# Patient Record
Sex: MALE | Race: BLACK OR AFRICAN AMERICAN | NOT HISPANIC OR LATINO
[De-identification: names, ages, dates, MRNs, and addresses within clinical notes are randomized per-mention and may not be internally consistent; named-entity substitution may affect disease eponyms.]

---

## 2017-01-17 ENCOUNTER — APPOINTMENT (OUTPATIENT)
Dept: CT IMAGING | Facility: IMAGING CENTER | Age: 67
End: 2017-01-17

## 2017-01-24 ENCOUNTER — APPOINTMENT (OUTPATIENT)
Dept: CT IMAGING | Facility: IMAGING CENTER | Age: 67
End: 2017-01-24

## 2017-01-24 ENCOUNTER — OUTPATIENT (OUTPATIENT)
Dept: OUTPATIENT SERVICES | Facility: HOSPITAL | Age: 67
LOS: 1 days | End: 2017-01-24
Payer: MEDICARE

## 2017-01-24 DIAGNOSIS — Z00.8 ENCOUNTER FOR OTHER GENERAL EXAMINATION: ICD-10-CM

## 2017-01-24 PROCEDURE — 71250 CT THORAX DX C-: CPT

## 2017-02-06 ENCOUNTER — EMERGENCY (EMERGENCY)
Facility: HOSPITAL | Age: 67
LOS: 1 days | Discharge: HOME | End: 2017-02-06
Attending: EMERGENCY MEDICINE | Admitting: EMERGENCY MEDICINE
Payer: MEDICARE

## 2017-02-06 VITALS
OXYGEN SATURATION: 97 % | HEART RATE: 77 BPM | RESPIRATION RATE: 20 BRPM | DIASTOLIC BLOOD PRESSURE: 78 MMHG | SYSTOLIC BLOOD PRESSURE: 194 MMHG | TEMPERATURE: 98 F

## 2017-02-06 DIAGNOSIS — Z95.1 PRESENCE OF AORTOCORONARY BYPASS GRAFT: Chronic | ICD-10-CM

## 2017-02-06 LAB
ALBUMIN SERPL ELPH-MCNC: 3.4 G/DL — SIGNIFICANT CHANGE UP (ref 3.3–5)
ALP SERPL-CCNC: 127 U/L — HIGH (ref 40–120)
ALT FLD-CCNC: 15 U/L — SIGNIFICANT CHANGE UP (ref 4–41)
AST SERPL-CCNC: 22 U/L — SIGNIFICANT CHANGE UP (ref 4–40)
BASOPHILS # BLD AUTO: 0.01 K/UL — SIGNIFICANT CHANGE UP (ref 0–0.2)
BASOPHILS NFR BLD AUTO: 0.2 % — SIGNIFICANT CHANGE UP (ref 0–2)
BILIRUB SERPL-MCNC: 0.2 MG/DL — SIGNIFICANT CHANGE UP (ref 0.2–1.2)
BUN SERPL-MCNC: 54 MG/DL — HIGH (ref 7–23)
CALCIUM SERPL-MCNC: 8.8 MG/DL — SIGNIFICANT CHANGE UP (ref 8.4–10.5)
CHLORIDE SERPL-SCNC: 103 MMOL/L — SIGNIFICANT CHANGE UP (ref 98–107)
CK MB BLD-MCNC: 3 — HIGH (ref 0–2.5)
CK MB BLD-MCNC: 6.67 NG/ML — HIGH (ref 1–6.6)
CK SERPL-CCNC: 219 U/L — HIGH (ref 30–200)
CO2 SERPL-SCNC: 21 MMOL/L — LOW (ref 22–31)
CREAT SERPL-MCNC: 3.75 MG/DL — HIGH (ref 0.5–1.3)
EOSINOPHIL # BLD AUTO: 0.2 K/UL — SIGNIFICANT CHANGE UP (ref 0–0.5)
EOSINOPHIL NFR BLD AUTO: 3.2 % — SIGNIFICANT CHANGE UP (ref 0–6)
GLUCOSE SERPL-MCNC: 214 MG/DL — HIGH (ref 70–99)
HCT VFR BLD CALC: 31.3 % — LOW (ref 39–50)
HGB BLD-MCNC: 10 G/DL — LOW (ref 13–17)
IMM GRANULOCYTES NFR BLD AUTO: 0.2 % — SIGNIFICANT CHANGE UP (ref 0–1.5)
LYMPHOCYTES # BLD AUTO: 1.15 K/UL — SIGNIFICANT CHANGE UP (ref 1–3.3)
LYMPHOCYTES # BLD AUTO: 18.5 % — SIGNIFICANT CHANGE UP (ref 13–44)
MCHC RBC-ENTMCNC: 24.7 PG — LOW (ref 27–34)
MCHC RBC-ENTMCNC: 31.9 % — LOW (ref 32–36)
MCV RBC AUTO: 77.3 FL — LOW (ref 80–100)
MONOCYTES # BLD AUTO: 0.56 K/UL — SIGNIFICANT CHANGE UP (ref 0–0.9)
MONOCYTES NFR BLD AUTO: 9 % — SIGNIFICANT CHANGE UP (ref 2–14)
NEUTROPHILS # BLD AUTO: 4.3 K/UL — SIGNIFICANT CHANGE UP (ref 1.8–7.4)
NEUTROPHILS NFR BLD AUTO: 68.9 % — SIGNIFICANT CHANGE UP (ref 43–77)
PLATELET # BLD AUTO: 269 K/UL — SIGNIFICANT CHANGE UP (ref 150–400)
PMV BLD: 10.3 FL — SIGNIFICANT CHANGE UP (ref 7–13)
POTASSIUM SERPL-MCNC: 5.1 MMOL/L — SIGNIFICANT CHANGE UP (ref 3.5–5.3)
POTASSIUM SERPL-SCNC: 5.1 MMOL/L — SIGNIFICANT CHANGE UP (ref 3.5–5.3)
PROT SERPL-MCNC: 6.6 G/DL — SIGNIFICANT CHANGE UP (ref 6–8.3)
RBC # BLD: 4.05 M/UL — LOW (ref 4.2–5.8)
RBC # FLD: 16.1 % — HIGH (ref 10.3–14.5)
SODIUM SERPL-SCNC: 137 MMOL/L — SIGNIFICANT CHANGE UP (ref 135–145)
TROPONIN T SERPL-MCNC: 0.1 NG/ML — HIGH (ref 0–0.06)
WBC # BLD: 6.23 K/UL — SIGNIFICANT CHANGE UP (ref 3.8–10.5)
WBC # FLD AUTO: 6.23 K/UL — SIGNIFICANT CHANGE UP (ref 3.8–10.5)

## 2017-02-06 PROCEDURE — 93010 ELECTROCARDIOGRAM REPORT: CPT

## 2017-02-06 PROCEDURE — 99284 EMERGENCY DEPT VISIT MOD MDM: CPT | Mod: 25,GC

## 2017-02-06 RX ORDER — SODIUM CHLORIDE 9 MG/ML
1000 INJECTION INTRAMUSCULAR; INTRAVENOUS; SUBCUTANEOUS ONCE
Qty: 0 | Refills: 0 | Status: COMPLETED | OUTPATIENT
Start: 2017-02-06 | End: 2017-02-06

## 2017-02-06 RX ADMIN — SODIUM CHLORIDE 2000 MILLILITER(S): 9 INJECTION INTRAMUSCULAR; INTRAVENOUS; SUBCUTANEOUS at 23:54

## 2017-02-06 NOTE — ED PROVIDER NOTE - OBJECTIVE STATEMENT
66yom w/ HTN, DM2 on insulin, CAD s/p CABG p/w a transient episode of right arm weakness. Pt states he had a sudden onset of weakness in his whole right arm that lasted about 5 minutes and resolved spontaneously. No associated facial droop, slurred speech, headache, vision changes, or persistent deficit. Asymptomatic now. Denies chest pain, shortness of breath, palpitations. No hx of CVA or TIA. Takes 81mg ASA daily. 66yom w/ HTN, DM2 on insulin, CAD s/p CABG p/w a transient episode of right arm weakness. Pt states he had a sudden onset of weakness in his whole right arm that lasted about 5 minutes and resolved spontaneously  Was absolutely unabel to use arm during that time. No shaking. No associated facial droop, slurred speech, headache, vision changes, or persistent deficit. Asymptomatic now. Denies chest pain, shortness of breath, palpitations. No hx of CVA or TIA. Takes 81mg ASA daily. Has never happened before.

## 2017-02-06 NOTE — ED PROVIDER NOTE - CRANIAL NERVE AND PUPILLARY EXAM
cranial nerves 2-12 intact/extra-ocular movements intact/tongue is midline/central and peripheral vision intact

## 2017-02-06 NOTE — ED PROVIDER NOTE - ATTENDING CONTRIBUTION TO CARE
Attending Attestation: Dr. Campos  I have personally performed a history and physical examination of the patient and discussed management with the resident as well as the patient.  I reviewed the resident's note and agree with the documented findings and plan of care.  I have authored and modified critical sections of the Provider Note, including but not limited to HPI, Physical Exam and MDM. 66yom w/ CAD, HTN, DM with transient right arm weakness now resolved. Neuro exam completely normal now w/ no persistent deficits. Suspicious for possible TIA, unlikely CVA given prompt resolution of symptoms. Check basic labs, non-con CT head. Likely f/u w/ neuro outpatient for MRI.

## 2017-02-06 NOTE — ED PROVIDER NOTE - CHPI ED SYMPTOMS NEG
no dizziness/no blurred vision/no change in level of consciousness/no loss of consciousness/no confusion

## 2017-02-06 NOTE — ED PROVIDER NOTE - PROGRESS NOTE DETAILS
Second troponin pending. Pt does not want to wait for result. Already has follow up appointment w/ PMD in the morning. Took pt's number, will call back if troponin is rising. Pt and wife aware that elevated troponin may be due to MI, understand that the result is abnormal and needs to be followed up. Pt also provided w/ Neurology f/u list, will make appointment. - MD Lea Repeat trop down to 0.08. Luara Tate Second troponin pending. Pt does not want to wait for result. Already has follow up appointment w/ PMD in the morning. Took pt's number, will call back if troponin is rising. Pt and wife aware that elevated troponin may be due to MI, understand that the result is abnormal and needs to be followed up immediately. Pt also provided w/ Neurology f/u list, will make appointment. - MD Lea

## 2017-02-06 NOTE — ED ADULT NURSE NOTE - CHIEF COMPLAINT QUOTE
Pt. brought to [facility] ED via [facility]. Pt. had numbness of right arm that has since resolved. No deficits noted. A & O x 3. Called [facility] to evaluate for stroke. No stroke code. Did take BP medicine at home and BP is coming down.

## 2017-02-06 NOTE — ED PROVIDER NOTE - MEDICAL DECISION MAKING DETAILS
66yom w/ CAD, HTN, DM with transient right arm weakness now resolved. Neuro exam completely normal now w/ no persistent deficits. Suspicious for possible TIA, unlikely CVA given prompt resolution of symptoms. Check basic labs, non-con CT head. Likely f/u w/ neuro outpatient for MRI.

## 2017-02-06 NOTE — ED ADULT NURSE NOTE - OBJECTIVE STATEMENT
Pt received into room 6 BIBA co Right arm numbness & twitching at approximately 7:30pm. Pt states he was eating dinner his Right arm went numb, his hand started twitching, the episode lasted 4-5 minutes and resolved on its own. Pt denies all symptoms currently. Pt A&Ox4, in NAD. Pt states these same symptoms happened about 4 days ago, lasted about 5 minutes and resolved on their own, pt did not seek medical attention. Pt denies, LOC, CP, dizziness, light headed. No motor or sensory deficits noted currently. 20G IV inserted in L AC, labs sent, Awaiting MD evaluation. Pt has PMH HTN, DM. Family at bedside, call bell within reach, will contnue to monitor for safety and comfort.

## 2017-02-06 NOTE — ED ADULT TRIAGE NOTE - CHIEF COMPLAINT QUOTE
Pt. brought to [facility] ED via [facility]. Pt. had numbness of right arm that has since resolved. No deficits noted. A & O x 3. Called [facility] to evaluate for stroke. No stroke code. Did take BP medicine at home and BP is coming down.

## 2017-02-07 VITALS
RESPIRATION RATE: 18 BRPM | HEART RATE: 69 BPM | DIASTOLIC BLOOD PRESSURE: 86 MMHG | SYSTOLIC BLOOD PRESSURE: 221 MMHG | OXYGEN SATURATION: 100 %

## 2017-02-07 LAB
CK MB BLD-MCNC: 3.2 — HIGH (ref 0–2.5)
CK MB BLD-MCNC: 5.23 NG/ML — SIGNIFICANT CHANGE UP (ref 1–6.6)
CK SERPL-CCNC: 163 U/L — SIGNIFICANT CHANGE UP (ref 30–200)
TROPONIN T SERPL-MCNC: 0.08 NG/ML — HIGH (ref 0–0.06)

## 2017-02-07 PROCEDURE — 70450 CT HEAD/BRAIN W/O DYE: CPT | Mod: 26

## 2017-02-07 RX ORDER — SODIUM CHLORIDE 9 MG/ML
1000 INJECTION, SOLUTION INTRAVENOUS ONCE
Qty: 0 | Refills: 0 | Status: COMPLETED | OUTPATIENT
Start: 2017-02-07 | End: 2017-02-07

## 2017-02-07 RX ADMIN — SODIUM CHLORIDE 1000 MILLILITER(S): 9 INJECTION, SOLUTION INTRAVENOUS at 00:47

## 2017-11-06 ENCOUNTER — APPOINTMENT (OUTPATIENT)
Dept: CT IMAGING | Facility: IMAGING CENTER | Age: 67
End: 2017-11-06
Payer: MEDICARE

## 2017-11-06 ENCOUNTER — OUTPATIENT (OUTPATIENT)
Dept: OUTPATIENT SERVICES | Facility: HOSPITAL | Age: 67
LOS: 1 days | End: 2017-11-06
Payer: MEDICARE

## 2017-11-06 DIAGNOSIS — Z95.1 PRESENCE OF AORTOCORONARY BYPASS GRAFT: Chronic | ICD-10-CM

## 2017-11-06 DIAGNOSIS — Z00.8 ENCOUNTER FOR OTHER GENERAL EXAMINATION: ICD-10-CM

## 2017-11-06 PROCEDURE — 71250 CT THORAX DX C-: CPT

## 2017-11-06 PROCEDURE — 71250 CT THORAX DX C-: CPT | Mod: 26

## 2018-03-27 NOTE — ED ADULT NURSE NOTE - NS PRO PASSIVE SMOKE EXP
Problem: SLP Goal  Goal: SLP Goal  Updated Speech Language Pathology Goals  Goals expected to be met by 4/3/18:    1.  Pt will tolerate Dental Soft diet w/ thin liquids w/ no overt signs of aspiration.  2.  Pt will be oriented x4 using external cues.  3.  Pt will complete abstract naming tasks w/ 80% acc given min cues.  4.  Pt will complete problem-solving tasks w/ 80% acc given min cues.  5.  Pt will complete reasoning tasks w/ 80% acc given min cues.  6.  Pt will complete short term memory tasks w/ 66% acc indep using memory strategies.  7.  Pt will complete further evaluation of reading, writing and visual spatial skills to determine the need for additional goals.        Speech Language Pathology Goals  Goals expected to be met by 4/1  1. Pt will participate in ongoing swallow assessment -Goal Met  2. Pt will participate in speech, language and cognitive assessment -Goal Met     Outcome: Ongoing (interventions implemented as appropriate)  Speech Language Cognitive evaluation completed.  Goals updated.  Cont ST per POC.    Nichol Perdomo CCC-SLP  3/27/2018         Unknown

## 2018-06-12 ENCOUNTER — INPATIENT (INPATIENT)
Facility: HOSPITAL | Age: 68
LOS: 4 days | Discharge: HOME | End: 2018-06-17
Attending: INTERNAL MEDICINE | Admitting: INTERNAL MEDICINE
Payer: MEDICARE

## 2018-06-12 VITALS
RESPIRATION RATE: 20 BRPM | DIASTOLIC BLOOD PRESSURE: 99 MMHG | OXYGEN SATURATION: 96 % | HEART RATE: 76 BPM | SYSTOLIC BLOOD PRESSURE: 164 MMHG | TEMPERATURE: 98 F

## 2018-06-12 DIAGNOSIS — I50.9 HEART FAILURE, UNSPECIFIED: ICD-10-CM

## 2018-06-12 DIAGNOSIS — Z95.1 PRESENCE OF AORTOCORONARY BYPASS GRAFT: Chronic | ICD-10-CM

## 2018-06-12 LAB
ALBUMIN SERPL ELPH-MCNC: 3.9 G/DL — SIGNIFICANT CHANGE UP (ref 3.3–5)
ALP SERPL-CCNC: 159 U/L — HIGH (ref 40–120)
ALT FLD-CCNC: 22 U/L — SIGNIFICANT CHANGE UP (ref 4–41)
APTT BLD: 36.6 SEC — SIGNIFICANT CHANGE UP (ref 27.5–37.4)
AST SERPL-CCNC: 28 U/L — SIGNIFICANT CHANGE UP (ref 4–40)
BASE EXCESS BLDV CALC-SCNC: -4.8 MMOL/L — SIGNIFICANT CHANGE UP
BILIRUB SERPL-MCNC: 0.2 MG/DL — SIGNIFICANT CHANGE UP (ref 0.2–1.2)
BLD GP AB SCN SERPL QL: NEGATIVE — SIGNIFICANT CHANGE UP
BLOOD GAS VENOUS - CREATININE: 8.52 MG/DL — HIGH (ref 0.5–1.3)
BUN SERPL-MCNC: 110 MG/DL — HIGH (ref 7–23)
CALCIUM SERPL-MCNC: 7.8 MG/DL — LOW (ref 8.4–10.5)
CHLORIDE BLDV-SCNC: 108 MMOL/L — SIGNIFICANT CHANGE UP (ref 96–108)
CHLORIDE SERPL-SCNC: 99 MMOL/L — SIGNIFICANT CHANGE UP (ref 98–107)
CO2 SERPL-SCNC: 18 MMOL/L — LOW (ref 22–31)
CREAT SERPL-MCNC: 7.69 MG/DL — HIGH (ref 0.5–1.3)
GAS PNL BLDV: 137 MMOL/L — SIGNIFICANT CHANGE UP (ref 136–146)
GLUCOSE BLDV-MCNC: 196 — HIGH (ref 70–99)
GLUCOSE SERPL-MCNC: 188 MG/DL — HIGH (ref 70–99)
HCO3 BLDV-SCNC: 20 MMOL/L — SIGNIFICANT CHANGE UP (ref 20–27)
HCT VFR BLD CALC: 23.5 % — LOW (ref 39–50)
HCT VFR BLDV CALC: 24.3 % — LOW (ref 39–51)
HGB BLD-MCNC: 7.3 G/DL — LOW (ref 13–17)
HGB BLDV-MCNC: 7.8 G/DL — LOW (ref 13–17)
INR BLD: 0.91 — SIGNIFICANT CHANGE UP (ref 0.88–1.17)
LACTATE BLDV-MCNC: 0.8 MMOL/L — SIGNIFICANT CHANGE UP (ref 0.5–2)
MCHC RBC-ENTMCNC: 24.5 PG — LOW (ref 27–34)
MCHC RBC-ENTMCNC: 31.1 % — LOW (ref 32–36)
MCV RBC AUTO: 78.9 FL — LOW (ref 80–100)
NRBC # FLD: 0 — SIGNIFICANT CHANGE UP
NT-PROBNP SERPL-SCNC: SIGNIFICANT CHANGE UP PG/ML
PCO2 BLDV: 39 MMHG — LOW (ref 41–51)
PH BLDV: 7.33 PH — SIGNIFICANT CHANGE UP (ref 7.32–7.43)
PLATELET # BLD AUTO: 240 K/UL — SIGNIFICANT CHANGE UP (ref 150–400)
PMV BLD: 10.4 FL — SIGNIFICANT CHANGE UP (ref 7–13)
PO2 BLDV: 46 MMHG — HIGH (ref 35–40)
POTASSIUM BLDV-SCNC: 4.8 MMOL/L — HIGH (ref 3.4–4.5)
POTASSIUM SERPL-MCNC: 5.2 MMOL/L — SIGNIFICANT CHANGE UP (ref 3.5–5.3)
POTASSIUM SERPL-SCNC: 5.2 MMOL/L — SIGNIFICANT CHANGE UP (ref 3.5–5.3)
PROT SERPL-MCNC: 7.6 G/DL — SIGNIFICANT CHANGE UP (ref 6–8.3)
PROTHROM AB SERPL-ACNC: 10.4 SEC — SIGNIFICANT CHANGE UP (ref 9.8–13.1)
RBC # BLD: 2.98 M/UL — LOW (ref 4.2–5.8)
RBC # FLD: 19.4 % — HIGH (ref 10.3–14.5)
RH IG SCN BLD-IMP: POSITIVE — SIGNIFICANT CHANGE UP
SAO2 % BLDV: 76.4 % — SIGNIFICANT CHANGE UP (ref 60–85)
SODIUM SERPL-SCNC: 138 MMOL/L — SIGNIFICANT CHANGE UP (ref 135–145)
TROPONIN T SERPL-MCNC: 0.42 NG/ML — HIGH (ref 0–0.06)
WBC # BLD: 7.59 K/UL — SIGNIFICANT CHANGE UP (ref 3.8–10.5)
WBC # FLD AUTO: 7.59 K/UL — SIGNIFICANT CHANGE UP (ref 3.8–10.5)

## 2018-06-12 PROCEDURE — 71045 X-RAY EXAM CHEST 1 VIEW: CPT | Mod: 26

## 2018-06-12 RX ORDER — DEXTROSE 50 % IN WATER 50 %
25 SYRINGE (ML) INTRAVENOUS ONCE
Qty: 0 | Refills: 0 | Status: DISCONTINUED | OUTPATIENT
Start: 2018-06-12 | End: 2018-06-17

## 2018-06-12 RX ORDER — SODIUM CHLORIDE 9 MG/ML
1000 INJECTION, SOLUTION INTRAVENOUS
Qty: 0 | Refills: 0 | Status: DISCONTINUED | OUTPATIENT
Start: 2018-06-12 | End: 2018-06-17

## 2018-06-12 RX ORDER — HEPARIN SODIUM 5000 [USP'U]/ML
5000 INJECTION INTRAVENOUS; SUBCUTANEOUS EVERY 12 HOURS
Qty: 0 | Refills: 0 | Status: DISCONTINUED | OUTPATIENT
Start: 2018-06-12 | End: 2018-06-17

## 2018-06-12 RX ORDER — FUROSEMIDE 40 MG
40 TABLET ORAL ONCE
Qty: 0 | Refills: 0 | Status: COMPLETED | OUTPATIENT
Start: 2018-06-12 | End: 2018-06-12

## 2018-06-12 RX ORDER — HYDRALAZINE HCL 50 MG
50 TABLET ORAL EVERY 8 HOURS
Qty: 0 | Refills: 0 | Status: DISCONTINUED | OUTPATIENT
Start: 2018-06-12 | End: 2018-06-17

## 2018-06-12 RX ORDER — METOPROLOL TARTRATE 50 MG
50 TABLET ORAL DAILY
Qty: 0 | Refills: 0 | Status: DISCONTINUED | OUTPATIENT
Start: 2018-06-12 | End: 2018-06-17

## 2018-06-12 RX ORDER — SIMVASTATIN 20 MG/1
20 TABLET, FILM COATED ORAL AT BEDTIME
Qty: 0 | Refills: 0 | Status: DISCONTINUED | OUTPATIENT
Start: 2018-06-12 | End: 2018-06-17

## 2018-06-12 RX ORDER — ASPIRIN/CALCIUM CARB/MAGNESIUM 324 MG
162 TABLET ORAL ONCE
Qty: 0 | Refills: 0 | Status: COMPLETED | OUTPATIENT
Start: 2018-06-12 | End: 2018-06-12

## 2018-06-12 RX ORDER — AMLODIPINE BESYLATE 2.5 MG/1
10 TABLET ORAL DAILY
Qty: 0 | Refills: 0 | Status: DISCONTINUED | OUTPATIENT
Start: 2018-06-12 | End: 2018-06-17

## 2018-06-12 RX ORDER — INSULIN LISPRO 100/ML
VIAL (ML) SUBCUTANEOUS
Qty: 0 | Refills: 0 | Status: DISCONTINUED | OUTPATIENT
Start: 2018-06-12 | End: 2018-06-17

## 2018-06-12 RX ORDER — INSULIN LISPRO 100/ML
VIAL (ML) SUBCUTANEOUS AT BEDTIME
Qty: 0 | Refills: 0 | Status: DISCONTINUED | OUTPATIENT
Start: 2018-06-12 | End: 2018-06-17

## 2018-06-12 RX ORDER — DEXTROSE 50 % IN WATER 50 %
12.5 SYRINGE (ML) INTRAVENOUS ONCE
Qty: 0 | Refills: 0 | Status: DISCONTINUED | OUTPATIENT
Start: 2018-06-12 | End: 2018-06-17

## 2018-06-12 RX ORDER — GLUCAGON INJECTION, SOLUTION 0.5 MG/.1ML
1 INJECTION, SOLUTION SUBCUTANEOUS ONCE
Qty: 0 | Refills: 0 | Status: DISCONTINUED | OUTPATIENT
Start: 2018-06-12 | End: 2018-06-17

## 2018-06-12 RX ORDER — ASPIRIN/CALCIUM CARB/MAGNESIUM 324 MG
81 TABLET ORAL DAILY
Qty: 0 | Refills: 0 | Status: DISCONTINUED | OUTPATIENT
Start: 2018-06-12 | End: 2018-06-17

## 2018-06-12 RX ORDER — CLOPIDOGREL BISULFATE 75 MG/1
75 TABLET, FILM COATED ORAL DAILY
Qty: 0 | Refills: 0 | Status: DISCONTINUED | OUTPATIENT
Start: 2018-06-12 | End: 2018-06-17

## 2018-06-12 RX ORDER — DEXTROSE 50 % IN WATER 50 %
15 SYRINGE (ML) INTRAVENOUS ONCE
Qty: 0 | Refills: 0 | Status: DISCONTINUED | OUTPATIENT
Start: 2018-06-12 | End: 2018-06-17

## 2018-06-12 RX ORDER — FUROSEMIDE 40 MG
40 TABLET ORAL
Qty: 0 | Refills: 0 | Status: DISCONTINUED | OUTPATIENT
Start: 2018-06-12 | End: 2018-06-14

## 2018-06-12 RX ADMIN — Medication 162 MILLIGRAM(S): at 20:42

## 2018-06-12 RX ADMIN — Medication 40 MILLIGRAM(S): at 20:42

## 2018-06-12 NOTE — ED PROVIDER NOTE - MEDICAL DECISION MAKING DETAILS
68M with sob and fluid overload, likely chf exacerbation. check labs with probnp and cardiac enzymes. cxr. ekg, asa. lasix ivp. 68M with sob and fluid overload, likely chf exacerbation. vs worsening renal failure. check labs with probnp and cardiac enzymes. cxr. ekg, asa. lasix ivp.

## 2018-06-12 NOTE — ED PROVIDER NOTE - OBJECTIVE STATEMENT
68M with hx of HTN, DM, CKD, CHF p/w worsening sob and le edema for the past 2 weeks. patient has been feeling too sob to walk short distances at home to the bathroom. unable to lay flat. denies any cough, fever, chills, cp.     PMD: Dr. Swartz  Cards: Dr. Mynor Lloyd 68M with hx of HTN, DM, CKD, CHF p/w worsening sob and le edema for the past 2 weeks. patient has been feeling too sob to walk short distances at home to the bathroom. unable to lay flat. denies any cough, fever, chills, cp.     PMD: Dr. Swartz  Cards: Dr. Mynor Lloyd    Attendinyo male presents with worsening shortness of breath and LE edema for the last 3 weeks or so.  Has dyspnea with exertaion.  Feels no chest pain.  Has mild shortness of breath at rest.

## 2018-06-12 NOTE — ED ADULT NURSE NOTE - OBJECTIVE STATEMENT
68 y male A+Ox3 presents to the ER with SOB and bilateral edema for the past month.  Pt states he has CHF and is on lasix at home but states for the past 2 weeks, his SOB has been getting worse along with increased swelling of the feet. Pt is normally ambulatory without assistance but for the past week he has been more on bedrest. Pt has hx of aortic valve replacement, is diabetic, and has a hx of hypertension which he states he is med compliant with.   Pt states he took his medications today. +2 pitting edema noted on bilateral feet. Wife at bedside. Pt placed on cardiac monitor and labs sent awaiting results and dispo. Wife at bedside.

## 2018-06-13 DIAGNOSIS — I50.9 HEART FAILURE, UNSPECIFIED: ICD-10-CM

## 2018-06-13 DIAGNOSIS — E11.9 TYPE 2 DIABETES MELLITUS WITHOUT COMPLICATIONS: ICD-10-CM

## 2018-06-13 DIAGNOSIS — Z29.9 ENCOUNTER FOR PROPHYLACTIC MEASURES, UNSPECIFIED: ICD-10-CM

## 2018-06-13 DIAGNOSIS — I10 ESSENTIAL (PRIMARY) HYPERTENSION: ICD-10-CM

## 2018-06-13 DIAGNOSIS — N18.9 CHRONIC KIDNEY DISEASE, UNSPECIFIED: ICD-10-CM

## 2018-06-13 DIAGNOSIS — D64.9 ANEMIA, UNSPECIFIED: ICD-10-CM

## 2018-06-13 LAB
BUN SERPL-MCNC: 110 MG/DL — HIGH (ref 7–23)
CALCIUM SERPL-MCNC: 7.6 MG/DL — LOW (ref 8.4–10.5)
CHLORIDE SERPL-SCNC: 103 MMOL/L — SIGNIFICANT CHANGE UP (ref 98–107)
CHOLEST SERPL-MCNC: 173 MG/DL — SIGNIFICANT CHANGE UP (ref 120–199)
CK MB BLD-MCNC: 11.5 NG/ML — HIGH (ref 1–6.6)
CK MB BLD-MCNC: 12.62 NG/ML — HIGH (ref 1–6.6)
CK SERPL-CCNC: 448 U/L — HIGH (ref 30–200)
CK SERPL-CCNC: 476 U/L — HIGH (ref 30–200)
CO2 SERPL-SCNC: 18 MMOL/L — LOW (ref 22–31)
CREAT SERPL-MCNC: 7.78 MG/DL — HIGH (ref 0.5–1.3)
GLUCOSE BLDC GLUCOMTR-MCNC: 126 MG/DL — HIGH (ref 70–99)
GLUCOSE BLDC GLUCOMTR-MCNC: 130 MG/DL — HIGH (ref 70–99)
GLUCOSE BLDC GLUCOMTR-MCNC: 130 MG/DL — HIGH (ref 70–99)
GLUCOSE BLDC GLUCOMTR-MCNC: 142 MG/DL — HIGH (ref 70–99)
GLUCOSE BLDC GLUCOMTR-MCNC: 144 MG/DL — HIGH (ref 70–99)
GLUCOSE SERPL-MCNC: 110 MG/DL — HIGH (ref 70–99)
HBA1C BLD-MCNC: 5.9 % — HIGH (ref 4–5.6)
HCT VFR BLD CALC: 21.2 % — LOW (ref 39–50)
HCT VFR BLD CALC: 21.9 % — LOW (ref 39–50)
HDLC SERPL-MCNC: 64 MG/DL — HIGH (ref 35–55)
HGB BLD-MCNC: 6.9 G/DL — CRITICAL LOW (ref 13–17)
HGB BLD-MCNC: 7 G/DL — CRITICAL LOW (ref 13–17)
LIPID PNL WITH DIRECT LDL SERPL: 103 MG/DL — SIGNIFICANT CHANGE UP
MAGNESIUM SERPL-MCNC: 2.3 MG/DL — SIGNIFICANT CHANGE UP (ref 1.6–2.6)
MCHC RBC-ENTMCNC: 24.1 PG — LOW (ref 27–34)
MCHC RBC-ENTMCNC: 24.2 PG — LOW (ref 27–34)
MCHC RBC-ENTMCNC: 31.5 % — LOW (ref 32–36)
MCHC RBC-ENTMCNC: 31.9 % — LOW (ref 32–36)
MCV RBC AUTO: 75.8 FL — LOW (ref 80–100)
MCV RBC AUTO: 76.6 FL — LOW (ref 80–100)
NRBC # FLD: 0 — SIGNIFICANT CHANGE UP
NRBC # FLD: 0 — SIGNIFICANT CHANGE UP
PHOSPHATE SERPL-MCNC: 7.9 MG/DL — HIGH (ref 2.5–4.5)
PLATELET # BLD AUTO: 231 K/UL — SIGNIFICANT CHANGE UP (ref 150–400)
PLATELET # BLD AUTO: 237 K/UL — SIGNIFICANT CHANGE UP (ref 150–400)
PMV BLD: SIGNIFICANT CHANGE UP FL (ref 7–13)
PMV BLD: SIGNIFICANT CHANGE UP FL (ref 7–13)
POTASSIUM SERPL-MCNC: 5 MMOL/L — SIGNIFICANT CHANGE UP (ref 3.5–5.3)
POTASSIUM SERPL-SCNC: 5 MMOL/L — SIGNIFICANT CHANGE UP (ref 3.5–5.3)
RBC # BLD: 2.81 M/UL — LOW (ref 4.2–5.8)
RBC # BLD: 2.86 M/UL — LOW (ref 4.2–5.8)
RBC # FLD: 18.9 % — HIGH (ref 10.3–14.5)
RBC # FLD: 19.6 % — HIGH (ref 10.3–14.5)
SODIUM SERPL-SCNC: 141 MMOL/L — SIGNIFICANT CHANGE UP (ref 135–145)
TRIGL SERPL-MCNC: 81 MG/DL — SIGNIFICANT CHANGE UP (ref 10–149)
TROPONIN T SERPL-MCNC: 0.38 NG/ML — HIGH (ref 0–0.06)
TROPONIN T SERPL-MCNC: 0.39 NG/ML — HIGH (ref 0–0.06)
TSH SERPL-MCNC: 1.73 UIU/ML — SIGNIFICANT CHANGE UP (ref 0.27–4.2)
WBC # BLD: 7.82 K/UL — SIGNIFICANT CHANGE UP (ref 3.8–10.5)
WBC # BLD: 8.17 K/UL — SIGNIFICANT CHANGE UP (ref 3.8–10.5)
WBC # FLD AUTO: 7.82 K/UL — SIGNIFICANT CHANGE UP (ref 3.8–10.5)
WBC # FLD AUTO: 8.17 K/UL — SIGNIFICANT CHANGE UP (ref 3.8–10.5)

## 2018-06-13 RX ORDER — FUROSEMIDE 40 MG
80 TABLET ORAL ONCE
Qty: 0 | Refills: 0 | Status: COMPLETED | OUTPATIENT
Start: 2018-06-13 | End: 2018-06-14

## 2018-06-13 RX ADMIN — AMLODIPINE BESYLATE 10 MILLIGRAM(S): 2.5 TABLET ORAL at 05:55

## 2018-06-13 RX ADMIN — Medication 81 MILLIGRAM(S): at 11:44

## 2018-06-13 RX ADMIN — Medication 50 MILLIGRAM(S): at 05:56

## 2018-06-13 RX ADMIN — SIMVASTATIN 20 MILLIGRAM(S): 20 TABLET, FILM COATED ORAL at 21:51

## 2018-06-13 RX ADMIN — CLOPIDOGREL BISULFATE 75 MILLIGRAM(S): 75 TABLET, FILM COATED ORAL at 11:44

## 2018-06-13 RX ADMIN — Medication 50 MILLIGRAM(S): at 13:37

## 2018-06-13 RX ADMIN — HEPARIN SODIUM 5000 UNIT(S): 5000 INJECTION INTRAVENOUS; SUBCUTANEOUS at 18:41

## 2018-06-13 RX ADMIN — Medication 40 MILLIGRAM(S): at 05:55

## 2018-06-13 RX ADMIN — Medication 50 MILLIGRAM(S): at 21:51

## 2018-06-13 RX ADMIN — Medication 40 MILLIGRAM(S): at 18:41

## 2018-06-13 RX ADMIN — Medication 50 MILLIGRAM(S): at 05:55

## 2018-06-13 RX ADMIN — Medication 0: at 10:10

## 2018-06-13 RX ADMIN — HEPARIN SODIUM 5000 UNIT(S): 5000 INJECTION INTRAVENOUS; SUBCUTANEOUS at 05:55

## 2018-06-13 NOTE — H&P ADULT - ATTENDING COMMENTS
Patient well known to me for many years he has been having chronic renal failure for the past 2 years which is worsening his wife refuse any intervention and possible hemodialysis I offered her to come to the hospital about 2 weeks ago but she decided to take him back to [facility] for further assessment and evaluation but apparently nothing was done as per patient he was seen in the ER lethargic his lab tests reviewed he had elevated troponin which can be secondary tronic renal failure patient downgraded to medicine so will be admitted to the floor  not  tely bed He was very uncomfortable in the ER.  67 y/o M with h/o HTN, DM, CKD, CKF (EF 30%), anemia, presents to the ED for shortness of breath and LE edema for 2 weeks. Pt states he has been having worsening shortness of breath. Pt states he has orthopnea and PND. Pt states he gets dyspnea on exertion after walking a block. Pt states he has a cough and coughs up phlegm. Pt denies LOC, syncope, fever, chills, N/V/D/C, chest pain, numbness, tingling, palpitations, headache, abdominal pain, dysuria, urinary/bowel incontinence or any other complaints at this time.  Patient creatinine 2 months ago was 6 is elevated  we'll ask renal to be seen for possible hemodialysis  which his wife completely against it.  His medical case discussed with the  ER team and physician assistant in detail

## 2018-06-13 NOTE — PHYSICAL THERAPY INITIAL EVALUATION ADULT - PERTINENT HX OF CURRENT PROBLEM, REHAB EVAL
67 y/o M with h/o HTN, DM, CKD, CKF (EF 30%), anemia, presents to the ED for shortness of breath and LE edema for 2 weeks.

## 2018-06-13 NOTE — H&P ADULT - ASSESSMENT
69 y/o M with h/o HTN, DM, CKD, CHF (EF 30-35%), anemia, HLD presents to the ED for SOB and LE edema. Admit to telemetry for acute on chronic CHF.

## 2018-06-13 NOTE — H&P ADULT - NSHPPHYSICALEXAM_GEN_ALL_CORE
GENERAL APPEARANCE: Well developed, well nourished, alert and cooperative, and appears to be in no acute distress.  HEAD: normocephalic.  EYES: PERRL, EOMI.   EARS: External auditory canals clear, hearing grossly intact.  NECK: Neck supple, non-tender without lymphadenopathy, masses or thyromegaly.  CARDIAC: Normal S1 and S2. No S3, S4 or murmurs. Rhythm is regular.   LUNGS: Clear to auscultation and percussion without rales, rhonchi, wheezing or diminished breath sounds.  ABDOMEN: Positive bowel sounds. Soft, nondistended, nontender. No guarding or rebound. No masses.  EXTREMITIES: No significant deformity or joint abnormality. 3+ edema bilaterally.  Peripheral pulses intact.   SKIN: Skin normal color, texture and turgor with no lesions or eruptions.  PSYCHIATRIC: The mental examination revealed the patient was oriented to person, place, and time. The patient was able to demonstrate good judgement and reason, without hallucinations, abnormal affect or abnormal behaviors during the examination. Patient is not suicidal.

## 2018-06-13 NOTE — ED ADULT NURSE REASSESSMENT NOTE - NS ED NURSE REASSESS COMMENT FT1
Report received from CORBIN Lea. Pt. received with 20 gauge IV in right ac, on 2L oxygen via nasal cannula, and on cardiac monitor. Pt. is resting comfortably at present. Awaiting bed assignment. Will continue to monitor.

## 2018-06-13 NOTE — H&P ADULT - PROBLEM SELECTOR PLAN 1
Admit to telemetry.   Strict I and O, daily weights.   Continue with lasix 40 mg IV BID. Fluid restriction.   check cbc,tsh,lipid, hemoglobin a1c, bmp with mag and phos.   f/u MD note

## 2018-06-13 NOTE — H&P ADULT - NSHPREVIEWOFSYSTEMS_GEN_ALL_CORE
Constitutional: No fever, fatigue or weight loss.  Skin: No rash.  Eyes: No recent vision problems or eye pain.  ENT: No congestion, ear pain, or sore throat.  Endocrine: No thyroid problems.  Cardiovascular: No chest pain or palpitation.  Respiratory: + shortness of breath + cough. + orthopnea. +PND. No congestion, or wheezing.  Gastrointestinal: No abdominal pain, nausea, vomiting, or diarrhea.  Genitourinary: No dysuria.  Musculoskeletal: No joint swelling.  Neurologic: No headache.

## 2018-06-13 NOTE — H&P ADULT - HISTORY OF PRESENT ILLNESS
67 y/o M with h/o HTN, DM, CKD, CKF (EF 30%), anemia, presents to the ED for shortness of breath and LE edema for 2 weeks. Pt states he has been having worsening shortness of breath. Pt states he has orthopnea and PND. Pt states he gets dyspnea on exertion after walking a block. Pt states he has a cough and coughs up phlegm. Pt denies LOC, syncope, fever, chills, N/V/D/C, chest pain, numbness, tingling, palpitations, headache, abdominal pain, dysuria, urinary/bowel incontinence or any other complaints at this time.

## 2018-06-13 NOTE — PHYSICAL THERAPY INITIAL EVALUATION ADULT - ADDITIONAL COMMENTS
Patient lives with wife in a home with steps to enter; patient reports he does not use an Assistive Device prior to admission

## 2018-06-13 NOTE — H&P ADULT - NSHPLABSRESULTS_GEN_ALL_CORE
LABS:                        7.3    7.59  )-----------( 240      ( 12 Jun 2018 20:30 )             23.5     06-12    138  |  99  |  110<H>  ----------------------------<  188<H>  5.2   |  18<L>  |  7.69<H>    Ca    7.8<L>      12 Jun 2018 19:58    TPro  7.6  /  Alb  3.9  /  TBili  0.2  /  DBili  x   /  AST  28  /  ALT  22  /  AlkPhos  159<H>  06-12    PT/INR - ( 12 Jun 2018 20:30 )   PT: 10.4 SEC;   INR: 0.91          PTT - ( 12 Jun 2018 20:30 )  PTT:36.6 SEC    CAPILLARY BLOOD GLUCOSE      EKG shows NSR @ 75 bpm  ms

## 2018-06-13 NOTE — ED ADULT NURSE REASSESSMENT NOTE - NS ED NURSE REASSESS COMMENT FT1
Tele JERICA Ramirez made aware of pt. vital signs and troponin results. No new orders at this time. Pt. is resting comfortably at present. Will continue to monitor.

## 2018-06-13 NOTE — H&P ADULT - PMH
Anemia    CHF (congestive heart failure)    CKD (chronic kidney disease)    Diabetes mellitus    Hypertension

## 2018-06-14 ENCOUNTER — TRANSCRIPTION ENCOUNTER (OUTPATIENT)
Age: 68
End: 2018-06-14

## 2018-06-14 DIAGNOSIS — N18.9 CHRONIC KIDNEY DISEASE, UNSPECIFIED: ICD-10-CM

## 2018-06-14 DIAGNOSIS — N17.9 ACUTE KIDNEY FAILURE, UNSPECIFIED: ICD-10-CM

## 2018-06-14 DIAGNOSIS — E87.2 ACIDOSIS: ICD-10-CM

## 2018-06-14 DIAGNOSIS — I10 ESSENTIAL (PRIMARY) HYPERTENSION: ICD-10-CM

## 2018-06-14 LAB
BUN SERPL-MCNC: 111 MG/DL — HIGH (ref 7–23)
CALCIUM SERPL-MCNC: 7.5 MG/DL — LOW (ref 8.4–10.5)
CHLORIDE SERPL-SCNC: 100 MMOL/L — SIGNIFICANT CHANGE UP (ref 98–107)
CO2 SERPL-SCNC: 19 MMOL/L — LOW (ref 22–31)
CREAT SERPL-MCNC: 7.98 MG/DL — HIGH (ref 0.5–1.3)
GLUCOSE BLDC GLUCOMTR-MCNC: 111 MG/DL — HIGH (ref 70–99)
GLUCOSE BLDC GLUCOMTR-MCNC: 148 MG/DL — HIGH (ref 70–99)
GLUCOSE BLDC GLUCOMTR-MCNC: 149 MG/DL — HIGH (ref 70–99)
GLUCOSE BLDC GLUCOMTR-MCNC: 189 MG/DL — HIGH (ref 70–99)
GLUCOSE SERPL-MCNC: 95 MG/DL — SIGNIFICANT CHANGE UP (ref 70–99)
HCT VFR BLD CALC: 21.4 % — LOW (ref 39–50)
HGB BLD-MCNC: 6.7 G/DL — CRITICAL LOW (ref 13–17)
MAGNESIUM SERPL-MCNC: 2.3 MG/DL — SIGNIFICANT CHANGE UP (ref 1.6–2.6)
MCHC RBC-ENTMCNC: 24.8 PG — LOW (ref 27–34)
MCHC RBC-ENTMCNC: 31.3 % — LOW (ref 32–36)
MCV RBC AUTO: 79.3 FL — LOW (ref 80–100)
NRBC # FLD: 0 — SIGNIFICANT CHANGE UP
PLATELET # BLD AUTO: 220 K/UL — SIGNIFICANT CHANGE UP (ref 150–400)
PMV BLD: SIGNIFICANT CHANGE UP FL (ref 7–13)
POTASSIUM SERPL-MCNC: 4.9 MMOL/L — SIGNIFICANT CHANGE UP (ref 3.5–5.3)
POTASSIUM SERPL-SCNC: 4.9 MMOL/L — SIGNIFICANT CHANGE UP (ref 3.5–5.3)
RBC # BLD: 2.7 M/UL — LOW (ref 4.2–5.8)
RBC # FLD: 18.8 % — HIGH (ref 10.3–14.5)
RH IG SCN BLD-IMP: POSITIVE — SIGNIFICANT CHANGE UP
SODIUM SERPL-SCNC: 139 MMOL/L — SIGNIFICANT CHANGE UP (ref 135–145)
WBC # BLD: 6.99 K/UL — SIGNIFICANT CHANGE UP (ref 3.8–10.5)
WBC # FLD AUTO: 6.99 K/UL — SIGNIFICANT CHANGE UP (ref 3.8–10.5)

## 2018-06-14 PROCEDURE — 99232 SBSQ HOSP IP/OBS MODERATE 35: CPT | Mod: GC

## 2018-06-14 RX ORDER — SODIUM BICARBONATE 1 MEQ/ML
650 SYRINGE (ML) INTRAVENOUS THREE TIMES A DAY
Qty: 0 | Refills: 0 | Status: DISCONTINUED | OUTPATIENT
Start: 2018-06-14 | End: 2018-06-17

## 2018-06-14 RX ORDER — FUROSEMIDE 40 MG
80 TABLET ORAL
Qty: 0 | Refills: 0 | Status: DISCONTINUED | OUTPATIENT
Start: 2018-06-14 | End: 2018-06-16

## 2018-06-14 RX ORDER — CALCIUM ACETATE 667 MG
667 TABLET ORAL
Qty: 0 | Refills: 0 | Status: DISCONTINUED | OUTPATIENT
Start: 2018-06-14 | End: 2018-06-17

## 2018-06-14 RX ADMIN — Medication 80 MILLIGRAM(S): at 17:24

## 2018-06-14 RX ADMIN — Medication 1: at 13:15

## 2018-06-14 RX ADMIN — Medication 50 MILLIGRAM(S): at 05:54

## 2018-06-14 RX ADMIN — HEPARIN SODIUM 5000 UNIT(S): 5000 INJECTION INTRAVENOUS; SUBCUTANEOUS at 17:45

## 2018-06-14 RX ADMIN — CLOPIDOGREL BISULFATE 75 MILLIGRAM(S): 75 TABLET, FILM COATED ORAL at 11:33

## 2018-06-14 RX ADMIN — SIMVASTATIN 20 MILLIGRAM(S): 20 TABLET, FILM COATED ORAL at 23:25

## 2018-06-14 RX ADMIN — Medication 81 MILLIGRAM(S): at 11:33

## 2018-06-14 RX ADMIN — Medication 80 MILLIGRAM(S): at 23:26

## 2018-06-14 RX ADMIN — Medication 50 MILLIGRAM(S): at 13:52

## 2018-06-14 RX ADMIN — Medication 667 MILLIGRAM(S): at 17:45

## 2018-06-14 RX ADMIN — HEPARIN SODIUM 5000 UNIT(S): 5000 INJECTION INTRAVENOUS; SUBCUTANEOUS at 05:54

## 2018-06-14 RX ADMIN — Medication 40 MILLIGRAM(S): at 05:54

## 2018-06-14 RX ADMIN — Medication 650 MILLIGRAM(S): at 23:25

## 2018-06-14 RX ADMIN — Medication 50 MILLIGRAM(S): at 23:26

## 2018-06-14 RX ADMIN — AMLODIPINE BESYLATE 10 MILLIGRAM(S): 2.5 TABLET ORAL at 05:55

## 2018-06-14 NOTE — DIETITIAN INITIAL EVALUATION ADULT. - OTHER INFO
Nutrition consult received on 6/12/18 for RD. Pt. alert, oriented , tolerating PO diet, reports fair to good appetite, PO intake . Pt.  states he was on low salt diet , no known food allergies, no difficulty chewing swallowing , maintained WT. Pt. denies being on renal restriction . Pt. informed on current diet order and renal restriction but not interested .

## 2018-06-14 NOTE — CONSULT NOTE ADULT - PROBLEM SELECTOR RECOMMENDATION 4
Serum CO2 noted to be 19 on labs done today. Recommend to start on sodium bicarbonate  mg TID. Monitor serum CO2 levels.

## 2018-06-14 NOTE — CONSULT NOTE ADULT - PROBLEM SELECTOR RECOMMENDATION 2
In setting of advanced renal failure and fluid overload. BP noted to be elevated today. Recommend to increase lasix to 80 mg IV BID. Low salt diet advised. Monitor BP. In setting of advanced renal failure and fluid overload. BP noted to be elevated today. Recommend to start on lasix and metolazone(as above). Low salt diet advised. Monitor BP.

## 2018-06-14 NOTE — CONSULT NOTE ADULT - PROBLEM SELECTOR RECOMMENDATION 9
Patient with known history of CKD. However exact cause and duration of CKD unknown. Scr. was 6 about 2 months. Scr. on 6/12 was 7.69 which increased to 7.98 today. BUN also noted to be elevated at 111 on labs done today. Patient with PACO on CKD in setting of fluid overload? Patient with progressive CKD?? Discussed with patient about starting RRT. However patient does not want to start HD despite explaining about risks of not starting on RRT. Patient is non oliguric and does not appear to be clinically uremic at this time. Recommend to increase lasix to 80 mg IV BID. Check renal sonogram to r/o obstructive causes. Monitor BMP daily. Avoid nephrotoxins. Patient with known history of CKD. However exact cause and duration of CKD unknown. Scr. was 6 about 2 months. Scr. on 6/12 was 7.69 which increased to 7.98 today. BUN also noted to be elevated at 111 on labs done today. Patient with PACO on CKD in setting of fluid overload? Patient with progressive CKD?? Discussed with patient about starting RRT. However patient does not want to start HD despite explaining about risks of not starting on RRT. Patient is non oliguric and does not appear to be clinically uremic at this time. Recommend to start patient on lasix 80 mg PO BID and metolazone 10 mg daily for diuresis. Check renal sonogram to r/o obstructive causes. Monitor BMP daily. Avoid nephrotoxins.

## 2018-06-14 NOTE — CHART NOTE - NSCHARTNOTEFT_GEN_A_CORE
HGb downtrending. Review of chart yesterday indicates patient refused. Patient is still refusing today. He would like to discuss transfusion with wife.

## 2018-06-14 NOTE — CONSULT NOTE ADULT - PROBLEM SELECTOR RECOMMENDATION 5
Serum phosphorus noted to be elevated. Recommend to start on phoslo 667 mg TID with meals. Check PTH levels. Monitor serum phosphorus levels.

## 2018-06-14 NOTE — CONSULT NOTE ADULT - PROBLEM SELECTOR RECOMMENDATION 3
Hb noted to be 6.7 on labs done today. Check serum ferritin and iron studies. Will start on epogen once BP is more controlled. Monitor Hb level.

## 2018-06-14 NOTE — CONSULT NOTE ADULT - SUBJECTIVE AND OBJECTIVE BOX
[facility] Division of Kidney Diseases & Hypertension  INITIAL CONSULT NOTE  [phone]--------------------------------------------------------------------------------    HPI: 69 y/o M with h/o HTN, DM, CKD, CKF (EF 30%), anemia came from home with complains of SOB on exertion and LE edema. Symptoms started about 2 weeks when patient started developing SOB on exertion. Patient also developed LE edema and productive cough as well. Symptoms worsened and patient was sent to ER for further management. Nephrology was consulted for elevated creatinine. Patient has been aware of his kidney diease for the past few months and has seen a nephrologist. His baseline creatinine is ~ 6 2 months ago. Patient was offered to start on dialysis but patient and his wife has refused it. Currently patient has mild SOB which has improved since yesterday. Patient denies CP, abdominal pain, nausea, vomiting, or decrease in appetite.     PAST HISTORY  --------------------------------------------------------------------------------  PAST MEDICAL & SURGICAL HISTORY:  CKD (chronic kidney disease)  Anemia  CHF (congestive heart failure)  Diabetes mellitus  Hypertension  S/P CABG (coronary artery bypass graft)  S/P appendectomy    FAMILY HISTORY:  No pertinent family history in first degree relatives    PAST SOCIAL HISTORY:    ALLERGIES & MEDICATIONS  --------------------------------------------------------------------------------  Allergies    lisinopril (Anaphylaxis)    Intolerances      Standing Inpatient Medications  amLODIPine   Tablet 10 milliGRAM(s) Oral daily  aspirin  chewable 81 milliGRAM(s) Oral daily  clopidogrel Tablet 75 milliGRAM(s) Oral daily  dextrose 5%. 1000 milliLiter(s) IV Continuous <Continuous>  dextrose 50% Injectable 12.5 Gram(s) IV Push once  dextrose 50% Injectable 25 Gram(s) IV Push once  dextrose 50% Injectable 25 Gram(s) IV Push once  furosemide   Injectable 40 milliGRAM(s) IV Push two times a day  furosemide   Injectable 80 milliGRAM(s) IV Push once  heparin  Injectable 5000 Unit(s) SubCutaneous every 12 hours  hydrALAZINE 50 milliGRAM(s) Oral every 8 hours  insulin lispro (HumaLOG) corrective regimen sliding scale   SubCutaneous three times a day before meals  insulin lispro (HumaLOG) corrective regimen sliding scale   SubCutaneous at bedtime  metoprolol succinate ER 50 milliGRAM(s) Oral daily  simvastatin 20 milliGRAM(s) Oral at bedtime    PRN Inpatient Medications  dextrose 40% Gel 15 Gram(s) Oral once PRN  glucagon  Injectable 1 milliGRAM(s) IntraMuscular once PRN      REVIEW OF SYSTEMS  --------------------------------------------------------------------------------  Gen: No  fevers/chills, weakness  Skin: No rashes  Head/Eyes/Ears/Mouth: No headache; Normal hearing; Normal vision w/o blurriness;   Respiratory:   CV: No chest pain,   GI: No abdominal pain, diarrhea, constipation, nausea, vomiting  : No increased frequency, dysuria, hematuria, nocturia  MSK: No joint pain/swelling;   Neuro: No dizziness/lightheadedness, weakness, seizures    All other systems were reviewed and are negative, except as noted.    VITALS/PHYSICAL EXAM  --------------------------------------------------------------------------------  T(C): 36.7 (06-14-18 @ 13:50), Max: 36.7 (06-13-18 @ 21:48)  HR: 67 (06-14-18 @ 13:50) (66 - 79)  BP: 166/78 (06-14-18 @ 13:50) (157/79 - 173/89)  RR: 18 (06-14-18 @ 13:50) (18 - 18)  SpO2: 95% (06-14-18 @ 13:50) (95% - 100%)  Wt(kg): --  Height (cm): 167.64 (06-13-18 @ 12:06)  Weight (kg): 73 (06-13-18 @ 12:06)  BMI (kg/m2): 26 (06-13-18 @ 12:06)  BSA (m2): 1.82 (06-13-18 @ 12:06)      06-13-18 @ 07:01  -  06-14-18 @ 07:00  --------------------------------------------------------  IN: 100 mL / OUT: 825 mL / NET: -725 mL    06-14-18 @ 07:01  -  06-14-18 @ 15:42  --------------------------------------------------------  IN: 250 mL / OUT: 200 mL / NET: 50 mL      Physical Exam:  	Gen: Elderly male, on nasal cannula.   	HEENT: no JVD  	Pulm: CTA B/L  	CV:  S1S2  	Abd: +BS, soft   	Ext: B/L Lower ext edema +  	Neuro: No focal deficits  	Skin: Warm, without rashes  	Vascular access: none     LABS/STUDIES  --------------------------------------------------------------------------------              6.7    6.99  >-----------<  220      [06-14-18 @ 05:20]              21.4     139  |  100  |  111  ----------------------------<  95      [06-14-18 @ 05:20]  4.9   |  19  |  7.98        Ca     7.5     [06-14-18 @ 05:20]      Mg     2.3     [06-14-18 @ 05:20]      Phos  7.9     [06-13-18 @ 07:50]    TPro  7.6  /  Alb  3.9  /  TBili  0.2  /  DBili  x   /  AST  28  /  ALT  22  /  AlkPhos  159  [06-12-18 @ 19:58]    PT/INR: PT 10.4 , INR 0.91       [06-12-18 @ 20:30]  PTT: 36.6       [06-12-18 @ 20:30]    Troponin 0.39      [06-13-18 @ 07:50]        [06-13-18 @ 07:50]    Creatinine Trend:  SCr 7.98 [06-14 @ 05:20]  SCr 7.78 [06-13 @ 07:50]  SCr 7.69 [06-12 @ 19:58]        HbA1c 5.9      [06-13-18 @ 07:50]  TSH 1.73      [06-13-18 @ 07:50]  Lipid: chol 173, TG 81, HDL 64,       [06-13-18 @ 07:50]

## 2018-06-14 NOTE — CHART NOTE - NSCHARTNOTEFT_GEN_A_CORE
Renal Consult requested- Dr. Hall. Awaiting recommendations. Renal Consult requested- Dr. Hall. Awaiting recommendations. Consult canceled. Renal Consult requested- Dr. Hall. Awaiting recommendations. Consult canceled.  House nephrology consulted.

## 2018-06-14 NOTE — CONSULT NOTE ADULT - ATTENDING COMMENTS
Patient examined and ROS reviewed. A case of advanced CKD with fluid overload, anemia, hyperphosphatemia need to be treated conservatively with enhanced diuresis, epogen and phosphate chelating agents.

## 2018-06-14 NOTE — DIETITIAN INITIAL EVALUATION ADULT. - DIET TYPE
1500ml/low sodium/regular/renal replacement pts:no protein restr,no conc K & phos, low sodium/DASH/TLC (sodium and cholesterol restricted diet)

## 2018-06-15 LAB
BUN SERPL-MCNC: 108 MG/DL — HIGH (ref 7–23)
CALCIUM SERPL-MCNC: 7.6 MG/DL — LOW (ref 8.4–10.5)
CHLORIDE SERPL-SCNC: 98 MMOL/L — SIGNIFICANT CHANGE UP (ref 98–107)
CO2 SERPL-SCNC: 20 MMOL/L — LOW (ref 22–31)
CREAT SERPL-MCNC: 7.93 MG/DL — HIGH (ref 0.5–1.3)
FERRITIN SERPL-MCNC: 323.8 NG/ML — SIGNIFICANT CHANGE UP (ref 30–400)
GLUCOSE BLDC GLUCOMTR-MCNC: 107 MG/DL — HIGH (ref 70–99)
GLUCOSE BLDC GLUCOMTR-MCNC: 116 MG/DL — HIGH (ref 70–99)
GLUCOSE BLDC GLUCOMTR-MCNC: 181 MG/DL — HIGH (ref 70–99)
GLUCOSE BLDC GLUCOMTR-MCNC: 218 MG/DL — HIGH (ref 70–99)
GLUCOSE SERPL-MCNC: 120 MG/DL — HIGH (ref 70–99)
HCT VFR BLD CALC: 23.3 % — LOW (ref 39–50)
HGB BLD-MCNC: 7.6 G/DL — LOW (ref 13–17)
IRON SATN MFR SERPL: 38 UG/DL — LOW (ref 45–165)
MCHC RBC-ENTMCNC: 25.3 PG — LOW (ref 27–34)
MCHC RBC-ENTMCNC: 32.6 % — SIGNIFICANT CHANGE UP (ref 32–36)
MCV RBC AUTO: 77.7 FL — LOW (ref 80–100)
NRBC # FLD: 0 — SIGNIFICANT CHANGE UP
PLATELET # BLD AUTO: 230 K/UL — SIGNIFICANT CHANGE UP (ref 150–400)
PMV BLD: SIGNIFICANT CHANGE UP FL (ref 7–13)
POTASSIUM SERPL-MCNC: 4.4 MMOL/L — SIGNIFICANT CHANGE UP (ref 3.5–5.3)
POTASSIUM SERPL-SCNC: 4.4 MMOL/L — SIGNIFICANT CHANGE UP (ref 3.5–5.3)
PTH-INTACT SERPL-MCNC: 768.7 PG/ML — HIGH (ref 15–65)
RBC # BLD: 3 M/UL — LOW (ref 4.2–5.8)
RBC # FLD: 17.2 % — HIGH (ref 10.3–14.5)
SODIUM SERPL-SCNC: 137 MMOL/L — SIGNIFICANT CHANGE UP (ref 135–145)
WBC # BLD: 7.12 K/UL — SIGNIFICANT CHANGE UP (ref 3.8–10.5)
WBC # FLD AUTO: 7.12 K/UL — SIGNIFICANT CHANGE UP (ref 3.8–10.5)

## 2018-06-15 PROCEDURE — 99232 SBSQ HOSP IP/OBS MODERATE 35: CPT | Mod: GC

## 2018-06-15 RX ORDER — FUROSEMIDE 40 MG
40 TABLET ORAL ONCE
Qty: 0 | Refills: 0 | Status: DISCONTINUED | OUTPATIENT
Start: 2018-06-15 | End: 2018-06-15

## 2018-06-15 RX ADMIN — Medication 80 MILLIGRAM(S): at 17:03

## 2018-06-15 RX ADMIN — Medication 80 MILLIGRAM(S): at 05:48

## 2018-06-15 RX ADMIN — Medication 650 MILLIGRAM(S): at 21:41

## 2018-06-15 RX ADMIN — Medication 81 MILLIGRAM(S): at 13:08

## 2018-06-15 RX ADMIN — Medication 650 MILLIGRAM(S): at 05:48

## 2018-06-15 RX ADMIN — Medication 50 MILLIGRAM(S): at 13:09

## 2018-06-15 RX ADMIN — Medication 50 MILLIGRAM(S): at 21:41

## 2018-06-15 RX ADMIN — AMLODIPINE BESYLATE 10 MILLIGRAM(S): 2.5 TABLET ORAL at 05:48

## 2018-06-15 RX ADMIN — Medication 650 MILLIGRAM(S): at 13:09

## 2018-06-15 RX ADMIN — Medication 50 MILLIGRAM(S): at 05:48

## 2018-06-15 RX ADMIN — SIMVASTATIN 20 MILLIGRAM(S): 20 TABLET, FILM COATED ORAL at 21:41

## 2018-06-15 RX ADMIN — Medication 667 MILLIGRAM(S): at 13:09

## 2018-06-15 RX ADMIN — Medication 1: at 17:44

## 2018-06-15 RX ADMIN — HEPARIN SODIUM 5000 UNIT(S): 5000 INJECTION INTRAVENOUS; SUBCUTANEOUS at 05:49

## 2018-06-15 RX ADMIN — Medication 2: at 13:09

## 2018-06-15 RX ADMIN — CLOPIDOGREL BISULFATE 75 MILLIGRAM(S): 75 TABLET, FILM COATED ORAL at 13:09

## 2018-06-15 NOTE — PROGRESS NOTE ADULT - ATTENDING COMMENTS
Patient examined and ROS reviewed. A case of PACO on CKD with fluid load being diuresed. BP is not fully controlled. Adjust BP meds.

## 2018-06-16 ENCOUNTER — TRANSCRIPTION ENCOUNTER (OUTPATIENT)
Age: 68
End: 2018-06-16

## 2018-06-16 LAB
BUN SERPL-MCNC: 108 MG/DL — HIGH (ref 7–23)
CALCIUM SERPL-MCNC: 7.9 MG/DL — LOW (ref 8.4–10.5)
CHLORIDE SERPL-SCNC: 99 MMOL/L — SIGNIFICANT CHANGE UP (ref 98–107)
CO2 SERPL-SCNC: 22 MMOL/L — SIGNIFICANT CHANGE UP (ref 22–31)
CREAT SERPL-MCNC: 8.15 MG/DL — HIGH (ref 0.5–1.3)
GLUCOSE BLDC GLUCOMTR-MCNC: 104 MG/DL — HIGH (ref 70–99)
GLUCOSE BLDC GLUCOMTR-MCNC: 113 MG/DL — HIGH (ref 70–99)
GLUCOSE BLDC GLUCOMTR-MCNC: 198 MG/DL — HIGH (ref 70–99)
GLUCOSE BLDC GLUCOMTR-MCNC: 243 MG/DL — HIGH (ref 70–99)
GLUCOSE SERPL-MCNC: 113 MG/DL — HIGH (ref 70–99)
HCT VFR BLD CALC: 28.9 % — LOW (ref 39–50)
HGB BLD-MCNC: 9.8 G/DL — LOW (ref 13–17)
MCHC RBC-ENTMCNC: 25.5 PG — LOW (ref 27–34)
MCHC RBC-ENTMCNC: 33.9 % — SIGNIFICANT CHANGE UP (ref 32–36)
MCV RBC AUTO: 75.1 FL — LOW (ref 80–100)
NRBC # FLD: 0 — SIGNIFICANT CHANGE UP
PLATELET # BLD AUTO: 258 K/UL — SIGNIFICANT CHANGE UP (ref 150–400)
PMV BLD: SIGNIFICANT CHANGE UP FL (ref 7–13)
POTASSIUM SERPL-MCNC: 4.4 MMOL/L — SIGNIFICANT CHANGE UP (ref 3.5–5.3)
POTASSIUM SERPL-SCNC: 4.4 MMOL/L — SIGNIFICANT CHANGE UP (ref 3.5–5.3)
RBC # BLD: 3.85 M/UL — LOW (ref 4.2–5.8)
RBC # FLD: 16.6 % — HIGH (ref 10.3–14.5)
SODIUM SERPL-SCNC: 141 MMOL/L — SIGNIFICANT CHANGE UP (ref 135–145)
WBC # BLD: 7.56 K/UL — SIGNIFICANT CHANGE UP (ref 3.8–10.5)
WBC # FLD AUTO: 7.56 K/UL — SIGNIFICANT CHANGE UP (ref 3.8–10.5)

## 2018-06-16 PROCEDURE — 99232 SBSQ HOSP IP/OBS MODERATE 35: CPT | Mod: GC

## 2018-06-16 RX ORDER — FUROSEMIDE 40 MG
40 TABLET ORAL ONCE
Qty: 0 | Refills: 0 | Status: COMPLETED | OUTPATIENT
Start: 2018-06-16 | End: 2018-06-16

## 2018-06-16 RX ORDER — FUROSEMIDE 40 MG
80 TABLET ORAL
Qty: 0 | Refills: 0 | Status: DISCONTINUED | OUTPATIENT
Start: 2018-06-16 | End: 2018-06-17

## 2018-06-16 RX ORDER — FUROSEMIDE 40 MG
40 TABLET ORAL ONCE
Qty: 0 | Refills: 0 | Status: DISCONTINUED | OUTPATIENT
Start: 2018-06-17 | End: 2018-06-17

## 2018-06-16 RX ADMIN — Medication 650 MILLIGRAM(S): at 05:41

## 2018-06-16 RX ADMIN — Medication 50 MILLIGRAM(S): at 14:15

## 2018-06-16 RX ADMIN — Medication 650 MILLIGRAM(S): at 14:15

## 2018-06-16 RX ADMIN — Medication 667 MILLIGRAM(S): at 17:06

## 2018-06-16 RX ADMIN — SIMVASTATIN 20 MILLIGRAM(S): 20 TABLET, FILM COATED ORAL at 21:27

## 2018-06-16 RX ADMIN — Medication 50 MILLIGRAM(S): at 05:41

## 2018-06-16 RX ADMIN — HEPARIN SODIUM 5000 UNIT(S): 5000 INJECTION INTRAVENOUS; SUBCUTANEOUS at 05:40

## 2018-06-16 RX ADMIN — Medication 50 MILLIGRAM(S): at 21:27

## 2018-06-16 RX ADMIN — HEPARIN SODIUM 5000 UNIT(S): 5000 INJECTION INTRAVENOUS; SUBCUTANEOUS at 17:06

## 2018-06-16 RX ADMIN — Medication 650 MILLIGRAM(S): at 21:27

## 2018-06-16 RX ADMIN — Medication 2: at 12:08

## 2018-06-16 RX ADMIN — Medication 667 MILLIGRAM(S): at 11:10

## 2018-06-16 RX ADMIN — AMLODIPINE BESYLATE 10 MILLIGRAM(S): 2.5 TABLET ORAL at 05:41

## 2018-06-16 RX ADMIN — CLOPIDOGREL BISULFATE 75 MILLIGRAM(S): 75 TABLET, FILM COATED ORAL at 11:09

## 2018-06-16 RX ADMIN — Medication 80 MILLIGRAM(S): at 05:40

## 2018-06-16 RX ADMIN — Medication 80 MILLIGRAM(S): at 17:06

## 2018-06-16 RX ADMIN — Medication 40 MILLIGRAM(S): at 15:43

## 2018-06-16 RX ADMIN — Medication 81 MILLIGRAM(S): at 11:09

## 2018-06-16 RX ADMIN — Medication 667 MILLIGRAM(S): at 08:38

## 2018-06-16 NOTE — DISCHARGE NOTE ADULT - HOSPITAL COURSE
67 y/o male with a PMHx of CAD S/P CABG, ischemic cardiomyopathy with moderate LV dysfunction (EF 42%), stage II diastolic dysfunction, CKD not on HD (wife opposed), HTN, HLD, DM and anemia of chronic disease presents to ED with dyspnea on exertion, orthopnea and lower extremity edema secondary to fluid overload.     CHF exacerbation  - Admitted to telemetry -> ADS  - Strict I and O, daily weights.   - Continue with lasix 40 mg IV BID. Fluid restriction.   - EKG: NSR at 75 bpm with LBBB, TWI I and AVL  - CE x3: Trop 0.42-->0.38-->0.39, -->448  - 6/12 CXR: Low lung volumes. Patchy opacities in the bilateral lungs, which may be secondary to pulmonary edema versus infection    PACO on CKD  - Nephrology following   - Creat improving   - Avoid neprhotoxins and trend daily BMP     Anemia  - Continue to monitor hemoglobin   - Pt denies any blood loss.     CKD   - Continue to monitor kidney function and electrolytes.   - Avoid nephrotoxic drugs.   - Nephrology following     Diabetes mellitus  - ISS & FSs  - HgA1C 5.9%    Hypertension  - Routine blood pressure check.  - Continue with current medications.   - Low salt,low cholesterol, DASH diet.     Need for prophylactic measure  - Heparin SQ    Dispo - Home

## 2018-06-16 NOTE — PROGRESS NOTE ADULT - ATTENDING COMMENTS
Patient examined and ROS reviewed. A case of advanced CKD with fluid overload. Patient is diuresing well.

## 2018-06-16 NOTE — CHART NOTE - NSCHARTNOTEFT_GEN_A_CORE
As per Dr. Swartz will give additional Lasix 40mg PO 6/16 and 6/17 and patient to be discharged 6/17

## 2018-06-16 NOTE — DISCHARGE NOTE ADULT - MEDICATION SUMMARY - MEDICATIONS TO TAKE
I will START or STAY ON the medications listed below when I get home from the hospital:    aspirin 81 mg oral tablet  -- 1 tab(s) by mouth once a day  -- Indication: For Prophylaxis    sodium bicarbonate 650 mg oral tablet  -- 1 tab(s) by mouth 3 times a day  -- Indication: For CKD (chronic kidney disease)    NovoLOG Mix 70/30 FlexPen subcutaneous suspension  -- 10 unit(s) subcutaneous once a day (in the morning) before breakfast and 6units once a day pefore dinner  -- Indication: For Diabetes mellitus    simvastatin 20 mg oral tablet  -- 1 tab(s) by mouth once a day (at bedtime)  -- Indication: For Hyperlipidemia    Plavix 75 mg oral tablet  -- 1 tab(s) by mouth once a day  -- Indication: For CAD    metoprolol succinate 50 mg oral tablet, extended release  -- 1 tab(s) by mouth once a day  -- Indication: For Hypertension    amLODIPine 10 mg oral tablet  -- 1 tab(s) by mouth once a day  -- Indication: For Hypertension    Lasix 40 mg oral tablet  -- 1 tab(s) by mouth once a day (at bedtime)   -- Indication: For Hypertension    Lasix 80 mg oral tablet  -- 1 tab(s) by mouth once a day (in the morning)   -- Avoid prolonged or excessive exposure to direct and/or artificial sunlight while taking this medication.  It is very important that you take or use this exactly as directed.  Do not skip doses or discontinue unless directed by your doctor.  It may be advisable to drink a full glass orange juice or eat a banana daily while taking this medication.    -- Indication: For Hypertension    calcium acetate 667 mg oral tablet  -- 1 tab(s) by mouth 3 times a day (with meals)   -- Indication: For CKD (chronic kidney disease)    hydrALAZINE 50 mg oral tablet  -- 1 tab(s) by mouth every 8 hours  -- Indication: For Hypertension

## 2018-06-16 NOTE — DISCHARGE NOTE ADULT - PLAN OF CARE
Continue your medications as directed and please follow-up as an outpatient with your primary care provider for further care and recommendations. Follow-up with your outpatient provider if further treatment is warranted. Monitor for signs/symptoms indicating worsening of disease, such as, easy bleeding/bruising, pale skin, fatigue, dizziness, increased heart rate, or chest pain. Continue consistent carbohydrate diet.  Monitor blood glucose levels throughout the day before meals and at bedtime.  Record blood sugars and bring to outpatient providers appointment in order to be reviewed by your doctor for management modifications.  Be aware of diabetes management symptoms including feeling cool and clammy may be related to low glucose levels.  Feeling hot and dry may indicate high glucose levels. If you feel these symptoms, check your blood sugar.  Make regular podiatry appointments in order to have feet checked for wounds and toe nails cut by a doctor to prevent infections, as well as, appointments with an ophthalmologist to monitor your vision. Continue blood pressure medication regimen as directed. Monitor for any visual changes, headaches or dizziness.  Monitor blood pressure regularly.  Follow up with your PCP for further management for high blood pressure. Resolution and return to baseline Please take Lasix 80mg PO in the morning and 40mg PO at bedtime. Continue your medications as directed and please follow-up as an outpatient with your primary care provider for further care and recommendations. Monitor for signs/symptoms of fluid overload and electrolyte abnormalities, such as, shortness of breath, cough, swelling, chest discomfort, changes in heart rate, dizziness, fainting, or changes in mental status. Follow-up with your PCP/cardiologist outpatient after you've been discharged from the hospital. Continue your medications as directed and please follow-up as an outpatient with your primary care provider for further care and recommendations and to repeat your labwork to assess kidney function. Continue your medications as directed and please follow-up as an outpatient with your primary care provider for further care and recommendations. You were started on calcium acetate and sodium bicarbonate please continue these as directed and follow-up with PCP for reassessment of kidney function.

## 2018-06-16 NOTE — DISCHARGE NOTE ADULT - ADDITIONAL INSTRUCTIONS
Continue your medications as directed and please follow-up as an outpatient with your primary care provider for further care and recommendations.   Follow-up for echocardiogram as recommended by your primary care physician to assess heart function Continue your medications as directed and please follow-up as an outpatient with your primary care provider for further care and recommendations. Continue your medications as directed and please follow-up as an outpatient with your primary care provider for further care and recommendations.  Follow-up outpatient for echocardiogram and renal ultrasound testing/results

## 2018-06-16 NOTE — DISCHARGE NOTE ADULT - CARE PLAN
Principal Discharge DX:	CHF exacerbation  Goal:	Resolution and return to baseline  Assessment and plan of treatment:	Continue your medications as directed and please follow-up as an outpatient with your primary care provider for further care and recommendations.  Secondary Diagnosis:	PACO (acute kidney injury)  Goal:	Resolution and return to baseline  Assessment and plan of treatment:	Continue your medications as directed and please follow-up as an outpatient with your primary care provider for further care and recommendations.  Secondary Diagnosis:	CKD (chronic kidney disease)  Assessment and plan of treatment:	Continue your medications as directed and please follow-up as an outpatient with your primary care provider for further care and recommendations.  Secondary Diagnosis:	Anemia  Assessment and plan of treatment:	Follow-up with your outpatient provider if further treatment is warranted. Monitor for signs/symptoms indicating worsening of disease, such as, easy bleeding/bruising, pale skin, fatigue, dizziness, increased heart rate, or chest pain.  Secondary Diagnosis:	Diabetes mellitus  Assessment and plan of treatment:	Continue consistent carbohydrate diet.  Monitor blood glucose levels throughout the day before meals and at bedtime.  Record blood sugars and bring to outpatient providers appointment in order to be reviewed by your doctor for management modifications.  Be aware of diabetes management symptoms including feeling cool and clammy may be related to low glucose levels.  Feeling hot and dry may indicate high glucose levels. If you feel these symptoms, check your blood sugar.  Make regular podiatry appointments in order to have feet checked for wounds and toe nails cut by a doctor to prevent infections, as well as, appointments with an ophthalmologist to monitor your vision.  Secondary Diagnosis:	Hypertension  Assessment and plan of treatment:	Continue blood pressure medication regimen as directed. Monitor for any visual changes, headaches or dizziness.  Monitor blood pressure regularly.  Follow up with your PCP for further management for high blood pressure. Principal Discharge DX:	CHF exacerbation  Goal:	Resolution and return to baseline  Assessment and plan of treatment:	Please take Lasix 80mg PO in the morning and 40mg PO at bedtime. Continue your medications as directed and please follow-up as an outpatient with your primary care provider for further care and recommendations. Monitor for signs/symptoms of fluid overload and electrolyte abnormalities, such as, shortness of breath, cough, swelling, chest discomfort, changes in heart rate, dizziness, fainting, or changes in mental status. Follow-up with your PCP/cardiologist outpatient after you've been discharged from the hospital.  Secondary Diagnosis:	PACO (acute kidney injury)  Goal:	Resolution and return to baseline  Assessment and plan of treatment:	Continue your medications as directed and please follow-up as an outpatient with your primary care provider for further care and recommendations and to repeat your labwork to assess kidney function.  Secondary Diagnosis:	CKD (chronic kidney disease)  Assessment and plan of treatment:	Continue your medications as directed and please follow-up as an outpatient with your primary care provider for further care and recommendations. You were started on calcium acetate and sodium bicarbonate please continue these as directed and follow-up with PCP for reassessment of kidney function.  Secondary Diagnosis:	Anemia  Assessment and plan of treatment:	Follow-up with your outpatient provider if further treatment is warranted. Monitor for signs/symptoms indicating worsening of disease, such as, easy bleeding/bruising, pale skin, fatigue, dizziness, increased heart rate, or chest pain.  Secondary Diagnosis:	Diabetes mellitus  Assessment and plan of treatment:	Continue consistent carbohydrate diet.  Monitor blood glucose levels throughout the day before meals and at bedtime.  Record blood sugars and bring to outpatient providers appointment in order to be reviewed by your doctor for management modifications.  Be aware of diabetes management symptoms including feeling cool and clammy may be related to low glucose levels.  Feeling hot and dry may indicate high glucose levels. If you feel these symptoms, check your blood sugar.  Make regular podiatry appointments in order to have feet checked for wounds and toe nails cut by a doctor to prevent infections, as well as, appointments with an ophthalmologist to monitor your vision.  Secondary Diagnosis:	Hypertension  Assessment and plan of treatment:	Continue blood pressure medication regimen as directed. Monitor for any visual changes, headaches or dizziness.  Monitor blood pressure regularly.  Follow up with your PCP for further management for high blood pressure.

## 2018-06-16 NOTE — DISCHARGE NOTE ADULT - PATIENT PORTAL LINK FT
You can access the [facility] Patient Portal, offered by [facility], by registering with the following website: [email or web address]

## 2018-06-17 VITALS
TEMPERATURE: 98 F | RESPIRATION RATE: 20 BRPM | OXYGEN SATURATION: 100 % | HEART RATE: 71 BPM | SYSTOLIC BLOOD PRESSURE: 178 MMHG | DIASTOLIC BLOOD PRESSURE: 81 MMHG

## 2018-06-17 LAB
BUN SERPL-MCNC: 112 MG/DL — HIGH (ref 7–23)
CALCIUM SERPL-MCNC: 8.4 MG/DL — SIGNIFICANT CHANGE UP (ref 8.4–10.5)
CHLORIDE SERPL-SCNC: 94 MMOL/L — LOW (ref 98–107)
CO2 SERPL-SCNC: 24 MMOL/L — SIGNIFICANT CHANGE UP (ref 22–31)
CREAT SERPL-MCNC: 8.25 MG/DL — HIGH (ref 0.5–1.3)
GLUCOSE BLDC GLUCOMTR-MCNC: 149 MG/DL — HIGH (ref 70–99)
GLUCOSE BLDC GLUCOMTR-MCNC: 209 MG/DL — HIGH (ref 70–99)
GLUCOSE SERPL-MCNC: 194 MG/DL — HIGH (ref 70–99)
HCT VFR BLD CALC: 30.8 % — LOW (ref 39–50)
HGB BLD-MCNC: 9.9 G/DL — LOW (ref 13–17)
MAGNESIUM SERPL-MCNC: 2.3 MG/DL — SIGNIFICANT CHANGE UP (ref 1.6–2.6)
MCHC RBC-ENTMCNC: 25.3 PG — LOW (ref 27–34)
MCHC RBC-ENTMCNC: 32.1 % — SIGNIFICANT CHANGE UP (ref 32–36)
MCV RBC AUTO: 78.8 FL — LOW (ref 80–100)
NRBC # FLD: 0 — SIGNIFICANT CHANGE UP
PHOSPHATE SERPL-MCNC: 6.5 MG/DL — HIGH (ref 2.5–4.5)
PLATELET # BLD AUTO: 248 K/UL — SIGNIFICANT CHANGE UP (ref 150–400)
PMV BLD: SIGNIFICANT CHANGE UP FL (ref 7–13)
POTASSIUM SERPL-MCNC: 4.5 MMOL/L — SIGNIFICANT CHANGE UP (ref 3.5–5.3)
POTASSIUM SERPL-SCNC: 4.5 MMOL/L — SIGNIFICANT CHANGE UP (ref 3.5–5.3)
RBC # BLD: 3.91 M/UL — LOW (ref 4.2–5.8)
RBC # FLD: 16.1 % — HIGH (ref 10.3–14.5)
SODIUM SERPL-SCNC: 136 MMOL/L — SIGNIFICANT CHANGE UP (ref 135–145)
WBC # BLD: 6.99 K/UL — SIGNIFICANT CHANGE UP (ref 3.8–10.5)
WBC # FLD AUTO: 6.99 K/UL — SIGNIFICANT CHANGE UP (ref 3.8–10.5)

## 2018-06-17 PROCEDURE — 93306 TTE W/DOPPLER COMPLETE: CPT | Mod: 26

## 2018-06-17 RX ORDER — FUROSEMIDE 40 MG
1 TABLET ORAL
Qty: 0 | Refills: 0 | COMMUNITY

## 2018-06-17 RX ORDER — CALCIUM ACETATE 667 MG
1 TABLET ORAL
Qty: 90 | Refills: 0 | OUTPATIENT
Start: 2018-06-17

## 2018-06-17 RX ORDER — FUROSEMIDE 40 MG
1 TABLET ORAL
Qty: 30 | Refills: 0 | OUTPATIENT
Start: 2018-06-17

## 2018-06-17 RX ORDER — SODIUM BICARBONATE 1 MEQ/ML
1 SYRINGE (ML) INTRAVENOUS
Qty: 90 | Refills: 0 | OUTPATIENT
Start: 2018-06-17

## 2018-06-17 RX ADMIN — HEPARIN SODIUM 5000 UNIT(S): 5000 INJECTION INTRAVENOUS; SUBCUTANEOUS at 05:25

## 2018-06-17 RX ADMIN — Medication 650 MILLIGRAM(S): at 05:25

## 2018-06-17 RX ADMIN — Medication 81 MILLIGRAM(S): at 12:41

## 2018-06-17 RX ADMIN — Medication 50 MILLIGRAM(S): at 05:25

## 2018-06-17 RX ADMIN — AMLODIPINE BESYLATE 10 MILLIGRAM(S): 2.5 TABLET ORAL at 05:25

## 2018-06-17 RX ADMIN — Medication 2: at 12:41

## 2018-06-17 RX ADMIN — CLOPIDOGREL BISULFATE 75 MILLIGRAM(S): 75 TABLET, FILM COATED ORAL at 12:41

## 2018-06-17 RX ADMIN — Medication 667 MILLIGRAM(S): at 08:44

## 2018-06-17 RX ADMIN — Medication 667 MILLIGRAM(S): at 12:41

## 2018-06-17 RX ADMIN — Medication 80 MILLIGRAM(S): at 05:29

## 2018-06-17 NOTE — PROGRESS NOTE ADULT - ASSESSMENT
67 y/o M with h/o HTN, DM, CKD, CKF (EF 30%), anemia admitted for fluid overload.
69 y/o M with h/o HTN, DM, CKD, CKF (EF 30%), anemia admitted for fluid overload.
67 y/o M with h/o HTN, DM, CKD, CKF (EF 30%), anemia admitted for fluid overload.

## 2018-06-17 NOTE — PROGRESS NOTE ADULT - PROBLEM SELECTOR PROBLEM 5
Chronic kidney disease-mineral and bone disorder

## 2018-06-17 NOTE — PROGRESS NOTE ADULT - PROBLEM SELECTOR PLAN 3
Hb noted to be 9.8 on labs done today. Serum ferritin is WNL. Will start on epogen once BP is more controlled. Monitor Hb level.
Hb noted to be 6.7 on labs done today. Serum ferritin is WNL. Will start on epogen once BP is more controlled. Monitor Hb level.
Hb noted to be 9.8 on labs done today. Serum ferritin is WNL. Will start on epogen once BP is more controlled. Monitor Hb level.

## 2018-06-17 NOTE — CHART NOTE - NSCHARTNOTEFT_GEN_A_CORE
DC home  note   Pt seen his  family at bed side .   spend more than 35 mint  for his care  and  after hospital fu  need lasix 80mg in am and 40 mg in pm .   lab test pending( initialy her refuse)

## 2018-06-17 NOTE — PROGRESS NOTE ADULT - PROBLEM SELECTOR PLAN 1
Patient with PACO on CKD in setting of fluid overload? Patient with progressive CKD?? Latest Scr. is elevated at 8.15 on labs done yesterday. No labs available for review today. Patient is non oliguric. Continue with lasix 80 mg PO BID. F/u renal sonogram. Continue to monitor BMP daily. Avoid nephrotoxins.
Patient with PACO on CKD in setting of fluid overload? Patient with progressive CKD?? Latest Scr. is stable at 7.93 today. Patient is non oliguric. Continue with diuretics. F/u renal sonogram. Continue to monitor BMP daily. Avoid nephrotoxins.
Patient with PACO on CKD in setting of fluid overload? Patient with progressive CKD?? Latest Scr. is elevated at 8.15 today. Patient is non oliguric. Can switch to lasix 80 mg PO BID. F/u renal sonogram. Continue to monitor BMP daily. Avoid nephrotoxins.

## 2018-06-17 NOTE — PROGRESS NOTE ADULT - PROBLEM SELECTOR PLAN 5
Serum phosphorus noted to be elevated. Continue with phoslo 667 TID with meals. Check serum phosphorus level today.
Serum phosphorus noted to be elevated. Continue with phoslo 667 TID with meals. Check serum phosphorus level today.
Serum phosphorus noted to be elevated. Continue with phoslo 667 TID with meals. Monitor serum phosphorus levels.

## 2018-06-17 NOTE — PROGRESS NOTE ADULT - PROBLEM SELECTOR PLAN 2
BP noted to be elevated today. Recommend to titrate up dose of hydralazine as per BP. Low salt diet advised. Monitor BP.
BP noted to be elevated today. Recommend to titrate up dose of hydralazine as per BP. Low salt diet advised. Monitor BP.
BP noted to be elevated today. Recommend to titrate up dose of hydralazine.  Low salt diet advised. Monitor BP.

## 2018-06-17 NOTE — PROGRESS NOTE ADULT - PROBLEM SELECTOR PLAN 4
Serum CO2 remains low. Continue with sodium bicarbonate 650 mg TID with meals. Monitor serum CO2 level.

## 2018-06-17 NOTE — PROGRESS NOTE ADULT - SUBJECTIVE AND OBJECTIVE BOX
CHIEF COMPLAINT:  patient condition improving  and he is breathing much better  CBC resolved post tra transfusion noted  67 y/o M with h/o HTN, DM, CKD, CKF (EF 30%), anemia, presents to the ED for shortness of breath and LE edema for 2 weeks. Pt states he has been having worsening shortness of breath. Pt states he has orthopnea and PND. Pt states he gets dyspnea on exertion after walking a block. Pt states he has a cough and coughs up phlegm. Pt denies LOC, syncope, fever, chills, N/V/D/C, chest pain, numbness, tingling, palpitations, headache, abdominal pain, dysuria, urinary/bowel incontinence or any other complaints at this time.  SUBJECTIVE:     REVIEW OF SYSTEMS:     Constitutional: No fever, fatigue or weight loss.  	Skin: No rash.  	Eyes: No recent vision problems or eye pain.  	ENT: No congestion, ear pain, or sore throat.  	Endocrine: No thyroid problems.  	Cardiovascular: No chest pain or palpitation.  	Respiratory: + shortness of breath + cough. + orthopnea. +PND. No congestion, or wheezing.  	Gastrointestinal: No abdominal pain, nausea, vomiting, or diarrhea.  	Genitourinary: No dysuria.  	Musculoskeletal: No joint swelling.  Neurologic: No headache  Vital Signs Last 24 Hrs  T(C): 36.9 (15 Gildardo 2018 16:55), Max: 37.2 (14 Jun 2018 22:50)  T(F): 98.5 (15 Gildardo 2018 16:55), Max: 98.9 (14 Jun 2018 22:50)  HR: 76 (15 Gildardo 2018 16:55) (73 - 84)  BP: 161/85 (15 Gildardo 2018 16:55) (159/82 - 177/76)  BP(mean): --  RR: 18 (15 Gildardo 2018 16:55) (18 - 18)  SpO2: 96% (15 Gildardo 2018 16:55) (94% - 96%)    I&O's Summary    14 Jun 2018 07:01  -  15 Gildardo 2018 07:00  --------------------------------------------------------  IN: 500 mL / OUT: 1170 mL / NET: -670 mL    15 Gildardo 2018 07:01  -  15 Gildardo 2018 18:02  --------------------------------------------------------  IN: 320 mL / OUT: 1150 mL / NET: -830 mL        CAPILLARY BLOOD GLUCOSE      POCT Blood Glucose.: 181 mg/dL (15 Gildardo 2018 17:03)  POCT Blood Glucose.: 218 mg/dL (15 Gildardo 2018 12:32)  POCT Blood Glucose.: 116 mg/dL (15 Gildardo 2018 08:55)  POCT Blood Glucose.: 148 mg/dL (14 Jun 2018 21:52)  POCT Blood Glucose.: 111 mg/dL (14 Jun 2018 18:07)      PHYSICAL EXAM:   GENERAL APPEARANCE: Well developed, well nourished, alert and cooperative, and appears to be in no acute distress.  HEAD: normocephalic.  EYES: PERRL, EOMI.   EARS: External auditory canals clear, hearing grossly intact.  NECK: Neck supple, non-tender without lymphadenopathy, masses or thyromegaly.  CARDIAC: Normal S1 and S2. No S3, S4 or murmurs. Rhythm is regular.   LUNGS: Clear to auscultation and percussion without rales, rhonchi, wheezing or diminished breath sounds.  ABDOMEN: Positive bowel sounds. Soft, nondistended, nontender. No guarding or rebound. No masses.  EXTREMITIES: No significant deformity or joint abnormality. 3+ edema bilaterally.  Peripheral pulses intact.   SKIN: Skin normal color, texture and turgor with no lesions or eruptions. PSYCHIATRIC: The mental examination revealed the patient was oriented to person, place, and time. The patient was able to demonstrate good judgement and reason, without hallucinations, abnormal affect or abnormal behaviors during the examination. Patient is not suicidal.	         MEDICATIONS:  MEDICATIONS  (STANDING):  amLODIPine   Tablet 10 milliGRAM(s) Oral daily  aspirin  chewable 81 milliGRAM(s) Oral daily  calcium acetate 667 milliGRAM(s) Oral three times a day with meals  clopidogrel Tablet 75 milliGRAM(s) Oral daily  dextrose 5%. 1000 milliLiter(s) (50 mL/Hr) IV Continuous <Continuous>  dextrose 50% Injectable 12.5 Gram(s) IV Push once  dextrose 50% Injectable 25 Gram(s) IV Push once  dextrose 50% Injectable 25 Gram(s) IV Push once  furosemide   Injectable 80 milliGRAM(s) IV Push two times a day  heparin  Injectable 5000 Unit(s) SubCutaneous every 12 hours  hydrALAZINE 50 milliGRAM(s) Oral every 8 hours  insulin lispro (HumaLOG) corrective regimen sliding scale   SubCutaneous three times a day before meals  insulin lispro (HumaLOG) corrective regimen sliding scale   SubCutaneous at bedtime  metoprolol succinate ER 50 milliGRAM(s) Oral daily  simvastatin 20 milliGRAM(s) Oral at bedtime  sodium bicarbonate 650 milliGRAM(s) Oral three times a day      LABS: All Labs Reviewed:                        7.6    7.12  )-----------( 230      ( 15 Gildardo 2018 06:28 )             23.3     06-15    137  |  98  |  108<H>  ----------------------------<  120<H>  4.4   |  20<L>  |  7.93<H>    Ca    7.6<L>      15 Gildardo 2018 06:28  Mg     2.3     06-14            Blood Culture:   Urine Culture      RADIOLOGY/EKG:    ASSESSMENT AND PLAN:  67 y/o M with h/o HTN, DM, CKD, CHF (EF 30-35%), anemia, HLD presents to the ED for SOB and LE edema. Admit to telemetry for acute on chronic CHF.     Problem/Plan - 1:  ·  Problem: CHF exacerbation.    Strict I and O, daily weights.   Continue with lasix 40 mg IV BID. Fluid restriction.   check cbc,tsh,lipid, hemoglobin a1c, bmp with mag and phos.    renal consult appreciated    Problem/Plan - 2:  ·  Problem: Anemia.   Will  transfuse patient 2 units  his wife agree the only  1/2 initially and then repeat   monitor hemoglobin  the daily.Pt denies any blood loss.     Problem/Plan - 3:  ·  Problem: CKD (chronic kidney disease).  Plan: Continue to monitor kidney function and electrolytes.   Avoid nephrotoxic drugs.     Problem/Plan - 4:  ·  Problem: Diabetes mellitus.  Plan: Routine blood glucose check.   Start sliding scale.   Check hemoglobin a1c.   Low carb diet.     Problem/Plan - 5:  ·  Problem: Hypertension.  Plan: Routine blood pressure check.  Continue with current medications.   Low salt,low cholesterol, DASH diet.     Problem/Plan - 6:  Problem: Need for prophylactic measure. Plan: Heparin sq for vte prophylaxis  DVT PPX:    ADVANCED DIRECTIVE:    DISPOSITION:
CHIEF COMPLAINT:  patient condition improving  and he is breathing much better  CBC result  pos t  transfusion noted  67 y/o M with h/o HTN, DM, CKD, CKF (EF 30%), anemia, presents to the ED for shortness of breath and LE edema for 2 weeks. Pt states he has been having worsening shortness of breath. Pt states he has orthopnea and PND. Pt states he gets dyspnea on exertion after walking a block. Pt states he has a cough and coughs up phlegm. Pt denies LOC, syncope, fever, chills, N/V/D/C, chest pain, numbness, tingling, palpitations, headache, abdominal pain, dysuria, urinary/bowel incontinence or any other complaints at this time.  SUBJECTIVE:     REVIEW OF SYSTEMS:   Constitutional: No fever, fatigue or weight loss.  	Skin: No rash.  	Eyes: No recent vision problems or eye pain.  	ENT: No congestion, ear pain, or sore throat.  	Endocrine: No thyroid problems.  	Cardiovascular: No chest pain or palpitation.  	Respiratory: + shortness of breath + cough. + orthopnea. +PND. No congestion, or wheezing.  	Gastrointestinal: No abdominal pain, nausea, vomiting, or diarrhea.  	Genitourinary: No dysuria.  	Musculoskeletal: No joint swelling.   All other review of systems is negative unless indicated above    Vital Signs Last 24 Hrs  T(C): 36.6 (16 Jun 2018 14:14), Max: 36.9 (15 Gildardo 2018 21:45)  T(F): 97.8 (16 Jun 2018 14:14), Max: 98.5 (15 Gildardo 2018 21:45)  HR: 75 (16 Jun 2018 17:03) (73 - 92)  BP: 175/88 (16 Jun 2018 17:03) (165/83 - 178/84)  BP(mean): --  RR: 18 (16 Jun 2018 14:14) (18 - 18)  SpO2: 98% (16 Jun 2018 14:14) (96% - 100%)    I&O's Summary    15 Gildardo 2018 07:01  -  16 Jun 2018 07:00  --------------------------------------------------------  IN: 320 mL / OUT: 2500 mL / NET: -2180 mL    16 Jun 2018 07:01  -  16 Jun 2018 17:36  --------------------------------------------------------  IN: 650 mL / OUT: 800 mL / NET: -150 mL        CAPILLARY BLOOD GLUCOSE      POCT Blood Glucose.: 104 mg/dL (16 Jun 2018 17:05)  POCT Blood Glucose.: 243 mg/dL (16 Jun 2018 12:00)  POCT Blood Glucose.: 113 mg/dL (16 Jun 2018 08:26)  POCT Blood Glucose.: 107 mg/dL (15 Gildardo 2018 21:33)      PHYSICAL EXAM:       GENERAL APPEARANCE: Well developed, well nourished, alert and cooperative, and appears to be in no acute distress.  	HEAD: normocephalic.  	EYES: PERRL, EOMI.   	EARS: External auditory canals clear, hearing grossly intact.  	NECK: Neck supple, non-tender without lymphadenopathy, masses or thyromegaly.  	CARDIAC: Normal S1 and S2. No S3, S4 or murmurs. Rhythm is regular.   	LUNGS: Clear to auscultation and percussion without rales, rhonchi, wheezing or diminished breath sounds.  	ABDOMEN: Positive bowel sounds. Soft, nondistended, nontender. No guarding or rebound. No masses.  	EXTREMITIES: No significant deformity or joint abnormality. 3+ edema bilaterally.  Peripheral pulses intact.   	SKIN: Skin normal color, texture and turgor with no lesions or eruptions.  PSYCHIATRIC: The mental examination revealed the patient was oriented to person, place, and time. The patient was able to demonstrate good judgement and reason, without hallucinations, abnormal affect or abnormal behaviors during the examination. Patient is not suicidal.  MEDICATIONS:  MEDICATIONS  (STANDING):  amLODIPine   Tablet 10 milliGRAM(s) Oral daily  aspirin  chewable 81 milliGRAM(s) Oral daily  calcium acetate 667 milliGRAM(s) Oral three times a day with meals  clopidogrel Tablet 75 milliGRAM(s) Oral daily  dextrose 5%. 1000 milliLiter(s) (50 mL/Hr) IV Continuous <Continuous>  dextrose 50% Injectable 12.5 Gram(s) IV Push once  dextrose 50% Injectable 25 Gram(s) IV Push once  dextrose 50% Injectable 25 Gram(s) IV Push once  furosemide    Tablet 80 milliGRAM(s) Oral two times a day  heparin  Injectable 5000 Unit(s) SubCutaneous every 12 hours  hydrALAZINE 50 milliGRAM(s) Oral every 8 hours  insulin lispro (HumaLOG) corrective regimen sliding scale   SubCutaneous three times a day before meals  insulin lispro (HumaLOG) corrective regimen sliding scale   SubCutaneous at bedtime  metoprolol succinate ER 50 milliGRAM(s) Oral daily  simvastatin 20 milliGRAM(s) Oral at bedtime  sodium bicarbonate 650 milliGRAM(s) Oral three times a day      LABS: All Labs Reviewed:                        9.8    7.56  )-----------( 258      ( 16 Jun 2018 05:57 )             28.9     06-16    141  |  99  |  108<H>  ----------------------------<  113<H>  4.4   |  22  |  8.15<H>    Ca    7.9<L>      16 Jun 2018 05:57            Blood Culture:   Urine Culture      RADIOLOGY/EKG:    ASSESSMENT AND PLAN:  67 y/o M with h/o HTN, DM, CKD, CHF (EF 30-35%), anemia, HLD presents to the ED for SOB and LE edema. Admit to telemetry for acute on chronic CHF.     Problem/Plan - 1:  ·  Problem: CHF exacerbation.    Strict I and O, daily weights.   Continue with lasix  80 mg po  BID. Fluid restriction.       renal consult appreciated    Problem/Plan - 2:  ·  Problem: Anemia.   Will  transfuse patient 2 units    Problem/Plan - 3:  ·  Problem: CKD (chronic kidney disease).  Plan: Continue to monitor kidney function and electrolytes.   Avoid nephrotoxic drugs.     Problem/Plan - 4:  ·  Problem: Diabetes mellitus.  Plan: Routine blood glucose check.   Start sliding scale.   Check hemoglobin a1c.   Low carb diet.     Problem/Plan - 5:  ·  Problem: Hypertension.  Plan: Routine blood pressure check.  Continue with current medications.    Low salt,low cholesterol, DASH diet.     Problem/Plan - 6:  Problem: Need for prophylactic measure. Plan: Heparin sq for vte prophylaxis  DVT PPX:    ADVANCED DIRECTIVE:    DISPOSITION: home in  am
CHIEF COMPLAINT: patient condition improv and he is breathing much better and a long discussion wit at 11:00 this a.m. while I saw  and consent was obtained  1 y/o M with h/o HTN, DM, CKD, CKF (EF 30%), anemia, presents to the ED for shortness of breath and LE edema for 2 weeks. Pt states he has been having worsening shortness of breath. Pt states he has orthopnea and PND. Pt states he gets dyspnea on exertion after walking a block. Pt states he has a cough and coughs up phlegm. Pt denies LOC, syncope, fever, chills, N/V/D/C, chest pain, numbness, tingling, palpitations, headache, abdominal pain, dysuria, urinary/bowel incontinence or any other complaints at this time.  SUBJECTIVE:     REVIEW OF SYSTEMS:  Constitutional: No fever, fatigue or weight loss.  	Skin: No rash.  	Eyes: No recent vision problems or eye pain.  	ENT: No congestion, ear pain, or sore throat.  	Endocrine: No thyroid problems.  	Cardiovascular: No chest pain or palpitation.  	Respiratory: + shortness of breath + cough. + orthopnea. +PND. No congestion, or wheezing.  	Gastrointestinal: No abdominal pain, nausea, vomiting, or diarrhea.  	Genitourinary: No dysuria.  	Musculoskeletal: No joint swelling.  Neurologic: No headache     Vital Signs Last 24 Hrs  T(C): 37.2 (14 Jun 2018 22:50), Max: 37.2 (14 Jun 2018 22:50)  T(F): 98.9 (14 Jun 2018 22:50), Max: 98.9 (14 Jun 2018 22:50)  HR: 83 (14 Jun 2018 22:50) (66 - 83)  BP: 164/80 (14 Jun 2018 22:50) (157/79 - 173/89)  BP(mean): --  RR: 18 (14 Jun 2018 22:50) (18 - 18)  SpO2: 95% (14 Jun 2018 22:50) (95% - 100%)    I&O's Summary    13 Jun 2018 07:01  -  14 Jun 2018 07:00  --------------------------------------------------------  IN: 100 mL / OUT: 825 mL / NET: -725 mL    14 Jun 2018 07:01  -  14 Jun 2018 23:16  --------------------------------------------------------  IN: 500 mL / OUT: 450 mL / NET: 50 mL        CAPILLARY BLOOD GLUCOSE      POCT Blood Glucose.: 148 mg/dL (14 Jun 2018 21:52)  POCT Blood Glucose.: 111 mg/dL (14 Jun 2018 18:07)  POCT Blood Glucose.: 189 mg/dL (14 Jun 2018 12:19)  POCT Blood Glucose.: 149 mg/dL (14 Jun 2018 08:26)      PHYSICAL EXAM:      : GENERAL APPEARANCE: Well developed, well nourished, alert and cooperative, and appears to be in no acute distress.  HEAD: normocephalic.  EYES: PERRL, EOMI.   EARS: External auditory canals clear, hearing grossly intact.  NECK: Neck supple, non-tender without lymphadenopathy, masses or thyromegaly.  CARDIAC: Normal S1 and S2. No S3, S4 or murmurs. Rhythm is regular.   LUNGS: Clear to auscultation and percussion without rales, rhonchi, wheezing or diminished breath sounds.  ABDOMEN: Positive bowel sounds. Soft, nondistended, nontender. No guarding or rebound. No masses.  EXTREMITIES: No significant deformity or joint abnormality. 3+ edema bilaterally.  Peripheral pulses intact.   SKIN: Skin normal color, texture and turgor with no lesions or eruptions. PSYCHIATRIC: The mental examination revealed the patient was oriented to person, place, and time. The patient was able to demonstrate good judgement and reason, without hallucinations, abnormal affect or abnormal behaviors during the examination. Patient is not suicidal.	      MEDICATIONS:  MEDICATIONS  (STANDING):  amLODIPine   Tablet 10 milliGRAM(s) Oral daily  aspirin  chewable 81 milliGRAM(s) Oral daily  calcium acetate 667 milliGRAM(s) Oral three times a day with meals  clopidogrel Tablet 75 milliGRAM(s) Oral daily  dextrose 5%. 1000 milliLiter(s) (50 mL/Hr) IV Continuous <Continuous>  dextrose 50% Injectable 12.5 Gram(s) IV Push once  dextrose 50% Injectable 25 Gram(s) IV Push once  dextrose 50% Injectable 25 Gram(s) IV Push once  furosemide   Injectable 80 milliGRAM(s) IV Push two times a day  heparin  Injectable 5000 Unit(s) SubCutaneous every 12 hours  hydrALAZINE 50 milliGRAM(s) Oral every 8 hours  insulin lispro (HumaLOG) corrective regimen sliding scale   SubCutaneous three times a day before meals  insulin lispro (HumaLOG) corrective regimen sliding scale   SubCutaneous at bedtime  metoprolol succinate ER 50 milliGRAM(s) Oral daily  simvastatin 20 milliGRAM(s) Oral at bedtime  sodium bicarbonate 650 milliGRAM(s) Oral three times a day      LABS: All Labs Reviewed:                        6.7    6.99  )-----------( 220      ( 14 Jun 2018 05:20 )             21.4     06-14    139  |  100  |  111<H>  ----------------------------<  95  4.9   |  19<L>  |  7.98<H>    Ca    7.5<L>      14 Jun 2018 05:20  Phos  7.9     06-13  Mg     2.3     06-14        CARDIAC MARKERS ( 13 Jun 2018 07:50 )  x     / 0.39 ng/mL / 448 u/L / 11.50 ng/mL / x      CARDIAC MARKERS ( 13 Jun 2018 02:00 )  x     / 0.38 ng/mL / 476 u/L / 12.62 ng/mL / x          Blood Culture:   Urine Culture      RADIOLOGY/EKG:    ASSESSMENT AND PLAN:  67 y/o M with h/o HTN, DM, CKD, CHF (EF 30-35%), anemia, HLD presents to the ED for SOB and LE edema. Admit to telemetry for acute on chronic CHF.     Problem/Plan - 1:  ·  Problem: CHF exacerbation.    Strict I and O, daily weights.   Continue with lasix 40 mg IV BID. Fluid restriction.   check cbc,tsh,lipid, hemoglobin a1c, bmp with mag and phos.    renal consult called    Problem/Plan - 2:  ·  Problem: Anemia.   Will transfuse patient 2 units his wife agree the only  1/2 initially and then repeat   monitor hemoglobin  the daily.Pt denies any blood loss.     Problem/Plan - 3:  ·  Problem: CKD (chronic kidney disease).  Plan: Continue to monitor kidney function and electrolytes.   Avoid nephrotoxic drugs.     Problem/Plan - 4:  ·  Problem: Diabetes mellitus.  Plan: Routine blood glucose check.   Start sliding scale.   Check hemoglobin a1c.   Low carb diet.     Problem/Plan - 5:  ·  Problem: Hypertension.  Plan: Routine blood pressure check.  Continue with current medications.   Low salt,low cholesterol, DASH diet.     Problem/Plan - 6:  Problem: Need for prophylactic measure. Plan: Heparin sq for vte prophylaxis.  DVT PPX:    ADVANCED DIRECTIVE:    DISPOSITION:
[facility] Division of Kidney Diseases & Hypertension  FOLLOW UP NOTE  [phone]--------------------------------------------------------------------------------    67 y/o M with h/o HTN, DM, CKD, CKF (EF 30%), anemia is currently admitted for fluid overload. Nephrology was consulted for elevated creatinine. Patient has been aware of his kidney diease for the past few months and has seen a nephrologist. His baseline creatinine is ~ 6 2 months ago. Patient was offered to start on dialysis but patient and his wife has refused it. Patient seen and examined today at bedside; states that SOB has improved.     PAST HISTORY  --------------------------------------------------------------------------------  No significant changes to PMH, PSH, FHx, SHx, unless otherwise noted    ALLERGIES & MEDICATIONS  --------------------------------------------------------------------------------  Allergies    lisinopril (Anaphylaxis)    Intolerances      Standing Inpatient Medications  amLODIPine   Tablet 10 milliGRAM(s) Oral daily  aspirin  chewable 81 milliGRAM(s) Oral daily  calcium acetate 667 milliGRAM(s) Oral three times a day with meals  clopidogrel Tablet 75 milliGRAM(s) Oral daily  dextrose 5%. 1000 milliLiter(s) IV Continuous <Continuous>  dextrose 50% Injectable 12.5 Gram(s) IV Push once  dextrose 50% Injectable 25 Gram(s) IV Push once  dextrose 50% Injectable 25 Gram(s) IV Push once  furosemide   Injectable 80 milliGRAM(s) IV Push two times a day  furosemide   Injectable 40 milliGRAM(s) IV Push once  heparin  Injectable 5000 Unit(s) SubCutaneous every 12 hours  hydrALAZINE 50 milliGRAM(s) Oral every 8 hours  insulin lispro (HumaLOG) corrective regimen sliding scale   SubCutaneous three times a day before meals  insulin lispro (HumaLOG) corrective regimen sliding scale   SubCutaneous at bedtime  metoprolol succinate ER 50 milliGRAM(s) Oral daily  simvastatin 20 milliGRAM(s) Oral at bedtime  sodium bicarbonate 650 milliGRAM(s) Oral three times a day    PRN Inpatient Medications  dextrose 40% Gel 15 Gram(s) Oral once PRN  glucagon  Injectable 1 milliGRAM(s) IntraMuscular once PRN      REVIEW OF SYSTEMS  --------------------------------------------------------------------------------  Gen: No  fevers/chills  Head/Eyes/Ears/Mouth: No headache  Respiratory: No dyspnea  CV: No chest pain  GI: No abdominal pain, nausea, vomiting  MSK: no edema    VITALS/PHYSICAL EXAM  --------------------------------------------------------------------------------  T(C): 36.9 (06-15-18 @ 13:15), Max: 37.2 (06-14-18 @ 22:50)  HR: 73 (06-15-18 @ 13:15) (67 - 84)  BP: 159/82 (06-15-18 @ 13:15) (159/82 - 177/76)  RR: 18 (06-15-18 @ 13:15) (18 - 18)  SpO2: 95% (06-15-18 @ 13:15) (94% - 96%)  Wt(kg): --        06-14-18 @ 07:01  -  06-15-18 @ 07:00  --------------------------------------------------------  IN: 500 mL / OUT: 1170 mL / NET: -670 mL      Physical Exam:  	  Gen: Elderly male, on nasal cannula.   	HEENT: no JVD  	Pulm: CTA B/L  	CV:  S1S2  	Abd: +BS, soft   	Ext: B/L Lower ext edema +  	Neuro: No focal deficits  	Skin: Warm, without rashes  	Vascular access: none     LABS/STUDIES  --------------------------------------------------------------------------------              7.6    7.12  >-----------<  230      [06-15-18 @ 06:28]              23.3     137  |  98  |  108  ----------------------------<  120      [06-15-18 @ 06:28]  4.4   |  20  |  7.93        Ca     7.6     [06-15-18 @ 06:28]      Mg     2.3     [06-14-18 @ 05:20]            Creatinine Trend:  SCr 7.93 [06-15 @ 06:28]  SCr 7.98 [06-14 @ 05:20]  SCr 7.78 [06-13 @ 07:50]  SCr 7.69 [06-12 @ 19:58]        Iron 38, TIBC --, %sat --      [06-15-18 @ 06:28]  Ferritin 323.8      [06-15-18 @ 06:28]  .7 (Ca --)      [06-15-18 @ 06:28]   --  HbA1c 5.9      [06-13-18 @ 07:50]  TSH 1.73      [06-13-18 @ 07:50]  Lipid: chol 173, TG 81, HDL 64,       [06-13-18 @ 07:50]
[facility] Division of Kidney Diseases & Hypertension  FOLLOW UP NOTE  [phone]--------------------------------------------------------------------------------    HPI: 67 y/o M with h/o HTN, DM, CKD, CKF (EF 30%), anemia came from home with complains of SOB on exertion and LE edema. Symptoms started about 2 weeks when patient started developing SOB on exertion. Patient also developed LE edema and productive cough as well. Symptoms worsened and patient was sent to ER for further management. Nephrology was consulted for elevated creatinine. Patient has been aware of his kidney diease for the past few months and has seen a nephrologist. His baseline creatinine is ~ 6 2 months ago. Patient was offered to start on dialysis but patient and his wife has refused it. Currently patient denies any complaints at this time.    PAST HISTORY  --------------------------------------------------------------------------------  No significant changes to PMH, PSH, FHx, SHx, unless otherwise noted    ALLERGIES & MEDICATIONS  --------------------------------------------------------------------------------  Allergies    lisinopril (Anaphylaxis)    Intolerances      Standing Inpatient Medications  amLODIPine   Tablet 10 milliGRAM(s) Oral daily  aspirin  chewable 81 milliGRAM(s) Oral daily  calcium acetate 667 milliGRAM(s) Oral three times a day with meals  clopidogrel Tablet 75 milliGRAM(s) Oral daily  dextrose 5%. 1000 milliLiter(s) IV Continuous <Continuous>  dextrose 50% Injectable 12.5 Gram(s) IV Push once  dextrose 50% Injectable 25 Gram(s) IV Push once  dextrose 50% Injectable 25 Gram(s) IV Push once  furosemide    Tablet 80 milliGRAM(s) Oral two times a day  furosemide    Tablet 40 milliGRAM(s) Oral once  heparin  Injectable 5000 Unit(s) SubCutaneous every 12 hours  hydrALAZINE 50 milliGRAM(s) Oral every 8 hours  insulin lispro (HumaLOG) corrective regimen sliding scale   SubCutaneous three times a day before meals  insulin lispro (HumaLOG) corrective regimen sliding scale   SubCutaneous at bedtime  metoprolol succinate ER 50 milliGRAM(s) Oral daily  simvastatin 20 milliGRAM(s) Oral at bedtime  sodium bicarbonate 650 milliGRAM(s) Oral three times a day    PRN Inpatient Medications  dextrose 40% Gel 15 Gram(s) Oral once PRN  glucagon  Injectable 1 milliGRAM(s) IntraMuscular once PRN      REVIEW OF SYSTEMS  --------------------------------------------------------------------------------    Gen: No  fevers/chills  Head/Eyes/Ears/Mouth: No headache  Respiratory: No dyspnea  CV: No chest pain  GI: No abdominal pain, nausea, vomiting  MSK: no edema    VITALS/PHYSICAL EXAM  --------------------------------------------------------------------------------  T(C): 36.8 (06-17-18 @ 05:21), Max: 36.8 (06-17-18 @ 05:21)  HR: 80 (06-17-18 @ 05:21) (73 - 80)  BP: 174/81 (06-17-18 @ 05:21) (174/80 - 177/81)  RR: 18 (06-17-18 @ 05:21) (18 - 18)  SpO2: 98% (06-17-18 @ 05:21) (97% - 98%)  Wt(kg): --        06-16-18 @ 07:01  -  06-17-18 @ 07:00  --------------------------------------------------------  IN: 950 mL / OUT: 1850 mL / NET: -900 mL    06-17-18 @ 07:01  -  06-17-18 @ 11:11  --------------------------------------------------------  IN: 0 mL / OUT: 500 mL / NET: -500 mL      Physical Exam:  	    Gen: NAD  	Pulm: CTA B/L  	CV: RRR, S1S2;  	Abd: +BS, soft, nontender/nondistended  	: No suprapubic tenderness              Extremities: no bilateral LE edema noted.               Neuro: No focal deficits  	Skin: Warm, without rashes  	Vascular access:    LABS/STUDIES  --------------------------------------------------------------------------------              9.8    7.56  >-----------<  258      [06-16-18 @ 05:57]              28.9     141  |  99  |  108  ----------------------------<  113      [06-16-18 @ 05:57]  4.4   |  22  |  8.15        Ca     7.9     [06-16-18 @ 05:57]            Creatinine Trend:  SCr 8.15 [06-16 @ 05:57]  SCr 7.93 [06-15 @ 06:28]  SCr 7.98 [06-14 @ 05:20]  SCr 7.78 [06-13 @ 07:50]  SCr 7.69 [06-12 @ 19:58]        Iron 38, TIBC --, %sat --      [06-15-18 @ 06:28]  Ferritin 323.8      [06-15-18 @ 06:28]  .7 (Ca --)      [06-15-18 @ 06:28]   --  HbA1c 5.9      [06-13-18 @ 07:50]  TSH 1.73      [06-13-18 @ 07:50]  Lipid: chol 173, TG 81, HDL 64,       [06-13-18 @ 07:50]
Health system Division of Kidney Diseases & Hypertension  FOLLOW UP NOTE  [phone]--------------------------------------------------------------------------------    HPI: 67 y/o M with h/o HTN, DM, CKD, CKF (EF 30%), anemia came from home with complains of SOB on exertion and LE edema. Symptoms started about 2 weeks when patient started developing SOB on exertion. Patient also developed LE edema and productive cough as well. Symptoms worsened and patient was sent to ER for further management. Nephrology was consulted for elevated creatinine. Patient has been aware of his kidney diease for the past few months and has seen a nephrologist. His baseline creatinine is ~ 6 2 months ago. Patient was offered to start on dialysis but patient and his wife has refused it. Currently patient denies any complaints at this time.    PAST HISTORY  --------------------------------------------------------------------------------  No significant changes to PMH, PSH, FHx, SHx, unless otherwise noted    ALLERGIES & MEDICATIONS  --------------------------------------------------------------------------------  Allergies    lisinopril (Anaphylaxis)    Intolerances      Standing Inpatient Medications  amLODIPine   Tablet 10 milliGRAM(s) Oral daily  aspirin  chewable 81 milliGRAM(s) Oral daily  calcium acetate 667 milliGRAM(s) Oral three times a day with meals  clopidogrel Tablet 75 milliGRAM(s) Oral daily  dextrose 5%. 1000 milliLiter(s) IV Continuous <Continuous>  dextrose 50% Injectable 12.5 Gram(s) IV Push once  dextrose 50% Injectable 25 Gram(s) IV Push once  dextrose 50% Injectable 25 Gram(s) IV Push once  furosemide   Injectable 80 milliGRAM(s) IV Push two times a day  heparin  Injectable 5000 Unit(s) SubCutaneous every 12 hours  hydrALAZINE 50 milliGRAM(s) Oral every 8 hours  insulin lispro (HumaLOG) corrective regimen sliding scale   SubCutaneous three times a day before meals  insulin lispro (HumaLOG) corrective regimen sliding scale   SubCutaneous at bedtime  metoprolol succinate ER 50 milliGRAM(s) Oral daily  simvastatin 20 milliGRAM(s) Oral at bedtime  sodium bicarbonate 650 milliGRAM(s) Oral three times a day    PRN Inpatient Medications  dextrose 40% Gel 15 Gram(s) Oral once PRN  glucagon  Injectable 1 milliGRAM(s) IntraMuscular once PRN      REVIEW OF SYSTEMS  --------------------------------------------------------------------------------    Gen: No  fevers/chills  Head/Eyes/Ears/Mouth: No headache  Respiratory: No dyspnea  CV: No chest pain  GI: No abdominal pain, nausea, vomiting  MSK: no edema    VITALS/PHYSICAL EXAM  --------------------------------------------------------------------------------  T(C): 36.7 (06-16-18 @ 05:34), Max: 36.9 (06-15-18 @ 13:15)  HR: 92 (06-16-18 @ 05:34) (73 - 92)  BP: 178/84 (06-16-18 @ 05:34) (159/82 - 178/84)  RR: 18 (06-16-18 @ 05:34) (18 - 18)  SpO2: 100% (06-16-18 @ 05:34) (95% - 100%)  Wt(kg): --        06-15-18 @ 07:01  -  06-16-18 @ 07:00  --------------------------------------------------------  IN: 320 mL / OUT: 2500 mL / NET: -2180 mL    06-16-18 @ 07:01  -  06-16-18 @ 11:24  --------------------------------------------------------  IN: 200 mL / OUT: 150 mL / NET: 50 mL      Physical Exam:  	  Gen: Elderly male, on nasal cannula.   	HEENT: no JVD  	Pulm: CTA B/L  	CV:  S1S2  	Abd: +BS, soft   	Ext: B/L Lower ext edema +  	Neuro: No focal deficits  	Skin: Warm, without rashes  	Vascular access: none       All other systems were reviewed and are negative, except as noted.    LABS/STUDIES  --------------------------------------------------------------------------------              9.8    7.56  >-----------<  258      [06-16-18 @ 05:57]              28.9     141  |  99  |  108  ----------------------------<  113      [06-16-18 @ 05:57]  4.4   |  22  |  8.15        Ca     7.9     [06-16-18 @ 05:57]            Creatinine Trend:  SCr 8.15 [06-16 @ 05:57]  SCr 7.93 [06-15 @ 06:28]  SCr 7.98 [06-14 @ 05:20]  SCr 7.78 [06-13 @ 07:50]  SCr 7.69 [06-12 @ 19:58]        Iron 38, TIBC --, %sat --      [06-15-18 @ 06:28]  Ferritin 323.8      [06-15-18 @ 06:28]  .7 (Ca --)      [06-15-18 @ 06:28]   --  HbA1c 5.9      [06-13-18 @ 07:50]  TSH 1.73      [06-13-18 @ 07:50]  Lipid: chol 173, TG 81, HDL 64,       [06-13-18 @ 07:50]

## 2018-08-02 PROBLEM — I50.9 HEART FAILURE, UNSPECIFIED: Chronic | Status: ACTIVE | Noted: 2018-06-12

## 2018-08-02 PROBLEM — D64.9 ANEMIA, UNSPECIFIED: Chronic | Status: ACTIVE | Noted: 2018-06-13

## 2018-08-03 ENCOUNTER — LABORATORY RESULT (OUTPATIENT)
Age: 68
End: 2018-08-03

## 2018-08-03 ENCOUNTER — APPOINTMENT (OUTPATIENT)
Dept: PULMONOLOGY | Facility: CLINIC | Age: 68
End: 2018-08-03
Payer: MEDICARE

## 2018-08-03 VITALS
DIASTOLIC BLOOD PRESSURE: 80 MMHG | RESPIRATION RATE: 16 BRPM | HEIGHT: 66 IN | TEMPERATURE: 99.3 F | SYSTOLIC BLOOD PRESSURE: 216 MMHG | HEART RATE: 85 BPM | OXYGEN SATURATION: 91 % | BODY MASS INDEX: 26.52 KG/M2 | WEIGHT: 165 LBS

## 2018-08-03 VITALS — SYSTOLIC BLOOD PRESSURE: 170 MMHG | DIASTOLIC BLOOD PRESSURE: 90 MMHG

## 2018-08-03 PROCEDURE — 99213 OFFICE O/P EST LOW 20 MIN: CPT | Mod: 25

## 2018-08-03 PROCEDURE — 71046 X-RAY EXAM CHEST 2 VIEWS: CPT

## 2018-08-04 LAB
ALBUMIN SERPL ELPH-MCNC: 3.6 G/DL
ALP BLD-CCNC: 118 U/L
ALT SERPL-CCNC: 15 U/L
ANION GAP SERPL CALC-SCNC: 20 MMOL/L
AST SERPL-CCNC: 18 U/L
BASOPHILS # BLD AUTO: 0.02 K/UL
BASOPHILS NFR BLD AUTO: 0.3 %
BILIRUB SERPL-MCNC: 0.2 MG/DL
BUN SERPL-MCNC: 117 MG/DL
CALCIUM SERPL-MCNC: 7.4 MG/DL
CHLORIDE SERPL-SCNC: 101 MMOL/L
CO2 SERPL-SCNC: 20 MMOL/L
CREAT SERPL-MCNC: 9.2 MG/DL
EOSINOPHIL # BLD AUTO: 0.3 K/UL
EOSINOPHIL NFR BLD AUTO: 4.1 %
GLUCOSE SERPL-MCNC: 97 MG/DL
HCT VFR BLD CALC: 23.5 %
HGB BLD-MCNC: 7.5 G/DL
IMM GRANULOCYTES NFR BLD AUTO: 0.3 %
LYMPHOCYTES # BLD AUTO: 1.01 K/UL
LYMPHOCYTES NFR BLD AUTO: 13.8 %
MAN DIFF?: NORMAL
MCHC RBC-ENTMCNC: 25.3 PG
MCHC RBC-ENTMCNC: 31.9 GM/DL
MCV RBC AUTO: 79.1 FL
MONOCYTES # BLD AUTO: 0.46 K/UL
MONOCYTES NFR BLD AUTO: 6.3 %
NEUTROPHILS # BLD AUTO: 5.53 K/UL
NEUTROPHILS NFR BLD AUTO: 75.2 %
PLATELET # BLD AUTO: 316 K/UL
POTASSIUM SERPL-SCNC: 4.6 MMOL/L
PROT SERPL-MCNC: 6.6 G/DL
RBC # BLD: 2.97 M/UL
RBC # FLD: 16.3 %
SODIUM SERPL-SCNC: 140 MMOL/L
WBC # FLD AUTO: 7.34 K/UL

## 2018-08-06 ENCOUNTER — MOBILE ON CALL (OUTPATIENT)
Age: 68
End: 2018-08-06

## 2018-09-01 ENCOUNTER — RECORD ABSTRACTING (OUTPATIENT)
Age: 68
End: 2018-09-01

## 2018-09-01 DIAGNOSIS — Z28.21 IMMUNIZATION NOT CARRIED OUT BECAUSE OF PATIENT REFUSAL: ICD-10-CM

## 2018-10-25 ENCOUNTER — INPATIENT (INPATIENT)
Facility: HOSPITAL | Age: 68
LOS: 2 days | Discharge: HOME | End: 2018-10-28
Attending: INTERNAL MEDICINE | Admitting: INTERNAL MEDICINE
Payer: MEDICARE

## 2018-10-25 VITALS
OXYGEN SATURATION: 100 % | RESPIRATION RATE: 16 BRPM | HEART RATE: 60 BPM | SYSTOLIC BLOOD PRESSURE: 184 MMHG | TEMPERATURE: 98 F | DIASTOLIC BLOOD PRESSURE: 93 MMHG

## 2018-10-25 DIAGNOSIS — D64.9 ANEMIA, UNSPECIFIED: ICD-10-CM

## 2018-10-25 DIAGNOSIS — N18.9 CHRONIC KIDNEY DISEASE, UNSPECIFIED: ICD-10-CM

## 2018-10-25 DIAGNOSIS — Z95.1 PRESENCE OF AORTOCORONARY BYPASS GRAFT: Chronic | ICD-10-CM

## 2018-10-25 DIAGNOSIS — I50.30 UNSPECIFIED DIASTOLIC (CONGESTIVE) HEART FAILURE: ICD-10-CM

## 2018-10-25 DIAGNOSIS — N18.5 CHRONIC KIDNEY DISEASE, STAGE 5: ICD-10-CM

## 2018-10-25 DIAGNOSIS — Z29.9 ENCOUNTER FOR PROPHYLACTIC MEASURES, UNSPECIFIED: ICD-10-CM

## 2018-10-25 DIAGNOSIS — E11.9 TYPE 2 DIABETES MELLITUS WITHOUT COMPLICATIONS: ICD-10-CM

## 2018-10-25 DIAGNOSIS — E87.79 OTHER FLUID OVERLOAD: ICD-10-CM

## 2018-10-25 DIAGNOSIS — I10 ESSENTIAL (PRIMARY) HYPERTENSION: ICD-10-CM

## 2018-10-25 DIAGNOSIS — G93.40 ENCEPHALOPATHY, UNSPECIFIED: ICD-10-CM

## 2018-10-25 DIAGNOSIS — I16.0 HYPERTENSIVE URGENCY: ICD-10-CM

## 2018-10-25 DIAGNOSIS — I25.10 ATHEROSCLEROTIC HEART DISEASE OF NATIVE CORONARY ARTERY WITHOUT ANGINA PECTORIS: ICD-10-CM

## 2018-10-25 LAB
ACANTHOCYTES BLD QL SMEAR: SIGNIFICANT CHANGE UP
ALBUMIN SERPL ELPH-MCNC: 3.3 G/DL — SIGNIFICANT CHANGE UP (ref 3.3–5)
ALP SERPL-CCNC: 120 U/L — SIGNIFICANT CHANGE UP (ref 40–120)
ALT FLD-CCNC: 16 U/L — SIGNIFICANT CHANGE UP (ref 4–41)
ANISOCYTOSIS BLD QL: SIGNIFICANT CHANGE UP
APPEARANCE UR: CLEAR — SIGNIFICANT CHANGE UP
AST SERPL-CCNC: 20 U/L — SIGNIFICANT CHANGE UP (ref 4–40)
BACTERIA # UR AUTO: NEGATIVE — SIGNIFICANT CHANGE UP
BASOPHILS # BLD AUTO: 0.02 K/UL — SIGNIFICANT CHANGE UP (ref 0–0.2)
BASOPHILS NFR BLD AUTO: 0.3 % — SIGNIFICANT CHANGE UP (ref 0–2)
BASOPHILS NFR SPEC: 0.9 % — SIGNIFICANT CHANGE UP (ref 0–2)
BILIRUB SERPL-MCNC: 0.2 MG/DL — SIGNIFICANT CHANGE UP (ref 0.2–1.2)
BILIRUB UR-MCNC: NEGATIVE — SIGNIFICANT CHANGE UP
BLASTS # FLD: 0 % — SIGNIFICANT CHANGE UP (ref 0–0)
BLD GP AB SCN SERPL QL: NEGATIVE — SIGNIFICANT CHANGE UP
BLOOD UR QL VISUAL: SIGNIFICANT CHANGE UP
BUN SERPL-MCNC: 101 MG/DL — HIGH (ref 7–23)
CALCIUM SERPL-MCNC: 7.6 MG/DL — LOW (ref 8.4–10.5)
CHLORIDE SERPL-SCNC: 105 MMOL/L — SIGNIFICANT CHANGE UP (ref 98–107)
CO2 SERPL-SCNC: 18 MMOL/L — LOW (ref 22–31)
COLOR SPEC: SIGNIFICANT CHANGE UP
CREAT SERPL-MCNC: 8.74 MG/DL — HIGH (ref 0.5–1.3)
EOSINOPHIL # BLD AUTO: 0.09 K/UL — SIGNIFICANT CHANGE UP (ref 0–0.5)
EOSINOPHIL NFR BLD AUTO: 1.3 % — SIGNIFICANT CHANGE UP (ref 0–6)
EOSINOPHIL NFR FLD: 0.9 % — SIGNIFICANT CHANGE UP (ref 0–6)
GIANT PLATELETS BLD QL SMEAR: PRESENT — SIGNIFICANT CHANGE UP
GLUCOSE BLDC GLUCOMTR-MCNC: 115 MG/DL — HIGH (ref 70–99)
GLUCOSE BLDC GLUCOMTR-MCNC: 140 MG/DL — HIGH (ref 70–99)
GLUCOSE BLDC GLUCOMTR-MCNC: 144 MG/DL — HIGH (ref 70–99)
GLUCOSE BLDC GLUCOMTR-MCNC: 155 MG/DL — HIGH (ref 70–99)
GLUCOSE SERPL-MCNC: 38 MG/DL — CRITICAL LOW (ref 70–99)
GLUCOSE UR-MCNC: 50 — SIGNIFICANT CHANGE UP
HCT VFR BLD CALC: 23.3 % — LOW (ref 39–50)
HGB BLD-MCNC: 6.9 G/DL — CRITICAL LOW (ref 13–17)
HYALINE CASTS # UR AUTO: NEGATIVE — SIGNIFICANT CHANGE UP
HYPOCHROMIA BLD QL: SLIGHT — SIGNIFICANT CHANGE UP
IMM GRANULOCYTES # BLD AUTO: 0.02 # — SIGNIFICANT CHANGE UP
IMM GRANULOCYTES NFR BLD AUTO: 0.3 % — SIGNIFICANT CHANGE UP (ref 0–1.5)
KETONES UR-MCNC: NEGATIVE — SIGNIFICANT CHANGE UP
LEUKOCYTE ESTERASE UR-ACNC: NEGATIVE — SIGNIFICANT CHANGE UP
LYMPHOCYTES # BLD AUTO: 0.34 K/UL — LOW (ref 1–3.3)
LYMPHOCYTES # BLD AUTO: 5 % — LOW (ref 13–44)
LYMPHOCYTES NFR SPEC AUTO: 1.7 % — LOW (ref 13–44)
MACROCYTES BLD QL: SLIGHT — SIGNIFICANT CHANGE UP
MCHC RBC-ENTMCNC: 24.1 PG — LOW (ref 27–34)
MCHC RBC-ENTMCNC: 29.6 % — LOW (ref 32–36)
MCV RBC AUTO: 81.5 FL — SIGNIFICANT CHANGE UP (ref 80–100)
METAMYELOCYTES # FLD: 0 % — SIGNIFICANT CHANGE UP (ref 0–1)
MICROCYTES BLD QL: SLIGHT — SIGNIFICANT CHANGE UP
MONOCYTES # BLD AUTO: 0.59 K/UL — SIGNIFICANT CHANGE UP (ref 0–0.9)
MONOCYTES NFR BLD AUTO: 8.7 % — SIGNIFICANT CHANGE UP (ref 2–14)
MONOCYTES NFR BLD: 5.2 % — SIGNIFICANT CHANGE UP (ref 2–9)
MYELOCYTES NFR BLD: 0 % — SIGNIFICANT CHANGE UP (ref 0–0)
NEUTROPHIL AB SER-ACNC: 91.3 % — HIGH (ref 43–77)
NEUTROPHILS # BLD AUTO: 5.76 K/UL — SIGNIFICANT CHANGE UP (ref 1.8–7.4)
NEUTROPHILS NFR BLD AUTO: 84.4 % — HIGH (ref 43–77)
NEUTS BAND # BLD: 0 % — SIGNIFICANT CHANGE UP (ref 0–6)
NITRITE UR-MCNC: NEGATIVE — SIGNIFICANT CHANGE UP
NRBC # BLD: 0.9 /100WBC — SIGNIFICANT CHANGE UP
NRBC # FLD: 0 — SIGNIFICANT CHANGE UP
OB PNL STL: NEGATIVE — SIGNIFICANT CHANGE UP
OTHER - HEMATOLOGY %: 0 — SIGNIFICANT CHANGE UP
OVALOCYTES BLD QL SMEAR: SLIGHT — SIGNIFICANT CHANGE UP
PH UR: 6.5 — SIGNIFICANT CHANGE UP (ref 5–8)
PLATELET # BLD AUTO: 280 K/UL — SIGNIFICANT CHANGE UP (ref 150–400)
PLATELET COUNT - ESTIMATE: NORMAL — SIGNIFICANT CHANGE UP
PMV BLD: SIGNIFICANT CHANGE UP FL (ref 7–13)
POIKILOCYTOSIS BLD QL AUTO: SIGNIFICANT CHANGE UP
POTASSIUM SERPL-MCNC: 4.8 MMOL/L — SIGNIFICANT CHANGE UP (ref 3.5–5.3)
POTASSIUM SERPL-SCNC: 4.8 MMOL/L — SIGNIFICANT CHANGE UP (ref 3.5–5.3)
PROMYELOCYTES # FLD: 0 % — SIGNIFICANT CHANGE UP (ref 0–0)
PROT SERPL-MCNC: 7.1 G/DL — SIGNIFICANT CHANGE UP (ref 6–8.3)
PROT UR-MCNC: 300 — HIGH
RBC # BLD: 2.86 M/UL — LOW (ref 4.2–5.8)
RBC # FLD: 18.9 % — HIGH (ref 10.3–14.5)
RBC CASTS # UR COMP ASSIST: SIGNIFICANT CHANGE UP (ref 0–?)
RH IG SCN BLD-IMP: POSITIVE — SIGNIFICANT CHANGE UP
SCHISTOCYTES BLD QL AUTO: SLIGHT — SIGNIFICANT CHANGE UP
SODIUM SERPL-SCNC: 143 MMOL/L — SIGNIFICANT CHANGE UP (ref 135–145)
SP GR SPEC: 1.01 — SIGNIFICANT CHANGE UP (ref 1–1.04)
SQUAMOUS # UR AUTO: SIGNIFICANT CHANGE UP
TARGETS BLD QL SMEAR: SLIGHT — SIGNIFICANT CHANGE UP
TROPONIN T, HIGH SENSITIVITY: 396 NG/L — CRITICAL HIGH (ref ?–14)
TROPONIN T, HIGH SENSITIVITY: 429 NG/L — CRITICAL HIGH (ref ?–14)
UROBILINOGEN FLD QL: NORMAL — SIGNIFICANT CHANGE UP
VARIANT LYMPHS # BLD: 0 % — SIGNIFICANT CHANGE UP
WBC # BLD: 6.82 K/UL — SIGNIFICANT CHANGE UP (ref 3.8–10.5)
WBC # FLD AUTO: 6.82 K/UL — SIGNIFICANT CHANGE UP (ref 3.8–10.5)
WBC UR QL: SIGNIFICANT CHANGE UP (ref 0–?)

## 2018-10-25 PROCEDURE — 71045 X-RAY EXAM CHEST 1 VIEW: CPT | Mod: 26

## 2018-10-25 PROCEDURE — 70450 CT HEAD/BRAIN W/O DYE: CPT | Mod: 26

## 2018-10-25 PROCEDURE — 99223 1ST HOSP IP/OBS HIGH 75: CPT | Mod: GC

## 2018-10-25 RX ORDER — SIMVASTATIN 20 MG/1
20 TABLET, FILM COATED ORAL AT BEDTIME
Qty: 0 | Refills: 0 | Status: DISCONTINUED | OUTPATIENT
Start: 2018-10-25 | End: 2018-10-28

## 2018-10-25 RX ORDER — CLOPIDOGREL BISULFATE 75 MG/1
75 TABLET, FILM COATED ORAL DAILY
Qty: 0 | Refills: 0 | Status: DISCONTINUED | OUTPATIENT
Start: 2018-10-25 | End: 2018-10-28

## 2018-10-25 RX ORDER — DEXTROSE 50 % IN WATER 50 %
15 SYRINGE (ML) INTRAVENOUS ONCE
Qty: 0 | Refills: 0 | Status: DISCONTINUED | OUTPATIENT
Start: 2018-10-25 | End: 2018-10-28

## 2018-10-25 RX ORDER — FUROSEMIDE 40 MG
60 TABLET ORAL ONCE
Qty: 0 | Refills: 0 | Status: COMPLETED | OUTPATIENT
Start: 2018-10-25 | End: 2018-10-25

## 2018-10-25 RX ORDER — HYDRALAZINE HCL 50 MG
50 TABLET ORAL EVERY 8 HOURS
Qty: 0 | Refills: 0 | Status: DISCONTINUED | OUTPATIENT
Start: 2018-10-25 | End: 2018-10-25

## 2018-10-25 RX ORDER — HEPARIN SODIUM 5000 [USP'U]/ML
5000 INJECTION INTRAVENOUS; SUBCUTANEOUS EVERY 8 HOURS
Qty: 0 | Refills: 0 | Status: DISCONTINUED | OUTPATIENT
Start: 2018-10-25 | End: 2018-10-28

## 2018-10-25 RX ORDER — HYDRALAZINE HCL 50 MG
50 TABLET ORAL EVERY 8 HOURS
Qty: 0 | Refills: 0 | Status: DISCONTINUED | OUTPATIENT
Start: 2018-10-25 | End: 2018-10-26

## 2018-10-25 RX ORDER — METOPROLOL TARTRATE 50 MG
50 TABLET ORAL DAILY
Qty: 0 | Refills: 0 | Status: DISCONTINUED | OUTPATIENT
Start: 2018-10-26 | End: 2018-10-27

## 2018-10-25 RX ORDER — DEXTROSE 50 % IN WATER 50 %
12.5 SYRINGE (ML) INTRAVENOUS ONCE
Qty: 0 | Refills: 0 | Status: DISCONTINUED | OUTPATIENT
Start: 2018-10-25 | End: 2018-10-28

## 2018-10-25 RX ORDER — SODIUM CHLORIDE 9 MG/ML
1000 INJECTION, SOLUTION INTRAVENOUS
Qty: 0 | Refills: 0 | Status: DISCONTINUED | OUTPATIENT
Start: 2018-10-25 | End: 2018-10-25

## 2018-10-25 RX ORDER — SODIUM CHLORIDE 9 MG/ML
1000 INJECTION, SOLUTION INTRAVENOUS
Qty: 0 | Refills: 0 | Status: DISCONTINUED | OUTPATIENT
Start: 2018-10-25 | End: 2018-10-28

## 2018-10-25 RX ORDER — DEXTROSE 50 % IN WATER 50 %
25 SYRINGE (ML) INTRAVENOUS ONCE
Qty: 0 | Refills: 0 | Status: DISCONTINUED | OUTPATIENT
Start: 2018-10-25 | End: 2018-10-28

## 2018-10-25 RX ORDER — INSULIN LISPRO 100/ML
VIAL (ML) SUBCUTANEOUS AT BEDTIME
Qty: 0 | Refills: 0 | Status: DISCONTINUED | OUTPATIENT
Start: 2018-10-25 | End: 2018-10-28

## 2018-10-25 RX ORDER — INSULIN LISPRO 100/ML
VIAL (ML) SUBCUTANEOUS
Qty: 0 | Refills: 0 | Status: DISCONTINUED | OUTPATIENT
Start: 2018-10-25 | End: 2018-10-28

## 2018-10-25 RX ORDER — AMLODIPINE BESYLATE 2.5 MG/1
10 TABLET ORAL DAILY
Qty: 0 | Refills: 0 | Status: DISCONTINUED | OUTPATIENT
Start: 2018-10-25 | End: 2018-10-28

## 2018-10-25 RX ORDER — CALCIUM ACETATE 667 MG
667 TABLET ORAL
Qty: 0 | Refills: 0 | Status: DISCONTINUED | OUTPATIENT
Start: 2018-10-25 | End: 2018-10-28

## 2018-10-25 RX ORDER — GLUCAGON INJECTION, SOLUTION 0.5 MG/.1ML
1 INJECTION, SOLUTION SUBCUTANEOUS ONCE
Qty: 0 | Refills: 0 | Status: DISCONTINUED | OUTPATIENT
Start: 2018-10-25 | End: 2018-10-28

## 2018-10-25 RX ORDER — HYDRALAZINE HCL 50 MG
25 TABLET ORAL EVERY 8 HOURS
Qty: 0 | Refills: 0 | Status: DISCONTINUED | OUTPATIENT
Start: 2018-10-25 | End: 2018-10-25

## 2018-10-25 RX ORDER — FUROSEMIDE 40 MG
80 TABLET ORAL
Qty: 0 | Refills: 0 | Status: DISCONTINUED | OUTPATIENT
Start: 2018-10-25 | End: 2018-10-28

## 2018-10-25 RX ORDER — METOPROLOL TARTRATE 50 MG
50 TABLET ORAL ONCE
Qty: 0 | Refills: 0 | Status: COMPLETED | OUTPATIENT
Start: 2018-10-25 | End: 2018-10-25

## 2018-10-25 RX ORDER — HYDRALAZINE HCL 50 MG
20 TABLET ORAL EVERY 6 HOURS
Qty: 0 | Refills: 0 | Status: DISCONTINUED | OUTPATIENT
Start: 2018-10-25 | End: 2018-10-25

## 2018-10-25 RX ORDER — FUROSEMIDE 40 MG
20 TABLET ORAL ONCE
Qty: 0 | Refills: 0 | Status: COMPLETED | OUTPATIENT
Start: 2018-10-25 | End: 2018-10-25

## 2018-10-25 RX ORDER — ASPIRIN/CALCIUM CARB/MAGNESIUM 324 MG
81 TABLET ORAL DAILY
Qty: 0 | Refills: 0 | Status: DISCONTINUED | OUTPATIENT
Start: 2018-10-25 | End: 2018-10-28

## 2018-10-25 RX ORDER — HYDRALAZINE HCL 50 MG
10 TABLET ORAL ONCE
Qty: 0 | Refills: 0 | Status: COMPLETED | OUTPATIENT
Start: 2018-10-25 | End: 2018-10-25

## 2018-10-25 RX ORDER — HYDRALAZINE HCL 50 MG
50 TABLET ORAL ONCE
Qty: 0 | Refills: 0 | Status: COMPLETED | OUTPATIENT
Start: 2018-10-25 | End: 2018-10-25

## 2018-10-25 RX ORDER — DEXTROSE 50 % IN WATER 50 %
50 SYRINGE (ML) INTRAVENOUS ONCE
Qty: 0 | Refills: 0 | Status: COMPLETED | OUTPATIENT
Start: 2018-10-25 | End: 2018-10-25

## 2018-10-25 RX ORDER — HYDRALAZINE HCL 50 MG
15 TABLET ORAL ONCE
Qty: 0 | Refills: 0 | Status: COMPLETED | OUTPATIENT
Start: 2018-10-25 | End: 2018-10-25

## 2018-10-25 RX ADMIN — Medication 50 MILLIGRAM(S): at 16:20

## 2018-10-25 RX ADMIN — Medication 10 MILLIGRAM(S): at 08:23

## 2018-10-25 RX ADMIN — CLOPIDOGREL BISULFATE 75 MILLIGRAM(S): 75 TABLET, FILM COATED ORAL at 11:49

## 2018-10-25 RX ADMIN — Medication 667 MILLIGRAM(S): at 12:24

## 2018-10-25 RX ADMIN — Medication 667 MILLIGRAM(S): at 17:38

## 2018-10-25 RX ADMIN — AMLODIPINE BESYLATE 10 MILLIGRAM(S): 2.5 TABLET ORAL at 11:49

## 2018-10-25 RX ADMIN — SIMVASTATIN 20 MILLIGRAM(S): 20 TABLET, FILM COATED ORAL at 21:26

## 2018-10-25 RX ADMIN — Medication 15 MILLIGRAM(S): at 09:53

## 2018-10-25 RX ADMIN — Medication 50 MILLIGRAM(S): at 16:53

## 2018-10-25 RX ADMIN — Medication 81 MILLIGRAM(S): at 11:49

## 2018-10-25 RX ADMIN — Medication 50 MILLIGRAM(S): at 21:26

## 2018-10-25 RX ADMIN — Medication 50 MILLIGRAM(S): at 05:55

## 2018-10-25 RX ADMIN — HEPARIN SODIUM 5000 UNIT(S): 5000 INJECTION INTRAVENOUS; SUBCUTANEOUS at 21:25

## 2018-10-25 RX ADMIN — Medication 20 MILLIGRAM(S): at 07:20

## 2018-10-25 RX ADMIN — Medication 50 MILLILITER(S): at 04:40

## 2018-10-25 RX ADMIN — Medication 80 MILLIGRAM(S): at 22:52

## 2018-10-25 RX ADMIN — Medication 60 MILLIGRAM(S): at 08:04

## 2018-10-25 NOTE — ED PROVIDER NOTE - OBJECTIVE STATEMENT
68M, pmh CHF, DM2, HTN, CKD, c/o ams by wife at home. Hx obtain from wife, who found the patient altered and unable to spoke around midnight. Pt was bibems and had FS 28 and received 1 amp of D50 and FS at ED was 100. Wife is unsure if the patient took HTN meds today and believe the patient did not eat much today, which caused his hypoglycemia. Pt denied any pain and is sleeping. Wife was away and unsure if patient took the meds. Pt is verbal and AAOx3 at baseline. 68M, pmh CHF, DM2, HTN, CKD, c/o ams by wife at home. Hx obtain from wife, who found the patient altered and unable to spoke around midnight. Pt was bibems and had FS 28 and received 1 amp of D50 and FS at ED was 100. Wife is unsure if the patient took HTN meds today and believe the patient did not eat much today, which caused his hypoglycemia. Pt denied any pain and is sleeping. Wife was away and unsure if patient took the meds. Pt is verbal and AAOx3 at baseline.  [place]: 68M PMH CHF, DM, CAD w/ CABG, ischemic cardiomyopathy, HTN, HLD, CKD (recommended to start HD but no specific plans yet) p/w AMS. Pt very poor historian, denying any complaints, though wife states he always denies complaints. Lives w/ family, slow but independent gait at baseline. Pt was last seen normal self ~2100. Wife randomly awoke in middle of night and found pt sitting in chair, unresponsive, profusely sweating. Wife also thinks that pt only occasionally takes his meds. EMS noted low FSGs given amp d50 w/ improvement. Wife states that over last few weeks to months pt has been very sleepy, moving around less, more confused. also eating/drinking less, especially yesterday. No reported cough, NV, black stool, diarrhea, fevers.   Only on insulin, no long acting oral hypoglycemics.

## 2018-10-25 NOTE — CONSULT NOTE ADULT - SUBJECTIVE AND OBJECTIVE BOX
CHIEF COMPLAINT: Hypoglycemia    HPI: 68M with HTN, T2DM, CAD s/p CABG, HFpEF, progressively worseneing CKD (Last discharged with Cr 8 6/2018) brought in by EMS for lethargy after found to have FS of 28 in the field. Pt's wife at bedside said she came home from work and found him lethargic and diaphoretic Pt given 1 amp of d50 EMS with glucose of 100 on arrival in the ED but FS dropped to 38 1 hour later, last 106 after another amp of dextrose. Pt is AAOx3 but a poor historian, recalls feeling tired and sweaty, now feeling better. Says he took 70/30 10u in the AM and 4u at night as per usual, only ate 1 muffin this AM. He doesn't usually eat more than that and never checks his glucose. He takes all of his own meds. Unclear if he took his antihypertensives today as BP in 250s/120s. NO headache, vision changes, numbess, or weakness. No fever, chills, chest pain, or palpitations but endorses dyspnea at rest, denies orthopnea. Pt anemic, denies melena, hematochezia, or other bleeding.     PAST MEDICAL & SURGICAL HISTORY:  CKD (chronic kidney disease)  Anemia  CHF (congestive heart failure)  Diabetes mellitus  Hypertension  S/P CABG (coronary artery bypass graft)  S/P appendectomy      FAMILY HISTORY:  No pertinent family history in first degree relatives      SOCIAL HISTORY:  Smoking: [ x] Never Smoked [ ] Former Smoker (__ packs x ___ years) [ ] Current Smoker  (__ packs x ___ years)  Substance Use: [ x] Never Used [ ] Used ____  EtOH Use: None  Marital Status: [ ] Single [ ]  [ ]  [ ]   Sexual History:   Occupation:  Recent Travel:  Country of Birth:  Advance Directives:    Allergies    lisinopril (Anaphylaxis)    Intolerances        HOME MEDICATIONS:    REVIEW OF SYSTEMS:  Constitutional: [x ] lethagy [ ] fevers [ ] chills [ ] weight loss [ ] weight gain  HEENT: [ x] negative [ ] dry eyes [ ] eye irritation [ ] postnasal drip [ ] nasal congestion  CV: [x ] negative  [ ] chest pain [ ] orthopnea [ ] palpitations [ ] murmur  Resp: [x ] negative [ ] cough [ ] shortness of breath [ ] dyspnea [ ] wheezing [ ] sputum [ ] hemoptysis  GI: [x ] negative [ ] nausea [ ] vomiting [ ] diarrhea [ ] constipation [ ] abd pain [ ] dysphagia   : [x ] negative [ ] dysuria [ ] nocturia [ ] hematuria [ ] increased urinary frequency  Musculoskeletal: [x ] negative [ ] back pain [ ] myalgias [ ] arthralgias [ ] fracture  Skin: [x ] negative [ ] rash [ ] itch  Neurological: [x ] negative [ ] headache [ ] dizziness [ ] syncope [ ] weakness [ ] numbness  Psychiatric: [ x] negative [ ] anxiety [ ] depression  Endocrine: [ ] negative [x ] diabetes [ ] thyroid problem  Hematologic/Lymphatic: [ ] negative [x ] anemia [ ] bleeding problem  Allergic/Immunologic: [ x] negative [ ] itchy eyes [ ] nasal discharge [ ] hives [ ] angioedema  [ x] All other systems negative  [ ] Unable to assess ROS because ________    OBJECTIVE:  ICU Vital Signs Last 24 Hrs  T(C): 36.7 (25 Oct 2018 03:33), Max: 36.8 (25 Oct 2018 02:08)  T(F): 98.1 (25 Oct 2018 03:33), Max: 98.2 (25 Oct 2018 02:08)  HR: 65 (25 Oct 2018 05:55) (60 - 65)  BP: 250/104 (25 Oct 2018 05:55) (184/93 - 250/104)  BP(mean): --  ABP: --  ABP(mean): --  RR: 16 (25 Oct 2018 05:55) (16 - 16)  SpO2: 100% (25 Oct 2018 05:55) (100% - 100%)        CAPILLARY BLOOD GLUCOSE      POCT Blood Glucose.: 106 mg/dL (25 Oct 2018 05:35)      PHYSICAL EXAM:  General: Appear comfortable  HEENT: PERRL, EOMI  Neck: full ROM, no JVD  Respiratory: rales at the bases, no accessory muscle use  Cardiovascular: RRR, no murmurs  Abdomen: soft, NTND, +BS  Extremities: 3+ pedal edema, no clubbing or cyanosis  Skin: warm and dry, LE stasis changes  Neurological: AAOx3, CN intact, strength 5/5 all extremities, no pronator drift  Psychiatry: paucity of speech and thoughtprocess    LINES: Peripherals    HOSPITAL MEDICATIONS:    Hydralazine 50 mg po, Metoprolol 50 mg ER          dextrose 5% + sodium chloride 0.45%. 1000 milliLiter(s) IV Continuous <Continuous>            LABS:                        6.9    6.82  )-----------( 280      ( 25 Oct 2018 03:40 )             23.3     Hgb Trend: 6.9<--  10-25    143  |  105  |  101<H>  ----------------------------<  38<LL>  4.8   |  18<L>  |  8.74<H>    Ca    7.6<L>      25 Oct 2018 03:40    TPro  7.1  /  Alb  3.3  /  TBili  0.2  /  DBili  x   /  AST  20  /  ALT  16  /  AlkPhos  120  10-25    Creatinine Trend: 8.74<--            MICROBIOLOGY:     RADIOLOGY:  [x] Reviewed and interpreted by me - b/l pleural effusions    EKG: RBBB and TWI in V1, V2, 1, aVL all unchanged, new TWI in V6

## 2018-10-25 NOTE — CONSULT NOTE ADULT - ASSESSMENT
68M with HTN, T2DM, CAD s/p CABG, HFpEF, progressively worseneing CKD (Last discharged with Cr 8 6/2018) brought in by EMS for lethargy after found to have FS of 28 in the field, MICU consulted for recurrent hypoglycemia to the 30s now improved to the 100s with D50 likely in setting of poor renal clearance of insulin 70/30 and pt not eating despite taking insulin. Pt also with hypertensive urgency.    # Hypoglycemia  - Would repeat finger stick now  - If pt with persistent hypoglycemia would give another amp of d50 and start at d10 at 20 cc/hr to limit fluid volume as pt is volume overloaded  - Check fingerstick frequently and adjust d10 accordingly  - Pt's wife is refusing d10 at the present time despite recommendation to start d10 and extensive discussion regarding risks and benefits     # HTN Urgency: without current neurologic symptoms  - Consider labetalol 5 mg IV push and the restart home antihypertensives    # CKD5: Continue goals of care discussion regarding HD as fluid overloaded and uremic, consider nephrology consult 68M with HTN, T2DM, CAD s/p CABG, HFpEF, progressively worseneing CKD (Last discharged with Cr 8 6/2018) brought in by EMS for lethargy after found to have FS of 28 in the field, MICU consulted for recurrent hypoglycemia to the 30s now improved to the 100s with D50 likely in setting of poor renal clearance of insulin 70/30 and pt not eating despite taking insulin. Pt also with hypertensive urgency.    # Hypoglycemia  - Would repeat finger stick now  - If pt with persistent hypoglycemia would give another amp of d50 and start at d10 at 20 cc/hr to limit fluid volume as pt is volume overloaded  - Check fingerstick frequently and adjust d10 accordingly  - Pt's wife is refusing d10 at the present time despite recommendation to start d10 and extensive discussion regarding risks and benefits   - Pt not a candidate for ICU admission a the present time, please call with concerns    # HTN Urgency: without current neurologic symptoms  - Consider labetalol 5 mg IV push and the restart home antihypertensives    # CKD5: Continue goals of care discussion regarding HD as fluid overloaded and uremic, consider nephrology consult

## 2018-10-25 NOTE — H&P ADULT - PROBLEM SELECTOR PLAN 4
Patient on Humulin 70/30 10 units am and 6 units pm at home  - wife endorsing that patient normally does not take pm dose  - presenting now with hypoglycemia, will not start basal or bolus regimen at this time, will start ISS and monitor FSG's closely  - will consult endocrinology   - f/u A1C  - carb consistent diet Patient on Humulin 70/30 10 units am and 6 units pm at home  - wife endorsing that patient normally does not take pm dose  - presenting now with hypoglycemia, will not start basal or bolus regimen at this time, will start ISS and monitor FSG's closely  - f/u A1C  - carb consistent diet

## 2018-10-25 NOTE — H&P ADULT - ASSESSMENT
68 year old Male with HTN, DM2, CAD s/p CABG, HFpEF, progressively worsening CKD (Creatinine 8 in 06/2018, refusing HD in the past) admitted for AMS secondary to hypoglycemia and hypertensive urgency.

## 2018-10-25 NOTE — CONSULT NOTE ADULT - PROBLEM SELECTOR RECOMMENDATION 3
Patient with uncontrolled HTN in the setting of CKD. Recommend uptitrating medications and diuresis with Lasix for improvement. Monitor BP

## 2018-10-25 NOTE — CONSULT NOTE ADULT - PROBLEM SELECTOR RECOMMENDATION 2
Patient with concerning admitting symptoms of shortness of breath and increased peripheral edema in the setting of advanced/progressive CKD. Attempt trial of diuresis with IV Lasix. Monitor daily weights and urine output

## 2018-10-25 NOTE — CONSULT NOTE ADULT - SUBJECTIVE AND OBJECTIVE BOX
[facility] DIVISION OF KIDNEY DISEASES AND HYPERTENSION -- INITIAL CONSULT NOTE  --------------------------------------------------------------------------------  HPI: 67 yo M with PMHx of advanced/progressive CKD, DM2, HTN, admitted for hypertensive urgency and shortness of breath. Patient was found to have an elevated Scr of 8.74 on admission (10/25/18). Nephrology consulted for progression of CKD.    Patient seen and examined in the ED in the company of his wife. Patient developed significant shortness of breath and LE edema at home which caused him to come to the emergency department. [facility] patient was admitted under similar circumstance between 6/12/18 through 6/17/18, where his Scr on admission was 7.69 and was increased to 8.25 on discharge.  Patient has been aware of his kidney disease for the past three months, and has seen a few nephrologists to gather a consensus opinion. Patient has been told that his kidney function has worsened to the point that he will require dialysis in the near future, which he has resisted. Patient currently states that he has mild shortness of breath and swollen LE that has worsened over the past few days. Admits to decreased appetite and lethargy. Denies CP, N/V/F/C.    PAST HISTORY  --------------------------------------------------------------------------------  PAST MEDICAL & SURGICAL HISTORY:  CKD (chronic kidney disease)  Anemia  CHF (congestive heart failure)  Diabetes mellitus  Hypertension  S/P CABG (coronary artery bypass graft)  S/P appendectomy    FAMILY HISTORY:  No pertinent family history in first degree relatives  Multiple family members with kidney disease requiring hemodialysis    PAST SOCIAL HISTORY: Denies alcohol, tobacco, and illicit drugs    ALLERGIES & MEDICATIONS  --------------------------------------------------------------------------------  Allergies    lisinopril (Anaphylaxis)    Intolerances    Standing Inpatient Medications  amLODIPine   Tablet 10 milliGRAM(s) Oral daily  aspirin enteric coated 81 milliGRAM(s) Oral daily  calcium acetate 667 milliGRAM(s) Oral three times a day with meals  clopidogrel Tablet 75 milliGRAM(s) Oral daily  dextrose 5%. 1000 milliLiter(s) IV Continuous <Continuous>  dextrose 50% Injectable 12.5 Gram(s) IV Push once  dextrose 50% Injectable 25 Gram(s) IV Push once  dextrose 50% Injectable 25 Gram(s) IV Push once  heparin  Injectable 5000 Unit(s) SubCutaneous every 8 hours  insulin lispro (HumaLOG) corrective regimen sliding scale   SubCutaneous three times a day before meals  insulin lispro (HumaLOG) corrective regimen sliding scale   SubCutaneous at bedtime  simvastatin 20 milliGRAM(s) Oral at bedtime    REVIEW OF SYSTEMS  --------------------------------------------------------------------------------  Gen: + lethargy, + fatigue  Respiratory: + dyspnea  CV: No chest pain  GI: + decreased appetite, no abdominal pain  MSK: + LE edema  Neuro: No dizziness  Heme: No bleeding  Psych: No depression  Skin: No rash    All other systems were reviewed and are negative, except as noted.    VITALS/PHYSICAL EXAM  --------------------------------------------------------------------------------  T(C): 36.9 (10-25-18 @ 13:54), Max: 36.9 (10-25-18 @ 13:54)  HR: 77 (10-25-18 @ 13:54) (60 - 77)  BP: 190/90 (10-25-18 @ 13:54) (184/93 - 250/104)  RR: 15 (10-25-18 @ 13:54) (15 - 18)  SpO2: 95% (10-25-18 @ 13:54) (95% - 100%)  Wt(kg): --    10-25-18 @ 07:01  -  10-25-18 @ 14:21  --------------------------------------------------------  IN: 0 mL / OUT: 200 mL / NET: -200 mL    Physical Exam:  	Gen: NAD  	HEENT: Supple neck  	Pulm: + bibasilar rales  	CV: RRR, S1S2; no rub  	Abd: +BS, soft, nontender/nondistended  	: No suprapubic tenderness  	UE: No asterixis  	LE: 2+ pitting edema b/l LE, L>R  	Skin: Warm, without rashes    LABS/STUDIES  --------------------------------------------------------------------------------              6.9    6.82  >-----------<  280      [10-25-18 @ 03:40]              23.3     143  |  105  |  101  ----------------------------<  38      [10-25-18 @ 03:40]  4.8   |  18  |  8.74        Ca     7.6     [10-25-18 @ 03:40]    TPro  7.1  /  Alb  3.3  /  TBili  0.2  /  DBili  x   /  AST  20  /  ALT  16  /  AlkPhos  120  [10-25-18 @ 03:40]    Creatinine Trend:  SCr 8.74 [10-25 @ 03:40]    Urinalysis - [10-25-18 @ 07:00]      Color LIGHT YELLOW / Appearance CLEAR / SG 1.014 / pH 6.5      Gluc 50 / Ketone NEGATIVE  / Bili NEGATIVE / Urobili NORMAL       Blood TRACE / Protein 300 / Leuk Est NEGATIVE / Nitrite NEGATIVE      RBC 3-5 / WBC 0-2 / Hyaline NEGATIVE / Gran  / Sq Epi OCC / Non Sq Epi  / Bacteria NEGATIVE    Iron 38, TIBC --, %sat --      [06-15-18 @ 06:28]  Ferritin 323.8      [06-15-18 @ 06:28]  .7 (Ca --)      [06-15-18 @ 06:28]   --  HbA1c 5.9      [06-13-18 @ 07:50]  TSH 1.73      [06-13-18 @ 07:50]  Lipid: chol 173, TG 81, HDL 64,       [06-13-18 @ 07:50]

## 2018-10-25 NOTE — ED ADULT NURSE NOTE - OBJECTIVE STATEMENT
pt hx HTN and diabetes type 2 received to the ed brought in by wife for symptoms of sweating and abnormal responses. as per wife pt not at baseline mental status-with increased lethargy x1day. pt able to respond to questions but appears lethargic in bed. pt a&ox4 and ambulatory at baseline, skin intact, respirations even and unlabored, abd soft and non-distended. pt denies cp, sob, chills, fever, or any other symptoms. pt arrived with 18G placed by ems in left arm, IV line flushes without difficulty. pt normally on bp but did not take evening dose, MD Arias aware of pts bp. will continue to monitor.

## 2018-10-25 NOTE — H&P ADULT - NSHPREVIEWOFSYSTEMS_GEN_ALL_CORE
Review of Systems:  Constitutional: No fever/chills, good appetite/po intake  Eyes: No blurry vision, No diplopia  Neuro: No tremors, No muscle weakness   Cardiovascular: No chest pain, No palpitations  Respiratory: + SOB with exertion, +  non productive cough  GI: No nausea, No vomiting, No diarrhea  : No dysuria, No hematuria  Skin: No rash  Vascular: + LE swelling

## 2018-10-25 NOTE — ED PROVIDER NOTE - MEDICAL DECISION MAKING DETAILS
68M pmh HTN, DM, CKD, CHF, c/o AMS and low FS at 28. Concerning for metabolic or infectious cause of hypoglycemia. Pt continued to have hypoglycemia event in the ED and still not back to baseline. Labs, UA, cxr, unlikely DKA, due to hx, will admit for further management

## 2018-10-25 NOTE — H&P ADULT - PROBLEM SELECTOR PLAN 3
Patient with progressively worsening CKD stage 5, Creatinine 8 in 06/2018, refusing HD in the past  - Patient with signs of overload on exam  - elevated Trop (429-->396) likely in setting of worsening kidney failure and demand  - s/p Lasix 80 IV in ED with good urine output  - Renal consulted, will follow up recommendations regarding HD and diuretics   - monitor I's and O's

## 2018-10-25 NOTE — CONSULT NOTE ADULT - ASSESSMENT
67 yo M with PMHx of advanced/progressive CKD, DM2, HTN, admitted for hypertensive urgency and shortness of breath. Patient was found to have an elevated Scr of 8.74 on admission (10/25/18). Nephrology consulted for progression of CKD.

## 2018-10-25 NOTE — H&P ADULT - NSHPLABSRESULTS_GEN_ALL_CORE
LABS, IMAGING AND EKG PERSONALLY REVIEWED:                            6.9    6.82  )-----------( 280      ( 25 Oct 2018 03:40 )             23.3       10-25    143  |  105  |  101<H>  ----------------------------<  38<LL>  4.8   |  18<L>  |  8.74<H>    Ca    7.6<L>      25 Oct 2018 03:40    TPro  7.1  /  Alb  3.3  /  TBili  0.2  /  DBili  x   /  AST  20  /  ALT  16  /  AlkPhos  120  10-25              Urinalysis Basic - ( 25 Oct 2018 07:00 )    Color: LIGHT YELLOW / Appearance: CLEAR / S.014 / pH: 6.5  Gluc: 50 / Ketone: NEGATIVE  / Bili: NEGATIVE / Urobili: NORMAL   Blood: TRACE / Protein: 300 / Nitrite: NEGATIVE   Leuk Esterase: NEGATIVE / RBC: 3-5 / WBC 0-2   Sq Epi: OCC / Non Sq Epi: x / Bacteria: NEGATIVE        POCT Blood Glucose.: 115 mg/dL (25 Oct 2018 09:01)      Troponin T, High Sensitivity: 396: Delta: 429 on 10/25/    < from: Xray Chest 1 View AP/PA (10.25.18 @ 03:53) >      FINDINGS:   Status post median sternotomy, CABG and aortic valve replacement.  Low lung volumes. Trace left pleural effusion. Underlying pneumonia   cannot be excluded. There is no pneumothorax.  The heart size cannot be accurately assessed in thisprojection.   No acute bony pathology.    IMPRESSION:    No focal consolidations.    < end of copied text > LABS, IMAGING AND EKG PERSONALLY REVIEWED:                            6.9    6.82  )-----------( 280      ( 25 Oct 2018 03:40 )             23.3       10-25    143  |  105  |  101<H>  ----------------------------<  38<LL>  4.8   |  18<L>  |  8.74<H>    Ca    7.6<L>      25 Oct 2018 03:40    TPro  7.1  /  Alb  3.3  /  TBili  0.2  /  DBili  x   /  AST  20  /  ALT  16  /  AlkPhos  120  10-25              Urinalysis Basic - ( 25 Oct 2018 07:00 )    Color: LIGHT YELLOW / Appearance: CLEAR / S.014 / pH: 6.5  Gluc: 50 / Ketone: NEGATIVE  / Bili: NEGATIVE / Urobili: NORMAL   Blood: TRACE / Protein: 300 / Nitrite: NEGATIVE   Leuk Esterase: NEGATIVE / RBC: 3-5 / WBC 0-2   Sq Epi: OCC / Non Sq Epi: x / Bacteria: NEGATIVE        POCT Blood Glucose.: 115 mg/dL (25 Oct 2018 09:01)      Troponin T, High Sensitivity: 396: Delta: 429 on 10/25/    < from: Xray Chest 1 View AP/PA (10.25.18 @ 03:53) >      FINDINGS:   Status post median sternotomy, CABG and aortic valve replacement.  Low lung volumes. Trace left pleural effusion. Underlying pneumonia   cannot be excluded. There is no pneumothorax.  The heart size cannot be accurately assessed in thisprojection.   No acute bony pathology.    IMPRESSION:    No focal consolidations.    < end of copied text >      < from: CT Head No Cont (10.25.18 @ 10:36) >    FINDINGS:  Mild prominence of the sulci and ventricles are consistent with   age-appropriate volume loss. There are hemispheric white matter areas of   low attenuation which are nonspecific but likely related to sequelae of   microvascular disease. Small chronic lacunar infarctions along the left   lentiform nucleus, bilateral caudate nuclei are stable. There is no   intraparenchymal hematoma, mass effect or midline shift. No abnormal   extra-axial fluid collections are present. There is no evidence of acute   transcortical territorial infarction.    The calvarium is intact. The visualized intraorbital compartments,   paranasal sinuses and tympanomastoid cavities appear free of acute   disease.    IMPRESSION:  No interval change.  Microvascular disease.  No acute intracranial hemorrhage.    < end of copied text >

## 2018-10-25 NOTE — ED ADULT NURSE NOTE - NSIMPLEMENTINTERV_GEN_ALL_ED
Implemented All Fall Risk Interventions:  [place] to call system. Call bell, personal items and telephone within reach. Instruct patient to call for assistance. Room bathroom lighting operational. Non-slip footwear when patient is off stretcher. Physically safe environment: no spills, clutter or unnecessary equipment. Stretcher in lowest position, wheels locked, appropriate side rails in place. Provide visual cue, wrist band, yellow gown, etc. Monitor gait and stability. Monitor for mental status changes and reorient to person, place, and time. Review medications for side effects contributing to fall risk. Reinforce activity limits and safety measures with patient and family.

## 2018-10-25 NOTE — ED PROVIDER NOTE - PHYSICAL EXAMINATION
General: NAD, good hygiene, well developed  HENT: Atraumatic, PERRLA, EMOI, no conjunctivae injection,  Cardiovascular: RRR, S1&2, no murmurs, rubs, radial pulses equal and b/l  Respiratory: crackles b/l in lower lobes, no wheezes or decreased breath sounds  Abdominal:  soft and non-tender  Extremities: no edema of the legs/feet  Skin: warm, well perfused  Neurologic: nonfocal, AAOx3, strength and sensation equal/bl, no pronator drift   Psych: normal mood and affect Klepfish: 3+ b/l LE pitting edema. Klepfish: 3+ b/l LE pitting edema.  resident:   neuro: DIANA MTZ, CN2-12 grossly intact, strength +5 b/l and sensation intact b/l   Pul: crackles on exam b/l more in lower lobes, no wheezes or coughs  Card: RRR,. s1s2

## 2018-10-25 NOTE — ED PROVIDER NOTE - CARE PLAN
Principal Discharge DX:	Hypoglycemia Principal Discharge DX:	Hypoglycemia  Secondary Diagnosis:	Anemia

## 2018-10-25 NOTE — H&P ADULT - HISTORY OF PRESENT ILLNESS
68 year old Male with HTN, DM2, CAD s/p CABG, HFpEF, progressively worsening CKD (Creatinine 8 in 06/2018, refusing HD in the past) brought in by EMS after wife noticed patient with AMS. FSG done by EMS was 28, patient received 1 amp of D50. FSG upon arrival in ED was 100, but then dropped to 30, requiring another amp of D50, with FSG improving to 130. Patient now AA0X3, does not remember much about last night except for feeling fatigued. Patient denies feeling unwell yesterday, took his normal dose of Humulin 70/30 10 units in the morning, wife says he did not take his normal dose of 6 units in the evening. Patient with ate muffin and sandwich yesterday. Patient's wife endorses chronic non productive cough. Patient denies fever, chills, chest pain, palpitations, URI symptoms, headache, blurry vision, abdominal pain, n/v/d or urinary symptoms. Patient with exertional dyspnea, not much worse than usual, also with LE swelling.

## 2018-10-25 NOTE — CONSULT NOTE ADULT - PROBLEM SELECTOR RECOMMENDATION 9
Patient with advanced/progressive CKD likely in the setting of long-standing DM2 and HTN. Upon review of [facility] HIE/[facility] showed elevated Scr of 8.25 on 6/17/18. Patient found to have elevated Scr of 8.74 on this admission (10/25/18). Patient understands that he will require renal replacement therapy in the near future, however has not made a decision to undergo this. Patient with shortness of breath and LE pitting edema. Recommend Lasix 80mg IV BID for symptoms. Request vascular consult for vein mapping for possible AVF creation. Will discuss patient's goals on a daily basis. Monitor BMP and urine output.

## 2018-10-25 NOTE — H&P ADULT - ATTENDING COMMENTS
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   Deb with all above management continue hydralazine IV and Lasix IV  For hyperglycemia wife refused D 10 will continue with oral intake and hold his meds today

## 2018-10-25 NOTE — ED PROVIDER NOTE - PROGRESS NOTE DETAILS
spoken with Dr. Swartz who is concern for hypoglycemia and requested ICU on board. Spoke with the ICU and will see patient shortly. Pt vss. Guaiac was brown, no gross blood (Elda Rolon) send and will give 1 unit blood. Klepfish: Pt labs notable for CKD, also hypoglycemia, given another amp d50 and started on d5 (low rate 2/2 likely fluid overload). Also anemia, will transfuse PRBC. Brown stool, guaiac pending. . CTH, UA pending. Klepfish: guaiac negative. repeat fsg wnl. not icu candidate at this time. wife refusing glucose gtt, understands risks. stable for floor. rediscussed w/ dr. graham and text paged mar.

## 2018-10-25 NOTE — H&P ADULT - PROBLEM SELECTOR PLAN 6
Patient with HF with preserved EF, 56% in 6/2018  - volume overload likely in setting of CKD  - c/w Metoprolol succinate 50 mg daily  - will f/u Nephrology recommendations regarding diuretics  - monitor I's and O's

## 2018-10-25 NOTE — H&P ADULT - PROBLEM SELECTOR PLAN 2
Patient presenting with systolic BP up to 250, without headache, blurry vision or n/v  - s/p Hydralazine 50 PO, 15 IV and 10 IV in ED   - will c/w Hydralazine 20 IV q 6hrs for SBP >180  - c/w home PO meds: metoprolol succinate 50 daily and amlodipine 10 mg daily   - continue to monitor BP closely Patient presenting with systolic BP up to 250, without headache, blurry vision or n/v  - CT head negative for hemorrhage   - s/p Hydralazine 50 PO, 15 IV and 10 IV in ED   - will c/w Hydralazine 20 IV q 6hrs for SBP >180  - c/w home PO meds: metoprolol succinate 50 daily and amlodipine 10 mg daily   - continue to monitor BP closely

## 2018-10-25 NOTE — H&P ADULT - NSHPPHYSICALEXAM_GEN_ALL_CORE
Vital Signs Last 24 Hrs  T(C): 36.3 (25 Oct 2018 09:38), Max: 36.8 (25 Oct 2018 02:08)  T(F): 97.4 (25 Oct 2018 09:38), Max: 98.2 (25 Oct 2018 02:08)  HR: 77 (25 Oct 2018 09:55) (60 - 77)  BP: 205/107 (25 Oct 2018 09:55) (184/93 - 250/104)  BP(mean): --  RR: 18 (25 Oct 2018 09:38) (16 - 18)  SpO2: 100% (25 Oct 2018 09:38) (100% - 100%)      PHYSICAL EXAM  GENERAL: NAD, lying comfortably in bed, awake and alert   HEAD:  Atraumatic, Normocephalic  EYES: EOMI b/l, PERRLA b/l, conjunctiva and sclera clear  NECK: Supple, No JVD, No LAD   CHEST/LUNG: diffuse crackles   HEART: Regular rate and rhythm; S1 and S2 present, No murmurs, rubs, or gallops  ABDOMEN: Soft, Nontender, Nondistended; Bowel sounds present  EXTREMITIES:  2+ Peripheral Pulses, 1+ pitting edema LE b/l   NEURO: AAOx3, non-focal   SKIN: No rashes or lesions

## 2018-10-25 NOTE — CONSULT NOTE ADULT - PROBLEM SELECTOR RECOMMENDATION 4
Recommend obtaining iron studies and reticulocyte count. Consider blood transfusion for symptoms of shortness of breath. Monitor Hgb

## 2018-10-25 NOTE — H&P ADULT - PROBLEM SELECTOR PLAN 1
Patient presenting with AMS in setting of hypoglycemia FSG 28 per EMS  - s/p 2 amps of D50, with improvement of FSG to 130, most recent 115  - no change in insulin regimen, likely hypoglycemic in setting of worsening CKD  - patient afebrile, no WC, no symptoms, CXR without consolidation, less likely infectious etiology  - mental status back at baseline now with improvement in FSG  - will continue to monitor FSG's Patient presenting with AMS in setting of hypoglycemia FSG 28 per EMS  - s/p 2 amps of D50, with improvement of FSG to 130, most recent 115  - no change in insulin regimen, likely hypoglycemic in setting of worsening CKD  - patient afebrile, no WC, no symptoms, CXR without consolidation, less likely infectious etiology  - mental status back at baseline now with improvement in FSG  - CT head negative   - will continue to monitor FSG's

## 2018-10-25 NOTE — CONSULT NOTE ADULT - ATTENDING COMMENTS
AMS, hypoglycemia in the setting of diabetes on insulin and CKD  d10, serial FS  r/o underlying infection   uncontrolled HTN, no signs of hypertensive emergency
Patient seen and examined today  with fellow.  Progressive renal failure  diminished appetite and subjective weakness    Would benefit from starting HD soon, spoke with pt and son about this.  Will see if lasix works and if not may start on this admission.

## 2018-10-25 NOTE — ED PROVIDER NOTE - NS ED ROS FT
per wife  General: denies fever, chills, fatigue  HENT: denies nasal congestion, sore throat, ear pain, hearing loss  Eyes: denies visual changes  Neck: denies neck pain, swelling, stiffness  CV: denies chest pain, palpitations  Resp: cough, no sob  GI: denies nausea, vomiting, diarrhea, abdominal pain, constipation  Urinary: denies pain on urination change  MSK: denies joint and muscle pain  Neuro: denies headaches, lightheadedness  Skin: denies rashes

## 2018-10-25 NOTE — ED ADULT TRIAGE NOTE - CHIEF COMPLAINT QUOTE
pt comes to ED for hypoglycemia. pt took his insulin today without eating enough food. pt went to bed pt wife noticed he was covered in sweat and called 911. pt FS at home was 28 pt received 1 amp of D 50. pt FS was 140. pt FS in triage is 100. pt BP is elevated otherwise VSS ekg obtained pt comes to ED for hypoglycemia. pt took his insulin today without eating enough food. pt went to bed pt wife noticed he was covered in sweat and called 911. pt FS at home was 28 pt received 1 amp of D 50. pt FS was 140. pt FS in triage is 100. pt BP is elevated otherwise VSS ekg obtained pt has 18 g in R AC by EMS

## 2018-10-25 NOTE — ED PROVIDER NOTE - ATTENDING CONTRIBUTION TO CARE
68M PMH CHF, DM, CAD w/ CABG, ischemic cardiomyopathy, HTN, HLD, CKD (recommended to start HD but no specific plans yet) p/w several weeks of increasing fatigue and decreasing PO intake and confusion. Now w/ episode of unresponsiveness/diaphoresis and noted to be hypoglycemic, improved w/ d50 but still slightly sleepy. Not on oral hypoglycemics. Unreliably taking meds Ate less than usual yesterday. Hypertensive, other vitals wnl. Exam as above.   Hypoglycemia: Possible metabolic vs. infectious vs. too much meds vs. too little food.   Weeks of lethargy: Likely related to multiple comorbidities, worsening kidney disease.   CBC, cmp, UA, CXR, CTH. Food. Reassess.

## 2018-10-25 NOTE — ED ADULT NURSE NOTE - CHIEF COMPLAINT QUOTE
pt comes to ED for hypoglycemia. pt took his insulin today without eating enough food. pt went to bed pt wife noticed he was covered in sweat and called 911. pt FS at home was 28 pt received 1 amp of D 50. pt FS was 140. pt FS in triage is 100. pt BP is elevated otherwise VSS ekg obtained pt has 18 g in R AC by EMS

## 2018-10-26 DIAGNOSIS — I25.10 ATHEROSCLEROTIC HEART DISEASE OF NATIVE CORONARY ARTERY WITHOUT ANGINA PECTORIS: ICD-10-CM

## 2018-10-26 DIAGNOSIS — N18.6 END STAGE RENAL DISEASE: ICD-10-CM

## 2018-10-26 DIAGNOSIS — I50.9 HEART FAILURE, UNSPECIFIED: ICD-10-CM

## 2018-10-26 DIAGNOSIS — E11.22 TYPE 2 DIABETES MELLITUS WITH DIABETIC CHRONIC KIDNEY DISEASE: ICD-10-CM

## 2018-10-26 DIAGNOSIS — E11.649 TYPE 2 DIABETES MELLITUS WITH HYPOGLYCEMIA WITHOUT COMA: ICD-10-CM

## 2018-10-26 LAB
BASOPHILS # BLD AUTO: 0.03 K/UL — SIGNIFICANT CHANGE UP (ref 0–0.2)
BASOPHILS NFR BLD AUTO: 0.5 % — SIGNIFICANT CHANGE UP (ref 0–2)
BUN SERPL-MCNC: 94 MG/DL — HIGH (ref 7–23)
CALCIUM SERPL-MCNC: 7.5 MG/DL — LOW (ref 8.4–10.5)
CHLORIDE SERPL-SCNC: 100 MMOL/L — SIGNIFICANT CHANGE UP (ref 98–107)
CO2 SERPL-SCNC: 19 MMOL/L — LOW (ref 22–31)
CREAT SERPL-MCNC: 8.59 MG/DL — HIGH (ref 0.5–1.3)
EOSINOPHIL # BLD AUTO: 0.36 K/UL — SIGNIFICANT CHANGE UP (ref 0–0.5)
EOSINOPHIL NFR BLD AUTO: 5.7 % — SIGNIFICANT CHANGE UP (ref 0–6)
GLUCOSE BLDC GLUCOMTR-MCNC: 107 MG/DL — HIGH (ref 70–99)
GLUCOSE BLDC GLUCOMTR-MCNC: 150 MG/DL — HIGH (ref 70–99)
GLUCOSE BLDC GLUCOMTR-MCNC: 187 MG/DL — HIGH (ref 70–99)
GLUCOSE BLDC GLUCOMTR-MCNC: 190 MG/DL — HIGH (ref 70–99)
GLUCOSE SERPL-MCNC: 85 MG/DL — SIGNIFICANT CHANGE UP (ref 70–99)
HBA1C BLD-MCNC: 4.9 % — SIGNIFICANT CHANGE UP (ref 4–5.6)
HCT VFR BLD CALC: 22.7 % — LOW (ref 39–50)
HGB BLD-MCNC: 7.2 G/DL — LOW (ref 13–17)
IMM GRANULOCYTES # BLD AUTO: 0.02 # — SIGNIFICANT CHANGE UP
IMM GRANULOCYTES NFR BLD AUTO: 0.3 % — SIGNIFICANT CHANGE UP (ref 0–1.5)
LYMPHOCYTES # BLD AUTO: 0.46 K/UL — LOW (ref 1–3.3)
LYMPHOCYTES # BLD AUTO: 7.3 % — LOW (ref 13–44)
MAGNESIUM SERPL-MCNC: 2.1 MG/DL — SIGNIFICANT CHANGE UP (ref 1.6–2.6)
MCHC RBC-ENTMCNC: 25.1 PG — LOW (ref 27–34)
MCHC RBC-ENTMCNC: 31.7 % — LOW (ref 32–36)
MCV RBC AUTO: 79.1 FL — LOW (ref 80–100)
MONOCYTES # BLD AUTO: 0.87 K/UL — SIGNIFICANT CHANGE UP (ref 0–0.9)
MONOCYTES NFR BLD AUTO: 13.8 % — SIGNIFICANT CHANGE UP (ref 2–14)
NEUTROPHILS # BLD AUTO: 4.58 K/UL — SIGNIFICANT CHANGE UP (ref 1.8–7.4)
NEUTROPHILS NFR BLD AUTO: 72.4 % — SIGNIFICANT CHANGE UP (ref 43–77)
NRBC # FLD: 0 — SIGNIFICANT CHANGE UP
PHOSPHATE SERPL-MCNC: 8.2 MG/DL — HIGH (ref 2.5–4.5)
PLATELET # BLD AUTO: 288 K/UL — SIGNIFICANT CHANGE UP (ref 150–400)
PMV BLD: SIGNIFICANT CHANGE UP FL (ref 7–13)
POTASSIUM SERPL-MCNC: 4.9 MMOL/L — SIGNIFICANT CHANGE UP (ref 3.5–5.3)
POTASSIUM SERPL-SCNC: 4.9 MMOL/L — SIGNIFICANT CHANGE UP (ref 3.5–5.3)
RBC # BLD: 2.87 M/UL — LOW (ref 4.2–5.8)
RBC # FLD: 19 % — HIGH (ref 10.3–14.5)
SODIUM SERPL-SCNC: 138 MMOL/L — SIGNIFICANT CHANGE UP (ref 135–145)
WBC # BLD: 6.32 K/UL — SIGNIFICANT CHANGE UP (ref 3.8–10.5)
WBC # FLD AUTO: 6.32 K/UL — SIGNIFICANT CHANGE UP (ref 3.8–10.5)

## 2018-10-26 PROCEDURE — 99233 SBSQ HOSP IP/OBS HIGH 50: CPT | Mod: GC

## 2018-10-26 PROCEDURE — G0365: CPT | Mod: 26

## 2018-10-26 PROCEDURE — 99222 1ST HOSP IP/OBS MODERATE 55: CPT

## 2018-10-26 RX ORDER — HYDRALAZINE HCL 50 MG
100 TABLET ORAL THREE TIMES A DAY
Qty: 0 | Refills: 0 | Status: DISCONTINUED | OUTPATIENT
Start: 2018-10-26 | End: 2018-10-28

## 2018-10-26 RX ORDER — HYDRALAZINE HCL 50 MG
75 TABLET ORAL EVERY 8 HOURS
Qty: 0 | Refills: 0 | Status: DISCONTINUED | OUTPATIENT
Start: 2018-10-26 | End: 2018-10-26

## 2018-10-26 RX ORDER — FUROSEMIDE 40 MG
80 TABLET ORAL ONCE
Qty: 0 | Refills: 0 | Status: COMPLETED | OUTPATIENT
Start: 2018-10-26 | End: 2018-10-26

## 2018-10-26 RX ORDER — ACETAMINOPHEN 500 MG
650 TABLET ORAL ONCE
Qty: 0 | Refills: 0 | Status: COMPLETED | OUTPATIENT
Start: 2018-10-26 | End: 2018-10-26

## 2018-10-26 RX ORDER — ISOSORBIDE DINITRATE 5 MG/1
20 TABLET ORAL THREE TIMES A DAY
Qty: 0 | Refills: 0 | Status: DISCONTINUED | OUTPATIENT
Start: 2018-10-26 | End: 2018-10-27

## 2018-10-26 RX ORDER — HYDRALAZINE HCL 50 MG
5 TABLET ORAL ONCE
Qty: 0 | Refills: 0 | Status: COMPLETED | OUTPATIENT
Start: 2018-10-26 | End: 2018-10-26

## 2018-10-26 RX ORDER — HYDRALAZINE HCL 50 MG
25 TABLET ORAL ONCE
Qty: 0 | Refills: 0 | Status: COMPLETED | OUTPATIENT
Start: 2018-10-26 | End: 2018-10-26

## 2018-10-26 RX ADMIN — HEPARIN SODIUM 5000 UNIT(S): 5000 INJECTION INTRAVENOUS; SUBCUTANEOUS at 21:56

## 2018-10-26 RX ADMIN — Medication 667 MILLIGRAM(S): at 17:48

## 2018-10-26 RX ADMIN — Medication 50 MILLIGRAM(S): at 05:31

## 2018-10-26 RX ADMIN — CLOPIDOGREL BISULFATE 75 MILLIGRAM(S): 75 TABLET, FILM COATED ORAL at 12:21

## 2018-10-26 RX ADMIN — Medication 667 MILLIGRAM(S): at 08:58

## 2018-10-26 RX ADMIN — Medication 80 MILLIGRAM(S): at 05:31

## 2018-10-26 RX ADMIN — SIMVASTATIN 20 MILLIGRAM(S): 20 TABLET, FILM COATED ORAL at 21:56

## 2018-10-26 RX ADMIN — Medication 80 MILLIGRAM(S): at 17:48

## 2018-10-26 RX ADMIN — Medication 81 MILLIGRAM(S): at 12:21

## 2018-10-26 RX ADMIN — Medication 80 MILLIGRAM(S): at 21:55

## 2018-10-26 RX ADMIN — Medication 100 MILLIGRAM(S): at 21:56

## 2018-10-26 RX ADMIN — HEPARIN SODIUM 5000 UNIT(S): 5000 INJECTION INTRAVENOUS; SUBCUTANEOUS at 05:31

## 2018-10-26 RX ADMIN — Medication 75 MILLIGRAM(S): at 14:45

## 2018-10-26 RX ADMIN — Medication 25 MILLIGRAM(S): at 08:58

## 2018-10-26 RX ADMIN — Medication 1: at 12:20

## 2018-10-26 RX ADMIN — Medication 667 MILLIGRAM(S): at 12:21

## 2018-10-26 RX ADMIN — Medication 650 MILLIGRAM(S): at 17:48

## 2018-10-26 RX ADMIN — ISOSORBIDE DINITRATE 20 MILLIGRAM(S): 5 TABLET ORAL at 21:56

## 2018-10-26 RX ADMIN — Medication 5 MILLIGRAM(S): at 01:48

## 2018-10-26 RX ADMIN — HEPARIN SODIUM 5000 UNIT(S): 5000 INJECTION INTRAVENOUS; SUBCUTANEOUS at 14:45

## 2018-10-26 RX ADMIN — AMLODIPINE BESYLATE 10 MILLIGRAM(S): 2.5 TABLET ORAL at 05:31

## 2018-10-26 NOTE — CONSULT NOTE ADULT - PROBLEM SELECTOR RECOMMENDATION 3
Continues to be in hypertensive urgency BP 170S -200S/ 70-80S. Likely secondary to worsening CKD. Pt and family have agreed to HD  - Continues to be in hypertensive urgency BP 170S -200S/ 70-80S. Likely secondary to worsening CKD. Pt and family have agreed to HD  - currently on metoprolol 50mg ER, amlodipine 10mg qd, hydralazine 75mg TID, and Lasix 80mg IVP BID   - Continues to be in hypertensive urgency BP 170S -200S/ 70-80S. Likely secondary to worsening CKD. Pt and family have agreed to HD  - currently on metoprolol 50mg ER, amlodipine 10mg qd, hydralazine 75mg TID, and Lasix 80mg IVP BID   - Would recommend to continue above regimen and add Isorbide Dinitrate 20mg TID Last echo 6/18: grossly normal LV function, normal RV function and EF =56%  - denies SOB, orthopnea. CXR clear  - On lasix 80mg BID IVP

## 2018-10-26 NOTE — CONSULT NOTE ADULT - SUBJECTIVE AND OBJECTIVE BOX
Endocrinology Attending Covering For Dr. Cruz    HPI:  68 year old Male with HTN, DM2, CAD s/p CABG, HFpEF, progressively worsening CKD (Creatinine 8 in 06/2018, refusing HD in the past) brought in by EMS after wife noticed patient with AMS. FSG done by EMS was 28, patient received 1 amp of D50. FSG upon arrival in ED was 100, but then dropped to 30, requiring another amp of D50, with FSG improving to 130. Patient now AA0X3, does not remember much about last night except for feeling fatigued. Patient denies feeling unwell yesterday, took his normal dose of Humulin 70/30 10 units in the morning, wife says he did not take his normal dose of 6 units in the evening. Patient with ate muffin and sandwich yesterday. Patient's wife endorses chronic non productive cough. Patient denies fever, chills, chest pain, palpitations, URI symptoms, headache, blurry vision, abdominal pain, n/v/d or urinary symptoms. Patient with exertional dyspnea, not much worse than usual, also with LE swelling. (25 Oct 2018 10:17)  Patient has history of diabetes over 30 years, insulin  as noted above. Patient follows up with PCP for diabetes management. seen optho., had hypoglycemic episodes while on 70/30 insulin    PAST MEDICAL & SURGICAL HISTORY:  CKD (chronic kidney disease)  Anemia  CHF (congestive heart failure)  Diabetes mellitus  Hypertension  S/P CABG (coronary artery bypass graft)  S/P appendectomy      FAMILY HISTORY:  No pertinent family history in first degree relatives      Social History:    Outpatient Medications:    MEDICATIONS  (STANDING):  amLODIPine   Tablet 10 milliGRAM(s) Oral daily  aspirin enteric coated 81 milliGRAM(s) Oral daily  calcium acetate 667 milliGRAM(s) Oral three times a day with meals  clopidogrel Tablet 75 milliGRAM(s) Oral daily  dextrose 5%. 1000 milliLiter(s) (50 mL/Hr) IV Continuous <Continuous>  dextrose 50% Injectable 12.5 Gram(s) IV Push once  dextrose 50% Injectable 25 Gram(s) IV Push once  dextrose 50% Injectable 25 Gram(s) IV Push once  furosemide   Injectable 80 milliGRAM(s) IV Push two times a day  heparin  Injectable 5000 Unit(s) SubCutaneous every 8 hours  hydrALAZINE 75 milliGRAM(s) Oral every 8 hours  insulin lispro (HumaLOG) corrective regimen sliding scale   SubCutaneous three times a day before meals  insulin lispro (HumaLOG) corrective regimen sliding scale   SubCutaneous at bedtime  metoprolol succinate ER 50 milliGRAM(s) Oral daily  simvastatin 20 milliGRAM(s) Oral at bedtime    MEDICATIONS  (PRN):  dextrose 40% Gel 15 Gram(s) Oral once PRN Blood Glucose LESS THAN 70 milliGRAM(s)/deciliter  glucagon  Injectable 1 milliGRAM(s) IntraMuscular once PRN Glucose LESS THAN 70 milligrams/deciliter      Allergies    lisinopril (Anaphylaxis)    Intolerances      Review of Systems:  Constitutional: No fever, no chills  Eyes: No blurry vision  Neuro: No tremors  HEENT: No pain, no neck swelling  Cardiovascular: No chest pain, no palpitations  Respiratory: Has SOB, no cough  GI: No nausea, vomiting, abdominal pain  : No dysuria  Skin: no rash  MSK: Has leg swelling, no foot ulcers.  Psych: no depression  Endocrine: no polyuria, polydipsia    ALL OTHER SYSTEMS REVIEWED AND NEGATIVE    UNABLE TO OBTAIN    PHYSICAL EXAM:  VITALS: T(C): 36.8 (10-26-18 @ 05:20)  T(F): 98.3 (10-26-18 @ 05:20), Max: 98.8 (10-25-18 @ 16:06)  HR: 67 (10-26-18 @ 10:18) (67 - 79)  BP: 174/82 (10-26-18 @ 10:18) (172/80 - 205/83)  RR:  (14 - 18)  SpO2:  (95% - 96%)  Wt(kg): --  GENERAL: NAD, well-groomed, well-developed  EYES: No proptosis, no lid lag  HEENT:  Atraumatic, Normocephalic  THYROID: Normal size, no palpable nodules  RESPIRATORY: Clear to auscultation bilaterally; No rales, rhonchi, wheezing  CARDIOVASCULAR: Si S2, No murmurs;  GI: Soft, non distended, normal bowel sounds  SKIN: Dry, intact, No rashes or lesions  MUSCULOSKELETAL: Has BL lower extremity edema.  NEURO:  no tremor, sensation decreased in feet BL,    POCT Blood Glucose.: 187 mg/dL (10-26-18 @ 12:06)  POCT Blood Glucose.: 107 mg/dL (10-26-18 @ 08:21)  POCT Blood Glucose.: 155 mg/dL (10-25-18 @ 21:18)  POCT Blood Glucose.: 140 mg/dL (10-25-18 @ 17:19)  POCT Blood Glucose.: 144 mg/dL (10-25-18 @ 12:16)  POCT Blood Glucose.: 115 mg/dL (10-25-18 @ 09:01)  POCT Blood Glucose.: 130 mg/dL (10-25-18 @ 06:46)  POCT Blood Glucose.: 106 mg/dL (10-25-18 @ 05:35)  POCT Blood Glucose.: 100 mg/dL (10-25-18 @ 02:06)                            7.2    6.32  )-----------( 288      ( 26 Oct 2018 04:40 )             22.7       10-26    138  |  100  |  94<H>  ----------------------------<  85  4.9   |  19<L>  |  8.59<H>    EGFR if : 7   EGFR if non : 6     Ca    7.5<L>      10-26  Mg     2.1     10-26  Phos  8.2     10-26    TPro  7.1  /  Alb  3.3  /  TBili  0.2  /  DBili  x   /  AST  20  /  ALT  16  /  AlkPhos  120  10-25      Thyroid Function Tests:      Hemoglobin A1C, Whole Blood: 4.9 % [4.0 - 5.6] (10-26-18 @ 04:40)          Radiology:

## 2018-10-26 NOTE — PROGRESS NOTE ADULT - SUBJECTIVE AND OBJECTIVE BOX
CHIEF COMPLAINT:    SUBJECTIVE:     REVIEW OF SYSTEMS:    CONSTITUTIONAL: (  )  weakness,  (  ) fevers or chills  EYES/ENT: (  )visual changes;     NECK: (  ) pain or stiffness  RESPIRATORY:   (  )cough, wheezing, hemoptysis;  (  ) shortness of breath  CARDIOVASCULAR:  (  )chest pain or palpitations  GASTROINTESTINAL:   (  )abdominal or epigastric pain.  (  ) nausea, vomiting, or hematemesis;   (   ) diarrhea or constipation.   GENITOURINARY:   (    ) dysuria, frequency or hematuria  NEUROLOGICAL:  (   ) numbness or weakness   All other review of systems is negative unless indicated above    Vital Signs Last 24 Hrs  T(C): 36.8 (26 Oct 2018 05:20), Max: 37.1 (25 Oct 2018 16:06)  T(F): 98.3 (26 Oct 2018 05:20), Max: 98.8 (25 Oct 2018 16:06)  HR: 76 (26 Oct 2018 05:20) (75 - 79)  BP: 172/80 (26 Oct 2018 06:31) (172/80 - 205/107)  BP(mean): --  RR: 18 (26 Oct 2018 05:20) (14 - 18)  SpO2: 96% (26 Oct 2018 05:20) (95% - 96%)    I&O's Summary    25 Oct 2018 07:01  -  26 Oct 2018 07:00  --------------------------------------------------------  IN: 30 mL / OUT: 200 mL / NET: -170 mL        CAPILLARY BLOOD GLUCOSE      POCT Blood Glucose.: 107 mg/dL (26 Oct 2018 08:21)  POCT Blood Glucose.: 155 mg/dL (25 Oct 2018 21:18)  POCT Blood Glucose.: 140 mg/dL (25 Oct 2018 17:19)  POCT Blood Glucose.: 144 mg/dL (25 Oct 2018 12:16)      PHYSICAL EXAM:    Constitutional:  (   ) NAD,   (   )awake and alert  HEENT: PERR, EOMI,    Neck: Soft and supple, No LAD, No JVD  Respiratory:  (    Breath sounds are clear bilaterally,    (   ) wheezing, rales or rhonchi  Cardiovascular:     (   )S1 and S2, regular rate and rhythm, no Murmurs, gallops or rubs  Gastrointestinal:  (   )Bowel Sounds present, soft,   (  )nontender, nondistended,    Extremities:    (  ) peripheral edema  Vascular: 2+ peripheral pulses  Neurological:    (    )A/O x 3,   (  ) focal deficits  Musculoskeletal:    (   )  normal strength b/l upper  (     ) normal  lower extremities  Skin: No rashes    MEDICATIONS:  MEDICATIONS  (STANDING):  amLODIPine   Tablet 10 milliGRAM(s) Oral daily  aspirin enteric coated 81 milliGRAM(s) Oral daily  calcium acetate 667 milliGRAM(s) Oral three times a day with meals  clopidogrel Tablet 75 milliGRAM(s) Oral daily  dextrose 5%. 1000 milliLiter(s) (50 mL/Hr) IV Continuous <Continuous>  dextrose 50% Injectable 12.5 Gram(s) IV Push once  dextrose 50% Injectable 25 Gram(s) IV Push once  dextrose 50% Injectable 25 Gram(s) IV Push once  furosemide   Injectable 80 milliGRAM(s) IV Push two times a day  heparin  Injectable 5000 Unit(s) SubCutaneous every 8 hours  hydrALAZINE 75 milliGRAM(s) Oral every 8 hours  insulin lispro (HumaLOG) corrective regimen sliding scale   SubCutaneous three times a day before meals  insulin lispro (HumaLOG) corrective regimen sliding scale   SubCutaneous at bedtime  metoprolol succinate ER 50 milliGRAM(s) Oral daily  simvastatin 20 milliGRAM(s) Oral at bedtime      LABS: All Labs Reviewed:                        7.2    6.32  )-----------( 288      ( 26 Oct 2018 04:40 )             22.7     10-26    138  |  100  |  94<H>  ----------------------------<  85  4.9   |  19<L>  |  8.59<H>    Ca    7.5<L>      26 Oct 2018 04:40  Phos  8.2     10-26  Mg     2.1     10-26    TPro  7.1  /  Alb  3.3  /  TBili  0.2  /  DBili  x   /  AST  20  /  ALT  16  /  AlkPhos  120  10-25          Blood Culture:   Urine Culture      RADIOLOGY/EKG:    ASSESSMENT AND PLAN:    DVT PPX:    ADVANCED DIRECTIVE:    DISPOSITION: CHIEF COMPLAINT: Hypoglycemia, HTN urgency  68 year old Male with HTN, DM2, CAD s/p CABG, HFpEF, progressively worsening CKD (Creatinine 8 in 06/2018, refusing HD in the past) brought in by EMS after wife noticed patient with AMS. FSG done by EMS was 28, patient received 1 amp of D50. FSG upon arrival in ED was 100, but then dropped to 30, requiring another amp of D50, with FSG improving to 130. Patient now AA0X3, does not remember much about last night except for feeling fatigued. Patient denies feeling unwell yesterday, took his normal dose of Humulin 70/30 10 units in the morning, wife says he did not take his normal dose of 6 units in the evening. Patient with ate muffin and sandwich yesterday. Patient's wife endorses chronic non productive cough. Patient denies fever, chills, chest pain, palpitations, URI symptoms, headache, blurry vision, abdominal pain, n/v/d or urinary symptoms. Patient with exertional dyspnea, not much worse than usual, also with LE swelling.   SUBJECTIVE:     REVIEW OF SYSTEMS:     	Constitutional: No fever/chills, good appetite/po intake  	Eyes: No blurry vision, No diplopia  	Neuro: No tremors, No muscle weakness   	Cardiovascular: No chest pain, No palpitations  	Respiratory: + SOB with exertion, +  non productive cough  	GI: No nausea, No vomiting, No diarrhea  	: No dysuria, No hematuria  	Skin: No rash  Vascular: + LE swelling    Vital Signs Last 24 Hrs  T(C): 36.8 (26 Oct 2018 05:20), Max: 37.1 (25 Oct 2018 16:06)  T(F): 98.3 (26 Oct 2018 05:20), Max: 98.8 (25 Oct 2018 16:06)  HR: 76 (26 Oct 2018 05:20) (75 - 79)  BP: 172/80 (26 Oct 2018 06:31) (172/80 - 205/107)  BP(mean): --  RR: 18 (26 Oct 2018 05:20) (14 - 18)  SpO2: 96% (26 Oct 2018 05:20) (95% - 96%)    I&O's Summary    25 Oct 2018 07:01  -  26 Oct 2018 07:00  --------------------------------------------------------  IN: 30 mL / OUT: 200 mL / NET: -170 mL        CAPILLARY BLOOD GLUCOSE      POCT Blood Glucose.: 107 mg/dL (26 Oct 2018 08:21)  POCT Blood Glucose.: 155 mg/dL (25 Oct 2018 21:18)  POCT Blood Glucose.: 140 mg/dL (25 Oct 2018 17:19)  POCT Blood Glucose.: 144 mg/dL (25 Oct 2018 12:16)      PHYSICAL EXAM:  GENERAL: NAD, lying comfortably in bed, awake and alert   	HEAD:  Atraumatic, Normocephalic  	EYES: EOMI b/l, PERRLA b/l, conjunctiva and sclera clear  	NECK: Supple, No JVD, No LAD   	CHEST/LUNG: diffuse crackles   	HEART: Regular rate and rhythm; S1 and S2 present, No murmurs, rubs, or gallops  	ABDOMEN: Soft, Nontender, Nondistended; Bowel sounds present  	EXTREMITIES:  2+ Peripheral Pulses, 1+ pitting edema LE b/l   	NEURO: AAOx3, non-focal   SKIN: No rashes or lesion       MEDICATIONS:  MEDICATIONS  (STANDING):  amLODIPine   Tablet 10 milliGRAM(s) Oral daily  aspirin enteric coated 81 milliGRAM(s) Oral daily  calcium acetate 667 milliGRAM(s) Oral three times a day with meals  clopidogrel Tablet 75 milliGRAM(s) Oral daily  dextrose 5%. 1000 milliLiter(s) (50 mL/Hr) IV Continuous <Continuous>  dextrose 50% Injectable 12.5 Gram(s) IV Push once  dextrose 50% Injectable 25 Gram(s) IV Push once  dextrose 50% Injectable 25 Gram(s) IV Push once  furosemide   Injectable 80 milliGRAM(s) IV Push two times a day  heparin  Injectable 5000 Unit(s) SubCutaneous every 8 hours  hydrALAZINE 75 milliGRAM(s) Oral every 8 hours  insulin lispro (HumaLOG) corrective regimen sliding scale   SubCutaneous three times a day before meals  insulin lispro (HumaLOG) corrective regimen sliding scale   SubCutaneous at bedtime  metoprolol succinate ER 50 milliGRAM(s) Oral daily  simvastatin 20 milliGRAM(s) Oral at bedtime      LABS: All Labs Reviewed:                        7.2    6.32  )-----------( 288      ( 26 Oct 2018 04:40 )             22.7     10-26    138  |  100  |  94<H>  ----------------------------<  85  4.9   |  19<L>  |  8.59<H>    Ca    7.5<L>      26 Oct 2018 04:40  Phos  8.2     10-26  Mg     2.1     10-26    TPro  7.1  /  Alb  3.3  /  TBili  0.2  /  DBili  x   /  AST  20  /  ALT  16  /  AlkPhos  120  10-25          Blood Culture:   Urine Culture      RADIOLOGY/EKG:    ASSESSMENT AND PLAN:  68 year old Male with HTN, DM2, CAD s/p CABG, HFpEF, progressively worsening CKD (Creatinine 8 in 06/2018, refusing HD in the past) admitted for AMS secondary to hypoglycemia and hypertensive urgency.     Problem/Plan - 1:  ·  Problem: Acute encephalopathy.  Plan: Patient presenting with AMS in setting of hypoglycemia FSG 28 per EMS   , most recent 115 will ask endo to see him  - no change in insulin regimen, likely hypoglycemic in setting of worsening CKD  - patient afebrile, no WC, no symptoms, CXR without consolidation, less likely infectious etiology  - mental status back at baseline now with improvement in FSG  - CT head negative   - will continue to monitor FSG's.     Problem/Plan - 2:  ·  Problem: Hypertensive urgency.  Plan: Patient presenting with systolic BP up to 250, without headache, blurry vision or n/v  - CT head negative for hemorrhage   - s/p Hydralazine 50 PO, 15 IV and 10 IV in ED   - will c/w Hydralazine 20 IV q 6hrs for SBP >180  - c/w home PO meds: metoprolol succinate 50 daily and amlodipine 10 mg daily   - continue to monitor BP closely.     Problem/Plan - 3:  ·  Problem: CKD (chronic kidney disease).  Plan: Patient with progressively worsening CKD stage 5, Creatinine 8 in 06/2018, refusing HD in the past  - Patient with signs of overload on exam  - elevated Trop (429-->396) likely in setting of worsening kidney failure and demand  - s/p Lasix 80 IV in ED with good urine output  - Renal consulted noted PT AND WIFE AGREE with HD   - monitor I's and O's.     Problem/Plan - 4:  ·  Problem: Diabetes mellitus.  Plan: Patient on Humulin 70/30 10 units am and 6 units pm at home  - wife endorsing that patient normally does not take pm dose  - presenting now with hypoglycemia, will not start basal or bolus regimen at this time, will start ISS and monitor FSG's closely  - f/u A1C  - carb consistent diet.     Problem/Plan - 5:  ·  Problem: Anemia.  Plan: Patient with chronic anemia, likely in setting of CKD  - Hgb 6.9, but will hold off on blood transfusion at this time as patient with significant volume overload  - continue to monitor Hgb, currently without hemodynamic compromise, asymptomatic.     Problem/Plan - 6:  Problem: Heart failure with preserved ejection fraction. Plan: Patient with HF with preserved EF, 56% in 6/2018  - volume overload likely in setting of CKD  - c/w Metoprolol succinate 50 mg daily  - will f/u Nephrology recommendations regarding diuretics  - monitor I's and O's.    Problem/Plan - 7:  ·  Problem: CAD (coronary artery disease).  Plan: c/w simvastatin and aspirin.     Problem/Plan - 8:  ·  Problem: Need for prophylactic measure.  Plan: Dvt PPx: hep sub q   Diet: Carb consistent diet.       DVT PPX:    ADVANCED DIRECTIVE: wife he is full code DW PT IN DETAIL and his wife [phone]    DISPOSITION: CHIEF COMPLAINT: Hypoglycemia, HTN urgency  68 year old Male with HTN, DM2, CAD s/p CABG, HFpEF, progressively worsening CKD (Creatinine 8 in 06/2018, refusing HD in the past) brought in by EMS after wife noticed patient with AMS. FSG done by EMS was 28, patient received 1 amp of D50. FSG upon arrival in ED was 100, but then dropped to 30, requiring another amp of D50, with FSG improving to 130. Patient now AA0X3, does not remember much about last night except for feeling fatigued. Patient denies feeling unwell yesterday, took his normal dose of Humulin 70/30 10 units in the morning, wife says he did not take his normal dose of 6 units in the evening. Patient with ate muffin and sandwich yesterday. Patient's wife endorses chronic non productive cough. Patient denies fever, chills, chest pain, palpitations, URI symptoms, headache, blurry vision, abdominal pain, n/v/d or urinary symptoms. Patient with exertional dyspnea, not much worse than usual, also with LE swelling.   SUBJECTIVE:     REVIEW OF SYSTEMS:     	Constitutional: No fever/chills, good appetite/po intake  	Eyes: No blurry vision, No diplopia  	Neuro: No tremors, No muscle weakness   	Cardiovascular: No chest pain, No palpitations  	Respiratory: + SOB with exertion, +  non productive cough  	GI: No nausea, No vomiting, No diarrhea  	: No dysuria, No hematuria  	Skin: No rash  Vascular: + LE swelling    Vital Signs Last 24 Hrs  T(C): 36.8 (26 Oct 2018 05:20), Max: 37.1 (25 Oct 2018 16:06)  T(F): 98.3 (26 Oct 2018 05:20), Max: 98.8 (25 Oct 2018 16:06)  HR: 76 (26 Oct 2018 05:20) (75 - 79)  BP: 172/80 (26 Oct 2018 06:31) (172/80 - 205/107)  BP(mean): --  RR: 18 (26 Oct 2018 05:20) (14 - 18)  SpO2: 96% (26 Oct 2018 05:20) (95% - 96%)    I&O's Summary    25 Oct 2018 07:01  -  26 Oct 2018 07:00  --------------------------------------------------------  IN: 30 mL / OUT: 200 mL / NET: -170 mL        CAPILLARY BLOOD GLUCOSE      POCT Blood Glucose.: 107 mg/dL (26 Oct 2018 08:21)  POCT Blood Glucose.: 155 mg/dL (25 Oct 2018 21:18)  POCT Blood Glucose.: 140 mg/dL (25 Oct 2018 17:19)  POCT Blood Glucose.: 144 mg/dL (25 Oct 2018 12:16)      PHYSICAL EXAM:  GENERAL: NAD, lying comfortably in bed, awake and alert   	HEAD:  Atraumatic, Normocephalic  	EYES: EOMI b/l, PERRLA b/l, conjunctiva and sclera clear  	NECK: Supple, No JVD, No LAD   	CHEST/LUNG: diffuse crackles   	HEART: Regular rate and rhythm; S1 and S2 present, No murmurs, rubs, or gallops  	ABDOMEN: Soft, Nontender, Nondistended; Bowel sounds present  	EXTREMITIES:  2+ Peripheral Pulses, 1+ pitting edema LE b/l   	NEURO: AAOx3, non-focal   SKIN: No rashes or lesion       MEDICATIONS:  MEDICATIONS  (STANDING):  amLODIPine   Tablet 10 milliGRAM(s) Oral daily  aspirin enteric coated 81 milliGRAM(s) Oral daily  calcium acetate 667 milliGRAM(s) Oral three times a day with meals  clopidogrel Tablet 75 milliGRAM(s) Oral daily  dextrose 5%. 1000 milliLiter(s) (50 mL/Hr) IV Continuous <Continuous>  dextrose 50% Injectable 12.5 Gram(s) IV Push once  dextrose 50% Injectable 25 Gram(s) IV Push once  dextrose 50% Injectable 25 Gram(s) IV Push once  furosemide   Injectable 80 milliGRAM(s) IV Push two times a day  heparin  Injectable 5000 Unit(s) SubCutaneous every 8 hours  hydrALAZINE 75 milliGRAM(s) Oral every 8 hours  insulin lispro (HumaLOG) corrective regimen sliding scale   SubCutaneous three times a day before meals  insulin lispro (HumaLOG) corrective regimen sliding scale   SubCutaneous at bedtime  metoprolol succinate ER 50 milliGRAM(s) Oral daily  simvastatin 20 milliGRAM(s) Oral at bedtime      LABS: All Labs Reviewed:                        7.2    6.32  )-----------( 288      ( 26 Oct 2018 04:40 )             22.7     10-26    138  |  100  |  94<H>  ----------------------------<  85  4.9   |  19<L>  |  8.59<H>    Ca    7.5<L>      26 Oct 2018 04:40  Phos  8.2     10-26  Mg     2.1     10-26    TPro  7.1  /  Alb  3.3  /  TBili  0.2  /  DBili  x   /  AST  20  /  ALT  16  /  AlkPhos  120  10-25          Blood Culture:   Urine Culture      RADIOLOGY/EKG:    ASSESSMENT AND PLAN:  68 year old Male with HTN, DM2, CAD s/p CABG, HFpEF, progressively worsening CKD (Creatinine 8 in 06/2018, refusing HD in the past) admitted for AMS secondary to hypoglycemia and hypertensive urgency.     Problem/Plan - 1:  ·  Problem: Acute encephalopathy.  Plan: Patient presenting with AMS in setting of hypoglycemia FSG 28 per EMS   , most recent 115 will ask endo to see him  - no change in insulin regimen, likely hypoglycemic in setting of worsening CKD  - patient afebrile, no WC, no symptoms, CXR without consolidation, less likely infectious etiology  - mental status back at baseline now with improvement in FSG  - CT head negative   - will continue to monitor FSG's.     Problem/Plan - 2:  ·  Problem: Hypertensive urgency.  Plan: Patient presenting with systolic BP up to 250, without headache, blurry vision or n/v  - CT head negative for hemorrhage   - s/p Hydralazine 50 PO, 15 IV and 10 IV in ED   - will c/w Hydralazine 75 mg tid   - c/w home PO meds: metoprolol succinate 50 daily and amlodipine 10 mg daily   - continue to monitor BP closely.     Problem/Plan - 3:  ·  Problem: CKD (chronic kidney disease).  Plan: Patient with progressively worsening CKD stage 5, Creatinine 8 in 06/2018, refusing HD in the past  - Patient with signs of overload on exam  - elevated Trop (429-->396) likely in setting of worsening kidney failure and demand  - s/p Lasix 80 IV in ED with good urine output  - Renal consulted noted PT AND WIFE AGREE with HD   - monitor I's and O's.     Problem/Plan - 4:  ·  Problem: Diabetes mellitus.  Plan: Patient on Humulin 70/30 10 units am and 6 units pm at home  - wife endorsing that patient normally does not take pm dose  - presenting now with hypoglycemia, will not start basal or bolus regimen at this time, will start ISS and monitor FSG's closely  - f/u A1C  - carb consistent diet.     Problem/Plan - 5:  ·  Problem: Anemia.  Plan: Patient with chronic anemia, likely in setting of CKD  - Hgb 6.9, but will hold off on blood transfusion at this time as patient with significant volume overload  - continue to monitor Hgb, currently without hemodynamic compromise, asymptomatic.     Problem/Plan - 6:  Problem: Heart failure with preserved ejection fraction. Plan: Patient with HF with preserved EF, 56% in 6/2018  - volume overload likely in setting of CKD  - c/w Metoprolol succinate 50 mg daily  - will f/u Nephrology recommendations regarding diuretics  - monitor I's and O's.    Problem/Plan - 7:  ·  Problem: CAD (coronary artery disease).  Plan: c/w simvastatin and aspirin.     Problem/Plan - 8:  ·  Problem: Need for prophylactic measure.  Plan: Dvt PPx: hep sub q   Diet: Carb consistent diet.       DVT PPX:    ADVANCED DIRECTIVE: wife he is full code DW PT IN DETAIL and his wife [phone]    DISPOSITION:

## 2018-10-26 NOTE — CONSULT NOTE ADULT - PROBLEM SELECTOR RECOMMENDATION 9
Since has ESRD, shoul not push for Tight glycemic control  Keep correction scale coverage, ( low coverage )monitor FS will FU  as long as BS are not above 200 no need for basal insulin  Patient counseled for compliance with consistent low carb diet

## 2018-10-26 NOTE — CONSULT NOTE ADULT - PROBLEM SELECTOR RECOMMENDATION 2
Continue treatment, monitoring, Primary team FU   suggest to consider small 1 to 2 mg of Bumex, to get better control of BP

## 2018-10-26 NOTE — CONSULT NOTE ADULT - SUBJECTIVE AND OBJECTIVE BOX
Reason for Consultation:  Chief Complaint:  Date of Admission:       HPI:  68 year old Male with HTN, DM2, CAD s/p CABG, HFpEF, progressively worsening CKD (Creatinine 8 in 06/2018, refusing HD in the past) brought in by EMS after wife noticed patient with AMS. FSG done by EMS was 28, patient received 1 amp of D50. FSG upon arrival in ED was 100, but then dropped to 30, requiring another amp of D50, with FSG improving to 130. Patient now AA0X3, does not remember much about last night except for feeling fatigued. Patient denies feeling unwell yesterday, took his normal dose of Humulin 70/30 10 units in the morning, wife says he did not take his normal dose of 6 units in the evening. Patient with ate muffin and sandwich yesterday. Patient's wife endorses chronic non productive cough. Patient denies fever, chills, chest pain, palpitations, URI symptoms, headache, blurry vision, abdominal pain, n/v/d or urinary symptoms. Patient with exertional dyspnea, not much worse than usual, also with LE swelling. (25 Oct 2018 10:17)    Of note pt was admitted in June 6/12- 6/16 for similar complaints, p/w SOB, orthopnea and LE edema 2/2 fluid overload and received supportive tx with diuretics.     ED course:   Vitals 223-250/ 104-118, HR 67, RR16 and 100%spO2 on RA   Labs remarkable for severe anemia (6.9 -transfusion held because of volume overload),  cr 8.7, Trop T x2 429 -->  396  EKG showed sinus bradycardia, RBBB, left anterior fascicular block  CXR is clear   CT head negative for hemorrhage     Received 2 amps of D50 with improvement of mental status   Received hydralazine 50mg PO, 15 IV and 10 IV + Lasix 20IVP and 60IVP for hypertensive urgency       INTERVAL HISTORY:   Mental status back to baseline with stabilization of glucose levels.   Patient continues to have elevated BP on hydralazine 75mg TID (added during this admission), amlodipine 10mg qd, metoprolol succinate 50mg qd.   Seen by nephrology who discussed HD--family/pt has AGREED to HD         Past Medical History:   CKD (chronic kidney disease)  Anemia  CHF (congestive heart failure)  Diabetes mellitus  Hypertension    Past Surgical History:   S/P CABG (coronary artery bypass graft)  S/P appendectomy    Medications:   amLODIPine   Tablet 10 milliGRAM(s) Oral daily  aspirin enteric coated 81 milliGRAM(s) Oral daily  calcium acetate 667 milliGRAM(s) Oral three times a day with meals  clopidogrel Tablet 75 milliGRAM(s) Oral daily  dextrose 40% Gel 15 Gram(s) Oral once PRN  dextrose 5%. 1000 milliLiter(s) IV Continuous <Continuous>  dextrose 50% Injectable 12.5 Gram(s) IV Push once  dextrose 50% Injectable 25 Gram(s) IV Push once  dextrose 50% Injectable 25 Gram(s) IV Push once  furosemide   Injectable 80 milliGRAM(s) IV Push two times a day  glucagon  Injectable 1 milliGRAM(s) IntraMuscular once PRN  heparin  Injectable 5000 Unit(s) SubCutaneous every 8 hours  hydrALAZINE 75 milliGRAM(s) Oral every 8 hours  insulin lispro (HumaLOG) corrective regimen sliding scale   SubCutaneous three times a day before meals  insulin lispro (HumaLOG) corrective regimen sliding scale   SubCutaneous at bedtime  metoprolol succinate ER 50 milliGRAM(s) Oral daily  simvastatin 20 milliGRAM(s) Oral at bedtime      Allergies:  lisinopril (Anaphylaxis)      FAMILY HISTORY:  No pertinent family history in first degree relatives    Social History: Lives at home with wife. Denies smoking, alcohol use or illlicit drug use.    Review of Systems:  REVIEW OF SYSTEMS:  CONSTITUTIONAL: No weakness, fevers or chills  EYES/ENT: No visual changes;  No dysphagia  NECK: No pain or stiffness  RESPIRATORY: No cough, wheezing, hemoptysis; No shortness of breath  CARDIOVASCULAR: No chest pain or palpitations; No lower extremity edema  GASTROINTESTINAL: No abdominal or epigastric pain. No nausea, vomiting, or hematemesis; No diarrhea or constipation. No melena or hematochezia.  BACK: No back pain  GENITOURINARY: No dysuria, frequency or hematuria  NEUROLOGICAL: No numbness or weakness  SKIN: No itching, burning, rashes, or lesions   All other review of systems is negative unless indicated above.    Vitals:  T(F): 98.3 (10-26), Max: 98.8 (10-25)  HR: 67 (10-26) (67 - 79)  BP: 174/82 (10-26) (172/80 - 205/83)  RR: 18 (10-26)  SpO2: 95% (10-26)    Physical Exam:  GENERAL: No acute distress, well-developed  HEAD:  Atraumatic, Normocephalic  ENT: EOMI, PERRLA, conjunctiva and sclera clear, Neck supple, No JVD, moist mucosa  CHEST/LUNG: Clear to auscultation bilaterally; No wheeze, equal breath sounds bilaterally   BACK: No spinal tenderness  HEART: Regular rate and rhythm; No murmurs, rubs, or gallops  ABDOMEN: Soft, Nontender, Nondistended; Bowel sounds present  EXTREMITIES:  No clubbing, cyanosis, or edema  PSYCH: Nl behavior, nl affect  NEUROLOGY: AAOx3, non-focal, cranial nerves intact  SKIN: Normal color, No rashes or lesions  LINES:    Cardiovascular Diagnostic Testing:    Interpretation of Tele:    ECG 10/25: sinus bradycardia, RBBB, left anterior fascicular bloc  k  Echo 06/2017:< from: Transthoracic Echocardiogram (06.17.18 @ 10:19) >  CONCLUSIONS:  1. Mitral annular calcification and calcified mitral  leaflets with normal diastolic opening.  2. Bioprosthetic aortic valve replacement. Peak transaortic  valve gradient equals 44 mm Hg, mean transaortic valve  gradient equals 22 mm Hg, which is probably normal in the  presence of a prosthetic valve.  3. Endocardium not well visualized; grossly normal left  ventricular systolic function.  4. Normal right ventricular size and function.    Stress Testing 02/2014:  < from: Nuclear Stress Test-Pharmacologic (02.03.14 @ 07:46) >  MPRESSIONS:Abnormal Study  * Myocardial Perfusion SPECT results are abnormal.  * Normal myocardial perfusion,with no evidence of  infarction or inducible ischemia. There is a mild proximal  inferior defect that is no longer evident with prone  imaging for attenuation correction.  * However, findings suggest non-ischemic cardiomyopathy.  Post-stress gated wall motion analysis was performed (LVEF  = 35 %;LVEDV = 201 ml.), revealing severe diffuse  hypokinesis.    Imaging: < from: Xray Chest 1 View AP/PA (10.25.18 @ 03:53) >  FINDINGS:   Status post median sternotomy, CABG and aortic valve replacement.  Low lung volumes. Trace left pleural effusion. Underlying pneumonia   cannot be excluded. There is no pneumothorax.  The heart size cannot be accurately assessed in thisprojection.   No acute bony pathology.    IMPRESSION:    No focal consolidations.  Labs: Personally reviewed                        7.2    6.32  )-----------( 288      ( 26 Oct 2018 04:40 )             22.7     10-26    138  |  100  |  94<H>  ----------------------------<  85  4.9   |  19<L>  |  8.59<H>    Ca    7.5<L>      26 Oct 2018 04:40  Phos  8.2     10-26  Mg     2.1     10-26    TPro  7.1  /  Alb  3.3  /  TBili  0.2  /  DBili  x   /  AST  20  /  ALT  16  /  AlkPhos  120  10-25    Hemoglobin A1C, Whole Blood: 4.9 % (10-26 @ 04:40) Reason for Consultation:  Chief Complaint:  Date of Admission:       HPI:  68 year old Male with HTN, DM2, CAD s/p CABG, Bioprosthetic AVR, and HFpEF, progressively worsening CKD (Creatinine 8 in 06/2018, refusing HD in the past) brought in by EMS after wife noticed patient with AMS. FSG done by EMS was 28, patient received 1 amp of D50. FSG upon arrival in ED was 100, but then dropped to 30, requiring another amp of D50, with FSG improving to 130. Patient now AA0X3, does not remember much about last night except for feeling fatigued. Patient denies feeling unwell yesterday, took his normal dose of Humulin 70/30 10 units in the morning, wife says he did not take his normal dose of 6 units in the evening. Patient with ate muffin and sandwich yesterday. Patient's wife endorses chronic non productive cough. Patient denies fever, chills, chest pain, palpitations, URI symptoms, headache, blurry vision, abdominal pain, n/v/d or urinary symptoms. Patient with exertional dyspnea, not much worse than usual, also with LE swelling. (25 Oct 2018 10:17)    Of note pt was admitted in June 6/12- 6/16 for similar complaints, p/w SOB, orthopnea and LE edema 2/2 fluid overload. Trop in 40s and proBNP was [id number] echocardiogram 6/16 showed grossly normal LV function and normal RV function with EF = 56%. He improved with diuretics and supportive care.     ED course:   Vitals 223-250/ 104-118, HR 67, RR16 and 100%spO2 on RA   Labs remarkable for severe anemia (6.9 -transfusion held because of volume overload),  cr 8.7, Trop T x2 429 -->  396  EKG showed sinus bradycardia, RBBB, left anterior fascicular block  CXR is clear   CT head negative for hemorrhage     Received 2 amps of D50 with improvement of mental status   Received hydralazine 50mg PO, 15 IV and 10 IV + Lasix 20IVP and 60IVP for hypertensive urgency     INTERVAL HISTORY:   Mental status back to baseline with stabilization of glucose levels.   Patient continues to have elevated BP on hydralazine 75mg TID (added during this admission), amlodipine 10mg qd, metoprolol succinate 50mg qd.   Seen by nephrology who discussed HD--family/pt has AGREED to HD     Past Medical History:   CKD (chronic kidney disease)  Anemia  CHF (congestive heart failure)  Diabetes mellitus  Hypertension    Past Surgical History:   S/P CABG (coronary artery bypass graft)  S/P appendectomy    Home Medications:  amLODIPine 10 mg oral tablet: 1 tab(s) orally once a day (25 Oct 2018 10:14)  aspirin 81 mg oral tablet: 1 tab(s) orally once a day (25 Oct 2018 10:14)  hydrALAZINE 50 mg oral tablet: 1 tab(s) orally every 8 hours (25 Oct 2018 10:14)  metoprolol succinate 50 mg oral tablet, extended release: 1 tab(s) orally once a day (25 Oct 2018 10:14)  NovoLOG Mix 70/30 FlexPen subcutaneous suspension: 10 unit(s) subcutaneous once a day (in the morning) before breakfast and 6units once a day pefore dinner (25 Oct 2018 10:14)  Omega-3 oral capsule: 1 tab(s) orally once a day (25 Oct 2018 10:14)  Plavix 75 mg oral tablet: 1 tab(s) orally once a day (25 Oct 2018 10:14)  simvastatin 20 mg oral tablet: 1 tab(s) orally once a day (at bedtime) (25 Oct 2018 10:14)    Medications:   amLODIPine   Tablet 10 milliGRAM(s) Oral daily  aspirin enteric coated 81 milliGRAM(s) Oral daily  calcium acetate 667 milliGRAM(s) Oral three times a day with meals  clopidogrel Tablet 75 milliGRAM(s) Oral daily  dextrose 40% Gel 15 Gram(s) Oral once PRN  dextrose 5%. 1000 milliLiter(s) IV Continuous <Continuous>  dextrose 50% Injectable 12.5 Gram(s) IV Push once  dextrose 50% Injectable 25 Gram(s) IV Push once  dextrose 50% Injectable 25 Gram(s) IV Push once  furosemide   Injectable 80 milliGRAM(s) IV Push two times a day  glucagon  Injectable 1 milliGRAM(s) IntraMuscular once PRN  heparin  Injectable 5000 Unit(s) SubCutaneous every 8 hours  hydrALAZINE 75 milliGRAM(s) Oral every 8 hours  insulin lispro (HumaLOG) corrective regimen sliding scale   SubCutaneous three times a day before meals  insulin lispro (HumaLOG) corrective regimen sliding scale   SubCutaneous at bedtime  metoprolol succinate ER 50 milliGRAM(s) Oral daily  simvastatin 20 milliGRAM(s) Oral at bedtime      Allergies:  lisinopril (Anaphylaxis)      FAMILY HISTORY:  No pertinent family history in first degree relatives    Social History: Lives at home with wife. Denies smoking, alcohol use or illlicit drug use.    Review of Systems:  REVIEW OF SYSTEMS:  CONSTITUTIONAL: No weakness, fevers or chills  EYES/ENT: No visual changes;  No dysphagia  NECK: No pain or stiffness  RESPIRATORY: No cough, wheezing, hemoptysis; No shortness of breath  CARDIOVASCULAR: No chest pain or palpitations; No lower extremity edema  GASTROINTESTINAL: No abdominal or epigastric pain. No nausea, vomiting, or hematemesis; No diarrhea or constipation. No melena or hematochezia.  BACK: No back pain  GENITOURINARY: No dysuria, frequency or hematuria  NEUROLOGICAL: No numbness or weakness  SKIN: No itching, burning, rashes, or lesions   All other review of systems is negative unless indicated above.    Vitals:  T(F): 98.3 (10-26), Max: 98.8 (10-25)  HR: 67 (10-26) (67 - 79)  BP: 174/82 (10-26) (172/80 - 205/83)  RR: 18 (10-26)  SpO2: 95% (10-26)    Physical Exam:  GENERAL: No acute distress, well-developed  HEAD:  Atraumatic, Normocephalic  ENT: EOMI, PERRLA, conjunctiva and sclera clear, Neck supple, No JVD, moist mucosa  CHEST/LUNG: Clear to auscultation bilaterally; No wheeze, equal breath sounds bilaterally   BACK: No spinal tenderness  HEART: Regular rate and rhythm; No murmurs, rubs, or gallops  ABDOMEN: Soft, Nontender, Nondistended; Bowel sounds present  EXTREMITIES:  No clubbing, cyanosis, or edema  PSYCH: Nl behavior, nl affect  NEUROLOGY: AAOx3, non-focal, cranial nerves intact  SKIN: Normal color, No rashes or lesions  LINES:    Cardiovascular Diagnostic Testing:    Interpretation of Tele:    ECG 10/25: sinus bradycardia, RBBB, left anterior fascicular bloc    Echo 06/2017:< from: Transthoracic Echocardiogram (06.17.18 @ 10:19) >  CONCLUSIONS:  1. Mitral annular calcification and calcified mitral  leaflets with normal diastolic opening.  2. Bioprosthetic aortic valve replacement. Peak transaortic  valve gradient equals 44 mm Hg, mean transaortic valve  gradient equals 22 mm Hg, which is probably normal in the  presence of a prosthetic valve.  3. Endocardium not well visualized; grossly normal left  ventricular systolic function.  4. Normal right ventricular size and function.    Stress Testing 02/2014:  < from: Nuclear Stress Test-Pharmacologic (02.03.14 @ 07:46) >  MPRESSIONS:Abnormal Study  * Myocardial Perfusion SPECT results are abnormal.  * Normal myocardial perfusion,with no evidence of  infarction or inducible ischemia. There is a mild proximal  inferior defect that is no longer evident with prone  imaging for attenuation correction.  * However, findings suggest non-ischemic cardiomyopathy.  Post-stress gated wall motion analysis was performed (LVEF  = 35 %;LVEDV = 201 ml.), revealing severe diffuse  hypokinesis.    Imaging: < from: Xray Chest 1 View AP/PA (10.25.18 @ 03:53) >  FINDINGS:   Status post median sternotomy, CABG and aortic valve replacement.  Low lung volumes. Trace left pleural effusion. Underlying pneumonia   cannot be excluded. There is no pneumothorax.  The heart size cannot be accurately assessed in thisprojection.   No acute bony pathology.    IMPRESSION:    No focal consolidations.  Labs: Personally reviewed                        7.2    6.32  )-----------( 288      ( 26 Oct 2018 04:40 )             22.7     10-26    138  |  100  |  94<H>  ----------------------------<  85  4.9   |  19<L>  |  8.59<H>    Ca    7.5<L>      26 Oct 2018 04:40  Phos  8.2     10-26  Mg     2.1     10-26    TPro  7.1  /  Alb  3.3  /  TBili  0.2  /  DBili  x   /  AST  20  /  ALT  16  /  AlkPhos  120  10-25    Hemoglobin A1C, Whole Blood: 4.9 % (10-26 @ 04:40) Reason for Consultation: preop risk stratification and BP management in the setting of Hypertensive urgency   Chief Complaint: AMS  Date of Admission: 10/25/2018      HPI:  68 year old Male with HTN, DM2, CAD s/p CABG, Bioprosthetic AVR, and HFpEF, progressively worsening CKD (Creatinine 8 in 06/2018, refusing HD in the past) brought in by EMS after wife noticed patient with AMS. FSG done by EMS was 28, patient received 1 amp of D50. FSG upon arrival in ED was 100, but then dropped to 30, requiring another amp of D50, with FSG improving to 130. Patient now AA0X3, does not remember much about last night except for feeling fatigued. Patient denies feeling unwell yesterday, took his normal dose of Humulin 70/30 10 units in the morning, wife says he did not take his normal dose of 6 units in the evening. Patient with ate muffin and sandwich yesterday. Patient's wife endorses chronic non productive cough. Patient denies fever, chills, chest pain, palpitations, URI symptoms, headache, blurry vision, abdominal pain, n/v/d or urinary symptoms. Patient with exertional dyspnea, not much worse than usual, also with LE swelling. (25 Oct 2018 10:17)    Of note pt was admitted in June 6/12- 6/16 SOB, orthopnea and LE edema 2/2 fluid overload. Trop in 40s and proBNP was [id number] echocardiogram 6/16 showed grossly normal LV function and normal RV function with EF = 56%. He improved with diuretics and supportive care.     ED course:   Vitals 223-250/ 104-118, HR 67, RR16 and 100%spO2 on RA   Labs remarkable for severe anemia (6.9 -transfusion held because of volume overload),  cr 8.7, Trop T x2 429 -->  396  EKG showed sinus bradycardia, RBBB, left anterior fascicular block  CXR is clear   CT head negative for hemorrhage     Received 2 amps of D50 with improvement of mental status   Received hydralazine 50mg PO, 15 IV and 10 IV + Lasix 20IVP and 60IVP for hypertensive urgency     INTERVAL HISTORY:   Mental status back to baseline with stabilization of glucose levels.   Patient continues to have elevated BP on hydralazine 75mg TID (added during this admission), amlodipine 10mg qd, metoprolol succinate 50mg qd.   Seen by nephrology who discussed HD--family/pt has AGREED to HD     Past Medical History:   CKD (chronic kidney disease)  Anemia  CHF (congestive heart failure)  Diabetes mellitus  Hypertension    Past Surgical History:   S/P CABG (coronary artery bypass graft)  S/P appendectomy    Home Medications:  amLODIPine 10 mg oral tablet: 1 tab(s) orally once a day (25 Oct 2018 10:14)  aspirin 81 mg oral tablet: 1 tab(s) orally once a day (25 Oct 2018 10:14)  hydrALAZINE 50 mg oral tablet: 1 tab(s) orally every 8 hours (25 Oct 2018 10:14)  metoprolol succinate 50 mg oral tablet, extended release: 1 tab(s) orally once a day (25 Oct 2018 10:14)  NovoLOG Mix 70/30 FlexPen subcutaneous suspension: 10 unit(s) subcutaneous once a day (in the morning) before breakfast and 6units once a day pefore dinner (25 Oct 2018 10:14)  Omega-3 oral capsule: 1 tab(s) orally once a day (25 Oct 2018 10:14)  Plavix 75 mg oral tablet: 1 tab(s) orally once a day (25 Oct 2018 10:14)  simvastatin 20 mg oral tablet: 1 tab(s) orally once a day (at bedtime) (25 Oct 2018 10:14)    Medications:   amLODIPine   Tablet 10 milliGRAM(s) Oral daily  aspirin enteric coated 81 milliGRAM(s) Oral daily  calcium acetate 667 milliGRAM(s) Oral three times a day with meals  clopidogrel Tablet 75 milliGRAM(s) Oral daily  dextrose 40% Gel 15 Gram(s) Oral once PRN  dextrose 5%. 1000 milliLiter(s) IV Continuous <Continuous>  dextrose 50% Injectable 12.5 Gram(s) IV Push once  dextrose 50% Injectable 25 Gram(s) IV Push once  dextrose 50% Injectable 25 Gram(s) IV Push once  furosemide   Injectable 80 milliGRAM(s) IV Push two times a day  glucagon  Injectable 1 milliGRAM(s) IntraMuscular once PRN  heparin  Injectable 5000 Unit(s) SubCutaneous every 8 hours  hydrALAZINE 75 milliGRAM(s) Oral every 8 hours  insulin lispro (HumaLOG) corrective regimen sliding scale   SubCutaneous three times a day before meals  insulin lispro (HumaLOG) corrective regimen sliding scale   SubCutaneous at bedtime  metoprolol succinate ER 50 milliGRAM(s) Oral daily  simvastatin 20 milliGRAM(s) Oral at bedtime      Allergies:  lisinopril (Anaphylaxis)      FAMILY HISTORY:  No pertinent family history in first degree relatives    Social History: Lives at home with wife. Denies smoking, alcohol use or illlicit drug use.    Review of Systems:  REVIEW OF SYSTEMS:  CONSTITUTIONAL: No weakness, fevers or chills  EYES/ENT: No visual changes;  No dysphagia  NECK: No pain or stiffness  RESPIRATORY: No cough, wheezing, hemoptysis; No shortness of breath  CARDIOVASCULAR: No chest pain or palpitations; No lower extremity edema  GASTROINTESTINAL: No abdominal or epigastric pain. No nausea, vomiting, or hematemesis; No diarrhea or constipation. No melena or hematochezia.  BACK: No back pain  GENITOURINARY: No dysuria, frequency or hematuria  NEUROLOGICAL: No numbness or weakness  SKIN: No itching, burning, rashes, or lesions   All other review of systems is negative unless indicated above.    Vitals:  T(F): 98.3 (10-26), Max: 98.8 (10-25)  HR: 67 (10-26) (67 - 79)  BP: 174/82 (10-26) (172/80 - 205/83)  RR: 18 (10-26)  SpO2: 95% (10-26)    Physical Exam:  GENERAL: No acute distress, well-developed  HEAD:  Atraumatic, Normocephalic  ENT: EOMI, PERRLA, conjunctiva and sclera clear, Neck supple, No JVD, moist mucosa  CHEST/LUNG: Clear to auscultation bilaterally; No wheeze, equal breath sounds bilaterally   BACK: No spinal tenderness  HEART: Regular rate and rhythm; No murmurs, rubs, or gallops  ABDOMEN: Soft, Nontender, Nondistended; Bowel sounds present  EXTREMITIES:  No clubbing, cyanosis, or edema  PSYCH: Nl behavior, nl affect  NEUROLOGY: AAOx3, non-focal, cranial nerves intact  SKIN: Normal color, No rashes or lesions  LINES:    Cardiovascular Diagnostic Testing:    Interpretation of Tele:    ECG 10/25: sinus bradycardia, RBBB, left anterior fascicular bloc    Echo 06/2017:< from: Transthoracic Echocardiogram (06.17.18 @ 10:19) >  CONCLUSIONS:  1. Mitral annular calcification and calcified mitral  leaflets with normal diastolic opening.  2. Bioprosthetic aortic valve replacement. Peak transaortic  valve gradient equals 44 mm Hg, mean transaortic valve  gradient equals 22 mm Hg, which is probably normal in the  presence of a prosthetic valve.  3. Endocardium not well visualized; grossly normal left  ventricular systolic function.  4. Normal right ventricular size and function.    Stress Testing 02/2014:  < from: Nuclear Stress Test-Pharmacologic (02.03.14 @ 07:46) >  MPRESSIONS:Abnormal Study  * Myocardial Perfusion SPECT results are abnormal.  * Normal myocardial perfusion,with no evidence of  infarction or inducible ischemia. There is a mild proximal  inferior defect that is no longer evident with prone  imaging for attenuation correction.  * However, findings suggest non-ischemic cardiomyopathy.  Post-stress gated wall motion analysis was performed (LVEF  = 35 %;LVEDV = 201 ml.), revealing severe diffuse  hypokinesis.    Imaging: < from: Xray Chest 1 View AP/PA (10.25.18 @ 03:53) >  FINDINGS:   Status post median sternotomy, CABG and aortic valve replacement.  Low lung volumes. Trace left pleural effusion. Underlying pneumonia   cannot be excluded. There is no pneumothorax.  The heart size cannot be accurately assessed in thisprojection.   No acute bony pathology.    IMPRESSION:    No focal consolidations.  Labs: Personally reviewed                        7.2    6.32  )-----------( 288      ( 26 Oct 2018 04:40 )             22.7     10-26    138  |  100  |  94<H>  ----------------------------<  85  4.9   |  19<L>  |  8.59<H>    Ca    7.5<L>      26 Oct 2018 04:40  Phos  8.2     10-26  Mg     2.1     10-26    TPro  7.1  /  Alb  3.3  /  TBili  0.2  /  DBili  x   /  AST  20  /  ALT  16  /  AlkPhos  120  10-25    Hemoglobin A1C, Whole Blood: 4.9 % (10-26 @ 04:40) Reason for Consultation: preop risk stratification for AVF fistula and BP management in the setting of Hypertensive urgency   Chief Complaint: AMS  Date of Admission: 10/25/2018      HPI:  67YO  with HTN, DM2, CAD s/p CABG (no MI or stent), Bioprosthetic AVR, and HFpEF (EF=56% 6/2018), progressively worsening CKD (Creatinine 8 in 06/2018, refusing HD in the past) brought in by EMS after wife noticed patient with AMS. FSG done by EMS was 28, patient received 1 amp of D50. FSG upon arrival in ED was 100, but then dropped to 30, requiring another amp of D50, with FSG improving to 130. Patient now AA0X3, does not remember much about last night except for feeling fatigued. Patient denies feeling unwell yesterday, took his normal dose of Humulin 70/30 10 units in the morning, wife says he did not take his normal dose of 6 units in the evening. Patient with ate muffin and sandwich yesterday. Patient's wife endorses chronic non productive cough. Patient denies fever, chills, chest pain, palpitations, URI symptoms, headache, blurry vision, abdominal pain, n/v/d or urinary symptoms. Patient with exertional dyspnea, not much worse than usual, also with LE swelling. (25 Oct 2018 10:17)    Of note pt was admitted in June 6/12- 6/16 at [facility] after presenting with SOB, orthopnea and LE edema 2/2 fluid overload. Trop in 40s and proBNP was 04593. Last echocardiogram 6/16 showed grossly normal LV function and normal RV function with EF = 56%. He improved with diuretics and supportive care.     ED course:   Vitals 223-250/ 104-118, HR 67, RR16 and 100%spO2 on RA   Labs remarkable for severe anemia (6.9 -transfusion held because of volume overload),  cr 8.7, Trop T x2 429 -->  396  EKG showed sinus bradycardia, RBBB, left anterior fascicular block  CXR is clear   CT head negative for hemorrhage     Received 2 amps of D50 with improvement of mental status   Received hydralazine 50mg PO, 15 IV and 10 IV + Lasix 20IVP and 60IVP for hypertensive urgency with 200cc urine output yesterday     INTERVAL HISTORY:   Mental status back to baseline with stabilization of glucose levels.   Patient continues to have elevated BP on hydralazine 75mg TID (added during this admission), amlodipine 10mg qd, metoprolol succinate 50mg qd.   Seen by nephrology who discussed HD--family/pt has AGREED to HD. Went for vein mapping this afternoon.     Past Medical History:   CKD (chronic kidney disease)  Anemia  CHF (congestive heart failure)  Diabetes mellitus  Hypertension    Past Surgical History:   S/P CABG (coronary artery bypass graft)  S/P appendectomy    Home Medications:  amLODIPine 10 mg oral tablet: 1 tab(s) orally once a day (25 Oct 2018 10:14)  aspirin 81 mg oral tablet: 1 tab(s) orally once a day (25 Oct 2018 10:14)  hydrALAZINE 50 mg oral tablet: 1 tab(s) orally every 8 hours (25 Oct 2018 10:14)  metoprolol succinate 50 mg oral tablet, extended release: 1 tab(s) orally once a day (25 Oct 2018 10:14)  NovoLOG Mix 70/30 FlexPen subcutaneous suspension: 10 unit(s) subcutaneous once a day (in the morning) before breakfast and 6units once a day pefore dinner (25 Oct 2018 10:14)  Omega-3 oral capsule: 1 tab(s) orally once a day (25 Oct 2018 10:14)  Plavix 75 mg oral tablet: 1 tab(s) orally once a day (25 Oct 2018 10:14)  simvastatin 20 mg oral tablet: 1 tab(s) orally once a day (at bedtime) (25 Oct 2018 10:14)    Medications:   amLODIPine   Tablet 10 milliGRAM(s) Oral daily  aspirin enteric coated 81 milliGRAM(s) Oral daily  calcium acetate 667 milliGRAM(s) Oral three times a day with meals  clopidogrel Tablet 75 milliGRAM(s) Oral daily  dextrose 40% Gel 15 Gram(s) Oral once PRN  dextrose 5%. 1000 milliLiter(s) IV Continuous <Continuous>  dextrose 50% Injectable 12.5 Gram(s) IV Push once  dextrose 50% Injectable 25 Gram(s) IV Push once  dextrose 50% Injectable 25 Gram(s) IV Push once  furosemide   Injectable 80 milliGRAM(s) IV Push two times a day  glucagon  Injectable 1 milliGRAM(s) IntraMuscular once PRN  heparin  Injectable 5000 Unit(s) SubCutaneous every 8 hours  hydrALAZINE 75 milliGRAM(s) Oral every 8 hours  insulin lispro (HumaLOG) corrective regimen sliding scale   SubCutaneous three times a day before meals  insulin lispro (HumaLOG) corrective regimen sliding scale   SubCutaneous at bedtime  metoprolol succinate ER 50 milliGRAM(s) Oral daily  simvastatin 20 milliGRAM(s) Oral at bedtime      Allergies:  lisinopril (Anaphylaxis)      FAMILY HISTORY:  No pertinent family history in first degree relatives    Social History: Lives at home with wife. Denies smoking, alcohol use or illlicit drug use.    Review of Systems:  REVIEW OF SYSTEMS:  CONSTITUTIONAL: No weakness, fevers or chills  EYES/ENT: No visual changes;  No dysphagia  NECK: No pain or stiffness  RESPIRATORY: No cough, wheezing, hemoptysis; No shortness of breath  CARDIOVASCULAR: No chest pain or palpitations; No lower extremity edema  GASTROINTESTINAL: No abdominal or epigastric pain. No nausea, vomiting, or hematemesis; No diarrhea or constipation. No melena or hematochezia.  BACK: No back pain  GENITOURINARY: No dysuria, frequency or hematuria  NEUROLOGICAL: No numbness or weakness  SKIN: No itching, burning, rashes, or lesions   All other review of systems is negative unless indicated above.    Vitals:  T(F): 98.3 (10-26), Max: 98.8 (10-25)  HR: 67 (10-26) (67 - 79)  BP: 174/82 (10-26) (172/80 - 205/83)  RR: 18 (10-26)  SpO2: 95% (10-26)    Physical Exam:  GENERAL: No acute distress, well-developed  HEAD:  Atraumatic, Normocephalic  ENT: EOMI, PERRLA, conjunctiva and sclera clear, Neck supple, No JVD, moist mucosa  CHEST/LUNG: Clear to auscultation bilaterally; No wheeze, equal breath sounds bilaterally   BACK: No spinal tenderness  HEART: Regular rate and rhythm; No murmurs, rubs, or gallops  ABDOMEN: Soft, Nontender, Nondistended; Bowel sounds present  EXTREMITIES:  No clubbing, cyanosis, or edema  PSYCH: Nl behavior, nl affect  NEUROLOGY: AAOx3, non-focal, cranial nerves intact  SKIN:  2+ pitting edema bilaterally  LINES:    ECG 10/25: sinus bradycardia, RBBB, left anterior fascicular bloc    Echo 06/2017:< from: Transthoracic Echocardiogram (06.17.18 @ 10:19) >  CONCLUSIONS:  1. Mitral annular calcification and calcified mitral  leaflets with normal diastolic opening.  2. Bioprosthetic aortic valve replacement. Peak transaortic  valve gradient equals 44 mm Hg, mean transaortic valve  gradient equals 22 mm Hg, which is probably normal in the  presence of a prosthetic valve.  3. Endocardium not well visualized; grossly normal left  ventricular systolic function.  4. Normal right ventricular size and function.    Stress Testing 02/2014:  < from: Nuclear Stress Test-Pharmacologic (02.03.14 @ 07:46) >  MPRESSIONS:Abnormal Study  * Myocardial Perfusion SPECT results are abnormal.  * Normal myocardial perfusion,with no evidence of  infarction or inducible ischemia. There is a mild proximal  inferior defect that is no longer evident with prone  imaging for attenuation correction.  * However, findings suggest non-ischemic cardiomyopathy.  Post-stress gated wall motion analysis was performed (LVEF  = 35 %;LVEDV = 201 ml.), revealing severe diffuse  hypokinesis.    Imaging: < from: Xray Chest 1 View AP/PA (10.25.18 @ 03:53) >  FINDINGS:   Status post median sternotomy, CABG and aortic valve replacement.  Low lung volumes. Trace left pleural effusion. Underlying pneumonia   cannot be excluded. There is no pneumothorax.  The heart size cannot be accurately assessed in thisprojection.   No acute bony pathology.    IMPRESSION:    No focal consolidations.  Labs: Personally reviewed                        7.2    6.32  )-----------( 288      ( 26 Oct 2018 04:40 )             22.7     10-26    138  |  100  |  94<H>  ----------------------------<  85  4.9   |  19<L>  |  8.59<H>    Ca    7.5<L>      26 Oct 2018 04:40  Phos  8.2     10-26  Mg     2.1     10-26    TPro  7.1  /  Alb  3.3  /  TBili  0.2  /  DBili  x   /  AST  20  /  ALT  16  /  AlkPhos  120  10-25    Hemoglobin A1C, Whole Blood: 4.9 % (10-26 @ 04:40) Reason for Consultation: preop risk stratification for AV fistula and BP management in the setting of Hypertensive urgency   Chief Complaint: AMS  Date of Admission: 10/25/2018      HPI:  67YO M with PMHx of HTN, DM2, CAD s/p CABG (no MI or stent), Bioprosthetic AVR, and HFpEF (EF=56% 6/2018), progressively worsening CKD (Creatinine 8 in 06/2018, refusing HD in the past) brought in by EMS after wife noticed patient with AMS. FSG done by EMS was 28, patient received 1 amp of D50. FSG upon arrival in ED was 100, but then dropped to 30, requiring another amp of D50, with FSG improving to 130. Patient now AA0X3, does not remember much about last night except for feeling fatigued. Patient denies feeling unwell yesterday, took his normal dose of Humulin 70/30 10 units in the morning, wife says he did not take his normal dose of 6 units in the evening. Patient with ate muffin and sandwich yesterday. Patient's wife endorses chronic non productive cough. Patient denies fever, chills, chest pain, palpitations, URI symptoms, headache, blurry vision, abdominal pain, n/v/d or urinary symptoms. Patient with exertional dyspnea, not much worse than usual, also with LE swelling. (25 Oct 2018 10:17)    Of note, pt was admitted in June 6/12- 6/16 at [facility] after presenting with SOB, orthopnea and LE edema 2/2 fluid overload. Trop in 40s and proBNP was 28508. Last echocardiogram 6/16 showed grossly normal systolic LV function and normal RV function with EF = 56%. He improved with diuretics and supportive care.     ED course:   Vitals 223-250/ 104-118, HR 67, RR16 and 100%spO2 on RA   Labs remarkable for severe anemia (6.9 -transfusion held because of volume overload),  Cr 8.7, Trop T x2 429 -->  396  EKG showed sinus bradycardia, RBBB, left anterior fascicular block  CXR is clear   CT head negative for hemorrhage     Received 2 amps of D50 with improvement of mental status   Received hydralazine 50mg PO, 15 IV and 10 IV + Lasix 20IVP and 60IVP for hypertensive urgency with 200cc urine output yesterday     INTERVAL HISTORY:   Mental status back to baseline with stabilization of glucose levels.   Patient continues to have elevated BP on hydralazine 75mg TID (added during this admission), amlodipine 10mg qd, metoprolol succinate 50mg qd.   Seen by nephrology who discussed HD--family/pt has AGREED to HD. Went for vein mapping this afternoon.   Patient reports being able to climb 1 flight of stairs at home, while carrying laundry or groceries without difficulty --No SOB or CP.     Past Medical History:   CKD (chronic kidney disease)  Anemia  CHF (congestive heart failure)  Diabetes mellitus  Hypertension    Past Surgical History:   S/P CABG (coronary artery bypass graft)  S/P appendectomy    Home Medications:  amLODIPine 10 mg oral tablet: 1 tab(s) orally once a day (25 Oct 2018 10:14)  aspirin 81 mg oral tablet: 1 tab(s) orally once a day (25 Oct 2018 10:14)  hydrALAZINE 50 mg oral tablet: 1 tab(s) orally every 8 hours (25 Oct 2018 10:14)  metoprolol succinate 50 mg oral tablet, extended release: 1 tab(s) orally once a day (25 Oct 2018 10:14)  NovoLOG Mix 70/30 FlexPen subcutaneous suspension: 10 unit(s) subcutaneous once a day (in the morning) before breakfast and 6units once a day pefore dinner (25 Oct 2018 10:14)  Omega-3 oral capsule: 1 tab(s) orally once a day (25 Oct 2018 10:14)  Plavix 75 mg oral tablet: 1 tab(s) orally once a day (25 Oct 2018 10:14)  simvastatin 20 mg oral tablet: 1 tab(s) orally once a day (at bedtime) (25 Oct 2018 10:14)    Medications:   amLODIPine   Tablet 10 milliGRAM(s) Oral daily  aspirin enteric coated 81 milliGRAM(s) Oral daily  calcium acetate 667 milliGRAM(s) Oral three times a day with meals  clopidogrel Tablet 75 milliGRAM(s) Oral daily  dextrose 40% Gel 15 Gram(s) Oral once PRN  dextrose 5%. 1000 milliLiter(s) IV Continuous <Continuous>  dextrose 50% Injectable 12.5 Gram(s) IV Push once  dextrose 50% Injectable 25 Gram(s) IV Push once  dextrose 50% Injectable 25 Gram(s) IV Push once  furosemide   Injectable 80 milliGRAM(s) IV Push two times a day  glucagon  Injectable 1 milliGRAM(s) IntraMuscular once PRN  heparin  Injectable 5000 Unit(s) SubCutaneous every 8 hours  hydrALAZINE 75 milliGRAM(s) Oral every 8 hours  insulin lispro (HumaLOG) corrective regimen sliding scale   SubCutaneous three times a day before meals  insulin lispro (HumaLOG) corrective regimen sliding scale   SubCutaneous at bedtime  metoprolol succinate ER 50 milliGRAM(s) Oral daily  simvastatin 20 milliGRAM(s) Oral at bedtime      Allergies:  lisinopril (Anaphylaxis)      FAMILY HISTORY:  No pertinent family history in first degree relatives    Social History: Lives at home with wife. Denies smoking, alcohol use or illlicit drug use.    Review of Systems:  REVIEW OF SYSTEMS:  CONSTITUTIONAL: No weakness, fevers or chills  EYES/ENT: No visual changes;  No dysphagia  NECK: No pain or stiffness  RESPIRATORY: No cough, wheezing, hemoptysis; No shortness of breath  CARDIOVASCULAR: No chest pain or palpitations; No lower extremity edema  GASTROINTESTINAL: No abdominal or epigastric pain. No nausea, vomiting, or hematemesis; No diarrhea or constipation. No melena or hematochezia.  BACK: No back pain  GENITOURINARY: No dysuria, frequency or hematuria  NEUROLOGICAL: No numbness or weakness  SKIN: No itching, burning, rashes, or lesions   All other review of systems is negative unless indicated above.    Vitals:  T(F): 98.3 (10-26), Max: 98.8 (10-25)  HR: 67 (10-26) (67 - 79)  BP: 174/82 (10-26) (172/80 - 205/83)  RR: 18 (10-26)  SpO2: 95% (10-26)    Physical Exam:  GENERAL: No acute distress, well-developed  HEAD:  Atraumatic, Normocephalic  ENT: EOMI, PERRLA, conjunctiva and sclera clear, Neck supple, No JVD, moist mucosa  CHEST/LUNG: Clear to auscultation bilaterally; No wheeze, equal breath sounds bilaterally   BACK: No spinal tenderness  HEART: Regular rate and rhythm; No murmurs, rubs, or gallops  ABDOMEN: Soft, Nontender, Nondistended; Bowel sounds present  EXTREMITIES:  No clubbing, cyanosis, or edema  PSYCH: Nl behavior, nl affect  NEUROLOGY: AAOx3, non-focal, cranial nerves intact  SKIN:  2+ LE pitting edema bilaterally  LINES:    ECG 10/25: sinus bradycardia, RBBB, left anterior fascicular bloc    Echo 06/2017:< from: Transthoracic Echocardiogram (06.17.18 @ 10:19) >  CONCLUSIONS:  1. Mitral annular calcification and calcified mitral  leaflets with normal diastolic opening.  2. Bioprosthetic aortic valve replacement. Peak transaortic  valve gradient equals 44 mm Hg, mean transaortic valve  gradient equals 22 mm Hg, which is probably normal in the  presence of a prosthetic valve.  3. Endocardium not well visualized; grossly normal left  ventricular systolic function.  4. Normal right ventricular size and function.    Stress Testing 02/2014:  < from: Nuclear Stress Test-Pharmacologic (02.03.14 @ 07:46) >  MPRESSIONS:Abnormal Study  * Myocardial Perfusion SPECT results are abnormal.  * Normal myocardial perfusion,with no evidence of  infarction or inducible ischemia. There is a mild proximal  inferior defect that is no longer evident with prone  imaging for attenuation correction.  * However, findings suggest non-ischemic cardiomyopathy.  Post-stress gated wall motion analysis was performed (LVEF  = 35 %;LVEDV = 201 ml.), revealing severe diffuse  hypokinesis.    Imaging: < from: Xray Chest 1 View AP/PA (10.25.18 @ 03:53) >  FINDINGS:   Status post median sternotomy, CABG and aortic valve replacement.  Low lung volumes. Trace left pleural effusion. Underlying pneumonia   cannot be excluded. There is no pneumothorax.  The heart size cannot be accurately assessed in thisprojection.   No acute bony pathology.    IMPRESSION:    No focal consolidations.  Labs: Personally reviewed                        7.2    6.32  )-----------( 288      ( 26 Oct 2018 04:40 )             22.7     10-26    138  |  100  |  94<H>  ----------------------------<  85  4.9   |  19<L>  |  8.59<H>    Ca    7.5<L>      26 Oct 2018 04:40  Phos  8.2     10-26  Mg     2.1     10-26    TPro  7.1  /  Alb  3.3  /  TBili  0.2  /  DBili  x   /  AST  20  /  ALT  16  /  AlkPhos  120  10-25    Hemoglobin A1C, Whole Blood: 4.9 % (10-26 @ 04:40)

## 2018-10-26 NOTE — CONSULT NOTE ADULT - PROBLEM SELECTOR RECOMMENDATION 2
Last echo 6/18: grossly normal LV function, normal RV function and EF =56%  - denies SOB, orthopnea. CXR clear   - Can give Lasix Last echo 6/18: grossly normal LV function, normal RV function and EF =56%  - denies SOB, orthopnea. CXR clear  - On lasix 80mg BID IVP Hx of CAD s/p CABG and Bioprosthetic AVR. Revised Cardiac Risk Index :11%  - Asymptomatic, denies CP, SOB or dizziness.   - Although pt has 11% risk, he is cleared to go for a AV Fistula (moderate risk procedure) given good functional status >4mets per hx.   - EKG 10/25/18 showed sinus bradycardia, RBBB, left anterior fascicular block  - Continue metoprolol succinate 50mg daily, Amlodipine 10mg qd, simvastatin 20mg at bedtime, plavex 75mg qd.

## 2018-10-26 NOTE — CONSULT NOTE ADULT - ASSESSMENT
Assessment  DMT2: 68y Male with DM T2 with  hypoglycemic episodes , not  eating  well ,   compliant with low carb diet.  CAD:  S//P On medications, stable, monitored.  .HLD; On Statin  HTN:  Not Controlled, On med.  CKD: Monitor labs/BMP,   ESRD: need hemodialysis but patient refusing , Monitor labs/BMP   CHF; On Med,s    Plan:     DMT2: Since has ESRD, shoul not push for Tight glycemic control  Keep correction scale coverage, ( low coverage )monitor FS will FU  as long as BS are not above 200 no need for basal insulin  Patient counseled for compliance with consistent low carb diet  CAD: Continue treatment, monitoring FU  by medical team.  HTN: Continue treatment, monitoring, Primary team FU   suggest to consider small 1 to 2 mg of Bumex, to get better control of BP.  CKD: Continue treatment, monitoring, Primary team FU   suggest to consider small 1 to 2 mg of Bumex, to get better control of BP  Dr francisco [phone]

## 2018-10-26 NOTE — CONSULT NOTE ADULT - PROBLEM SELECTOR RECOMMENDATION 9
Hx of  CAD s/p CABG and Bioprosthetic AVR. Revised Cardiac Risk Index :11%   - Asymptomatic, denies SOB    - EKG 10/25/18 showed sinus bradycardia, RBBB, left anterior fascicular block  - Continue metoprolol succinate 50mg daily, Amlodipine 10mg qd, simvastatin 20mg at bedtime, plavex 75mg qd Hx of  CAD s/p CABG and Bioprosthetic AVR. Revised Cardiac Risk Index :11% - High Risk patient   - Asymptomatic, denies CP, SOB or dizziness   - EKG 10/25/18 showed sinus bradycardia, RBBB, left anterior fascicular block  - Continue metoprolol succinate 50mg daily, Amlodipine 10mg qd, simvastatin 20mg at bedtime, plavex 75mg qd Continues to be in hypertensive urgency BP 170s -200s/ 70-80s, improved from admission -250s. Likely secondary to worsening CKD. Pt and family have agreed to HD  - currently on metoprolol 50mg ER, amlodipine 10mg qd, hydralazine 75mg TID, and Lasix 80mg IVP BID   - Would recommend increasing hydralazine 100mg TID and continue with current dose of metoprolol and amlodipine.

## 2018-10-26 NOTE — CONSULT NOTE ADULT - ASSESSMENT
68 year old Male with HTN, DM2, CAD s/p CABG, Bioprosthetic AVR, and HFpEF (56% on last echo 6/2018), progressively worsening CKD (Creatinine 8 in 06/2018, refusing HD in the past) brought in by EMS after wife noticed patient with AMS. FSG done by EMS was 28, p 68 year old Male with HTN, DM2, CAD s/p CABG, Bioprosthetic AVR, and HFpEF (56% on last echo 6/2018)  brought in by EMS after wife noticed patient with AMS brought by EMS for AMS 2/2 severe hypoglycemia ( FSG done by EMS was 28) in the setting of worsening CKD (Creatinine 8 in 06/2018, refusing HD in the past) and found on admission to have hypertensive urgency. We were consulted for preop risk stratification for AV fistula and BP management. 68 year old Male with HTN, DM2, CAD s/p CABG, Bioprosthetic AVR, and HFpEF (56% on last echo 6/2018)  brought in by EMS after wife noticed change in mental status, found to have severe hypoglycemia ( FSG done by EMS was 28) and hypertensive urgency in the setting of worsening CKD (Creatinine 8, refusing HD in the past). We were consulted for preop risk stratification for AV fistula and BP management.

## 2018-10-26 NOTE — PROGRESS NOTE ADULT - SUBJECTIVE AND OBJECTIVE BOX
[facility] DIVISION OF KIDNEY DISEASES AND HYPERTENSION -- FOLLOW UP NOTE  --------------------------------------------------------------------------------  HPI: 67 yo M with PMHx of advanced/progressive CKD, DM2, HTN, admitted for hypertensive urgency and shortness of breath. Patient was found to have an elevated Scr of 8.74 on admission (10/25/18). Nephrology consulted for progression of CKD.    Patient seen and examined at bedside this AM. Patient states that he would not like to start HD at this time. States that his breathing has improved today. Juventino BALDERAS, DAY, N/V/F/C.    PAST HISTORY  --------------------------------------------------------------------------------  No significant changes to PMH, PSH, FHx, SHx, unless otherwise noted    ALLERGIES & MEDICATIONS  --------------------------------------------------------------------------------  Allergies    lisinopril (Anaphylaxis)    Intolerances    Standing Inpatient Medications  amLODIPine   Tablet 10 milliGRAM(s) Oral daily  aspirin enteric coated 81 milliGRAM(s) Oral daily  calcium acetate 667 milliGRAM(s) Oral three times a day with meals  clopidogrel Tablet 75 milliGRAM(s) Oral daily  dextrose 5%. 1000 milliLiter(s) IV Continuous <Continuous>  dextrose 50% Injectable 12.5 Gram(s) IV Push once  dextrose 50% Injectable 25 Gram(s) IV Push once  dextrose 50% Injectable 25 Gram(s) IV Push once  furosemide   Injectable 80 milliGRAM(s) IV Push two times a day  heparin  Injectable 5000 Unit(s) SubCutaneous every 8 hours  hydrALAZINE 75 milliGRAM(s) Oral every 8 hours  insulin lispro (HumaLOG) corrective regimen sliding scale   SubCutaneous three times a day before meals  insulin lispro (HumaLOG) corrective regimen sliding scale   SubCutaneous at bedtime  metoprolol succinate ER 50 milliGRAM(s) Oral daily  simvastatin 20 milliGRAM(s) Oral at bedtime    REVIEW OF SYSTEMS  --------------------------------------------------------------------------------  Gen: No fatigue  Respiratory: No dyspnea  CV: No chest pain  GI: + decreased appetite, no abdominal pain  MSK: + LE edema  Neuro: No dizziness  Heme: No bleeding  Psych: No depression  Skin: No rash    All other systems were reviewed and are negative, except as noted.    VITALS/PHYSICAL EXAM  --------------------------------------------------------------------------------  T(C): 36.8 (10-26-18 @ 05:20), Max: 37.1 (10-25-18 @ 16:06)  HR: 67 (10-26-18 @ 10:18) (67 - 79)  BP: 174/82 (10-26-18 @ 10:18) (172/80 - 205/83)  RR: 18 (10-26-18 @ 10:18) (14 - 18)  SpO2: 95% (10-26-18 @ 10:18) (95% - 96%)  Wt(kg): --  Height (cm): 167.64 (10-25-18 @ 17:16)  Weight (kg): 161 (10-25-18 @ 17:16)  BMI (kg/m2): 57.3 (10-25-18 @ 17:16)  BSA (m2): 2.55 (10-25-18 @ 17:16)    10-25-18 @ 07:01  -  10-26-18 @ 07:00  --------------------------------------------------------  IN: 30 mL / OUT: 200 mL / NET: -170 mL    Physical Exam:  	Gen: NAD  	HEENT: Supple neck  	Pulm: CTA b/l  	CV: RRR, S1S2; no rub  	Abd: +BS, soft, nontender/nondistended  	: No suprapubic tenderness  	UE: No asterixis  	LE: 2+ pitting edema b/l LE, L>R  	Skin: Warm, without rashes    LABS/STUDIES  --------------------------------------------------------------------------------              7.2    6.32  >-----------<  288      [10-26-18 @ 04:40]              22.7     138  |  100  |  94  ----------------------------<  85      [10-26-18 @ 04:40]  4.9   |  19  |  8.59        Ca     7.5     [10-26-18 @ 04:40]      Mg     2.1     [10-26-18 @ 04:40]      Phos  8.2     [10-26-18 @ 04:40]    TPro  7.1  /  Alb  3.3  /  TBili  0.2  /  DBili  x   /  AST  20  /  ALT  16  /  AlkPhos  120  [10-25-18 @ 03:40]    Creatinine Trend:  SCr 8.59 [10-26 @ 04:40]  SCr 8.74 [10-25 @ 03:40]    Urinalysis - [10-25-18 @ 07:00]      Color LIGHT YELLOW / Appearance CLEAR / SG 1.014 / pH 6.5      Gluc 50 / Ketone NEGATIVE  / Bili NEGATIVE / Urobili NORMAL       Blood TRACE / Protein 300 / Leuk Est NEGATIVE / Nitrite NEGATIVE      RBC 3-5 / WBC 0-2 / Hyaline NEGATIVE / Gran  / Sq Epi OCC / Non Sq Epi  / Bacteria NEGATIVE

## 2018-10-27 ENCOUNTER — TRANSCRIPTION ENCOUNTER (OUTPATIENT)
Age: 68
End: 2018-10-27

## 2018-10-27 LAB
BUN SERPL-MCNC: 97 MG/DL — HIGH (ref 7–23)
CALCIUM SERPL-MCNC: 7.5 MG/DL — LOW (ref 8.4–10.5)
CHLORIDE SERPL-SCNC: 101 MMOL/L — SIGNIFICANT CHANGE UP (ref 98–107)
CO2 SERPL-SCNC: 19 MMOL/L — LOW (ref 22–31)
CREAT SERPL-MCNC: 9.02 MG/DL — HIGH (ref 0.5–1.3)
GLUCOSE BLDC GLUCOMTR-MCNC: 113 MG/DL — HIGH (ref 70–99)
GLUCOSE BLDC GLUCOMTR-MCNC: 121 MG/DL — HIGH (ref 70–99)
GLUCOSE BLDC GLUCOMTR-MCNC: 130 MG/DL — HIGH (ref 70–99)
GLUCOSE BLDC GLUCOMTR-MCNC: 131 MG/DL — HIGH (ref 70–99)
GLUCOSE SERPL-MCNC: 96 MG/DL — SIGNIFICANT CHANGE UP (ref 70–99)
HCT VFR BLD CALC: 24.3 % — LOW (ref 39–50)
HGB BLD-MCNC: 7.7 G/DL — LOW (ref 13–17)
MAGNESIUM SERPL-MCNC: 2.1 MG/DL — SIGNIFICANT CHANGE UP (ref 1.6–2.6)
MCHC RBC-ENTMCNC: 25.3 PG — LOW (ref 27–34)
MCHC RBC-ENTMCNC: 31.7 % — LOW (ref 32–36)
MCV RBC AUTO: 79.9 FL — LOW (ref 80–100)
NRBC # FLD: 0 — SIGNIFICANT CHANGE UP
PHOSPHATE SERPL-MCNC: 8.2 MG/DL — HIGH (ref 2.5–4.5)
PLATELET # BLD AUTO: 261 K/UL — SIGNIFICANT CHANGE UP (ref 150–400)
PMV BLD: SIGNIFICANT CHANGE UP FL (ref 7–13)
POTASSIUM SERPL-MCNC: 4.7 MMOL/L — SIGNIFICANT CHANGE UP (ref 3.5–5.3)
POTASSIUM SERPL-SCNC: 4.7 MMOL/L — SIGNIFICANT CHANGE UP (ref 3.5–5.3)
RBC # BLD: 3.04 M/UL — LOW (ref 4.2–5.8)
RBC # FLD: 18.2 % — HIGH (ref 10.3–14.5)
SODIUM SERPL-SCNC: 139 MMOL/L — SIGNIFICANT CHANGE UP (ref 135–145)
WBC # BLD: 6.31 K/UL — SIGNIFICANT CHANGE UP (ref 3.8–10.5)
WBC # FLD AUTO: 6.31 K/UL — SIGNIFICANT CHANGE UP (ref 3.8–10.5)

## 2018-10-27 PROCEDURE — 99232 SBSQ HOSP IP/OBS MODERATE 35: CPT

## 2018-10-27 PROCEDURE — 99232 SBSQ HOSP IP/OBS MODERATE 35: CPT | Mod: GC

## 2018-10-27 RX ORDER — CARVEDILOL PHOSPHATE 80 MG/1
25 CAPSULE, EXTENDED RELEASE ORAL EVERY 12 HOURS
Qty: 0 | Refills: 0 | Status: DISCONTINUED | OUTPATIENT
Start: 2018-10-27 | End: 2018-10-28

## 2018-10-27 RX ORDER — ISOSORBIDE DINITRATE 5 MG/1
30 TABLET ORAL THREE TIMES A DAY
Qty: 0 | Refills: 0 | Status: DISCONTINUED | OUTPATIENT
Start: 2018-10-27 | End: 2018-10-28

## 2018-10-27 RX ADMIN — CLOPIDOGREL BISULFATE 75 MILLIGRAM(S): 75 TABLET, FILM COATED ORAL at 12:22

## 2018-10-27 RX ADMIN — Medication 667 MILLIGRAM(S): at 18:06

## 2018-10-27 RX ADMIN — SIMVASTATIN 20 MILLIGRAM(S): 20 TABLET, FILM COATED ORAL at 22:11

## 2018-10-27 RX ADMIN — Medication 667 MILLIGRAM(S): at 12:22

## 2018-10-27 RX ADMIN — Medication 80 MILLIGRAM(S): at 05:03

## 2018-10-27 RX ADMIN — HEPARIN SODIUM 5000 UNIT(S): 5000 INJECTION INTRAVENOUS; SUBCUTANEOUS at 14:23

## 2018-10-27 RX ADMIN — Medication 667 MILLIGRAM(S): at 08:50

## 2018-10-27 RX ADMIN — Medication 50 MILLIGRAM(S): at 05:04

## 2018-10-27 RX ADMIN — Medication 100 MILLIGRAM(S): at 05:04

## 2018-10-27 RX ADMIN — Medication 100 MILLIGRAM(S): at 22:10

## 2018-10-27 RX ADMIN — Medication 81 MILLIGRAM(S): at 12:22

## 2018-10-27 RX ADMIN — AMLODIPINE BESYLATE 10 MILLIGRAM(S): 2.5 TABLET ORAL at 05:04

## 2018-10-27 RX ADMIN — Medication 80 MILLIGRAM(S): at 18:06

## 2018-10-27 RX ADMIN — HEPARIN SODIUM 5000 UNIT(S): 5000 INJECTION INTRAVENOUS; SUBCUTANEOUS at 05:03

## 2018-10-27 RX ADMIN — ISOSORBIDE DINITRATE 20 MILLIGRAM(S): 5 TABLET ORAL at 05:04

## 2018-10-27 RX ADMIN — HEPARIN SODIUM 5000 UNIT(S): 5000 INJECTION INTRAVENOUS; SUBCUTANEOUS at 22:11

## 2018-10-27 RX ADMIN — Medication 100 MILLIGRAM(S): at 14:23

## 2018-10-27 RX ADMIN — ISOSORBIDE DINITRATE 20 MILLIGRAM(S): 5 TABLET ORAL at 14:23

## 2018-10-27 RX ADMIN — ISOSORBIDE DINITRATE 30 MILLIGRAM(S): 5 TABLET ORAL at 22:10

## 2018-10-27 RX ADMIN — CARVEDILOL PHOSPHATE 25 MILLIGRAM(S): 80 CAPSULE, EXTENDED RELEASE ORAL at 22:10

## 2018-10-27 NOTE — DISCHARGE NOTE ADULT - CARE PROVIDER_API CALL
Josue Swartz), Medicine  [address] [place] [address]  Phone: [phone]  Fax: [phone] Josue Swartz (MD), Medicine  [address] [place] [address]  Phone: [phone]  Fax: [phone]    Antione Ramirez (MD; PhD), Cardiology; Internal Medicine; Vascular Medicine  [address] [address] [place] [address]  [place] [address]  Phone: [phone]  Fax: [phone]    Yumiko Cruz (MD), EndocrinologyMetabDiabetes; Internal Medicine  [address] [place] [address]  Phone: [phone]  Fax: [phone]    Devonte Singh), Nephrology  [address] [place] [address]  Phone: [phone]  Fax: [phone]

## 2018-10-27 NOTE — DISCHARGE NOTE ADULT - MEDICATION SUMMARY - MEDICATIONS TO STOP TAKING
I will STOP taking the medications listed below when I get home from the hospital:    NovoLOG Mix 70/30 FlexPen subcutaneous suspension  -- 10 unit(s) subcutaneous once a day (in the morning) before breakfast and 6units once a day pefore dinner    metoprolol succinate 50 mg oral tablet, extended release  -- 1 tab(s) by mouth once a day

## 2018-10-27 NOTE — DISCHARGE NOTE ADULT - PATIENT PORTAL LINK FT
You can access the [facility], offered by [facility], by registering with the following website: [email or web address]

## 2018-10-27 NOTE — DISCHARGE NOTE ADULT - HOSPITAL COURSE
68 year old Male with HTN, DM2, CAD s/p CABG, HFpEF, progressively worsening CKD (Creatinine 8 in 06/2018, refusing HD in the past) brought in by EMS after wife noticed patient with AMS. FSG done by EMS was 28, patient received 1 amp of D50. FSG upon arrival in ED was 100, but then dropped to 30, requiring another amp of D50, with FSG improving to 130 68 year old Male with HTN, DM2, CAD s/p CABG, HFpEF, progressively worsening CKD (Creatinine 8 in 06/2018, refusing HD in the past) brought in by EMS after wife noticed patient with AMS. FSG done by EMS was 28, patient received 1 amp of D50. FSG upon arrival in ED was 100, but then dropped to 30, requiring another amp of D50, with FSG improving to 130    Acute encephalopathy-  AMS in setting of hypoglycemia FSG 28 per EMS- s/p 2 amps of D50, with improvement of FSG to 130. CXR without consolidation. CT head negative     Hypertensive urgency - with systolic BP up to 250-asymptomatic. CT head negative for hemorrhage   - Coreg, amlodipine 10 mg QD, Isorbide 30mg TID, hydralazine 100mg TID, and Lasix 80mg IVP BID     CKD -  progressively worsening, no s/overload   - elevated Trop (429-->396) likely in setting of worsening kidney failure and demand  - s/p Lasix 80 IV BID  - Renal house-plan for HD, pt wants after 11/12    Diabetes mellitus A1C 4.9% Patient was on Humulin 70/30 10 units am and 6 units pm at home, wife endorsing that patient normally does not take pm dose. Pt has been evaluated by endo, no need for insulin, discharged on Januvia 25 mg daily and outpt follow up   Anemia- Hgb 6.9. 10/26 1PRBC in 1/2s and Lasix 80 mg IV in between - occult blood neg  Heart failure -  preserved EF, 56% in 6/2018  CAD- simvastatin and aspirin.

## 2018-10-27 NOTE — DISCHARGE NOTE ADULT - PROVIDER TOKENS
TOKROSA M:'[id number]' TOKEN:'[id number]',TOKEN:'[id number]:'[id number]',TOKEN:'[id number]'

## 2018-10-27 NOTE — PROGRESS NOTE ADULT - SUBJECTIVE AND OBJECTIVE BOX
Endocrinology Attending Covering for Dr. Cruz      Chief complaint  Patient is a 68y old  Male who presents with a chief complaint of Hypoglycemia, HTN urgency (27 Oct 2018 08:04)   Review of systems  Patient in bed, looks comfortable, no fever,  had no hypoglycemia.    Labs and Fingersticks  CAPILLARY BLOOD GLUCOSE      POCT Blood Glucose.: 113 mg/dL (27 Oct 2018 08:34)  POCT Blood Glucose.: 190 mg/dL (26 Oct 2018 22:08)  POCT Blood Glucose.: 150 mg/dL (26 Oct 2018 17:15)  POCT Blood Glucose.: 187 mg/dL (26 Oct 2018 12:06)        Hemoglobin A1C, Whole Blood: 4.9 (10-26 @ 04:40)    Calcium, Total Serum: 7.5 <L> (10-27 @ 06:10)  Calcium, Total Serum: 7.5 <L> (10-26 @ 04:40)          10-27    139  |  101  |  97<H>  ----------------------------<  96  4.7   |  19<L>  |  9.02<H>    Ca    7.5<L>      27 Oct 2018 06:10  Phos  8.2     10-27  Mg     2.1     10-27                          7.7    6.31  )-----------( 261      ( 27 Oct 2018 06:10 )             24.3     Medications  MEDICATIONS  (STANDING):  amLODIPine   Tablet 10 milliGRAM(s) Oral daily  aspirin enteric coated 81 milliGRAM(s) Oral daily  calcium acetate 667 milliGRAM(s) Oral three times a day with meals  clopidogrel Tablet 75 milliGRAM(s) Oral daily  dextrose 5%. 1000 milliLiter(s) (50 mL/Hr) IV Continuous <Continuous>  dextrose 50% Injectable 12.5 Gram(s) IV Push once  dextrose 50% Injectable 25 Gram(s) IV Push once  dextrose 50% Injectable 25 Gram(s) IV Push once  furosemide   Injectable 80 milliGRAM(s) IV Push two times a day  heparin  Injectable 5000 Unit(s) SubCutaneous every 8 hours  hydrALAZINE 100 milliGRAM(s) Oral three times a day  insulin lispro (HumaLOG) corrective regimen sliding scale   SubCutaneous three times a day before meals  insulin lispro (HumaLOG) corrective regimen sliding scale   SubCutaneous at bedtime  isosorbide   dinitrate Tablet (ISORDIL) 20 milliGRAM(s) Oral three times a day  metoprolol succinate ER 50 milliGRAM(s) Oral daily  simvastatin 20 milliGRAM(s) Oral at bedtime      Physical Exam  General: Patient comfortable in bed  Vital Signs Last 12 Hrs  T(F): 97.4 (10-27-18 @ 05:00), Max: 97.9 (10-26-18 @ 21:50)  HR: 77 (10-27-18 @ 05:00) (71 - 79)  BP: 188/80 (10-27-18 @ 05:00) (179/86 - 188/80)  BP(mean): --  RR: 18 (10-27-18 @ 05:00) (18 - 18)  SpO2: 95% (10-27-18 @ 05:00) (95% - 95%)  Neck: No palpable thyroid nodules.  CVS: S1S2, No murmurs  Respiratory: No wheezing, no crepitations  GI: Abdomen soft, bowel sounds positive  Musculoskeletal:  edema lower extremities.   Skin: No skin rashes, no ecchymosis    Diagnostics

## 2018-10-27 NOTE — DISCHARGE NOTE ADULT - PLAN OF CARE
resolved You were found to have hypoglycemia. Glucose control follow consistent carbohydrate diet. Insulin was changed. Make a follow up appt with Dr Cruz in 3-4 weeks for further follow up and adjustment in insulin regimen. Goal FS btw 100-180 Further follow up Worsening of renal function at this point nephrologist strongly recommend to start dialysis.   Follow renal diet, Keep blood pressure and sugars under tight control symptoms control You were started on Isordil to 30mg 3 x day, coreg 25mg 2 x day  Continue Hydralazine, Norvasc, Lasix as ordered Symptoms control Continue daily Aspirin and Plavix. Make a follow up appt with your cardiologist. stable hemoglobin you have received 1 U of PRBC. Hg on discharge__________ CT head was negative. You were found to have hypoglycemia. Stop insulin. You were started on Januvia 25 mg take 1 tab in AM. Keep FS log and make a follow up appt with Dr Anthony rodas you have received 1 U of PRBC. Hg on discharge 7.7

## 2018-10-27 NOTE — PROGRESS NOTE ADULT - PROBLEM SELECTOR PLAN 4
Recommend obtaining iron studies and reticulocyte count. Blood transfusions per primary team. Monitor Hgb

## 2018-10-27 NOTE — DISCHARGE NOTE ADULT - MEDICATION SUMMARY - MEDICATIONS TO TAKE
I will START or STAY ON the medications listed below when I get home from the hospital:    aspirin 81 mg oral tablet  -- 1 tab(s) by mouth once a day  -- Indication: For CAD (coronary artery disease)    isosorbide dinitrate 30 mg oral tablet  -- 1 tab(s) by mouth 3 times a day  -- Indication: For CAD (coronary artery disease)    Januvia 25 mg oral tablet  -- 1 tab(s) by mouth once a day   -- Do not drink alcoholic beverages when taking this medication.    -- Indication: For Diabetes mellitus    simvastatin 20 mg oral tablet  -- 1 tab(s) by mouth once a day (at bedtime)  -- Indication: For HLD    Plavix 75 mg oral tablet  -- 1 tab(s) by mouth once a day  -- Indication: For CAD (coronary artery disease)    carvedilol 25 mg oral tablet  -- 1 tab(s) by mouth every 12 hours  -- Indication: For CAD (coronary artery disease)    amLODIPine 10 mg oral tablet  -- 1 tab(s) by mouth once a day  -- Indication: For Hypertensive urgency    furosemide 80 mg oral tablet  -- 1 tab(s) by mouth 2 times a day   -- Indication: For Congestive heart failure    Omega-3 oral capsule  -- 1 tab(s) by mouth once a day  -- Indication: For Supplement     calcium acetate 667 mg oral tablet  -- 2 tab(s) by mouth 3 times a day   -- Indication: For ESRD    hydrALAZINE 100 mg oral tablet  -- 1 tab(s) by mouth 3 times a day  -- Indication: For Hypertensive urgency

## 2018-10-27 NOTE — DISCHARGE NOTE ADULT - SECONDARY DIAGNOSIS.
Congestive heart failure, unspecified HF chronicity, unspecified heart failure type CAD (coronary artery disease) Anemia due to chronic kidney disease, on chronic dialysis Hypoglycemia associated with type 2 diabetes mellitus CKD (chronic kidney disease) stage 5, GFR less than 15 ml/min

## 2018-10-27 NOTE — DISCHARGE NOTE ADULT - CARE PROVIDERS DIRECT ADDRESSES
,DirectAddress_Unknown ,DirectAddress_Unknown,[email or web address],DirectAddress_Unknown,[email or web address]

## 2018-10-27 NOTE — DISCHARGE NOTE ADULT - INSTRUCTIONS
Low slat, consistent carbohydrate, renal restrictions diet Low salt, consistent carbohydrate, renal restrictions diet

## 2018-10-27 NOTE — PROGRESS NOTE ADULT - SUBJECTIVE AND OBJECTIVE BOX
[facility] DIVISION OF KIDNEY DISEASES AND HYPERTENSION -- FOLLOW UP NOTE  --------------------------------------------------------------------------------  HPI: 67 yo M with PMHx of advanced/progressive CKD, DM2, HTN, admitted for hypertensive urgency and shortness of breath. Patient was found to have an elevated Scr of 8.74 on admission (10/25/18). Nephrology consulted for progression of CKD.    Patient seen and examined at bedside this AM. Patient states that he would not like to start HD at this time. Received additional Lasix 80mg IV dose yesterday night. BP remains uncontrolled. Unable to assess urine output (not recorded). States that his breathing is normal today. Denies CP, SOB, N/V/F/C.    PAST HISTORY  --------------------------------------------------------------------------------  No significant changes to PMH, PSH, FHx, SHx, unless otherwise noted    ALLERGIES & MEDICATIONS  --------------------------------------------------------------------------------  Allergies    lisinopril (Anaphylaxis)    Intolerances      Standing Inpatient Medications  amLODIPine   Tablet 10 milliGRAM(s) Oral daily  aspirin enteric coated 81 milliGRAM(s) Oral daily  calcium acetate 667 milliGRAM(s) Oral three times a day with meals  clopidogrel Tablet 75 milliGRAM(s) Oral daily  dextrose 5%. 1000 milliLiter(s) IV Continuous <Continuous>  dextrose 50% Injectable 12.5 Gram(s) IV Push once  dextrose 50% Injectable 25 Gram(s) IV Push once  dextrose 50% Injectable 25 Gram(s) IV Push once  furosemide   Injectable 80 milliGRAM(s) IV Push two times a day  heparin  Injectable 5000 Unit(s) SubCutaneous every 8 hours  hydrALAZINE 100 milliGRAM(s) Oral three times a day  insulin lispro (HumaLOG) corrective regimen sliding scale   SubCutaneous three times a day before meals  insulin lispro (HumaLOG) corrective regimen sliding scale   SubCutaneous at bedtime  isosorbide   dinitrate Tablet (ISORDIL) 20 milliGRAM(s) Oral three times a day  metoprolol succinate ER 50 milliGRAM(s) Oral daily  simvastatin 20 milliGRAM(s) Oral at bedtime    REVIEW OF SYSTEMS  --------------------------------------------------------------------------------  Gen: No fatigue  Respiratory: No dyspnea  CV: No chest pain  GI: No decreased appetite, no abdominal pain  MSK: + LE edema  Neuro: No dizziness  Heme: No bleeding  Psych: No depression  Skin: No rash    All other systems were reviewed and are negative, except as noted.    VITALS/PHYSICAL EXAM  --------------------------------------------------------------------------------  T(C): 36.3 (10-27-18 @ 05:00), Max: 36.6 (10-26-18 @ 21:50)  HR: 77 (10-27-18 @ 05:00) (67 - 79)  BP: 188/80 (10-27-18 @ 05:00) (163/80 - 193/85)  RR: 18 (10-27-18 @ 05:00) (18 - 18)  SpO2: 95% (10-27-18 @ 05:00) (95% - 95%)  Wt(kg): --  Height (cm): 167.64 (10-25-18 @ 17:16)  Weight (kg): 161 (10-25-18 @ 17:16)  BMI (kg/m2): 57.3 (10-25-18 @ 17:16)  BSA (m2): 2.55 (10-25-18 @ 17:16)    Physical Exam:  	Gen: NAD  	HEENT: Supple neck  	Pulm: CTA b/l  	CV: RRR, S1S2; no rub  	Abd: +BS, soft, nontender/nondistended  	: No suprapubic tenderness  	UE: No asterixis  	LE: 2+ pitting edema b/l LE, L>R  	Skin: Warm, without rashes    LABS/STUDIES  --------------------------------------------------------------------------------              7.7    6.31  >-----------<  261      [10-27-18 @ 06:10]              24.3     139  |  101  |  97  ----------------------------<  96      [10-27-18 @ 06:10]  4.7   |  19  |  9.02        Ca     7.5     [10-27-18 @ 06:10]      Mg     2.1     [10-27-18 @ 06:10]      Phos  8.2     [10-27-18 @ 06:10]    Creatinine Trend:  SCr 9.02 [10-27 @ 06:10]  SCr 8.59 [10-26 @ 04:40]  SCr 8.74 [10-25 @ 03:40] [facility] DIVISION OF KIDNEY DISEASES AND HYPERTENSION -- FOLLOW UP NOTE  --------------------------------------------------------------------------------  HPI: 69 yo M with PMHx of advanced/progressive CKD, DM2, HTN, admitted for hypertensive urgency and shortness of breath. Patient was found to have an elevated Scr of 8.74 on admission (10/25/18). Nephrology consulted for progression of CKD.    Patient seen and examined at bedside this AM. Patient states that he would not like to start HD at this time. Received additional Lasix 80mg IV dose yesterday night. BP remains uncontrolled. Unable to assess urine output (not recorded). States that his breathing is normal today. Denies CP, SOB, N/V/F/C.    PAST HISTORY  --------------------------------------------------------------------------------  No significant changes to PMH, PSH, FHx, SHx, unless otherwise noted    ALLERGIES & MEDICATIONS  --------------------------------------------------------------------------------  Allergies    lisinopril (Anaphylaxis)    Intolerances      Standing Inpatient Medications  amLODIPine   Tablet 10 milliGRAM(s) Oral daily  aspirin enteric coated 81 milliGRAM(s) Oral daily  calcium acetate 667 milliGRAM(s) Oral three times a day with meals  clopidogrel Tablet 75 milliGRAM(s) Oral daily  dextrose 5%. 1000 milliLiter(s) IV Continuous <Continuous>  dextrose 50% Injectable 12.5 Gram(s) IV Push once  dextrose 50% Injectable 25 Gram(s) IV Push once  dextrose 50% Injectable 25 Gram(s) IV Push once  furosemide   Injectable 80 milliGRAM(s) IV Push two times a day  heparin  Injectable 5000 Unit(s) SubCutaneous every 8 hours  hydrALAZINE 100 milliGRAM(s) Oral three times a day  insulin lispro (HumaLOG) corrective regimen sliding scale   SubCutaneous three times a day before meals  insulin lispro (HumaLOG) corrective regimen sliding scale   SubCutaneous at bedtime  isosorbide   dinitrate Tablet (ISORDIL) 20 milliGRAM(s) Oral three times a day  metoprolol succinate ER 50 milliGRAM(s) Oral daily  simvastatin 20 milliGRAM(s) Oral at bedtime    REVIEW OF SYSTEMS  --------------------------------------------------------------------------------  Gen: No fatigue  Respiratory: No dyspnea  CV: No chest pain  GI: No decreased appetite, no abdominal pain  MSK: + LE edema  Neuro: No dizziness  Heme: No bleeding  Psych: No depression  Skin: No rash    All other systems were reviewed and are negative, except as noted.    VITALS/PHYSICAL EXAM  --------------------------------------------------------------------------------  T(C): 36.3 (10-27-18 @ 05:00), Max: 36.6 (10-26-18 @ 21:50)  HR: 77 (10-27-18 @ 05:00) (67 - 79)  BP: 188/80 (10-27-18 @ 05:00) (163/80 - 193/85)  RR: 18 (10-27-18 @ 05:00) (18 - 18)  SpO2: 95% (10-27-18 @ 05:00) (95% - 95%)  Wt(kg): --  Height (cm): 167.64 (10-25-18 @ 17:16)  Weight (kg): 161 (10-25-18 @ 17:16)  BMI (kg/m2): 57.3 (10-25-18 @ 17:16)  BSA (m2): 2.55 (10-25-18 @ 17:16)    Physical Exam:  	Gen: NAD  	HEENT: Supple neck  	Pulm: + rales b/l  	CV: RRR, S1S2; no rub  	Abd: +BS, soft, nontender/nondistended  	: No suprapubic tenderness  	UE: No asterixis  	LE: 2+ pitting edema b/l LE, L>R  	Skin: Warm, without rashes    LABS/STUDIES  --------------------------------------------------------------------------------              7.7    6.31  >-----------<  261      [10-27-18 @ 06:10]              24.3     139  |  101  |  97  ----------------------------<  96      [10-27-18 @ 06:10]  4.7   |  19  |  9.02        Ca     7.5     [10-27-18 @ 06:10]      Mg     2.1     [10-27-18 @ 06:10]      Phos  8.2     [10-27-18 @ 06:10]    Creatinine Trend:  SCr 9.02 [10-27 @ 06:10]  SCr 8.59 [10-26 @ 04:40]  SCr 8.74 [10-25 @ 03:40]

## 2018-10-27 NOTE — DISCHARGE NOTE ADULT - CARE PLAN
Principal Discharge DX:	Acute encephalopathy  Goal:	resolved  Assessment and plan of treatment:	You were found to have hypoglycemia.  Secondary Diagnosis:	Hypoglycemia associated with type 2 diabetes mellitus  Goal:	Glucose control  Secondary Diagnosis:	CKD (chronic kidney disease) stage 5, GFR less than 15 ml/min  Secondary Diagnosis:	Congestive heart failure, unspecified HF chronicity, unspecified heart failure type  Secondary Diagnosis:	CAD (coronary artery disease)  Secondary Diagnosis:	Anemia due to chronic kidney disease, on chronic dialysis Principal Discharge DX:	Acute encephalopathy  Goal:	resolved  Assessment and plan of treatment:	You were found to have hypoglycemia.  Secondary Diagnosis:	Hypoglycemia associated with type 2 diabetes mellitus  Goal:	Glucose control  Assessment and plan of treatment:	follow consistent carbohydrate diet. Insulin was changed. Make a follow up appt with Dr Cruz in 3-4 weeks for further follow up and adjustment in insulin regimen. Goal FS btw 100-180  Secondary Diagnosis:	CKD (chronic kidney disease) stage 5, GFR less than 15 ml/min  Goal:	Further follow up  Assessment and plan of treatment:	Worsening of renal function at this point nephrologist strongly recommend to start dialysis.   Follow renal diet, Keep blood pressure and sugars under tight control  Secondary Diagnosis:	Congestive heart failure, unspecified HF chronicity, unspecified heart failure type  Goal:	symptoms control  Assessment and plan of treatment:	You were started on Isordil to 30mg 3 x day, coreg 25mg 2 x day  Continue Hydralazine, Norvasc, Lasix as ordered  Secondary Diagnosis:	CAD (coronary artery disease)  Goal:	Symptoms control  Assessment and plan of treatment:	Continue daily Aspirin and Plavix. Make a follow up appt with your cardiologist.  Secondary Diagnosis:	Anemia due to chronic kidney disease, on chronic dialysis  Goal:	stable hemoglobin  Assessment and plan of treatment:	you have received 1 U of PRBC. Hg on discharge__________ Principal Discharge DX:	Acute encephalopathy  Goal:	resolved  Assessment and plan of treatment:	CT head was negative. You were found to have hypoglycemia. Stop insulin. You were started on Januvia 25 mg take 1 tab in AM. Keep FS log and make a follow up appt with Dr Cruz endocrine  Secondary Diagnosis:	Hypoglycemia associated with type 2 diabetes mellitus  Goal:	Glucose control  Assessment and plan of treatment:	follow consistent carbohydrate diet. Insulin was changed. Make a follow up appt with Dr Cruz in 3-4 weeks for further follow up and adjustment in insulin regimen. Goal FS btw 100-180  Secondary Diagnosis:	CKD (chronic kidney disease) stage 5, GFR less than 15 ml/min  Goal:	Further follow up  Assessment and plan of treatment:	Worsening of renal function at this point nephrologist strongly recommend to start dialysis.   Follow renal diet, Keep blood pressure and sugars under tight control  Secondary Diagnosis:	Congestive heart failure, unspecified HF chronicity, unspecified heart failure type  Goal:	symptoms control  Assessment and plan of treatment:	You were started on Isordil to 30mg 3 x day, coreg 25mg 2 x day  Continue Hydralazine, Norvasc, Lasix as ordered  Secondary Diagnosis:	CAD (coronary artery disease)  Goal:	Symptoms control  Assessment and plan of treatment:	Continue daily Aspirin and Plavix. Make a follow up appt with your cardiologist.  Secondary Diagnosis:	Anemia due to chronic kidney disease, on chronic dialysis  Goal:	stable hemoglobin  Assessment and plan of treatment:	you have received 1 U of PRBC. Hg on discharge 7.7

## 2018-10-27 NOTE — DISCHARGE NOTE ADULT - MEDICATION SUMMARY - MEDICATIONS TO CHANGE
I will SWITCH the dose or number of times a day I take the medications listed below when I get home from the hospital:    hydrALAZINE 50 mg oral tablet  -- 1 tab(s) by mouth every 8 hours    Lasix 40 mg oral tablet  -- 1 tab(s) by mouth once a day (at bedtime)    Lasix 80 mg oral tablet  -- 1 tab(s) by mouth once a day (in the morning)   -- Avoid prolonged or excessive exposure to direct and/or artificial sunlight while taking this medication.  It is very important that you take or use this exactly as directed.  Do not skip doses or discontinue unless directed by your doctor.  It may be advisable to drink a full glass orange juice or eat a banana daily while taking this medication.    calcium acetate 667 mg oral tablet  -- 1 tab(s) by mouth 3 times a day (with meals)

## 2018-10-27 NOTE — PROGRESS NOTE ADULT - SUBJECTIVE AND OBJECTIVE BOX
24H hour events: No acute events.    MEDICATIONS:  amLODIPine   Tablet 10 milliGRAM(s) Oral daily  aspirin enteric coated 81 milliGRAM(s) Oral daily  clopidogrel Tablet 75 milliGRAM(s) Oral daily  furosemide   Injectable 80 milliGRAM(s) IV Push two times a day  heparin  Injectable 5000 Unit(s) SubCutaneous every 8 hours  hydrALAZINE 100 milliGRAM(s) Oral three times a day  isosorbide   dinitrate Tablet (ISORDIL) 20 milliGRAM(s) Oral three times a day  metoprolol succinate ER 50 milliGRAM(s) Oral daily  dextrose 40% Gel 15 Gram(s) Oral once PRN  dextrose 50% Injectable 12.5 Gram(s) IV Push once  dextrose 50% Injectable 25 Gram(s) IV Push once  dextrose 50% Injectable 25 Gram(s) IV Push once  glucagon  Injectable 1 milliGRAM(s) IntraMuscular once PRN  insulin lispro (HumaLOG) corrective regimen sliding scale   SubCutaneous three times a day before meals  insulin lispro (HumaLOG) corrective regimen sliding scale   SubCutaneous at bedtime  simvastatin 20 milliGRAM(s) Oral at bedtime  calcium acetate 667 milliGRAM(s) Oral three times a day with meals  dextrose 5%. 1000 milliLiter(s) IV Continuous <Continuous>      REVIEW OF SYSTEMS:  Complete 10point ROS negative. Denies cardiac complaints.    PHYSICAL EXAM:  T(C): 36.2 (10-27-18 @ 09:00), Max: 36.6 (10-26-18 @ 21:50)  HR: 77 (10-27-18 @ 09:00) (71 - 79)  BP: 178/80 (10-27-18 @ 09:00) (163/80 - 193/85)  RR: 18 (10-27-18 @ 09:00) (18 - 18)  SpO2: 95% (10-27-18 @ 09:00) (95% - 95%)  Wt(kg): --  I&O's Summary    Appearance: Normal	  HEENT:   Normal oral mucosa, PERRL, EOMI	  Lymphatic: No lymphadenopathy  Cardiovascular: Normal S1 S2, No JVD, No murmurs, No edema  Respiratory: Lungs clear to auscultation	  Gastrointestinal:  Soft, Non-tender, + BS		  Neurologic: Non-focal  Vascular: Peripheral pulses palpable 2+ bilaterally    LABS:	 	    CBC Full  -  ( 27 Oct 2018 06:10 )  WBC Count : 6.31 K/uL  Hemoglobin : 7.7 g/dL  Hematocrit : 24.3 %  Platelet Count - Automated : 261 K/uL  Mean Cell Volume : 79.9 fL  Mean Cell Hemoglobin : 25.3 pg  Mean Cell Hemoglobin Concentration : 31.7 %    10-27    139  |  101  |  97<H>  ----------------------------<  96  4.7   |  19<L>  |  9.02<H>  10-26    138  |  100  |  94<H>  ----------------------------<  85  4.9   |  19<L>  |  8.59<H>    Ca    7.5<L>      27 Oct 2018 06:10  Ca    7.5<L>      26 Oct 2018 04:40  Phos  8.2     10-27  Phos  8.2     10-26  Mg     2.1     10-27  Mg     2.1     10-26    < from: Transthoracic Echocardiogram (06.17.18 @ 10:19) >  ------------------------------------------------------------------------  DIMENSIONS:  Dimensions:     Normal Values:  LA:     5.5 cm    2.0 - 4.0 cm  Ao:     3.8 cm    2.0 - 3.8 cm  SEPTUM: 1.7 cm    0.6 - 1.2 cm  PWT: 1.8 cm    0.6 - 1.1 cm  LVIDd:  4.5 cm    3.0 - 5.6 cm  LVIDs:  3.2 cm    1.8 - 4.0 cm  Derived Variables:  LVMI: 193 g/m2  RWT: 0.80  Fractional short: 29 %  Ejection Fraction (Micki): 56 %  ------------------------------------------------------------------------    < end of copied text >  < from: Transthoracic Echocardiogram (06.17.18 @ 10:19) >  ------------------------------------------------------------------------  CONCLUSIONS:  1. Mitral annular calcification and calcified mitral  leaflets with normal diastolic opening.  2. Bioprosthetic aortic valve replacement. Peak transaortic  valve gradient equals 44 mm Hg, mean transaortic valve  gradient equals 22 mm Hg, which is probably normal in the  presence of a prosthetic valve.  3. Endocardium not well visualized; grossly normal left  ventricular systolic function.  4. Normal right ventricular size and function.  ------------------------------------------------------------------------    < end of copied text > 24H hour events: No acute events.    MEDICATIONS:  amLODIPine   Tablet 10 milliGRAM(s) Oral daily  aspirin enteric coated 81 milliGRAM(s) Oral daily  clopidogrel Tablet 75 milliGRAM(s) Oral daily  furosemide   Injectable 80 milliGRAM(s) IV Push two times a day  heparin  Injectable 5000 Unit(s) SubCutaneous every 8 hours  hydrALAZINE 100 milliGRAM(s) Oral three times a day  isosorbide   dinitrate Tablet (ISORDIL) 20 milliGRAM(s) Oral three times a day  metoprolol succinate ER 50 milliGRAM(s) Oral daily  dextrose 40% Gel 15 Gram(s) Oral once PRN  dextrose 50% Injectable 12.5 Gram(s) IV Push once  dextrose 50% Injectable 25 Gram(s) IV Push once  dextrose 50% Injectable 25 Gram(s) IV Push once  glucagon  Injectable 1 milliGRAM(s) IntraMuscular once PRN  insulin lispro (HumaLOG) corrective regimen sliding scale   SubCutaneous three times a day before meals  insulin lispro (HumaLOG) corrective regimen sliding scale   SubCutaneous at bedtime  simvastatin 20 milliGRAM(s) Oral at bedtime  calcium acetate 667 milliGRAM(s) Oral three times a day with meals  dextrose 5%. 1000 milliLiter(s) IV Continuous <Continuous>      REVIEW OF SYSTEMS:  Complete 10point ROS negative. Denies cardiac complaints.    PHYSICAL EXAM:  T(C): 36.2 (10-27-18 @ 09:00), Max: 36.6 (10-26-18 @ 21:50)  HR: 77 (10-27-18 @ 09:00) (71 - 79)  BP: 178/80 (10-27-18 @ 09:00) (163/80 - 193/85)  RR: 18 (10-27-18 @ 09:00) (18 - 18)  SpO2: 95% (10-27-18 @ 09:00) (95% - 95%)  Wt(kg): --  I&O's Summary    Appearance: Normal	  HEENT:   Normal oral mucosa, PERRL, EOMI	  Lymphatic: No lymphadenopathy  Cardiovascular: Normal S1 S2, JVD 10 cm, No murmurs, LE 2+ pitting  Respiratory: Lungs clear to auscultation	  Gastrointestinal:  Soft, Non-tender, + BS		  Neurologic: Non-focal  Vascular: Peripheral pulses palpable 2+ bilaterally    LABS:	 	    CBC Full  -  ( 27 Oct 2018 06:10 )  WBC Count : 6.31 K/uL  Hemoglobin : 7.7 g/dL  Hematocrit : 24.3 %  Platelet Count - Automated : 261 K/uL  Mean Cell Volume : 79.9 fL  Mean Cell Hemoglobin : 25.3 pg  Mean Cell Hemoglobin Concentration : 31.7 %    10-27    139  |  101  |  97<H>  ----------------------------<  96  4.7   |  19<L>  |  9.02<H>  10-26    138  |  100  |  94<H>  ----------------------------<  85  4.9   |  19<L>  |  8.59<H>    Ca    7.5<L>      27 Oct 2018 06:10  Ca    7.5<L>      26 Oct 2018 04:40  Phos  8.2     10-27  Phos  8.2     10-26  Mg     2.1     10-27  Mg     2.1     10-26    < from: Transthoracic Echocardiogram (06.17.18 @ 10:19) >  ------------------------------------------------------------------------  DIMENSIONS:  Dimensions:     Normal Values:  LA:     5.5 cm    2.0 - 4.0 cm  Ao:     3.8 cm    2.0 - 3.8 cm  SEPTUM: 1.7 cm    0.6 - 1.2 cm  PWT: 1.8 cm    0.6 - 1.1 cm  LVIDd:  4.5 cm    3.0 - 5.6 cm  LVIDs:  3.2 cm    1.8 - 4.0 cm  Derived Variables:  LVMI: 193 g/m2  RWT: 0.80  Fractional short: 29 %  Ejection Fraction (Micki): 56 %  ------------------------------------------------------------------------    < end of copied text >  < from: Transthoracic Echocardiogram (06.17.18 @ 10:19) >  ------------------------------------------------------------------------  CONCLUSIONS:  1. Mitral annular calcification and calcified mitral  leaflets with normal diastolic opening.  2. Bioprosthetic aortic valve replacement. Peak transaortic  valve gradient equals 44 mm Hg, mean transaortic valve  gradient equals 22 mm Hg, which is probably normal in the  presence of a prosthetic valve.  3. Endocardium not well visualized; grossly normal left  ventricular systolic function.  4. Normal right ventricular size and function.  ------------------------------------------------------------------------    < end of copied text >

## 2018-10-27 NOTE — PROGRESS NOTE ADULT - SUBJECTIVE AND OBJECTIVE BOX
CHIEF COMPLAINT:    SUBJECTIVE:     REVIEW OF SYSTEMS:    CONSTITUTIONAL: (  )  weakness,  (  ) fevers or chills  EYES/ENT: (  )visual changes;     NECK: (  ) pain or stiffness  RESPIRATORY:   (  )cough, wheezing, hemoptysis;  (  ) shortness of breath  CARDIOVASCULAR:  (  )chest pain or palpitations  GASTROINTESTINAL:   (  )abdominal or epigastric pain.  (  ) nausea, vomiting, or hematemesis;   (   ) diarrhea or constipation.   GENITOURINARY:   (    ) dysuria, frequency or hematuria  NEUROLOGICAL:  (   ) numbness or weakness   All other review of systems is negative unless indicated above    Vital Signs Last 24 Hrs  T(C): 36.8 (27 Oct 2018 12:22), Max: 36.8 (27 Oct 2018 12:22)  T(F): 98.3 (27 Oct 2018 12:22), Max: 98.3 (27 Oct 2018 12:22)  HR: 71 (27 Oct 2018 12:22) (71 - 79)  BP: 165/81 (27 Oct 2018 12:22) (163/80 - 193/85)  BP(mean): --  RR: 17 (27 Oct 2018 12:22) (17 - 18)  SpO2: 90% (27 Oct 2018 12:22) (90% - 95%)    I&O's Summary      CAPILLARY BLOOD GLUCOSE      POCT Blood Glucose.: 131 mg/dL (27 Oct 2018 12:27)  POCT Blood Glucose.: 113 mg/dL (27 Oct 2018 08:34)  POCT Blood Glucose.: 190 mg/dL (26 Oct 2018 22:08)  POCT Blood Glucose.: 150 mg/dL (26 Oct 2018 17:15)      PHYSICAL EXAM:    Constitutional:  (   ) NAD,   (   )awake and alert  HEENT: PERR, EOMI,    Neck: Soft and supple, No LAD, No JVD  Respiratory:  (    Breath sounds are clear bilaterally,    (   ) wheezing, rales or rhonchi  Cardiovascular:     (   )S1 and S2, regular rate and rhythm, no Murmurs, gallops or rubs  Gastrointestinal:  (   )Bowel Sounds present, soft,   (  )nontender, nondistended,    Extremities:    (  ) peripheral edema  Vascular: 2+ peripheral pulses  Neurological:    (    )A/O x 3,   (  ) focal deficits  Musculoskeletal:    (   )  normal strength b/l upper  (     ) normal  lower extremities  Skin: No rashes    MEDICATIONS:  MEDICATIONS  (STANDING):  amLODIPine   Tablet 10 milliGRAM(s) Oral daily  aspirin enteric coated 81 milliGRAM(s) Oral daily  calcium acetate 667 milliGRAM(s) Oral three times a day with meals  clopidogrel Tablet 75 milliGRAM(s) Oral daily  dextrose 5%. 1000 milliLiter(s) (50 mL/Hr) IV Continuous <Continuous>  dextrose 50% Injectable 12.5 Gram(s) IV Push once  dextrose 50% Injectable 25 Gram(s) IV Push once  dextrose 50% Injectable 25 Gram(s) IV Push once  furosemide   Injectable 80 milliGRAM(s) IV Push two times a day  heparin  Injectable 5000 Unit(s) SubCutaneous every 8 hours  hydrALAZINE 100 milliGRAM(s) Oral three times a day  insulin lispro (HumaLOG) corrective regimen sliding scale   SubCutaneous three times a day before meals  insulin lispro (HumaLOG) corrective regimen sliding scale   SubCutaneous at bedtime  isosorbide   dinitrate Tablet (ISORDIL) 20 milliGRAM(s) Oral three times a day  metoprolol succinate ER 50 milliGRAM(s) Oral daily  simvastatin 20 milliGRAM(s) Oral at bedtime      LABS: All Labs Reviewed:                        7.7    6.31  )-----------( 261      ( 27 Oct 2018 06:10 )             24.3     10-27    139  |  101  |  97<H>  ----------------------------<  96  4.7   |  19<L>  |  9.02<H>    Ca    7.5<L>      27 Oct 2018 06:10  Phos  8.2     10-27  Mg     2.1     10-27            Blood Culture:   Urine Culture      RADIOLOGY/EKG:    ASSESSMENT AND PLAN:    DVT PPX:    ADVANCED DIRECTIVE:    DISPOSITION: CHIEF COMPLAINT: Hypoglycemia, HTN urgency  68 year old Male with HTN, DM2, CAD s/p CABG, HFpEF, progressively worsening CKD (Creatinine 8 in 06/2018, refusing HD in the past) brought in by EMS after wife noticed patient with AMS. FSG done by EMS was 28, patient received 1 amp of D50. FSG upon arrival in ED was 100, but then dropped to 30, requiring another amp of D50, with FSG improving to 130. Patient now AA0X3, does not remember much about last night except for feeling fatigued. Patient denies feeling unwell yesterday, took his normal dose of Humulin 70/30 10 units in the morning, wife says he did not take his normal dose of 6 units in the evening. Patient with ate muffin and sandwich yesterday. Patient's wife endorses chronic non productive cough. Patient denies fever, chills, chest pain, palpitations, URI symptoms, headache, blurry vision, abdominal pain, n/v/d or urinary symptoms. Patient with exertional dyspnea, not much worse than usual, also with LE swelling.     SUBJECTIVE:     REVIEW OF SYSTEMS:     	Constitutional: No fever/chills, good appetite/po intake  	Eyes: No blurry vision, No diplopia  	Neuro: No tremors, No muscle weakness   	Cardiovascular: No chest pain, No palpitations  	Respiratory: + SOB with exertion, +  non productive cough  	GI: No nausea, No vomiting, No diarrhea  	: No dysuria, No hematuria  	Skin: No rash  Vascular: + LE swelling    Vital Signs Last 24 Hrs  T(C): 36.8 (27 Oct 2018 12:22), Max: 36.8 (27 Oct 2018 12:22)  T(F): 98.3 (27 Oct 2018 12:22), Max: 98.3 (27 Oct 2018 12:22)  HR: 71 (27 Oct 2018 12:22) (71 - 79)  BP: 165/81 (27 Oct 2018 12:22) (163/80 - 193/85)  BP(mean): --  RR: 17 (27 Oct 2018 12:22) (17 - 18)  SpO2: 90% (27 Oct 2018 12:22) (90% - 95%)    I&O's Summary      CAPILLARY BLOOD GLUCOSE      POCT Blood Glucose.: 131 mg/dL (27 Oct 2018 12:27)  POCT Blood Glucose.: 113 mg/dL (27 Oct 2018 08:34)  POCT Blood Glucose.: 190 mg/dL (26 Oct 2018 22:08)  POCT Blood Glucose.: 150 mg/dL (26 Oct 2018 17:15)      PHYSICAL EXAM:    GENERAL: NAD, lying comfortably in bed, awake and alert   	HEAD:  Atraumatic, Normocephalic  	EYES: EOMI b/l, PERRLA b/l, conjunctiva and sclera clear  	NECK: Supple, No JVD, No LAD   	CHEST/LUNG: diffuse crackles   	HEART: Regular rate and rhythm; S1 and S2 present, No murmurs, rubs, or gallops  	ABDOMEN: Soft, Nontender, Nondistended; Bowel sounds present  	EXTREMITIES:  2+ Peripheral Pulses, 1+ pitting edema LE b/l   	NEURO: AAOx3, non-focal   SKIN: No rashes or lesion    MEDICATIONS:  MEDICATIONS  (STANDING):  amLODIPine   Tablet 10 milliGRAM(s) Oral daily  aspirin enteric coated 81 milliGRAM(s) Oral daily  calcium acetate 667 milliGRAM(s) Oral three times a day with meals  clopidogrel Tablet 75 milliGRAM(s) Oral daily  dextrose 5%. 1000 milliLiter(s) (50 mL/Hr) IV Continuous <Continuous>  dextrose 50% Injectable 12.5 Gram(s) IV Push once  dextrose 50% Injectable 25 Gram(s) IV Push once  dextrose 50% Injectable 25 Gram(s) IV Push once  furosemide   Injectable 80 milliGRAM(s) IV Push two times a day  heparin  Injectable 5000 Unit(s) SubCutaneous every 8 hours  hydrALAZINE 100 milliGRAM(s) Oral three times a day  insulin lispro (HumaLOG) corrective regimen sliding scale   SubCutaneous three times a day before meals  insulin lispro (HumaLOG) corrective regimen sliding scale   SubCutaneous at bedtime  isosorbide   dinitrate Tablet (ISORDIL) 20 milliGRAM(s) Oral three times a day  metoprolol succinate ER 50 milliGRAM(s) Oral daily  simvastatin 20 milliGRAM(s) Oral at bedtime      LABS: All Labs Reviewed:                        7.7    6.31  )-----------( 261      ( 27 Oct 2018 06:10 )             24.3     10-27    139  |  101  |  97<H>  ----------------------------<  96  4.7   |  19<L>  |  9.02<H>    Ca    7.5<L>      27 Oct 2018 06:10  Phos  8.2     10-27  Mg     2.1     10-27            Blood Culture:   Urine Culture      RADIOLOGY/EKG:    ASSESSMENT AND PLAN:  68 year old Male with HTN, DM2, CAD s/p CABG, HFpEF, progressively worsening CKD (Creatinine 8 in 06/2018, refusing HD in the past) admitted for AMS secondary to hypoglycemia and hypertensive urgency.     Problem/Plan - 1:  ·  Problem: Acute encephalopathy.  Plan: Patient presenting with AMS in setting of hypoglycemia FSG 28 per EMS     - patient afebrile, no WC, no symptoms, CXR without consolidation, less likely infectious etiology  - mental status back at baseline now with improvement in FSG  - CT head negative   - will continue to monitor FSG's.     Problem/Plan - 2:  ·  Problem: Hypertensive urgency.  Plan: Patient presenting with systolic BP up to 250, without headache, blurry vision or n/v  - CT head negative for hemorrhage   - s/p Hydralazine 50 PO, 15 IV and 10 IV in ED   - will c/w Hydralazine 75 mg tid   - c/w home PO meds: metoprolol succinate 50 daily and amlodipine 10 mg daily   - continue to monitor BP closely.     Problem/Plan - 3:  ·  Problem: CKD (chronic kidney disease).  Plan: Patient with progressively worsening CKD stage 5, Creatinine 8 in 06/2018, refusing HD in the past  - Patient with signs of overload on exam  - elevated Trop (429-->396) likely in setting of worsening kidney failure and demand  - s/p Lasix 80 IV in ED with good urine output  - Renal consulted noted PT AND WIFE AGREE with HD   - monitor I's and O's.     Problem/Plan - 4:  ·  Problem: Diabetes mellitus.  ENDO consult appreciated patient on fingerstick sliding scale.     Problem/Plan - 5:  ·  Problem: Anemia.  Plan: Patient with chronic anemia, likely in setting of CKD  - Hgb  7.7    Problem/Plan - 6:  Problem: Heart failure with preserved ejection fraction. Plan: Patient with HF with preserved EF, 56% in 6/2018  - volume overload likely in setting of CKD  - c/w Metoprolol succinate 50 mg daily  - will f/u Nephrology recommendations on Lasix 80 mg twice daily  - monitor I's and O's.    Problem/Plan - 7:  ·  Problem: CAD (coronary artery disease).  Plan: c/w simvastatin and aspirin. cardiology consult note    Problem/Plan - 8:  ·  Problem: Need for prophylactic measure.  Plan: Dvt PPx: hep sub q   Diet: Carb consistent diet.       DVT PPX:    ADVANCED DIRECTIVE: wife he is full code DW PT IN DETAIL and his wife [phone]    DVT PPX:    ADVANCED DIRECTIVE:    DISPOSITION:

## 2018-10-28 VITALS — DIASTOLIC BLOOD PRESSURE: 71 MMHG | HEART RATE: 65 BPM | SYSTOLIC BLOOD PRESSURE: 148 MMHG

## 2018-10-28 DIAGNOSIS — E83.39 OTHER DISORDERS OF PHOSPHORUS METABOLISM: ICD-10-CM

## 2018-10-28 LAB
GLUCOSE BLDC GLUCOMTR-MCNC: 128 MG/DL — HIGH (ref 70–99)
GLUCOSE BLDC GLUCOMTR-MCNC: 238 MG/DL — HIGH (ref 70–99)

## 2018-10-28 PROCEDURE — 99232 SBSQ HOSP IP/OBS MODERATE 35: CPT

## 2018-10-28 PROCEDURE — 99233 SBSQ HOSP IP/OBS HIGH 50: CPT | Mod: GC

## 2018-10-28 PROCEDURE — 99223 1ST HOSP IP/OBS HIGH 75: CPT | Mod: GC

## 2018-10-28 RX ORDER — FUROSEMIDE 40 MG
1 TABLET ORAL
Qty: 60 | Refills: 0 | OUTPATIENT
Start: 2018-10-28 | End: 2018-11-26

## 2018-10-28 RX ORDER — HYDRALAZINE HCL 50 MG
1 TABLET ORAL
Qty: 90 | Refills: 0 | OUTPATIENT
Start: 2018-10-28 | End: 2018-11-26

## 2018-10-28 RX ORDER — FUROSEMIDE 40 MG
1 TABLET ORAL
Qty: 0 | Refills: 0 | COMMUNITY
Start: 2018-10-28

## 2018-10-28 RX ORDER — CALCIUM ACETATE 667 MG
1 TABLET ORAL
Qty: 0 | Refills: 0 | COMMUNITY
Start: 2018-10-28

## 2018-10-28 RX ORDER — HYDRALAZINE HCL 50 MG
1 TABLET ORAL
Qty: 0 | Refills: 0 | COMMUNITY
Start: 2018-10-28

## 2018-10-28 RX ORDER — METOPROLOL TARTRATE 50 MG
1 TABLET ORAL
Qty: 0 | Refills: 0 | COMMUNITY

## 2018-10-28 RX ORDER — CALCIUM ACETATE 667 MG
2 TABLET ORAL
Qty: 180 | Refills: 0 | OUTPATIENT
Start: 2018-10-28 | End: 2018-11-26

## 2018-10-28 RX ORDER — SITAGLIPTIN 50 MG/1
1 TABLET, FILM COATED ORAL
Qty: 30 | Refills: 0
Start: 2018-10-28 | End: 2018-11-26

## 2018-10-28 RX ORDER — CALCIUM ACETATE 667 MG
1334 TABLET ORAL
Qty: 0 | Refills: 0 | Status: DISCONTINUED | OUTPATIENT
Start: 2018-10-28 | End: 2018-10-28

## 2018-10-28 RX ORDER — ISOSORBIDE DINITRATE 5 MG/1
1 TABLET ORAL
Qty: 90 | Refills: 0
Start: 2018-10-28 | End: 2018-11-26

## 2018-10-28 RX ORDER — CARVEDILOL PHOSPHATE 80 MG/1
1 CAPSULE, EXTENDED RELEASE ORAL
Qty: 60 | Refills: 0
Start: 2018-10-28 | End: 2018-11-26

## 2018-10-28 RX ADMIN — ISOSORBIDE DINITRATE 30 MILLIGRAM(S): 5 TABLET ORAL at 14:10

## 2018-10-28 RX ADMIN — Medication 2: at 12:30

## 2018-10-28 RX ADMIN — AMLODIPINE BESYLATE 10 MILLIGRAM(S): 2.5 TABLET ORAL at 05:24

## 2018-10-28 RX ADMIN — CARVEDILOL PHOSPHATE 25 MILLIGRAM(S): 80 CAPSULE, EXTENDED RELEASE ORAL at 05:24

## 2018-10-28 RX ADMIN — Medication 100 MILLIGRAM(S): at 14:10

## 2018-10-28 RX ADMIN — Medication 80 MILLIGRAM(S): at 05:23

## 2018-10-28 RX ADMIN — Medication 100 MILLIGRAM(S): at 05:24

## 2018-10-28 RX ADMIN — Medication 81 MILLIGRAM(S): at 11:17

## 2018-10-28 RX ADMIN — CLOPIDOGREL BISULFATE 75 MILLIGRAM(S): 75 TABLET, FILM COATED ORAL at 11:17

## 2018-10-28 RX ADMIN — ISOSORBIDE DINITRATE 30 MILLIGRAM(S): 5 TABLET ORAL at 05:24

## 2018-10-28 RX ADMIN — HEPARIN SODIUM 5000 UNIT(S): 5000 INJECTION INTRAVENOUS; SUBCUTANEOUS at 05:24

## 2018-10-28 NOTE — PROGRESS NOTE ADULT - REASON FOR ADMISSION
Hypoglycemia, HTN urgency

## 2018-10-28 NOTE — PROGRESS NOTE ADULT - SUBJECTIVE AND OBJECTIVE BOX
[facility] DIVISION OF KIDNEY DISEASES AND HYPERTENSION -- FOLLOW UP NOTE  --------------------------------------------------------------------------------  HPI: 67 yo M with PMHx of advanced/progressive CKD, DM2, HTN, admitted for hypertensive urgency and shortness of breath. Patient was found to have an elevated Scr of 8.74 on admission (10/25/18). Nephrology consulted for progression of CKD.    Patient seen and examined at bedside this AM. Patient states that he would not like to start HD at this time. Continues to state that his breathing and appetite are normal. Patient denies CP, SOB, N/V/F/C.    PAST HISTORY  --------------------------------------------------------------------------------  No significant changes to PMH, PSH, FHx, SHx, unless otherwise noted    ALLERGIES & MEDICATIONS  --------------------------------------------------------------------------------  Allergies    lisinopril (Anaphylaxis)    Intolerances    Standing Inpatient Medications  amLODIPine   Tablet 10 milliGRAM(s) Oral daily  aspirin enteric coated 81 milliGRAM(s) Oral daily  calcium acetate 667 milliGRAM(s) Oral three times a day with meals  carvedilol 25 milliGRAM(s) Oral every 12 hours  clopidogrel Tablet 75 milliGRAM(s) Oral daily  dextrose 5%. 1000 milliLiter(s) IV Continuous <Continuous>  dextrose 50% Injectable 12.5 Gram(s) IV Push once  dextrose 50% Injectable 25 Gram(s) IV Push once  dextrose 50% Injectable 25 Gram(s) IV Push once  furosemide   Injectable 80 milliGRAM(s) IV Push two times a day  heparin  Injectable 5000 Unit(s) SubCutaneous every 8 hours  hydrALAZINE 100 milliGRAM(s) Oral three times a day  insulin lispro (HumaLOG) corrective regimen sliding scale   SubCutaneous three times a day before meals  insulin lispro (HumaLOG) corrective regimen sliding scale   SubCutaneous at bedtime  isosorbide   dinitrate Tablet (ISORDIL) 30 milliGRAM(s) Oral three times a day  simvastatin 20 milliGRAM(s) Oral at bedtime    REVIEW OF SYSTEMS  --------------------------------------------------------------------------------  Gen: No fatigue  Respiratory: No dyspnea  CV: No chest pain  GI: No decreased appetite, no abdominal pain  MSK: + LE edema  Neuro: No dizziness  Heme: No bleeding  Psych: No depression  Skin: No rash    All other systems were reviewed and are negative, except as noted.    VITALS/PHYSICAL EXAM  --------------------------------------------------------------------------------  T(C): 36.7 (10-28-18 @ 09:03), Max: 36.8 (10-27-18 @ 12:22)  HR: 66 (10-28-18 @ 09:03) (66 - 80)  BP: 128/62 (10-28-18 @ 09:03) (128/62 - 191/77)  RR: 18 (10-28-18 @ 09:03) (17 - 18)  SpO2: 95% (10-28-18 @ 09:03) (90% - 95%)  Wt(kg): --    10-27-18 @ 07:01  -  10-28-18 @ 07:00  --------------------------------------------------------  IN: 0 mL / OUT: 300 mL / NET: -300 mL    Physical Exam:  	Gen: NAD  	HEENT: Supple neck  	Pulm: + rales b/l  	CV: RRR, S1S2; no rub  	Abd: +BS, soft, nontender/nondistended  	: No suprapubic tenderness  	UE: No asterixis  	LE: 2+ pitting edema b/l LE, L>R  	Skin: Warm, without rashes    LABS/STUDIES  --------------------------------------------------------------------------------              7.7    6.31  >-----------<  261      [10-27-18 @ 06:10]              24.3     139  |  101  |  97  ----------------------------<  96      [10-27-18 @ 06:10]  4.7   |  19  |  9.02        Ca     7.5     [10-27-18 @ 06:10]      Mg     2.1     [10-27-18 @ 06:10]      Phos  8.2     [10-27-18 @ 06:10]    Creatinine Trend:  SCr 9.02 [10-27 @ 06:10]  SCr 8.59 [10-26 @ 04:40]  SCr 8.74 [10-25 @ 03:40] [facility] DIVISION OF KIDNEY DISEASES AND HYPERTENSION -- FOLLOW UP NOTE  --------------------------------------------------------------------------------  HPI: 69 yo M with PMHx of advanced/progressive CKD, DM2, HTN, admitted for hypertensive urgency and shortness of breath. Patient was found to have an elevated Scr of 8.74 on admission (10/25/18). Nephrology consulted for progression of CKD.    Patient seen and examined at bedside this AM. Patient states that he would not like to start HD at this time. Continues to state that his breathing and appetite are normal. Patient denies CP, SOB, N/V/F/C.    PAST HISTORY  --------------------------------------------------------------------------------  No significant changes to PMH, PSH, FHx, SHx, unless otherwise noted    ALLERGIES & MEDICATIONS  --------------------------------------------------------------------------------  Allergies    lisinopril (Anaphylaxis)    Intolerances    Standing Inpatient Medications  amLODIPine   Tablet 10 milliGRAM(s) Oral daily  aspirin enteric coated 81 milliGRAM(s) Oral daily  calcium acetate 667 milliGRAM(s) Oral three times a day with meals  carvedilol 25 milliGRAM(s) Oral every 12 hours  clopidogrel Tablet 75 milliGRAM(s) Oral daily  dextrose 5%. 1000 milliLiter(s) IV Continuous <Continuous>  dextrose 50% Injectable 12.5 Gram(s) IV Push once  dextrose 50% Injectable 25 Gram(s) IV Push once  dextrose 50% Injectable 25 Gram(s) IV Push once  furosemide   Injectable 80 milliGRAM(s) IV Push two times a day  heparin  Injectable 5000 Unit(s) SubCutaneous every 8 hours  hydrALAZINE 100 milliGRAM(s) Oral three times a day  insulin lispro (HumaLOG) corrective regimen sliding scale   SubCutaneous three times a day before meals  insulin lispro (HumaLOG) corrective regimen sliding scale   SubCutaneous at bedtime  isosorbide   dinitrate Tablet (ISORDIL) 30 milliGRAM(s) Oral three times a day  simvastatin 20 milliGRAM(s) Oral at bedtime    REVIEW OF SYSTEMS  --------------------------------------------------------------------------------  Gen:fatigue  Respiratory: No dyspnea  CV: No chest pain  GI: No decreased appetite, no abdominal pain  MSK: + LE edema  Neuro: No dizziness  Heme: No bleeding  Psych: No depression  Skin: No rash    All other systems were reviewed and are negative, except as noted.    VITALS/PHYSICAL EXAM  --------------------------------------------------------------------------------  T(C): 36.7 (10-28-18 @ 09:03), Max: 36.8 (10-27-18 @ 12:22)  HR: 66 (10-28-18 @ 09:03) (66 - 80)  BP: 128/62 (10-28-18 @ 09:03) (128/62 - 191/77)  RR: 18 (10-28-18 @ 09:03) (17 - 18)  SpO2: 95% (10-28-18 @ 09:03) (90% - 95%)  Wt(kg): --    10-27-18 @ 07:01  -  10-28-18 @ 07:00  --------------------------------------------------------  IN: 0 mL / OUT: 300 mL / NET: -300 mL    Physical Exam:  	Gen: NAD  	HEENT: Supple neck  	Pulm: + rales b/l  	CV: RRR, S1S2; no rub  	Abd: +BS, soft, nontender/nondistended  	: No suprapubic tenderness  	UE: No asterixis  	LE: 2+ pitting edema b/l LE, L>R  	Skin: Warm, without rashes    LABS/STUDIES  --------------------------------------------------------------------------------              7.7    6.31  >-----------<  261      [10-27-18 @ 06:10]              24.3     139  |  101  |  97  ----------------------------<  96      [10-27-18 @ 06:10]  4.7   |  19  |  9.02        Ca     7.5     [10-27-18 @ 06:10]      Mg     2.1     [10-27-18 @ 06:10]      Phos  8.2     [10-27-18 @ 06:10]    Creatinine Trend:  SCr 9.02 [10-27 @ 06:10]  SCr 8.59 [10-26 @ 04:40]  SCr 8.74 [10-25 @ 03:40]

## 2018-10-28 NOTE — PROGRESS NOTE ADULT - SUBJECTIVE AND OBJECTIVE BOX
24H hour events: No acute events.     MEDICATIONS:  amLODIPine   Tablet 10 milliGRAM(s) Oral daily  aspirin enteric coated 81 milliGRAM(s) Oral daily  carvedilol 25 milliGRAM(s) Oral every 12 hours  clopidogrel Tablet 75 milliGRAM(s) Oral daily  furosemide   Injectable 80 milliGRAM(s) IV Push two times a day  heparin  Injectable 5000 Unit(s) SubCutaneous every 8 hours  hydrALAZINE 100 milliGRAM(s) Oral three times a day  isosorbide   dinitrate Tablet (ISORDIL) 30 milliGRAM(s) Oral three times a day  dextrose 40% Gel 15 Gram(s) Oral once PRN  dextrose 50% Injectable 12.5 Gram(s) IV Push once  dextrose 50% Injectable 25 Gram(s) IV Push once  dextrose 50% Injectable 25 Gram(s) IV Push once  glucagon  Injectable 1 milliGRAM(s) IntraMuscular once PRN  insulin lispro (HumaLOG) corrective regimen sliding scale   SubCutaneous three times a day before meals  insulin lispro (HumaLOG) corrective regimen sliding scale   SubCutaneous at bedtime  simvastatin 20 milliGRAM(s) Oral at bedtime  calcium acetate 667 milliGRAM(s) Oral three times a day with meals  dextrose 5%. 1000 milliLiter(s) IV Continuous <Continuous>      REVIEW OF SYSTEMS:  Complete 10point ROS negative.    PHYSICAL EXAM:  T(C): 36.7 (10-28-18 @ 09:03), Max: 36.8 (10-27-18 @ 12:22)  HR: 66 (10-28-18 @ 09:03) (66 - 80)  BP: 128/62 (10-28-18 @ 09:03) (128/62 - 191/77)  RR: 18 (10-28-18 @ 09:03) (17 - 18)  SpO2: 95% (10-28-18 @ 09:03) (90% - 95%)  Wt(kg): --  I&O's Summary    27 Oct 2018 07:01  -  28 Oct 2018 07:00  --------------------------------------------------------  IN: 0 mL / OUT: 300 mL / NET: -300 mL        Appearance: NAD  HEENT:   Normal oral mucosa, PERRL, EOMI	  Cardiovascular: Normal S1 S2, No JVD, No murmurs, LE pitting edema  Respiratory: Lungs clear to auscultation	  Gastrointestinal:  Soft, Non-tender, + BS	  Neurologic: Non-focal  Vascular: Peripheral pulses palpable 2+ bilaterally        LABS:	 	    CBC Full  -  ( 27 Oct 2018 06:10 )  WBC Count : 6.31 K/uL  Hemoglobin : 7.7 g/dL  Hematocrit : 24.3 %  Platelet Count - Automated : 261 K/uL  Mean Cell Volume : 79.9 fL  Mean Cell Hemoglobin : 25.3 pg  Mean Cell Hemoglobin Concentration : 31.7 %    10-27    139  |  101  |  97<H>  ----------------------------<  96  4.7   |  19<L>  |  9.02<H>    Ca    7.5<L>      27 Oct 2018 06:10  Phos  8.2     10-27  Mg     2.1     10-27

## 2018-10-28 NOTE — CONSULT NOTE ADULT - CONSULT REQUESTED BY NAME
Pt told she requested it go to [facility] so that is why it was sent a second time  Told she only has to get it from [facility] and take 2 tabs once with food 
Dr Sheridan Ray
Dr. Arias
Dr. Swartz
Nephrology
Dr. Josue Swartz

## 2018-10-28 NOTE — CONSULT NOTE ADULT - REASON FOR ADMISSION
Hypoglycemia, HTN urgency

## 2018-10-28 NOTE — PROGRESS NOTE ADULT - PROBLEM SELECTOR PROBLEM 1
CKD (chronic kidney disease) stage 5, GFR less than 15 ml/min
Hypoglycemia associated with type 2 diabetes mellitus
Hypoglycemia associated with type 2 diabetes mellitus
CKD (chronic kidney disease) stage 5, GFR less than 15 ml/min

## 2018-10-28 NOTE — PROGRESS NOTE ADULT - PROBLEM SELECTOR PLAN 3
-improving  -cont lasix  -monitor I/Os and weigh daily
Patient with recently controlled HTN in the setting of advanced CKD. Appreciate cardiology progress note. Recommend uptitrating medications and diuresis with Lasix as needed. Monitor BP
Patient with uncontrolled HTN in the setting of CKD. Appreciate cardiology consult note. Recommend uptitrating medications and diuresis with Lasix for improvement. Monitor BP
Patient with uncontrolled HTN in the setting of CKD. Recommend uptitrating medications and diuresis with Lasix for improvement. Monitor BP
-improving on exam  -cont lasix  -what I wouldn't give for some I/Os and a weight trend

## 2018-10-28 NOTE — PROGRESS NOTE ADULT - PROBLEM SELECTOR PLAN 4
Recommend obtaining iron studies and reticulocyte count. Blood transfusions per primary team. Monitor Hgb Recommend obtaining iron studies and reticulocyte count.  Monitor Hgb

## 2018-10-28 NOTE — PROGRESS NOTE ADULT - PROVIDER SPECIALTY LIST ADULT
Cardiology
Endocrinology
Endocrinology
Internal Medicine
Nephrology
Cardiology

## 2018-10-28 NOTE — PROGRESS NOTE ADULT - SUBJECTIVE AND OBJECTIVE BOX
CHIEF COMPLAINT:    SUBJECTIVE:     REVIEW OF SYSTEMS:    CONSTITUTIONAL: (  )  weakness,  (  ) fevers or chills  EYES/ENT: (  )visual changes;     NECK: (  ) pain or stiffness  RESPIRATORY:   (  )cough, wheezing, hemoptysis;  (  ) shortness of breath  CARDIOVASCULAR:  (  )chest pain or palpitations  GASTROINTESTINAL:   (  )abdominal or epigastric pain.  (  ) nausea, vomiting, or hematemesis;   (   ) diarrhea or constipation.   GENITOURINARY:   (    ) dysuria, frequency or hematuria  NEUROLOGICAL:  (   ) numbness or weakness   All other review of systems is negative unless indicated above    Vital Signs Last 24 Hrs  T(C): 36.7 (28 Oct 2018 09:03), Max: 36.8 (27 Oct 2018 12:22)  T(F): 98.1 (28 Oct 2018 09:03), Max: 98.3 (27 Oct 2018 12:22)  HR: 66 (28 Oct 2018 09:03) (66 - 80)  BP: 128/62 (28 Oct 2018 09:03) (128/62 - 191/77)  BP(mean): --  RR: 18 (28 Oct 2018 09:03) (17 - 18)  SpO2: 95% (28 Oct 2018 09:03) (90% - 95%)    I&O's Summary    27 Oct 2018 07:01  -  28 Oct 2018 07:00  --------------------------------------------------------  IN: 0 mL / OUT: 300 mL / NET: -300 mL        CAPILLARY BLOOD GLUCOSE      POCT Blood Glucose.: 128 mg/dL (28 Oct 2018 08:37)  POCT Blood Glucose.: 130 mg/dL (27 Oct 2018 22:12)  POCT Blood Glucose.: 121 mg/dL (27 Oct 2018 17:14)  POCT Blood Glucose.: 131 mg/dL (27 Oct 2018 12:27)      PHYSICAL EXAM:    Constitutional:  (   ) NAD,   (   )awake and alert  HEENT: PERR, EOMI,    Neck: Soft and supple, No LAD, No JVD  Respiratory:  (    Breath sounds are clear bilaterally,    (   ) wheezing, rales or rhonchi  Cardiovascular:     (   )S1 and S2, regular rate and rhythm, no Murmurs, gallops or rubs  Gastrointestinal:  (   )Bowel Sounds present, soft,   (  )nontender, nondistended,    Extremities:    (  ) peripheral edema  Vascular: 2+ peripheral pulses  Neurological:    (    )A/O x 3,   (  ) focal deficits  Musculoskeletal:    (   )  normal strength b/l upper  (     ) normal  lower extremities  Skin: No rashes    MEDICATIONS:  MEDICATIONS  (STANDING):  amLODIPine   Tablet 10 milliGRAM(s) Oral daily  aspirin enteric coated 81 milliGRAM(s) Oral daily  calcium acetate 667 milliGRAM(s) Oral three times a day with meals  carvedilol 25 milliGRAM(s) Oral every 12 hours  clopidogrel Tablet 75 milliGRAM(s) Oral daily  dextrose 5%. 1000 milliLiter(s) (50 mL/Hr) IV Continuous <Continuous>  dextrose 50% Injectable 12.5 Gram(s) IV Push once  dextrose 50% Injectable 25 Gram(s) IV Push once  dextrose 50% Injectable 25 Gram(s) IV Push once  furosemide   Injectable 80 milliGRAM(s) IV Push two times a day  heparin  Injectable 5000 Unit(s) SubCutaneous every 8 hours  hydrALAZINE 100 milliGRAM(s) Oral three times a day  insulin lispro (HumaLOG) corrective regimen sliding scale   SubCutaneous three times a day before meals  insulin lispro (HumaLOG) corrective regimen sliding scale   SubCutaneous at bedtime  isosorbide   dinitrate Tablet (ISORDIL) 30 milliGRAM(s) Oral three times a day  simvastatin 20 milliGRAM(s) Oral at bedtime      LABS: All Labs Reviewed:                        7.7    6.31  )-----------( 261      ( 27 Oct 2018 06:10 )             24.3     10-27    139  |  101  |  97<H>  ----------------------------<  96  4.7   |  19<L>  |  9.02<H>    Ca    7.5<L>      27 Oct 2018 06:10  Phos  8.2     10-27  Mg     2.1     10-27            Blood Culture:   Urine Culture      RADIOLOGY/EKG:    ASSESSMENT AND PLAN:    DVT PPX:    ADVANCED DIRECTIVE:    DISPOSITION: CHIEF COMPLAINT: Hypoglycemia, HTN urgency patient refused lab today  68 year old Male with HTN, DM2, CAD s/p CABG, HFpEF, progressively worsening CKD (Creatinine 8 in 06/2018, refusing HD in the past) brought in by EMS after wife noticed patient with AMS. FSG done by EMS was 28, patient received 1 amp of D50. FSG upon arrival in ED was 100, but then dropped to 30, requiring another amp of D50, with FSG improving to 130. Patient now AA0X3, does not remember much about last night except for feeling fatigued. Patient denies feeling unwell yesterday, took his normal dose of Humulin 70/30 10 units in the morning, wife says he did not take his normal dose of 6 units in the evening. Patient with ate muffin and sandwich yesterday. Patient's wife endorses chronic non productive cough. Patient denies fever, chills, chest pain, palpitations, URI symptoms, headache, blurry vision, abdominal pain, n/v/d or urinary symptoms. Patient with exertional dyspnea, not much worse than usual, also with LE swelling.     SUBJECTIVE:     REVIEW OF SYSTEMS:  	Constitutional: No fever/chills, good appetite/po intake  	Eyes: No blurry vision, No diplopia  	Neuro: No tremors, No muscle weakness   	Cardiovascular: No chest pain, No palpitations  	Respiratory: + SOB with exertion, +  non productive cough  	GI: No nausea, No vomiting, No diarrhea  	: No dysuria, No hematuria  	Skin: No rash  Vascular: + LE swelling     Vital Signs Last 24 Hrs  T(C): 36.7 (28 Oct 2018 09:03), Max: 36.8 (27 Oct 2018 12:22)  T(F): 98.1 (28 Oct 2018 09:03), Max: 98.3 (27 Oct 2018 12:22)  HR: 66 (28 Oct 2018 09:03) (66 - 80)  BP: 128/62 (28 Oct 2018 09:03) (128/62 - 191/77)  BP(mean): --  RR: 18 (28 Oct 2018 09:03) (17 - 18)  SpO2: 95% (28 Oct 2018 09:03) (90% - 95%)    I&O's Summary    27 Oct 2018 07:01  -  28 Oct 2018 07:00  --------------------------------------------------------  IN: 0 mL / OUT: 300 mL / NET: -300 mL        CAPILLARY BLOOD GLUCOSE      POCT Blood Glucose.: 128 mg/dL (28 Oct 2018 08:37)  POCT Blood Glucose.: 130 mg/dL (27 Oct 2018 22:12)  POCT Blood Glucose.: 121 mg/dL (27 Oct 2018 17:14)  POCT Blood Glucose.: 131 mg/dL (27 Oct 2018 12:27)      PHYSICAL EXAM:       GENERAL: NAD, lying comfortably in bed, awake and alert   	HEAD:  Atraumatic, Normocephalic  	EYES: EOMI b/l, PERRLA b/l, conjunctiva and sclera clear  	NECK: Supple, No JVD, No LAD   	CHEST/LUNG: diffuse crackles   	HEART: Regular rate and rhythm; S1 and S2 present, No murmurs, rubs, or gallops  	ABDOMEN: Soft, Nontender, Nondistended; Bowel sounds present  	EXTREMITIES:  2+ Peripheral Pulses, 1+ pitting edema LE b/l   	NEURO: AAOx3, non-focal   SKIN: No rashes or lesion  MEDICATIONS:  MEDICATIONS  (STANDING):  amLODIPine   Tablet 10 milliGRAM(s) Oral daily  aspirin enteric coated 81 milliGRAM(s) Oral daily  calcium acetate 667 milliGRAM(s) Oral three times a day with meals  carvedilol 25 milliGRAM(s) Oral every 12 hours  clopidogrel Tablet 75 milliGRAM(s) Oral daily  dextrose 5%. 1000 milliLiter(s) (50 mL/Hr) IV Continuous <Continuous>  dextrose 50% Injectable 12.5 Gram(s) IV Push once  dextrose 50% Injectable 25 Gram(s) IV Push once  dextrose 50% Injectable 25 Gram(s) IV Push once  furosemide   Injectable 80 milliGRAM(s) IV Push two times a day  heparin  Injectable 5000 Unit(s) SubCutaneous every 8 hours  hydrALAZINE 100 milliGRAM(s) Oral three times a day  insulin lispro (HumaLOG) corrective regimen sliding scale   SubCutaneous three times a day before meals  insulin lispro (HumaLOG) corrective regimen sliding scale   SubCutaneous at bedtime  isosorbide   dinitrate Tablet (ISORDIL) 30 milliGRAM(s) Oral three times a day  simvastatin 20 milliGRAM(s) Oral at bedtime      LABS: All Labs Reviewed:                        7.7    6.31  )-----------( 261      ( 27 Oct 2018 06:10 )             24.3     10-27    139  |  101  |  97<H>  ----------------------------<  96  4.7   |  19<L>  |  9.02<H>    Ca    7.5<L>      27 Oct 2018 06:10  Phos  8.2     10-27  Mg     2.1     10-27            Blood Culture:   Urine Culture      RADIOLOGY/EKG:    ASSESSMENT AND PLAN:  68 year old Male with HTN, DM2, CAD s/p CABG, HFpEF, progressively worsening CKD (Creatinine 8 in 06/2018, refusing HD in the past) admitted for AMS secondary to hypoglycemia and hypertensive urgency.     Problem/Plan - 1:  ·  Problem: Acute encephalopathy.  Plan: Patient presenting with AMS in setting of hypoglycemia FSG 28 per EMS     - patient afebrile, no WC, no symptoms, CXR without consolidation, less likely infectious etiology  - mental status back at baseline now with improvement in FSG  - CT head negative   - will continue to monitor FSG's.     Problem/Plan - 2:  ·  Problem: Hypertensive urgency.  Plan: Patient presenting with systolic BP up to 250, without headache, blurry vision or n/v  - CT head negative for hemorrhage   - s/p Hydralazine 50 PO, 15 IV and 10 IV in ED   - will c/w Hydralazine 75 mg tid   - c/w home PO meds: metoprolol succinate 50 daily and amlodipine 10 mg daily   - continue to monitor BP closely.     Problem/Plan - 3:  ·  Problem: CKD (chronic kidney disease).  Plan: Patient with progressively worsening CKD stage 5, Creatinine 8 in 06/2018, refusing HD in the past  - Patient with signs of overload on exam  - elevated Trop (429-->396) likely in setting of worsening kidney failure and demand  - s/p Lasix 80 IV in ED with good urine output  - Renal consulted noted PT AND WIFE AGREE with HD         Problem/Plan - 4:  ·  Problem: Diabetes mellitus.  ENDO consult appreciated patient on fingerstick sliding scale.     Problem/Plan - 5:  ·  Problem: Anemia.  Plan: Patient with chronic anemia, likely in setting of CKD  - Hgb  7.7    Problem/Plan - 6:  Problem: Heart failure with preserved ejection fraction. Plan: Patient with HF with preserved EF, 56% in 6/2018  - volume overload likely in setting of CKD  - c/w Metoprolol succinate 50 mg daily  - will f/u Nephrology recommendations on Lasix 80 mg twice daily  - monitor I's and O's.    Problem/Plan - 7:  ·  Problem: CAD (coronary artery disease).  Plan: c/w simvastatin and aspirin. cardiology consult note    Problem/Plan - 8:  ·  Problem: Need for prophylactic measure.  Plan: Dvt PPx: hep sub q   Diet: Carb consistent diet.       DVT PPX:    ADVANCED DIRECTIVE: wife he is full code DW PT IN DETAIL and his wife [phone]  DVT PPX:    ADVANCED DIRECTIVE:    DISPOSITION: discharge home after a vascular consult for preparation for hemodialysis discuss with patient and NP in detail

## 2018-10-28 NOTE — PROGRESS NOTE ADULT - PROBLEM SELECTOR PLAN 2
-uncontrolled - goal daily 25% reduction to <130/80  -increase isordil to 30mg tid; hold for sbp <100  -change metoprolol to coreg 25mg bid; hold for sbp <100, hr <60  -greatest help would be initiation of HD
Patient with concerning admitting symptoms of shortness of breath and increased peripheral edema in the setting of advanced/progressive CKD. Attempt trial of diuresis with IV Lasix. Monitor daily weights and urine output
Patient with concerning admitting symptoms of shortness of breath and increased peripheral edema in the setting of advanced/progressive CKD. Monitor urine output. Continue with diuresis
Patient with concerning admitting symptoms of shortness of breath and increased peripheral edema in the setting of advanced/progressive CKD. Monitor urine output. Continue with diuresis
-currently at goal this am at 128/62- goal daily 25% reduction to <130/80  -isordil to 30mg tid; hold for sbp <100  -coreg 25mg bid; hold for sbp <100, hr <60  -greatest help would be initiation of HD

## 2018-10-28 NOTE — PROGRESS NOTE ADULT - PROBLEM SELECTOR PROBLEM 4
Coronary artery disease without angina pectoris, unspecified vessel or lesion type, unspecified whether native or transplanted heart
Coronary artery disease without angina pectoris, unspecified vessel or lesion type, unspecified whether native or transplanted heart
Anemia due to chronic kidney disease, on chronic dialysis

## 2018-10-28 NOTE — PROGRESS NOTE ADULT - PROBLEM SELECTOR PROBLEM 3
Stage 5 chronic kidney disease on chronic dialysis
Stage 5 chronic kidney disease on chronic dialysis
Heart failure with preserved ejection fraction
Hypertension, unspecified type
Heart failure with preserved ejection fraction

## 2018-10-28 NOTE — PROGRESS NOTE ADULT - PROBLEM SELECTOR PLAN 5
Hyperphosphatemia of 8.2 in the setting of CKD. Patient on Phoslo 667mg TID with meals. May increase this to Phoslo 2 tabs PO TID. Monitor serum phosphorus

## 2018-10-28 NOTE — CONSULT NOTE ADULT - ASSESSMENT
68 year old male with CKD stage V, not yet requiring HD, with request for pre-emptive AV fistula; hemodynamically stable.    - plan for outpatient follow up with Dr. Granados to establish care and plan for AV fistula creation  - care and dispo per primary team 68 year old male with CKD stage V, not yet requiring HD, with request for pre-emptive AV fistula by nephrology team; hemodynamically stable.    - plan for outpatient follow up with Dr. Granados to establish care and plan for AV fistula creation  - vein mapping completed, may require AV graft vs BVT  - patient and family presently unwilling to undergo fistula placement, amenable to further discussion in the outpatient setting  - care and dispo per primary team    Discussed with Dr. Vinay Robison (vascular surgery fellow), seen and examined with Dr. Krueger,  --YANELY Jolly, PGY-4

## 2018-10-28 NOTE — PROGRESS NOTE ADULT - ASSESSMENT
67 yo M with PMHx of advanced/progressive CKD, DM2, HTN, admitted for hypertensive urgency and shortness of breath. Patient was found to have an elevated Scr of 8.74 on admission (10/25/18). Nephrology consulted for progression of CKD.
67 yo M with PMHx of advanced/progressive CKD, DM2, HTN, admitted for hypertensive urgency and shortness of breath. Patient was found to have an elevated Scr of 8.74 on admission (10/25/18). Nephrology consulted for progression of CKD.
68 year old Male with HTN, DM2, CAD s/p CABG, Bioprosthetic AVR, and HFpEF (56% on last echo 6/2018)  brought in by EMS after wife noticed change in mental status, found to have severe hypoglycemia ( FSG done by EMS was 28) and hypertensive urgency in the setting of worsening CKD (Creatinine 8, refusing HD in the past). We were consulted for preop risk stratification for AV fistula and BP management.
69 yo M with PMHx of advanced/progressive CKD, DM2, HTN, admitted for hypertensive urgency and shortness of breath. Patient was found to have an elevated Scr of 8.74 on admission (10/25/18). Nephrology consulted for progression of CKD.
Assessment  DMT2: 68y Male with DM T2 with  hypoglycemic episodes , ,   compliant with low carb diet.  Had breakfast this AM , F.Sticks below 200, this   CAD:  S//P On medications, stable, monitored.  .HLD; On Statin  HTN:  Not Controlled, On med.  CKD: Monitor labs/BMP,   ESRD: need hemodialysis but patient refusing , Monitor labs/BMP   CHF; On Med,s    Plan:     DMT2:   Keep correction scale coverage, ( low coverage )monitor FS will FU  as long as BS are not above 200 no need for basal insulin  Patient counseled for compliance with consistent low carb diet  CAD: Continue treatment, monitoring FU  by medical team.  HTN: Continue treatment, monitoring, Primary team FU   suggest to consider small 1 to 2 mg of Bumex, to get better control of BP.  CKD: Continue treatment, monitoring, Primary team FU   suggest to consider small 1 to 2 mg of Bumex, to get better control of BP  Dr francisco [phone]
Assessment  DMT2: 68y Male with DM T2 with H/O   hypoglycemic episodes , ,   compliant with low carb diet.  Had breakfast this AM , F.Sticks  128 this AM  CAD:  S//P On medications, stable, monitored.  .HLD; On Statin  HTN:  Not Controlled, On med.  CKD: Monitor labs/BMP,   ESRD: need hemodialysis but patient refusing , Monitor labs/BMP   CHF; On Med,s    Plan:     DMT2:   Pt can be D/C on Januvia 25 mg daily  Patient counseled for compliance with consistent low carb diet  CAD: Continue treatment, monitoring FU  by medical team.  HTN: Continue treatment, monitoring, Primary team FU  CKD: Continue treatment, monitoring, Primary team FU  Case discussed with the PA  Dr francisco [phone]
68 year old Male with HTN, DM2, CAD s/p CABG, Bioprosthetic AVR, and HFpEF (56% on last echo 6/2018)  brought in by EMS after wife noticed change in mental status, found to have severe hypoglycemia ( FSG done by EMS was 28) and hypertensive urgency in the setting of worsening CKD (Creatinine 8, refusing HD in the past). We were consulted for preop risk stratification for AV fistula and BP management.

## 2018-10-28 NOTE — PROGRESS NOTE ADULT - PROBLEM SELECTOR PROBLEM 5
Congestive heart failure, unspecified HF chronicity, unspecified heart failure type
Congestive heart failure, unspecified HF chronicity, unspecified heart failure type
Hyperphosphatemia

## 2018-10-28 NOTE — PROGRESS NOTE ADULT - ATTENDING COMMENTS
still refusing to consent for dialysis but leaves the option open for the future. discussed regarding fistula placement in the meantime but not committal at this point. please get the iron studies if low would need iron infusion  and if normal will start SONU

## 2018-10-28 NOTE — PROGRESS NOTE ADULT - PROBLEM SELECTOR PROBLEM 2
Hypertension associated with stage 5 chronic kidney disease due to type 2 diabetes mellitus
Hypertension associated with stage 5 chronic kidney disease due to type 2 diabetes mellitus
Hypertension, unspecified type
Other hypervolemia
Hypertension, unspecified type

## 2018-10-28 NOTE — PROGRESS NOTE ADULT - SUBJECTIVE AND OBJECTIVE BOX
Endocrinology Attending Covering for Dr. Cruz      Chief complaint  Patient is a 68y old  Male who presents with a chief complaint of Hypoglycemia, HTN urgency (28 Oct 2018 11:36)   Review of systems  Patient in bed, looks comfortable, no fever,  had no hypoglycemia.    Labs and Fingersticks  CAPILLARY BLOOD GLUCOSE      POCT Blood Glucose.: 238 mg/dL (28 Oct 2018 12:28)  POCT Blood Glucose.: 128 mg/dL (28 Oct 2018 08:37)  POCT Blood Glucose.: 130 mg/dL (27 Oct 2018 22:12)  POCT Blood Glucose.: 121 mg/dL (27 Oct 2018 17:14)          Calcium, Total Serum: 7.5 <L> (10-27 @ 06:10)          10-27    139  |  101  |  97<H>  ----------------------------<  96  4.7   |  19<L>  |  9.02<H>    Ca    7.5<L>      27 Oct 2018 06:10  Phos  8.2     10-27  Mg     2.1     10-27                          7.7    6.31  )-----------( 261      ( 27 Oct 2018 06:10 )             24.3     Medications  MEDICATIONS  (STANDING):  amLODIPine   Tablet 10 milliGRAM(s) Oral daily  aspirin enteric coated 81 milliGRAM(s) Oral daily  calcium acetate 1334 milliGRAM(s) Oral four times a day with meals  carvedilol 25 milliGRAM(s) Oral every 12 hours  clopidogrel Tablet 75 milliGRAM(s) Oral daily  dextrose 5%. 1000 milliLiter(s) (50 mL/Hr) IV Continuous <Continuous>  dextrose 50% Injectable 12.5 Gram(s) IV Push once  dextrose 50% Injectable 25 Gram(s) IV Push once  dextrose 50% Injectable 25 Gram(s) IV Push once  furosemide   Injectable 80 milliGRAM(s) IV Push two times a day  heparin  Injectable 5000 Unit(s) SubCutaneous every 8 hours  hydrALAZINE 100 milliGRAM(s) Oral three times a day  insulin lispro (HumaLOG) corrective regimen sliding scale   SubCutaneous three times a day before meals  insulin lispro (HumaLOG) corrective regimen sliding scale   SubCutaneous at bedtime  isosorbide   dinitrate Tablet (ISORDIL) 30 milliGRAM(s) Oral three times a day  simvastatin 20 milliGRAM(s) Oral at bedtime      Physical Exam  General: Patient comfortable in bed  Vital Signs Last 12 Hrs  T(F): 98.1 (10-28-18 @ 09:03), Max: 98.1 (10-28-18 @ 04:47)  HR: 66 (10-28-18 @ 09:03) (66 - 74)  BP: 128/62 (10-28-18 @ 09:03) (128/62 - 161/75)  BP(mean): --  RR: 18 (10-28-18 @ 09:03) (18 - 18)  SpO2: 95% (10-28-18 @ 09:03) (93% - 95%)  Neck: No palpable thyroid nodules.  CVS: S1S2, No murmurs  Respiratory: No wheezing, no crepitations  GI: Abdomen soft, bowel sounds positive  Musculoskeletal:  edema lower extremities.   Skin: No skin rashes, no ecchymosis    Diagnostics

## 2018-10-28 NOTE — CONSULT NOTE ADULT - SUBJECTIVE AND OBJECTIVE BOX
incomplete note, full consult to follow CC: 68y old Male admitted with a chief complaint of Hypoglycemia, HTN urgency, now with worsening kidney function.    HPI:  68 year old Male with HTN, DM2, CAD s/p CABG, HFpEF, progressively worsening CKD (Creatinine 8 in 06/2018, refusing HD in the past) was brought in by EMS with AMS. He was diagnosed with hypoglycemia and HTN. During this hospitalization he was evaluated by the nephrology team; patient has history of CKD and has previously declined HD/AV fistula placement. At this time, he has no urgent HD needs but declining kidney function and was advised to consider a fistula. The family and patient are presently concerned about that, and not interested in undergoing fistula creation at this time. He had vein mapping performed. Patient is right hand dominant and has no history of central lines, pacemakers or other intravenous devices.      PMHx: DM II, HTN, CHF, anemia, CKD (stage V)  PSHx: S/P CABG (~2014), S/P appendectomy    Medications (inpatient): amLODIPine   Tablet 10 milliGRAM(s) Oral daily  aspirin enteric coated 81 milliGRAM(s) Oral daily  calcium acetate 1334 milliGRAM(s) Oral four times a day with meals  carvedilol 25 milliGRAM(s) Oral every 12 hours  clopidogrel Tablet 75 milliGRAM(s) Oral daily  dextrose 5%. 1000 milliLiter(s) IV Continuous <Continuous>  dextrose 50% Injectable 12.5 Gram(s) IV Push once  dextrose 50% Injectable 25 Gram(s) IV Push once  dextrose 50% Injectable 25 Gram(s) IV Push once  furosemide   Injectable 80 milliGRAM(s) IV Push two times a day  heparin  Injectable 5000 Unit(s) SubCutaneous every 8 hours  hydrALAZINE 100 milliGRAM(s) Oral three times a day  insulin lispro (HumaLOG) corrective regimen sliding scale   SubCutaneous three times a day before meals  insulin lispro (HumaLOG) corrective regimen sliding scale   SubCutaneous at bedtime  isosorbide   dinitrate Tablet (ISORDIL) 30 milliGRAM(s) Oral three times a day  simvastatin 20 milliGRAM(s) Oral at bedtime    Medications (PRN):dextrose 40% Gel 15 Gram(s) Oral once PRN  glucagon  Injectable 1 milliGRAM(s) IntraMuscular once PRN    Allergies: lisinopril (Anaphylaxis)  Social Hx: Lives at home with wife  Family Hx: No pertinent family history in first degree relatives      Physical Exam  T(C): 36.7  HR: 65 (65 - 80)  BP: 148/71 (128/62 - 191/77)  RR: 18 (18 - 18)  SpO2: 95% (92% - 95%)  Tmax: T(C): , Max: 36.7 (10-27-18 @ 21:44)    10-27-18  -  10-28-18  --------------------------------------------------------  IN:  Total IN: 0 mL    OUT:    Voided: 300 mL  Total OUT: 300 mL    Total NET: -300 mL        General: well developed, well nourished, NAD  Neuro: alert and oriented, no focal deficits, moves all extremities spontaneously  HEENT: NCAT, mucosa moist  Respiratory: airway patent, respirations unlabored  CVS: regular rate and rhythm  Extremities: no edema, sensation and movement grossly intact  Vascular: bilateral palpable radial pulses, normal cap refill  Skin: warm, dry, appropriate color    Labs:                        7.7    6.31  )-----------( 261      ( 27 Oct 2018 06:10 )             24.3       10-27    139  |  101  |  97<H>  ----------------------------<  96  4.7   |  19<L>  |  9.02<H>    Ca    7.5<L>      27 Oct 2018 06:10  Phos  8.2     10-27  Mg     2.1     10-27    Imaging and other studies:  < from: [facility] Duplex Ext Veins Upper Comp, Bilat. (10.26.18 @ 12:42) >  RESULT:  ------------------------------------------------------------------------  RIGHT:  Deep venous thrombosis: (Not Examined)  Superficial venous thrombosis: No  ------------------------------------------------------------------------  LEFT:  Deep venous thrombosis: (Not Examined)  Superficial venous thrombosis: Yes  ------------------------------------------------------------------------  Right Findings: Right Cephalic Vein  Diameter(cm)  Proximal upper arm                    0.26  Mid upper arm                            0.15  Distal upper arm                        0.19  Antecubital                                0.40  Proximal forearm                       0.19  Mid forearm                            0.17  Distal forearm                           0.18    Right Basilic Vein   [place]                                0.17    Mid upper arm                           0.17    Distal upper arm                        0.11  Right brachial artery: Normal velocities with triphasic waveforms.  Right radial artery: Normal velocities with triphasic waveforms.  Right ulnar artery: Normal velocities with triphasic waveforms.  Left Findings: Superficial venous thrombosis noted within the median cubital vein at the antecubital fossa.  Left Cephalic Vein                     Diameter (cm)  Proximal upper arm                    0.55  Mid upper arm                            0.24  Distal upper arm                        0.25  Antecubital                                Thrombosed  Proximal forearm                       0.14  Mid forearm                               0.14  Distal forearm                           0.13    Left Basilic Vein  [place]    0.40    Mid upper arm                           0.23    Distal upper arm                        0.10  Left brachial artery: Normal velocities with triphasic waveforms.  Left radial artery: Normal velocities with triphasic waveforms.  Left ulnarartery: Normal velocities with triphasic waveforms.    < end of copied text >

## 2018-10-28 NOTE — CONSULT NOTE ADULT - CONSULT REASON
Hypoglycemia
Hypoglycemis
Progressive CKD
request placement of AV fistula
preop risk stratification and BP management in the setting of Hypertensive urgency

## 2018-11-20 ENCOUNTER — APPOINTMENT (OUTPATIENT)
Dept: PULMONOLOGY | Facility: CLINIC | Age: 68
End: 2018-11-20

## 2018-11-20 NOTE — PROGRESS NOTE ADULT - PROBLEM/PLAN-4
Message noted. May start macrobid as requested for UTI symptoms. Erx sent to listed pharmacy.  To follow up for appointment if not better; Please notify patient DISPLAY PLAN FREE TEXT

## 2018-12-20 ENCOUNTER — APPOINTMENT (OUTPATIENT)
Dept: CT IMAGING | Facility: IMAGING CENTER | Age: 68
End: 2018-12-20

## 2018-12-20 ENCOUNTER — INPATIENT (INPATIENT)
Facility: HOSPITAL | Age: 68
LOS: 16 days | Discharge: HOME | DRG: 264 | End: 2019-01-06
Attending: INTERNAL MEDICINE | Admitting: INTERNAL MEDICINE
Payer: MEDICARE

## 2018-12-20 VITALS
SYSTOLIC BLOOD PRESSURE: 191 MMHG | HEART RATE: 72 BPM | DIASTOLIC BLOOD PRESSURE: 117 MMHG | WEIGHT: 141.98 LBS | OXYGEN SATURATION: 98 % | RESPIRATION RATE: 19 BRPM

## 2018-12-20 DIAGNOSIS — I16.1 HYPERTENSIVE EMERGENCY: ICD-10-CM

## 2018-12-20 DIAGNOSIS — I48.0 PAROXYSMAL ATRIAL FIBRILLATION: ICD-10-CM

## 2018-12-20 DIAGNOSIS — J96.91 RESPIRATORY FAILURE, UNSPECIFIED WITH HYPOXIA: ICD-10-CM

## 2018-12-20 DIAGNOSIS — I50.21 ACUTE SYSTOLIC (CONGESTIVE) HEART FAILURE: ICD-10-CM

## 2018-12-20 DIAGNOSIS — D64.9 ANEMIA, UNSPECIFIED: ICD-10-CM

## 2018-12-20 DIAGNOSIS — Z95.1 PRESENCE OF AORTOCORONARY BYPASS GRAFT: Chronic | ICD-10-CM

## 2018-12-20 DIAGNOSIS — R74.8 ABNORMAL LEVELS OF OTHER SERUM ENZYMES: ICD-10-CM

## 2018-12-20 DIAGNOSIS — I50.9 HEART FAILURE, UNSPECIFIED: ICD-10-CM

## 2018-12-20 DIAGNOSIS — E11.9 TYPE 2 DIABETES MELLITUS WITHOUT COMPLICATIONS: ICD-10-CM

## 2018-12-20 DIAGNOSIS — N18.5 CHRONIC KIDNEY DISEASE, STAGE 5: ICD-10-CM

## 2018-12-20 LAB
ALBUMIN SERPL ELPH-MCNC: 3.8 G/DL — SIGNIFICANT CHANGE UP (ref 3.3–5)
ALP SERPL-CCNC: 136 U/L — HIGH (ref 40–120)
ALT FLD-CCNC: 31 U/L — SIGNIFICANT CHANGE UP (ref 10–45)
ANION GAP SERPL CALC-SCNC: 22 MMOL/L — HIGH (ref 5–17)
APTT BLD: 35.5 SEC — SIGNIFICANT CHANGE UP (ref 27.5–36.3)
AST SERPL-CCNC: 31 U/L — SIGNIFICANT CHANGE UP (ref 10–40)
BASOPHILS # BLD AUTO: 0 K/UL — SIGNIFICANT CHANGE UP (ref 0–0.2)
BASOPHILS NFR BLD AUTO: 0.5 % — SIGNIFICANT CHANGE UP (ref 0–2)
BILIRUB SERPL-MCNC: 0.3 MG/DL — SIGNIFICANT CHANGE UP (ref 0.2–1.2)
BUN SERPL-MCNC: 122 MG/DL — HIGH (ref 7–23)
CALCIUM SERPL-MCNC: 8 MG/DL — LOW (ref 8.4–10.5)
CHLORIDE SERPL-SCNC: 105 MMOL/L — SIGNIFICANT CHANGE UP (ref 96–108)
CO2 SERPL-SCNC: 19 MMOL/L — LOW (ref 22–31)
CREAT SERPL-MCNC: 10.2 MG/DL — HIGH (ref 0.5–1.3)
EOSINOPHIL # BLD AUTO: 0 K/UL — SIGNIFICANT CHANGE UP (ref 0–0.5)
EOSINOPHIL NFR BLD AUTO: 0.6 % — SIGNIFICANT CHANGE UP (ref 0–6)
GAS PNL BLDV: SIGNIFICANT CHANGE UP
GLUCOSE BLDC GLUCOMTR-MCNC: 246 MG/DL — HIGH (ref 70–99)
GLUCOSE SERPL-MCNC: 141 MG/DL — HIGH (ref 70–99)
HCT VFR BLD CALC: 22.5 % — LOW (ref 39–50)
HGB BLD-MCNC: 7.4 G/DL — LOW (ref 13–17)
INR BLD: 1.08 RATIO — SIGNIFICANT CHANGE UP (ref 0.88–1.16)
LYMPHOCYTES # BLD AUTO: 0.7 K/UL — LOW (ref 1–3.3)
LYMPHOCYTES # BLD AUTO: 10.8 % — LOW (ref 13–44)
MCHC RBC-ENTMCNC: 25 PG — LOW (ref 27–34)
MCHC RBC-ENTMCNC: 32.7 GM/DL — SIGNIFICANT CHANGE UP (ref 32–36)
MCV RBC AUTO: 76.3 FL — LOW (ref 80–100)
MONOCYTES # BLD AUTO: 0.5 K/UL — SIGNIFICANT CHANGE UP (ref 0–0.9)
MONOCYTES NFR BLD AUTO: 7.8 % — SIGNIFICANT CHANGE UP (ref 2–14)
NEUTROPHILS # BLD AUTO: 5.2 K/UL — SIGNIFICANT CHANGE UP (ref 1.8–7.4)
NEUTROPHILS NFR BLD AUTO: 80.3 % — HIGH (ref 43–77)
NT-PROBNP SERPL-SCNC: HIGH PG/ML (ref 0–300)
PLATELET # BLD AUTO: 243 K/UL — SIGNIFICANT CHANGE UP (ref 150–400)
POTASSIUM SERPL-MCNC: 5.2 MMOL/L — SIGNIFICANT CHANGE UP (ref 3.5–5.3)
POTASSIUM SERPL-SCNC: 5.2 MMOL/L — SIGNIFICANT CHANGE UP (ref 3.5–5.3)
PROT SERPL-MCNC: 7.5 G/DL — SIGNIFICANT CHANGE UP (ref 6–8.3)
PROTHROM AB SERPL-ACNC: 12.5 SEC — SIGNIFICANT CHANGE UP (ref 10–12.9)
RBC # BLD: 2.95 M/UL — LOW (ref 4.2–5.8)
RBC # FLD: 22.2 % — HIGH (ref 10.3–14.5)
SODIUM SERPL-SCNC: 146 MMOL/L — HIGH (ref 135–145)
TROPONIN T, HIGH SENSITIVITY RESULT: 492 NG/L — HIGH (ref 0–51)
WBC # BLD: 6.4 K/UL — SIGNIFICANT CHANGE UP (ref 3.8–10.5)
WBC # FLD AUTO: 6.4 K/UL — SIGNIFICANT CHANGE UP (ref 3.8–10.5)

## 2018-12-20 PROCEDURE — 93010 ELECTROCARDIOGRAM REPORT: CPT

## 2018-12-20 PROCEDURE — 99291 CRITICAL CARE FIRST HOUR: CPT

## 2018-12-20 PROCEDURE — 99223 1ST HOSP IP/OBS HIGH 75: CPT

## 2018-12-20 PROCEDURE — 71045 X-RAY EXAM CHEST 1 VIEW: CPT | Mod: 26

## 2018-12-20 RX ORDER — HYDRALAZINE HCL 50 MG
100 TABLET ORAL THREE TIMES A DAY
Qty: 0 | Refills: 0 | Status: DISCONTINUED | OUTPATIENT
Start: 2018-12-20 | End: 2019-01-03

## 2018-12-20 RX ORDER — CLOPIDOGREL BISULFATE 75 MG/1
75 TABLET, FILM COATED ORAL DAILY
Qty: 0 | Refills: 0 | Status: DISCONTINUED | OUTPATIENT
Start: 2018-12-20 | End: 2018-12-24

## 2018-12-20 RX ORDER — HYDRALAZINE HCL 50 MG
10 TABLET ORAL ONCE
Qty: 0 | Refills: 0 | Status: COMPLETED | OUTPATIENT
Start: 2018-12-20 | End: 2018-12-20

## 2018-12-20 RX ORDER — INSULIN GLARGINE 100 [IU]/ML
8 INJECTION, SOLUTION SUBCUTANEOUS AT BEDTIME
Qty: 0 | Refills: 0 | Status: DISCONTINUED | OUTPATIENT
Start: 2018-12-20 | End: 2018-12-23

## 2018-12-20 RX ORDER — DEXTROSE 50 % IN WATER 50 %
12.5 SYRINGE (ML) INTRAVENOUS ONCE
Qty: 0 | Refills: 0 | Status: DISCONTINUED | OUTPATIENT
Start: 2018-12-20 | End: 2019-01-03

## 2018-12-20 RX ORDER — ASPIRIN/CALCIUM CARB/MAGNESIUM 324 MG
81 TABLET ORAL DAILY
Qty: 0 | Refills: 0 | Status: DISCONTINUED | OUTPATIENT
Start: 2018-12-20 | End: 2019-01-03

## 2018-12-20 RX ORDER — HEPARIN SODIUM 5000 [USP'U]/ML
5000 INJECTION INTRAVENOUS; SUBCUTANEOUS EVERY 8 HOURS
Qty: 0 | Refills: 0 | Status: DISCONTINUED | OUTPATIENT
Start: 2018-12-20 | End: 2019-01-03

## 2018-12-20 RX ORDER — ISOSORBIDE DINITRATE 5 MG/1
10 TABLET ORAL THREE TIMES A DAY
Qty: 0 | Refills: 0 | Status: DISCONTINUED | OUTPATIENT
Start: 2018-12-20 | End: 2019-01-03

## 2018-12-20 RX ORDER — DEXTROSE 50 % IN WATER 50 %
25 SYRINGE (ML) INTRAVENOUS ONCE
Qty: 0 | Refills: 0 | Status: DISCONTINUED | OUTPATIENT
Start: 2018-12-20 | End: 2019-01-03

## 2018-12-20 RX ORDER — INSULIN LISPRO 100/ML
VIAL (ML) SUBCUTANEOUS AT BEDTIME
Qty: 0 | Refills: 0 | Status: DISCONTINUED | OUTPATIENT
Start: 2018-12-20 | End: 2019-01-03

## 2018-12-20 RX ORDER — SODIUM CHLORIDE 9 MG/ML
1000 INJECTION, SOLUTION INTRAVENOUS
Qty: 0 | Refills: 0 | Status: DISCONTINUED | OUTPATIENT
Start: 2018-12-20 | End: 2019-01-03

## 2018-12-20 RX ORDER — METOPROLOL TARTRATE 50 MG
50 TABLET ORAL
Qty: 0 | Refills: 0 | Status: DISCONTINUED | OUTPATIENT
Start: 2018-12-20 | End: 2018-12-21

## 2018-12-20 RX ORDER — CALCIUM ACETATE 667 MG
667 TABLET ORAL
Qty: 0 | Refills: 0 | Status: DISCONTINUED | OUTPATIENT
Start: 2018-12-20 | End: 2019-01-03

## 2018-12-20 RX ORDER — SIMVASTATIN 20 MG/1
1 TABLET, FILM COATED ORAL
Qty: 0 | Refills: 0 | COMMUNITY

## 2018-12-20 RX ORDER — DEXTROSE 50 % IN WATER 50 %
15 SYRINGE (ML) INTRAVENOUS ONCE
Qty: 0 | Refills: 0 | Status: DISCONTINUED | OUTPATIENT
Start: 2018-12-20 | End: 2019-01-03

## 2018-12-20 RX ORDER — FUROSEMIDE 40 MG
40 TABLET ORAL ONCE
Qty: 0 | Refills: 0 | Status: COMPLETED | OUTPATIENT
Start: 2018-12-20 | End: 2018-12-20

## 2018-12-20 RX ORDER — FUROSEMIDE 40 MG
60 TABLET ORAL
Qty: 0 | Refills: 0 | Status: DISCONTINUED | OUTPATIENT
Start: 2018-12-20 | End: 2018-12-28

## 2018-12-20 RX ORDER — INSULIN LISPRO 100/ML
VIAL (ML) SUBCUTANEOUS
Qty: 0 | Refills: 0 | Status: DISCONTINUED | OUTPATIENT
Start: 2018-12-20 | End: 2019-01-03

## 2018-12-20 RX ORDER — HYDRALAZINE HCL 50 MG
100 TABLET ORAL ONCE
Qty: 0 | Refills: 0 | Status: COMPLETED | OUTPATIENT
Start: 2018-12-20 | End: 2018-12-20

## 2018-12-20 RX ORDER — ISOSORBIDE DINITRATE 5 MG/1
10 TABLET ORAL ONCE
Qty: 0 | Refills: 0 | Status: COMPLETED | OUTPATIENT
Start: 2018-12-20 | End: 2018-12-20

## 2018-12-20 RX ORDER — GLUCAGON INJECTION, SOLUTION 0.5 MG/.1ML
1 INJECTION, SOLUTION SUBCUTANEOUS ONCE
Qty: 0 | Refills: 0 | Status: DISCONTINUED | OUTPATIENT
Start: 2018-12-20 | End: 2019-01-03

## 2018-12-20 RX ORDER — ATORVASTATIN CALCIUM 80 MG/1
40 TABLET, FILM COATED ORAL AT BEDTIME
Qty: 0 | Refills: 0 | Status: DISCONTINUED | OUTPATIENT
Start: 2018-12-20 | End: 2019-01-03

## 2018-12-20 RX ADMIN — Medication 10 MILLIGRAM(S): at 16:58

## 2018-12-20 RX ADMIN — INSULIN GLARGINE 8 UNIT(S): 100 INJECTION, SOLUTION SUBCUTANEOUS at 23:14

## 2018-12-20 RX ADMIN — Medication 100 MILLIGRAM(S): at 22:49

## 2018-12-20 RX ADMIN — Medication 40 MILLIGRAM(S): at 16:58

## 2018-12-20 RX ADMIN — Medication 60 MILLIGRAM(S): at 22:49

## 2018-12-20 RX ADMIN — ISOSORBIDE DINITRATE 10 MILLIGRAM(S): 5 TABLET ORAL at 23:02

## 2018-12-20 NOTE — ED ADULT NURSE REASSESSMENT NOTE - NS ED NURSE REASSESS COMMENT FT1
Care assumed of patient at this time. Patient has family member at bedside. Patient is on nasal cannula and trying to sleep at this time. See VS at this time. Patient NAD at this time.

## 2018-12-20 NOTE — H&P ADULT - PROBLEM SELECTOR PLAN 1
--from med non-adherence, especially with uncontrolled HTN, and worsening CKD5  --requiring nasal cannula oxygen  --patient reports feeling a little better after lasix in the ED. Start lasix 60mg IV BID.  --cardiology consult as per Dr. Swartz in the morning  --strict I/Os, daily weights  --may ultimately need initiation of HD soon.

## 2018-12-20 NOTE — H&P ADULT - HISTORY OF PRESENT ILLNESS
68M PMH CAD/CABG, HFrEF (30%), T2DM, HTN, CKD 5 (not on HD), aortic valve replacement, chronic anemia occasionally requiring transfusions presents with SOB. Has had progressively worsening SOB and SZYMANSKI over the past 2 weeks. History is supplemented by wife as she manages most of his medical care and gives his medications. Symptoms significantly worsened this week with orthopnea and LE edema. No recent URIs, fevers, chills, cough, sputum production, chest pain, palpitations, NVD, abdominal pain, dysuria, decreased urination. Went to PMD for evaluation and he was noted to be in rapid afib in the office, rate 140s-150s. By time of arrival in ED, had spontaneously converted back to rate controlled sinus rhythm. O2sat 89% on RA, improved with nasal cannula. His wife believes he is on too many medications. For instance, she only gives him hydralazine once daily when he has been prescribed TID multiple times. Seems he is taking lasix 80mg daily (was discharged on BID dosing last admission). Never started isordil as recommended.

## 2018-12-20 NOTE — H&P ADULT - PROBLEM SELECTOR PLAN 5
--converted to sinus rhythm on way to the hospital  --no history of afib. Patient does not seem to have chest pain, palpitations, or dizziness related to it, so unclear if he has been having pAF for a while.   --Patient and his wife are extremely hesitant about starting anticoagulation at this time. No history of bleeding, but they have somewhat poor health literacy regarding medication interactions. I explained the risks and benefits of starting or deferring anticoagulation in the setting afib including risk of catastrophic CVA, and they wish to think on the matter further. Will hold off on A/C at the moment.

## 2018-12-20 NOTE — H&P ADULT - NSHPLABSRESULTS_GEN_ALL_CORE
EKG, labs, and imaging personally reviewed and interpreted.     EKG: sinus rhythm, LAFB, RBBB, LVH (EKG is unchanged from the prior EKG, no acute ischemic changes)    LABS:                        7.4    6.4   )-----------( 243      ( 20 Dec 2018 16:27 )             22.5     146<H>  |  105  |  122<H>  ----------------------------<  141<H>  5.2   |  19<L>  |  10.20<H>    Ca    8.0<L>      20 Dec 2018 16:27  Phos  7.1     12-20  Mg     2.4     12-20    TPro  7.5  /  Alb  3.8  /  TBili  0.3  /  DBili  x   /  AST  31  /  ALT  31  /  AlkPhos  136<H>  12-20    PT/INR - ( 20 Dec 2018 16:27 )   PT: 12.5 sec;   INR: 1.08 ratio    PTT - ( 20 Dec 2018 16:27 )  PTT:35.5 sec    RADIOLOGY & ADDITIONAL TESTS:  Imaging Personally Reviewed:    CXR:   IMPRESSION:   Mild perihilar fullness likely secondary to low inspiratory volume.   Left lower lung hazy opacity likely represents atelectasis given its   stability from prior study. Unchanged trace left pleural effusion.      Consultant(s) Notes Reviewed:  Yes  Care Discussed with Consultants/Other Providers: Yes

## 2018-12-20 NOTE — H&P ADULT - NSHPPHYSICALEXAM_GEN_ALL_CORE
Vital Signs Last 24 Hrs  T(C): 36.4 (12-20-18 @ 21:55)  T(F): 97.5 (12-20-18 @ 21:55), Max: 97.8 (12-20-18 @ 19:33)  HR: 71 (12-20-18 @ 21:55) (70 - 73)  BP: 210/120 (12-20-18 @ 21:55)  BP(mean): --  RR: 18 (12-20-18 @ 21:55) (18 - 24)  SpO2: 96% (12-20-18 @ 21:55) (89% - 100%)  Wt(kg): --    CAPILLARY BLOOD GLUCOSE  POCT Blood Glucose.: 246 mg/dL (20 Dec 2018 22:18)    PHYSICAL EXAM:  GENERAL: NAD, well-developed, sleepy  HEAD:  Atraumatic, Normocephalic  EYES: EOMI, PERRLA, conjunctiva and sclera clear  NECK: +JVD  CHEST/LUNG: Clear to auscultation bilaterally; No wheeze  HEART: crackles b/l lower lobes  ABDOMEN: Soft, Nontender, Nondistended; Bowel sounds present  EXTREMITIES:  2+ pitting edema peripheral; 2+ Peripheral Pulses, No clubbing, cyanosis  PSYCH: AAOx3  NEUROLOGY: non-focal  SKIN: No rashes or lesions

## 2018-12-20 NOTE — H&P ADULT - PROBLEM SELECTOR PLAN 3
--hsTrop to 492, up from high 300s last admission.   --likely type 2 NSTEMI from CHF exacerbation and worsening renal failure  --will trend troponin  --c/w DAPT  --restart statin (hasn't been taking)

## 2018-12-20 NOTE — H&P ADULT - PMH
Anemia    Aortic valve replaced    CHF (congestive heart failure)    CKD (chronic kidney disease)    Diabetes mellitus    HTN (hypertension)    Hypertension

## 2018-12-20 NOTE — ED ADULT NURSE NOTE - NSIMPLEMENTINTERV_GEN_ALL_ED
Implemented All Universal Safety Interventions:  [place] to call system. Call bell, personal items and telephone within reach. Instruct patient to call for assistance. Room bathroom lighting operational. Non-slip footwear when patient is off stretcher. Physically safe environment: no spills, clutter or unnecessary equipment. Stretcher in lowest position, wheels locked, appropriate side rails in place.

## 2018-12-20 NOTE — H&P ADULT - ASSESSMENT
68M PMH CAD/CABG, HFrEF (30%), T2DM, HTN, CKD 5 (not on HD), aortic valve replacement, chronic anemia occasionally requiring transfusions presents with SOB, admitted with acute decompensated systolic heart failure with hypertensive emergency, demand ischemia, worsening renal failure, transient afib RVR.

## 2018-12-20 NOTE — H&P ADULT - PROBLEM SELECTOR PLAN 2
--poor adherence/medical literacy regarding need for medications  --BPs remain elevated to 210/120 at time of my eval. Stat PO hydralazine and isordil.  --Will start hydralazine 100mg TID, isordil 10mg TID. --poor adherence/medical literacy regarding need for medications  --BPs remain elevated to 210/120 at time of my eval. Stat PO hydralazine and isordil.  --Will start hydralazine 100mg TID, isordil 10mg TID. On IV lasix for CHF.   --Restart home metoprolol, may need to be switched to coreg. Previously was on amlodipine too.

## 2018-12-20 NOTE — ED PROVIDER NOTE - PROGRESS NOTE DETAILS
Dr. Birch Note: updated pt on results and need for admission.  Pt advised on plan several times, continues to ask for update thinking it will be different results (believe pt anxious about his upcoming cruise).  Pt with elevated troponin likely from chf exacerbation in setting of kidney disease.  Pt stable for admission to hospital. Barby SAUCEDO: Spoke to patient's PCP about admission where he accepted onto his service.  PCP aware about elevated Cr of 10.2 and Trop of 450+ - no changes seen on EKG - likely due to worsening CKD.

## 2018-12-20 NOTE — ED PROVIDER NOTE - OBJECTIVE STATEMENT
68 year-old male with history of HTN, DM2, CAD s/p CABG, HFpEF (last EF 30%), progressively worsening CKD (last Cr 9.02) presents to the Emergency Department for shortness of breath.  Patient has been progressively worsening SOB x 2 weeks; worsened x 1 week with worsening LE edema and unable to lie flat.  Seen at PCP office and found to be tachycardic to 140-150s in rapid atrial fibrillation; spontaneously resolved before arrival to ER.  No fevers, nausea, vomiting, chest pain, abdominal pain, dysuria.  Patient was found to be 93% RA as per EMS that went to 98% on 3-4L NC.

## 2018-12-20 NOTE — ED PROVIDER NOTE - ATTENDING CONTRIBUTION TO CARE
Pt with worsening dyspnea over 2 weeks, noted tachycardia in doctor's office today and sent over (tachycardia resolved in ED).  +peripheral edema +3 bilateral, rales at bases bilateral, noted elevated BP.  no pains, no chest pain/back.  Equal pulses 4 extermities.

## 2018-12-20 NOTE — ED PROVIDER NOTE - PHYSICAL EXAMINATION
*Gen: lying in bed, intermittently somnolent/sleepy, AAO*3  *HEENT: NC/AT, dry mucous membranes, airway patent, trachea midline  *CV: RRR, S1/S2 present  *Resp: rales in b/l middle and lower lung lobes  *Abd: non-distended, soft N/Tx4, no guarding or rigidity  *Neuro: no focal neuro deficits, moving all limbs appropriately  *Extremities: no gross deformity  *Skin: no rashes, no wounds   ~ Georgina Dior M.D.

## 2018-12-20 NOTE — H&P ADULT - PROBLEM SELECTOR PLAN 6
--Per wife, they have been in discussion regarding the probability of needing to start dialysis some point soon. No definitive plans made. They have intermittent follow up with nephrology, not seeing anyone consistent right now.   --Needs lasix now for HTN emergency and respiratory failure.   --Nephrology consult as per Dr. Swartz in the morning.  --No urgent indications for HD at the moment as volume able to me managed with lasix and he is compensated from his uremia.

## 2018-12-20 NOTE — ED PROVIDER NOTE - MEDICAL DECISION MAKING DETAILS
68 year-old male with history of HTN, DM2, CAD s/p CABG, HFpEF (last EF 30%), progressively worsening CKD (last Cr 9.02) presents to the Emergency Department for shortness of breath.  DDx: ACS, CHF exacerbation, PNA, uremia, CO2 narcosis

## 2018-12-20 NOTE — ED ADULT NURSE NOTE - OBJECTIVE STATEMENT
69 y/o M, reported to ED from home. A&ox3, c/o SOB. Pt reports that he has been having SOB X 1 week. Pt reports that he went to his PMD office to get his Procrit shot and his doctor sent him to the ED after seeing his EKG. Pt denies C/P or numbness/tingling. EMS reports that the pt was in A. Fib upon their arrival to ED. Pt's HR was 130-150bmp. EMS reports that they tried to gain access but failed and then the pt spontaneously went back into a normal rhythm. Pt reports that he vomited 2x today. EMS reports that pt's Sp02 was 93% on RA, pt was placed on 2L NC and his Sp02 increased to 98%. Pt reports SOB with deep breaths. Pt denies LOC, H/A, C/P, N/D, abd pain. Pt denies fever or chills. Pt placed on cardiac monitor, wife at bedside.

## 2018-12-20 NOTE — H&P ADULT - NSHPREVIEWOFSYSTEMS_GEN_ALL_CORE
Review of Systems:   CONSTITUTIONAL: +fatigue; No fever, weight loss  EYES: No eye pain, visual disturbances, or discharge  ENMT:  No difficulty hearing, tinnitus, vertigo; No sinus or throat pain  NECK: No pain or stiffness  RESPIRATORY: +SOB No cough, wheezing, chills or hemoptysis;  CARDIOVASCULAR: +LE swelling, SZYMANSKI, orthopnea; No chest pain, palpitations, dizziness  GASTROINTESTINAL: No abdominal or epigastric pain. No nausea, vomiting, or hematemesis; No diarrhea or constipation. No melena or hematochezia.  GENITOURINARY: No dysuria, frequency, hematuria, or incontinence  NEUROLOGICAL: No headaches, memory loss, loss of strength, numbness, or tremors  SKIN: No itching, burning, rashes, or lesions   LYMPH NODES: No enlarged glands  ENDOCRINE: No heat or cold intolerance; No hair loss  MUSCULOSKELETAL: No joint pain or swelling; No muscle, back, or extremity pain  PSYCHIATRIC: No depression, anxiety, mood swings, or difficulty sleeping  HEME/LYMPH: No easy bruising, or bleeding gums  ALLERY AND IMMUNOLOGIC: No hives or eczema  [X] all other systems negative or as per HPI

## 2018-12-21 ENCOUNTER — APPOINTMENT (OUTPATIENT)
Dept: PULMONOLOGY | Facility: CLINIC | Age: 68
End: 2018-12-21

## 2018-12-21 ENCOUNTER — TRANSCRIPTION ENCOUNTER (OUTPATIENT)
Age: 68
End: 2018-12-21

## 2018-12-21 LAB
ALBUMIN SERPL ELPH-MCNC: 3.5 G/DL — SIGNIFICANT CHANGE UP (ref 3.3–5)
ALP SERPL-CCNC: 126 U/L — HIGH (ref 40–120)
ALT FLD-CCNC: 32 U/L — SIGNIFICANT CHANGE UP (ref 10–45)
ANION GAP SERPL CALC-SCNC: 20 MMOL/L — HIGH (ref 5–17)
AST SERPL-CCNC: 39 U/L — SIGNIFICANT CHANGE UP (ref 10–40)
BASOPHILS # BLD AUTO: 0.01 K/UL — SIGNIFICANT CHANGE UP (ref 0–0.2)
BASOPHILS NFR BLD AUTO: 0.2 % — SIGNIFICANT CHANGE UP (ref 0–2)
BILIRUB SERPL-MCNC: 0.3 MG/DL — SIGNIFICANT CHANGE UP (ref 0.2–1.2)
BLD GP AB SCN SERPL QL: NEGATIVE — SIGNIFICANT CHANGE UP
BUN SERPL-MCNC: 126 MG/DL — HIGH (ref 7–23)
CALCIUM SERPL-MCNC: 7.7 MG/DL — LOW (ref 8.4–10.5)
CHLORIDE SERPL-SCNC: 107 MMOL/L — SIGNIFICANT CHANGE UP (ref 96–108)
CO2 SERPL-SCNC: 19 MMOL/L — LOW (ref 22–31)
CREAT SERPL-MCNC: 10.54 MG/DL — HIGH (ref 0.5–1.3)
EOSINOPHIL # BLD AUTO: 0.09 K/UL — SIGNIFICANT CHANGE UP (ref 0–0.5)
EOSINOPHIL NFR BLD AUTO: 1.7 % — SIGNIFICANT CHANGE UP (ref 0–6)
FERRITIN SERPL-MCNC: 368 NG/ML — SIGNIFICANT CHANGE UP (ref 30–400)
GLUCOSE BLDC GLUCOMTR-MCNC: 106 MG/DL — HIGH (ref 70–99)
GLUCOSE BLDC GLUCOMTR-MCNC: 156 MG/DL — HIGH (ref 70–99)
GLUCOSE BLDC GLUCOMTR-MCNC: 168 MG/DL — HIGH (ref 70–99)
GLUCOSE BLDC GLUCOMTR-MCNC: 94 MG/DL — SIGNIFICANT CHANGE UP (ref 70–99)
GLUCOSE SERPL-MCNC: 110 MG/DL — HIGH (ref 70–99)
HBA1C BLD-MCNC: 5.9 % — HIGH (ref 4–5.6)
HCT VFR BLD CALC: 18.8 % — CRITICAL LOW (ref 39–50)
HCT VFR BLD CALC: 20.8 % — CRITICAL LOW (ref 39–50)
HGB BLD-MCNC: 6.1 G/DL — CRITICAL LOW (ref 13–17)
HGB BLD-MCNC: 6.8 G/DL — CRITICAL LOW (ref 13–17)
IMM GRANULOCYTES NFR BLD AUTO: 0.4 % — SIGNIFICANT CHANGE UP (ref 0–1.5)
IRON SATN MFR SERPL: 17 % — SIGNIFICANT CHANGE UP (ref 16–55)
IRON SATN MFR SERPL: 36 UG/DL — LOW (ref 45–165)
LYMPHOCYTES # BLD AUTO: 0.64 K/UL — LOW (ref 1–3.3)
LYMPHOCYTES # BLD AUTO: 12.4 % — LOW (ref 13–44)
MAGNESIUM SERPL-MCNC: 2.2 MG/DL — SIGNIFICANT CHANGE UP (ref 1.6–2.6)
MCHC RBC-ENTMCNC: 24.4 PG — LOW (ref 27–34)
MCHC RBC-ENTMCNC: 25.3 PG — LOW (ref 27–34)
MCHC RBC-ENTMCNC: 32.4 GM/DL — SIGNIFICANT CHANGE UP (ref 32–36)
MCHC RBC-ENTMCNC: 32.6 GM/DL — SIGNIFICANT CHANGE UP (ref 32–36)
MCV RBC AUTO: 75.2 FL — LOW (ref 80–100)
MCV RBC AUTO: 77.4 FL — LOW (ref 80–100)
MONOCYTES # BLD AUTO: 0.25 K/UL — SIGNIFICANT CHANGE UP (ref 0–0.9)
MONOCYTES NFR BLD AUTO: 4.8 % — SIGNIFICANT CHANGE UP (ref 2–14)
NEUTROPHILS # BLD AUTO: 4.15 K/UL — SIGNIFICANT CHANGE UP (ref 1.8–7.4)
NEUTROPHILS NFR BLD AUTO: 80.5 % — HIGH (ref 43–77)
PHOSPHATE SERPL-MCNC: 8.4 MG/DL — HIGH (ref 2.5–4.5)
PLATELET # BLD AUTO: 222 K/UL — SIGNIFICANT CHANGE UP (ref 150–400)
PLATELET # BLD AUTO: 241 K/UL — SIGNIFICANT CHANGE UP (ref 150–400)
POTASSIUM SERPL-MCNC: 5.2 MMOL/L — SIGNIFICANT CHANGE UP (ref 3.5–5.3)
POTASSIUM SERPL-SCNC: 5.2 MMOL/L — SIGNIFICANT CHANGE UP (ref 3.5–5.3)
PROT SERPL-MCNC: 6.7 G/DL — SIGNIFICANT CHANGE UP (ref 6–8.3)
RBC # BLD: 2.5 M/UL — LOW (ref 4.2–5.8)
RBC # BLD: 2.68 M/UL — LOW (ref 4.2–5.8)
RBC # FLD: 21.3 % — HIGH (ref 10.3–14.5)
RBC # FLD: 22.4 % — HIGH (ref 10.3–14.5)
RH IG SCN BLD-IMP: POSITIVE — SIGNIFICANT CHANGE UP
SODIUM SERPL-SCNC: 146 MMOL/L — HIGH (ref 135–145)
TIBC SERPL-MCNC: 212 UG/DL — LOW (ref 220–430)
TROPONIN T, HIGH SENSITIVITY RESULT: 487 NG/L — HIGH (ref 0–51)
TROPONIN T, HIGH SENSITIVITY RESULT: 520 NG/L — HIGH (ref 0–51)
UIBC SERPL-MCNC: 176 UG/DL — SIGNIFICANT CHANGE UP (ref 110–370)
WBC # BLD: 5.16 K/UL — SIGNIFICANT CHANGE UP (ref 3.8–10.5)
WBC # BLD: 5.2 K/UL — SIGNIFICANT CHANGE UP (ref 3.8–10.5)
WBC # FLD AUTO: 5.16 K/UL — SIGNIFICANT CHANGE UP (ref 3.8–10.5)
WBC # FLD AUTO: 5.2 K/UL — SIGNIFICANT CHANGE UP (ref 3.8–10.5)

## 2018-12-21 PROCEDURE — 93306 TTE W/DOPPLER COMPLETE: CPT | Mod: 26

## 2018-12-21 PROCEDURE — 93970 EXTREMITY STUDY: CPT | Mod: 26

## 2018-12-21 RX ORDER — ACETAMINOPHEN 500 MG
650 TABLET ORAL ONCE
Qty: 0 | Refills: 0 | Status: COMPLETED | OUTPATIENT
Start: 2018-12-21 | End: 2018-12-21

## 2018-12-21 RX ORDER — HYDRALAZINE HCL 50 MG
10 TABLET ORAL ONCE
Qty: 0 | Refills: 0 | Status: COMPLETED | OUTPATIENT
Start: 2018-12-21 | End: 2018-12-21

## 2018-12-21 RX ORDER — METOPROLOL TARTRATE 50 MG
100 TABLET ORAL
Qty: 0 | Refills: 0 | Status: DISCONTINUED | OUTPATIENT
Start: 2018-12-21 | End: 2018-12-24

## 2018-12-21 RX ORDER — ACETAMINOPHEN 500 MG
1000 TABLET ORAL ONCE
Qty: 0 | Refills: 0 | Status: COMPLETED | OUTPATIENT
Start: 2018-12-21 | End: 2018-12-21

## 2018-12-21 RX ADMIN — Medication 100 MILLIGRAM(S): at 14:05

## 2018-12-21 RX ADMIN — Medication 60 MILLIGRAM(S): at 05:38

## 2018-12-21 RX ADMIN — Medication 650 MILLIGRAM(S): at 01:33

## 2018-12-21 RX ADMIN — Medication 10 MILLIGRAM(S): at 09:50

## 2018-12-21 RX ADMIN — Medication 400 MILLIGRAM(S): at 04:08

## 2018-12-21 RX ADMIN — Medication 60 MILLIGRAM(S): at 18:14

## 2018-12-21 RX ADMIN — ISOSORBIDE DINITRATE 10 MILLIGRAM(S): 5 TABLET ORAL at 05:37

## 2018-12-21 RX ADMIN — Medication 81 MILLIGRAM(S): at 11:58

## 2018-12-21 RX ADMIN — Medication 667 MILLIGRAM(S): at 12:55

## 2018-12-21 RX ADMIN — Medication 1000 MILLIGRAM(S): at 04:42

## 2018-12-21 RX ADMIN — Medication 1: at 11:57

## 2018-12-21 RX ADMIN — Medication 100 MILLIGRAM(S): at 21:44

## 2018-12-21 RX ADMIN — Medication 667 MILLIGRAM(S): at 09:31

## 2018-12-21 RX ADMIN — HEPARIN SODIUM 5000 UNIT(S): 5000 INJECTION INTRAVENOUS; SUBCUTANEOUS at 05:37

## 2018-12-21 RX ADMIN — CLOPIDOGREL BISULFATE 75 MILLIGRAM(S): 75 TABLET, FILM COATED ORAL at 11:58

## 2018-12-21 RX ADMIN — Medication 100 MILLIGRAM(S): at 18:22

## 2018-12-21 RX ADMIN — HEPARIN SODIUM 5000 UNIT(S): 5000 INJECTION INTRAVENOUS; SUBCUTANEOUS at 21:44

## 2018-12-21 RX ADMIN — Medication 10 MILLIGRAM(S): at 21:07

## 2018-12-21 RX ADMIN — ISOSORBIDE DINITRATE 10 MILLIGRAM(S): 5 TABLET ORAL at 14:05

## 2018-12-21 RX ADMIN — Medication 100 MILLIGRAM(S): at 05:37

## 2018-12-21 RX ADMIN — HEPARIN SODIUM 5000 UNIT(S): 5000 INJECTION INTRAVENOUS; SUBCUTANEOUS at 14:05

## 2018-12-21 RX ADMIN — Medication 50 MILLIGRAM(S): at 05:37

## 2018-12-21 RX ADMIN — ATORVASTATIN CALCIUM 40 MILLIGRAM(S): 80 TABLET, FILM COATED ORAL at 21:44

## 2018-12-21 RX ADMIN — INSULIN GLARGINE 8 UNIT(S): 100 INJECTION, SOLUTION SUBCUTANEOUS at 21:44

## 2018-12-21 RX ADMIN — Medication 650 MILLIGRAM(S): at 00:50

## 2018-12-21 RX ADMIN — ISOSORBIDE DINITRATE 10 MILLIGRAM(S): 5 TABLET ORAL at 21:44

## 2018-12-21 RX ADMIN — Medication 667 MILLIGRAM(S): at 18:18

## 2018-12-21 NOTE — DISCHARGE NOTE ADULT - MEDICATION SUMMARY - MEDICATIONS TO CHANGE
I will SWITCH the dose or number of times a day I take the medications listed below when I get home from the hospital:    Procrit 20,000 units/mL injectable solution  -- injectable once a week

## 2018-12-21 NOTE — DISCHARGE NOTE ADULT - HOSPITAL COURSE
68M PMH CAD/CABG, HFrEF (30%), T2DM, HTN, CKD 5 (not on HD), aortic valve replacement, chronic anemia occasionally requiring transfusions presents with SOB. Has had progressively worsening SOB and SZYMANSKI over the past 2 weeks. Symptoms significantly worsened this week with orthopnea and LE edema. Went to PMD for evaluation and he was noted to be in rapid afib in the office, rate 140s-150s. By time of arrival in ED, had spontaneously converted back to rate controlled sinus rhythm. O2sat 89% on RA, improved with nasal cannula. His wife believes he is on too many medications. For instance, she only gives him hydralazine once daily when he has been prescribed TID multiple times. Seems he is taking lasix 80mg daily (was discharged on BID dosing last admission). Never started isordil as recommended.   Admitted with Respiratory failure with hypoxia due to acute decompensated systolic heart failure with hypertensive emergency, demand ischemia, worsening renal failure, transient afib RVR.  Acute systolic heart failure - likely due to med non-adherence, requiring nasal cannula oxygen, patient reports feeling a little better after lasix in the ED. Start lasix 60mg IV BID and transitioned to PO Lasix  Hypertensive urgency - B/P controlled with labetalol 300mg po tid, hydralazine 100mg TID, isordil 10mg TID,  lasix for CHF. labetalol 300 milliGRAM(s) Oral three times a day Allergy to ACE - Will not start ACE or ARB  Patient also found to have Elevated troponin. hsTrop to 492, up from high 300s last admission, due  worsening renal failure  Pt with Paroxysmal A-fib - converted to sinus rhythm on way to the hospital, no history of afib. Patient does not seem to have chest pain, palpitations, or dizziness related to it, so unclear if he has been having pAF for a while.    Renal consulted for CKD 5 now presents with uremia. He also has hypertensive urgency with accompanying severe pulmonary hypertension.  Goals of therapy are blood pressure control and dialysis.  Started hemodialysis via [facility] on 12/26 converted to   [facility] permacath placement 12/31/18 and s/p LUE AVF creation with Dr. Dotson on 1/3/18  Pt with chronic Anemia - s/p transfusion, on Epogen 10,000 units IV at each HD  -------------------------------------------

## 2018-12-21 NOTE — DISCHARGE NOTE ADULT - DURABLE MEDICAL EQUIPMENT AGENCY
[facility] @ [place], [phone]. First treatment is Tuesday, January 8, 2019 at 6:50 PM

## 2018-12-21 NOTE — PROGRESS NOTE ADULT - SUBJECTIVE AND OBJECTIVE BOX
CHIEF COMPLAINT:  68M PMH CAD/CABG, HFrEF (30%), T2DM, HTN, CKD 5 (not on HD), aortic valve replacement, chronic anemia occasionally requiring transfusions presents with SOB. Has had progressively worsening SOB and SZYMANSKI over the past 2 weeks. History is supplemented by wife as she manages most of his medical care and gives his medications. Symptoms significantly worsened this week with orthopnea and LE edema. No recent URIs, fevers, chills, cough, sputum production, chest pain, palpitations, NVD, abdominal pain, dysuria, decreased urination. Went to PMD for evaluation and he was noted to be in rapid afib in the office, rate 140s-150s. By time of arrival in ED, had spontaneously converted back to rate controlled sinus rhythm. O2sat 89% on RA, improved with nasal cannula. His wife believes he is on too many medications. For instance, she only gives him hydralazine once daily when he has been prescribed TID multiple times. Seems he is taking lasix 80mg daily (was discharged on BID dosing last admission). Never started isordil as recommended.   SUBJECTIVE:     REVIEW OF SYSTEMS:   CONSTITUTIONAL: +fatigue; No fever, weight loss  	EYES: No eye pain, visual disturbances, or discharge  	ENMT:  No difficulty hearing, tinnitus, vertigo; No sinus or throat pain  	NECK: No pain or stiffness  	RESPIRATORY: +SOB No cough, wheezing, chills or hemoptysis;  	CARDIOVASCULAR: +LE swelling, SZYMANSKI, orthopnea; No chest pain, palpitations, dizziness  	GASTROINTESTINAL: No abdominal or epigastric pain. No nausea, vomiting, or hematemesis; No diarrhea or constipation. No melena or hematochezia.  	GENITOURINARY: No dysuria, frequency, hematuria, or incontinence  	NEUROLOGICAL: No headaches, memory loss, loss of strength, numbness, or tremors  	SKIN: No itching, burning, rashes, or lesions     Vital Signs Last 24 Hrs  T(C): 36.7 (21 Dec 2018 14:06), Max: 36.7 (21 Dec 2018 14:06)  T(F): 98 (21 Dec 2018 14:06), Max: 98 (21 Dec 2018 14:06)  HR: 63 (21 Dec 2018 14:06) (63 - 80)  BP: 182/92 (21 Dec 2018 14:06) (172/118 - 216/124)  BP(mean): --  RR: 18 (21 Dec 2018 14:06) (18 - 24)  SpO2: 97% (21 Dec 2018 14:06) (89% - 100%)    I&O's Summary    20 Dec 2018 07:01  -  21 Dec 2018 07:00  --------------------------------------------------------  IN: 0 mL / OUT: 250 mL / NET: -250 mL        CAPILLARY BLOOD GLUCOSE      POCT Blood Glucose.: 168 mg/dL (21 Dec 2018 11:45)  POCT Blood Glucose.: 106 mg/dL (21 Dec 2018 09:29)  POCT Blood Glucose.: 246 mg/dL (20 Dec 2018 22:18)      PHYSICAL EXAM:     GENERAL: NAD, well-developed, sleepy  	HEAD:  Atraumatic, Normocephalic  	EYES: EOMI, PERRLA, conjunctiva and sclera clear  	NECK: +JVD  	CHEST/LUNG: Clear to auscultation bilaterally; No wheeze  	HEART: crackles b/l lower lobes  	ABDOMEN: Soft, Nontender, Nondistended; Bowel sounds present  	EXTREMITIES:  2+ pitting edema peripheral; 2+ Peripheral Pulses, No clubbing, cyanosis  	PSYCH: AAOx3  	NEUROLOGY: non-focal  SKIN: No rashes or lesions    MEDICATIONS:  MEDICATIONS  (STANDING):  aspirin enteric coated 81 milliGRAM(s) Oral daily  atorvastatin 40 milliGRAM(s) Oral at bedtime  calcium acetate 667 milliGRAM(s) Oral three times a day with meals  clopidogrel Tablet 75 milliGRAM(s) Oral daily  dextrose 5%. 1000 milliLiter(s) (50 mL/Hr) IV Continuous <Continuous>  dextrose 50% Injectable 12.5 Gram(s) IV Push once  dextrose 50% Injectable 25 Gram(s) IV Push once  dextrose 50% Injectable 25 Gram(s) IV Push once  furosemide   Injectable 60 milliGRAM(s) IV Push two times a day  heparin  Injectable 5000 Unit(s) SubCutaneous every 8 hours  hydrALAZINE 100 milliGRAM(s) Oral three times a day  insulin glargine Injectable (LANTUS) 8 Unit(s) SubCutaneous at bedtime  insulin lispro (HumaLOG) corrective regimen sliding scale   SubCutaneous three times a day before meals  insulin lispro (HumaLOG) corrective regimen sliding scale   SubCutaneous at bedtime  isosorbide   dinitrate Tablet (ISORDIL) 10 milliGRAM(s) Oral three times a day  metoprolol succinate  milliGRAM(s) Oral two times a day      LABS: All Labs Reviewed:                        6.8    5.2   )-----------( 222      ( 21 Dec 2018 12:09 )             20.8     12-21    146<H>  |  107  |  126<H>  ----------------------------<  110<H>  5.2   |  19<L>  |  10.54<H>    Ca    7.7<L>      21 Dec 2018 06:08  Phos  8.4     12-21  Mg     2.2     12-21    TPro  6.7  /  Alb  3.5  /  TBili  0.3  /  DBili  x   /  AST  39  /  ALT  32  /  AlkPhos  126<H>  12-21    PT/INR - ( 20 Dec 2018 16:27 )   PT: 12.5 sec;   INR: 1.08 ratio         PTT - ( 20 Dec 2018 16:27 )  PTT:35.5 sec      Blood Culture:   Urine Culture      RADIOLOGY/EKG:    ASSESSMENT AND PLAN:  68M PMH CAD/CABG, HFrEF (30%), T2DM, HTN, CKD 5 (not on HD), aortic valve replacement, chronic anemia occasionally requiring transfusions presents with SOB, admitted with acute decompensated systolic heart failure with hypertensive emergency, demand ischemia, worsening renal failure, transient afib RVR.     Problem/Plan - 1:  ·  Problem: Acute systolic heart failure.  Plan: --from med non-adherence, especially with uncontrolled HTN, and worsening CKD5  --requiring nasal cannula oxygen  --patient reports feeling a little better after lasix in the ED. Start lasix 60mg IV BID.  --cardiology consult as per Dr. Swartz in the morning  --strict I/Os, daily weights  --may ultimately need initiation of HD soon.     Problem/Plan - 2:  ·  Problem: Hypertensive emergency.  Plan: --poor adherence/medical literacy regarding need for medications  --BPs remain elevated to 210/120 at time of my eval. Stat PO hydralazine and isordil.  --Will start hydralazine 100mg TID, isordil 10mg TID. On IV lasix for CHF.   --Restart home metoprolol, may need to be switched to coreg. Previously was on amlodipine too.    Problem/Plan - 3:  ·  Problem: Elevated troponin.  Plan: --hsTrop to 492, up from high 300s last admission.   --likely type 2 NSTEMI from CHF exacerbation and worsening renal failure  --will trend troponin  --c/w DAPT  --restart statin (hasn't been taking).     Problem/Plan - 4:  ·  Problem: Respiratory failure with hypoxia.  Plan: --from CHF exacerbation and volume overload  --diuresis  --oxygen PRN.     Problem/Plan - 5:  ·  Problem: Paroxysmal A-fib.  Plan: --converted to sinus rhythm on way to the hospital  --no history of afib. Patient does not seem to have chest pain, palpitations, or dizziness related to it, so unclear if he has been having pAF for a while.   --Patient and his wife are extremely hesitant about starting anticoagulation at this time. No history of bleeding, but they have somewhat poor health literacy regarding medication interactions. I explained the risks and benefits of starting or deferring anticoagulation in the setting afib including risk of catastrophic CVA, and they wish to think on the matter further. Will hold off on A/C at the moment.     Problem/Plan - 6:  Problem: CKD (chronic kidney disease) stage 5, GFR less than 15 ml/min. Plan: --Per wife, they have been in discussion regarding the probability of needing to start dialysis some point soon. No definitive plans made. They have intermittent follow up with nephrology, not seeing anyone consistent right now.   --Needs lasix now for HTN emergency and respiratory failure.   --Nephrology consult as per Dr. Swartz in the morning.  --No urgent indications for HD at the moment as volume able to me managed with lasix and he is compensated from his uremia.    Problem/Plan - 7:  ·  Problem: T2DM (type 2 diabetes mellitus).  Plan: --On 70/30 at home. Start low dose lantus tonight, HISS.     Problem/Plan - 8:  ·  Problem: Chronic anemia.  Plan: --trend CBC, may need transfusion while admitted.       DVT PPX:    ADVANCED DIRECTIVE:    DISPOSITION: CHIEF COMPLAINT: pt feel better wife at bed side off o2 his sat drop to 84-85  68M PMH CAD/CABG, HFrEF (30%), T2DM, HTN, CKD 5 (not on HD), aortic valve replacement, chronic anemia occasionally requiring transfusions presents with SOB. Has had progressively worsening SOB and SZYMANSKI over the past 2 weeks. History is supplemented by wife as she manages most of his medical care and gives his medications. Symptoms significantly worsened this week with orthopnea and LE edema. No recent URIs, fevers, chills, cough, sputum production, chest pain, palpitations, NVD, abdominal pain, dysuria, decreased urination. Went to PMD for evaluation and he was noted to be in rapid afib in the office, rate 140s-150s. By time of arrival in ED, had spontaneously converted back to rate controlled sinus rhythm. O2sat 89% on RA, improved with nasal cannula. His wife believes he is on too many medications. For instance, she only gives him hydralazine once daily when he has been prescribed TID multiple times. Seems he is taking lasix 80mg daily (was discharged on BID dosing last admission)..   SUBJECTIVE:     REVIEW OF SYSTEMS:   CONSTITUTIONAL: +fatigue; No fever, weight loss  	EYES: No eye pain, visual disturbances, or discharge  	ENMT:  No difficulty hearing, tinnitus, vertigo; No sinus or throat pain  	NECK: No pain or stiffness  	RESPIRATORY: +SOB No cough, wheezing, chills or hemoptysis;  	CARDIOVASCULAR: +LE swelling, SZYMANSKI, orthopnea; No chest pain, palpitations, dizziness  	GASTROINTESTINAL: No abdominal or epigastric pain. No nausea, vomiting, or hematemesis; No diarrhea or constipation. No melena or hematochezia.  	GENITOURINARY: No dysuria, frequency, hematuria, or incontinence  	NEUROLOGICAL: No headaches, memory loss, loss of strength, numbness, or tremors  	SKIN: No itching, burning, rashes, or lesions     Vital Signs Last 24 Hrs  T(C): 36.7 (21 Dec 2018 14:06), Max: 36.7 (21 Dec 2018 14:06)  T(F): 98 (21 Dec 2018 14:06), Max: 98 (21 Dec 2018 14:06)  HR: 63 (21 Dec 2018 14:06) (63 - 80)  BP: 182/92 (21 Dec 2018 14:06) (172/118 - 216/124)  BP(mean): --  RR: 18 (21 Dec 2018 14:06) (18 - 24)  SpO2: 97% (21 Dec 2018 14:06) (89% - 100%)    I&O's Summary    20 Dec 2018 07:01  -  21 Dec 2018 07:00  --------------------------------------------------------  IN: 0 mL / OUT: 250 mL / NET: -250 mL        CAPILLARY BLOOD GLUCOSE      POCT Blood Glucose.: 168 mg/dL (21 Dec 2018 11:45)  POCT Blood Glucose.: 106 mg/dL (21 Dec 2018 09:29)  POCT Blood Glucose.: 246 mg/dL (20 Dec 2018 22:18)      PHYSICAL EXAM:     GENERAL: NAD, well-developed, sleepy  	HEAD:  Atraumatic, Normocephalic  	EYES: EOMI, PERRLA, conjunctiva and sclera clear  	NECK: +JVD  	CHEST/LUNG: Clear to auscultation bilaterally; No wheeze  	HEART: crackles b/l lower lobes  	ABDOMEN: Soft, Nontender, Nondistended; Bowel sounds present  	EXTREMITIES:  2+ pitting edema peripheral; 2+ Peripheral Pulses, No clubbing, cyanosis  	PSYCH: AAOx3  	NEUROLOGY: non-focal  SKIN: No rashes or lesions    MEDICATIONS:  MEDICATIONS  (STANDING):  aspirin enteric coated 81 milliGRAM(s) Oral daily  atorvastatin 40 milliGRAM(s) Oral at bedtime  calcium acetate 667 milliGRAM(s) Oral three times a day with meals  clopidogrel Tablet 75 milliGRAM(s) Oral daily  dextrose 5%. 1000 milliLiter(s) (50 mL/Hr) IV Continuous <Continuous>  dextrose 50% Injectable 12.5 Gram(s) IV Push once  dextrose 50% Injectable 25 Gram(s) IV Push once  dextrose 50% Injectable 25 Gram(s) IV Push once  furosemide   Injectable 60 milliGRAM(s) IV Push two times a day  heparin  Injectable 5000 Unit(s) SubCutaneous every 8 hours  hydrALAZINE 100 milliGRAM(s) Oral three times a day  insulin glargine Injectable (LANTUS) 8 Unit(s) SubCutaneous at bedtime  insulin lispro (HumaLOG) corrective regimen sliding scale   SubCutaneous three times a day before meals  insulin lispro (HumaLOG) corrective regimen sliding scale   SubCutaneous at bedtime  isosorbide   dinitrate Tablet (ISORDIL) 10 milliGRAM(s) Oral three times a day  metoprolol succinate  milliGRAM(s) Oral two times a day      LABS: All Labs Reviewed:                        6.8    5.2   )-----------( 222      ( 21 Dec 2018 12:09 )             20.8     12-21    146<H>  |  107  |  126<H>  ----------------------------<  110<H>  5.2   |  19<L>  |  10.54<H>    Ca    7.7<L>      21 Dec 2018 06:08  Phos  8.4     12-21  Mg     2.2     12-21    TPro  6.7  /  Alb  3.5  /  TBili  0.3  /  DBili  x   /  AST  39  /  ALT  32  /  AlkPhos  126<H>  12-21    PT/INR - ( 20 Dec 2018 16:27 )   PT: 12.5 sec;   INR: 1.08 ratio         PTT - ( 20 Dec 2018 16:27 )  PTT:35.5 sec      Blood Culture:   Urine Culture      RADIOLOGY/EKG:    ASSESSMENT AND PLAN:  68M PMH CAD/CABG, HFrEF (30%), T2DM, HTN, CKD 5 (not on HD), aortic valve replacement, chronic anemia occasionally requiring transfusions presents with SOB, admitted with acute decompensated systolic heart failure with hypertensive emergency, demand ischemia, worsening renal failure, transient afib RVR.     Problem/Plan - 1:  ·  Problem: Acute systolic heart failure.  Plan: --from med non-adherence, especially with uncontrolled HTN, and worsening CKD5  --requiring nasal cannula oxygen  --patient reports feeling a little better after lasix in the ED. Start lasix 60mg IV BID.  --cardiology consult  called  DR Borjas  --strict I/Os, daily weights   refus  HD no need for renal consult    Problem/Plan - 2:  · on  hydralazine 100mg TID, isordil 10mg TID. On IV lasix for CHF.   --Restart home metoprolol, may need to be switched to coreg. Previously was on amlodipine too.    Problem/Plan - 3:  ·  Problem: Elevated troponin.  Plan: --hsTrop to 492, up from high 300s last admission.    due  worsening renal failure  --will trend troponin  --c/w DAPT  --restart statin (hasn't been taking).     Problem/Plan - 4:  ·  Problem: Respiratory failure with hypoxia.  Plan: --from CHF exacerbation and volume overload  --diuresis  --oxygen PRN.  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  Plan: --converted to sinus rhythm on way to the hospital  --no history of afib. Patient does not seem to have chest pain, palpitations, or dizziness related to it, so unclear if he has been having pAF for a while.   --Patient and his wife are extremely hesitant about starting anticoagulation at this time. No history of bleeding, but they have somewhat poor health literacy regarding medication interactions. I explained the risks and benefits of starting or deferring anticoagulation in the setting afib including risk of catastrophic CVA, and they wish to think on the matter further. Will hold off on A/C at the moment.     Problem/Plan - 6:  Problem: CKD (chronic kidney disease) stage 5, GFR less than 15 ml/min. Plan: --Per wife, they have been in discussion regarding the probability of needing to start dialysis some point soon. No definitive plans made. They have intermittent follow up with nephrology, not seeing anyone consistent right now.   --Needs lasix now for HTN emergency and respiratory failure.   -  NO need for-Nephrology consult .       Problem/Plan - 7:  ·  Problem: T2DM (type 2 diabetes mellitus).  Plan: --On 70/30 at home. Start low dose lantus tonight, HISS.     Problem/Plan - 8:  ·  Problem: Chronic anemia.  Plan: --trend CBC, S/P  transfusion  .       DVT PPX:    ADVANCED DIRECTIVE:    DISPOSITION:

## 2018-12-21 NOTE — DISCHARGE NOTE ADULT - CARE PLAN
Principal Discharge DX:	Acute systolic heart failure  Goal:	no further exacerbations  Assessment and plan of treatment:	Weigh yourself daily.  If you gain 3lbs in 3 days, or 5lbs in a week call your Health Care Provider.  Do not eat or drink foods containing more than 2000mg of salt (sodium) in your diet every day.  Call your Health Care Provider if you have any swelling or increased swelling in your feet, ankles, and/or stomach.  Take all of your medication as directed.  If you become dizzy call your Health Care Provider.  Secondary Diagnosis:	CKD (chronic kidney disease) stage 5, GFR less than 15 ml/min  Assessment and plan of treatment:	New to HD  Follow up with Dr. Dotson in 2 weeks  Must be compliant with hemodialysis  Hemodialysis is the most common method used to treat advanced and permanent kidney failure. But even with better procedures and equipment, hemodialysis is still a complicated and inconvenient therapy that requires a coordinated effort from your whole health care team, including your nephrologist, dialysis nurse, dialysis technician, dietitian, and . The most important members of your health care team are you and your family.   Healthy kidneys clean your blood by removing excess fluid, minerals, and wastes. When your kidneys fail, harmful wastes build up in your body, your blood pressure may rise, and your body may retain excess fluid and not make enough red blood cells. When this happens, you need treatment to replace the work of your failed kidneys. In hemodialysis, your blood is allowed to flow, a few ounces at a time, through a special filter that removes wastes and extra fluids. The clean blood is then returned to your body. One of the biggest adjustments you must make when you start hemodialysis treatments is following a strict schedule. Most patients go to a dialysis center three times a week for 3 to 5 or more hours each visit.   Some of the more common conditions caused by kidney failure are extreme tiredness, bone problems, joint problems, itching, and "restless legs”.  Many people treated with hemodialysis complain of itchy skin, which is often worse during or just after treatment.  Patients on dialysis often have insomnia, and some people have a specific problem called the sleep apnea syndrome.  Over time, these sleep disturbances can lead to "day-night reversal" (insomnia at night, sleepiness during the day), headache, depression, and decreased alertness.   Sleep disorders may seem unimportant, but they can impair your quality of life. Don't hesitate to raise these problems with your nurse, doctor, or   Secondary Diagnosis:	Hypertensive emergency  Assessment and plan of treatment:	Now controlled  Take medications for your blood pressure as recommended.  Eat a heart healthy diet that is low in saturated fats and salt, and includes whole grains, fruits, vegetables and lean protein   Exercise regularly (consult with your physician or cardiologist first); maintain a heart healthy weight.   If you smoke - quit (A resource to help you stop smoking is the [facility] – phone number 072-051-6073.). Continue to follow with your primary physician or cardiologist.   Seek medical help for dizziness, Lightheadedness, Blurry vision, Headache, Chest pain, Shortness of breath  Follow up with your medical doctor to establish long term blood pressure treatment goals.  Secondary Diagnosis:	Paroxysmal A-fib  Assessment and plan of treatment:	converted to sinus rhythm on way to the hospital  --no history of afib. Patient does not seem to have chest pain, palpitations, or dizziness related to it, so unclear if he has been having pAF for a while.  Secondary Diagnosis:	Elevated troponin  Assessment and plan of treatment:	No further chest pain  Elevated troponin in the setting of worsening kidney function  Secondary Diagnosis:	T2DM (type 2 diabetes mellitus)  Assessment and plan of treatment:	Follow up with PMD in 1 week  HgA1C this admission - 5.9  Make sure you get your HgA1c checked every three months.  If you take oral diabetes medications, check your blood glucose two times a day.  If you take insulin, check your blood glucose before meals and at bedtime.  It's important not to skip any meals.  Keep a log of your blood glucose results and always take it with you to your doctor appointments.  Keep a list of your current medications including injectables and over the counter medications and bring this medication list with you to all your doctor appointments.  If you have not seen your ophthalmologist this year call for appointment.  Check your feet daily for redness, sores, or openings. Do not self treat. If no improvement in two days call your primary care physician for an appointment.  Low blood sugar (hypoglycemia) is a blood sugar below 70mg/dl. Check your blood sugar if you feel signs/symptoms of hypoglycemia. If your blood sugar is below 70 take 15 grams of carbohydrates (ex 4 oz of apple juice, 3-4 glucose tablets, or 4-6 oz of regular soda) wait 15 minutes and repeat blood sugar to make sure it comes up above 70.  If your blood sugar is above 70 and you are due for a meal, have a meal.  If you are not due for a meal have a snack.  This snack helps keeps your blood sugar at a safe range.  Secondary Diagnosis:	Anemia due to stage 5 chronic kidney disease, not on chronic dialysis  Assessment and plan of treatment:	S/p 3 units of PRBC while in hospital  Monitor CBC

## 2018-12-21 NOTE — DISCHARGE NOTE ADULT - PATIENT PORTAL LINK FT
You can access the [facility], offered by [facility], by registering with the following website: [email or web address]

## 2018-12-21 NOTE — PROVIDER CONTACT NOTE (OTHER) - ACTION/TREATMENT ORDERED:
Hydralazine 10mg IVP x1 dose given. BP remain elevated. Patient asymptomatic. Will continue to monitor.

## 2018-12-21 NOTE — DISCHARGE NOTE ADULT - MEDICATION SUMMARY - MEDICATIONS TO TAKE
I will START or STAY ON the medications listed below when I get home from the hospital:    Left radiocephalic fistula creation and revision of fistula daily dressing change  -- Indication: For dressing    aspirin 81 mg oral tablet  -- 1 tab(s) by mouth once a day  -- Indication: For Preventive measure    isosorbide dinitrate 10 mg oral tablet  -- 1 tab(s) by mouth 3 times a day  -- Indication: For CAD    NovoLOG Mix 70/30 subcutaneous suspension  -- 10 unit(s) subcutaneous once a day (in the morning)  -- Indication: For T2DM (type 2 diabetes mellitus)    atorvastatin 40 mg oral tablet  -- 1 tab(s) by mouth once a day (at bedtime)  -- Indication: For Hyperlipidemia    labetalol 300 mg oral tablet  -- 1 tab(s) by mouth 3 times a day  -- Indication: For HTN (hypertension)    furosemide 80 mg oral tablet  -- 1 tab(s) by mouth once a day    *please see internal stephen*  -- Indication: For Acute systolic heart failure    Procrit 10,000 units/mL preservative-free injectable solution  -- 1 milliliter(s) injectable Tuesday, Thursday, Saturday  -- Indication: For Anemia due to stage 5 chronic kidney disease, not on chronic dialysis    docusate sodium 100 mg oral capsule  -- 1 cap(s) by mouth 2 times a day  -- Indication: For Constipation    Senna 8.6 mg oral tablet  -- 1 tab(s) by mouth once a day (at bedtime), As Needed  -- Indication: For Constipation    calcium acetate 667 mg oral tablet  -- 1 tab(s) by mouth 3 times a day   -- Indication: For Supplement    hydrALAZINE 100 mg oral tablet  -- 1 tab(s) by mouth 3 times a day  -- Indication: For HTN (hypertension)    doxercalciferol 2 mcg/mL injectable solution  -- 1 milliliter(s) injectable Tuesday, Thursday, Sunday   -- Indication: For Supplement I will START or STAY ON the medications listed below when I get home from the hospital:    Left radiocephalic fistula creation and revision of fistula daily dressing change  -- Indication: For dressing    aspirin 81 mg oral tablet  -- 1 tab(s) by mouth once a day  -- Indication: For Preventive measure    isosorbide dinitrate 10 mg oral tablet  -- 1 tab(s) by mouth 3 times a day  -- Indication: For CAD    NovoLOG Mix 70/30 subcutaneous suspension  -- 10 unit(s) subcutaneous once a day (in the morning)  -- Indication: For T2DM (type 2 diabetes mellitus)    atorvastatin 40 mg oral tablet  -- 1 tab(s) by mouth once a day (at bedtime)  -- Indication: For Hyperlipidemia    labetalol 300 mg oral tablet  -- 1 tab(s) by mouth 3 times a day  -- Indication: For HTN (hypertension)    amLODIPine 5 mg oral tablet  -- 1 tab(s) by mouth once a day  -- Indication: For HTN (hypertension)    furosemide 80 mg oral tablet  -- 1 tab(s) by mouth once a day    *please see internal stephen*  -- Indication: For Acute systolic heart failure    Procrit 10,000 units/mL preservative-free injectable solution  -- 1 milliliter(s) injectable Tuesday, Thursday, Saturday  -- Indication: For Anemia due to stage 5 chronic kidney disease, not on chronic dialysis    docusate sodium 100 mg oral capsule  -- 1 cap(s) by mouth 2 times a day  -- Indication: For Constipation    Senna 8.6 mg oral tablet  -- 1 tab(s) by mouth once a day (at bedtime), As Needed  -- Indication: For Constipation    calcium acetate 667 mg oral tablet  -- 1 tab(s) by mouth 3 times a day   -- Indication: For Supplement    hydrALAZINE 100 mg oral tablet  -- 1 tab(s) by mouth 3 times a day  -- Indication: For HTN (hypertension)    doxercalciferol 2 mcg/mL injectable solution  -- 1 milliliter(s) injectable Tuesday, Thursday, Sunday   -- Indication: For Supplement I will START or STAY ON the medications listed below when I get home from the hospital:    Left radiocephalic fistula creation and revision of fistula daily dressing change  -- clean area with Normal Saline and apply dry dressing.  -- Indication: For skin    aspirin 81 mg oral tablet  -- 1 tab(s) by mouth once a day  -- Indication: For Preventive measure    isosorbide dinitrate 10 mg oral tablet  -- 1 tab(s) by mouth 3 times a day  -- Indication: For CAD    NovoLOG Mix 70/30 subcutaneous suspension  -- 10 unit(s) subcutaneous once a day (in the morning)  -- Indication: For T2DM (type 2 diabetes mellitus)    atorvastatin 40 mg oral tablet  -- 1 tab(s) by mouth once a day (at bedtime)  -- Indication: For Hyperlipidemia    labetalol 300 mg oral tablet  -- 1 tab(s) by mouth 3 times a day  -- Indication: For HTN (hypertension)    amLODIPine 5 mg oral tablet  -- 1 tab(s) by mouth once a day  -- Indication: For HTN (hypertension)    furosemide 80 mg oral tablet  -- 1 tab(s) by mouth once a day    *please see internal stephen*  -- Indication: For Acute systolic heart failure    Procrit 10,000 units/mL preservative-free injectable solution  -- 1 milliliter(s) injectable Tuesday, Thursday, Saturday  -- Indication: For Anemia due to stage 5 chronic kidney disease, not on chronic dialysis    docusate sodium 100 mg oral capsule  -- 1 cap(s) by mouth 2 times a day  -- Indication: For Constipation    Senna 8.6 mg oral tablet  -- 1 tab(s) by mouth once a day (at bedtime), As Needed  -- Indication: For Constipation    calcium acetate 667 mg oral tablet  -- 1 tab(s) by mouth 3 times a day   -- Indication: For Supplement    hydrALAZINE 100 mg oral tablet  -- 1 tab(s) by mouth 3 times a day  -- Indication: For HTN (hypertension)    doxercalciferol 2 mcg/mL injectable solution  -- 1 milliliter(s) injectable Tuesday, Thursday, Sunday   -- Indication: For Supplement

## 2018-12-21 NOTE — DISCHARGE NOTE ADULT - CARE PROVIDER_API CALL
Ar Dotson), Vascular Surgery  [address]  [address]  [place] [address]  Phone: [phone]  Fax: [phone]

## 2018-12-21 NOTE — DISCHARGE NOTE ADULT - SECONDARY DIAGNOSIS.
CKD (chronic kidney disease) stage 5, GFR less than 15 ml/min Hypertensive emergency Paroxysmal A-fib Elevated troponin T2DM (type 2 diabetes mellitus) Anemia due to stage 5 chronic kidney disease, not on chronic dialysis

## 2018-12-21 NOTE — DISCHARGE NOTE ADULT - PLAN OF CARE
no further exacerbations Weigh yourself daily.  If you gain 3lbs in 3 days, or 5lbs in a week call your Health Care Provider.  Do not eat or drink foods containing more than 2000mg of salt (sodium) in your diet every day.  Call your Health Care Provider if you have any swelling or increased swelling in your feet, ankles, and/or stomach.  Take all of your medication as directed.  If you become dizzy call your Health Care Provider. New to HD  Follow up with Dr. Dotson in 2 weeks  Must be compliant with hemodialysis  Hemodialysis is the most common method used to treat advanced and permanent kidney failure. But even with better procedures and equipment, hemodialysis is still a complicated and inconvenient therapy that requires a coordinated effort from your whole health care team, including your nephrologist, dialysis nurse, dialysis technician, dietitian, and . The most important members of your health care team are you and your family.   Healthy kidneys clean your blood by removing excess fluid, minerals, and wastes. When your kidneys fail, harmful wastes build up in your body, your blood pressure may rise, and your body may retain excess fluid and not make enough red blood cells. When this happens, you need treatment to replace the work of your failed kidneys. In hemodialysis, your blood is allowed to flow, a few ounces at a time, through a special filter that removes wastes and extra fluids. The clean blood is then returned to your body. One of the biggest adjustments you must make when you start hemodialysis treatments is following a strict schedule. Most patients go to a dialysis center three times a week for 3 to 5 or more hours each visit.   Some of the more common conditions caused by kidney failure are extreme tiredness, bone problems, joint problems, itching, and "restless legs”.  Many people treated with hemodialysis complain of itchy skin, which is often worse during or just after treatment.  Patients on dialysis often have insomnia, and some people have a specific problem called the sleep apnea syndrome.  Over time, these sleep disturbances can lead to "day-night reversal" (insomnia at night, sleepiness during the day), headache, depression, and decreased alertness.   Sleep disorders may seem unimportant, but they can impair your quality of life. Don't hesitate to raise these problems with your nurse, doctor, or  Now controlled  Take medications for your blood pressure as recommended.  Eat a heart healthy diet that is low in saturated fats and salt, and includes whole grains, fruits, vegetables and lean protein   Exercise regularly (consult with your physician or cardiologist first); maintain a heart healthy weight.   If you smoke - quit (A resource to help you stop smoking is the [facility] – phone number 996-740-8856.). Continue to follow with your primary physician or cardiologist.   Seek medical help for dizziness, Lightheadedness, Blurry vision, Headache, Chest pain, Shortness of breath  Follow up with your medical doctor to establish long term blood pressure treatment goals. converted to sinus rhythm on way to the hospital  --no history of afib. Patient does not seem to have chest pain, palpitations, or dizziness related to it, so unclear if he has been having pAF for a while. No further chest pain  Elevated troponin in the setting of worsening kidney function Follow up with PMD in 1 week  HgA1C this admission - 5.9  Make sure you get your HgA1c checked every three months.  If you take oral diabetes medications, check your blood glucose two times a day.  If you take insulin, check your blood glucose before meals and at bedtime.  It's important not to skip any meals.  Keep a log of your blood glucose results and always take it with you to your doctor appointments.  Keep a list of your current medications including injectables and over the counter medications and bring this medication list with you to all your doctor appointments.  If you have not seen your ophthalmologist this year call for appointment.  Check your feet daily for redness, sores, or openings. Do not self treat. If no improvement in two days call your primary care physician for an appointment.  Low blood sugar (hypoglycemia) is a blood sugar below 70mg/dl. Check your blood sugar if you feel signs/symptoms of hypoglycemia. If your blood sugar is below 70 take 15 grams of carbohydrates (ex 4 oz of apple juice, 3-4 glucose tablets, or 4-6 oz of regular soda) wait 15 minutes and repeat blood sugar to make sure it comes up above 70.  If your blood sugar is above 70 and you are due for a meal, have a meal.  If you are not due for a meal have a snack.  This snack helps keeps your blood sugar at a safe range. S/p 3 units of PRBC while in hospital  Monitor CBC

## 2018-12-22 LAB
ANION GAP SERPL CALC-SCNC: 20 MMOL/L — HIGH (ref 5–17)
BUN SERPL-MCNC: 122 MG/DL — HIGH (ref 7–23)
CALCIUM SERPL-MCNC: 7.8 MG/DL — LOW (ref 8.4–10.5)
CHLORIDE SERPL-SCNC: 103 MMOL/L — SIGNIFICANT CHANGE UP (ref 96–108)
CO2 SERPL-SCNC: 17 MMOL/L — LOW (ref 22–31)
CREAT SERPL-MCNC: 10.3 MG/DL — HIGH (ref 0.5–1.3)
GLUCOSE BLDC GLUCOMTR-MCNC: 115 MG/DL — HIGH (ref 70–99)
GLUCOSE BLDC GLUCOMTR-MCNC: 142 MG/DL — HIGH (ref 70–99)
GLUCOSE BLDC GLUCOMTR-MCNC: 150 MG/DL — HIGH (ref 70–99)
GLUCOSE BLDC GLUCOMTR-MCNC: 175 MG/DL — HIGH (ref 70–99)
GLUCOSE BLDC GLUCOMTR-MCNC: 52 MG/DL — LOW (ref 70–99)
GLUCOSE BLDC GLUCOMTR-MCNC: 53 MG/DL — LOW (ref 70–99)
GLUCOSE BLDC GLUCOMTR-MCNC: 62 MG/DL — LOW (ref 70–99)
GLUCOSE SERPL-MCNC: 47 MG/DL — LOW (ref 70–99)
HCT VFR BLD CALC: 21.5 % — LOW (ref 39–50)
HCT VFR BLD CALC: 22.5 % — LOW (ref 39–50)
HGB BLD-MCNC: 7.2 G/DL — LOW (ref 13–17)
HGB BLD-MCNC: 7.4 G/DL — LOW (ref 13–17)
MCHC RBC-ENTMCNC: 24.5 PG — LOW (ref 27–34)
MCHC RBC-ENTMCNC: 26.8 PG — LOW (ref 27–34)
MCHC RBC-ENTMCNC: 32 GM/DL — SIGNIFICANT CHANGE UP (ref 32–36)
MCHC RBC-ENTMCNC: 34.6 GM/DL — SIGNIFICANT CHANGE UP (ref 32–36)
MCV RBC AUTO: 76.5 FL — LOW (ref 80–100)
MCV RBC AUTO: 77.6 FL — LOW (ref 80–100)
PLATELET # BLD AUTO: 212 K/UL — SIGNIFICANT CHANGE UP (ref 150–400)
PLATELET # BLD AUTO: 248 K/UL — SIGNIFICANT CHANGE UP (ref 150–400)
POTASSIUM SERPL-MCNC: 4.7 MMOL/L — SIGNIFICANT CHANGE UP (ref 3.5–5.3)
POTASSIUM SERPL-SCNC: 4.7 MMOL/L — SIGNIFICANT CHANGE UP (ref 3.5–5.3)
RBC # BLD: 2.77 M/UL — LOW (ref 4.2–5.8)
RBC # BLD: 2.94 M/UL — LOW (ref 4.2–5.8)
RBC # FLD: 20 % — HIGH (ref 10.3–14.5)
RBC # FLD: 21.3 % — HIGH (ref 10.3–14.5)
SODIUM SERPL-SCNC: 140 MMOL/L — SIGNIFICANT CHANGE UP (ref 135–145)
WBC # BLD: 4.92 K/UL — SIGNIFICANT CHANGE UP (ref 3.8–10.5)
WBC # BLD: 5 K/UL — SIGNIFICANT CHANGE UP (ref 3.8–10.5)
WBC # FLD AUTO: 4.92 K/UL — SIGNIFICANT CHANGE UP (ref 3.8–10.5)
WBC # FLD AUTO: 5 K/UL — SIGNIFICANT CHANGE UP (ref 3.8–10.5)

## 2018-12-22 RX ORDER — HYDRALAZINE HCL 50 MG
10 TABLET ORAL ONCE
Qty: 0 | Refills: 0 | Status: DISCONTINUED | OUTPATIENT
Start: 2018-12-22 | End: 2018-12-22

## 2018-12-22 RX ORDER — DEXTROSE 50 % IN WATER 50 %
15 SYRINGE (ML) INTRAVENOUS ONCE
Qty: 0 | Refills: 0 | Status: COMPLETED | OUTPATIENT
Start: 2018-12-22 | End: 2018-12-22

## 2018-12-22 RX ORDER — HYDRALAZINE HCL 50 MG
10 TABLET ORAL ONCE
Qty: 0 | Refills: 0 | Status: COMPLETED | OUTPATIENT
Start: 2018-12-22 | End: 2018-12-22

## 2018-12-22 RX ADMIN — INSULIN GLARGINE 8 UNIT(S): 100 INJECTION, SOLUTION SUBCUTANEOUS at 22:18

## 2018-12-22 RX ADMIN — Medication 667 MILLIGRAM(S): at 08:59

## 2018-12-22 RX ADMIN — Medication 667 MILLIGRAM(S): at 12:13

## 2018-12-22 RX ADMIN — CLOPIDOGREL BISULFATE 75 MILLIGRAM(S): 75 TABLET, FILM COATED ORAL at 12:13

## 2018-12-22 RX ADMIN — Medication 100 MILLIGRAM(S): at 13:18

## 2018-12-22 RX ADMIN — Medication 100 MILLIGRAM(S): at 22:18

## 2018-12-22 RX ADMIN — Medication 10 MILLIGRAM(S): at 02:23

## 2018-12-22 RX ADMIN — ISOSORBIDE DINITRATE 10 MILLIGRAM(S): 5 TABLET ORAL at 13:18

## 2018-12-22 RX ADMIN — Medication 100 MILLIGRAM(S): at 06:11

## 2018-12-22 RX ADMIN — HEPARIN SODIUM 5000 UNIT(S): 5000 INJECTION INTRAVENOUS; SUBCUTANEOUS at 22:13

## 2018-12-22 RX ADMIN — Medication 60 MILLIGRAM(S): at 18:35

## 2018-12-22 RX ADMIN — ATORVASTATIN CALCIUM 40 MILLIGRAM(S): 80 TABLET, FILM COATED ORAL at 22:12

## 2018-12-22 RX ADMIN — ISOSORBIDE DINITRATE 10 MILLIGRAM(S): 5 TABLET ORAL at 22:19

## 2018-12-22 RX ADMIN — Medication 15 GRAM(S): at 08:13

## 2018-12-22 RX ADMIN — Medication 667 MILLIGRAM(S): at 17:07

## 2018-12-22 RX ADMIN — HEPARIN SODIUM 5000 UNIT(S): 5000 INJECTION INTRAVENOUS; SUBCUTANEOUS at 13:18

## 2018-12-22 RX ADMIN — Medication 1: at 12:12

## 2018-12-22 RX ADMIN — Medication 100 MILLIGRAM(S): at 01:12

## 2018-12-22 RX ADMIN — HEPARIN SODIUM 5000 UNIT(S): 5000 INJECTION INTRAVENOUS; SUBCUTANEOUS at 06:11

## 2018-12-22 RX ADMIN — Medication 60 MILLIGRAM(S): at 01:05

## 2018-12-22 RX ADMIN — Medication 81 MILLIGRAM(S): at 12:13

## 2018-12-22 RX ADMIN — ISOSORBIDE DINITRATE 10 MILLIGRAM(S): 5 TABLET ORAL at 06:11

## 2018-12-22 RX ADMIN — Medication 100 MILLIGRAM(S): at 17:07

## 2018-12-22 NOTE — CHART NOTE - NSCHARTNOTEFT_GEN_A_CORE
Notified by RN that patient hypertensive with Bp 210/100 mm of hg (manual). Seen and examined the patient. Patient asymptomatic, sitting in chair, offers no complaints    Vital Signs Last 24 Hrs  T(C): 36.8 (21 Dec 2018 16:20), Max: 36.8 (21 Dec 2018 16:20)  T(F): 98.2 (21 Dec 2018 16:20), Max: 98.2 (21 Dec 2018 16:20)  HR: 59 (21 Dec 2018 22:56) (59 - 78)  BP: 194/96 (21 Dec 2018 22:56) (182/92 - 210/100)  BP(mean): --  RR: 18 (21 Dec 2018 16:20) (18 - 18)  SpO2: 85% (21 Dec 2018 15:56) (85% - 97%)    Comprehensive Metabolic Panel in AM (12.21.18 @ 06:08)    Sodium, Serum: 146 mmol/L    Potassium, Serum: 5.2 mmol/L    Chloride, Serum: 107 mmol/L    Carbon Dioxide, Serum: 19 mmol/L    Anion Gap, Serum: 20 mmol/L    Blood Urea Nitrogen, Serum: 126: TEST REPEATED mg/dL    Creatinine, Serum: 10.54 mg/dL    Glucose, Serum: 110 mg/dL    Calcium, Total Serum: 7.7 mg/dL    Protein Total, Serum: 6.7 g/dL    Albumin, Serum: 3.5 g/dL    Bilirubin Total, Serum: 0.3 mg/dL    Alkaline Phosphatase, Serum: 126 U/L    Aspartate Aminotransferase (AST/SGOT): 39 U/L    Alanine Aminotransferase (ALT/SGPT): 32 U/L        68M PMH CAD/CABG, HFrEF (30%), T2DM, HTN, CKD 5 (not on HD), aortic valve replacement, chronic anemia occasionally requiring transfusions presents with SOB, admitted with acute decompensated systolic heart failure with hypertensive emergency, demand ischemia, worsening renal failure, transient afib RVR.   Acute systolic heart failure from med non-adherence, especially with uncontrolled HTN, and worsening CKD5  patient reports feeling a little better after lasix in the ED, on lasix 60mg IV BID   on  hydralazine 100mg TID, isordil 10mg TID. Metoprolol 100mg PO, On IV lasix for CHF /Bp control    Uncontrolled HTN: Noted that pt with SBP in 190's to 200's since admission, patient asymptomatic    Hydralazine 10mg IVP ordered  C/W  Hydralazine 100mg TID, isordil 10mg TID. Metoprolol 100mg PO, on IV Lasix for CHF /Bp control  Patient CKD5, not on HD, Trend creatinine  Consider cardiology consult in am if BP persists uncontrolled  CCU consult if patient symptomatic with elevated BP    CHF/Anemia  PRBC transfusion progress, F/U post transfusion CBC  Lungs wet, crackles on lung bases, Lasix 60mg IV am dose to administer now  CXR in am  Will follow closely  Will reach out to Attending to discuss plan of care  Will update with  primary team    Or[place] Bon[place]  [id number]                                        - Notified by RN that patient hypertensive with Bp 210/100 mm of hg (manual). Seen and examined the patient. Patient asymptomatic, sitting in chair, offers no complaints    Vital Signs Last 24 Hrs  T(C): 36.8 (21 Dec 2018 16:20), Max: 36.8 (21 Dec 2018 16:20)  T(F): 98.2 (21 Dec 2018 16:20), Max: 98.2 (21 Dec 2018 16:20)  HR: 59 (21 Dec 2018 22:56) (59 - 78)  BP: 194/96 (21 Dec 2018 22:56) (182/92 - 210/100)  BP(mean): --  RR: 18 (21 Dec 2018 16:20) (18 - 18)  SpO2: 85% (21 Dec 2018 15:56) (85% - 97%)    Comprehensive Metabolic Panel in AM (12.21.18 @ 06:08)    Sodium, Serum: 146 mmol/L    Potassium, Serum: 5.2 mmol/L    Chloride, Serum: 107 mmol/L    Carbon Dioxide, Serum: 19 mmol/L    Anion Gap, Serum: 20 mmol/L    Blood Urea Nitrogen, Serum: 126: TEST REPEATED mg/dL    Creatinine, Serum: 10.54 mg/dL    Glucose, Serum: 110 mg/dL    Calcium, Total Serum: 7.7 mg/dL    Protein Total, Serum: 6.7 g/dL    Albumin, Serum: 3.5 g/dL    Bilirubin Total, Serum: 0.3 mg/dL    Alkaline Phosphatase, Serum: 126 U/L    Aspartate Aminotransferase (AST/SGOT): 39 U/L    Alanine Aminotransferase (ALT/SGPT): 32 U/L    PHYSICAL EXAM:     GENERAL: NAD, well-developed, sleepy  	HEAD:  Atraumatic, Normocephalic  	NECK: +JVD  	CHEST/LUNG: fine crackles on B/L lung bases  	HEART: S1S2 RRR  	ABDOMEN: Soft, Nontender, Nondistended; Bowel sounds present  	EXTREMITIES:  2+ pitting edema peripheral; 2+ Peripheral Pulses, No clubbing, cyanosis  	PSYCH: AAOx3  	NEUROLOGY: non-focal    68M PMH CAD/CABG, HFrEF (30%), T2DM, HTN, CKD 5 (not on HD), aortic valve replacement, chronic anemia occasionally requiring transfusions presents with SOB, admitted with acute decompensated systolic heart failure with hypertensive emergency, demand ischemia, worsening renal failure, transient afib RVR.   Acute systolic heart failure from med non-adherence, especially with uncontrolled HTN, and worsening CKD5  patient reports feeling a little better after lasix in the ED, on lasix 60mg IV BID   on  hydralazine 100mg TID, isordil 10mg TID. Metoprolol 100mg PO, On IV lasix for CHF /Bp control    Uncontrolled HTN: Noted that pt with SBP in 190's to 200's since admission, patient asymptomatic    Hydralazine 10mg IVP ordered  C/W  Hydralazine 100mg TID, isordil 10mg TID. Metoprolol 100mg PO, on IV Lasix for CHF /Bp control  Patient CKD5, not on HD, Trend creatinine  Consider cardiology consult in am if BP persists uncontrolled  CCU consult if patient symptomatic with elevated BP    CHF/Anemia  PRBC transfusion progress, F/U post transfusion CBC  Lungs wet, crackles on lung bases, Lasix 60mg IV am dose to administer now  CXR in am  Will follow closely  Will reach out to Attending to discuss plan of care  Will update with  primary team    Orly Bonner [facility] [place]  [id number]                                        - Notified by RN that patient hypertensive with Bp 210/100 mm of hg (manual). Seen and examined the patient. Patient asymptomatic, sitting in chair, offers no complaints    Vital Signs Last 24 Hrs  T(C): 36.8 (21 Dec 2018 16:20), Max: 36.8 (21 Dec 2018 16:20)  T(F): 98.2 (21 Dec 2018 16:20), Max: 98.2 (21 Dec 2018 16:20)  HR: 59 (21 Dec 2018 22:56) (59 - 78)  BP: 194/96 (21 Dec 2018 22:56) (182/92 - 210/100)  BP(mean): --  RR: 18 (21 Dec 2018 16:20) (18 - 18)  SpO2: 85% (21 Dec 2018 15:56) (85% - 97%)    Comprehensive Metabolic Panel in AM (12.21.18 @ 06:08)    Sodium, Serum: 146 mmol/L    Potassium, Serum: 5.2 mmol/L    Chloride, Serum: 107 mmol/L    Carbon Dioxide, Serum: 19 mmol/L    Anion Gap, Serum: 20 mmol/L    Blood Urea Nitrogen, Serum: 126: TEST REPEATED mg/dL    Creatinine, Serum: 10.54 mg/dL    Glucose, Serum: 110 mg/dL    Calcium, Total Serum: 7.7 mg/dL    Protein Total, Serum: 6.7 g/dL    Albumin, Serum: 3.5 g/dL    Bilirubin Total, Serum: 0.3 mg/dL    Alkaline Phosphatase, Serum: 126 U/L    Aspartate Aminotransferase (AST/SGOT): 39 U/L    Alanine Aminotransferase (ALT/SGPT): 32 U/L    PHYSICAL EXAM:     GENERAL: NAD, well-developed, sleepy  	HEAD:  Atraumatic, Normocephalic  	NECK: +JVD  	CHEST/LUNG: fine crackles on B/L lung bases  	HEART: S1S2 RRR  	ABDOMEN: Soft, Nontender, Nondistended; Bowel sounds present  	EXTREMITIES:  2+ pitting edema peripheral; 2+ Peripheral Pulses, No clubbing, cyanosis  	PSYCH: AAOx3  	NEUROLOGY: non-focal    68M PMH CAD/CABG, HFrEF (30%), T2DM, HTN, CKD 5 (not on HD), aortic valve replacement, chronic anemia occasionally requiring transfusions presents with SOB, admitted with acute decompensated systolic heart failure with hypertensive emergency, demand ischemia, worsening renal failure, transient afib RVR.   Acute systolic heart failure from med non-adherence, especially with uncontrolled HTN, and worsening CKD5  patient reports feeling a little better after lasix in the ED, on lasix 60mg IV BID   on  hydralazine 100mg TID, isordil 10mg TID. Metoprolol 100mg PO, On IV lasix for CHF /Bp control    Uncontrolled HTN: Noted that pt with SBP in 190's to 200's since admission, patient asymptomatic    Hydralazine 10mg IVP ordered  C/W  Hydralazine 100mg TID, isordil 10mg TID. Metoprolol 100mg PO, on IV Lasix for CHF /Bp control  Patient CKD5, not on HD, Trend creatinine  F/U  cardiology consult with Dr. Ramirez in am if BP persists uncontrolled  CCU consult if patient symptomatic with elevated BP    CHF/Anemia  PRBC transfusion progress, F/U post transfusion CBC  Lungs wet, crackles on lung bases, Lasix 60mg IV am dose to administer now  CXR in am  Will follow closely  Will reach out to Attending to discuss plan of care  Will update with  primary team    Orly Bonner P [place]  [id number]                                        -

## 2018-12-22 NOTE — PROGRESS NOTE ADULT - SUBJECTIVE AND OBJECTIVE BOX
CHIEF COMPLAINT: pt feel better wife at bed side off o2 his sat drop to 84-85  68M PMH CAD/CABG, HFrEF (30%), T2DM, HTN, CKD 5 (not on HD), aortic valve replacement, chronic anemia occasionally requiring transfusions presents with SOB. Has had progressively worsening SOB and SZYMANSKI over the past 2 weeks. History is supplemented by wife as she manages most of his medical care and gives his medications. Symptoms significantly worsened this week with orthopnea and LE edema. No recent URIs, fevers, chills, cough, sputum production, chest pain, palpitations, NVD, abdominal pain, dysuria, decreased urination. Went to PMD for evaluation and he was noted to be in rapid afib in the office, rate 140s-150s. By time of arrival in ED, had spontaneously converted back to rate controlled sinus rhythm. O2sat 89% on RA, improved with nasal cannula. His wife believes he is on too many medications. For instance, she only gives him hydralazine once daily when he has been prescribed TID multiple times. Seems he is taking lasix 80mg daily (was discharged on BID dosing last admission)    SUBJECTIVE:     REVIEW OF SYSTEMS:    CONSTITUTIONAL: ( x )  weakness,  (  ) fevers or chills  EYES/ENT: (  )visual changes;     NECK: (  ) pain or stiffness  RESPIRATORY:   (  )cough, wheezing, hemoptysis;  ( x ) shortness of breath  CARDIOVASCULAR:  (  )chest pain or palpitations  GASTROINTESTINAL:   (  )abdominal or epigastric pain.  (  ) nausea, vomiting, or hematemesis;   (   ) diarrhea or constipation.   GENITOURINARY:   (    ) dysuria, frequency or hematuria  NEUROLOGICAL:  (   ) numbness or weakness   All other review of systems is negative unless indicated above    Vital Signs Last 24 Hrs  T(C): 36.6 (22 Dec 2018 11:29), Max: 36.8 (21 Dec 2018 16:20)  T(F): 97.9 (22 Dec 2018 11:29), Max: 98.2 (21 Dec 2018 16:20)  HR: 54 (22 Dec 2018 11:29) (51 - 63)  BP: 199/95 (22 Dec 2018 11:29) (178/92 - 210/100)  BP(mean): --  RR: 17 (22 Dec 2018 11:29) (17 - 18)  SpO2: 98% (22 Dec 2018 11:29) (85% - 98%)    I&O's Summary    21 Dec 2018 07:01  -  22 Dec 2018 07:00  --------------------------------------------------------  IN: 420 mL / OUT: 275 mL / NET: 145 mL    22 Dec 2018 07:01  -  22 Dec 2018 11:39  --------------------------------------------------------  IN: 360 mL / OUT: 0 mL / NET: 360 mL        CAPILLARY BLOOD GLUCOSE      POCT Blood Glucose.: 142 mg/dL (22 Dec 2018 08:29)  POCT Blood Glucose.: 62 mg/dL (22 Dec 2018 08:06)  POCT Blood Glucose.: 53 mg/dL (22 Dec 2018 07:48)  POCT Blood Glucose.: 52 mg/dL (22 Dec 2018 07:47)  POCT Blood Glucose.: 156 mg/dL (21 Dec 2018 21:37)  POCT Blood Glucose.: 94 mg/dL (21 Dec 2018 16:48)  POCT Blood Glucose.: 168 mg/dL (21 Dec 2018 11:45)      PHYSICAL EXAM:  GENERAL: NAD, well-developed, sleepy  	HEAD:  Atraumatic, Normocephalic  	EYES: EOMI, PERRLA, conjunctiva and sclera clear  	NECK: +JVD  	CHEST/LUNG: Clear to auscultation bilaterally; No wheeze  	HEART: crackles b/l lower lobes  	ABDOMEN: Soft, Nontender, Nondistended; Bowel sounds present  	EXTREMITIES:  2+ pitting edema peripheral; 2+ Peripheral Pulses, No clubbing, cyanosis  	PSYCH: AAOx3  	NEUROLOGY: non-focal  SKIN: No rashes or lesions       MEDICATIONS:  MEDICATIONS  (STANDING):  aspirin enteric coated 81 milliGRAM(s) Oral daily  atorvastatin 40 milliGRAM(s) Oral at bedtime  calcium acetate 667 milliGRAM(s) Oral three times a day with meals  clopidogrel Tablet 75 milliGRAM(s) Oral daily  dextrose 5%. 1000 milliLiter(s) (50 mL/Hr) IV Continuous <Continuous>  dextrose 50% Injectable 12.5 Gram(s) IV Push once  dextrose 50% Injectable 25 Gram(s) IV Push once  dextrose 50% Injectable 25 Gram(s) IV Push once  furosemide   Injectable 60 milliGRAM(s) IV Push two times a day  heparin  Injectable 5000 Unit(s) SubCutaneous every 8 hours  hydrALAZINE 100 milliGRAM(s) Oral three times a day  insulin glargine Injectable (LANTUS) 8 Unit(s) SubCutaneous at bedtime  insulin lispro (HumaLOG) corrective regimen sliding scale   SubCutaneous three times a day before meals  insulin lispro (HumaLOG) corrective regimen sliding scale   SubCutaneous at bedtime  isosorbide   dinitrate Tablet (ISORDIL) 10 milliGRAM(s) Oral three times a day  metoprolol succinate  milliGRAM(s) Oral two times a day      LABS: All Labs Reviewed:                        7.2    4.92  )-----------( 248      ( 22 Dec 2018 07:57 )             22.5     12-22    140  |  103  |  122<H>  ----------------------------<  47<L>  4.7   |  17<L>  |  10.30<H>    Ca    7.8<L>      22 Dec 2018 05:48  Phos  8.4     12-21  Mg     2.2     12-21    TPro  6.7  /  Alb  3.5  /  TBili  0.3  /  DBili  x   /  AST  39  /  ALT  32  /  AlkPhos  126<H>  12-21    PT/INR - ( 20 Dec 2018 16:27 )   PT: 12.5 sec;   INR: 1.08 ratio         PTT - ( 20 Dec 2018 16:27 )  PTT:35.5 sec      Blood Culture:   Urine Culture      RADIOLOGY/EKG:    ASSESSMENT AND PLAN:  68M PMH CAD/CABG, HFrEF (30%), T2DM, HTN, CKD 5 (not on HD), aortic valve replacement, chronic anemia occasionally requiring transfusions presents with SOB, admitted with acute decompensated systolic heart failure with hypertensive emergency, demand ischemia, worsening renal failure, transient afib RVR.     Problem/Plan - 1:  ·  Problem: Acute systolic heart failure.  Plan: --from med non-adherence, especially with uncontrolled HTN, and worsening CKD5  --requiring nasal cannula oxygen  --patient reports feeling a little better after lasix in the ED. Start lasix 60mg IV BID.  --cardiology consult  called  DR Borjas  --strict I/Os, daily weights   refus  HD no need for renal consult waiting for cariology consult dr payton  called [phone] left message    Problem/Plan - 2:  · on  hydralazine 100mg TID, isordil 10mg TID. On IV lasix for CHF.   --Restart home metoprolol, may need to be switched to coreg. Previously was on amlodipine too.    Problem/Plan - 3:  ·  Problem: Elevated troponin.  Plan: --hsTrop to 492, up from high 300s last admission.    due  worsening renal failure  --will trend troponin  --c/w DAPT  --restart statin (hasn't been taking).     Problem/Plan - 4:  ·  Problem: Respiratory failure with hypoxia.  Plan: --from CHF exacerbation and volume overload  --diuresis  --oxygen PRN.  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  Plan: --converted to sinus rhythm on way to the hospital  --no history of afib. Patient does not seem to have chest pain, palpitations, or dizziness related to it, so unclear if he has been having pAF for a while.   --Patient and his wife are extremely hesitant about starting anticoagulation at this time. No history of bleeding, but they have somewhat poor health literacy regarding medication interactions. I explained the risks and benefits of starting or deferring anticoagulation in the setting afib including risk of catastrophic CVA, and they wish to think on the matter further. Will hold off on A/C at the moment.     Problem/Plan - 6:  Problem: CKD (chronic kidney disease) stage 5, GFR less than 15 ml/min. Plan: --Per wife, they have been in discussion regarding the probability of needing to start dialysis some point soon. No definitive plans made. They have intermittent follow up with nephrology, not seeing anyone consistent right now.   --Needs lasix now for HTN emergency and  renal failure.   -  NO need for-Nephrology consult .       Problem/Plan - 7:  ·  Problem: T2DM (type 2 diabetes mellitus).  Plan: --On 70/30 at home. Start low dose lantus tonight, HISS.     Problem/Plan - 8:  ·  Problem: Chronic anemia.  Plan: --trend CBC, S/P  transfusion  .       DVT PPX:    ADVANCED DIRECTIVE:    DISPOSITION: home called his wife

## 2018-12-23 LAB
ANION GAP SERPL CALC-SCNC: 21 MMOL/L — HIGH (ref 5–17)
BUN SERPL-MCNC: 137 MG/DL — HIGH (ref 7–23)
CALCIUM SERPL-MCNC: 7.7 MG/DL — LOW (ref 8.4–10.5)
CHLORIDE SERPL-SCNC: 105 MMOL/L — SIGNIFICANT CHANGE UP (ref 96–108)
CO2 SERPL-SCNC: 17 MMOL/L — LOW (ref 22–31)
CREAT SERPL-MCNC: 10.73 MG/DL — HIGH (ref 0.5–1.3)
GLUCOSE BLDC GLUCOMTR-MCNC: 125 MG/DL — HIGH (ref 70–99)
GLUCOSE BLDC GLUCOMTR-MCNC: 134 MG/DL — HIGH (ref 70–99)
GLUCOSE BLDC GLUCOMTR-MCNC: 150 MG/DL — HIGH (ref 70–99)
GLUCOSE BLDC GLUCOMTR-MCNC: 195 MG/DL — HIGH (ref 70–99)
GLUCOSE BLDC GLUCOMTR-MCNC: 46 MG/DL — LOW (ref 70–99)
GLUCOSE BLDC GLUCOMTR-MCNC: 47 MG/DL — LOW (ref 70–99)
GLUCOSE BLDC GLUCOMTR-MCNC: 66 MG/DL — LOW (ref 70–99)
GLUCOSE BLDC GLUCOMTR-MCNC: 67 MG/DL — LOW (ref 70–99)
GLUCOSE BLDC GLUCOMTR-MCNC: 87 MG/DL — SIGNIFICANT CHANGE UP (ref 70–99)
GLUCOSE SERPL-MCNC: 51 MG/DL — LOW (ref 70–99)
HCT VFR BLD CALC: 23.8 % — LOW (ref 39–50)
HGB BLD-MCNC: 7.6 G/DL — LOW (ref 13–17)
MCHC RBC-ENTMCNC: 24.8 PG — LOW (ref 27–34)
MCHC RBC-ENTMCNC: 31.9 GM/DL — LOW (ref 32–36)
MCV RBC AUTO: 77.8 FL — LOW (ref 80–100)
PLATELET # BLD AUTO: 258 K/UL — SIGNIFICANT CHANGE UP (ref 150–400)
POTASSIUM SERPL-MCNC: 4.6 MMOL/L — SIGNIFICANT CHANGE UP (ref 3.5–5.3)
POTASSIUM SERPL-SCNC: 4.6 MMOL/L — SIGNIFICANT CHANGE UP (ref 3.5–5.3)
RBC # BLD: 3.06 M/UL — LOW (ref 4.2–5.8)
RBC # FLD: 20.2 % — HIGH (ref 10.3–14.5)
SODIUM SERPL-SCNC: 143 MMOL/L — SIGNIFICANT CHANGE UP (ref 135–145)
WBC # BLD: 5.67 K/UL — SIGNIFICANT CHANGE UP (ref 3.8–10.5)
WBC # FLD AUTO: 5.67 K/UL — SIGNIFICANT CHANGE UP (ref 3.8–10.5)

## 2018-12-23 RX ORDER — DEXTROSE 50 % IN WATER 50 %
25 SYRINGE (ML) INTRAVENOUS ONCE
Qty: 0 | Refills: 0 | Status: COMPLETED | OUTPATIENT
Start: 2018-12-23 | End: 2018-12-23

## 2018-12-23 RX ORDER — INSULIN GLARGINE 100 [IU]/ML
4 INJECTION, SOLUTION SUBCUTANEOUS AT BEDTIME
Qty: 0 | Refills: 0 | Status: DISCONTINUED | OUTPATIENT
Start: 2018-12-23 | End: 2019-01-03

## 2018-12-23 RX ADMIN — ISOSORBIDE DINITRATE 10 MILLIGRAM(S): 5 TABLET ORAL at 13:56

## 2018-12-23 RX ADMIN — HEPARIN SODIUM 5000 UNIT(S): 5000 INJECTION INTRAVENOUS; SUBCUTANEOUS at 13:56

## 2018-12-23 RX ADMIN — Medication 60 MILLIGRAM(S): at 05:47

## 2018-12-23 RX ADMIN — Medication 100 MILLIGRAM(S): at 18:05

## 2018-12-23 RX ADMIN — ATORVASTATIN CALCIUM 40 MILLIGRAM(S): 80 TABLET, FILM COATED ORAL at 22:14

## 2018-12-23 RX ADMIN — INSULIN GLARGINE 4 UNIT(S): 100 INJECTION, SOLUTION SUBCUTANEOUS at 22:14

## 2018-12-23 RX ADMIN — ISOSORBIDE DINITRATE 10 MILLIGRAM(S): 5 TABLET ORAL at 22:13

## 2018-12-23 RX ADMIN — Medication 81 MILLIGRAM(S): at 12:15

## 2018-12-23 RX ADMIN — Medication 667 MILLIGRAM(S): at 18:05

## 2018-12-23 RX ADMIN — Medication 667 MILLIGRAM(S): at 09:05

## 2018-12-23 RX ADMIN — HEPARIN SODIUM 5000 UNIT(S): 5000 INJECTION INTRAVENOUS; SUBCUTANEOUS at 22:14

## 2018-12-23 RX ADMIN — CLOPIDOGREL BISULFATE 75 MILLIGRAM(S): 75 TABLET, FILM COATED ORAL at 12:15

## 2018-12-23 RX ADMIN — ISOSORBIDE DINITRATE 10 MILLIGRAM(S): 5 TABLET ORAL at 05:47

## 2018-12-23 RX ADMIN — Medication 100 MILLIGRAM(S): at 05:47

## 2018-12-23 RX ADMIN — HEPARIN SODIUM 5000 UNIT(S): 5000 INJECTION INTRAVENOUS; SUBCUTANEOUS at 05:48

## 2018-12-23 RX ADMIN — Medication 100 MILLIGRAM(S): at 13:56

## 2018-12-23 RX ADMIN — Medication 25 GRAM(S): at 07:53

## 2018-12-23 RX ADMIN — Medication 60 MILLIGRAM(S): at 18:05

## 2018-12-23 RX ADMIN — Medication 667 MILLIGRAM(S): at 12:15

## 2018-12-23 RX ADMIN — Medication 100 MILLIGRAM(S): at 22:14

## 2018-12-23 NOTE — PROGRESS NOTE ADULT - SUBJECTIVE AND OBJECTIVE BOX
CHIEF COMPLAINT: event of hypoglycemia noted still BP  elevated  68M PMH CAD/CABG, HFrEF (30%), T2DM, HTN, CKD 5 (not on HD), aortic valve replacement, chronic anemia occasionally requiring transfusions presents with SOB. Has had progressively worsening SOB and SZYMNASKI over the past 2 weeks. History is supplemented by wife as she manages most of his medical care and gives his medications. Symptoms significantly worsened this week with orthopnea and LE edema. No recent URIs, fevers, chills, cough, sputum production, chest pain, palpitations, NVD, abdominal pain, dysuria, decreased urination. Went to PMD for evaluation and he was noted to be in rapid afib in the office, rate 140s-150s. By time of arrival in ED, had spontaneously converted back to rate controlled sinus rhythm. O2sat 89% on RA, improved with nasal cannula. His wife believes he is on too many medications. For instance, she only gives him hydralazine once daily when he has been prescribed TID multiple times. Seems he is taking lasix 80mg daily (was discharged on BID dosing last admission)  SUBJECTIVE:     REVIEW OF SYSTEMS:    CONSTITUTIONAL: (x  )  weakness,  (  ) fevers or chills  EYES/ENT: (  )visual changes;     NECK: (  ) pain or stiffness  RESPIRATORY:   (  )cough, wheezing, hemoptysis;  (  ) shortness of breath  CARDIOVASCULAR:  (  )chest pain or palpitations  GASTROINTESTINAL:   (  )abdominal or epigastric pain.  (  ) nausea, vomiting, or hematemesis;   (   ) diarrhea or constipation.   GENITOURINARY:   (    ) dysuria, frequency or hematuria  NEUROLOGICAL:  (   ) numbness or weakness   All other review of systems is negative unless indicated above    Vital Signs Last 24 Hrs  T(C): 36.3 (23 Dec 2018 11:19), Max: 36.8 (23 Dec 2018 04:04)  T(F): 97.4 (23 Dec 2018 11:19), Max: 98.2 (23 Dec 2018 04:04)  HR: 58 (23 Dec 2018 11:19) (55 - 63)  BP: 197/95 (23 Dec 2018 14:12) (168/82 - 206/94)  BP(mean): --  RR: 17 (23 Dec 2018 11:19) (17 - 19)  SpO2: 94% (23 Dec 2018 11:19) (94% - 96%)    I&O's Summary    22 Dec 2018 07:01  -  23 Dec 2018 07:00  --------------------------------------------------------  IN: 1200 mL / OUT: 800 mL / NET: 400 mL    23 Dec 2018 07:01  -  23 Dec 2018 14:54  --------------------------------------------------------  IN: 540 mL / OUT: 450 mL / NET: 90 mL        CAPILLARY BLOOD GLUCOSE      POCT Blood Glucose.: 134 mg/dL (23 Dec 2018 11:32)  POCT Blood Glucose.: 125 mg/dL (23 Dec 2018 08:20)  POCT Blood Glucose.: 47 mg/dL (23 Dec 2018 07:46)  POCT Blood Glucose.: 46 mg/dL (23 Dec 2018 07:45)  POCT Blood Glucose.: 150 mg/dL (22 Dec 2018 21:36)  POCT Blood Glucose.: 115 mg/dL (22 Dec 2018 16:41)      PHYSICAL EXAM:  GENERAL: NAD, well-developed, sleepy  	HEAD:  Atraumatic, Normocephalic  	EYES: EOMI, PERRLA, conjunctiva and sclera clear  	NECK: +JVD  	CHEST/LUNG: Clear to auscultation bilaterally; No wheeze  	HEART: crackles b/l lower lobes  	ABDOMEN: Soft, Nontender, Nondistended; Bowel sounds present  	EXTREMITIES:  1+ pitting edema peripheral; 2+ Peripheral Pulses, No clubbing, cyanosis  	PSYCH: AAOx3  	NEUROLOGY: non-focal  SKIN: No rashes or lesions       MEDICATIONS:  MEDICATIONS  (STANDING):  aspirin enteric coated 81 milliGRAM(s) Oral daily  atorvastatin 40 milliGRAM(s) Oral at bedtime  calcium acetate 667 milliGRAM(s) Oral three times a day with meals  clopidogrel Tablet 75 milliGRAM(s) Oral daily  dextrose 5%. 1000 milliLiter(s) (50 mL/Hr) IV Continuous <Continuous>  dextrose 50% Injectable 12.5 Gram(s) IV Push once  dextrose 50% Injectable 25 Gram(s) IV Push once  dextrose 50% Injectable 25 Gram(s) IV Push once  furosemide   Injectable 60 milliGRAM(s) IV Push two times a day  heparin  Injectable 5000 Unit(s) SubCutaneous every 8 hours  hydrALAZINE 100 milliGRAM(s) Oral three times a day  insulin glargine Injectable (LANTUS) 4 Unit(s) SubCutaneous at bedtime  insulin lispro (HumaLOG) corrective regimen sliding scale   SubCutaneous three times a day before meals  insulin lispro (HumaLOG) corrective regimen sliding scale   SubCutaneous at bedtime  isosorbide   dinitrate Tablet (ISORDIL) 10 milliGRAM(s) Oral three times a day  metoprolol succinate  milliGRAM(s) Oral two times a day      LABS: All Labs Reviewed:                        7.6    5.67  )-----------( 258      ( 23 Dec 2018 08:18 )             23.8     12-23    143  |  105  |  137<H>  ----------------------------<  51<L>  4.6   |  17<L>  |  10.73<H>    Ca    7.7<L>      23 Dec 2018 06:28            Blood Culture:   Urine Culture      RADIOLOGY/EKG:    ASSESSMENT AND PLAN:  68M PMH CAD/CABG, HFrEF (30%), T2DM, HTN, CKD 5 (not on HD), aortic valve replacement, chronic anemia occasionally requiring transfusions presents with SOB, admitted with acute decompensated systolic heart failure with hypertensive emergency, demand ischemia, worsening renal failure, transient afib RVR.     Problem/Plan - 1:  ·  Problem: Acute systolic heart failure.  Plan: --from med non-adherence, especially with uncontrolled HTN, and worsening CKD5  --requiring nasal cannula oxygen  --patient reports feeling a little better after lasix in the ED. Start lasix 60mg IV BID.  --cardiology consult  will house  --strict I/Os, daily weights   refus  HD no need for renal consult waiting for cariology consult dr payton  called [phone] left message    Problem/Plan - 2:  · on  hydralazine 100mg TID, isordil 10mg TID. On IV lasix for CHF.   --Restart home metoprolol, may need to be switched to coreg. Previously was on amlodipine too.    Problem/Plan - 3:  ·  Problem: Elevated troponin.  Plan: --hsTrop to 492, up from high 300s last admission.    due  worsening renal failure  --will trend troponin  --c/w DAPT  --restart statin (hasn't been taking).     Problem/Plan - 4:  ·  Problem: Respiratory failure with hypoxia.  Plan: --from CHF exacerbation and volume overload  --diuresis  --oxygen PRN.  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  Plan: --converted to sinus rhythm on way to the hospital  --no history of afib. Patient does not seem to have chest pain, palpitations, or dizziness related to it, so unclear if he has been having pAF for a while.   --Patient and his wife are extremely hesitant about starting anticoagulation at this time. No history of bleeding, but they have somewhat poor health literacy regarding medication interactions. I explained the risks and benefits of starting or deferring anticoagulation in the setting afib including risk of catastrophic CVA, and they wish to think on the matter further. Will hold off on A/C at the moment.     Problem/Plan - 6:  Problem: CKD (chronic kidney disease) stage 5, GFR less than 15 ml/min. Plan: --Per wife, they have been in discussion regarding the probability of needing to start dialysis some point soon. No definitive plans made. They have intermittent follow up with nephrology, not seeing anyone consistent right now.   --Needs lasix now for HTN emergency and  renal failure.   -  NO need for-Nephrology consult .       Problem/Plan - 7:  ·  Problem: T2DM (type 2 diabetes mellitus).  Plan: --On 70/30 at home.  due to hypoglycemia will decrease  lantus   DW  with [id number]  Problem/Plan - 8:  ·  Problem: Chronic anemia.  Plan: --trend CBC, S/P  transfusion  .       DVT PPX:    ADVANCED DIRECTIVE:    DISPOSITION:

## 2018-12-24 LAB
ANION GAP SERPL CALC-SCNC: 19 MMOL/L — HIGH (ref 5–17)
BUN SERPL-MCNC: 133 MG/DL — HIGH (ref 7–23)
CALCIUM SERPL-MCNC: 7.6 MG/DL — LOW (ref 8.4–10.5)
CHLORIDE SERPL-SCNC: 102 MMOL/L — SIGNIFICANT CHANGE UP (ref 96–108)
CO2 SERPL-SCNC: 20 MMOL/L — LOW (ref 22–31)
CREAT SERPL-MCNC: 10.84 MG/DL — HIGH (ref 0.5–1.3)
GLUCOSE BLDC GLUCOMTR-MCNC: 119 MG/DL — HIGH (ref 70–99)
GLUCOSE BLDC GLUCOMTR-MCNC: 157 MG/DL — HIGH (ref 70–99)
GLUCOSE BLDC GLUCOMTR-MCNC: 187 MG/DL — HIGH (ref 70–99)
GLUCOSE BLDC GLUCOMTR-MCNC: 94 MG/DL — SIGNIFICANT CHANGE UP (ref 70–99)
GLUCOSE SERPL-MCNC: 55 MG/DL — LOW (ref 70–99)
HBV SURFACE AG SER-ACNC: SIGNIFICANT CHANGE UP
HCT VFR BLD CALC: 23.9 % — LOW (ref 39–50)
HCV AB S/CO SERPL IA: 0.74 S/CO — SIGNIFICANT CHANGE UP
HCV AB SERPL-IMP: SIGNIFICANT CHANGE UP
HGB BLD-MCNC: 7.6 G/DL — LOW (ref 13–17)
MCHC RBC-ENTMCNC: 24.7 PG — LOW (ref 27–34)
MCHC RBC-ENTMCNC: 31.8 GM/DL — LOW (ref 32–36)
MCV RBC AUTO: 77.6 FL — LOW (ref 80–100)
PHOSPHATE SERPL-MCNC: 7.8 MG/DL — HIGH (ref 2.5–4.5)
PLATELET # BLD AUTO: 255 K/UL — SIGNIFICANT CHANGE UP (ref 150–400)
POTASSIUM SERPL-MCNC: 4.7 MMOL/L — SIGNIFICANT CHANGE UP (ref 3.5–5.3)
POTASSIUM SERPL-SCNC: 4.7 MMOL/L — SIGNIFICANT CHANGE UP (ref 3.5–5.3)
PTH-INTACT FLD-MCNC: 893 PG/ML — HIGH (ref 15–65)
RBC # BLD: 3.08 M/UL — LOW (ref 4.2–5.8)
RBC # FLD: 20.6 % — HIGH (ref 10.3–14.5)
SODIUM SERPL-SCNC: 141 MMOL/L — SIGNIFICANT CHANGE UP (ref 135–145)
WBC # BLD: 5.61 K/UL — SIGNIFICANT CHANGE UP (ref 3.8–10.5)
WBC # FLD AUTO: 5.61 K/UL — SIGNIFICANT CHANGE UP (ref 3.8–10.5)

## 2018-12-24 RX ORDER — HYDRALAZINE HCL 50 MG
10 TABLET ORAL ONCE
Qty: 0 | Refills: 0 | Status: COMPLETED | OUTPATIENT
Start: 2018-12-24 | End: 2018-12-24

## 2018-12-24 RX ORDER — LABETALOL HCL 100 MG
200 TABLET ORAL THREE TIMES A DAY
Qty: 0 | Refills: 0 | Status: DISCONTINUED | OUTPATIENT
Start: 2018-12-24 | End: 2018-12-28

## 2018-12-24 RX ADMIN — Medication 100 MILLIGRAM(S): at 06:14

## 2018-12-24 RX ADMIN — Medication 60 MILLIGRAM(S): at 06:15

## 2018-12-24 RX ADMIN — Medication 81 MILLIGRAM(S): at 11:53

## 2018-12-24 RX ADMIN — HEPARIN SODIUM 5000 UNIT(S): 5000 INJECTION INTRAVENOUS; SUBCUTANEOUS at 06:15

## 2018-12-24 RX ADMIN — Medication 667 MILLIGRAM(S): at 17:31

## 2018-12-24 RX ADMIN — Medication 60 MILLIGRAM(S): at 17:30

## 2018-12-24 RX ADMIN — Medication 100 MILLIGRAM(S): at 22:53

## 2018-12-24 RX ADMIN — ISOSORBIDE DINITRATE 10 MILLIGRAM(S): 5 TABLET ORAL at 22:53

## 2018-12-24 RX ADMIN — ISOSORBIDE DINITRATE 10 MILLIGRAM(S): 5 TABLET ORAL at 11:52

## 2018-12-24 RX ADMIN — Medication 200 MILLIGRAM(S): at 17:30

## 2018-12-24 RX ADMIN — ATORVASTATIN CALCIUM 40 MILLIGRAM(S): 80 TABLET, FILM COATED ORAL at 22:53

## 2018-12-24 RX ADMIN — Medication 1: at 17:30

## 2018-12-24 RX ADMIN — CLOPIDOGREL BISULFATE 75 MILLIGRAM(S): 75 TABLET, FILM COATED ORAL at 11:53

## 2018-12-24 RX ADMIN — Medication 10 MILLIGRAM(S): at 11:52

## 2018-12-24 RX ADMIN — Medication 100 MILLIGRAM(S): at 11:53

## 2018-12-24 RX ADMIN — Medication 667 MILLIGRAM(S): at 09:20

## 2018-12-24 RX ADMIN — Medication 1: at 12:27

## 2018-12-24 RX ADMIN — Medication 100 MILLIGRAM(S): at 06:15

## 2018-12-24 RX ADMIN — INSULIN GLARGINE 4 UNIT(S): 100 INJECTION, SOLUTION SUBCUTANEOUS at 22:44

## 2018-12-24 RX ADMIN — Medication 667 MILLIGRAM(S): at 11:53

## 2018-12-24 RX ADMIN — ISOSORBIDE DINITRATE 10 MILLIGRAM(S): 5 TABLET ORAL at 06:14

## 2018-12-24 RX ADMIN — HEPARIN SODIUM 5000 UNIT(S): 5000 INJECTION INTRAVENOUS; SUBCUTANEOUS at 14:20

## 2018-12-24 NOTE — PROVIDER CONTACT NOTE (OTHER) - ACTION/TREATMENT ORDERED:
NP informed, Hydralazine 10 mg IV stat ordered along with Hydralazine 100mg and Isordil 10mg. Will continue to monitor

## 2018-12-24 NOTE — CHART NOTE - NSCHARTNOTEFT_GEN_A_CORE
Pt was seen and examined.  Briefly this is a elderly male with hx HTN DM CAD sp CABG CKD stage 5 pw uremia    Suggest  -HD to be initiated  -DC plavix(Stent > one year ago-DW PMD)  -Change Lopressor to Labetolol  -Check PTH        Sayed Rafael  [facility]  [phone]

## 2018-12-24 NOTE — PROGRESS NOTE ADULT - SUBJECTIVE AND OBJECTIVE BOX
CHIEF COMPLAINT:  still BP  elevated patient and his wife agree with hemod and they agreed to stay in the hospi initiation of hemodialysis secondary to elevated blood pressure  68M PMH CAD/CABG, HFrEF (30%), T2DM, HTN, CKD 5 (not on HD), aortic valve replacement, chronic anemia occasionally requiring transfusions presents with SOB. Has had progressively worsening SOB and SZYMANSKI over the past 2 weeks. History is supplemented by wife as she manages most of his medical care and gives his medications. Symptoms significantly worsened this week with orthopnea and LE edema. No recent URIs, fevers, chills, cough, sputum production, chest pain, palpitations, NVD, abdominal pain, dysuria, decreased urination. Went to PMD for evaluation and he was noted to be in rapid afib in the office, rate 140s-150s. By time of arrival in ED, had spontaneously converted back to rate controlled sinus rhythm. O2sat 89% on RA, improved with nasal cannula. His wife believes he is on too many medications. For instance, she only gives him hydralazine once daily when he has been prescribed TID multiple times. Seems he is taking lasix 80mg daily (was discharged on BID dosing last admission)  SUBJECTIVE:     REVIEW OF SYSTEMS:    CONSTITUTIONAL: ( x )  weakness,  (  ) fevers or chills  EYES/ENT: (  )visual changes;     NECK: (  ) pain or stiffness  RESPIRATORY:   (  )cough, wheezing, hemoptysis;  ( x ) shortness of breath  CARDIOVASCULAR:  (  )chest pain or palpitations  GASTROINTESTINAL:   (  )abdominal or epigastric pain.  (  ) nausea, vomiting, or hematemesis;   (   ) diarrhea or constipation.   GENITOURINARY:   (    ) dysuria, frequency or hematuria  NEUROLOGICAL:  (   ) numbness or weakness   All other review of systems is negative unless indicated above    Vital Signs Last 24 Hrs  T(C): 36.6 (24 Dec 2018 11:25), Max: 36.8 (24 Dec 2018 04:21)  T(F): 97.8 (24 Dec 2018 11:25), Max: 98.2 (24 Dec 2018 04:21)  HR: 56 (24 Dec 2018 17:29) (54 - 59)  BP: 203/91 (24 Dec 2018 17:29) (164/71 - 210/90)  BP(mean): --  RR: 18 (24 Dec 2018 11:25) (18 - 19)  SpO2: 100% (24 Dec 2018 11:25) (97% - 100%)    I&O's Summary    23 Dec 2018 07:01  -  24 Dec 2018 07:00  --------------------------------------------------------  IN: 780 mL / OUT: 985 mL / NET: -205 mL    24 Dec 2018 07:01  -  24 Dec 2018 19:46  --------------------------------------------------------  IN: 680 mL / OUT: 500 mL / NET: 180 mL        CAPILLARY BLOOD GLUCOSE      POCT Blood Glucose.: 187 mg/dL (24 Dec 2018 16:47)  POCT Blood Glucose.: 157 mg/dL (24 Dec 2018 11:44)  POCT Blood Glucose.: 119 mg/dL (24 Dec 2018 07:46)  POCT Blood Glucose.: 195 mg/dL (23 Dec 2018 21:38)      PHYSICAL EXAM:     GENERAL: NAD, well-developed, sleepy  	HEAD:  Atraumatic, Normocephalic  	EYES: EOMI, PERRLA, conjunctiva and sclera clear  	NECK: +JVD  	CHEST/LUNG: Clear to auscultation bilaterally; No wheeze  	HEART: crackles b/l lower lobes  	ABDOMEN: Soft, Nontender, Nondistended; Bowel sounds present  	EXTREMITIES:  1+ pitting edema peripheral; 2+ Peripheral Pulses, No clubbing, cyanosis  	PSYCH: AAOx3  	NEUROLOGY: non-focal  SKIN: No rashes or lesions    MEDICATIONS:  MEDICATIONS  (STANDING):  aspirin enteric coated 81 milliGRAM(s) Oral daily  atorvastatin 40 milliGRAM(s) Oral at bedtime  calcium acetate 667 milliGRAM(s) Oral three times a day with meals  dextrose 5%. 1000 milliLiter(s) (50 mL/Hr) IV Continuous <Continuous>  dextrose 50% Injectable 12.5 Gram(s) IV Push once  dextrose 50% Injectable 25 Gram(s) IV Push once  dextrose 50% Injectable 25 Gram(s) IV Push once  furosemide   Injectable 60 milliGRAM(s) IV Push two times a day  heparin  Injectable 5000 Unit(s) SubCutaneous every 8 hours  hydrALAZINE 100 milliGRAM(s) Oral three times a day  insulin glargine Injectable (LANTUS) 4 Unit(s) SubCutaneous at bedtime  insulin lispro (HumaLOG) corrective regimen sliding scale   SubCutaneous three times a day before meals  insulin lispro (HumaLOG) corrective regimen sliding scale   SubCutaneous at bedtime  isosorbide   dinitrate Tablet (ISORDIL) 10 milliGRAM(s) Oral three times a day  labetalol 200 milliGRAM(s) Oral three times a day      LABS: All Labs Reviewed:                        7.6    5.61  )-----------( 255      ( 24 Dec 2018 07:51 )             23.9     12-24    141  |  102  |  133<H>  ----------------------------<  55<L>  4.7   |  20<L>  |  10.84<H>    Ca    7.6<L>      24 Dec 2018 06:18  Phos  7.8     12-24            Blood Culture:   Urine Culture      RADIOLOGY/EKG:    ASSESSMENT AND PLAN:  68M PMH CAD/CABG, HFrEF (30%), T2DM, HTN, CKD 5 (not on HD), aortic valve replacement, chronic anemia occasionally requiring transfusions presents with SOB, admitted with acute decompensated systolic heart failure with hypertensive emergency, demand ischemia, worsening renal failure, transient afib RVR.     Problem/Plan - 1:  ·  Problem: Acute systolic heart failure.  Plan: --from med non-adherence, especially with uncontrolled HTN, and worsening CKD5  --requiring nasal cannula oxygen  --patient reports feeling a little better after lasix in the ED. Start lasix 60mg IV BID.     --strict I/Os, daily weights   waiting for cariology consult dr payton  called [phone] left message    Problem/Plan - 2:   hypertension  · on  hydralazine 100mg TID, isordil 10mg TID. On IV lasix for CHF. labetalol 200 milliGRAM(s) Oral three times a day  --   Problem/Plan - 3:  ·  Problem: Elevated troponin.  Plan: --hsTrop to 492, up from high 300s last admission.    due  worsening renal failure  --will trend troponin  --c/w DAPT  --restart statin (hasn't been taking).     Problem/Plan - 4:  ·  Problem: Respiratory failure with hypoxia.  Plan: --from CHF exacerbation and volume overload  --diuresis  --oxygen PRN.  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  Plan: --converted to sinus rhythm on way to the hospital  --no history of afib. Patient does not seem to have chest pain, palpitations, or dizziness related to it, so unclear if he has been having pAF for a while.   --Patient and his wife are extremely hesitant about starting anticoagulation at this time. No history of bleeding, but they have somewhat poor health literacy regarding medication interactions. I explained the risks and benefits of starting or deferring anticoagulation in the setting afib including risk of catastrophic CVA, and they wish to think on the matter further. Will hold off on A/C at the moment.     Problem/Plan - 6:  Problem: CKD (chronic kidney disease) stage 5, GFR less than 15 ml/min. Plan: --Per wife, they have been in discussion regarding the probability of needing to start dialysis some point soon. No definitive plans made. They have intermittent follow up with nephrology, not seeing anyone consistent right now.   --Needs lasix now for HTN emergency and  renal failure.   -  Nephrology consult noted .       Problem/Plan - 7:  ·  Problem: T2DM (type 2 diabetes mellitus).  Plan: --On 70/30 at home.  due to hypoglycemia will decrease  lantus   DW  with [id number]  Problem/Plan - 8:  ·  Problem: Chronic anemia.  Plan: --trend CBC, S/P  transfusion  .     DVT PPX:    ADVANCED DIRECTIVE:    DISPOSITION:

## 2018-12-25 LAB
ANION GAP SERPL CALC-SCNC: 18 MMOL/L — HIGH (ref 5–17)
BLD GP AB SCN SERPL QL: NEGATIVE — SIGNIFICANT CHANGE UP
BUN SERPL-MCNC: 127 MG/DL — HIGH (ref 7–23)
CALCIUM SERPL-MCNC: 7.3 MG/DL — LOW (ref 8.4–10.5)
CALCIUM SERPL-MCNC: 7.3 MG/DL — LOW (ref 8.4–10.5)
CHLORIDE SERPL-SCNC: 101 MMOL/L — SIGNIFICANT CHANGE UP (ref 96–108)
CO2 SERPL-SCNC: 20 MMOL/L — LOW (ref 22–31)
CREAT SERPL-MCNC: 11.06 MG/DL — HIGH (ref 0.5–1.3)
GLUCOSE BLDC GLUCOMTR-MCNC: 131 MG/DL — HIGH (ref 70–99)
GLUCOSE BLDC GLUCOMTR-MCNC: 184 MG/DL — HIGH (ref 70–99)
GLUCOSE BLDC GLUCOMTR-MCNC: 206 MG/DL — HIGH (ref 70–99)
GLUCOSE BLDC GLUCOMTR-MCNC: 231 MG/DL — HIGH (ref 70–99)
GLUCOSE SERPL-MCNC: 93 MG/DL — SIGNIFICANT CHANGE UP (ref 70–99)
HBV SURFACE AB SER-ACNC: SIGNIFICANT CHANGE UP
HCT VFR BLD CALC: 22.2 % — LOW (ref 39–50)
HCT VFR BLD CALC: 22.5 % — LOW (ref 39–50)
HGB BLD-MCNC: 6.9 G/DL — CRITICAL LOW (ref 13–17)
HGB BLD-MCNC: 7.2 G/DL — LOW (ref 13–17)
MCHC RBC-ENTMCNC: 24.1 PG — LOW (ref 27–34)
MCHC RBC-ENTMCNC: 25.4 PG — LOW (ref 27–34)
MCHC RBC-ENTMCNC: 31.1 GM/DL — LOW (ref 32–36)
MCHC RBC-ENTMCNC: 31.9 GM/DL — LOW (ref 32–36)
MCV RBC AUTO: 77.6 FL — LOW (ref 80–100)
MCV RBC AUTO: 79.4 FL — LOW (ref 80–100)
PLATELET # BLD AUTO: 207 K/UL — SIGNIFICANT CHANGE UP (ref 150–400)
PLATELET # BLD AUTO: 213 K/UL — SIGNIFICANT CHANGE UP (ref 150–400)
POTASSIUM SERPL-MCNC: 4.7 MMOL/L — SIGNIFICANT CHANGE UP (ref 3.5–5.3)
POTASSIUM SERPL-SCNC: 4.7 MMOL/L — SIGNIFICANT CHANGE UP (ref 3.5–5.3)
RBC # BLD: 2.83 M/UL — LOW (ref 4.2–5.8)
RBC # BLD: 2.86 M/UL — LOW (ref 4.2–5.8)
RBC # FLD: 20.7 % — HIGH (ref 10.3–14.5)
RBC # FLD: 21.9 % — HIGH (ref 10.3–14.5)
RH IG SCN BLD-IMP: POSITIVE — SIGNIFICANT CHANGE UP
SODIUM SERPL-SCNC: 139 MMOL/L — SIGNIFICANT CHANGE UP (ref 135–145)
WBC # BLD: 5.6 K/UL — SIGNIFICANT CHANGE UP (ref 3.8–10.5)
WBC # BLD: 5.7 K/UL — SIGNIFICANT CHANGE UP (ref 3.8–10.5)
WBC # FLD AUTO: 5.6 K/UL — SIGNIFICANT CHANGE UP (ref 3.8–10.5)
WBC # FLD AUTO: 5.7 K/UL — SIGNIFICANT CHANGE UP (ref 3.8–10.5)

## 2018-12-25 RX ADMIN — ISOSORBIDE DINITRATE 10 MILLIGRAM(S): 5 TABLET ORAL at 14:33

## 2018-12-25 RX ADMIN — ATORVASTATIN CALCIUM 40 MILLIGRAM(S): 80 TABLET, FILM COATED ORAL at 21:30

## 2018-12-25 RX ADMIN — HEPARIN SODIUM 5000 UNIT(S): 5000 INJECTION INTRAVENOUS; SUBCUTANEOUS at 21:30

## 2018-12-25 RX ADMIN — Medication 667 MILLIGRAM(S): at 08:19

## 2018-12-25 RX ADMIN — Medication 2: at 17:19

## 2018-12-25 RX ADMIN — HEPARIN SODIUM 5000 UNIT(S): 5000 INJECTION INTRAVENOUS; SUBCUTANEOUS at 14:33

## 2018-12-25 RX ADMIN — HEPARIN SODIUM 5000 UNIT(S): 5000 INJECTION INTRAVENOUS; SUBCUTANEOUS at 05:32

## 2018-12-25 RX ADMIN — Medication 200 MILLIGRAM(S): at 05:32

## 2018-12-25 RX ADMIN — Medication 100 MILLIGRAM(S): at 21:30

## 2018-12-25 RX ADMIN — Medication 667 MILLIGRAM(S): at 12:22

## 2018-12-25 RX ADMIN — ISOSORBIDE DINITRATE 10 MILLIGRAM(S): 5 TABLET ORAL at 05:32

## 2018-12-25 RX ADMIN — Medication 60 MILLIGRAM(S): at 17:18

## 2018-12-25 RX ADMIN — ISOSORBIDE DINITRATE 10 MILLIGRAM(S): 5 TABLET ORAL at 21:30

## 2018-12-25 RX ADMIN — Medication 200 MILLIGRAM(S): at 21:30

## 2018-12-25 RX ADMIN — Medication 100 MILLIGRAM(S): at 05:32

## 2018-12-25 RX ADMIN — Medication 2: at 12:22

## 2018-12-25 RX ADMIN — Medication 100 MILLIGRAM(S): at 14:33

## 2018-12-25 RX ADMIN — Medication 60 MILLIGRAM(S): at 05:32

## 2018-12-25 RX ADMIN — Medication 667 MILLIGRAM(S): at 17:19

## 2018-12-25 RX ADMIN — Medication 200 MILLIGRAM(S): at 14:33

## 2018-12-25 RX ADMIN — INSULIN GLARGINE 4 UNIT(S): 100 INJECTION, SOLUTION SUBCUTANEOUS at 22:12

## 2018-12-25 RX ADMIN — Medication 1: at 08:19

## 2018-12-25 RX ADMIN — Medication 81 MILLIGRAM(S): at 12:22

## 2018-12-25 NOTE — PROGRESS NOTE ADULT - SUBJECTIVE AND OBJECTIVE BOX
CHIEF COMPLAINT: patient' feeling better today, wife at the bedside results of low hemoglobin noted but repeat  stable  68M PMH CAD/CABG, HFrEF (30%), T2DM, HTN, CKD 5 (not on HD), aortic valve replacement, chronic anemia occasionally requiring transfusions presents with SOB. Has had progressively worsening SOB and SZYMANSKI over the past 2 weeks. History is supplemented by wife as she manages most of his medical care and gives his medications. Symptoms significantly worsened this week with orthopnea and LE edema. No recent URIs, fevers, chills, cough, sputum production, chest pain, palpitations, NVD, abdominal pain, dysuria, decreased urination. Went to PMD for evaluation and he was noted to be in rapid afib in the office, rate 140s-150s. By time of arrival in ED, had spontaneously converted back to rate controlled sinus rhythm. O2sat 89% on RA, improved with nasal cannula. His wife believes he is on too many medications. For instance, she only gives him hydralazine once daily when he has been prescribed TID multiple times. Seems he is taking lasix 80mg daily (was discharged on BID dosing last admission)  SUBJECTIVE:     REVIEW OF SYSTEMS:    CONSTITUTIONAL: (x  )  weakness,  (  ) fevers or chills  EYES/ENT: (  )visual changes;     NECK: (  ) pain or stiffness  RESPIRATORY:   (  )cough, wheezing, hemoptysis;  ( x ) shortness of breath  CARDIOVASCULAR:  (  )chest pain or palpitations  GASTROINTESTINAL:   (  )abdominal or epigastric pain.  (  ) nausea, vomiting, or hematemesis;   (   ) diarrhea or constipation.   GENITOURINARY:   (    ) dysuria, frequency or hematuria  NEUROLOGICAL:  (   ) numbness or weakness   All other review of systems is negative unless indicated above    Vital Signs Last 24 Hrs  T(C): 36.4 (25 Dec 2018 11:12), Max: 36.5 (25 Dec 2018 04:01)  T(F): 97.5 (25 Dec 2018 11:12), Max: 97.7 (25 Dec 2018 04:01)  HR: 55 (25 Dec 2018 17:23) (55 - 58)  BP: 146/77 (25 Dec 2018 17:23) (146/77 - 178/71)  BP(mean): --  RR: 18 (25 Dec 2018 11:12) (18 - 18)  SpO2: 100% (25 Dec 2018 11:12) (97% - 100%)    I&O's Summary    24 Dec 2018 07:01  -  25 Dec 2018 07:00  --------------------------------------------------------  IN: 860 mL / OUT: 1250 mL / NET: -390 mL    25 Dec 2018 07:01  -  25 Dec 2018 18:01  --------------------------------------------------------  IN: 480 mL / OUT: 0 mL / NET: 480 mL        CAPILLARY BLOOD GLUCOSE      POCT Blood Glucose.: 206 mg/dL (25 Dec 2018 16:46)  POCT Blood Glucose.: 231 mg/dL (25 Dec 2018 11:55)  POCT Blood Glucose.: 184 mg/dL (25 Dec 2018 07:59)  POCT Blood Glucose.: 94 mg/dL (24 Dec 2018 21:55)      PHYSICAL EXAM:   GENERAL: NAD, well-developed, sleepy  	HEAD:  Atraumatic, Normocephalic  	EYES: EOMI, PERRLA, conjunctiva and sclera clear  	NECK: +JVD  	CHEST/LUNG: Clear to auscultation bilaterally; No wheeze  	HEART: crackles b/l lower lobes  	ABDOMEN: Soft, Nontender, Nondistended; Bowel sounds present  	EXTREMITIES:  1+ pitting edema peripheral; 2+ Peripheral Pulses, No clubbing, cyanosis  	PSYCH: AAOx3  	NEUROLOGY: non-focal  SKIN: No rashes or lesions     MEDICATIONS:  MEDICATIONS  (STANDING):  aspirin enteric coated 81 milliGRAM(s) Oral daily  atorvastatin 40 milliGRAM(s) Oral at bedtime  calcium acetate 667 milliGRAM(s) Oral three times a day with meals  dextrose 5%. 1000 milliLiter(s) (50 mL/Hr) IV Continuous <Continuous>  dextrose 50% Injectable 12.5 Gram(s) IV Push once  dextrose 50% Injectable 25 Gram(s) IV Push once  dextrose 50% Injectable 25 Gram(s) IV Push once  furosemide   Injectable 60 milliGRAM(s) IV Push two times a day  heparin  Injectable 5000 Unit(s) SubCutaneous every 8 hours  hydrALAZINE 100 milliGRAM(s) Oral three times a day  insulin glargine Injectable (LANTUS) 4 Unit(s) SubCutaneous at bedtime  insulin lispro (HumaLOG) corrective regimen sliding scale   SubCutaneous three times a day before meals  insulin lispro (HumaLOG) corrective regimen sliding scale   SubCutaneous at bedtime  isosorbide   dinitrate Tablet (ISORDIL) 10 milliGRAM(s) Oral three times a day  labetalol 200 milliGRAM(s) Oral three times a day      LABS: All Labs Reviewed:                        7.2    5.6   )-----------( 207      ( 25 Dec 2018 09:38 )             22.5     12-25    139  |  101  |  127<H>  ----------------------------<  93  4.7   |  20<L>  |  11.06<H>    Ca    7.3<L>      25 Dec 2018 05:43  Phos  7.8     12-24            Blood Culture:   Urine Culture      RADIOLOGY/EKG:    ASSESSMENT AND PLAN:  68M PMH CAD/CABG, HFrEF (30%), T2DM, HTN, CKD 5 (not on HD), aortic valve replacement, chronic anemia occasionally requiring transfusions presents with SOB, admitted with acute decompensated systolic heart failure with hypertensive emergency, demand ischemia, worsening renal failure, transient afib RVR.     Problem/Plan - 1:  ·  Problem: Acute systolic heart failure.  Plan: --from med non-adherence, especially with uncontrolled HTN, and worsening CKD5  --requiring nasal cannula oxygen  --patient reports feeling a little better after lasix in the ED. Start lasix 60mg IV BID.     --strict I/Os, daily weights   waiting for cariology consult dr payton  called [phone] left message    Problem/Plan - 2:   hypertension  · on  hydralazine 100mg TID, isordil 10mg TID. On IV lasix for CHF. labetalol 200 milliGRAM(s) Oral three times a day  --   Problem/Plan - 3:  ·  Problem: Elevated troponin.  Plan: --hsTrop to 492, up from high 300s last admission.    due  worsening renal failure  --will trend troponin  --c/w DAPT  --restart statin (hasn't been taking).     Problem/Plan - 4:  ·  Problem: Respiratory failure with hypoxia.  Plan: --from CHF exacerbation and volume overload  --diuresis  --oxygen PRN.  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  Plan: --converted to sinus rhythm on way to the hospital  --no history of afib. Patient does not seem to have chest pain, palpitations, or dizziness related to it, so unclear if he has been having pAF for a while.         Problem/Plan - 6:  Problem: CKD (chronic kidney disease) stage 5, GFR less than 15 ml/min. Plan: - anyone consistent right now.   --Needs lasix now for HTN emergency and  renal failure.   -  Nephrology consult noted . for hemodialysis soon       Problem/Plan - 7:  ·  Problem: T2DM (type 2 diabetes mellitus).  Plan: --On 70/30 at home.  due to hypoglycemia will decrease  lantus   DW  with [id number]  Problem/Plan - 8:  ·  Problem: Chronic anemia.  Plan: --trend CBC, ??transfusion  . before hemodialysis    DVT PPX:    ADVANCED DIRECTIVE:    DISPOSITION:

## 2018-12-26 LAB
ANION GAP SERPL CALC-SCNC: 19 MMOL/L — HIGH (ref 5–17)
BUN SERPL-MCNC: 125 MG/DL — HIGH (ref 7–23)
CALCIUM SERPL-MCNC: 7.3 MG/DL — LOW (ref 8.4–10.5)
CHLORIDE SERPL-SCNC: 97 MMOL/L — SIGNIFICANT CHANGE UP (ref 96–108)
CO2 SERPL-SCNC: 21 MMOL/L — LOW (ref 22–31)
CREAT SERPL-MCNC: 11.26 MG/DL — HIGH (ref 0.5–1.3)
GLUCOSE BLDC GLUCOMTR-MCNC: 148 MG/DL — HIGH (ref 70–99)
GLUCOSE BLDC GLUCOMTR-MCNC: 156 MG/DL — HIGH (ref 70–99)
GLUCOSE BLDC GLUCOMTR-MCNC: 175 MG/DL — HIGH (ref 70–99)
GLUCOSE BLDC GLUCOMTR-MCNC: 80 MG/DL — SIGNIFICANT CHANGE UP (ref 70–99)
GLUCOSE SERPL-MCNC: 101 MG/DL — HIGH (ref 70–99)
HCT VFR BLD CALC: 22.5 % — LOW (ref 39–50)
HGB BLD-MCNC: 7.1 G/DL — LOW (ref 13–17)
MAGNESIUM SERPL-MCNC: 2.4 MG/DL — SIGNIFICANT CHANGE UP (ref 1.6–2.6)
MCHC RBC-ENTMCNC: 24.6 PG — LOW (ref 27–34)
MCHC RBC-ENTMCNC: 31.6 GM/DL — LOW (ref 32–36)
MCV RBC AUTO: 77.9 FL — LOW (ref 80–100)
PLATELET # BLD AUTO: 248 K/UL — SIGNIFICANT CHANGE UP (ref 150–400)
POTASSIUM SERPL-MCNC: 5.1 MMOL/L — SIGNIFICANT CHANGE UP (ref 3.5–5.3)
POTASSIUM SERPL-SCNC: 5.1 MMOL/L — SIGNIFICANT CHANGE UP (ref 3.5–5.3)
RBC # BLD: 2.89 M/UL — LOW (ref 4.2–5.8)
RBC # FLD: 21 % — HIGH (ref 10.3–14.5)
SODIUM SERPL-SCNC: 137 MMOL/L — SIGNIFICANT CHANGE UP (ref 135–145)
WBC # BLD: 5.42 K/UL — SIGNIFICANT CHANGE UP (ref 3.8–10.5)
WBC # FLD AUTO: 5.42 K/UL — SIGNIFICANT CHANGE UP (ref 3.8–10.5)

## 2018-12-26 PROCEDURE — 36556 INSERT NON-TUNNEL CV CATH: CPT

## 2018-12-26 PROCEDURE — 77001 FLUOROGUIDE FOR VEIN DEVICE: CPT | Mod: 26

## 2018-12-26 PROCEDURE — 76937 US GUIDE VASCULAR ACCESS: CPT | Mod: 26

## 2018-12-26 RX ORDER — DOXERCALCIFEROL 2.5 UG/1
3 CAPSULE ORAL
Qty: 0 | Refills: 0 | Status: DISCONTINUED | OUTPATIENT
Start: 2018-12-26 | End: 2019-01-03

## 2018-12-26 RX ORDER — ERYTHROPOIETIN 10000 [IU]/ML
10000 INJECTION, SOLUTION INTRAVENOUS; SUBCUTANEOUS
Qty: 0 | Refills: 0 | Status: DISCONTINUED | OUTPATIENT
Start: 2018-12-26 | End: 2019-01-03

## 2018-12-26 RX ORDER — ACETAMINOPHEN 500 MG
1000 TABLET ORAL ONCE
Qty: 0 | Refills: 0 | Status: COMPLETED | OUTPATIENT
Start: 2018-12-26 | End: 2018-12-26

## 2018-12-26 RX ADMIN — Medication 100 MILLIGRAM(S): at 15:22

## 2018-12-26 RX ADMIN — Medication 400 MILLIGRAM(S): at 21:42

## 2018-12-26 RX ADMIN — INSULIN GLARGINE 4 UNIT(S): 100 INJECTION, SOLUTION SUBCUTANEOUS at 22:15

## 2018-12-26 RX ADMIN — Medication 60 MILLIGRAM(S): at 05:36

## 2018-12-26 RX ADMIN — Medication 100 MILLIGRAM(S): at 05:36

## 2018-12-26 RX ADMIN — Medication 200 MILLIGRAM(S): at 05:36

## 2018-12-26 RX ADMIN — ISOSORBIDE DINITRATE 10 MILLIGRAM(S): 5 TABLET ORAL at 05:36

## 2018-12-26 RX ADMIN — DOXERCALCIFEROL 3 MICROGRAM(S): 2.5 CAPSULE ORAL at 13:58

## 2018-12-26 RX ADMIN — ERYTHROPOIETIN 10000 UNIT(S): 10000 INJECTION, SOLUTION INTRAVENOUS; SUBCUTANEOUS at 13:58

## 2018-12-26 RX ADMIN — Medication 667 MILLIGRAM(S): at 08:14

## 2018-12-26 RX ADMIN — Medication 81 MILLIGRAM(S): at 16:26

## 2018-12-26 RX ADMIN — Medication 200 MILLIGRAM(S): at 15:22

## 2018-12-26 RX ADMIN — Medication 1000 MILLIGRAM(S): at 22:20

## 2018-12-26 RX ADMIN — Medication 60 MILLIGRAM(S): at 19:04

## 2018-12-26 RX ADMIN — HEPARIN SODIUM 5000 UNIT(S): 5000 INJECTION INTRAVENOUS; SUBCUTANEOUS at 05:37

## 2018-12-26 RX ADMIN — Medication 1: at 17:30

## 2018-12-26 RX ADMIN — Medication 667 MILLIGRAM(S): at 18:26

## 2018-12-26 RX ADMIN — ISOSORBIDE DINITRATE 10 MILLIGRAM(S): 5 TABLET ORAL at 15:22

## 2018-12-26 NOTE — PROGRESS NOTE ADULT - SUBJECTIVE AND OBJECTIVE BOX
68M PMH CAD/CABG, HFrEF (30%), T2DM, HTN, CKD 5 referred to IR for central venous catheter placement for hemodialysis.  high troponin thought to be 2/2 renal failure per chart review.     Allergies: lisinopril (Anaphylaxis)      PAST MEDICAL & SURGICAL HISTORY:  HTN (hypertension)  Aortic valve replaced  CKD (chronic kidney disease)  Anemia  CHF (congestive heart failure)  Diabetes mellitus  Hypertension  S/P CABG (coronary artery bypass graft)  S/P appendectomy        Pertinent labs:                      7.1    5.42  )-----------( 248      ( 26 Dec 2018 06:51 )             22.5   12-26    137  |  97  |  125<H>  ----------------------------<  101<H>  5.1   |  21<L>  |  11.26<H>    Ca    7.3<L>      26 Dec 2018 05:53  Phos  7.8     12-24  Mg     2.4     12-26    Troponin T, High Sensitivity (12.21.18 @ 06:08)    Troponin T, High Sensitivity Result: 520: Rapid upward or downward changes in high-sensitivity troponin levels  suggest acute myocardial injury. Renal impairment may cause sustained  troponin elevations.  Normal: <6 - 14 ng/L  Indeterminate: 15-51 ng/L  Elevated: > 51 ng/L  See http://labs/test/TROPTHS on the [facility] intranet for more  information ng/L    onsent: Procedure/risks/ Benefits explained. Informed consent obtained. Pt verbalizes understanding.

## 2018-12-26 NOTE — PROCEDURE NOTE - NSPROCDETAILS_GEN_ALL_CORE
lumen(s) aspirated and flushed/guidewire recovered/sterile dressing applied/sterile technique, catheter placed/ultrasound guidance

## 2018-12-26 NOTE — DIETITIAN INITIAL EVALUATION ADULT. - NS AS NUTRI INTERV MEALS SNACK
Recommend change diet to carbohydrate consistent + Renal diet. Encourage PO intake of protein rich, nutrient dense foods./General/healthful diet

## 2018-12-26 NOTE — DIETITIAN INITIAL EVALUATION ADULT. - NS AS NUTRI INTERV ED CONTENT
Provided education on renal diet, education included importance of monitoring intake of foods high in potassium/phosphorous/sodium, review of foods high in these micronutrients as well as alternatives, monitoring fluid intake, and importance of adequate protein intake due to increased demand on HD./Purpose of the nutrition education

## 2018-12-26 NOTE — DIETITIAN INITIAL EVALUATION ADULT. - PERTINENT LABORATORY DATA
POCT glucose: 12/26: 80-156mg/dL, 12/25: 131-231mg/dL, potassium 5.1 (12/26), Phosphorus 7.8 (12/24), , creatinine 10.84

## 2018-12-26 NOTE — PROGRESS NOTE ADULT - SUBJECTIVE AND OBJECTIVE BOX
NEPHROLOGY-NSN [phone]        Patient seen and examined in the bed.  He was in good spirits        MEDICATIONS  (STANDING):  aspirin enteric coated 81 milliGRAM(s) Oral daily  atorvastatin 40 milliGRAM(s) Oral at bedtime  calcium acetate 667 milliGRAM(s) Oral three times a day with meals  dextrose 5%. 1000 milliLiter(s) (50 mL/Hr) IV Continuous <Continuous>  dextrose 50% Injectable 12.5 Gram(s) IV Push once  dextrose 50% Injectable 25 Gram(s) IV Push once  dextrose 50% Injectable 25 Gram(s) IV Push once  furosemide   Injectable 60 milliGRAM(s) IV Push two times a day  heparin  Injectable 5000 Unit(s) SubCutaneous every 8 hours  hydrALAZINE 100 milliGRAM(s) Oral three times a day  insulin glargine Injectable (LANTUS) 4 Unit(s) SubCutaneous at bedtime  insulin lispro (HumaLOG) corrective regimen sliding scale   SubCutaneous three times a day before meals  insulin lispro (HumaLOG) corrective regimen sliding scale   SubCutaneous at bedtime  isosorbide   dinitrate Tablet (ISORDIL) 10 milliGRAM(s) Oral three times a day  labetalol 200 milliGRAM(s) Oral three times a day      VITAL:  T(C): , Max: 36.4 (12-25-18 @ 11:12)  T(F): , Max: 97.5 (12-25-18 @ 11:12)  HR: 71 (12-26-18 @ 04:41)  BP: 159/86 (12-26-18 @ 04:41)  BP(mean): --  RR: 18 (12-26-18 @ 04:41)  SpO2: 99% (12-26-18 @ 04:41)  Wt(kg): --    I and O's:    12-25 @ 07:01  -  12-26 @ 07:00  --------------------------------------------------------  IN: 480 mL / OUT: 0 mL / NET: 480 mL          PHYSICAL EXAM:    Constitutional: NAD  Neck:  No JVD  Respiratory: CTAB/L  Cardiovascular: S1 and S2  Gastrointestinal: BS+, soft, NT/ND  Extremities: No peripheral edema  Neurological: A/O x 3, no focal deficits  Psychiatric: Normal mood, normal affect  : No [place]  Skin: No rashes  Access: Not applicable    LABS:                        7.1    5.42  )-----------( 248      ( 26 Dec 2018 06:51 )             22.5     12-26    137  |  97  |  125<H>  ----------------------------<  101<H>  5.1   |  21<L>  |  11.26<H>    Ca    7.3<L>      26 Dec 2018 05:53  Phos  7.8     12-24  Mg     2.4     12-26            Urine Studies:          RADIOLOGY & ADDITIONAL STUDIES:

## 2018-12-26 NOTE — PROGRESS NOTE ADULT - SUBJECTIVE AND OBJECTIVE BOX
CHIEF COMPLAINT: the patient seen earlier today at 9 Am wating  FOR [facility] for hemodialysis  68M PMH CAD/CABG, HFrEF (30%), T2DM, HTN, CKD 5 (not on HD), aortic valve replacement, chronic anemia occasionally requiring transfusions presents with SOB. Has had progressively worsening SOB and SZYMANSKI over the past 2 weeks. History is supplemented by wife as she manages most of his medical care and gives his medications. Symptoms significantly worsened this week with orthopnea and LE edema. No recent URIs, fevers, chills, cough, sputum production, chest pain, palpitations, NVD, abdominal pain, dysuria, decreased urination. Went to PMD for evaluation and he was noted to be in rapid afib in the office, rate 140s-150s. By time of arrival in ED, had spontaneously converted back to rate controlled sinus rhythm. O2sat 89% on RA, improved with nasal cannula. His wife believes he is on too many medications. For instance, she only gives him hydralazine once daily when he has been prescribed TID multiple time will  SUBJECTIVE:     REVIEW OF SYSTEMS:    CONSTITUTIONAL: (x  )  weakness,  (  ) fevers or chills  EYES/ENT: (  )visual changes;     NECK: (  ) pain or stiffness  RESPIRATORY:   (  )cough, wheezing, hemoptysis;  (  ) shortness of breath  CARDIOVASCULAR:  (  )chest pain or palpitations  GASTROINTESTINAL:   (  )abdominal or epigastric pain.  (  ) nausea, vomiting, or hematemesis;   (   ) diarrhea or constipation.   GENITOURINARY:   (    ) dysuria, frequency or hematuria  NEUROLOGICAL:  (   ) numbness or weakness   All other review of systems is negative unless indicated above    Vital Signs Last 24 Hrs  T(C): 36.2 (26 Dec 2018 20:52), Max: 36.7 (26 Dec 2018 12:35)  T(F): 97.2 (26 Dec 2018 20:52), Max: 98.1 (26 Dec 2018 12:35)  HR: 74 (26 Dec 2018 20:52) (60 - 74)  BP: 157/74 (26 Dec 2018 20:52) (126/67 - 175/76)  BP(mean): --  RR: 18 (26 Dec 2018 20:52) (15 - 18)  SpO2: 98% (26 Dec 2018 20:52) (98% - 99%)    I&O's Summary    25 Dec 2018 07:01  -  26 Dec 2018 07:00  --------------------------------------------------------  IN: 480 mL / OUT: 0 mL / NET: 480 mL    26 Dec 2018 07:01  -  26 Dec 2018 23:00  --------------------------------------------------------  IN: 240 mL / OUT: 600 mL / NET: -360 mL        CAPILLARY BLOOD GLUCOSE      POCT Blood Glucose.: 148 mg/dL (26 Dec 2018 21:37)  POCT Blood Glucose.: 175 mg/dL (26 Dec 2018 16:37)  POCT Blood Glucose.: 156 mg/dL (26 Dec 2018 12:03)  POCT Blood Glucose.: 80 mg/dL (26 Dec 2018 08:03)      PHYSICAL EXAM:  GENERAL: NAD, well-developed, sleepy  	HEAD:  Atraumatic, Normocephalic  	EYES: EOMI, PERRLA, conjunctiva and sclera clear  	NECK: +JVD  	CHEST/LUNG: Clear to auscultation bilaterally; No wheeze  	HEART: crackles b/l lower lobes  	ABDOMEN: Soft, Nontender, Nondistended; Bowel sounds present  	EXTREMITIES:  1+ pitting edema peripheral; 2+ Peripheral Pulses, No clubbing, cyanosis  	PSYCH: AAOx3  	NEUROLOGY: non-focal  SKIN: No rashes or lesions       MEDICATIONS:  MEDICATIONS  (STANDING):  aspirin enteric coated 81 milliGRAM(s) Oral daily  atorvastatin 40 milliGRAM(s) Oral at bedtime  calcium acetate 667 milliGRAM(s) Oral three times a day with meals  dextrose 5%. 1000 milliLiter(s) (50 mL/Hr) IV Continuous <Continuous>  dextrose 50% Injectable 12.5 Gram(s) IV Push once  dextrose 50% Injectable 25 Gram(s) IV Push once  dextrose 50% Injectable 25 Gram(s) IV Push once  doxercalciferol Injectable 3 MICROGram(s) IV Push <User Schedule>  epoetin theodore Injectable 60975 Unit(s) IV Push <User Schedule>  furosemide   Injectable 60 milliGRAM(s) IV Push two times a day  heparin  Injectable 5000 Unit(s) SubCutaneous every 8 hours  hydrALAZINE 100 milliGRAM(s) Oral three times a day  insulin glargine Injectable (LANTUS) 4 Unit(s) SubCutaneous at bedtime  insulin lispro (HumaLOG) corrective regimen sliding scale   SubCutaneous three times a day before meals  insulin lispro (HumaLOG) corrective regimen sliding scale   SubCutaneous at bedtime  isosorbide   dinitrate Tablet (ISORDIL) 10 milliGRAM(s) Oral three times a day  labetalol 200 milliGRAM(s) Oral three times a day      LABS: All Labs Reviewed:                        7.1    5.42  )-----------( 248      ( 26 Dec 2018 06:51 )             22.5     12-26    137  |  97  |  125<H>  ----------------------------<  101<H>  5.1   |  21<L>  |  11.26<H>    Ca    7.3<L>      26 Dec 2018 05:53  Mg     2.4     12-26            Blood Culture:   Urine Culture      RADIOLOGY/EKG:    ASSESSMENT AND PLAN:  68M PMH CAD/CABG, HFrEF (30%), T2DM, HTN, CKD 5 (not on HD), aortic valve replacement, chronic anemia occasionally requiring transfusions presents with SOB, admitted with acute decompensated systolic heart failure with hypertensive emergency, demand ischemia, worsening renal failure, transient afib RVR.     Problem/Plan - 1:  ·  Problem: Acute systolic heart failure.  Plan: --from med non-adherence, especially with uncontrolled HTN, and worsening CKD5  --requiring nasal cannula oxygen  --patient reports feeling a little better after lasix in the ED. Start lasix 60mg IV BID.     --strict I/Os, daily weights   waiting for cariology consult dr payton  called [phone] left message    Problem/Plan - 2:   hypertension  · on  hydralazine 100mg TID, isordil 10mg TID. On IV lasix for CHF. labetalol 200 milliGRAM(s) Oral three times a day  --   Problem/Plan - 3:  ·  Problem: Elevated troponin.  Plan: --hsTrop to 492, up from high 300s last admission.    due  worsening renal failure  --will trend troponin  --c/w DAPT  --restart statin (hasn't been taking).     Problem/Plan - 4:  ·  Problem: Respiratory failure with hypoxia.  Plan: --from CHF exacerbation and volume overload  --diuresis  --oxygen PRN.  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  Plan: --converted to sinus rhythm on way to the hospital  --no history of afib. Patient does not seem to have chest pain, palpitations, or dizziness related to it, so unclear if he has been having pAF for a while.         Problem/Plan - 6:  Problem: CKD (chronic kidney disease) stage 5, GFR less than 15 ml/min. Plan: - anyone consistent right now.   --Needs lasix now for HTN emergency and  renal failure.   -  Nephrology  followup noted . for hemodialysis in AM       Problem/Plan - 7:  ·  Problem: T2DM (type 2 diabetes mellitus).  Plan: --On 70/30 at home.  due to hypoglycemia will decrease  lantus   DW  with [id number]  Problem/Plan - 8:  ·  Problem: Chronic anemia.  Plan: --trend CBC, ??transfusion  . before hemodialysis    DVT PPX:    ADVANCED DIRECTIVE:    DISPOSITION:

## 2018-12-26 NOTE — DIETITIAN INITIAL EVALUATION ADULT. - OTHER INFO
Patient seen for Length Of Stay on 4MON. Pt reports UBW as 144lbs however per previous RD note (6/2018) pt noted with dry weight of 161lbs. Current dosing weight noted as 150lbs, wt today (12/26) Post-HD noted as 156lbs. Question accuracy of stated UBW. Since admission pt reports overall good PO intake, completing ~75% of meals. Pt noted with decreased appetite this morning consuming only 50% of breakfast. No acute GI distress noted however no BM noted in chart. Consider addition of bowel regimen. Pt started on HD today, amendable to diet education.

## 2018-12-26 NOTE — PROGRESS NOTE ADULT - SUBJECTIVE AND OBJECTIVE BOX
Called to see patient for bleeding at the dermatotomy site. NO hematoma. Held pressure for 15 minutes hemostasis was not achieved. A purse string suture was applied and another suture to close the dermatotomy. Bleeding slow downed. New dressing applied and pressure dressing applied. PT tolerated procedure well. Called to see patient for bleeding at the dermatotomy site. NO hematoma. Held pressure for 15 minutes hemostasis was not achieved. A purse string suture was applied and another suture to close the dermatotomy. Bleeding slow downed. New dressing applied and pressure dressing applied. PT tolerated procedure well. Please don't give heparin during dialysis tomorrow.

## 2018-12-26 NOTE — DIETITIAN INITIAL EVALUATION ADULT. - ENERGY NEEDS
Ht: 66 inches, Wt: 150lbs, BMI:24.2 kg/m2, IBW:142 lbs +/- 10%, %IBW: 106%  Edema: +1 left/right leg , Skin: free of pressure injuries per nursing flow sheets   pertinent Information: This is a 68M PMH CAD/CABG, HFrEF (30%), T2DM, HTN, CKD 5 referred to IR for central venous catheter placement for hemodialysis.

## 2018-12-26 NOTE — DIETITIAN INITIAL EVALUATION ADULT. - ORAL INTAKE PTA
good/Pt reports good PO intake and appetite PTA, denies any recent changes. Usually consumes 2 meals per day (skips breakfast). Confirms NKFA, takes calcium acetate at home. Pt denies chewing/swallowing difficulty, nausea, vomiting, diarrhea, constipation.

## 2018-12-26 NOTE — DIETITIAN INITIAL EVALUATION ADULT. - ADHERENCE
Pt reports he follows a low sodium diet at home, denies following any renal restrictions but avoids potatoes, bananas and tomatoes. Pt noted with T2DM, takes Novolog, HbA1c: 5.9% indicating good glycemic control

## 2018-12-26 NOTE — PROGRESS NOTE ADULT - ASSESSMENT
The patient is a 68-year-old gentleman with past medical history of hypertension, diabetes, chronic kidney disease stage V, now presents with uremia.  He also has hypertensive urgency with also accompanying severe pulmonary hypertension.  Goals of therapy are blood pressure control and dialysis.  The patient was told the importance of dialysis and now he agrees to proceed.  Secondary hyperparathyroidism    1.	Renal:  Set up dialysis today;  [facility] to be done around 12pm and then HD to follow.  2.	Cardiology:  BP improved with the labetalol and hydralazine.  Likely reduction of these medications once HD is started and then add ARB  3.	Anemia:   Epogen today.  4.	Endocrine-Start Hectorol

## 2018-12-26 NOTE — DIETITIAN INITIAL EVALUATION ADULT. - PROBLEM SELECTOR PLAN 2
--poor adherence/medical literacy regarding need for medications  --BPs remain elevated to 210/120 at time of my eval. Stat PO hydralazine and isordil.  --Will start hydralazine 100mg TID, isordil 10mg TID. On IV lasix for CHF.   --Restart home metoprolol, may need to be switched to coreg. Previously was on amlodipine too.

## 2018-12-27 LAB
ANION GAP SERPL CALC-SCNC: 14 MMOL/L — SIGNIFICANT CHANGE UP (ref 5–17)
BLD GP AB SCN SERPL QL: NEGATIVE — SIGNIFICANT CHANGE UP
BUN SERPL-MCNC: 97 MG/DL — HIGH (ref 7–23)
CALCIUM SERPL-MCNC: 7 MG/DL — LOW (ref 8.4–10.5)
CHLORIDE SERPL-SCNC: 98 MMOL/L — SIGNIFICANT CHANGE UP (ref 96–108)
CO2 SERPL-SCNC: 23 MMOL/L — SIGNIFICANT CHANGE UP (ref 22–31)
CREAT SERPL-MCNC: 8.66 MG/DL — HIGH (ref 0.5–1.3)
GLUCOSE BLDC GLUCOMTR-MCNC: 112 MG/DL — HIGH (ref 70–99)
GLUCOSE BLDC GLUCOMTR-MCNC: 128 MG/DL — HIGH (ref 70–99)
GLUCOSE BLDC GLUCOMTR-MCNC: 133 MG/DL — HIGH (ref 70–99)
GLUCOSE BLDC GLUCOMTR-MCNC: 153 MG/DL — HIGH (ref 70–99)
GLUCOSE SERPL-MCNC: 110 MG/DL — HIGH (ref 70–99)
HAV IGM SER-ACNC: SIGNIFICANT CHANGE UP
HBV CORE AB SER-ACNC: SIGNIFICANT CHANGE UP
HBV CORE IGM SER-ACNC: SIGNIFICANT CHANGE UP
HBV SURFACE AB SER-ACNC: <3 MIU/ML — LOW
HBV SURFACE AG SER-ACNC: SIGNIFICANT CHANGE UP
HCT VFR BLD CALC: 20 % — CRITICAL LOW (ref 39–50)
HCT VFR BLD CALC: 24 % — LOW (ref 39–50)
HCV AB S/CO SERPL IA: 0.69 S/CO — SIGNIFICANT CHANGE UP
HCV AB SERPL-IMP: SIGNIFICANT CHANGE UP
HGB BLD-MCNC: 6.2 G/DL — CRITICAL LOW (ref 13–17)
HGB BLD-MCNC: 8 G/DL — LOW (ref 13–17)
MCHC RBC-ENTMCNC: 24.7 PG — LOW (ref 27–34)
MCHC RBC-ENTMCNC: 27.1 PG — SIGNIFICANT CHANGE UP (ref 27–34)
MCHC RBC-ENTMCNC: 31 GM/DL — LOW (ref 32–36)
MCHC RBC-ENTMCNC: 33.3 GM/DL — SIGNIFICANT CHANGE UP (ref 32–36)
MCV RBC AUTO: 79.7 FL — LOW (ref 80–100)
MCV RBC AUTO: 81.4 FL — SIGNIFICANT CHANGE UP (ref 80–100)
PLATELET # BLD AUTO: 176 K/UL — SIGNIFICANT CHANGE UP (ref 150–400)
PLATELET # BLD AUTO: 215 K/UL — SIGNIFICANT CHANGE UP (ref 150–400)
POTASSIUM SERPL-MCNC: 5 MMOL/L — SIGNIFICANT CHANGE UP (ref 3.5–5.3)
POTASSIUM SERPL-SCNC: 5 MMOL/L — SIGNIFICANT CHANGE UP (ref 3.5–5.3)
RBC # BLD: 2.51 M/UL — LOW (ref 4.2–5.8)
RBC # BLD: 2.95 M/UL — LOW (ref 4.2–5.8)
RBC # FLD: 20.8 % — HIGH (ref 10.3–14.5)
RBC # FLD: 20.9 % — HIGH (ref 10.3–14.5)
RH IG SCN BLD-IMP: POSITIVE — SIGNIFICANT CHANGE UP
SODIUM SERPL-SCNC: 135 MMOL/L — SIGNIFICANT CHANGE UP (ref 135–145)
WBC # BLD: 5.6 K/UL — SIGNIFICANT CHANGE UP (ref 3.8–10.5)
WBC # BLD: 6.41 K/UL — SIGNIFICANT CHANGE UP (ref 3.8–10.5)
WBC # FLD AUTO: 5.6 K/UL — SIGNIFICANT CHANGE UP (ref 3.8–10.5)
WBC # FLD AUTO: 6.41 K/UL — SIGNIFICANT CHANGE UP (ref 3.8–10.5)

## 2018-12-27 PROCEDURE — 99223 1ST HOSP IP/OBS HIGH 75: CPT | Mod: GC

## 2018-12-27 RX ORDER — METOCLOPRAMIDE HCL 10 MG
5 TABLET ORAL ONCE
Qty: 0 | Refills: 0 | Status: COMPLETED | OUTPATIENT
Start: 2018-12-27 | End: 2018-12-27

## 2018-12-27 RX ADMIN — Medication 81 MILLIGRAM(S): at 13:09

## 2018-12-27 RX ADMIN — Medication 200 MILLIGRAM(S): at 21:20

## 2018-12-27 RX ADMIN — ATORVASTATIN CALCIUM 40 MILLIGRAM(S): 80 TABLET, FILM COATED ORAL at 21:20

## 2018-12-27 RX ADMIN — Medication 5 MILLIGRAM(S): at 20:10

## 2018-12-27 RX ADMIN — ISOSORBIDE DINITRATE 10 MILLIGRAM(S): 5 TABLET ORAL at 21:20

## 2018-12-27 RX ADMIN — Medication 200 MILLIGRAM(S): at 13:09

## 2018-12-27 RX ADMIN — Medication 60 MILLIGRAM(S): at 18:07

## 2018-12-27 RX ADMIN — HEPARIN SODIUM 5000 UNIT(S): 5000 INJECTION INTRAVENOUS; SUBCUTANEOUS at 13:10

## 2018-12-27 RX ADMIN — Medication 100 MILLIGRAM(S): at 00:32

## 2018-12-27 RX ADMIN — Medication 667 MILLIGRAM(S): at 13:09

## 2018-12-27 RX ADMIN — INSULIN GLARGINE 4 UNIT(S): 100 INJECTION, SOLUTION SUBCUTANEOUS at 22:07

## 2018-12-27 RX ADMIN — ISOSORBIDE DINITRATE 10 MILLIGRAM(S): 5 TABLET ORAL at 00:32

## 2018-12-27 RX ADMIN — Medication 60 MILLIGRAM(S): at 05:33

## 2018-12-27 RX ADMIN — Medication 100 MILLIGRAM(S): at 21:20

## 2018-12-27 RX ADMIN — Medication 667 MILLIGRAM(S): at 08:03

## 2018-12-27 RX ADMIN — Medication 1: at 13:10

## 2018-12-27 RX ADMIN — Medication 667 MILLIGRAM(S): at 17:29

## 2018-12-27 RX ADMIN — Medication 100 MILLIGRAM(S): at 13:10

## 2018-12-27 RX ADMIN — Medication 200 MILLIGRAM(S): at 00:32

## 2018-12-27 RX ADMIN — ISOSORBIDE DINITRATE 10 MILLIGRAM(S): 5 TABLET ORAL at 13:09

## 2018-12-27 RX ADMIN — ATORVASTATIN CALCIUM 40 MILLIGRAM(S): 80 TABLET, FILM COATED ORAL at 00:31

## 2018-12-27 NOTE — PROGRESS NOTE ADULT - SUBJECTIVE AND OBJECTIVE BOX
NEPHROLOGY-[facility] [phone]        Patient seen and examined in bed on HD.  He felt well        MEDICATIONS  (STANDING):  aspirin enteric coated 81 milliGRAM(s) Oral daily  atorvastatin 40 milliGRAM(s) Oral at bedtime  calcium acetate 667 milliGRAM(s) Oral three times a day with meals  dextrose 5%. 1000 milliLiter(s) (50 mL/Hr) IV Continuous <Continuous>  dextrose 50% Injectable 12.5 Gram(s) IV Push once  dextrose 50% Injectable 25 Gram(s) IV Push once  dextrose 50% Injectable 25 Gram(s) IV Push once  doxercalciferol Injectable 3 MICROGram(s) IV Push <User Schedule>  epoetin theodore Injectable 41836 Unit(s) IV Push <User Schedule>  furosemide   Injectable 60 milliGRAM(s) IV Push two times a day  heparin  Injectable 5000 Unit(s) SubCutaneous every 8 hours  hydrALAZINE 100 milliGRAM(s) Oral three times a day  insulin glargine Injectable (LANTUS) 4 Unit(s) SubCutaneous at bedtime  insulin lispro (HumaLOG) corrective regimen sliding scale   SubCutaneous three times a day before meals  insulin lispro (HumaLOG) corrective regimen sliding scale   SubCutaneous at bedtime  isosorbide   dinitrate Tablet (ISORDIL) 10 milliGRAM(s) Oral three times a day  labetalol 200 milliGRAM(s) Oral three times a day      VITAL:  T(C): , Max: 37.1 (12-27-18 @ 09:18)  T(F): , Max: 98.7 (12-27-18 @ 09:18)  HR: 68 (12-27-18 @ 09:18)  BP: 161/67 (12-27-18 @ 09:18)  BP(mean): --  RR: 18 (12-27-18 @ 09:18)  SpO2: 97% (12-27-18 @ 09:18)  Wt(kg): --    I and O's:    12-26 @ 07:01  -  12-27 @ 07:00  --------------------------------------------------------  IN: 340 mL / OUT: 900 mL / NET: -560 mL          PHYSICAL EXAM:    Constitutional: NAD  Neck:  No JVD  Respiratory: CTAB/L  Cardiovascular: S1 and S2  Gastrointestinal: BS+, soft, NT/ND  Extremities: No peripheral edema  Neurological: A/O x 3, no focal deficits  Psychiatric: Normal mood, normal affect  : No [place]  Skin: No rashes  Access: Not applicable    LABS:                        6.2    6.41  )-----------( 215      ( 27 Dec 2018 08:03 )             20.0     12-27    135  |  98  |  97<H>  ----------------------------<  110<H>  5.0   |  23  |  8.66<H>    Ca    7.0<L>      27 Dec 2018 06:33  Mg     2.4     12-26            Urine Studies:          RADIOLOGY & ADDITIONAL STUDIES:

## 2018-12-27 NOTE — PHYSICAL THERAPY INITIAL EVALUATION ADULT - PERTINENT HX OF CURRENT PROBLEM, REHAB EVAL
67 y/o male admitted to [facility] on 12/20/18 PMH CAD/CABG, HFrEF (30%), T2DM, HTN, CKD 5 (not on HD), aortic valve replacement, chronic anemia occasionally requiring transfusions presents with SOB, admitted with acute decompensated systolic heart failure with hypertensive emergency, demand ischemia, worsening renal failure, transient afib RVR.

## 2018-12-27 NOTE — CONSULT NOTE ADULT - SUBJECTIVE AND OBJECTIVE BOX
Vascular Surgery Consult Note  Pager     HPI:        PAST MEDICAL & SURGICAL HISTORY:  HTN (hypertension)  Aortic valve replaced  CKD (chronic kidney disease)  Anemia  CHF (congestive heart failure)  Diabetes mellitus  Hypertension  S/P CABG (coronary artery bypass graft)  S/P appendectomy      ALLERGIES:    HOME MEDICATIONS:    MEDICATIONS  (STANDING):  aspirin enteric coated 81 milliGRAM(s) Oral daily  atorvastatin 40 milliGRAM(s) Oral at bedtime  calcium acetate 667 milliGRAM(s) Oral three times a day with meals  dextrose 5%. 1000 milliLiter(s) (50 mL/Hr) IV Continuous <Continuous>  dextrose 50% Injectable 12.5 Gram(s) IV Push once  dextrose 50% Injectable 25 Gram(s) IV Push once  dextrose 50% Injectable 25 Gram(s) IV Push once  doxercalciferol Injectable 3 MICROGram(s) IV Push <User Schedule>  epoetin theodore Injectable 26975 Unit(s) IV Push <User Schedule>  furosemide   Injectable 60 milliGRAM(s) IV Push two times a day  heparin  Injectable 5000 Unit(s) SubCutaneous every 8 hours  hydrALAZINE 100 milliGRAM(s) Oral three times a day  insulin glargine Injectable (LANTUS) 4 Unit(s) SubCutaneous at bedtime  insulin lispro (HumaLOG) corrective regimen sliding scale   SubCutaneous three times a day before meals  insulin lispro (HumaLOG) corrective regimen sliding scale   SubCutaneous at bedtime  isosorbide   dinitrate Tablet (ISORDIL) 10 milliGRAM(s) Oral three times a day  labetalol 200 milliGRAM(s) Oral three times a day      SOCIAL HISTORY:    FAMILY HISTORY:    ___________________________________________  REVIEW OF SYSTEMS:    ___________________________________________  PHYSICAL EXAM:  Vital Signs Last 24 Hrs  T(C): 37.2 (27 Dec 2018 13:25), Max: 37.2 (27 Dec 2018 12:18)  T(F): 99 (27 Dec 2018 13:25), Max: 99 (27 Dec 2018 12:18)  HR: 69 (27 Dec 2018 13:25) (68 - 74)  BP: 174/76 (27 Dec 2018 13:25) (143/63 - 177/90)  BP(mean): --  RR: 17 (27 Dec 2018 13:25) (17 - 18)  SpO2: 99% (27 Dec 2018 13:25) (96% - 100%)CAPILLARY BLOOD GLUCOSE      POCT Blood Glucose.: 133 mg/dL (27 Dec 2018 16:36)    I&O's Detail    26 Dec 2018 07:01  -  27 Dec 2018 07:00  --------------------------------------------------------  IN:    IV PiggyBack: 100 mL    Oral Fluid: 240 mL  Total IN: 340 mL    OUT:    Voided: 900 mL  Total OUT: 900 mL    Total NET: -560 mL      27 Dec 2018 07:01  -  27 Dec 2018 17:44  --------------------------------------------------------  IN:    Oral Fluid: 240 mL  Total IN: 240 mL    OUT:    Other: 400 mL    Voided: 150 mL  Total OUT: 550 mL    Total NET: -310 mL          General: A&O x3, NAD  Neuro: CN II-XII intact, motor and sensory grossly intact with no focal deficits.  HEENT: Normocephalic, atraumatic, EOMI, anicteric sclerae.  Respiratory: Clear to auscultation bilaterally, breathing non-labored.  CVS: Regular rate and rhythm  Abdomen: Soft, nondistended, nontender. No rebound, no guarding, No palpable organomegaly or masses.  Extremities: Warm bilaterally with palpable pulses.  MSK: Intact ROM.  ____________________________________________  LABS:  CBC Full  -  ( 27 Dec 2018 08:03 )  WBC Count : 6.41 K/uL  Hemoglobin : 6.2 g/dL  Hematocrit : 20.0 %  Platelet Count - Automated : 215 K/uL  Mean Cell Volume : 79.7 fl  Mean Cell Hemoglobin : 24.7 pg  Mean Cell Hemoglobin Concentration : 31.0 gm/dL  Auto Neutrophil # : x  Auto Lymphocyte # : x  Auto Monocyte # : x  Auto Eosinophil # : x  Auto Basophil # : x  Auto Neutrophil % : x  Auto Lymphocyte % : x  Auto Monocyte % : x  Auto Eosinophil % : x  Auto Basophil % : x    12-27    135  |  98  |  97<H>  ----------------------------<  110<H>  5.0   |  23  |  8.66<H>    Ca    7.0<L>      27 Dec 2018 06:33  Mg     2.4     12-26                ____________________________________________  RADIOLOGY: Vascular Surgery Consult Note  Pager     HPI: called to jarod pt for HD access Pt was admitted for CHF   Pt is right handed         PAST MEDICAL & SURGICAL HISTORY:  HTN (hypertension)  Aortic valve replaced  CKD (chronic kidney disease)  Anemia  CHF (congestive heart failure)  Diabetes mellitus  Hypertension  S/P CABG (coronary artery bypass graft)  S/P appendectomy      ALLERGIES:    HOME MEDICATIONS:    MEDICATIONS  (STANDING):  aspirin enteric coated 81 milliGRAM(s) Oral daily  atorvastatin 40 milliGRAM(s) Oral at bedtime  calcium acetate 667 milliGRAM(s) Oral three times a day with meals  dextrose 5%. 1000 milliLiter(s) (50 mL/Hr) IV Continuous <Continuous>  dextrose 50% Injectable 12.5 Gram(s) IV Push once  dextrose 50% Injectable 25 Gram(s) IV Push once  dextrose 50% Injectable 25 Gram(s) IV Push once  doxercalciferol Injectable 3 MICROGram(s) IV Push <User Schedule>  epoetin theodore Injectable 15341 Unit(s) IV Push <User Schedule>  furosemide   Injectable 60 milliGRAM(s) IV Push two times a day  heparin  Injectable 5000 Unit(s) SubCutaneous every 8 hours  hydrALAZINE 100 milliGRAM(s) Oral three times a day  insulin glargine Injectable (LANTUS) 4 Unit(s) SubCutaneous at bedtime  insulin lispro (HumaLOG) corrective regimen sliding scale   SubCutaneous three times a day before meals  insulin lispro (HumaLOG) corrective regimen sliding scale   SubCutaneous at bedtime  isosorbide   dinitrate Tablet (ISORDIL) 10 milliGRAM(s) Oral three times a day  labetalol 200 milliGRAM(s) Oral three times a day      SOCIAL HISTORY:    FAMILY HISTORY:    ___________________________________________  REVIEW OF SYSTEMS:    ___________________________________________  PHYSICAL EXAM:  Vital Signs Last 24 Hrs  T(C): 37.2 (27 Dec 2018 13:25), Max: 37.2 (27 Dec 2018 12:18)  T(F): 99 (27 Dec 2018 13:25), Max: 99 (27 Dec 2018 12:18)  HR: 69 (27 Dec 2018 13:25) (68 - 74)  BP: 174/76 (27 Dec 2018 13:25) (143/63 - 177/90)  BP(mean): --  RR: 17 (27 Dec 2018 13:25) (17 - 18)  SpO2: 99% (27 Dec 2018 13:25) (96% - 100%)CAPILLARY BLOOD GLUCOSE      POCT Blood Glucose.: 133 mg/dL (27 Dec 2018 16:36)    I&O's Detail    26 Dec 2018 07:01  -  27 Dec 2018 07:00  --------------------------------------------------------  IN:    IV PiggyBack: 100 mL    Oral Fluid: 240 mL  Total IN: 340 mL    OUT:    Voided: 900 mL  Total OUT: 900 mL    Total NET: -560 mL      27 Dec 2018 07:01  -  27 Dec 2018 17:44  --------------------------------------------------------  IN:    Oral Fluid: 240 mL  Total IN: 240 mL    OUT:    Other: 400 mL    Voided: 150 mL  Total OUT: 550 mL    Total NET: -310 mL    General: A&O x3, NAD  Neuro: CN II-XII intact, motor and sensory grossly intact with no focal deficits.  HEENT: Normocephalic, atraumatic, EOMI, anicteric sclerae.  Respiratory: Clear to auscultation bilaterally, breathing non-labored.  CVS: Regular rate and rhythm  Abdomen: Soft, nondistended, nontender. No rebound, no guarding, No palpable organomegaly or masses.  Extremities: Warm bilaterally with palpable pulses.  MSK: Intact ROM.  ____________________________________________  LABS:  CBC Full  -  ( 27 Dec 2018 08:03 )  WBC Count : 6.41 K/uL  Hemoglobin : 6.2 g/dL  Hematocrit : 20.0 %  Platelet Count - Automated : 215 K/uL  Mean Cell Volume : 79.7 fl  Mean Cell Hemoglobin : 24.7 pg  Mean Cell Hemoglobin Concentration : 31.0 gm/dL  Auto Neutrophil # : x  Auto Lymphocyte # : x  Auto Monocyte # : x  Auto Eosinophil # : x  Auto Basophil # : x  Auto Neutrophil % : x  Auto Lymphocyte % : x  Auto Monocyte % : x  Auto Eosinophil % : x  Auto Basophil % : x    12-27    135  |  98  |  97<H>  ----------------------------<  110<H>  5.0   |  23  |  8.66<H>    Ca    7.0<L>      27 Dec 2018 06:33  Mg     2.4     12-26 Vascular Surgery Consult Note  Pager     HPI: 68-year-old man with CHF s/p CABG (EF 30%), CKD (previously not on HD), AVR presents with CHF exacerbation, HTN emergency, and atrial fibrillation with RVR. Vascular surgery called to evaluate pt for HD access.  Pt is right handed. Currently receiving HD via a [employer].   Surgical history only includes CABG and appendectomy.       PAST MEDICAL & SURGICAL HISTORY:  HTN (hypertension)  Aortic valve replaced  CKD (chronic kidney disease)  Anemia  CHF (congestive heart failure)  Diabetes mellitus  Hypertension  S/P CABG (coronary artery bypass graft)  S/P appendectomy      ALLERGIES:  lisinopril (Anaphylaxis)        HOME MEDICATIONS:  aspirin 81 mg oral tablet: 1 tab(s) orally once a day (20 Dec 2018 22:31)  calcium acetate 667 mg oral tablet: 1 tab(s) orally once a day    *please see internal stephen* (20 Dec 2018 22:31)  furosemide 80 mg oral tablet: 1 tab(s) orally once a day    *please see internal stephen* (20 Dec 2018 22:31)  hydrALAZINE 100 mg oral tablet: 1 tab(s) orally once a day    *please see internal stephen* (20 Dec 2018 22:31)  Metoprolol Succinate ER 50 mg oral tablet, extended release: 1 tab(s) orally 2 times a day    *please see internal stephen* (20 Dec 2018 22:31)  NovoLOG Mix 70/30 subcutaneous suspension: 10 unit(s) subcutaneous once a day (in the morning) (20 Dec 2018 22:31)  Plavix 75 mg oral tablet: 1 tab(s) orally once a day (20 Dec 2018 22:31)  Procrit 20,000 units/mL injectable solution: injectable once a week (20 Dec 2018 22:31)  Senna 8.6 mg oral tablet: 1 tab(s) orally once a day (at bedtime), As Needed (20 Dec 2018 22:31)      MEDICATIONS  (STANDING):  aspirin enteric coated 81 milliGRAM(s) Oral daily  atorvastatin 40 milliGRAM(s) Oral at bedtime  calcium acetate 667 milliGRAM(s) Oral three times a day with meals  dextrose 5%. 1000 milliLiter(s) (50 mL/Hr) IV Continuous <Continuous>  dextrose 50% Injectable 12.5 Gram(s) IV Push once  dextrose 50% Injectable 25 Gram(s) IV Push once  dextrose 50% Injectable 25 Gram(s) IV Push once  doxercalciferol Injectable 3 MICROGram(s) IV Push <User Schedule>  epoetin theodore Injectable 47604 Unit(s) IV Push <User Schedule>  furosemide   Injectable 60 milliGRAM(s) IV Push two times a day  heparin  Injectable 5000 Unit(s) SubCutaneous every 8 hours  hydrALAZINE 100 milliGRAM(s) Oral three times a day  insulin glargine Injectable (LANTUS) 4 Unit(s) SubCutaneous at bedtime  insulin lispro (HumaLOG) corrective regimen sliding scale   SubCutaneous three times a day before meals  insulin lispro (HumaLOG) corrective regimen sliding scale   SubCutaneous at bedtime  isosorbide   dinitrate Tablet (ISORDIL) 10 milliGRAM(s) Oral three times a day  labetalol 200 milliGRAM(s) Oral three times a day      SOCIAL HISTORY:  Denies smoking and ETOH use.     FAMILY HISTORY:  No pertinent history in first degree relatives.  ___________________________________________  REVIEW OF SYSTEMS:  Constitutional: No fevers, chills, no recent weight loss  ENMT: No changes in hearing, no changes in vision, no sore throat, no cough  Respiratory: No shortness of breath  Cardiovascular: No chest pain, palpitations  Gastrointestinal: No abdominal pain, no diarrhea/constipation  Genitourinary: No dysuria, frequency, or urgency    Extremities: No joint swelling, no limited range of movement  Neurological: No paresthesia  Skin: No rashes  ___________________________________________  PHYSICAL EXAM:  Vital Signs Last 24 Hrs  T(C): 37.2 (27 Dec 2018 13:25), Max: 37.2 (27 Dec 2018 12:18)  T(F): 99 (27 Dec 2018 13:25), Max: 99 (27 Dec 2018 12:18)  HR: 69 (27 Dec 2018 13:25) (68 - 74)  BP: 174/76 (27 Dec 2018 13:25) (143/63 - 177/90)  BP(mean): --  RR: 17 (27 Dec 2018 13:25) (17 - 18)  SpO2: 99% (27 Dec 2018 13:25) (96% - 100%)CAPILLARY BLOOD GLUCOSE      POCT Blood Glucose.: 133 mg/dL (27 Dec 2018 16:36)    I&O's Detail    26 Dec 2018 07:01  -  27 Dec 2018 07:00  --------------------------------------------------------  IN:    IV PiggyBack: 100 mL    Oral Fluid: 240 mL  Total IN: 340 mL    OUT:    Voided: 900 mL  Total OUT: 900 mL    Total NET: -560 mL      27 Dec 2018 07:01  -  27 Dec 2018 17:44  --------------------------------------------------------  IN:    Oral Fluid: 240 mL  Total IN: 240 mL    OUT:    Other: 400 mL    Voided: 150 mL  Total OUT: 550 mL    Total NET: -310 mL      General: A&Ox3, NAD.  Neuro: Motor and sensory grossly intact with no focal deficits.  HEENT: Anicteric sclerae.  Respiratory: Unlabored breathing.   CVS: Regular rate and rhythm.  Abdomen: Soft, non-distended, non-tender.   Extremities: Upper extremities warm bilaterally w/ palpable pulses.   MSK: Intact ROM.  ____________________________________________  LABS:  CBC Full  -  ( 27 Dec 2018 08:03 )  WBC Count : 6.41 K/uL  Hemoglobin : 6.2 g/dL  Hematocrit : 20.0 %  Platelet Count - Automated : 215 K/uL  Mean Cell Volume : 79.7 fl  Mean Cell Hemoglobin : 24.7 pg  Mean Cell Hemoglobin Concentration : 31.0 gm/dL  Auto Neutrophil # : x  Auto Lymphocyte # : x  Auto Monocyte # : x  Auto Eosinophil # : x  Auto Basophil # : x  Auto Neutrophil % : x  Auto Lymphocyte % : x  Auto Monocyte % : x  Auto Eosinophil % : x  Auto Basophil % : x    12-27    135  |  98  |  97<H>  ----------------------------<  110<H>  5.0   |  23  |  8.66<H>    Ca    7.0<L>      27 Dec 2018 06:33  Mg     2.4     12-26

## 2018-12-27 NOTE — PROVIDER CONTACT NOTE (OTHER) - ACTION/TREATMENT ORDERED:
As per PA, administer IV Tylenol. PA at bedside applying pressure until IR resident arrives. Will continue to monitor.

## 2018-12-27 NOTE — PROGRESS NOTE ADULT - ASSESSMENT
The patient is a 68-year-old gentleman with past medical history of hypertension, diabetes, chronic kidney disease stage V, now presents with uremia.  He also has hypertensive urgency with also accompanying severe pulmonary hypertension.  Goals of therapy are blood pressure control and dialysis.  The patient was told the importance of dialysis and now he agrees to proceed.  Secondary hyperparathyroidism  Anemia      1.	Renal:  2nd HD and next hd in am.  Next HD after that will be Monday.  2.	Cardiology:  BP improved with the labetalol and hydralazine.  Likely reduction of these medications once HD.  Start Cozaar  3.	Anemia:   blood xfusion today on the floor  4.	Endocrine-Cont  Hectorol   5.	Vasc- Dr Dotson called for right AVF;  No blood draws on the right hand The patient is a 68-year-old gentleman with past medical history of hypertension, diabetes, chronic kidney disease stage V, now presents with uremia.  He also has hypertensive urgency with also accompanying severe pulmonary hypertension.  Goals of therapy are blood pressure control and dialysis.  The patient was told the importance of dialysis and now he agrees to proceed.  Secondary hyperparathyroidism  Anemia      1.	Renal:  2nd HD and next hd in am.  Next HD after that will be Monday.  2.	Cardiology:  BP improved with the labetalol and hydralazine.  Likely reduction of these medications once HD.  Allergy to ACE.  Will not start  3.	Anemia:   blood xfusion today on the floor  4.	Endocrine-Cont  Hectorol   5.	Vasc- Dr Dotson called for right AVF;  No blood draws on the right hand

## 2018-12-27 NOTE — PHYSICAL THERAPY INITIAL EVALUATION ADULT - ADDITIONAL COMMENTS
CVC cath placement, 12/27 bleeding for dermatogosit, elevated trop H?H 6.2/ 20.0 and currently receiving blood transfusion CVC cath placement, 12/27 bleeding from dermatotomy, elevated trop H?H 6.2/ 20.0 and currently receiving blood transfusion

## 2018-12-27 NOTE — CONSULT NOTE ADULT - PROBLEM SELECTOR RECOMMENDATION 9
- Will plan for fistula for next Thursday tentatively  - Please save left arm   - Please document medical/cardiac clearance    Discussed with Dr. Dotson  Pager [phone] I performed a history and physical exam of the patient and discussed  the findings and plan with the house officer. I reviewed the resident note and agree with the findings and plan

## 2018-12-27 NOTE — CONSULT NOTE ADULT - ASSESSMENT
68 y old male  requiring long term HD access    D/w pt indications risks and benefits of LUE AVF/G   pt wants to proceed   tent OR thurs 1/3/2019  lue precautions please and vein mapping  - Please document medical/cardiac clearance    Discussed with Dr. Dotson  Pager [phone] 68-year-old man presents with CHF exacerbation and CKD now requiring long-term HD access.    D/w pt indications risks and benefits of LUE AVF/G   pt wants to proceed   tent OR thurs 1/3/2019  lue precautions please and vein mapping  - Please document medical/cardiac clearance    Discussed with Dr. Dotson  Pager [phone]

## 2018-12-27 NOTE — PROGRESS NOTE ADULT - SUBJECTIVE AND OBJECTIVE BOX
Pt s/p non tunneled CVC catheter placement on 12/27 with bleeding from the dermatotomy site. Controlled with sutures a the site.   Receiving 1 unit of bleeding.   No hematoma or active bleeding noted at the site now. Dark blood noted on the dressing.  Will remove the sutures on monday 12/31.

## 2018-12-27 NOTE — CHART NOTE - NSCHARTNOTEFT_GEN_A_CORE
MEDICINE PA     Notified that patient with bleeding from RIJ shiley. s/p placement and HD today. Per documentation, significant bleeding in afternoon requiring purse string suture placed by IR PA. Pt examined at bedside by me. Endorses 5/10 pain at insertion site. Denies dizziness, lightheadedness, palpitations, numbness/tingling in L arm, active bleeding in any other areas.  On physical exam: brisk bleeding from RIJ insertion. old clotting. dressing saturated.  Pressure applied x15-20 minutes by me without resolution of bleed. Pressure dressing re-applied. IR resident evaluated patient, re sutured. Continue to monitor for bleeding. Sutures to be removed by Friday per IR. Endorsed to primary team in AM. Attending to follow.     Hollie Barrios PA-C   Department of Medicine   Spectra [phone] MEDICINE PA     Notified that patient with bleeding from RIJ shiley. s/p placement and HD today. Per documentation, significant bleeding in afternoon requiring purse string suture placed by IR PA. Pt examined at bedside by me. Endorses 5/10 pain at insertion site. Denies dizziness, lightheadedness, palpitations, numbness/tingling in L arm, active bleeding in any other areas.  On physical exam: brisk bleeding from RIJ insertion. old clotting. dressing saturated.  Pressure applied x15-20 minutes by me without resolution of bleed. Pressure dressing re-applied. IR resident evaluated patient, re sutured. Continue to monitor for bleeding. Sutures to be removed by Friday per IR. Endorsed to primary team in AM. Attending to follow.     Hollie Barrios PA-C   Department of Medicine   Spectra [phone]   *Late entry note- pt seen and evaluated at time of bleeding*

## 2018-12-27 NOTE — PROGRESS NOTE ADULT - SUBJECTIVE AND OBJECTIVE BOX
CHIEF COMPLAINT: He feel better  had HD last nigt  68M PMH CAD/CABG, HFrEF (30%), T2DM, HTN, CKD 5 (not on HD), aortic valve replacement, chronic anemia occasionally requiring transfusions presents with SOB. Has had progressively worsening SOB and SZYMANSKI over the past 2 weeks. History is supplemented by wife as she manages most of his medical care and gives his medications. Symptoms significantly worsened this week with orthopnea and LE edema. No recent URIs, fevers, chills, cough, sputum production, chest pain, palpitations, NVD, abdominal pain, dysuria, decreased urination. Went to PMD for evaluation and he was noted to be in rapid afib in the office, rate 140s-150s. By time of arrival in ED, had spontaneously converted back to rate controlled sinus rhythm. O2sat 89% on RA, improved with nasal cannula. His wife believes he is on too many medication  SUBJECTIVE:     REVIEW OF SYSTEMS:    CONSTITUTIONAL: (  )  weakness,  (  ) fevers or chills  EYES/ENT: (  )visual changes;     NECK: (  ) pain or stiffness  RESPIRATORY:   (  )cough, wheezing, hemoptysis;  (  ) shortness of breath  CARDIOVASCULAR:  (  )chest pain or palpitations  GASTROINTESTINAL:   (  )abdominal or epigastric pain.  (  ) nausea, vomiting, or hematemesis;   (   ) diarrhea or constipation.   GENITOURINARY:   (    ) dysuria, frequency or hematuria  NEUROLOGICAL:  (   ) numbness or weakness   All other review of systems is negative unless indicated above    Vital Signs Last 24 Hrs  T(C): 37.1 (27 Dec 2018 09:18), Max: 37.1 (27 Dec 2018 09:18)  T(F): 98.7 (27 Dec 2018 09:18), Max: 98.7 (27 Dec 2018 09:18)  HR: 68 (27 Dec 2018 09:18) (60 - 74)  BP: 161/67 (27 Dec 2018 09:18) (128/67 - 177/90)  BP(mean): --  RR: 18 (27 Dec 2018 09:18) (15 - 18)  SpO2: 97% (27 Dec 2018 09:18) (96% - 99%)    I&O's Summary    26 Dec 2018 07:01  -  27 Dec 2018 07:00  --------------------------------------------------------  IN: 340 mL / OUT: 900 mL / NET: -560 mL        CAPILLARY BLOOD GLUCOSE      POCT Blood Glucose.: 112 mg/dL (27 Dec 2018 07:41)  POCT Blood Glucose.: 148 mg/dL (26 Dec 2018 21:37)  POCT Blood Glucose.: 175 mg/dL (26 Dec 2018 16:37)  POCT Blood Glucose.: 156 mg/dL (26 Dec 2018 12:03)      PHYSICAL EXAM:  GENERAL: NAD, well-developed, sleepy  	HEAD:  Atraumatic, Normocephalic  	EYES: EOMI, PERRLA, conjunctiva and sclera clear  	NECK: +JVD  	CHEST/LUNG: Clear to auscultation bilaterally; No wheeze  	HEART: crackles b/l lower lobes  	ABDOMEN: Soft, Nontender, Nondistended; Bowel sounds present  	EXTREMITIES:  1+ pitting edema peripheral; 2+ Peripheral Pulses, No clubbing, cyanosis  	PSYCH: AAOx3  	NEUROLOGY: non-focal       MEDICATIONS:  MEDICATIONS  (STANDING):  aspirin enteric coated 81 milliGRAM(s) Oral daily  atorvastatin 40 milliGRAM(s) Oral at bedtime  calcium acetate 667 milliGRAM(s) Oral three times a day with meals  dextrose 5%. 1000 milliLiter(s) (50 mL/Hr) IV Continuous <Continuous>  dextrose 50% Injectable 12.5 Gram(s) IV Push once  dextrose 50% Injectable 25 Gram(s) IV Push once  dextrose 50% Injectable 25 Gram(s) IV Push once  doxercalciferol Injectable 3 MICROGram(s) IV Push <User Schedule>  epoetin theodore Injectable 84034 Unit(s) IV Push <User Schedule>  furosemide   Injectable 60 milliGRAM(s) IV Push two times a day  heparin  Injectable 5000 Unit(s) SubCutaneous every 8 hours  hydrALAZINE 100 milliGRAM(s) Oral three times a day  insulin glargine Injectable (LANTUS) 4 Unit(s) SubCutaneous at bedtime  insulin lispro (HumaLOG) corrective regimen sliding scale   SubCutaneous three times a day before meals  insulin lispro (HumaLOG) corrective regimen sliding scale   SubCutaneous at bedtime  isosorbide   dinitrate Tablet (ISORDIL) 10 milliGRAM(s) Oral three times a day  labetalol 200 milliGRAM(s) Oral three times a day      LABS: All Labs Reviewed:                        6.2    6.41  )-----------( 215      ( 27 Dec 2018 08:03 )             20.0     12-27    135  |  98  |  97<H>  ----------------------------<  110<H>  5.0   |  23  |  8.66<H>    Ca    7.0<L>      27 Dec 2018 06:33  Mg     2.4     12-26            Blood Culture:   Urine Culture      RADIOLOGY/EKG:    ASSESSMENT AND PLAN:  68M PMH CAD/CABG, HFrEF (30%), T2DM, HTN, CKD 5 (not on HD), aortic valve replacement, chronic anemia occasionally requiring transfusions presents with SOB, admitted with acute decompensated systolic heart failure with hypertensive emergency, demand ischemia, worsening renal failure, transient afib RVR.     Problem/Plan - 1:  ·  Problem: Acute systolic heart failure.  Plan: --from med non-adherence, especially with uncontrolled HTN, and worsening CKD5  --requiring nasal cannula oxygen  --patient reports feeling a little better after lasix in the ED. Start lasix 60mg IV BID.     --strict I/Os, daily weights   waiting for cariology consult dr payton  called [phone] left message    Problem/Plan - 2:   hypertension  · on  hydralazine 100mg TID, isordil 10mg TID. On IV lasix for CHF. labetalol 200 milliGRAM(s) Oral three times a day  --   Problem/Plan - 3:  ·  Problem: Elevated troponin.  Plan: --hsTrop to 492, up from high 300s last admission.    due  worsening renal failure  --will trend troponin  --c/w DAPT  --restart statin (hasn't been taking).     Problem/Plan - 4:  ·  Problem: Respiratory failure with hypoxia.  Plan: --from CHF exacerbation and volume overload  --diuresis  --oxygen PRN.  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  Plan: --converted to sinus rhythm on way to the hospital  --no history of afib. Patient does not seem to have chest pain, palpitations, or dizziness related to it, so unclear if he has been having pAF for a while.         Problem/Plan - 6:  Problem: CKD (chronic kidney disease) stage 5, GFR less than 15 ml/min. Plan: - anyone consistent right now.   --Needs lasix now for HTN emergency and  renal failure.   -  Nephrology  followup noted . for hemodialysis today       Problem/Plan - 7:  ·  Problem: T2DM (type 2 diabetes mellitus).  Plan: --On 70/30 at home.  due to hypoglycemia will decrease  lantus   DW  with [id number]  Problem/Plan - 8:  ·  Problem: Chronic anemia.  Plan: - for transfusion  .today    DVT PPX:    ADVANCED DIRECTIVE:    DISPOSITION:

## 2018-12-28 LAB
ANION GAP SERPL CALC-SCNC: 12 MMOL/L — SIGNIFICANT CHANGE UP (ref 5–17)
BUN SERPL-MCNC: 42 MG/DL — HIGH (ref 7–23)
CALCIUM SERPL-MCNC: 7.7 MG/DL — LOW (ref 8.4–10.5)
CHLORIDE SERPL-SCNC: 95 MMOL/L — LOW (ref 96–108)
CO2 SERPL-SCNC: 28 MMOL/L — SIGNIFICANT CHANGE UP (ref 22–31)
CREAT SERPL-MCNC: 5.51 MG/DL — HIGH (ref 0.5–1.3)
GLUCOSE BLDC GLUCOMTR-MCNC: 100 MG/DL — HIGH (ref 70–99)
GLUCOSE BLDC GLUCOMTR-MCNC: 148 MG/DL — HIGH (ref 70–99)
GLUCOSE BLDC GLUCOMTR-MCNC: 82 MG/DL — SIGNIFICANT CHANGE UP (ref 70–99)
GLUCOSE BLDC GLUCOMTR-MCNC: 84 MG/DL — SIGNIFICANT CHANGE UP (ref 70–99)
GLUCOSE SERPL-MCNC: 79 MG/DL — SIGNIFICANT CHANGE UP (ref 70–99)
HCT VFR BLD CALC: 22.7 % — LOW (ref 39–50)
HGB BLD-MCNC: 7.2 G/DL — LOW (ref 13–17)
MCHC RBC-ENTMCNC: 25.4 PG — LOW (ref 27–34)
MCHC RBC-ENTMCNC: 31.7 GM/DL — LOW (ref 32–36)
MCV RBC AUTO: 80.2 FL — SIGNIFICANT CHANGE UP (ref 80–100)
PLATELET # BLD AUTO: 201 K/UL — SIGNIFICANT CHANGE UP (ref 150–400)
POTASSIUM SERPL-MCNC: 4.2 MMOL/L — SIGNIFICANT CHANGE UP (ref 3.5–5.3)
POTASSIUM SERPL-SCNC: 4.2 MMOL/L — SIGNIFICANT CHANGE UP (ref 3.5–5.3)
RBC # BLD: 2.83 M/UL — LOW (ref 4.2–5.8)
RBC # FLD: 20.1 % — HIGH (ref 10.3–14.5)
SODIUM SERPL-SCNC: 135 MMOL/L — SIGNIFICANT CHANGE UP (ref 135–145)
WBC # BLD: 6.12 K/UL — SIGNIFICANT CHANGE UP (ref 3.8–10.5)
WBC # FLD AUTO: 6.12 K/UL — SIGNIFICANT CHANGE UP (ref 3.8–10.5)

## 2018-12-28 PROCEDURE — 99232 SBSQ HOSP IP/OBS MODERATE 35: CPT

## 2018-12-28 RX ORDER — HYDRALAZINE HCL 50 MG
10 TABLET ORAL ONCE
Qty: 0 | Refills: 0 | Status: COMPLETED | OUTPATIENT
Start: 2018-12-28 | End: 2018-12-28

## 2018-12-28 RX ORDER — FUROSEMIDE 40 MG
80 TABLET ORAL DAILY
Qty: 0 | Refills: 0 | Status: DISCONTINUED | OUTPATIENT
Start: 2018-12-28 | End: 2019-01-03

## 2018-12-28 RX ORDER — LABETALOL HCL 100 MG
300 TABLET ORAL THREE TIMES A DAY
Qty: 0 | Refills: 0 | Status: DISCONTINUED | OUTPATIENT
Start: 2018-12-28 | End: 2019-01-03

## 2018-12-28 RX ADMIN — Medication 300 MILLIGRAM(S): at 21:16

## 2018-12-28 RX ADMIN — ATORVASTATIN CALCIUM 40 MILLIGRAM(S): 80 TABLET, FILM COATED ORAL at 21:15

## 2018-12-28 RX ADMIN — Medication 100 MILLIGRAM(S): at 05:32

## 2018-12-28 RX ADMIN — ISOSORBIDE DINITRATE 10 MILLIGRAM(S): 5 TABLET ORAL at 14:34

## 2018-12-28 RX ADMIN — Medication 300 MILLIGRAM(S): at 14:33

## 2018-12-28 RX ADMIN — Medication 81 MILLIGRAM(S): at 14:35

## 2018-12-28 RX ADMIN — ISOSORBIDE DINITRATE 10 MILLIGRAM(S): 5 TABLET ORAL at 21:15

## 2018-12-28 RX ADMIN — HEPARIN SODIUM 5000 UNIT(S): 5000 INJECTION INTRAVENOUS; SUBCUTANEOUS at 14:35

## 2018-12-28 RX ADMIN — Medication 60 MILLIGRAM(S): at 05:32

## 2018-12-28 RX ADMIN — Medication 0: at 07:50

## 2018-12-28 RX ADMIN — INSULIN GLARGINE 4 UNIT(S): 100 INJECTION, SOLUTION SUBCUTANEOUS at 22:00

## 2018-12-28 RX ADMIN — Medication 10 MILLIGRAM(S): at 12:41

## 2018-12-28 RX ADMIN — Medication 100 MILLIGRAM(S): at 14:33

## 2018-12-28 RX ADMIN — HEPARIN SODIUM 5000 UNIT(S): 5000 INJECTION INTRAVENOUS; SUBCUTANEOUS at 05:32

## 2018-12-28 RX ADMIN — DOXERCALCIFEROL 3 MICROGRAM(S): 2.5 CAPSULE ORAL at 11:34

## 2018-12-28 RX ADMIN — Medication 100 MILLIGRAM(S): at 21:15

## 2018-12-28 RX ADMIN — HEPARIN SODIUM 5000 UNIT(S): 5000 INJECTION INTRAVENOUS; SUBCUTANEOUS at 21:16

## 2018-12-28 NOTE — PROGRESS NOTE ADULT - SUBJECTIVE AND OBJECTIVE BOX
NEPHROLOGY-[facility] [phone]        Patient seen and examined in bed on HD.  He felt well  BP was high though        MEDICATIONS  (STANDING):  aspirin enteric coated 81 milliGRAM(s) Oral daily  atorvastatin 40 milliGRAM(s) Oral at bedtime  calcium acetate 667 milliGRAM(s) Oral three times a day with meals  dextrose 5%. 1000 milliLiter(s) (50 mL/Hr) IV Continuous <Continuous>  dextrose 50% Injectable 12.5 Gram(s) IV Push once  dextrose 50% Injectable 25 Gram(s) IV Push once  dextrose 50% Injectable 25 Gram(s) IV Push once  doxercalciferol Injectable 3 MICROGram(s) IV Push <User Schedule>  epoetin hteodore Injectable 77819 Unit(s) IV Push <User Schedule>  furosemide   Injectable 60 milliGRAM(s) IV Push two times a day  heparin  Injectable 5000 Unit(s) SubCutaneous every 8 hours  hydrALAZINE 100 milliGRAM(s) Oral three times a day  insulin glargine Injectable (LANTUS) 4 Unit(s) SubCutaneous at bedtime  insulin lispro (HumaLOG) corrective regimen sliding scale   SubCutaneous three times a day before meals  insulin lispro (HumaLOG) corrective regimen sliding scale   SubCutaneous at bedtime  isosorbide   dinitrate Tablet (ISORDIL) 10 milliGRAM(s) Oral three times a day  labetalol 300 milliGRAM(s) Oral three times a day      VITAL:  T(C): , Max: 37.2 (12-27-18 @ 12:18)  T(F): , Max: 99 (12-27-18 @ 12:18)  HR: 73 (12-28-18 @ 08:50)  BP: 192/90 (12-28-18 @ 08:50)  BP(mean): --  RR: 19 (12-28-18 @ 08:50)  SpO2: 96% (12-28-18 @ 08:50)  Wt(kg): --    I and O's:    12-27 @ 07:01  -  12-28 @ 07:00  --------------------------------------------------------  IN: 360 mL / OUT: 750 mL / NET: -390 mL          PHYSICAL EXAM:    Constitutional: NAD  Neck:  No JVD  Respiratory: CTAB/L  Cardiovascular: S1 and S2  Gastrointestinal: BS+, soft, NT/ND  Extremities: No peripheral edema  Neurological: A/O x 3, no focal deficits  Psychiatric: Normal mood, normal affect  : No Palacios  Skin: No rashes  Access: [facility]    LABS:                        8.0    5.6   )-----------( 176      ( 27 Dec 2018 18:47 )             24.0     12-28    135  |  95<L>  |  42<H>  ----------------------------<  79  4.2   |  28  |  5.51<H>    Ca    7.7<L>      28 Dec 2018 09:25            Urine Studies:          RADIOLOGY & ADDITIONAL STUDIES:

## 2018-12-28 NOTE — PROGRESS NOTE ADULT - SUBJECTIVE AND OBJECTIVE BOX
Patient is a 68y old  Male who presents with a chief complaint of SOB (28 Dec 2018 10:17)      Vascular Surgery Attending Progress Note    Interval HPI: Pt w/o c/o     Medications:  aspirin enteric coated 81 milliGRAM(s) Oral daily  atorvastatin 40 milliGRAM(s) Oral at bedtime  calcium acetate 667 milliGRAM(s) Oral three times a day with meals  dextrose 40% Gel 15 Gram(s) Oral once PRN  dextrose 5%. 1000 milliLiter(s) IV Continuous <Continuous>  dextrose 50% Injectable 12.5 Gram(s) IV Push once  dextrose 50% Injectable 25 Gram(s) IV Push once  dextrose 50% Injectable 25 Gram(s) IV Push once  doxercalciferol Injectable 3 MICROGram(s) IV Push <User Schedule>  epoetin theodore Injectable 94126 Unit(s) IV Push <User Schedule>  furosemide    Tablet 80 milliGRAM(s) Oral daily  glucagon  Injectable 1 milliGRAM(s) IntraMuscular once PRN  heparin  Injectable 5000 Unit(s) SubCutaneous every 8 hours  hydrALAZINE 100 milliGRAM(s) Oral three times a day  insulin glargine Injectable (LANTUS) 4 Unit(s) SubCutaneous at bedtime  insulin lispro (HumaLOG) corrective regimen sliding scale   SubCutaneous three times a day before meals  insulin lispro (HumaLOG) corrective regimen sliding scale   SubCutaneous at bedtime  isosorbide   dinitrate Tablet (ISORDIL) 10 milliGRAM(s) Oral three times a day  labetalol 300 milliGRAM(s) Oral three times a day      Vital Signs Last 24 Hrs  T(C): 36.9 (28 Dec 2018 08:50), Max: 37.2 (27 Dec 2018 12:18)  T(F): 98.4 (28 Dec 2018 08:50), Max: 99 (27 Dec 2018 12:18)  HR: 73 (28 Dec 2018 08:50) (69 - 82)  BP: 192/90 (28 Dec 2018 08:50) (161/79 - 207/92)  BP(mean): --  RR: 19 (28 Dec 2018 08:50) (17 - 19)  SpO2: 96% (28 Dec 2018 08:50) (93% - 100%)  I&O's Summary    27 Dec 2018 07:01  -  28 Dec 2018 07:00  --------------------------------------------------------  IN: 360 mL / OUT: 750 mL / NET: -390 mL    Physical Exam:  Neuro  A&Ox3 VSS  Vascular:  stable     LABS:                        8.0    5.6   )-----------( 176      ( 27 Dec 2018 18:47 )             24.0     12-28    135  |  95<L>  |  42<H>  ----------------------------<  79  4.2   |  28  |  5.51<H>    Ca    7.7<L>      28 Dec 2018 09:25          SYDNIE LYN MD  [phone]

## 2018-12-28 NOTE — PROGRESS NOTE ADULT - ASSESSMENT
68-year-old man presents with CHF exacerbation and CKD now requiring long-term HD access.    tent OR thurs 1/3/2019 for lue avf/g  lue precautions please and vein mapping  - Please document medical/cardiac clearance    Discussed with Dr. Dotson  Pager [phone]

## 2018-12-28 NOTE — PROGRESS NOTE ADULT - ASSESSMENT
The patient is a 68-year-old gentleman with past medical history of hypertension, diabetes, chronic kidney disease stage V, now presents with uremia.  He also has hypertensive urgency with also accompanying severe pulmonary hypertension.  Goals of therapy are blood pressure control and dialysis.  The patient was told the importance of dialysis and now he agrees to proceed.  Secondary hyperparathyroidism  Anemia  Hypertensive urgency      1.	Renal:  3rd HD today  and next hd Monday after the perm cath also on Monday.  2.	Cardiology: Increase labetalol 300mg po tid.  Allergy to ACE.  Will not start ACE or ARB  3.	Anemia: SP blood xfusion  4.	Endocrine-Cont  Hectorol   5.	Vasc- Dr Dotson called for AVF likely on Wed

## 2018-12-28 NOTE — PROGRESS NOTE ADULT - SUBJECTIVE AND OBJECTIVE BOX
CHIEF COMPLAINT: He feel better  had HD today  68M PMH CAD/CABG, HFrEF (30%), T2DM, HTN, CKD 5 (not on HD), aortic valve replacement, chronic anemia occasionally requiring transfusions presents with SOB. Has had progressively worsening SOB and SZYMANSKI over the past 2 weeks. History is supplemented by wife as she manages most of his medical care and gives his medications. Symptoms significantly worsened this week with orthopnea and LE edema. No recent URIs, fevers, chills, cough, sputum production, chest pain, palpitations, NVD, abdominal pain, dysuria, decreased urination. Went to PMD for evaluation and he was noted to be in rapid afib in the office, rate 140s-150s. By time of arrival in ED, had spontaneously converted back to rate controlled sinus rhythm. O2sat 89% on RA, improved with nasal cannul    SUBJECTIVE:     REVIEW OF SYSTEMS:    CONSTITUTIONAL: (  x)  weakness,  (  ) fevers or chills  EYES/ENT: (  )visual changes;     NECK: (  ) pain or stiffness  RESPIRATORY:   (  )cough, wheezing, hemoptysis;  (  ) shortness of breath  CARDIOVASCULAR:  (  )chest pain or palpitations  GASTROINTESTINAL:   (  )abdominal or epigastric pain.  (  ) nausea, vomiting, or hematemesis;   (   ) diarrhea or constipation.   GENITOURINARY:   (    ) dysuria, frequency or hematuria  NEUROLOGICAL:  (   ) numbness or weakness   All other review of systems is negative unless indicated above    Vital Signs Last 24 Hrs  T(C): 36.8 (28 Dec 2018 13:10), Max: 37.1 (27 Dec 2018 20:58)  T(F): 98.2 (28 Dec 2018 13:10), Max: 98.7 (27 Dec 2018 20:58)  HR: 62 (28 Dec 2018 16:21) (62 - 83)  BP: 115/54 (28 Dec 2018 16:21) (115/54 - 223/95)  BP(mean): --  RR: 18 (28 Dec 2018 14:30) (18 - 19)  SpO2: 93% (28 Dec 2018 14:30) (93% - 96%)    I&O's Summary    27 Dec 2018 07:01  -  28 Dec 2018 07:00  --------------------------------------------------------  IN: 360 mL / OUT: 750 mL / NET: -390 mL    28 Dec 2018 07:01  -  28 Dec 2018 20:12  --------------------------------------------------------  IN: 0 mL / OUT: 1000 mL / NET: -1000 mL        CAPILLARY BLOOD GLUCOSE      POCT Blood Glucose.: 148 mg/dL (28 Dec 2018 16:36)  POCT Blood Glucose.: 82 mg/dL (28 Dec 2018 14:41)  POCT Blood Glucose.: 84 mg/dL (28 Dec 2018 07:47)  POCT Blood Glucose.: 128 mg/dL (27 Dec 2018 21:25)      PHYSICAL EXAM:   GENERAL: NAD, well-developed, sleepy  	HEAD:  Atraumatic, Normocephalic  	EYES: EOMI, PERRLA, conjunctiva and sclera clear  	NECK: +JVD  	CHEST/LUNG: Clear to auscultation bilaterally; No wheeze  	HEART: crackles b/l lower lobes  	ABDOMEN: Soft, Nontender, Nondistended; Bowel sounds present  	EXTREMITIES:  1+ pitting edema peripheral; 2+ Peripheral Pulses, No clubbing, cyanosis  	PSYCH: AAOx3  	NEUROLOGY: non-focal    MEDICATIONS:  MEDICATIONS  (STANDING):  aspirin enteric coated 81 milliGRAM(s) Oral daily  atorvastatin 40 milliGRAM(s) Oral at bedtime  calcium acetate 667 milliGRAM(s) Oral three times a day with meals  dextrose 5%. 1000 milliLiter(s) (50 mL/Hr) IV Continuous <Continuous>  dextrose 50% Injectable 12.5 Gram(s) IV Push once  dextrose 50% Injectable 25 Gram(s) IV Push once  dextrose 50% Injectable 25 Gram(s) IV Push once  doxercalciferol Injectable 3 MICROGram(s) IV Push <User Schedule>  epoetin theodore Injectable 55155 Unit(s) IV Push <User Schedule>  furosemide    Tablet 80 milliGRAM(s) Oral daily  heparin  Injectable 5000 Unit(s) SubCutaneous every 8 hours  hydrALAZINE 100 milliGRAM(s) Oral three times a day  insulin glargine Injectable (LANTUS) 4 Unit(s) SubCutaneous at bedtime  insulin lispro (HumaLOG) corrective regimen sliding scale   SubCutaneous three times a day before meals  insulin lispro (HumaLOG) corrective regimen sliding scale   SubCutaneous at bedtime  isosorbide   dinitrate Tablet (ISORDIL) 10 milliGRAM(s) Oral three times a day  labetalol 300 milliGRAM(s) Oral three times a day      LABS: All Labs Reviewed:                        7.2    6.12  )-----------( 201      ( 28 Dec 2018 11:15 )             22.7     12-28    135  |  95<L>  |  42<H>  ----------------------------<  79  4.2   |  28  |  5.51<H>    Ca    7.7<L>      28 Dec 2018 09:25            Blood Culture:   Urine Culture      RADIOLOGY/EKG:    ASSESSMENT AND PLAN:  68M PMH CAD/CABG, HFrEF (30%), T2DM, HTN, CKD 5 (not on HD), aortic valve replacement, chronic anemia occasionally requiring transfusions presents with SOB, admitted with acute decompensated systolic heart failure with hypertensive emergency, demand ischemia, worsening renal failure, transient afib RVR.     Problem/Plan - 1:  ·  Problem: Acute systolic heart failure.  Plan: --from med non-adherence, especially with uncontrolled HTN, and worsening CKD5  --requiring nasal cannula oxygen  --patient reports feeling a little better after lasix in the ED. Start lasix 60mg IV BID.     --strict I/Os, daily weights   waiting for cariology consult dr payton  called [phone] left message    Problem/Plan - 2:   hypertension  · on  hydralazine 100mg TID, isordil 10mg TID. On IV lasix for CHF. labetalol 200 milliGRAM(s) Oral three times a day  --   Problem/Plan - 3:  ·  Problem: Elevated troponin.  Plan: --hsTrop to 492, up from high 300s last admission.    due  worsening renal failure  --will trend troponin  --c/w DAPT  --restart statin (hasn't been taking).     Problem/Plan - 4:  ·  Problem: Respiratory failure with hypoxia.  Plan: --from CHF exacerbation and volume overload  --diuresis  --oxygen PRN.  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  Plan: --converted to sinus rhythm on way to the hospital  --no history of afib. Patient does not seem to have chest pain, palpitations, or dizziness related to it, so unclear if he has been having pAF for a while.         Problem/Plan - 6:  Problem: CKD (chronic kidney disease) stage 5, GFR less than 15 ml/min. Plan: - anyone consistent right now.   --Needs lasix now for HTN emergency and  renal failure.   -  Nephrology  followup noted . for hemodialysis today       Problem/Plan - 7:  ·  Problem: T2DM (type 2 diabetes mellitus).  Plan: --On 70/30 at home.  due to hypoglycemia will decrease  lantus   Problem/Plan - 8:  ·  Problem: Chronic anemia.  Plan: -  transfusion    dw RENAL     DVT PPX:    ADVANCED DIRECTIVE:    DISPOSITION:

## 2018-12-29 LAB
ANION GAP SERPL CALC-SCNC: 12 MMOL/L — SIGNIFICANT CHANGE UP (ref 5–17)
BUN SERPL-MCNC: 25 MG/DL — HIGH (ref 7–23)
CALCIUM SERPL-MCNC: 7.8 MG/DL — LOW (ref 8.4–10.5)
CHLORIDE SERPL-SCNC: 97 MMOL/L — SIGNIFICANT CHANGE UP (ref 96–108)
CO2 SERPL-SCNC: 27 MMOL/L — SIGNIFICANT CHANGE UP (ref 22–31)
CREAT SERPL-MCNC: 4.32 MG/DL — HIGH (ref 0.5–1.3)
GLUCOSE BLDC GLUCOMTR-MCNC: 119 MG/DL — HIGH (ref 70–99)
GLUCOSE BLDC GLUCOMTR-MCNC: 121 MG/DL — HIGH (ref 70–99)
GLUCOSE BLDC GLUCOMTR-MCNC: 193 MG/DL — HIGH (ref 70–99)
GLUCOSE BLDC GLUCOMTR-MCNC: 74 MG/DL — SIGNIFICANT CHANGE UP (ref 70–99)
GLUCOSE SERPL-MCNC: 69 MG/DL — LOW (ref 70–99)
HCT VFR BLD CALC: 23.8 % — LOW (ref 39–50)
HGB BLD-MCNC: 7.4 G/DL — LOW (ref 13–17)
MCHC RBC-ENTMCNC: 25.5 PG — LOW (ref 27–34)
MCHC RBC-ENTMCNC: 31.1 GM/DL — LOW (ref 32–36)
MCV RBC AUTO: 82.1 FL — SIGNIFICANT CHANGE UP (ref 80–100)
PLATELET # BLD AUTO: 205 K/UL — SIGNIFICANT CHANGE UP (ref 150–400)
POTASSIUM SERPL-MCNC: 4.2 MMOL/L — SIGNIFICANT CHANGE UP (ref 3.5–5.3)
POTASSIUM SERPL-SCNC: 4.2 MMOL/L — SIGNIFICANT CHANGE UP (ref 3.5–5.3)
RBC # BLD: 2.9 M/UL — LOW (ref 4.2–5.8)
RBC # FLD: 20 % — HIGH (ref 10.3–14.5)
SODIUM SERPL-SCNC: 136 MMOL/L — SIGNIFICANT CHANGE UP (ref 135–145)
WBC # BLD: 6.06 K/UL — SIGNIFICANT CHANGE UP (ref 3.8–10.5)
WBC # FLD AUTO: 6.06 K/UL — SIGNIFICANT CHANGE UP (ref 3.8–10.5)

## 2018-12-29 PROCEDURE — 93970 EXTREMITY STUDY: CPT | Mod: 26

## 2018-12-29 RX ADMIN — Medication 667 MILLIGRAM(S): at 13:27

## 2018-12-29 RX ADMIN — INSULIN GLARGINE 4 UNIT(S): 100 INJECTION, SOLUTION SUBCUTANEOUS at 21:38

## 2018-12-29 RX ADMIN — Medication 667 MILLIGRAM(S): at 17:32

## 2018-12-29 RX ADMIN — HEPARIN SODIUM 5000 UNIT(S): 5000 INJECTION INTRAVENOUS; SUBCUTANEOUS at 13:26

## 2018-12-29 RX ADMIN — Medication 81 MILLIGRAM(S): at 13:27

## 2018-12-29 RX ADMIN — ISOSORBIDE DINITRATE 10 MILLIGRAM(S): 5 TABLET ORAL at 21:31

## 2018-12-29 RX ADMIN — HEPARIN SODIUM 5000 UNIT(S): 5000 INJECTION INTRAVENOUS; SUBCUTANEOUS at 06:03

## 2018-12-29 RX ADMIN — ISOSORBIDE DINITRATE 10 MILLIGRAM(S): 5 TABLET ORAL at 06:02

## 2018-12-29 RX ADMIN — ISOSORBIDE DINITRATE 10 MILLIGRAM(S): 5 TABLET ORAL at 13:26

## 2018-12-29 RX ADMIN — Medication 100 MILLIGRAM(S): at 06:03

## 2018-12-29 RX ADMIN — Medication 300 MILLIGRAM(S): at 06:04

## 2018-12-29 RX ADMIN — ATORVASTATIN CALCIUM 40 MILLIGRAM(S): 80 TABLET, FILM COATED ORAL at 21:30

## 2018-12-29 RX ADMIN — Medication 100 MILLIGRAM(S): at 13:26

## 2018-12-29 RX ADMIN — Medication 667 MILLIGRAM(S): at 08:06

## 2018-12-29 RX ADMIN — Medication 100 MILLIGRAM(S): at 21:30

## 2018-12-29 RX ADMIN — HEPARIN SODIUM 5000 UNIT(S): 5000 INJECTION INTRAVENOUS; SUBCUTANEOUS at 21:30

## 2018-12-29 RX ADMIN — Medication 300 MILLIGRAM(S): at 13:26

## 2018-12-29 RX ADMIN — Medication 300 MILLIGRAM(S): at 21:30

## 2018-12-29 RX ADMIN — Medication 80 MILLIGRAM(S): at 06:03

## 2018-12-29 NOTE — PROGRESS NOTE ADULT - SUBJECTIVE AND OBJECTIVE BOX
NEPHROLOGY - NSN      Patient seen and examined. Pt seen laying in bed, on 2L NC, reports feeling better, offered no complaints.    MEDICATIONS  (STANDING):  aspirin enteric coated 81 milliGRAM(s) Oral daily  atorvastatin 40 milliGRAM(s) Oral at bedtime  calcium acetate 667 milliGRAM(s) Oral three times a day with meals  dextrose 5%. 1000 milliLiter(s) (50 mL/Hr) IV Continuous <Continuous>  dextrose 50% Injectable 12.5 Gram(s) IV Push once  dextrose 50% Injectable 25 Gram(s) IV Push once  dextrose 50% Injectable 25 Gram(s) IV Push once  doxercalciferol Injectable 3 MICROGram(s) IV Push <User Schedule>  epoetin theodore Injectable 95205 Unit(s) IV Push <User Schedule>  furosemide    Tablet 80 milliGRAM(s) Oral daily  heparin  Injectable 5000 Unit(s) SubCutaneous every 8 hours  hydrALAZINE 100 milliGRAM(s) Oral three times a day  insulin glargine Injectable (LANTUS) 4 Unit(s) SubCutaneous at bedtime  insulin lispro (HumaLOG) corrective regimen sliding scale   SubCutaneous three times a day before meals  insulin lispro (HumaLOG) corrective regimen sliding scale   SubCutaneous at bedtime  isosorbide   dinitrate Tablet (ISORDIL) 10 milliGRAM(s) Oral three times a day  labetalol 300 milliGRAM(s) Oral three times a day    VITALS:  T(C): , Max: 37.1 (12-29-18 @ 05:27)  T(F): , Max: 98.7 (12-29-18 @ 05:27)  HR: 68 (12-29-18 @ 05:27)  BP: 178/81 (12-29-18 @ 05:27)  RR: 18 (12-29-18 @ 05:27)  SpO2: 99% (12-29-18 @ 05:27)    I and O's:    12-28 @ 07:01  -  12-29 @ 07:00  --------------------------------------------------------  IN: 0 mL / OUT: 1200 mL / NET: -1200 mL    REVIEW OF SYSTEMS:  Full ROS done and were negative unless otherwise indicated in HPI/assessment.     PHYSICAL EXAM  Constitutional: NAD  Neck:  No JVD  Respiratory: diminished breath sounds bilaterally at bases  Cardiovascular: S1 and S2  Gastrointestinal: BS+, soft, NT/ND  Extremities: No peripheral edema  Neurological: A/O x 3, no focal deficits  Psychiatric: Normal mood, normal affect  : No Palacios  Skin: No rashes  Access: [facility]    LABS:                        7.4    6.06  )-----------( 205      ( 29 Dec 2018 08:55 )             23.8     12-29    136  |  97  |  25<H>  ----------------------------<  69<L>  4.2   |  27  |  4.32<H>    Ca    7.8<L>      29 Dec 2018 07:16

## 2018-12-29 NOTE — PROGRESS NOTE ADULT - SUBJECTIVE AND OBJECTIVE BOX
CHIEF COMPLAINT: He feel better  had  transfusion  68M PMH CAD/CABG, HFrEF (30%), T2DM, HTN, CKD 5 (not on HD), aortic valve replacement, chronic anemia occasionally requiring transfusions presents with SOB. Has had progressively worsening SOB and SZYMANSKI over the past 2 weeks. History is supplemented by wife as she manages most of his medical care and gives his medications. Symptoms significantly worsened this week with orthopnea and LE edema. No recent URIs, fevers, chills, cough, sputum production, chest pain, palpitations, NVD, abdominal pain, dysuria, decreased urination. Went to PMD for evaluation and he was noted to be in rapid afib in the office, rate 140s-150s. By time of arrival in ED, had spontaneously converted back to rate controlled sinus rhythm. O2sat 89% on RA, improved with nasal cannul  SUBJECTIVE:     REVIEW OF SYSTEMS:    CONSTITUTIONAL: (x  )  weakness,  (  ) fevers or chills  EYES/ENT: (  )visual changes;     NECK: (  ) pain or stiffness  RESPIRATORY:   (  )cough, wheezing, hemoptysis;  (  ) shortness of breath  CARDIOVASCULAR:  (  )chest pain or palpitations  GASTROINTESTINAL:   (  )abdominal or epigastric pain.  (  ) nausea, vomiting, or hematemesis;   (   ) diarrhea or constipation.   GENITOURINARY:   (    ) dysuria, frequency or hematuria  NEUROLOGICAL:  (   ) numbness or weakness   All other review of systems is negative unless indicated above    Vital Signs Last 24 Hrs  T(C): 37.1 (29 Dec 2018 05:27), Max: 37.1 (29 Dec 2018 05:27)  T(F): 98.7 (29 Dec 2018 05:27), Max: 98.7 (29 Dec 2018 05:27)  HR: 68 (29 Dec 2018 05:27) (62 - 83)  BP: 178/81 (29 Dec 2018 05:27) (115/54 - 223/95)  BP(mean): --  RR: 18 (29 Dec 2018 05:27) (17 - 18)  SpO2: 99% (29 Dec 2018 05:27) (93% - 99%)    I&O's Summary    28 Dec 2018 07:01  -  29 Dec 2018 07:00  --------------------------------------------------------  IN: 0 mL / OUT: 1200 mL / NET: -1200 mL        CAPILLARY BLOOD GLUCOSE      POCT Blood Glucose.: 74 mg/dL (29 Dec 2018 08:06)  POCT Blood Glucose.: 100 mg/dL (28 Dec 2018 21:37)  POCT Blood Glucose.: 148 mg/dL (28 Dec 2018 16:36)  POCT Blood Glucose.: 82 mg/dL (28 Dec 2018 14:41)      PHYSICAL EXAM:  GENERAL: NAD, well-developed, sleepy  	HEAD:  Atraumatic, Normocephalic  	EYES: EOMI, PERRLA, conjunctiva and sclera clear  	NECK: +JVD  	CHEST/LUNG: Clear to auscultation bilaterally; No wheeze  	HEART: crackles b/l lower lobes  	ABDOMEN: Soft, Nontender, Nondistended; Bowel sounds present  	EXTREMITIES:  1+ pitting edema peripheral; 2+ Peripheral Pulses, No clubbing, cyanosis  	PSYCH: AAOx3  	NEUROLOGY: non-focal       MEDICATIONS:  MEDICATIONS  (STANDING):  aspirin enteric coated 81 milliGRAM(s) Oral daily  atorvastatin 40 milliGRAM(s) Oral at bedtime  calcium acetate 667 milliGRAM(s) Oral three times a day with meals  dextrose 5%. 1000 milliLiter(s) (50 mL/Hr) IV Continuous <Continuous>  dextrose 50% Injectable 12.5 Gram(s) IV Push once  dextrose 50% Injectable 25 Gram(s) IV Push once  dextrose 50% Injectable 25 Gram(s) IV Push once  doxercalciferol Injectable 3 MICROGram(s) IV Push <User Schedule>  epoetin theodore Injectable 07524 Unit(s) IV Push <User Schedule>  furosemide    Tablet 80 milliGRAM(s) Oral daily  heparin  Injectable 5000 Unit(s) SubCutaneous every 8 hours  hydrALAZINE 100 milliGRAM(s) Oral three times a day  insulin glargine Injectable (LANTUS) 4 Unit(s) SubCutaneous at bedtime  insulin lispro (HumaLOG) corrective regimen sliding scale   SubCutaneous three times a day before meals  insulin lispro (HumaLOG) corrective regimen sliding scale   SubCutaneous at bedtime  isosorbide   dinitrate Tablet (ISORDIL) 10 milliGRAM(s) Oral three times a day  labetalol 300 milliGRAM(s) Oral three times a day      LABS: All Labs Reviewed:                        7.4    6.06  )-----------( 205      ( 29 Dec 2018 08:55 )             23.8     12-29    136  |  97  |  25<H>  ----------------------------<  69<L>  4.2   |  27  |  4.32<H>    Ca    7.8<L>      29 Dec 2018 07:16            Blood Culture:   Urine Culture      RADIOLOGY/EKG:    ASSESSMENT AND PLAN:  68M PMH CAD/CABG, HFrEF (30%), T2DM, HTN, CKD 5 (not on HD), aortic valve replacement, chronic anemia occasionally requiring transfusions presents with SOB, admitted with acute decompensated systolic heart failure with hypertensive emergency, demand ischemia, worsening renal failure, transient afib RVR.     Problem/Plan - 1:  ·  Problem: Acute systolic heart failure.  Plan: --from med non-adherence, especially with uncontrolled HTN, and worsening CKD5  --requiring nasal cannula oxygen  --patient reports feeling a little better after lasix in the ED. Start lasix 60mg IV BID.     --strict I/Os, daily weights   waiting for cariology consult dr payton  called [phone] left message    Problem/Plan - 2:   hypertension  · on  hydralazine 100mg TID, isordil 10mg TID. On IV lasix for CHF. labetalol 200 milliGRAM(s) Oral three times a day  --   Problem/Plan - 3:  ·  Problem: Elevated troponin.  Plan: --hsTrop to 492, up from high 300s last admission.    due  worsening renal failure  --will trend troponin  --c/w DAPT  --restart statin (hasn't been taking).     Problem/Plan - 4:  ·  Problem: Respiratory failure with hypoxia.  Plan: --from CHF exacerbation and volume overload  --diuresis  --oxygen PRN.  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  Plan: --converted to sinus rhythm on way to the hospital  --no history of afib. Patient does not seem to have chest pain, palpitations, or dizziness related to it, so unclear if he has been having pAF for a while.         Problem/Plan - 6:  Problem: CKD (chronic kidney disease) stage 5, GFR less than 15 ml/min. Plan: - anyone consistent right now.   --Needs lasix now for HTN emergency and  renal failure.   -  Nephrology  followup noted . for hemodialysis today       Problem/Plan - 7:  ·  Problem: T2DM (type 2 diabetes mellitus).  Plan: --On 70/30 at home.  due to hypoglycemia will decrease  lantus   Problem/Plan - 8:  ·  Problem: Chronic anemia.  Plan: -  s/p  transfusion      feel better    DVT PPX:    ADVANCED DIRECTIVE:    DISPOSITION:

## 2018-12-29 NOTE — PHYSICAL THERAPY INITIAL EVALUATION ADULT - ADDITIONAL COMMENTS
as per pt: PTA pt was living in a PH 3 steps to enter and living on first floor. and was independent in all functional mobility and ADL's. no AD for gait. Pt states he only ambulated household distances.

## 2018-12-29 NOTE — PHYSICAL THERAPY INITIAL EVALUATION ADULT - CRITERIA FOR SKILLED THERAPEUTIC INTERVENTIONS
predicted duration of therapy intervention/anticipated equipment needs at discharge/therapy frequency/functional limitations in following categories/risk reduction/prevention/anticipated discharge recommendation/impairments found/rehab potential

## 2018-12-29 NOTE — PROVIDER CONTACT NOTE (OTHER) - ACTION/TREATMENT ORDERED:
Provider did bedside assessment. Site marked to monitor bleeding overnight. Will continue to monitor. Provider did bedside assessment. Site marked to monitor further bleeding overnight. Will continue to monitor.

## 2018-12-29 NOTE — PHYSICAL THERAPY INITIAL EVALUATION ADULT - PLANNED THERAPY INTERVENTIONS, PT EVAL
Pt will negotiate 3 steps on stairs indepenedently in 2 weeks./bed mobility training/transfer training/balance training/gait training/strengthening

## 2018-12-29 NOTE — PROVIDER CONTACT NOTE (OTHER) - SITUATION
Pt's right IJ appears to be bleeding. Pt's right IJ appears to be bleeding. Blood collection in the center of dressing. Dressing not saturated.

## 2018-12-29 NOTE — PHYSICAL THERAPY INITIAL EVALUATION ADULT - PERTINENT HX OF CURRENT PROBLEM, REHAB EVAL
68M PMH CAD/CABG, HFrEF (30%), T2DM, HTN, CKD 5 (not on HD), aortic valve replacement, chronic anemia occasionally requiring transfusions presents with SOB, admitted with acute decompensated systolic heart failure with hypertensive emergency, demand ischemia, worsening renal failure, transient afib RVR. s/p Shiley placement by IR

## 2018-12-29 NOTE — PROGRESS NOTE ADULT - ASSESSMENT
ASSESMENT:  Mr Castaneda is a 67yo male with past medical history of HTN, DM chronic kidney disease stage V, now presents with uremia.  He also has hypertensive urgency with accompanying severe pulmonary hypertension.  Goals of therapy are blood pressure control and dialysis.  The patient was told the importance of dialysis and now he agrees to proceed. Last HD on 12/28/18: 0.5L removed.  Secondary hyperparathyroidism  Anemia-s/p transfusion, on Epogen 10,000 units IV at each HD  Hypertensive urgency - consult placed by primary team for cardiology Dr. Zurita    RECOMMEND  Renal:  HD Monday after the perm cath placement also planned for Monday  Cardiology: Monitor BPs, Continue labetalol 300mg po tid.  Allergy to ACE.  Will not start ACE or ARB  Anemia: continue Epogen IV q HD, trend H/H  Endocrine-Cont  Hectorol   Vasc- LUE precautions- OR planned for 1/3/18 for LUE AVF with Dr. Nila Bess, NP  [facility]  [phone]

## 2018-12-30 LAB
ANION GAP SERPL CALC-SCNC: 11 MMOL/L — SIGNIFICANT CHANGE UP (ref 5–17)
BUN SERPL-MCNC: 35 MG/DL — HIGH (ref 7–23)
CALCIUM SERPL-MCNC: 7.8 MG/DL — LOW (ref 8.4–10.5)
CHLORIDE SERPL-SCNC: 95 MMOL/L — LOW (ref 96–108)
CO2 SERPL-SCNC: 29 MMOL/L — SIGNIFICANT CHANGE UP (ref 22–31)
CREAT SERPL-MCNC: 5.94 MG/DL — HIGH (ref 0.5–1.3)
GLUCOSE BLDC GLUCOMTR-MCNC: 100 MG/DL — HIGH (ref 70–99)
GLUCOSE BLDC GLUCOMTR-MCNC: 136 MG/DL — HIGH (ref 70–99)
GLUCOSE BLDC GLUCOMTR-MCNC: 168 MG/DL — HIGH (ref 70–99)
GLUCOSE BLDC GLUCOMTR-MCNC: 94 MG/DL — SIGNIFICANT CHANGE UP (ref 70–99)
GLUCOSE SERPL-MCNC: 121 MG/DL — HIGH (ref 70–99)
HCT VFR BLD CALC: 21.7 % — LOW (ref 39–50)
HGB BLD-MCNC: 7.1 G/DL — LOW (ref 13–17)
MAGNESIUM SERPL-MCNC: 2.2 MG/DL — SIGNIFICANT CHANGE UP (ref 1.6–2.6)
MCHC RBC-ENTMCNC: 26.9 PG — LOW (ref 27–34)
MCHC RBC-ENTMCNC: 32.5 GM/DL — SIGNIFICANT CHANGE UP (ref 32–36)
MCV RBC AUTO: 82.5 FL — SIGNIFICANT CHANGE UP (ref 80–100)
PLATELET # BLD AUTO: 202 K/UL — SIGNIFICANT CHANGE UP (ref 150–400)
POTASSIUM SERPL-MCNC: 4.7 MMOL/L — SIGNIFICANT CHANGE UP (ref 3.5–5.3)
POTASSIUM SERPL-SCNC: 4.7 MMOL/L — SIGNIFICANT CHANGE UP (ref 3.5–5.3)
RBC # BLD: 2.63 M/UL — LOW (ref 4.2–5.8)
RBC # FLD: 21 % — HIGH (ref 10.3–14.5)
SODIUM SERPL-SCNC: 135 MMOL/L — SIGNIFICANT CHANGE UP (ref 135–145)
WBC # BLD: 7.2 K/UL — SIGNIFICANT CHANGE UP (ref 3.8–10.5)
WBC # FLD AUTO: 7.2 K/UL — SIGNIFICANT CHANGE UP (ref 3.8–10.5)

## 2018-12-30 RX ADMIN — Medication 100 MILLIGRAM(S): at 13:02

## 2018-12-30 RX ADMIN — Medication 81 MILLIGRAM(S): at 13:02

## 2018-12-30 RX ADMIN — Medication 667 MILLIGRAM(S): at 13:02

## 2018-12-30 RX ADMIN — Medication 80 MILLIGRAM(S): at 10:36

## 2018-12-30 RX ADMIN — Medication 100 MILLIGRAM(S): at 22:18

## 2018-12-30 RX ADMIN — HEPARIN SODIUM 5000 UNIT(S): 5000 INJECTION INTRAVENOUS; SUBCUTANEOUS at 22:18

## 2018-12-30 RX ADMIN — INSULIN GLARGINE 4 UNIT(S): 100 INJECTION, SOLUTION SUBCUTANEOUS at 22:17

## 2018-12-30 RX ADMIN — ISOSORBIDE DINITRATE 10 MILLIGRAM(S): 5 TABLET ORAL at 13:02

## 2018-12-30 RX ADMIN — HEPARIN SODIUM 5000 UNIT(S): 5000 INJECTION INTRAVENOUS; SUBCUTANEOUS at 13:05

## 2018-12-30 RX ADMIN — HEPARIN SODIUM 5000 UNIT(S): 5000 INJECTION INTRAVENOUS; SUBCUTANEOUS at 06:18

## 2018-12-30 RX ADMIN — Medication 667 MILLIGRAM(S): at 07:43

## 2018-12-30 RX ADMIN — Medication 667 MILLIGRAM(S): at 17:24

## 2018-12-30 RX ADMIN — Medication 300 MILLIGRAM(S): at 22:21

## 2018-12-30 RX ADMIN — ISOSORBIDE DINITRATE 10 MILLIGRAM(S): 5 TABLET ORAL at 22:19

## 2018-12-30 RX ADMIN — Medication 300 MILLIGRAM(S): at 13:02

## 2018-12-30 RX ADMIN — ATORVASTATIN CALCIUM 40 MILLIGRAM(S): 80 TABLET, FILM COATED ORAL at 22:18

## 2018-12-30 NOTE — PROGRESS NOTE ADULT - SUBJECTIVE AND OBJECTIVE BOX
CHIEF COMPLAINT: He feel better  had  transfusion  68M PMH CAD/CABG, HFrEF (30%), T2DM, HTN, CKD 5 (not on HD), aortic valve replacement, chronic anemia occasionally requiring transfusions presents with SOB. Has had progressively worsening SOB and SZYMANSKI over the past 2 weeks. History is supplemented by wife as she manages most of his medical care and gives his medications. Symptoms significantly worsened this week with orthopnea and LE edema. No recent URIs, fevers, chills, cough, sputum production, chest pain, palpitations, NVD, abdominal pain, dysuria, decreased urination. Went to PMD for evaluation and he was noted to be in rapid afib in the office, rate 140s-150s. By time of arrival in ED, had spontaneously converted back to rate controlled sinus rhythm. O2sat 89% on RA, improved with nasal cannul    CONSTITUTIONAL: (x  )  weakness,  (  ) fevers or chills  EYES/ENT: (  )visual changes;     NECK: (  ) pain or stiffness  RESPIRATORY:   (  )cough, wheezing, hemoptysis;  (  ) shortness of breath  CARDIOVASCULAR:  (  )chest pain or palpitations  GASTROINTESTINAL:   (  )abdominal or epigastric pain.  (  ) nausea, vomiting, or hematemesis;   (   ) diarrhea or constipation.   GENITOURINARY:   (    ) dysuria, frequency or hematuria  NEUROLOGICAL:  (   ) numbness or weakness   All other review of systems is negative unless indicated above    Vital Signs Last 24 Hrs  T(C): 36.8 (30 Dec 2018 10:59), Max: 36.8 (29 Dec 2018 21:28)  T(F): 98.2 (30 Dec 2018 10:59), Max: 98.2 (29 Dec 2018 21:28)  HR: 72 (30 Dec 2018 17:26) (67 - 72)  BP: 172/81 (30 Dec 2018 17:26) (134/80 - 186/82)  BP(mean): --  RR: 18 (30 Dec 2018 10:59) (18 - 18)  SpO2: 91% (30 Dec 2018 10:59) (91% - 98%)    I&O's Summary    29 Dec 2018 07:01  -  30 Dec 2018 07:00  --------------------------------------------------------  IN: 1160 mL / OUT: 600 mL / NET: 560 mL    30 Dec 2018 07:01  -  30 Dec 2018 20:25  --------------------------------------------------------  IN: 900 mL / OUT: 200 mL / NET: 700 mL        CAPILLARY BLOOD GLUCOSE      POCT Blood Glucose.: 136 mg/dL (30 Dec 2018 16:49)  POCT Blood Glucose.: 94 mg/dL (30 Dec 2018 11:57)  POCT Blood Glucose.: 100 mg/dL (30 Dec 2018 07:31)  POCT Blood Glucose.: 193 mg/dL (29 Dec 2018 21:34)      PHYSICAL EXAM:       GENERAL: NAD, well-developed, sleepy  	HEAD:  Atraumatic, Normocephalic  	EYES: EOMI, PERRLA, conjunctiva and sclera clear  	NECK: +JVD  	CHEST/LUNG: Clear to auscultation bilaterally; No wheeze  	HEART: crackles b/l lower lobes  	ABDOMEN: Soft, Nontender, Nondistended; Bowel sounds present  	EXTREMITIES:  1+ pitting edema peripheral; 2+ Peripheral Pulses, No clubbing, cyanosis  	PSYCH: AAOx3  	NEUROLOGY: non-focal  MEDICATIONS:  MEDICATIONS  (STANDING):  aspirin enteric coated 81 milliGRAM(s) Oral daily  atorvastatin 40 milliGRAM(s) Oral at bedtime  calcium acetate 667 milliGRAM(s) Oral three times a day with meals  dextrose 5%. 1000 milliLiter(s) (50 mL/Hr) IV Continuous <Continuous>  dextrose 50% Injectable 12.5 Gram(s) IV Push once  dextrose 50% Injectable 25 Gram(s) IV Push once  dextrose 50% Injectable 25 Gram(s) IV Push once  doxercalciferol Injectable 3 MICROGram(s) IV Push <User Schedule>  epoetin theodore Injectable 16211 Unit(s) IV Push <User Schedule>  furosemide    Tablet 80 milliGRAM(s) Oral daily  heparin  Injectable 5000 Unit(s) SubCutaneous every 8 hours  hydrALAZINE 100 milliGRAM(s) Oral three times a day  insulin glargine Injectable (LANTUS) 4 Unit(s) SubCutaneous at bedtime  insulin lispro (HumaLOG) corrective regimen sliding scale   SubCutaneous three times a day before meals  insulin lispro (HumaLOG) corrective regimen sliding scale   SubCutaneous at bedtime  isosorbide   dinitrate Tablet (ISORDIL) 10 milliGRAM(s) Oral three times a day  labetalol 300 milliGRAM(s) Oral three times a day      LABS: All Labs Reviewed:                        7.1    7.2   )-----------( 202      ( 30 Dec 2018 14:27 )             21.7     12-30    135  |  95<L>  |  35<H>  ----------------------------<  121<H>  4.7   |  29  |  5.94<H>    Ca    7.8<L>      30 Dec 2018 14:27  Mg     2.2     12-30            Blood Culture:   Urine Culture      RADIOLOGY/EKG:    ASSESSMENT AND PLAN:    68M PMH CAD/CABG, HFrEF (30%), T2DM, HTN, CKD 5 (not on HD), aortic valve replacement, chronic anemia occasionally requiring transfusions presents with SOB, admitted with acute decompensated systolic heart failure with hypertensive emergency, demand ischemia, worsening renal failure, transient afib RVR.     Problem/Plan - 1:  ·  Problem: Acute systolic heart failure.  Plan: --from med non-adherence, especially with uncontrolled HTN, and worsening CKD5  --requiring nasal cannula oxygen  --patient reports feeling a little better after lasix in the ED. Start lasix 60mg IV BID.     --strict I/Os, daily weights   waiting for cariology consult dr payton  called [phone] left message    Problem/Plan - 2:   hypertension  · on  hydralazine 100mg TID, isordil 10mg TID. On IV lasix for CHF. labetalol 200 milliGRAM(s) Oral three times a day  --   Problem/Plan - 3:  ·  Problem: Elevated troponin.  Plan: --hsTrop to 492, up from high 300s last admission.    due  worsening renal failure  --will trend troponin  -       Problem/Plan - 4:  ·  Problem: Respiratory failure with hypoxia.  Plan: --from CHF exacerbation and volume overload  --diuresis  --oxygen PRN.  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  Plan: --converted to sinus rhythm on way to the hospital  --no history of afib. Patient does not seem to have chest pain, palpitations, or dizziness related to it, so unclear if he has been having pAF for a while.         Problem/Plan - 6:  Problem: CKD (chronic kidney disease) stage 5, GFR less than 15 ml/min. Plan: - anyone consistent right now.   --Needs lasix now for HTN emergency and  renal failure.   -  Nephrology  followup noted . for hemodialysis today       Problem/Plan - 7:  ·  Problem: T2DM (type 2 diabetes mellitus).  Plan: --On 70/30 at home.  due to hypoglycemia will decrease  lantus   Problem/Plan - 8:  ·  Problem: Chronic anemia.  Plan: -  s/p  transfusion      feel better  DVT PPX:    ADVANCED DIRECTIVE:    DISPOSITION:

## 2018-12-31 LAB
ANION GAP SERPL CALC-SCNC: 11 MMOL/L — SIGNIFICANT CHANGE UP (ref 5–17)
BLD GP AB SCN SERPL QL: NEGATIVE — SIGNIFICANT CHANGE UP
BUN SERPL-MCNC: 42 MG/DL — HIGH (ref 7–23)
CALCIUM SERPL-MCNC: 7.6 MG/DL — LOW (ref 8.4–10.5)
CHLORIDE SERPL-SCNC: 97 MMOL/L — SIGNIFICANT CHANGE UP (ref 96–108)
CO2 SERPL-SCNC: 27 MMOL/L — SIGNIFICANT CHANGE UP (ref 22–31)
CREAT SERPL-MCNC: 6.7 MG/DL — HIGH (ref 0.5–1.3)
GLUCOSE BLDC GLUCOMTR-MCNC: 142 MG/DL — HIGH (ref 70–99)
GLUCOSE BLDC GLUCOMTR-MCNC: 65 MG/DL — LOW (ref 70–99)
GLUCOSE BLDC GLUCOMTR-MCNC: 81 MG/DL — SIGNIFICANT CHANGE UP (ref 70–99)
GLUCOSE BLDC GLUCOMTR-MCNC: 90 MG/DL — SIGNIFICANT CHANGE UP (ref 70–99)
GLUCOSE BLDC GLUCOMTR-MCNC: 94 MG/DL — SIGNIFICANT CHANGE UP (ref 70–99)
GLUCOSE SERPL-MCNC: 59 MG/DL — LOW (ref 70–99)
HCT VFR BLD CALC: 20.9 % — CRITICAL LOW (ref 39–50)
HCT VFR BLD CALC: 24.2 % — LOW (ref 39–50)
HGB BLD-MCNC: 6.6 G/DL — CRITICAL LOW (ref 13–17)
HGB BLD-MCNC: 7.7 G/DL — LOW (ref 13–17)
MAGNESIUM SERPL-MCNC: 2.2 MG/DL — SIGNIFICANT CHANGE UP (ref 1.6–2.6)
MCHC RBC-ENTMCNC: 25 PG — LOW (ref 27–34)
MCHC RBC-ENTMCNC: 26.5 PG — LOW (ref 27–34)
MCHC RBC-ENTMCNC: 31.6 GM/DL — LOW (ref 32–36)
MCHC RBC-ENTMCNC: 31.9 GM/DL — LOW (ref 32–36)
MCV RBC AUTO: 79.2 FL — LOW (ref 80–100)
MCV RBC AUTO: 83 FL — SIGNIFICANT CHANGE UP (ref 80–100)
PLATELET # BLD AUTO: 235 K/UL — SIGNIFICANT CHANGE UP (ref 150–400)
PLATELET # BLD AUTO: 260 K/UL — SIGNIFICANT CHANGE UP (ref 150–400)
POTASSIUM SERPL-MCNC: 4.7 MMOL/L — SIGNIFICANT CHANGE UP (ref 3.5–5.3)
POTASSIUM SERPL-SCNC: 4.7 MMOL/L — SIGNIFICANT CHANGE UP (ref 3.5–5.3)
RBC # BLD: 2.64 M/UL — LOW (ref 4.2–5.8)
RBC # BLD: 2.92 M/UL — LOW (ref 4.2–5.8)
RBC # FLD: 20.4 % — HIGH (ref 10.3–14.5)
RBC # FLD: 21 % — HIGH (ref 10.3–14.5)
RH IG SCN BLD-IMP: POSITIVE — SIGNIFICANT CHANGE UP
SODIUM SERPL-SCNC: 135 MMOL/L — SIGNIFICANT CHANGE UP (ref 135–145)
WBC # BLD: 7.6 K/UL — SIGNIFICANT CHANGE UP (ref 3.8–10.5)
WBC # BLD: 7.62 K/UL — SIGNIFICANT CHANGE UP (ref 3.8–10.5)
WBC # FLD AUTO: 7.6 K/UL — SIGNIFICANT CHANGE UP (ref 3.8–10.5)
WBC # FLD AUTO: 7.62 K/UL — SIGNIFICANT CHANGE UP (ref 3.8–10.5)

## 2018-12-31 PROCEDURE — 36558 INSERT TUNNELED CV CATH: CPT | Mod: 53

## 2018-12-31 RX ORDER — DEXTROSE 50 % IN WATER 50 %
25 SYRINGE (ML) INTRAVENOUS ONCE
Qty: 0 | Refills: 0 | Status: COMPLETED | OUTPATIENT
Start: 2018-12-31 | End: 2018-12-31

## 2018-12-31 RX ADMIN — Medication 100 MILLIGRAM(S): at 05:46

## 2018-12-31 RX ADMIN — ISOSORBIDE DINITRATE 10 MILLIGRAM(S): 5 TABLET ORAL at 21:07

## 2018-12-31 RX ADMIN — Medication 667 MILLIGRAM(S): at 14:53

## 2018-12-31 RX ADMIN — Medication 100 MILLIGRAM(S): at 14:53

## 2018-12-31 RX ADMIN — ERYTHROPOIETIN 10000 UNIT(S): 10000 INJECTION, SOLUTION INTRAVENOUS; SUBCUTANEOUS at 17:34

## 2018-12-31 RX ADMIN — ATORVASTATIN CALCIUM 40 MILLIGRAM(S): 80 TABLET, FILM COATED ORAL at 21:07

## 2018-12-31 RX ADMIN — HEPARIN SODIUM 5000 UNIT(S): 5000 INJECTION INTRAVENOUS; SUBCUTANEOUS at 14:53

## 2018-12-31 RX ADMIN — INSULIN GLARGINE 4 UNIT(S): 100 INJECTION, SOLUTION SUBCUTANEOUS at 22:19

## 2018-12-31 RX ADMIN — Medication 300 MILLIGRAM(S): at 21:06

## 2018-12-31 RX ADMIN — Medication 80 MILLIGRAM(S): at 09:02

## 2018-12-31 RX ADMIN — Medication 300 MILLIGRAM(S): at 15:54

## 2018-12-31 RX ADMIN — HEPARIN SODIUM 5000 UNIT(S): 5000 INJECTION INTRAVENOUS; SUBCUTANEOUS at 21:07

## 2018-12-31 RX ADMIN — ISOSORBIDE DINITRATE 10 MILLIGRAM(S): 5 TABLET ORAL at 13:46

## 2018-12-31 RX ADMIN — Medication 81 MILLIGRAM(S): at 14:53

## 2018-12-31 RX ADMIN — Medication 25 MILLILITER(S): at 08:32

## 2018-12-31 RX ADMIN — DOXERCALCIFEROL 3 MICROGRAM(S): 2.5 CAPSULE ORAL at 17:35

## 2018-12-31 RX ADMIN — Medication 100 MILLIGRAM(S): at 21:07

## 2018-12-31 NOTE — PROGRESS NOTE ADULT - ASSESSMENT
ASSESMENT:  Mr Castaneda is a 69yo male with past medical history of HTN, DM chronic kidney disease stage V, now presents with uremia.  He also has hypertensive urgency with accompanying severe pulmonary hypertension.  Goals of therapy are blood pressure control and dialysis.  The patient was told the importance of dialysis and now he agrees to proceed. Last HD on 12/28/18: 0.5L removed.  Secondary hyperparathyroidism  Anemia-s/p transfusion, on Epogen 10,000 units IV at each HD  Hypertensive urgency -      RECOMMEND  Renal:  HD Monday after the perm cath placement also planned today  Cardiology: Monitor BPs, Continue labetalol 300mg po tid.  Allergy to ACE.  Will not start ACE or ARB  Anemia: continue Epogen IV q HD, trend H/H  Endocrine-Cont  Hectorol   Vasc- LUE precautions- OR planned for 1/3/18 for LUE AVF with Dr. Dotson       [facility]  [phone]

## 2018-12-31 NOTE — PROGRESS NOTE ADULT - SUBJECTIVE AND OBJECTIVE BOX
Vascular Surgery Consult - Daily Progress Note    SUBJECTIVE:  Doing well.   No overnight events.   NPO in anticipation of IR today.     OBJECTIVE:     ** VITAL SIGNS / I&O's **    T(C): 37 (12-31-18 @ 04:36), Max: 37 (12-30-18 @ 22:00)  T(F): 98.6 (12-31-18 @ 04:36), Max: 98.6 (12-30-18 @ 22:00)  HR: 68 (12-31-18 @ 08:46) (68 - 73)  BP: 161/62 (12-31-18 @ 08:46) (134/80 - 186/82)  RR: 18 (12-31-18 @ 04:36) (18 - 18)  SpO2: 100% (12-31-18 @ 04:36) (91% - 100%)      30 Dec 2018 07:01  -  31 Dec 2018 07:00  --------------------------------------------------------  IN:    Oral Fluid: 1200 mL  Total IN: 1200 mL    OUT:    Voided: 450 mL  Total OUT: 450 mL    Total NET: 750 mL    ** PHYSICAL EXAM **    -- CONSTITUTIONAL: awake, alert, NAD  -- CARDIOVASCULAR: hypertensive, regular rate   -- RESPIRATORY: breathing comfortably   -- EXTREMITIES: warm, well perfused  -- VASCULAR:          RUE: palpable radial         LUE: palpable radial  -- NEUROLOGICAL: motor and sensation symmetric     ** LABS **                 6.6    7.62   )----------(  260       ( 31 Dec 2018 08:10 )               20.9      135    |  97     |  42     ----------------------------<  59         ( 31 Dec 2018 06:08 )  4.7     |  27     |  6.70     Ca    7.6        ( 31 Dec 2018 06:08 )  Mg     2.2       ( 31 Dec 2018 06:08 )      **RADS**  VA Duplex Upper Ext Vein Scan, Bilat (12.29.18 @ 12:31)   Right upper extremity    Basilic vein  Upper arm proximal not visualized  Upper arm mid 0.41 cm  Upper arm distal 0.44 cm  Antecubital fossa 0.21 cm  Forearm proximal 0.14 cm  Forearm mid 0.28 cm  Forearm distal 0.22 cm    Cephalic vein  Upper arm proximal 0.44 cm  Upper arm mid 0.40 cm  Upper arm distal 0.52 cm  Antecubital fossa 0.68 cm  Forearm proximal 0.39 cm  Forearm mid 0.38 cm  Forearm distal 0.25 cm    Left upper extremity    Basilic vein  Upper arm proximal not visualized  Upper arm mid 0.51 cm  Upper arm distal 0.25 cm  Antecubital fossa 0.27 cm  Forearm proximal 0.17 cm  Forearm mid 0.11 cm  Forearm distal 0.13 cm    Cephalic vein  Upper arm proximal 0.53 cm  Upper arm mid 0.51 cm  Upper arm distal 0.43 cm  Antecubital fossa 0.59 cm  Forearm proximal 0.37 cm  Forearm mid 0.31 cm  Forearm distal 0.34 cm    Impression: No duplex evidence of DVT in either upper extremity.    Nonocclusive postthrombotic changes in the cephalic veins bilaterally.    Superficial vein mapping as described above.

## 2018-12-31 NOTE — PROVIDER CONTACT NOTE (OTHER) - ACTION/TREATMENT ORDERED:
Give pt hydralazine 100mg now at 5am, retake vitals at 8am and see if the isordil, labetalol and lasix need to be administered at that time.

## 2018-12-31 NOTE — PROGRESS NOTE ADULT - SUBJECTIVE AND OBJECTIVE BOX
CHIEF COMPLAINT: He feel better  he was seen in the morning waiting  for placement        right internal jugular temporary dialysis  for long-term hemodialysis  68M PMH CAD/CABG, HFrEF (30%), T2DM, HTN, CKD 5 (not on HD), aortic valve replacement, chronic anemia occasionally requiring transfusions presents with SOB. Has had progressively worsening SOB and SZYMANSKI over the past 2 weeks. History is supplemented by wife as she manages most of his medical care and gives his medications. Symptoms significantly worsened this week with orthopnea and LE edema. No recent URIs, fevers, chills, cough, sputum production, chest pain, palpitations, NVD, abdominal pain, dysuria, decreased urination. Went to PMD for evaluation and he was noted to be in rapid afib in the office, rate 140s-150s. By time of arrival in ED, had spontaneously converted back to rate controlled sinus rhythm. O2sat 89% on RA, improved with nasal cannul    CONSTITUTIONAL: (x  )  weakness,  (  ) fevers or chills  EYES/ENT: (  )visual changes;     NECK: (  ) pain or stiffness  RESPIRATORY:   (  )cough, wheezing, hemoptysis;  (  ) shortness of breath  CARDIOVASCULAR:  (  )chest pain or palpitations  GASTROINTESTINAL:   (  )abdominal or epigastric pain.  (  ) nausea, vomiting, or hematemesis;   (   ) diarrhea or constipation.   GENITOURINARY:   (    ) dysuria, frequency or hematuria  NEUROLOGICAL:  (   ) numbness or weakness   All other review of systems is negative unless indicated above    Vital Signs Last 24 Hrs  T(C): 36.8 (30 Dec 2018 10:59), Max: 36.8 (29 Dec 2018 21:28)  T(F): 98.2 (30 Dec 2018 10:59), Max: 98.2 (29 Dec 2018 21:28)  HR: 72 (30 Dec 2018 17:26) (67 - 72)  BP: 172/81 (30 Dec 2018 17:26) (134/80 - 186/82)  BP(mean): --  RR: 18 (30 Dec 2018 10:59) (18 - 18)  SpO2: 91% (30 Dec 2018 10:59) (91% - 98%)    I&O's Summary    29 Dec 2018 07:01  -  30 Dec 2018 07:00  --------------------------------------------------------  IN: 1160 mL / OUT: 600 mL / NET: 560 mL    30 Dec 2018 07:01  -  30 Dec 2018 20:25  --------------------------------------------------------  IN: 900 mL / OUT: 200 mL / NET: 700 mL        CAPILLARY BLOOD GLUCOSE      POCT Blood Glucose.: 136 mg/dL (30 Dec 2018 16:49)  POCT Blood Glucose.: 94 mg/dL (30 Dec 2018 11:57)  POCT Blood Glucose.: 100 mg/dL (30 Dec 2018 07:31)  POCT Blood Glucose.: 193 mg/dL (29 Dec 2018 21:34)      PHYSICAL EXAM:       GENERAL: NAD, well-developed, sleepy  	HEAD:  Atraumatic, Normocephalic  	EYES: EOMI, PERRLA, conjunctiva and sclera clear  	NECK: +JVD  	CHEST/LUNG: Clear to auscultation bilaterally; No wheeze  	HEART: crackles b/l lower lobes  	ABDOMEN: Soft, Nontender, Nondistended; Bowel sounds present  	EXTREMITIES:  1+ pitting edema peripheral; 2+ Peripheral Pulses, No clubbing, cyanosis  	PSYCH: AAOx3  	NEUROLOGY: non-focal  MEDICATIONS:  MEDICATIONS  (STANDING):  aspirin enteric coated 81 milliGRAM(s) Oral daily  atorvastatin 40 milliGRAM(s) Oral at bedtime  calcium acetate 667 milliGRAM(s) Oral three times a day with meals  dextrose 5%. 1000 milliLiter(s) (50 mL/Hr) IV Continuous <Continuous>  dextrose 50% Injectable 12.5 Gram(s) IV Push once  dextrose 50% Injectable 25 Gram(s) IV Push once  dextrose 50% Injectable 25 Gram(s) IV Push once  doxercalciferol Injectable 3 MICROGram(s) IV Push <User Schedule>  epoetin theodore Injectable 94649 Unit(s) IV Push <User Schedule>  furosemide    Tablet 80 milliGRAM(s) Oral daily  heparin  Injectable 5000 Unit(s) SubCutaneous every 8 hours  hydrALAZINE 100 milliGRAM(s) Oral three times a day  insulin glargine Injectable (LANTUS) 4 Unit(s) SubCutaneous at bedtime  insulin lispro (HumaLOG) corrective regimen sliding scale   SubCutaneous three times a day before meals  insulin lispro (HumaLOG) corrective regimen sliding scale   SubCutaneous at bedtime  isosorbide   dinitrate Tablet (ISORDIL) 10 milliGRAM(s) Oral three times a day  labetalol 300 milliGRAM(s) Oral three times a day      LABS: All Labs Reviewed:                        7.1    7.2   )-----------( 202      ( 30 Dec 2018 14:27 )             21.7     12-30    135  |  95<L>  |  35<H>  ----------------------------<  121<H>  4.7   |  29  |  5.94<H>    Ca    7.8<L>      30 Dec 2018 14:27  Mg     2.2     12-30            Blood Culture:   Urine Culture      RADIOLOGY/EKG:    ASSESSMENT AND PLAN:    68M PMH CAD/CABG, HFrEF (30%), T2DM, HTN, CKD 5 (not on HD), aortic valve replacement, chronic anemia occasionally requiring transfusions presents with SOB, admitted with acute decompensated systolic heart failure with hypertensive emergency, demand ischemia, worsening renal failure, transient afib RVR.     Problem/Plan - 1:  ·  Problem: Acute systolic heart failure.  Plan: --from med non-adherence, especially with uncontrolled HTN, and worsening CKD5  --requiring nasal cannula oxygen  --patient reports feeling a little better after lasix in the ED. Start lasix 60mg IV BID.     --strict I/Os, daily weights   waiting for cariology consult dr payton  called [phone] left message    Problem/Plan - 2:   hypertension  · on  hydralazine 100mg TID, isordil 10mg TID. On IV lasix for CHF. labetalol 200 milliGRAM(s) Oral three times a day  --   Problem/Plan - 3:  ·  Problem: Elevated troponin.  Plan: --hsTrop to 492, up from high 300s last admission.    due  worsening renal failure  --will trend troponin  -       Problem/Plan - 4:  ·  Problem: Respiratory failure with hypoxia.  Plan: --from CHF exacerbation and volume overload  --diuresis  --oxygen PRN.  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  Plan: --converted to sinus rhythm on way to the hospital  --no history of afib. Patient does not seem to have chest pain, palpitations, or dizziness related to it, so unclear if he has been having pAF for a while.         Problem/Plan - 6:  Problem: CKD (chronic kidney disease) stage 5, GFR less than 15 ml/min. Plan: - anyone consistent right now.   --Needs lasix now for HTN emergency and  renal failure.   -  Nephrology  followup noted . for hemodialysis today       Problem/Plan - 7:  ·  Problem: T2DM (type 2 diabetes mellitus).  Plan: --On 70/30 at home.  due to hypoglycemia will decrease  lantus   Problem/Plan - 8:  ·  Problem: Chronic anemia.  Plan: -  s/p  transfusion      feel better

## 2018-12-31 NOTE — PROGRESS NOTE ADULT - ASSESSMENT
68-year-old man presents with CHF exacerbation and CKD now requiring long-term HD access.    Plan:  - OR on 1/3 for LUE AVF creation  - Protect left arm  - Please document cardiology and medicine risk stratification    Price Weaver, PGY2  [phone]

## 2018-12-31 NOTE — PROGRESS NOTE ADULT - SUBJECTIVE AND OBJECTIVE BOX
NEPHROLOGY-[facility] [phone]        Patient seen and examined in bed.  No new changes noted        MEDICATIONS  (STANDING):  aspirin enteric coated 81 milliGRAM(s) Oral daily  atorvastatin 40 milliGRAM(s) Oral at bedtime  calcium acetate 667 milliGRAM(s) Oral three times a day with meals  dextrose 5%. 1000 milliLiter(s) (50 mL/Hr) IV Continuous <Continuous>  dextrose 50% Injectable 12.5 Gram(s) IV Push once  dextrose 50% Injectable 25 Gram(s) IV Push once  dextrose 50% Injectable 25 Gram(s) IV Push once  doxercalciferol Injectable 3 MICROGram(s) IV Push <User Schedule>  epoetin theodore Injectable 14652 Unit(s) IV Push <User Schedule>  furosemide    Tablet 80 milliGRAM(s) Oral daily  heparin  Injectable 5000 Unit(s) SubCutaneous every 8 hours  hydrALAZINE 100 milliGRAM(s) Oral three times a day  insulin glargine Injectable (LANTUS) 4 Unit(s) SubCutaneous at bedtime  insulin lispro (HumaLOG) corrective regimen sliding scale   SubCutaneous three times a day before meals  insulin lispro (HumaLOG) corrective regimen sliding scale   SubCutaneous at bedtime  isosorbide   dinitrate Tablet (ISORDIL) 10 milliGRAM(s) Oral three times a day  labetalol 300 milliGRAM(s) Oral three times a day      VITAL:  T(C): , Max: 37 (12-30-18 @ 22:00)  T(F): , Max: 98.6 (12-30-18 @ 22:00)  HR: 68 (12-31-18 @ 08:46)  BP: 161/62 (12-31-18 @ 08:46)  BP(mean): --  RR: 18 (12-31-18 @ 04:36)  SpO2: 100% (12-31-18 @ 04:36)  Wt(kg): --    I and O's:    12-30 @ 07:01  -  12-31 @ 07:00  --------------------------------------------------------  IN: 1200 mL / OUT: 450 mL / NET: 750 mL          PHYSICAL EXAM:    Constitutional: NAD  Neck:  No JVD  Respiratory: CTAB/L  Cardiovascular: S1 and S2  Gastrointestinal: BS+, soft, NT/ND  Extremities: No peripheral edema  Neurological: A/O x 3, no focal deficits  Psychiatric: Normal mood, normal affect  : No [place]  Skin: No rashes  Access: Not applicable    LABS:                        7.7    7.6   )-----------( 235      ( 31 Dec 2018 09:44 )             24.2     12-31    135  |  97  |  42<H>  ----------------------------<  59<L>  4.7   |  27  |  6.70<H>    Ca    7.6<L>      31 Dec 2018 06:08  Mg     2.2     12-31            Urine Studies:          RADIOLOGY & ADDITIONAL STUDIES:

## 2018-12-31 NOTE — PROCEDURE NOTE - GENERAL PROCEDURE DETAILS
conversion of non tunneled rij dialysis catheter to a 19 cm tunneled rt ij dialysis catheter. tip in svc. ok to access.

## 2018-12-31 NOTE — PROGRESS NOTE ADULT - SUBJECTIVE AND OBJECTIVE BOX
Interventional Radiology  Pre-Procedure Note    This is a 68y  Male      HPI:  This is a 68 year old male with PMH of CAD/CABG, HFrEF (30%), T2DM, HTN, CKD 5 (not on HD), aortic valve replacement, chronic anemia occasionally requiring transfusions admitted to ED with SOB, oliguria. Temporary HD catheter was placed in IR on 12/26 for HD. Pt now requires longterm HD & is scheduled for AVF creation 1/23 with vascular surgery. Pt presents to IR for conversion of temporary to tunneled HD catheter with anesthesia today. Pt a & o x 3. Informed consent obtained from pt after risks, benefits & alternatives discussion with his comprehension confirmed.    NPO since 5:30 pm 12/30.      PAST MEDICAL & SURGICAL HISTORY:  HTN (hypertension)  Aortic valve replaced  CKD (chronic kidney disease)  Anemia  CHF (congestive heart failure)  Diabetes mellitus  Hypertension  S/P CABG (coronary artery bypass graft)  S/P appendectomy      Social History: wife at bedside    FAMILY HISTORY:  No pertinent family history in first degree relatives      Allergies: lisinopril (Anaphylaxis)      Current Medications: aspirin enteric coated 81 milliGRAM(s) Oral daily  atorvastatin 40 milliGRAM(s) Oral at bedtime  calcium acetate 667 milliGRAM(s) Oral three times a day with meals  dextrose 40% Gel 15 Gram(s) Oral once PRN  dextrose 5%. 1000 milliLiter(s) IV Continuous <Continuous>  dextrose 50% Injectable 12.5 Gram(s) IV Push once  dextrose 50% Injectable 25 Gram(s) IV Push once  dextrose 50% Injectable 25 Gram(s) IV Push once  doxercalciferol Injectable 3 MICROGram(s) IV Push <User Schedule>  epoetin theodore Injectable 28380 Unit(s) IV Push <User Schedule>  furosemide    Tablet 80 milliGRAM(s) Oral daily  glucagon  Injectable 1 milliGRAM(s) IntraMuscular once PRN  heparin  Injectable 5000 Unit(s) SubCutaneous every 8 hours  hydrALAZINE 100 milliGRAM(s) Oral three times a day  insulin glargine Injectable (LANTUS) 4 Unit(s) SubCutaneous at bedtime  insulin lispro (HumaLOG) corrective regimen sliding scale   SubCutaneous three times a day before meals  insulin lispro (HumaLOG) corrective regimen sliding scale   SubCutaneous at bedtime  isosorbide   dinitrate Tablet (ISORDIL) 10 milliGRAM(s) Oral three times a day  labetalol 300 milliGRAM(s) Oral three times a day      Labs:                           7.7    7.6   )-----------( 235      ( 31 Dec 2018 09:44 )             24.2       12-31    135  |  97  |  42<H>  ----------------------------<  59<L>  4.7   |  27  |  6.70<H>    Ca    7.6<L>      31 Dec 2018 06:08  Phos  7.8     12-24  Mg     2.2     12-31                Blood Bank: Type + Screen 12-27 @ 10:45  B  --  Negative  Positive        Assessment/Plan:   This is a 68 year old male who presents with ESRD requiring longterm HD.   Patient presents to IR for conversion of temporary to tunneled HD catheter with anesthesia.    Wendi CRUZ BC  ext [phone]  # [phone]

## 2019-01-01 LAB
ANION GAP SERPL CALC-SCNC: 11 MMOL/L — SIGNIFICANT CHANGE UP (ref 5–17)
BUN SERPL-MCNC: 22 MG/DL — SIGNIFICANT CHANGE UP (ref 7–23)
CALCIUM SERPL-MCNC: 7.7 MG/DL — LOW (ref 8.4–10.5)
CHLORIDE SERPL-SCNC: 95 MMOL/L — LOW (ref 96–108)
CO2 SERPL-SCNC: 28 MMOL/L — SIGNIFICANT CHANGE UP (ref 22–31)
CREAT SERPL-MCNC: 4.13 MG/DL — HIGH (ref 0.5–1.3)
GLUCOSE BLDC GLUCOMTR-MCNC: 107 MG/DL — HIGH (ref 70–99)
GLUCOSE BLDC GLUCOMTR-MCNC: 112 MG/DL — HIGH (ref 70–99)
GLUCOSE BLDC GLUCOMTR-MCNC: 113 MG/DL — HIGH (ref 70–99)
GLUCOSE BLDC GLUCOMTR-MCNC: 347 MG/DL — HIGH (ref 70–99)
GLUCOSE SERPL-MCNC: 81 MG/DL — SIGNIFICANT CHANGE UP (ref 70–99)
HCT VFR BLD CALC: 21.8 % — LOW (ref 39–50)
HCT VFR BLD CALC: 22.4 % — LOW (ref 39–50)
HGB BLD-MCNC: 6.8 G/DL — CRITICAL LOW (ref 13–17)
HGB BLD-MCNC: 7.2 G/DL — LOW (ref 13–17)
MCHC RBC-ENTMCNC: 25.5 PG — LOW (ref 27–34)
MCHC RBC-ENTMCNC: 26.7 PG — LOW (ref 27–34)
MCHC RBC-ENTMCNC: 31.2 GM/DL — LOW (ref 32–36)
MCHC RBC-ENTMCNC: 32.1 GM/DL — SIGNIFICANT CHANGE UP (ref 32–36)
MCV RBC AUTO: 81.6 FL — SIGNIFICANT CHANGE UP (ref 80–100)
MCV RBC AUTO: 83.2 FL — SIGNIFICANT CHANGE UP (ref 80–100)
PLATELET # BLD AUTO: 228 K/UL — SIGNIFICANT CHANGE UP (ref 150–400)
PLATELET # BLD AUTO: 236 K/UL — SIGNIFICANT CHANGE UP (ref 150–400)
POTASSIUM SERPL-MCNC: 4.4 MMOL/L — SIGNIFICANT CHANGE UP (ref 3.5–5.3)
POTASSIUM SERPL-SCNC: 4.4 MMOL/L — SIGNIFICANT CHANGE UP (ref 3.5–5.3)
RBC # BLD: 2.67 M/UL — LOW (ref 4.2–5.8)
RBC # BLD: 2.7 M/UL — LOW (ref 4.2–5.8)
RBC # FLD: 20.3 % — HIGH (ref 10.3–14.5)
RBC # FLD: 21.2 % — HIGH (ref 10.3–14.5)
SODIUM SERPL-SCNC: 134 MMOL/L — LOW (ref 135–145)
WBC # BLD: 7.28 K/UL — SIGNIFICANT CHANGE UP (ref 3.8–10.5)
WBC # BLD: 8 K/UL — SIGNIFICANT CHANGE UP (ref 3.8–10.5)
WBC # FLD AUTO: 7.28 K/UL — SIGNIFICANT CHANGE UP (ref 3.8–10.5)
WBC # FLD AUTO: 8 K/UL — SIGNIFICANT CHANGE UP (ref 3.8–10.5)

## 2019-01-01 RX ADMIN — HEPARIN SODIUM 5000 UNIT(S): 5000 INJECTION INTRAVENOUS; SUBCUTANEOUS at 22:12

## 2019-01-01 RX ADMIN — Medication 667 MILLIGRAM(S): at 17:10

## 2019-01-01 RX ADMIN — Medication 100 MILLIGRAM(S): at 22:10

## 2019-01-01 RX ADMIN — ATORVASTATIN CALCIUM 40 MILLIGRAM(S): 80 TABLET, FILM COATED ORAL at 22:10

## 2019-01-01 RX ADMIN — HEPARIN SODIUM 5000 UNIT(S): 5000 INJECTION INTRAVENOUS; SUBCUTANEOUS at 05:10

## 2019-01-01 RX ADMIN — ISOSORBIDE DINITRATE 10 MILLIGRAM(S): 5 TABLET ORAL at 22:11

## 2019-01-01 RX ADMIN — Medication 80 MILLIGRAM(S): at 05:10

## 2019-01-01 RX ADMIN — ISOSORBIDE DINITRATE 10 MILLIGRAM(S): 5 TABLET ORAL at 05:10

## 2019-01-01 RX ADMIN — Medication 4: at 17:10

## 2019-01-01 RX ADMIN — Medication 100 MILLIGRAM(S): at 13:50

## 2019-01-01 RX ADMIN — Medication 81 MILLIGRAM(S): at 13:50

## 2019-01-01 RX ADMIN — Medication 667 MILLIGRAM(S): at 13:51

## 2019-01-01 RX ADMIN — Medication 300 MILLIGRAM(S): at 13:51

## 2019-01-01 RX ADMIN — Medication 300 MILLIGRAM(S): at 22:11

## 2019-01-01 RX ADMIN — INSULIN GLARGINE 4 UNIT(S): 100 INJECTION, SOLUTION SUBCUTANEOUS at 22:11

## 2019-01-01 RX ADMIN — HEPARIN SODIUM 5000 UNIT(S): 5000 INJECTION INTRAVENOUS; SUBCUTANEOUS at 13:51

## 2019-01-01 RX ADMIN — Medication 667 MILLIGRAM(S): at 09:25

## 2019-01-01 RX ADMIN — Medication 300 MILLIGRAM(S): at 05:09

## 2019-01-01 RX ADMIN — Medication 100 MILLIGRAM(S): at 05:11

## 2019-01-01 RX ADMIN — ISOSORBIDE DINITRATE 10 MILLIGRAM(S): 5 TABLET ORAL at 13:50

## 2019-01-01 NOTE — PROGRESS NOTE ADULT - SUBJECTIVE AND OBJECTIVE BOX
[facility] [phone]        Patient seen and examined         MEDICATIONS  (STANDING):  aspirin enteric coated 81 milliGRAM(s) Oral daily  atorvastatin 40 milliGRAM(s) Oral at bedtime  calcium acetate 667 milliGRAM(s) Oral three times a day with meals  dextrose 5%. 1000 milliLiter(s) (50 mL/Hr) IV Continuous <Continuous>  dextrose 50% Injectable 12.5 Gram(s) IV Push once  dextrose 50% Injectable 25 Gram(s) IV Push once  dextrose 50% Injectable 25 Gram(s) IV Push once  doxercalciferol Injectable 3 MICROGram(s) IV Push <User Schedule>  epoetin theodore Injectable 31950 Unit(s) IV Push <User Schedule>  furosemide    Tablet 80 milliGRAM(s) Oral daily  heparin  Injectable 5000 Unit(s) SubCutaneous every 8 hours  hydrALAZINE 100 milliGRAM(s) Oral three times a day  insulin glargine Injectable (LANTUS) 4 Unit(s) SubCutaneous at bedtime  insulin lispro (HumaLOG) corrective regimen sliding scale   SubCutaneous three times a day before meals  insulin lispro (HumaLOG) corrective regimen sliding scale   SubCutaneous at bedtime  isosorbide   dinitrate Tablet (ISORDIL) 10 milliGRAM(s) Oral three times a day  labetalol 300 milliGRAM(s) Oral three times a day      VITAL:  T(C): , Max: 37.1 (12-31-18 @ 21:05)  T(F): , Max: 98.8 (12-31-18 @ 21:05)  HR: 70 (01-01-19 @ 13:54)  BP: 190/78 (01-01-19 @ 13:54)  BP(mean): --  RR: 17 (01-01-19 @ 11:26)  SpO2: 99% (01-01-19 @ 11:26)  Wt(kg): --    I and O's:    12-31 @ 07:01  -  01-01 @ 07:00  --------------------------------------------------------  IN: 540 mL / OUT: 900 mL / NET: -360 mL    01-01 @ 07:01  -  01-01 @ 15:31  --------------------------------------------------------  IN: 450 mL / OUT: 300 mL / NET: 150 mL        Weight (kg): 68.2 (12-31 @ 15:51)    PHYSICAL EXAM:    Constitutional: NAD  Neck:  No JVD  Respiratory: CTAB/L  Cardiovascular: S1 and S2  Gastrointestinal: BS+, soft, NT/ND  Extremities: No peripheral edema  Neurological: A/O x 3, no focal deficits  Psychiatric: Normal mood, normal affect  : No Palacios  Skin: No rashes  Access: RCW permacath    LABS:                        7.2    8.0   )-----------( 228      ( 01 Jan 2019 13:57 )             22.4     01-01    134<L>  |  95<L>  |  22  ----------------------------<  81  4.4   |  28  |  4.13<H>    Ca    7.7<L>      01 Jan 2019 05:58  Mg     2.2     12-31        ASSESSMENT:  67 yo male with past medical history of HTN, DM chronic kidney disease stage V, now presents with uremia. He also has hypertensive urgency with accompanying severe pulmonary hypertension.  Goals of therapy are blood pressure control and dialysis.  The patient was told the importance of dialysis and now he agrees to proceed.   Secondary hyperparathyroidism  Anemia - s/p transfusion, on Epogen 10,000 units IV at each HD  Hypertensive urgency  ESRD now on HD - last HD 12/31/18    RECOMMEND:  Renal: Next HD Thursday  Cardiology: Monitor BPs, Continue labetalol 300mg po tid.  Allergy to ACE. Will not start ACE or ARB  Anemia: continue Epogen IV q HD, trend H/H  Endocrine: Cont Hectorol   Vascular: LUE precautions- OR planned for Thursday 1/3/18 for LUE AVF with Dr. Nila Hampton, NP-C  [facility]  [phone] Patient seen and examined.      MEDICATIONS  (STANDING):  aspirin enteric coated 81 milliGRAM(s) Oral daily  atorvastatin 40 milliGRAM(s) Oral at bedtime  calcium acetate 667 milliGRAM(s) Oral three times a day with meals  dextrose 5%. 1000 milliLiter(s) (50 mL/Hr) IV Continuous <Continuous>  dextrose 50% Injectable 12.5 Gram(s) IV Push once  dextrose 50% Injectable 25 Gram(s) IV Push once  dextrose 50% Injectable 25 Gram(s) IV Push once  doxercalciferol Injectable 3 MICROGram(s) IV Push <User Schedule>  epoetin theodore Injectable 62293 Unit(s) IV Push <User Schedule>  furosemide    Tablet 80 milliGRAM(s) Oral daily  heparin  Injectable 5000 Unit(s) SubCutaneous every 8 hours  hydrALAZINE 100 milliGRAM(s) Oral three times a day  insulin glargine Injectable (LANTUS) 4 Unit(s) SubCutaneous at bedtime  insulin lispro (HumaLOG) corrective regimen sliding scale   SubCutaneous three times a day before meals  insulin lispro (HumaLOG) corrective regimen sliding scale   SubCutaneous at bedtime  isosorbide   dinitrate Tablet (ISORDIL) 10 milliGRAM(s) Oral three times a day  labetalol 300 milliGRAM(s) Oral three times a day      VITAL:  T(C): , Max: 37.1 (12-31-18 @ 21:05)  T(F): , Max: 98.8 (12-31-18 @ 21:05)  HR: 70 (01-01-19 @ 13:54)  BP: 190/78 (01-01-19 @ 13:54)  RR: 17 (01-01-19 @ 11:26)  SpO2: 99% (01-01-19 @ 11:26)    I and O's:    12-31 @ 07:01 - 01-01 @ 07:00  --------------------------------------------------------  IN: 540 mL / OUT: 900 mL / NET: -360 mL    01-01 @ 07:01 - 01-01 @ 15:31  --------------------------------------------------------  IN: 450 mL / OUT: 300 mL / NET: 150 mL        Weight (kg): 68.2 (12-31 @ 15:51)    PHYSICAL EXAM:    Constitutional: NAD  Neck:  No JVD  Respiratory: CTAB/L  Cardiovascular: S1 and S2  Gastrointestinal: BS+, soft, NT/ND  Extremities: No peripheral edema  Neurological: A/O x 3, no focal deficits  Psychiatric: Normal mood, normal affect  : No Palacios  Skin: No rashes  Access: RCW permacath    LABS:                        7.2    8.0   )-----------( 228      ( 01 Jan 2019 13:57 )             22.4     01-01    134<L>  |  95<L>  |  22  ----------------------------<  81  4.4   |  28  |  4.13<H>    Ca    7.7<L>      01 Jan 2019 05:58  Mg     2.2     12-31        ASSESSMENT:  67 yo male with past medical history of HTN, DM chronic kidney disease stage V, now presents with uremia. He also has hypertensive urgency with accompanying severe pulmonary hypertension.  Goals of therapy are blood pressure control and dialysis.  The patient was told the importance of dialysis and now he agrees to proceed.   Secondary hyperparathyroidism  Anemia - s/p transfusion, on Epogen 10,000 units IV at each HD  Hypertensive urgency  ESRD now on HD - last HD 12/31/18  s/p RCW permacath placement 12/31/18    RECOMMEND:  Renal: Next HD Thursday  Cardiology: Monitor BPs, Continue labetalol 300mg po tid.  Allergy to ACE. Will not start ACE or ARB  Anemia: continue Epogen IV q HD, trend H/H  Endocrine: Cont Hectorol   Vascular: LUE precautions- OR planned for Thursday 1/3/18 for LUE AVF with Dr. Nila Hampton, NP-C  [facility]  [phone] Patient seen and examined.      MEDICATIONS  (STANDING):  aspirin enteric coated 81 milliGRAM(s) Oral daily  atorvastatin 40 milliGRAM(s) Oral at bedtime  calcium acetate 667 milliGRAM(s) Oral three times a day with meals  dextrose 5%. 1000 milliLiter(s) (50 mL/Hr) IV Continuous <Continuous>  dextrose 50% Injectable 12.5 Gram(s) IV Push once  dextrose 50% Injectable 25 Gram(s) IV Push once  dextrose 50% Injectable 25 Gram(s) IV Push once  doxercalciferol Injectable 3 MICROGram(s) IV Push <User Schedule>  epoetin theodore Injectable 84481 Unit(s) IV Push <User Schedule>  furosemide    Tablet 80 milliGRAM(s) Oral daily  heparin  Injectable 5000 Unit(s) SubCutaneous every 8 hours  hydrALAZINE 100 milliGRAM(s) Oral three times a day  insulin glargine Injectable (LANTUS) 4 Unit(s) SubCutaneous at bedtime  insulin lispro (HumaLOG) corrective regimen sliding scale   SubCutaneous three times a day before meals  insulin lispro (HumaLOG) corrective regimen sliding scale   SubCutaneous at bedtime  isosorbide   dinitrate Tablet (ISORDIL) 10 milliGRAM(s) Oral three times a day  labetalol 300 milliGRAM(s) Oral three times a day      VITAL:  T(C): , Max: 37.1 (12-31-18 @ 21:05)  T(F): , Max: 98.8 (12-31-18 @ 21:05)  HR: 70 (01-01-19 @ 13:54)  BP: 190/78 (01-01-19 @ 13:54)  RR: 17 (01-01-19 @ 11:26)  SpO2: 99% (01-01-19 @ 11:26)    I and O's:    12-31 @ 07:01 - 01-01 @ 07:00  --------------------------------------------------------  IN: 540 mL / OUT: 900 mL / NET: -360 mL    01-01 @ 07:01 - 01-01 @ 15:31  --------------------------------------------------------  IN: 450 mL / OUT: 300 mL / NET: 150 mL        Weight (kg): 68.2 (12-31 @ 15:51)    PHYSICAL EXAM:    Constitutional: NAD  Neck:  No JVD  Respiratory: CTAB/L  Cardiovascular: S1 and S2  Gastrointestinal: BS+, soft, NT/ND  Extremities: No peripheral edema  Neurological: A/O x 3, no focal deficits  Psychiatric: Normal mood, normal affect  : No Palacios  Skin: No rashes  Access: RCW permacath    LABS:                        7.2    8.0   )-----------( 228      ( 01 Jan 2019 13:57 )             22.4     01-01    134<L>  |  95<L>  |  22  ----------------------------<  81  4.4   |  28  |  4.13<H>    Ca    7.7<L>      01 Jan 2019 05:58  Mg     2.2     12-31        ASSESSMENT:  67 yo male with past medical history of HTN, DM chronic kidney disease stage V, now presents with uremia. He also has hypertensive urgency with accompanying severe pulmonary hypertension.  Goals of therapy are blood pressure control and dialysis.  The patient was told the importance of dialysis and now he agrees to proceed.   Secondary hyperparathyroidism  Anemia - s/p transfusion, on Epogen 10,000 units IV at each HD  Hypertensive urgency  ESRD now on HD - last HD 12/31/18  s/p RCW permacath placement 12/31/18    RECOMMEND:  Renal: Next HD Thursday  Cardiology: Monitor BPs, Continue labetalol 300mg po tid.  Allergy to ACE. Will not start ACE or ARB  Anemia: continue Epogen IV q HD, trend H/H  Endocrine: Cont Hectorol   Vascular: LUE precautions- OR planned for Thursday 1/3/18 for LUE AVF with Dr. Nila Hampton, NP-C  [facility]  [phone] Patient seen and examined.      MEDICATIONS  (STANDING):  aspirin enteric coated 81 milliGRAM(s) Oral daily  atorvastatin 40 milliGRAM(s) Oral at bedtime  calcium acetate 667 milliGRAM(s) Oral three times a day with meals  dextrose 5%. 1000 milliLiter(s) (50 mL/Hr) IV Continuous <Continuous>  dextrose 50% Injectable 12.5 Gram(s) IV Push once  dextrose 50% Injectable 25 Gram(s) IV Push once  dextrose 50% Injectable 25 Gram(s) IV Push once  doxercalciferol Injectable 3 MICROGram(s) IV Push <User Schedule>  epoetin theodore Injectable 07017 Unit(s) IV Push <User Schedule>  furosemide    Tablet 80 milliGRAM(s) Oral daily  heparin  Injectable 5000 Unit(s) SubCutaneous every 8 hours  hydrALAZINE 100 milliGRAM(s) Oral three times a day  insulin glargine Injectable (LANTUS) 4 Unit(s) SubCutaneous at bedtime  insulin lispro (HumaLOG) corrective regimen sliding scale   SubCutaneous three times a day before meals  insulin lispro (HumaLOG) corrective regimen sliding scale   SubCutaneous at bedtime  isosorbide   dinitrate Tablet (ISORDIL) 10 milliGRAM(s) Oral three times a day  labetalol 300 milliGRAM(s) Oral three times a day      VITAL:  T(C): , Max: 37.1 (12-31-18 @ 21:05)  T(F): , Max: 98.8 (12-31-18 @ 21:05)  HR: 70 (01-01-19 @ 13:54)  BP: 190/78 (01-01-19 @ 13:54)  RR: 17 (01-01-19 @ 11:26)  SpO2: 99% (01-01-19 @ 11:26)    I and O's:    12-31 @ 07:01 - 01-01 @ 07:00  --------------------------------------------------------  IN: 540 mL / OUT: 900 mL / NET: -360 mL    01-01 @ 07:01 - 01-01 @ 15:31  --------------------------------------------------------  IN: 450 mL / OUT: 300 mL / NET: 150 mL        Weight (kg): 68.2 (12-31 @ 15:51)    PHYSICAL EXAM:    Constitutional: NAD  Neck:  No JVD  Respiratory: CTAB/L  Cardiovascular: S1 and S2  Gastrointestinal: BS+, soft, NT/ND  Extremities: No peripheral edema  Neurological: A/O x 3, no focal deficits  Psychiatric: Normal mood, normal affect  : No Palacios  Skin: No rashes  Access: RCW permacath    LABS:                        7.2    8.0   )-----------( 228      ( 01 Jan 2019 13:57 )             22.4     01-01    134<L>  |  95<L>  |  22  ----------------------------<  81  4.4   |  28  |  4.13<H>    Ca    7.7<L>      01 Jan 2019 05:58  Mg     2.2     12-31        ASSESSMENT:  67 yo male with past medical history of HTN, DM chronic kidney disease stage V, now presents with uremia. He also has hypertensive urgency with accompanying severe pulmonary hypertension.  Goals of therapy are blood pressure control and dialysis.  The patient was told the importance of dialysis and now he agrees to proceed.   Secondary hyperparathyroidism  Anemia - s/p transfusion, on Epogen 10,000 units IV at each HD  Hypertensive urgency - BP elevated this afternoon despite BP meds  ESRD now on HD - last HD 12/31/18  s/p RCW permacath placement 12/31/18    RECOMMEND:  Renal: Next HD Thursday  Cardiology: Monitor BPs, Continue labetalol 300mg po tid.  Allergy to ACE. Will not start ACE or ARB  Anemia: continue Epogen IV q HD, trend H/H  Endocrine: Cont Hectorol   Vascular: LUE precautions- OR planned for Thursday 1/3/18 for LUE AVF with Dr. Nila Hampton, NP-C  [facility]  [phone] Patient seen and examined. No new complaints.       MEDICATIONS  (STANDING):  aspirin enteric coated 81 milliGRAM(s) Oral daily  atorvastatin 40 milliGRAM(s) Oral at bedtime  calcium acetate 667 milliGRAM(s) Oral three times a day with meals  dextrose 5%. 1000 milliLiter(s) (50 mL/Hr) IV Continuous <Continuous>  dextrose 50% Injectable 12.5 Gram(s) IV Push once  dextrose 50% Injectable 25 Gram(s) IV Push once  dextrose 50% Injectable 25 Gram(s) IV Push once  doxercalciferol Injectable 3 MICROGram(s) IV Push <User Schedule>  epoetin theodore Injectable 45488 Unit(s) IV Push <User Schedule>  furosemide    Tablet 80 milliGRAM(s) Oral daily  heparin  Injectable 5000 Unit(s) SubCutaneous every 8 hours  hydrALAZINE 100 milliGRAM(s) Oral three times a day  insulin glargine Injectable (LANTUS) 4 Unit(s) SubCutaneous at bedtime  insulin lispro (HumaLOG) corrective regimen sliding scale   SubCutaneous three times a day before meals  insulin lispro (HumaLOG) corrective regimen sliding scale   SubCutaneous at bedtime  isosorbide   dinitrate Tablet (ISORDIL) 10 milliGRAM(s) Oral three times a day  labetalol 300 milliGRAM(s) Oral three times a day      VITAL:  T(C): , Max: 37.1 (12-31-18 @ 21:05)  T(F): , Max: 98.8 (12-31-18 @ 21:05)  HR: 70 (01-01-19 @ 13:54)  BP: 190/78 (01-01-19 @ 13:54)  RR: 17 (01-01-19 @ 11:26)  SpO2: 99% (01-01-19 @ 11:26)    I and O's:    12-31 @ 07:01 - 01-01 @ 07:00  --------------------------------------------------------  IN: 540 mL / OUT: 900 mL / NET: -360 mL    01-01 @ 07:01 - 01-01 @ 15:31  --------------------------------------------------------  IN: 450 mL / OUT: 300 mL / NET: 150 mL        Weight (kg): 68.2 (12-31 @ 15:51)    PHYSICAL EXAM:    Constitutional: NAD  Neck:  No JVD  Respiratory: CTAB/L  Cardiovascular: S1 and S2  Gastrointestinal: BS+, soft, NT/ND  Extremities: No peripheral edema  Neurological: A/O x 3, no focal deficits  Psychiatric: Normal mood, normal affect  : No Palacios  Skin: No rashes  Access: RCW permacath    LABS:                        7.2    8.0   )-----------( 228      ( 01 Jan 2019 13:57 )             22.4     01-01    134<L>  |  95<L>  |  22  ----------------------------<  81  4.4   |  28  |  4.13<H>    Ca    7.7<L>      01 Jan 2019 05:58  Mg     2.2     12-31        ASSESSMENT:  67 yo male with past medical history of HTN, DM chronic kidney disease stage V, now presents with uremia. He also has hypertensive urgency with accompanying severe pulmonary hypertension.  Goals of therapy are blood pressure control and dialysis.  The patient was told the importance of dialysis and now he agrees to proceed.   Secondary hyperparathyroidism  Anemia - s/p transfusion, on Epogen 10,000 units IV at each HD  Hypertensive urgency - BP elevated this afternoon - afternoon BP meds given, BP to be rechecked  ESRD now on HD - last HD 12/31/18  s/p RCW permacath placement 12/31/18    RECOMMEND:  Renal: Next HD Thursday  Cardiology: Monitor BPs, Continue labetalol 300mg po tid.  Allergy to ACE. Will not start ACE or ARB  Anemia: continue Epogen IV q HD, trend H/H  Endocrine: Cont Hectorol   Vascular: LUE precautions- OR planned for Thursday 1/3/18 for LUE AVF with Dr. Nila Hampton, NP-C  [facility]  [phone]

## 2019-01-01 NOTE — CHART NOTE - NSCHARTNOTEFT_GEN_A_CORE
Discussed planned LUE AVF with patient and wife. Both parties in agreement with plan and consent signed.     - OR on 1/3  - Will pre-op patient on 1/2  - Please document medical/cardiology risk stratification and optimization    Price Weaver, PGY2  [phone]

## 2019-01-01 NOTE — CHART NOTE - NSCHARTNOTEFT_GEN_A_CORE
MEDICINE PA     cc: SOB     Event Summary: Notified by RN that patient with SBP 200s. Patient examined at bedside by me. Endorses no new symptoms at this time. Denies CP, SOB, palpitations, nausea, vomiting, diarrhea, abdominal pain, diaphoresis, numbness, tingling, HA, changes in vision, confusion, trouble speaking, new UE/LE weakness.    Vital Signs Last 24 Hrs  T(C): 36.9 (01 Jan 2019 04:34), Max: 37.1 (31 Dec 2018 21:05)  T(F): 98.4 (01 Jan 2019 04:34), Max: 98.8 (31 Dec 2018 21:05)  HR: 75 (01 Jan 2019 04:34) (65 - 84)  BP: 179/84 (01 Jan 2019 04:34) (158/66 - 219/85)  RR: 18 (01 Jan 2019 04:34) (18 - 18)  SpO2: 99% (01 Jan 2019 04:34) (95% - 99%)    Physical Assessment:  General: Lying comfortably in bed  Neurology: A&Ox3, nonfocal. JAMES. EOMI. tongue midline no fasciculations. no facial droop. smile symmetric. facial sensation intact to light touch b/l. +should shrug. b/l UE and LE sensation intact to light touch b/l. 5/5 strength b/l UE   CV: +S1S2, RRR.    Respiratory: Even, unlabored.  CTA B/L.    Abdomen:  +BS.  Soft, NT, ND.     A/P:  68-year-old man presents with CHF exacerbation and CKD now requiring long-term HD access. Acutely presenting with HTN urgency s/p HD.     1. HTN Urgency likely 2/2 to not receiving BP medication   - All BP medications given   - No focal neuro deficits   - Recheck BP 1 hr sp medication   - Continue close monitoring of clinical status and vital signs.   - Endorsed to primary team in AM. Attending to follow.     Hollie Barrios PA-C   Department of Medicine   Spectra [phone] MEDICINE PA     cc: SOB     Event Summary: Notified by RN that patient with SBP 200s. Patient examined at bedside by me. Endorses no new symptoms at this time. Denies CP, SOB, palpitations, nausea, vomiting, diarrhea, abdominal pain, diaphoresis, numbness, tingling, HA, changes in vision, confusion, trouble speaking, new UE/LE weakness.    Vital Signs Last 24 Hrs  T(C): 36.9 (01 Jan 2019 04:34), Max: 37.1 (31 Dec 2018 21:05)  T(F): 98.4 (01 Jan 2019 04:34), Max: 98.8 (31 Dec 2018 21:05)  HR: 75 (01 Jan 2019 04:34) (65 - 84)  BP: 179/84 (01 Jan 2019 04:34) (158/66 - 219/85)  RR: 18 (01 Jan 2019 04:34) (18 - 18)  SpO2: 99% (01 Jan 2019 04:34) (95% - 99%)    Physical Assessment:  General: Lying comfortably in bed  Neurology: A&Ox3, nonfocal. JAMES. EOMI. tongue midline no fasciculations. no facial droop. smile symmetric. facial sensation intact to light touch b/l. +should shrug. b/l UE and LE sensation intact to light touch b/l. 5/5 strength b/l UE   CV: +S1S2, RRR.    Respiratory: Even, unlabored.  CTA B/L.    Abdomen:  +BS.  Soft, NT, ND.     A/P:  68-year-old man presents with CHF exacerbation and CKD now requiring long-term HD access. Acutely presenting with HTN urgency s/p HD.     1. HTN Urgency likely 2/2 to not receiving BP medication   - All BP medications given   - No focal neuro deficits   - Recheck BP 1 hr sp medication   - Continue close monitoring of clinical status and vital signs.   - Endorsed to primary team in AM. Attending to follow.     Hollie Barrios PA-C   Department of Medicine   Spectra [phone]  *Late entry note- pt seen and examined 12/31 appx 21:10*

## 2019-01-01 NOTE — PROVIDER CONTACT NOTE (OTHER) - ACTION/TREATMENT ORDERED:
PA notified. Hydralazine 100mg, labetalol 300mg, and isosorbide dinitrate 10mg given as ordered. Continue to monitor and re check BP in an hour.

## 2019-01-01 NOTE — PROGRESS NOTE ADULT - SUBJECTIVE AND OBJECTIVE BOX
CHIEF COMPLAINT:    SUBJECTIVE:     REVIEW OF SYSTEMS:    CONSTITUTIONAL: (  )  weakness,  (  ) fevers or chills  EYES/ENT: (  )visual changes;     NECK: (  ) pain or stiffness  RESPIRATORY:   (  )cough, wheezing, hemoptysis;  (  ) shortness of breath  CARDIOVASCULAR:  (  )chest pain or palpitations  GASTROINTESTINAL:   (  )abdominal or epigastric pain.  (  ) nausea, vomiting, or hematemesis;   (   ) diarrhea or constipation.   GENITOURINARY:   (    ) dysuria, frequency or hematuria  NEUROLOGICAL:  (   ) numbness or weakness   All other review of systems is negative unless indicated above    Vital Signs Last 24 Hrs  T(C): 36.9 (01 Jan 2019 11:26), Max: 37.1 (31 Dec 2018 21:05)  T(F): 98.4 (01 Jan 2019 11:26), Max: 98.8 (31 Dec 2018 21:05)  HR: 72 (01 Jan 2019 11:26) (65 - 84)  BP: 197/87 (01 Jan 2019 11:26) (158/66 - 219/85)  BP(mean): --  RR: 17 (01 Jan 2019 11:26) (17 - 18)  SpO2: 99% (01 Jan 2019 11:26) (95% - 99%)    I&O's Summary    31 Dec 2018 07:01  -  01 Jan 2019 07:00  --------------------------------------------------------  IN: 540 mL / OUT: 900 mL / NET: -360 mL    01 Jan 2019 07:01  -  01 Jan 2019 12:26  --------------------------------------------------------  IN: 420 mL / OUT: 0 mL / NET: 420 mL        CAPILLARY BLOOD GLUCOSE      POCT Blood Glucose.: 112 mg/dL (01 Jan 2019 11:44)  POCT Blood Glucose.: 107 mg/dL (01 Jan 2019 07:54)  POCT Blood Glucose.: 142 mg/dL (31 Dec 2018 21:54)  POCT Blood Glucose.: 94 mg/dL (31 Dec 2018 17:23)  POCT Blood Glucose.: 90 mg/dL (31 Dec 2018 14:45)      PHYSICAL EXAM:    Constitutional:  (   ) NAD,   (   )awake and alert  HEENT: PERR, EOMI,    Neck: Soft and supple, No LAD, No JVD  Respiratory:  (    Breath sounds are clear bilaterally,    (   ) wheezing, rales or rhonchi  Cardiovascular:     (   )S1 and S2, regular rate and rhythm, no Murmurs, gallops or rubs  Gastrointestinal:  (   )Bowel Sounds present, soft,   (  )nontender, nondistended,    Extremities:    (  ) peripheral edema  Vascular: 2+ peripheral pulses  Neurological:    (    )A/O x 3,   (  ) focal deficits  Musculoskeletal:    (   )  normal strength b/l upper  (     ) normal  lower extremities  Skin: No rashes    MEDICATIONS:  MEDICATIONS  (STANDING):  aspirin enteric coated 81 milliGRAM(s) Oral daily  atorvastatin 40 milliGRAM(s) Oral at bedtime  calcium acetate 667 milliGRAM(s) Oral three times a day with meals  dextrose 5%. 1000 milliLiter(s) (50 mL/Hr) IV Continuous <Continuous>  dextrose 50% Injectable 12.5 Gram(s) IV Push once  dextrose 50% Injectable 25 Gram(s) IV Push once  dextrose 50% Injectable 25 Gram(s) IV Push once  doxercalciferol Injectable 3 MICROGram(s) IV Push <User Schedule>  epoetin theodore Injectable 85098 Unit(s) IV Push <User Schedule>  furosemide    Tablet 80 milliGRAM(s) Oral daily  heparin  Injectable 5000 Unit(s) SubCutaneous every 8 hours  hydrALAZINE 100 milliGRAM(s) Oral three times a day  insulin glargine Injectable (LANTUS) 4 Unit(s) SubCutaneous at bedtime  insulin lispro (HumaLOG) corrective regimen sliding scale   SubCutaneous three times a day before meals  insulin lispro (HumaLOG) corrective regimen sliding scale   SubCutaneous at bedtime  isosorbide   dinitrate Tablet (ISORDIL) 10 milliGRAM(s) Oral three times a day  labetalol 300 milliGRAM(s) Oral three times a day      LABS: All Labs Reviewed:                        6.8    7.28  )-----------( 236      ( 01 Jan 2019 09:18 )             21.8     01-01    134<L>  |  95<L>  |  22  ----------------------------<  81  4.4   |  28  |  4.13<H>    Ca    7.7<L>      01 Jan 2019 05:58  Mg     2.2     12-31            Blood Culture:   Urine Culture      RADIOLOGY/EKG:    ASSESSMENT AND PLAN:    DVT PPX:    ADVANCED DIRECTIVE:    DISPOSITION: CHIEF COMPLAINT:   He feel better  he was seen in the morning waiting  for access for long-term hemodialysis which is scheduled  January 3, 2019         68M PMH CAD/CABG, HFrEF (30%), T2DM, HTN, CKD 5 (not on HD), aortic valve replacement, chronic anemia occasionally requiring transfusions presents with SOB. Has had progressively worsening SOB and SZYMANSKI over the past 2 weeks. History is supplemented by wife as she manages most of his medical care and gives his medications. Symptoms significantly worsened this week with orthopnea and LE edema. No recent URIs, fevers, chills, cough, sputum production, chest pain, palpitations, NVD, abdominal pain, dysuria, decreased urination. Went to PMD for evaluation and he was noted to be in rapid afib in the office, rate 140s-150s. By time of arrival in ED, had spontaneously converted back to rate controlled sinus rhythm. O2sat 89% on RA, improved with nasal cannul  SUBJECTIVE:     REVIEW OF SYSTEMS:    CONSTITUTIONAL: ( x )  weakness,  (  ) fevers or chills  EYES/ENT: (  )visual changes;     NECK: (  ) pain or stiffness  RESPIRATORY:   (  )cough, wheezing, hemoptysis;  (  ) shortness of breath  CARDIOVASCULAR:  (  )chest pain or palpitations  GASTROINTESTINAL:   (  )abdominal or epigastric pain.  (  ) nausea, vomiting, or hematemesis;   (   ) diarrhea or constipation.   GENITOURINARY:   (    ) dysuria, frequency or hematuria  NEUROLOGICAL:  (   ) numbness or weakness   All other review of systems is negative unless indicated above    Vital Signs Last 24 Hrs  T(C): 36.9 (01 Jan 2019 11:26), Max: 37.1 (31 Dec 2018 21:05)  T(F): 98.4 (01 Jan 2019 11:26), Max: 98.8 (31 Dec 2018 21:05)  HR: 72 (01 Jan 2019 11:26) (65 - 84)  BP: 197/87 (01 Jan 2019 11:26) (158/66 - 219/85)  BP(mean): --  RR: 17 (01 Jan 2019 11:26) (17 - 18)  SpO2: 99% (01 Jan 2019 11:26) (95% - 99%)    I&O's Summary    31 Dec 2018 07:01  -  01 Jan 2019 07:00  --------------------------------------------------------  IN: 540 mL / OUT: 900 mL / NET: -360 mL    01 Jan 2019 07:01  -  01 Jan 2019 12:26  --------------------------------------------------------  IN: 420 mL / OUT: 0 mL / NET: 420 mL        CAPILLARY BLOOD GLUCOSE      POCT Blood Glucose.: 112 mg/dL (01 Jan 2019 11:44)  POCT Blood Glucose.: 107 mg/dL (01 Jan 2019 07:54)  POCT Blood Glucose.: 142 mg/dL (31 Dec 2018 21:54)  POCT Blood Glucose.: 94 mg/dL (31 Dec 2018 17:23)  POCT Blood Glucose.: 90 mg/dL (31 Dec 2018 14:45)      PHYSICAL EXAM:    GENERAL: NAD, well-developed, sleepy  	HEAD:  Atraumatic, Normocephalic  	EYES: EOMI, PERRLA, conjunctiva and sclera clear  	NECK: +JVD  	CHEST/LUNG: Clear to auscultation bilaterally; No wheeze  	HEART: crackles b/l lower lobes  	ABDOMEN: Soft, Nontender, Nondistended; Bowel sounds present  	EXTREMITIES:  1+ pitting edema peripheral; 2+ Peripheral Pulses, No clubbing, cyanosis  	PSYCH: AAOx3  	NEUROLOGY: non-focal          MEDICATIONS:  MEDICATIONS  (STANDING):  aspirin enteric coated 81 milliGRAM(s) Oral daily  atorvastatin 40 milliGRAM(s) Oral at bedtime  calcium acetate 667 milliGRAM(s) Oral three times a day with meals  dextrose 5%. 1000 milliLiter(s) (50 mL/Hr) IV Continuous <Continuous>  dextrose 50% Injectable 12.5 Gram(s) IV Push once  dextrose 50% Injectable 25 Gram(s) IV Push once  dextrose 50% Injectable 25 Gram(s) IV Push once  doxercalciferol Injectable 3 MICROGram(s) IV Push <User Schedule>  epoetin theodore Injectable 32458 Unit(s) IV Push <User Schedule>  furosemide    Tablet 80 milliGRAM(s) Oral daily  heparin  Injectable 5000 Unit(s) SubCutaneous every 8 hours  hydrALAZINE 100 milliGRAM(s) Oral three times a day  insulin glargine Injectable (LANTUS) 4 Unit(s) SubCutaneous at bedtime  insulin lispro (HumaLOG) corrective regimen sliding scale   SubCutaneous three times a day before meals  insulin lispro (HumaLOG) corrective regimen sliding scale   SubCutaneous at bedtime  isosorbide   dinitrate Tablet (ISORDIL) 10 milliGRAM(s) Oral three times a day  labetalol 300 milliGRAM(s) Oral three times a day      LABS: All Labs Reviewed:                        6.8    7.28  )-----------( 236      ( 01 Jan 2019 09:18 )             21.8     01-01    134<L>  |  95<L>  |  22  ----------------------------<  81  4.4   |  28  |  4.13<H>    Ca    7.7<L>      01 Jan 2019 05:58  Mg     2.2     12-31            Blood Culture:   Urine Culture      RADIOLOGY/EKG:    ASSESSMENT AND PLAN:  68M PMH CAD/CABG, HFrEF (30%), T2DM, HTN, CKD 5 (not on HD), aortic valve replacement, chronic anemia occasionally requiring transfusions presents with SOB, admitted with acute decompensated systolic heart failure with hypertensive emergency, demand ischemia, worsening renal failure, transient afib RVR.     Problem/Plan - 1:  ·  Problem: Acute systolic heart failure.  Plan: --from med non-adherence, especially with uncontrolled HTN, and worsening CKD5  --requiring nasal cannula oxygen  --patient reports feeling a little better after lasix in the ED. Start lasix 60mg IV BID.     --strict I/Os, daily weights       Problem/Plan - 2:   hypertension  · on  hydralazine 100mg TID, isordil 10mg TID. On IV lasix for CHF. labetalol 300 milliGRAM(s) Oral three times a day  --   Problem/Plan - 3:  ·  Problem: Elevated troponin.  Plan: --hsTrop to 492, up from high 300s last admission.    due  worsening renal failure  --will trend troponin  -       Problem/Plan - 4:  ·  Problem: Respiratory failure with hypoxia.  Plan: --from CHF exacerbation and volume overload  --diuresis  --oxygen PRN.  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  Plan: --converted to sinus rhythm on way to the hospital  --no history of afib. Patient does not seem to have chest pain, palpitations, or dizziness related to it, so unclear if he has been having pAF for a while.         Problem/Plan - 6:  Problem: CKD (chronic kidney disease) stage 5, GFR less than 15 ml/min. Plan: - anyone consistent right now.   --Needs lasix now for HTN emergency and  renal failure.   -  Nephrology  followup noted . for hemodialysis today       Problem/Plan - 7:  ·  Problem: T2DM (type 2 diabetes mellitus).  Plan: --On 70/30 at home.  due to hypoglycemia will decrease  lantus   Problem/Plan - 8:  ·  Problem: Chronic anemia.  Plan: -  s/p  transfusion      feel better    DVT PPX:    ADVANCED DIRECTIVE:    DISPOSITION: waiting for placement for long-term access for hemodialysis which will be scheduled for January3/19

## 2019-01-02 ENCOUNTER — TRANSCRIPTION ENCOUNTER (OUTPATIENT)
Age: 69
End: 2019-01-02

## 2019-01-02 LAB
ANION GAP SERPL CALC-SCNC: 13 MMOL/L — SIGNIFICANT CHANGE UP (ref 5–17)
APTT BLD: 30.7 SEC — SIGNIFICANT CHANGE UP (ref 27.5–36.3)
BLD GP AB SCN SERPL QL: NEGATIVE — SIGNIFICANT CHANGE UP
BUN SERPL-MCNC: 32 MG/DL — HIGH (ref 7–23)
CALCIUM SERPL-MCNC: 8.1 MG/DL — LOW (ref 8.4–10.5)
CHLORIDE SERPL-SCNC: 95 MMOL/L — LOW (ref 96–108)
CO2 SERPL-SCNC: 26 MMOL/L — SIGNIFICANT CHANGE UP (ref 22–31)
CREAT SERPL-MCNC: 5.69 MG/DL — HIGH (ref 0.5–1.3)
GLUCOSE BLDC GLUCOMTR-MCNC: 115 MG/DL — HIGH (ref 70–99)
GLUCOSE BLDC GLUCOMTR-MCNC: 163 MG/DL — HIGH (ref 70–99)
GLUCOSE BLDC GLUCOMTR-MCNC: 188 MG/DL — HIGH (ref 70–99)
GLUCOSE BLDC GLUCOMTR-MCNC: 84 MG/DL — SIGNIFICANT CHANGE UP (ref 70–99)
GLUCOSE SERPL-MCNC: 61 MG/DL — LOW (ref 70–99)
HCT VFR BLD CALC: 21.8 % — LOW (ref 39–50)
HCT VFR BLD CALC: 22.2 % — LOW (ref 39–50)
HGB BLD-MCNC: 6.8 G/DL — CRITICAL LOW (ref 13–17)
HGB BLD-MCNC: 7 G/DL — CRITICAL LOW (ref 13–17)
INR BLD: 1.03 RATIO — SIGNIFICANT CHANGE UP (ref 0.88–1.16)
MAGNESIUM SERPL-MCNC: 2.2 MG/DL — SIGNIFICANT CHANGE UP (ref 1.6–2.6)
MCHC RBC-ENTMCNC: 25.7 PG — LOW (ref 27–34)
MCHC RBC-ENTMCNC: 26.5 PG — LOW (ref 27–34)
MCHC RBC-ENTMCNC: 30.6 GM/DL — LOW (ref 32–36)
MCHC RBC-ENTMCNC: 32.1 GM/DL — SIGNIFICANT CHANGE UP (ref 32–36)
MCV RBC AUTO: 82.5 FL — SIGNIFICANT CHANGE UP (ref 80–100)
MCV RBC AUTO: 83.8 FL — SIGNIFICANT CHANGE UP (ref 80–100)
PHOSPHATE SERPL-MCNC: 5.6 MG/DL — HIGH (ref 2.5–4.5)
PLATELET # BLD AUTO: 220 K/UL — SIGNIFICANT CHANGE UP (ref 150–400)
PLATELET # BLD AUTO: 260 K/UL — SIGNIFICANT CHANGE UP (ref 150–400)
POTASSIUM SERPL-MCNC: 4.6 MMOL/L — SIGNIFICANT CHANGE UP (ref 3.5–5.3)
POTASSIUM SERPL-SCNC: 4.6 MMOL/L — SIGNIFICANT CHANGE UP (ref 3.5–5.3)
PROTHROM AB SERPL-ACNC: 11.5 SEC — SIGNIFICANT CHANGE UP (ref 10–13.1)
RBC # BLD: 2.65 M/UL — LOW (ref 4.2–5.8)
RBC # BLD: 2.65 M/UL — LOW (ref 4.2–5.8)
RBC # FLD: 19.6 % — HIGH (ref 10.3–14.5)
RBC # FLD: 20.9 % — HIGH (ref 10.3–14.5)
RH IG SCN BLD-IMP: POSITIVE — SIGNIFICANT CHANGE UP
SODIUM SERPL-SCNC: 134 MMOL/L — LOW (ref 135–145)
WBC # BLD: 6.86 K/UL — SIGNIFICANT CHANGE UP (ref 3.8–10.5)
WBC # BLD: 7.7 K/UL — SIGNIFICANT CHANGE UP (ref 3.8–10.5)
WBC # FLD AUTO: 6.86 K/UL — SIGNIFICANT CHANGE UP (ref 3.8–10.5)
WBC # FLD AUTO: 7.7 K/UL — SIGNIFICANT CHANGE UP (ref 3.8–10.5)

## 2019-01-02 PROCEDURE — 93010 ELECTROCARDIOGRAM REPORT: CPT

## 2019-01-02 PROCEDURE — 71045 X-RAY EXAM CHEST 1 VIEW: CPT | Mod: 26

## 2019-01-02 PROCEDURE — 99232 SBSQ HOSP IP/OBS MODERATE 35: CPT

## 2019-01-02 RX ADMIN — ISOSORBIDE DINITRATE 10 MILLIGRAM(S): 5 TABLET ORAL at 14:10

## 2019-01-02 RX ADMIN — INSULIN GLARGINE 4 UNIT(S): 100 INJECTION, SOLUTION SUBCUTANEOUS at 22:45

## 2019-01-02 RX ADMIN — Medication 100 MILLIGRAM(S): at 14:10

## 2019-01-02 RX ADMIN — ISOSORBIDE DINITRATE 10 MILLIGRAM(S): 5 TABLET ORAL at 21:41

## 2019-01-02 RX ADMIN — DOXERCALCIFEROL 3 MICROGRAM(S): 2.5 CAPSULE ORAL at 17:43

## 2019-01-02 RX ADMIN — Medication 300 MILLIGRAM(S): at 05:17

## 2019-01-02 RX ADMIN — ISOSORBIDE DINITRATE 10 MILLIGRAM(S): 5 TABLET ORAL at 05:17

## 2019-01-02 RX ADMIN — Medication 80 MILLIGRAM(S): at 05:17

## 2019-01-02 RX ADMIN — ATORVASTATIN CALCIUM 40 MILLIGRAM(S): 80 TABLET, FILM COATED ORAL at 21:41

## 2019-01-02 RX ADMIN — Medication 300 MILLIGRAM(S): at 21:40

## 2019-01-02 RX ADMIN — HEPARIN SODIUM 5000 UNIT(S): 5000 INJECTION INTRAVENOUS; SUBCUTANEOUS at 21:41

## 2019-01-02 RX ADMIN — HEPARIN SODIUM 5000 UNIT(S): 5000 INJECTION INTRAVENOUS; SUBCUTANEOUS at 05:17

## 2019-01-02 RX ADMIN — Medication 100 MILLIGRAM(S): at 21:41

## 2019-01-02 RX ADMIN — Medication 667 MILLIGRAM(S): at 19:49

## 2019-01-02 RX ADMIN — ERYTHROPOIETIN 10000 UNIT(S): 10000 INJECTION, SOLUTION INTRAVENOUS; SUBCUTANEOUS at 17:40

## 2019-01-02 RX ADMIN — Medication 81 MILLIGRAM(S): at 12:10

## 2019-01-02 RX ADMIN — Medication 1: at 19:49

## 2019-01-02 RX ADMIN — Medication 667 MILLIGRAM(S): at 12:10

## 2019-01-02 RX ADMIN — Medication 100 MILLIGRAM(S): at 05:17

## 2019-01-02 NOTE — PROGRESS NOTE ADULT - ASSESSMENT
68-year-old man presents with CHF exacerbation and CKD now requiring long-term HD access.    tent OR thurs 1/3/2019 for lue avf/g  ysabele precautions

## 2019-01-02 NOTE — PROGRESS NOTE ADULT - SUBJECTIVE AND OBJECTIVE BOX
NEPHROLOGY-[facility] [phone]        Patient seen and examined in bed.  He felt better        MEDICATIONS  (STANDING):  aspirin enteric coated 81 milliGRAM(s) Oral daily  atorvastatin 40 milliGRAM(s) Oral at bedtime  calcium acetate 667 milliGRAM(s) Oral three times a day with meals  dextrose 5%. 1000 milliLiter(s) (50 mL/Hr) IV Continuous <Continuous>  dextrose 50% Injectable 12.5 Gram(s) IV Push once  dextrose 50% Injectable 25 Gram(s) IV Push once  dextrose 50% Injectable 25 Gram(s) IV Push once  doxercalciferol Injectable 3 MICROGram(s) IV Push <User Schedule>  epoetin theodore Injectable 97671 Unit(s) IV Push <User Schedule>  furosemide    Tablet 80 milliGRAM(s) Oral daily  heparin  Injectable 5000 Unit(s) SubCutaneous every 8 hours  hydrALAZINE 100 milliGRAM(s) Oral three times a day  insulin glargine Injectable (LANTUS) 4 Unit(s) SubCutaneous at bedtime  insulin lispro (HumaLOG) corrective regimen sliding scale   SubCutaneous three times a day before meals  insulin lispro (HumaLOG) corrective regimen sliding scale   SubCutaneous at bedtime  isosorbide   dinitrate Tablet (ISORDIL) 10 milliGRAM(s) Oral three times a day  labetalol 300 milliGRAM(s) Oral three times a day      VITAL:  T(C): , Max: 36.9 (01-01-19 @ 11:26)  T(F): , Max: 98.4 (01-01-19 @ 11:26)  HR: 70 (01-02-19 @ 04:03)  BP: 152/76 (01-02-19 @ 04:03)  BP(mean): --  RR: 18 (01-02-19 @ 04:03)  SpO2: 97% (01-02-19 @ 04:03)  Wt(kg): --    I and O's:    01-01 @ 07:01  -  01-02 @ 07:00  --------------------------------------------------------  IN: 1010 mL / OUT: 550 mL / NET: 460 mL          PHYSICAL EXAM:    Constitutional: NAD  Neck:  No JVD  Respiratory: CTAB/L  Cardiovascular: S1 and S2  Gastrointestinal: BS+, soft, NT/ND  Extremities: No peripheral edema  Neurological: A/O x 3, no focal deficits  Psychiatric: Normal mood, normal affect  : No Palacios  Skin: No rashes  Access: perm cath    LABS:                        7.0    7.7   )-----------( 220      ( 02 Jan 2019 08:23 )             21.8     01-02    134<L>  |  95<L>  |  32<H>  ----------------------------<  61<L>  4.6   |  26  |  5.69<H>    Ca    8.1<L>      02 Jan 2019 05:57  Phos  5.6     01-02  Mg     2.2     01-02            Urine Studies:          RADIOLOGY & ADDITIONAL STUDIES:

## 2019-01-02 NOTE — PROVIDER CONTACT NOTE (CRITICAL VALUE NOTIFICATION) - ASSESSMENT
Pt stable, no signs of distress, no bleeding noted
A&Ox4 Denies chest pain, sob or dizziness.
A&Ox4. No c/o chest pain, sob or dizziness. No s/s bleeding.
VSS. A&Ox4. No s/s of active bleeding
no s/s of active bleeding; vss
patient

## 2019-01-02 NOTE — PROGRESS NOTE ADULT - ASSESSMENT
69 yo male with past medical history of HTN, DM chronic kidney disease stage V, now presents with uremia. He also has hypertensive urgency with accompanying severe pulmonary hypertension.  Goals of therapy are blood pressure control and dialysis.     Secondary hyperparathyroidism  Anemia - s/p transfusion, on Epogen 10,000 units IV at each HD  Hypertensive urgency -    ESRD now on HD -    s/p RCW permacath placement 12/31/18    RECOMMEND:  Renal: Next HD today   Cardiology: Monitor BPs, Continue labetalol 300mg po tid.  Allergy to ACE. Will not start ACE or ARB  Anemia: continue Epogen IV q HD, trend H/H;  If HH remains low then blood xfusion   Endocrine: Cont Hectorol   Vascular: LUE precautions- OR planned for Thursday 1/3/18 for LUE AVF with Dr. Dotson           [facility]  [phone]

## 2019-01-02 NOTE — PROGRESS NOTE ADULT - ASSESSMENT
68-year-old man presents with CHF exacerbation and CKD now requiring long-term HD access.    Plan:  - OR on 1/3 for LUE AVF creation  - Protect left arm  - Medical clearance for OR documented in note from 1/2  - Consent signed and in chart  - NPO at MN; see pre-operative note    Price Weaver, PGY2  [phone]

## 2019-01-02 NOTE — CHART NOTE - NSCHARTNOTEFT_GEN_A_CORE
Nutrition Follow Up Note  Patient seen for: nutrition follow-up on 4MON.     Chart reviewed, events noted. This is a 68 year old M PMH CAD/CABG, HFrEF (30%), T2DM, HTN, CKD 5 referred to IR for central venous catheter placement for hemodialysis. Possible OR tomorrow for LUE AVF.    Source: patient, medical record     Diet : DASH + carbohydrate consistent (evening snack) + Renal diet     Patient reports good PO intake and appetite, lunch tray just arrived a bedside during visit. Pt reports he is eating "all" of his meals. Denies any acute GI distress, last BM 1/1. Pt seen for diet education on renal diet during last assessment however per pt unable to recall to teack back points. Pt amendable to diet education reinforcement. Reviewed the importance of monitoring intake of foods high in potassium/phosphorous/sodium, reviewed foods high in these micronutrients as well as alternatives, discussed the need to monitor fluid intake, and the importance of adequate protein intake due to increased demand while on HD.      PO intake : % of meals     Source for PO intake:     Enteral /Parenteral Nutrition: N/A    Weights: weight changes, likely related to fluid shifts as pt on HD with good PO intake during admission, will continue to monitor.   Post-HD Weights:   156lbs (12/26)  149.4lbs (12/31)       Pertinent Medications: MEDICATIONS  (STANDING):  aspirin enteric coated 81 milliGRAM(s) Oral daily  atorvastatin 40 milliGRAM(s) Oral at bedtime  calcium acetate 667 milliGRAM(s) Oral three times a day with meals  dextrose 5%. 1000 milliLiter(s) (50 mL/Hr) IV Continuous <Continuous>  dextrose 50% Injectable 12.5 Gram(s) IV Push once  dextrose 50% Injectable 25 Gram(s) IV Push once  dextrose 50% Injectable 25 Gram(s) IV Push once  doxercalciferol Injectable 3 MICROGram(s) IV Push <User Schedule>  epoetin theodore Injectable 13720 Unit(s) IV Push <User Schedule>  furosemide    Tablet 80 milliGRAM(s) Oral daily  heparin  Injectable 5000 Unit(s) SubCutaneous every 8 hours  hydrALAZINE 100 milliGRAM(s) Oral three times a day  insulin glargine Injectable (LANTUS) 4 Unit(s) SubCutaneous at bedtime  insulin lispro (HumaLOG) corrective regimen sliding scale   SubCutaneous three times a day before meals  insulin lispro (HumaLOG) corrective regimen sliding scale   SubCutaneous at bedtime  isosorbide   dinitrate Tablet (ISORDIL) 10 milliGRAM(s) Oral three times a day  labetalol 300 milliGRAM(s) Oral three times a day    MEDICATIONS  (PRN):  dextrose 40% Gel 15 Gram(s) Oral once PRN Blood Glucose LESS THAN 70 milliGRAM(s)/deciliter  glucagon  Injectable 1 milliGRAM(s) IntraMuscular once PRN Glucose LESS THAN 70 milligrams/deciliter    Pertinent Labs: 01-02 @ 05:57: Na 134<L>, BUN 32<H>, Cr 5.69<H>, BG 61<L>, K+ 4.6, Phos 5.6<H>, Mg 2.2, Alk Phos --, ALT/SGPT --, AST/SGOT --, HbA1c --    Finger Sticks:  POCT Blood Glucose.: 84 mg/dL (01-02 @ 11:46)  POCT Blood Glucose.: 115 mg/dL (01-02 @ 08:01)  POCT Blood Glucose.: 113 mg/dL (01-01 @ 22:10)  POCT Blood Glucose.: 347 mg/dL (01-01 @ 16:47)      Skin per nursing documentation: free of pressure injuries   Edema: +1 right arm       Estimated Needs:   [ x] no change since previous assessment  [ ] recalculated:     Previous Nutrition Diagnosis: Food - and nutrition - related knowledge deficit  Nutrition Diagnosis is: on going, being addressed with diet education reinforcement.     New Nutrition Diagnosis: N/A      Interventions:   Recommend  1) Continue current diet as tolerated. Encourage PO intake of protein-rich nutrient dense foods.   2) Provide diet education reinforcement as needed.   3) Obtain/honor food preferences as able.   4) Recommend addition of Nephro-Anatoliy once daily     Monitoring and Evaluation:     Continue to monitor Nutritional intake, Tolerance to diet prescription, weights, labs, skin integrity    RD remains available upon request and will follow up per protocol  Manuela Shrestha RD, CDN, Pager # [phone]

## 2019-01-02 NOTE — CHART NOTE - NSCHARTNOTEFT_GEN_A_CORE
Pre-operative Note    - Pre-operative Diagnosis: CKD  - Surgeon: Nila  - Procedure: LUE AVF poss graft, poss R    - Labs:                        6.8    6.86  )-----------( 260      ( 02 Jan 2019 07:00 )             22.2     01-02    134<L>  |  95<L>  |  32<H>  ----------------------------<  61<L>  4.6   |  26  |  5.69<H>    Ca    8.1<L>      02 Jan 2019 05:57  Phos  5.6     01-02  Mg     2.2     01-02      Type & Screen #1: 12/31, B+  Type & Screen #2: 1/02, B+    - CXR: pending results    - EKG: pending results    - Blood: Not needed.       - Orders:  > plan for HD today (1/2)  > NPO at midnight  > Perioperative antibiotics not needed.  > Morning Labs: CBC, BMP, coags  > Adjust Diabetic orders for NPO period: Lantus 4u to 2u.     - Permits:  > Consent in chart.  > Case scheduled with OR for Thursday 1/3  > f/u cardiology clearance Pre-operative Note    - Pre-operative Diagnosis: CKD  - Surgeon: Nila  - Procedure: LUE AVF poss graft, poss R    - Labs:                        6.8    6.86  )-----------( 260      ( 02 Jan 2019 07:00 )             22.2     01-02    134<L>  |  95<L>  |  32<H>  ----------------------------<  61<L>  4.6   |  26  |  5.69<H>    Ca    8.1<L>      02 Jan 2019 05:57  Phos  5.6     01-02  Mg     2.2     01-02      Type & Screen #1: 12/31, B+  Type & Screen #2: 1/02, B+    - Xray Chest 1 View AP/PA (12.20.18 @ 16:27)     Decreased inspiratory lung volume. Mild perihilar hazy opacities. Trace   left pleural effusion, unchanged. Left lower lung hazy opacity,   unchanged. Mediastinal silhouette appears enlarged on this projection.   Aortic knob iscalcified. Median sternotomy wires, aortic valve   replacement, and CABG. Osseous and soft tissue structures are   unremarkable.    IMPRESSION:     Mild perihilar fullness likely secondary to low inspiratory volume.   Left lower lung hazy opacity likely represents atelectasis given its   stability from prior study. Unchanged trace left pleural effusion.    - 12 Lead ECG (12.20.18 @ 16:15)     Ventricular Rate 72 BPM    Atrial Rate 72 BPM    P-R Interval 138 ms    QRS Duration 162 ms    Q-T Interval 480 ms    QTC Calculation(Bezet) 525 ms    P Axis 47 degrees    R Axis -54 degrees    T Axis 111 degrees    Diagnosis Line NORMAL SINUS RHYTHM  POSSIBLE LEFT ATRIAL ENLARGEMENT  RIGHT BUNDLE BRANCH BLOCK  LEFT ANTERIOR FASCICULAR BLOCK  *** BIFASCICULAR BLOCK ***  LEFT VENTRICULAR HYPERTROPHY  CANNOT RULE OUT SEPTAL INFARCT , AGE UNDETERMINED  T WAVE ABNORMALITY, CONSIDER LATERAL ISCHEMIA  ABNORMAL ECG    - Blood: Not needed.     - Orders:  > plan for HD today (1/2)  > NPO at midnight  > Perioperative antibiotics not needed.  > Morning Labs: CBC, BMP, coags  > Adjust Diabetic orders for NPO period: Lantus 4u to 2u.     - Permits:  > Consent in chart.  > Case scheduled with OR for Thursday 1/3  > Medical clearance documented in note from 1/2

## 2019-01-02 NOTE — PROGRESS NOTE ADULT - SUBJECTIVE AND OBJECTIVE BOX
Patient is a 68y old  Male who presents with a chief complaint of SOB (02 Jan 2019 10:54)      Vascular Surgery Attending Progress Note    Interval HPI: Pt w/o c/o     Medications:  aspirin enteric coated 81 milliGRAM(s) Oral daily  atorvastatin 40 milliGRAM(s) Oral at bedtime  calcium acetate 667 milliGRAM(s) Oral three times a day with meals  dextrose 40% Gel 15 Gram(s) Oral once PRN  dextrose 5%. 1000 milliLiter(s) IV Continuous <Continuous>  dextrose 50% Injectable 12.5 Gram(s) IV Push once  dextrose 50% Injectable 25 Gram(s) IV Push once  dextrose 50% Injectable 25 Gram(s) IV Push once  doxercalciferol Injectable 3 MICROGram(s) IV Push <User Schedule>  epoetin theodore Injectable 77418 Unit(s) IV Push <User Schedule>  furosemide    Tablet 80 milliGRAM(s) Oral daily  glucagon  Injectable 1 milliGRAM(s) IntraMuscular once PRN  heparin  Injectable 5000 Unit(s) SubCutaneous every 8 hours  hydrALAZINE 100 milliGRAM(s) Oral three times a day  insulin glargine Injectable (LANTUS) 4 Unit(s) SubCutaneous at bedtime  insulin lispro (HumaLOG) corrective regimen sliding scale   SubCutaneous three times a day before meals  insulin lispro (HumaLOG) corrective regimen sliding scale   SubCutaneous at bedtime  isosorbide   dinitrate Tablet (ISORDIL) 10 milliGRAM(s) Oral three times a day  labetalol 300 milliGRAM(s) Oral three times a day      Vital Signs Last 24 Hrs  T(C): 37 (02 Jan 2019 11:09), Max: 37 (02 Jan 2019 11:09)  T(F): 98.6 (02 Jan 2019 11:09), Max: 98.6 (02 Jan 2019 11:09)  HR: 65 (02 Jan 2019 11:09) (65 - 70)  BP: 182/84 (02 Jan 2019 11:09) (152/76 - 190/78)  BP(mean): --  RR: 17 (02 Jan 2019 11:09) (17 - 18)  SpO2: 92% (02 Jan 2019 11:09) (92% - 97%)  I&O's Summary    01 Jan 2019 07:01  -  02 Jan 2019 07:00  --------------------------------------------------------  IN: 1010 mL / OUT: 550 mL / NET: 460 mL    Physical Exam:  Neuro  A&Ox3 VSS  Vascular:  stable    LABS:                        7.0    7.7   )-----------( 220      ( 02 Jan 2019 08:23 )             21.8     01-02    134<L>  |  95<L>  |  32<H>  ----------------------------<  61<L>  4.6   |  26  |  5.69<H>    Ca    8.1<L>      02 Jan 2019 05:57  Phos  5.6     01-02  Mg     2.2     01-02      PT/INR - ( 02 Jan 2019 07:00 )   PT: 11.5 sec;   INR: 1.03 ratio         PTT - ( 02 Jan 2019 07:00 )  PTT:30.7 sec    SYDNIE LYN MD  [phone]

## 2019-01-02 NOTE — PROVIDER CONTACT NOTE (CRITICAL VALUE NOTIFICATION) - ACTION/TREATMENT ORDERED:
Per NP Julieta Razo repeat CBC and draw type and screen, cont to monitor
2units PRBC to be transfused in dialysis
First 1/2 unit started. No reaction noted. Next RN to give second 1/2 unit. Will continue to monitor.
NP will order stat cbc
Repeat CBC sent. Await result. Will continue to monitor.
STAT CBC
STAT CBC and type and screen

## 2019-01-02 NOTE — PROGRESS NOTE ADULT - SUBJECTIVE AND OBJECTIVE BOX
CHIEF COMPLAINT:    SUBJECTIVE:     REVIEW OF SYSTEMS:    CONSTITUTIONAL: (  )  weakness,  (  ) fevers or chills  EYES/ENT: (  )visual changes;     NECK: (  ) pain or stiffness  RESPIRATORY:   (  )cough, wheezing, hemoptysis;  (  ) shortness of breath  CARDIOVASCULAR:  (  )chest pain or palpitations  GASTROINTESTINAL:   (  )abdominal or epigastric pain.  (  ) nausea, vomiting, or hematemesis;   (   ) diarrhea or constipation.   GENITOURINARY:   (    ) dysuria, frequency or hematuria  NEUROLOGICAL:  (   ) numbness or weakness   All other review of systems is negative unless indicated above    Vital Signs Last 24 Hrs  T(C): 36.7 (02 Jan 2019 04:03), Max: 36.9 (01 Jan 2019 11:26)  T(F): 98 (02 Jan 2019 04:03), Max: 98.4 (01 Jan 2019 11:26)  HR: 70 (02 Jan 2019 04:03) (67 - 72)  BP: 152/76 (02 Jan 2019 04:03) (152/76 - 197/87)  BP(mean): --  RR: 18 (02 Jan 2019 04:03) (17 - 18)  SpO2: 97% (02 Jan 2019 04:03) (96% - 99%)    I&O's Summary    01 Jan 2019 07:01  -  02 Jan 2019 07:00  --------------------------------------------------------  IN: 1010 mL / OUT: 550 mL / NET: 460 mL        CAPILLARY BLOOD GLUCOSE      POCT Blood Glucose.: 115 mg/dL (02 Jan 2019 08:01)  POCT Blood Glucose.: 113 mg/dL (01 Jan 2019 22:10)  POCT Blood Glucose.: 347 mg/dL (01 Jan 2019 16:47)  POCT Blood Glucose.: 112 mg/dL (01 Jan 2019 11:44)      PHYSICAL EXAM:    Constitutional:  (   ) NAD,   (   )awake and alert  HEENT: PERR, EOMI,    Neck: Soft and supple, No LAD, No JVD  Respiratory:  (    Breath sounds are clear bilaterally,    (   ) wheezing, rales or rhonchi  Cardiovascular:     (   )S1 and S2, regular rate and rhythm, no Murmurs, gallops or rubs  Gastrointestinal:  (   )Bowel Sounds present, soft,   (  )nontender, nondistended,    Extremities:    (  ) peripheral edema  Vascular: 2+ peripheral pulses  Neurological:    (    )A/O x 3,   (  ) focal deficits  Musculoskeletal:    (   )  normal strength b/l upper  (     ) normal  lower extremities  Skin: No rashes    MEDICATIONS:  MEDICATIONS  (STANDING):  aspirin enteric coated 81 milliGRAM(s) Oral daily  atorvastatin 40 milliGRAM(s) Oral at bedtime  calcium acetate 667 milliGRAM(s) Oral three times a day with meals  dextrose 5%. 1000 milliLiter(s) (50 mL/Hr) IV Continuous <Continuous>  dextrose 50% Injectable 12.5 Gram(s) IV Push once  dextrose 50% Injectable 25 Gram(s) IV Push once  dextrose 50% Injectable 25 Gram(s) IV Push once  doxercalciferol Injectable 3 MICROGram(s) IV Push <User Schedule>  epoetin theodore Injectable 15016 Unit(s) IV Push <User Schedule>  furosemide    Tablet 80 milliGRAM(s) Oral daily  heparin  Injectable 5000 Unit(s) SubCutaneous every 8 hours  hydrALAZINE 100 milliGRAM(s) Oral three times a day  insulin glargine Injectable (LANTUS) 4 Unit(s) SubCutaneous at bedtime  insulin lispro (HumaLOG) corrective regimen sliding scale   SubCutaneous three times a day before meals  insulin lispro (HumaLOG) corrective regimen sliding scale   SubCutaneous at bedtime  isosorbide   dinitrate Tablet (ISORDIL) 10 milliGRAM(s) Oral three times a day  labetalol 300 milliGRAM(s) Oral three times a day      LABS: All Labs Reviewed:                        6.8    6.86  )-----------( 260      ( 02 Jan 2019 07:00 )             22.2     01-02    134<L>  |  95<L>  |  32<H>  ----------------------------<  61<L>  4.6   |  26  |  5.69<H>    Ca    8.1<L>      02 Jan 2019 05:57  Phos  5.6     01-02  Mg     2.2     01-02            Blood Culture:   Urine Culture      RADIOLOGY/EKG:    ASSESSMENT AND PLAN:    DVT PPX:    ADVANCED DIRECTIVE:    DISPOSITION: CHIEF COMPLAINT:  no enent overnight  LOW   HBG noted   waiting  for access (  AVF) for long-term hemodialysis which is scheduled  January 3, 2019   68M PMH CAD/CABG, HFrEF (30%), T2DM, HTN, CKD 5 (not on HD), aortic valve replacement, chronic anemia occasionally requiring transfusions presents with SOB. Has had progressively worsening SOB and SZYMANSKI over the past 2 weeks. History is supplemented by wife as she manages most of his medical care and gives his medications. Symptoms significantly worsened this week with orthopnea and LE edema. No recent URIs, fevers, chills, cough, sputum production, chest pain, palpitations, NVD, abdominal pain, dysuria, decreased urination. Went to PMD for evaluation and he was noted to be in rapid afib in the office, rate 140s-150s. By time of arrival in ED, had spontaneously converted back to rate controlled sinus rhythm. O2sat 89% on RA, improved with nasal cannul  SUBJECTIVE:     REVIEW OF SYSTEMS:    CONSTITUTIONAL: ( x )  weakness,  (  ) fevers or chills  EYES/ENT: (  )visual changes;     NECK: (  ) pain or stiffness  RESPIRATORY:   (  )cough, wheezing, hemoptysis;  (  ) shortness of breath  CARDIOVASCULAR:  (  )chest pain or palpitations  GASTROINTESTINAL:   (  )abdominal or epigastric pain.  (  ) nausea, vomiting, or hematemesis;   (   ) diarrhea or constipation.   GENITOURINARY:   (    ) dysuria, frequency or hematuria  NEUROLOGICAL:  (   ) numbness or weakness   All other review of systems is negative unless indicated above    Vital Signs Last 24 Hrs  T(C): 36.7 (02 Jan 2019 04:03), Max: 36.9 (01 Jan 2019 11:26)  T(F): 98 (02 Jan 2019 04:03), Max: 98.4 (01 Jan 2019 11:26)  HR: 70 (02 Jan 2019 04:03) (67 - 72)  BP: 152/76 (02 Jan 2019 04:03) (152/76 - 197/87)  BP(mean): --  RR: 18 (02 Jan 2019 04:03) (17 - 18)  SpO2: 97% (02 Jan 2019 04:03) (96% - 99%)    I&O's Summary    01 Jan 2019 07:01  -  02 Jan 2019 07:00  --------------------------------------------------------  IN: 1010 mL / OUT: 550 mL / NET: 460 mL        CAPILLARY BLOOD GLUCOSE      POCT Blood Glucose.: 115 mg/dL (02 Jan 2019 08:01)  POCT Blood Glucose.: 113 mg/dL (01 Jan 2019 22:10)  POCT Blood Glucose.: 347 mg/dL (01 Jan 2019 16:47)  POCT Blood Glucose.: 112 mg/dL (01 Jan 2019 11:44)      PHYSICAL EXAM:    GENERAL: NAD, well-developed,   	HEAD:  Atraumatic, Normocephalic  	EYES: EOMI, PERRLA, conjunctiva and sclera clear  	NECK: +JVD  	CHEST/LUNG: Clear to auscultation bilaterally; No wheeze  	HEART: crackles b/l lower lobes  	ABDOMEN: Soft, Nontender, Nondistended; Bowel sounds present  	EXTREMITIES:  1+ pitting edema peripheral; 2+ Peripheral Pulses, No clubbing, cyanosis  	PSYCH: AAOx3  	NEUROLOGY: non-focal     MEDICATIONS:  MEDICATIONS  (STANDING):  aspirin enteric coated 81 milliGRAM(s) Oral daily  atorvastatin 40 milliGRAM(s) Oral at bedtime  calcium acetate 667 milliGRAM(s) Oral three times a day with meals  dextrose 5%. 1000 milliLiter(s) (50 mL/Hr) IV Continuous <Continuous>  dextrose 50% Injectable 12.5 Gram(s) IV Push once  dextrose 50% Injectable 25 Gram(s) IV Push once  dextrose 50% Injectable 25 Gram(s) IV Push once  doxercalciferol Injectable 3 MICROGram(s) IV Push <User Schedule>  epoetin theodore Injectable 81866 Unit(s) IV Push <User Schedule>  furosemide    Tablet 80 milliGRAM(s) Oral daily  heparin  Injectable 5000 Unit(s) SubCutaneous every 8 hours  hydrALAZINE 100 milliGRAM(s) Oral three times a day  insulin glargine Injectable (LANTUS) 4 Unit(s) SubCutaneous at bedtime  insulin lispro (HumaLOG) corrective regimen sliding scale   SubCutaneous three times a day before meals  insulin lispro (HumaLOG) corrective regimen sliding scale   SubCutaneous at bedtime  isosorbide   dinitrate Tablet (ISORDIL) 10 milliGRAM(s) Oral three times a day  labetalol 300 milliGRAM(s) Oral three times a day      LABS: All Labs Reviewed:                        6.8    6.86  )-----------( 260      ( 02 Jan 2019 07:00 )             22.2     01-02    134<L>  |  95<L>  |  32<H>  ----------------------------<  61<L>  4.6   |  26  |  5.69<H>    Ca    8.1<L>      02 Jan 2019 05:57  Phos  5.6     01-02  Mg     2.2     01-02            Blood Culture:   Urine Culture      RADIOLOGY/EKG:    ASSESSMENT AND PLAN:  68M PMH CAD/CABG, HFrEF (30%), T2DM, HTN, CKD 5 (not on HD), aortic valve replacement, chronic anemia occasionally requiring transfusions presents with SOB, admitted with acute decompensated systolic heart failure with hypertensive emergency, demand ischemia, worsening renal failure, transient afib RVR.     Problem/Plan - 1:  ·  Problem: Acute systolic heart failure.  Plan: --from med non-adherence, especially with uncontrolled HTN, and worsening CKD5  --requiring nasal cannula oxygen  --patient reports feeling a little better after lasix in the ED. Start lasix 60mg IV BID.     --strict I/Os, daily weights       Problem/Plan - 2:   hypertension  · on  hydralazine 100mg TID, isordil 10mg TID. On IV lasix for CHF. labetalol 300 milliGRAM(s) Oral three times a day  --   Problem/Plan - 3:  ·  Problem: Elevated troponin.  Plan: --hsTrop to 492, up from high 300s last admission.    due  worsening renal failure  --will trend troponin  -       Problem/Plan - 4:  ·  Problem: Respiratory failure with hypoxia.  Plan: --from CHF exacerbation and volume overload  --diuresis  --oxygen PRN.       Problem/Plan - 5:  ·  Problem: Paroxysmal A-fib.  Plan: --converted to sinus rhythm on way to the hospital  --no history of afib. Patient does not seem to have chest pain, palpitations, or dizziness related to it, so unclear if he has been having pAF for a while.         Problem/Plan - 6:  Problem: CKD (chronic kidney disease) stage 5, GFR less than 15 ml/min. Plan: - anyone consistent right now.   --Needs lasix now for HTN emergency and  renal failure.   -  Nephrology  followup noted . for hemodialysis         Problem/Plan - 7:  ·  Problem: T2DM (type 2 diabetes mellitus).  Plan: --On 70/30 at home.  due to hypoglycemia will decrease  lantus   Problem/Plan - 8:  ·  Problem: Chronic anemia.   need   transfusion 2 unit            DVT PPX:    ADVANCED DIRECTIVE:    DISPOSITION: home after placement of AVF  . he is medically clear no medical contraindication.  DW   NP today

## 2019-01-02 NOTE — PROGRESS NOTE ADULT - SUBJECTIVE AND OBJECTIVE BOX
Vascular Surgery Consult - Daily Progress Note    SUBJECTIVE:  Doing well.   No overnight events.   Discussed plan for OR tomorrow. Patient expresses understanding.     OBJECTIVE:     ** VITAL SIGNS / I&O's **    T(C): 37 (01-02-19 @ 11:09), Max: 37 (01-02-19 @ 11:09)  T(F): 98.6 (01-02-19 @ 11:09), Max: 98.6 (01-02-19 @ 11:09)  HR: 65 (01-02-19 @ 11:09) (65 - 70)  BP: 182/84 (01-02-19 @ 11:09) (152/76 - 182/84)  RR: 17 (01-02-19 @ 11:09) (17 - 18)  SpO2: 92% (01-02-19 @ 11:09) (92% - 97%)      01 Jan 2019 07:01  -  02 Jan 2019 07:00  --------------------------------------------------------  IN:    Oral Fluid: 1010 mL  Total IN: 1010 mL    OUT:    Voided: 550 mL  Total OUT: 550 mL    Total NET: 460 mL      02 Jan 2019 07:01  -  02 Jan 2019 14:51  --------------------------------------------------------  IN:    Oral Fluid: 540 mL  Total IN: 540 mL    OUT:    Voided: 500 mL  Total OUT: 500 mL    Total NET: 40 mL    ** PHYSICAL EXAM **    -- CONSTITUTIONAL: awake, alert, NAD  -- CARDIOVASCULAR: hypertensive, regular rate   -- RESPIRATORY: breathing comfortably   -- EXTREMITIES: warm, well perfused  -- VASCULAR:          RUE: palpable radial         LUE: palpable radial  -- NEUROLOGICAL: motor and sensation symmetric     ** LABS **                 7.0    7.7    )----------(  220       ( 02 Jan 2019 08:23 )               21.8      134    |  95     |  32     ----------------------------<  61         ( 02 Jan 2019 05:57 )  4.6     |  26     |  5.69     Ca    8.1        ( 02 Jan 2019 05:57 )  Phos  5.6       ( 02 Jan 2019 05:57 )  Mg     2.2       ( 02 Jan 2019 05:57 )      PT/INR -  11.5 sec / 1.03 ratio   ( 02 Jan 2019 07:00 )       PTT -  30.7 sec   ( 02 Jan 2019 07:00 )  CAPILLARY BLOOD GLUCOSE    **RADS**  VA Duplex Upper Ext Vein Scan, Bilat (12.29.18 @ 12:31)   Right upper extremity    Basilic vein  Upper arm proximal not visualized  Upper arm mid 0.41 cm  Upper arm distal 0.44 cm  Antecubital fossa 0.21 cm  Forearm proximal 0.14 cm  Forearm mid 0.28 cm  Forearm distal 0.22 cm    Cephalic vein  Upper arm proximal 0.44 cm  Upper arm mid 0.40 cm  Upper arm distal 0.52 cm  Antecubital fossa 0.68 cm  Forearm proximal 0.39 cm  Forearm mid 0.38 cm  Forearm distal 0.25 cm    Left upper extremity    Basilic vein  Upper arm proximal not visualized  Upper arm mid 0.51 cm  Upper arm distal 0.25 cm  Antecubital fossa 0.27 cm  Forearm proximal 0.17 cm  Forearm mid 0.11 cm  Forearm distal 0.13 cm    Cephalic vein  Upper arm proximal 0.53 cm  Upper arm mid 0.51 cm  Upper arm distal 0.43 cm  Antecubital fossa 0.59 cm  Forearm proximal 0.37 cm  Forearm mid 0.31 cm  Forearm distal 0.34 cm    Impression: No duplex evidence of DVT in either upper extremity.    Nonocclusive postthrombotic changes in the cephalic veins bilaterally.    Superficial vein mapping as described above.

## 2019-01-03 LAB
ANION GAP SERPL CALC-SCNC: 8 MMOL/L — SIGNIFICANT CHANGE UP (ref 5–17)
APTT BLD: 30.4 SEC — SIGNIFICANT CHANGE UP (ref 27.5–36.3)
BUN SERPL-MCNC: 22 MG/DL — SIGNIFICANT CHANGE UP (ref 7–23)
CALCIUM SERPL-MCNC: 8.1 MG/DL — LOW (ref 8.4–10.5)
CHLORIDE SERPL-SCNC: 95 MMOL/L — LOW (ref 96–108)
CO2 SERPL-SCNC: 27 MMOL/L — SIGNIFICANT CHANGE UP (ref 22–31)
CREAT SERPL-MCNC: 4.23 MG/DL — HIGH (ref 0.5–1.3)
GLUCOSE BLDC GLUCOMTR-MCNC: 178 MG/DL — HIGH (ref 70–99)
GLUCOSE BLDC GLUCOMTR-MCNC: 79 MG/DL — SIGNIFICANT CHANGE UP (ref 70–99)
GLUCOSE BLDC GLUCOMTR-MCNC: 81 MG/DL — SIGNIFICANT CHANGE UP (ref 70–99)
GLUCOSE BLDC GLUCOMTR-MCNC: 84 MG/DL — SIGNIFICANT CHANGE UP (ref 70–99)
GLUCOSE SERPL-MCNC: 82 MG/DL — SIGNIFICANT CHANGE UP (ref 70–99)
HCT VFR BLD CALC: 27.3 % — LOW (ref 39–50)
HGB BLD-MCNC: 8.9 G/DL — LOW (ref 13–17)
INR BLD: 1.03 RATIO — SIGNIFICANT CHANGE UP (ref 0.88–1.16)
MAGNESIUM SERPL-MCNC: 2 MG/DL — SIGNIFICANT CHANGE UP (ref 1.6–2.6)
MCHC RBC-ENTMCNC: 27.3 PG — SIGNIFICANT CHANGE UP (ref 27–34)
MCHC RBC-ENTMCNC: 32.5 GM/DL — SIGNIFICANT CHANGE UP (ref 32–36)
MCV RBC AUTO: 84.1 FL — SIGNIFICANT CHANGE UP (ref 80–100)
PHOSPHATE SERPL-MCNC: 4.8 MG/DL — HIGH (ref 2.5–4.5)
PLATELET # BLD AUTO: 238 K/UL — SIGNIFICANT CHANGE UP (ref 150–400)
POTASSIUM SERPL-MCNC: 4.2 MMOL/L — SIGNIFICANT CHANGE UP (ref 3.5–5.3)
POTASSIUM SERPL-SCNC: 4.2 MMOL/L — SIGNIFICANT CHANGE UP (ref 3.5–5.3)
PROTHROM AB SERPL-ACNC: 11.6 SEC — SIGNIFICANT CHANGE UP (ref 10–13.1)
RBC # BLD: 3.24 M/UL — LOW (ref 4.2–5.8)
RBC # FLD: 19.1 % — HIGH (ref 10.3–14.5)
SODIUM SERPL-SCNC: 130 MMOL/L — LOW (ref 135–145)
WBC # BLD: 7.6 K/UL — SIGNIFICANT CHANGE UP (ref 3.8–10.5)
WBC # FLD AUTO: 7.6 K/UL — SIGNIFICANT CHANGE UP (ref 3.8–10.5)

## 2019-01-03 PROCEDURE — 36821 AV FUSION DIRECT ANY SITE: CPT | Mod: LT,59

## 2019-01-03 PROCEDURE — 36832 AV FISTULA REVISION OPEN: CPT | Mod: LT

## 2019-01-03 RX ORDER — CEFAZOLIN SODIUM 1 G
1000 VIAL (EA) INJECTION DAILY
Qty: 0 | Refills: 0 | Status: COMPLETED | OUTPATIENT
Start: 2019-01-03 | End: 2019-01-05

## 2019-01-03 RX ORDER — ATORVASTATIN CALCIUM 80 MG/1
40 TABLET, FILM COATED ORAL AT BEDTIME
Qty: 0 | Refills: 0 | Status: DISCONTINUED | OUTPATIENT
Start: 2019-01-03 | End: 2019-01-06

## 2019-01-03 RX ORDER — SODIUM CHLORIDE 9 MG/ML
1000 INJECTION, SOLUTION INTRAVENOUS
Qty: 0 | Refills: 0 | Status: DISCONTINUED | OUTPATIENT
Start: 2019-01-03 | End: 2019-01-06

## 2019-01-03 RX ORDER — ISOSORBIDE DINITRATE 5 MG/1
10 TABLET ORAL THREE TIMES A DAY
Qty: 0 | Refills: 0 | Status: DISCONTINUED | OUTPATIENT
Start: 2019-01-03 | End: 2019-01-06

## 2019-01-03 RX ORDER — DEXTROSE 50 % IN WATER 50 %
25 SYRINGE (ML) INTRAVENOUS ONCE
Qty: 0 | Refills: 0 | Status: DISCONTINUED | OUTPATIENT
Start: 2019-01-03 | End: 2019-01-06

## 2019-01-03 RX ORDER — FENTANYL CITRATE 50 UG/ML
25 INJECTION INTRAVENOUS
Qty: 0 | Refills: 0 | Status: DISCONTINUED | OUTPATIENT
Start: 2019-01-03 | End: 2019-01-03

## 2019-01-03 RX ORDER — CALCIUM ACETATE 667 MG
667 TABLET ORAL
Qty: 0 | Refills: 0 | Status: DISCONTINUED | OUTPATIENT
Start: 2019-01-03 | End: 2019-01-06

## 2019-01-03 RX ORDER — FUROSEMIDE 40 MG
80 TABLET ORAL DAILY
Qty: 0 | Refills: 0 | Status: DISCONTINUED | OUTPATIENT
Start: 2019-01-03 | End: 2019-01-06

## 2019-01-03 RX ORDER — ERYTHROPOIETIN 10000 [IU]/ML
10000 INJECTION, SOLUTION INTRAVENOUS; SUBCUTANEOUS
Qty: 0 | Refills: 0 | Status: DISCONTINUED | OUTPATIENT
Start: 2019-01-03 | End: 2019-01-06

## 2019-01-03 RX ORDER — INSULIN LISPRO 100/ML
VIAL (ML) SUBCUTANEOUS AT BEDTIME
Qty: 0 | Refills: 0 | Status: DISCONTINUED | OUTPATIENT
Start: 2019-01-03 | End: 2019-01-06

## 2019-01-03 RX ORDER — LABETALOL HCL 100 MG
300 TABLET ORAL THREE TIMES A DAY
Qty: 0 | Refills: 0 | Status: DISCONTINUED | OUTPATIENT
Start: 2019-01-03 | End: 2019-01-06

## 2019-01-03 RX ORDER — DEXTROSE 50 % IN WATER 50 %
12.5 SYRINGE (ML) INTRAVENOUS ONCE
Qty: 0 | Refills: 0 | Status: DISCONTINUED | OUTPATIENT
Start: 2019-01-03 | End: 2019-01-06

## 2019-01-03 RX ORDER — DOXERCALCIFEROL 2.5 UG/1
3 CAPSULE ORAL
Qty: 0 | Refills: 0 | Status: DISCONTINUED | OUTPATIENT
Start: 2019-01-03 | End: 2019-01-06

## 2019-01-03 RX ORDER — ONDANSETRON 8 MG/1
4 TABLET, FILM COATED ORAL ONCE
Qty: 0 | Refills: 0 | Status: DISCONTINUED | OUTPATIENT
Start: 2019-01-03 | End: 2019-01-03

## 2019-01-03 RX ORDER — GLUCAGON INJECTION, SOLUTION 0.5 MG/.1ML
1 INJECTION, SOLUTION SUBCUTANEOUS ONCE
Qty: 0 | Refills: 0 | Status: DISCONTINUED | OUTPATIENT
Start: 2019-01-03 | End: 2019-01-06

## 2019-01-03 RX ORDER — ASPIRIN/CALCIUM CARB/MAGNESIUM 324 MG
81 TABLET ORAL DAILY
Qty: 0 | Refills: 0 | Status: DISCONTINUED | OUTPATIENT
Start: 2019-01-03 | End: 2019-01-06

## 2019-01-03 RX ORDER — INSULIN GLARGINE 100 [IU]/ML
4 INJECTION, SOLUTION SUBCUTANEOUS AT BEDTIME
Qty: 0 | Refills: 0 | Status: DISCONTINUED | OUTPATIENT
Start: 2019-01-03 | End: 2019-01-06

## 2019-01-03 RX ORDER — HEPARIN SODIUM 5000 [USP'U]/ML
5000 INJECTION INTRAVENOUS; SUBCUTANEOUS EVERY 8 HOURS
Qty: 0 | Refills: 0 | Status: DISCONTINUED | OUTPATIENT
Start: 2019-01-03 | End: 2019-01-06

## 2019-01-03 RX ORDER — INSULIN LISPRO 100/ML
VIAL (ML) SUBCUTANEOUS
Qty: 0 | Refills: 0 | Status: DISCONTINUED | OUTPATIENT
Start: 2019-01-03 | End: 2019-01-06

## 2019-01-03 RX ORDER — HYDRALAZINE HCL 50 MG
100 TABLET ORAL THREE TIMES A DAY
Qty: 0 | Refills: 0 | Status: DISCONTINUED | OUTPATIENT
Start: 2019-01-03 | End: 2019-01-06

## 2019-01-03 RX ORDER — DEXTROSE 50 % IN WATER 50 %
15 SYRINGE (ML) INTRAVENOUS ONCE
Qty: 0 | Refills: 0 | Status: DISCONTINUED | OUTPATIENT
Start: 2019-01-03 | End: 2019-01-06

## 2019-01-03 RX ADMIN — ATORVASTATIN CALCIUM 40 MILLIGRAM(S): 80 TABLET, FILM COATED ORAL at 22:06

## 2019-01-03 RX ADMIN — Medication 300 MILLIGRAM(S): at 05:32

## 2019-01-03 RX ADMIN — INSULIN GLARGINE 4 UNIT(S): 100 INJECTION, SOLUTION SUBCUTANEOUS at 22:06

## 2019-01-03 RX ADMIN — Medication 100 MILLIGRAM(S): at 18:39

## 2019-01-03 RX ADMIN — ISOSORBIDE DINITRATE 10 MILLIGRAM(S): 5 TABLET ORAL at 18:39

## 2019-01-03 RX ADMIN — Medication 80 MILLIGRAM(S): at 05:34

## 2019-01-03 RX ADMIN — Medication 300 MILLIGRAM(S): at 18:39

## 2019-01-03 RX ADMIN — ISOSORBIDE DINITRATE 10 MILLIGRAM(S): 5 TABLET ORAL at 05:32

## 2019-01-03 RX ADMIN — Medication 667 MILLIGRAM(S): at 18:39

## 2019-01-03 RX ADMIN — Medication 100 MILLIGRAM(S): at 05:34

## 2019-01-03 NOTE — PROVIDER CONTACT NOTE (OTHER) - SITUATION
Patients Right SC Permacath dressing site bleeding. Pt s/p right SC permacath placement 12/31/18 for HD. Pt s/p HD 1/2/19. Dressing site bleeding, NP aware.

## 2019-01-03 NOTE — PROGRESS NOTE ADULT - SUBJECTIVE AND OBJECTIVE BOX
CHIEF COMPLAINT:   Patient was seen with their dialysis nurse wife at the bedside he was losing blood through there right  Subclavian access   68M PMH CAD/CABG, HFrEF (30%), T2DM, HTN, CKD 5 (not on HD), aortic valve replacement, chronic anemia occasionally requiring transfusions presents with SOB. Has had progressively worsening SOB and SZYMANSKI over the past 2 weeks. History is supplemented by wife as she manages most of his medical care and gives his medications. Symptoms significantly worsened this week with orthopnea and LE edema. No recent URIs, fevers, chills, cough, sputum production, chest pain, palpitations, NVD, abdominal pain, dysuria, decreased urination. Went to PMD for evaluation and he was noted to be in rapid afib in the office, rate 140s-150s. By time of arrival in ED, had spontaneously converted back to rate controlled sinus rhythm. O2sat 89% on RA, improved with nasal cannul  SUBJECTIVE:     REVIEW OF SYSTEMS:    CONSTITUTIONAL: ( x )  weakness,  (  ) fevers or chills  EYES/ENT: (  )visual changes;     NECK: (  ) pain or stiffness  RESPIRATORY:   (  )cough, wheezing, hemoptysis;  (  ) shortness of breath  CARDIOVASCULAR:  (  )chest pain or palpitations  GASTROINTESTINAL:   (  )abdominal or epigastric pain.  (  ) nausea, vomiting, or hematemesis;   (   ) diarrhea or constipation.   GENITOURINARY:   (    ) dysuria, frequency or hematuria  NEUROLOGICAL:  (   ) numbness or weakness   All other review of systems is negative unless indicated above    Vital Signs Last 24 Hrs  T(C): 36.8 (03 Jan 2019 11:11), Max: 36.9 (02 Jan 2019 18:50)  T(F): 98.3 (03 Jan 2019 11:11), Max: 98.4 (02 Jan 2019 18:50)  HR: 69 (03 Jan 2019 11:11) (69 - 78)  BP: 197/85 (03 Jan 2019 11:11) (186/83 - 197/85)  BP(mean): --  RR: 18 (03 Jan 2019 11:11) (18 - 18)  SpO2: 98% (03 Jan 2019 11:11) (97% - 100%)    I&O's Summary    02 Jan 2019 07:01  -  03 Jan 2019 07:00  --------------------------------------------------------  IN: 880 mL / OUT: 1000 mL / NET: -120 mL        CAPILLARY BLOOD GLUCOSE      POCT Blood Glucose.: 79 mg/dL (03 Jan 2019 11:51)  POCT Blood Glucose.: 84 mg/dL (03 Jan 2019 07:44)  POCT Blood Glucose.: 163 mg/dL (02 Jan 2019 22:13)  POCT Blood Glucose.: 188 mg/dL (02 Jan 2019 19:47)      PHYSICAL EXAM:     GENERAL: NAD, well-developed,   	HEAD:  Atraumatic, Normocephalic  	EYES: EOMI, PERRLA, conjunctiva and sclera clear  	NECK: +JVD  	CHEST/LUNG: Clear to auscultation bilaterally; No wheeze  	HEART: crackles b/l lower lobes  	ABDOMEN: Soft, Nontender, Nondistended; Bowel sounds present  	EXTREMITIES:  1+ pitting edema peripheral; 2+ Peripheral Pulses, No clubbing, cyanosis  	PSYCH: AAOx3    SKIN : A small clot around their right subclavian access  MEDICATIONS:  MEDICATIONS  (STANDING):      LABS: All Labs Reviewed:                        8.9    7.6   )-----------( 238      ( 03 Jan 2019 06:29 )             27.3     01-03    130<L>  |  95<L>  |  22  ----------------------------<  82  4.2   |  27  |  4.23<H>    Ca    8.1<L>      03 Jan 2019 06:29  Phos  4.8     01-03  Mg     2.0     01-03      PT/INR - ( 03 Jan 2019 08:52 )   PT: 11.6 sec;   INR: 1.03 ratio         PTT - ( 03 Jan 2019 08:52 )  PTT:30.4 sec      Blood Culture:   Urine Culture      RADIOLOGY/EKG:    ASSESSMENT AND PLAN:  68M PMH CAD/CABG, HFrEF (30%), T2DM, HTN, CKD 5 (not on HD), aortic valve replacement, chronic anemia occasionally requiring transfusions presents with SOB, admitted with acute decompensated systolic heart failure with hypertensive emergency, demand ischemia, worsening renal failure, transient afib RVR.     Problem/Plan - 1:  ·  Problem: Acute systolic heart failure.  Plan: --from med non-adherence, especially with uncontrolled HTN, and worsening CKD5  --requiring nasal cannula oxygen  --patient reports feeling a little better after lasix in the ED. Start lasix 60mg IV BID.     --strict I/Os, daily weights       Problem/Plan - 2:   hypertension  · on  hydralazine 100mg TID, isordil 10mg TID. On IV lasix for CHF. labetalol 300 milliGRAM(s) Oral three times a day  --   Problem/Plan - 3:  ·  Problem: Elevated troponin.  Plan: --hsTrop to 492, up from high 300s last admission.    due  worsening renal failure  --will trend troponin  -       Problem/Plan - 4:  ·  Problem: Respiratory failure with hypoxia.  Plan: --from CHF exacerbation and volume overload  --diuresis  --oxygen PRN.       Problem/Plan - 5:  ·  Problem: Paroxysmal A-fib.  Plan: --converted to sinus rhythm on way to the hospital  --no history of afib. Patient does not seem to have chest pain, palpitations, or dizziness related to it, so unclear if he has been having pAF for a while.         Problem/Plan - 6:  Problem: CKD (chronic kidney disease) stage 5, GFR less than 15 ml/min. Plan: - anyone consistent right now.   --Needs lasix now for HTN emergency and  renal failure.   -  Nephrology  followup noted . for hemodialysis         Problem/Plan - 7:  ·  Problem: T2DM (type 2 diabetes mellitus).  Plan: --On 70/30 at home.  due to hypoglycemia will decrease  lantus   Problem/Plan - 8:  ·  Problem: Chronic anemia.  sip  transfusion 2 unit            DVT PPX:    ADVANCED DIRECTIVE:    DISPOSITION: home after placement of AVF  in [place] Discussed with his wife at the bedside in detail

## 2019-01-03 NOTE — PRE-ANESTHESIA EVALUATION ADULT - NSRADCARDRESULTSFT_GEN_ALL_CORE
TTE: EF 50%, bioprosthetic AV, mild global LV systolic dysfunction, decreased RV function, moderate TR, severe pulm HTN w/RVSP 79

## 2019-01-03 NOTE — PRE-ANESTHESIA EVALUATION ADULT - NSANTHOSAYNRD_GEN_A_CORE
No. MARYJO screening performed.  STOP BANG Legend: 0-2 = LOW Risk; 3-4 = INTERMEDIATE Risk; 5-8 = HIGH Risk

## 2019-01-03 NOTE — PROGRESS NOTE ADULT - ASSESSMENT
68-year-old man presents with CHF exacerbation and CKD now requiring long-term HD access.    Plan:  - OR today for LUE AVF creation  - Protect left arm  - Medical clearance for OR documented in note from 1/2  - Consent signed and in chart    Price Weaver, PGY2  [phone]

## 2019-01-03 NOTE — CHART NOTE - NSCHARTNOTEFT_GEN_A_CORE
Pt s/p planned HD 1/2/19 around the evening, RN called in AM around 3 am noted Right SC permacath  noted oozing blood, as per RN applied pressure dressing, recalled by RN around 04:30 am, pt still with oozing blood. Pt is a 68 y.o. M with PMHx of HTN, DM chronic kidney disease stage V, now presents with uremia. He also has hypertensive urgency with accompanying severe pulmonary hypertension.  Pt s/p transfusion, on Epogen 10,000 units IV at each HD, Hgb 7 prior to transfusion, H/H optimized for planned AVF placement today, CBC recheck planned for this AM.     Vital Signs Last 24 Hrs  T(C): 36.7 (03 Jan 2019 04:05), Max: 37 (02 Jan 2019 11:09)  T(F): 98.1 (03 Jan 2019 04:05), Max: 98.6 (02 Jan 2019 11:09)  HR: 78 (03 Jan 2019 04:05) (65 - 78)  BP: 189/84 (03 Jan 2019 04:05) (175/79 - 197/82)  BP(mean): --  RR: 18 (03 Jan 2019 04:05) (17 - 18)  SpO2: 98% (03 Jan 2019 04:05) (92% - 98%)    CBC Full  -  ( 02 Jan 2019 08:23 )  WBC Count : 7.7 K/uL  Hemoglobin : 7.0 g/dL  Hematocrit : 21.8 %  Platelet Count - Automated : 220 K/uL  Mean Cell Volume : 82.5 fl  Mean Cell Hemoglobin : 26.5 pg  Mean Cell Hemoglobin Concentration : 32.1 gm/dL      A/P: 68 y.o. M with PMHx of HTN, DM chronic kidney disease stage V admitted with uremia, found with hypertensive urgency with accompanying severe pulmonary hypertension.  ESRD now on HD, pt s/p RCW permacath placement 12/31/18. Pt now s/p 2 units of PRBC on 1/2 with HD transfusion,  Hgb 7 prior to transfusion, H/H optimized for planned LUE AVF with Dr. Dotson today, CBC recheck planned for this AM since Hgb 7 prior to transfusion, pt with no h/o of overt bleed, but now noted with ongoing oozing from permacath s/p access for HD last night.      - Reapplied central line dressing to site after cleaning the area, noted with clotted blood around the site.  - Pressure dressing applied to R SC access  - Informed RN to notify Dialysis RN to redo catheter dressing   - Informed RN to monitor site for further drainage.  - Heparin SQ for VTE PPX held for surgery and present bleed.  - f/u CBC this AM    Sapphire Parra, THAI  [phone]

## 2019-01-03 NOTE — BRIEF OPERATIVE NOTE - OPERATION/FINDINGS
Left radiocephalic fistula creation and revision of fistula with ligation of branching veins. Thrill at end of case.

## 2019-01-03 NOTE — PROGRESS NOTE ADULT - SUBJECTIVE AND OBJECTIVE BOX
Vascular Surgery Consult - Daily Progress Note    SUBJECTIVE:  Doing well.   No overnight events.   Patient NPO in anticipation of OR today.     OBJECTIVE:     ** VITAL SIGNS / I&O's **  Vital Signs Last 24 Hrs  T(C): 36.8 (03 Jan 2019 11:11), Max: 36.9 (02 Jan 2019 18:50)  T(F): 98.3 (03 Jan 2019 11:11), Max: 98.4 (02 Jan 2019 18:50)  HR: 69 (03 Jan 2019 11:11) (69 - 78)  BP: 197/85 (03 Jan 2019 11:11) (186/83 - 197/85)  BP(mean): --  RR: 18 (03 Jan 2019 11:11) (18 - 18)  SpO2: 98% (03 Jan 2019 11:11) (97% - 100%)    01-02-19 @ 07:01  -  01-03-19 @ 07:00  --------------------------------------------------------  IN: 880 mL / OUT: 1000 mL / NET: -120 mL      CBC (01-03 @ 06:29)                              8.9<L>                         7.6     )----------------(  238        --    % Neutrophils, --    % Lymphocytes, ANC: --                                  27.3<L>              CBC (01-02 @ 08:23)                              7.0<LL>                         7.7     )----------------(  220        --    % Neutrophils, --    % Lymphocytes, ANC: --                                  21.8<L>                BMP (01-03 @ 06:29)             130<L>  |  95<L>   |  22    		Ca++ --      Ca 8.1<L>             ---------------------------------( 82    		Mg 2.0                4.2     |  27      |  4.23<H>			Ph 4.8<H>  BMP (01-02 @ 05:57)             134<L>  |  95<L>   |  32<H> 		Ca++ --      Ca 8.1<L>             ---------------------------------( 61<L> 		Mg 2.2                4.6     |  26      |  5.69<H>			Ph 5.6<H>      Coags (01-03 @ 08:52)  aPTT 30.4 / INR 1.03 / PT 11.6  Coags (01-02 @ 07:00)  aPTT 30.7 / INR 1.03 / PT 11.5      ** PHYSICAL EXAM **    -- CONSTITUTIONAL: awake, alert, NAD  -- CARDIOVASCULAR: hypertensive, regular rate   -- RESPIRATORY: breathing comfortably   -- EXTREMITIES: warm, well perfused  -- VASCULAR:          RUE: palpable radial         LUE: palpable radial  -- NEUROLOGICAL: motor and sensation symmetric     ** LABS **    **RADS**  VA Duplex Upper Ext Vein Scan, Bilat (12.29.18 @ 12:31)   Right upper extremity    Basilic vein  Upper arm proximal not visualized  Upper arm mid 0.41 cm  Upper arm distal 0.44 cm  Antecubital fossa 0.21 cm  Forearm proximal 0.14 cm  Forearm mid 0.28 cm  Forearm distal 0.22 cm    Cephalic vein  Upper arm proximal 0.44 cm  Upper arm mid 0.40 cm  Upper arm distal 0.52 cm  Antecubital fossa 0.68 cm  Forearm proximal 0.39 cm  Forearm mid 0.38 cm  Forearm distal 0.25 cm    Left upper extremity    Basilic vein  Upper arm proximal not visualized  Upper arm mid 0.51 cm  Upper arm distal 0.25 cm  Antecubital fossa 0.27 cm  Forearm proximal 0.17 cm  Forearm mid 0.11 cm  Forearm distal 0.13 cm    Cephalic vein  Upper arm proximal 0.53 cm  Upper arm mid 0.51 cm  Upper arm distal 0.43 cm  Antecubital fossa 0.59 cm  Forearm proximal 0.37 cm  Forearm mid 0.31 cm  Forearm distal 0.34 cm    Impression: No duplex evidence of DVT in either upper extremity.    Nonocclusive postthrombotic changes in the cephalic veins bilaterally.    Superficial vein mapping as described above.

## 2019-01-03 NOTE — CHART NOTE - NSCHARTNOTEFT_GEN_A_CORE
Post-operative Check    SUBJECTIVE:   No acute events in the immediate post-operative period. Pain well controlled.     OBJECTIVE:  T(C): 36.7 (01-03-19 @ 18:42), Max: 36.8 (01-03-19 @ 11:11)  HR: 61 (01-03-19 @ 19:21) (58 - 78)  BP: 155/57 (01-03-19 @ 19:21) (133/65 - 205/88)  RR: 17 (01-03-19 @ 18:42) (14 - 18)  SpO2: 97% (01-03-19 @ 18:42) (97% - 100%)      01-02-19 @ 07:01  -  01-03-19 @ 07:00  --------------------------------------------------------  IN: 880 mL / OUT: 1000 mL / NET: -120 mL        Physical Exam:   Gen: NAD, quiet  Resp: No increased work of breathing  RUE radial pulse palpable  LUE radial pulse palpable  palpable thrill  LUE dressings soaked; changed.   3 incision sites intact.    ASSESSMENT:   NATANAEL MASSEY is a 68y Male s/p     PLAN:  - Pain management  - dressing changes as needed  - more Abx with dialysis

## 2019-01-04 LAB
ANION GAP SERPL CALC-SCNC: 14 MMOL/L — SIGNIFICANT CHANGE UP (ref 5–17)
BUN SERPL-MCNC: 32 MG/DL — HIGH (ref 7–23)
CALCIUM SERPL-MCNC: 8.1 MG/DL — LOW (ref 8.4–10.5)
CHLORIDE SERPL-SCNC: 94 MMOL/L — LOW (ref 96–108)
CO2 SERPL-SCNC: 25 MMOL/L — SIGNIFICANT CHANGE UP (ref 22–31)
CREAT SERPL-MCNC: 5.53 MG/DL — HIGH (ref 0.5–1.3)
GLUCOSE BLDC GLUCOMTR-MCNC: 107 MG/DL — HIGH (ref 70–99)
GLUCOSE BLDC GLUCOMTR-MCNC: 129 MG/DL — HIGH (ref 70–99)
GLUCOSE BLDC GLUCOMTR-MCNC: 156 MG/DL — HIGH (ref 70–99)
GLUCOSE BLDC GLUCOMTR-MCNC: 94 MG/DL — SIGNIFICANT CHANGE UP (ref 70–99)
GLUCOSE SERPL-MCNC: 112 MG/DL — HIGH (ref 70–99)
HCT VFR BLD CALC: 28.2 % — LOW (ref 39–50)
HGB BLD-MCNC: 8.8 G/DL — LOW (ref 13–17)
MCHC RBC-ENTMCNC: 26.7 PG — LOW (ref 27–34)
MCHC RBC-ENTMCNC: 31.2 GM/DL — LOW (ref 32–36)
MCV RBC AUTO: 85.5 FL — SIGNIFICANT CHANGE UP (ref 80–100)
PLATELET # BLD AUTO: 277 K/UL — SIGNIFICANT CHANGE UP (ref 150–400)
POTASSIUM SERPL-MCNC: 4.4 MMOL/L — SIGNIFICANT CHANGE UP (ref 3.5–5.3)
POTASSIUM SERPL-SCNC: 4.4 MMOL/L — SIGNIFICANT CHANGE UP (ref 3.5–5.3)
RBC # BLD: 3.3 M/UL — LOW (ref 4.2–5.8)
RBC # FLD: 18.9 % — HIGH (ref 10.3–14.5)
SODIUM SERPL-SCNC: 133 MMOL/L — LOW (ref 135–145)
WBC # BLD: 8.3 K/UL — SIGNIFICANT CHANGE UP (ref 3.8–10.5)
WBC # FLD AUTO: 8.3 K/UL — SIGNIFICANT CHANGE UP (ref 3.8–10.5)

## 2019-01-04 RX ADMIN — Medication 667 MILLIGRAM(S): at 08:42

## 2019-01-04 RX ADMIN — INSULIN GLARGINE 4 UNIT(S): 100 INJECTION, SOLUTION SUBCUTANEOUS at 22:13

## 2019-01-04 RX ADMIN — Medication 80 MILLIGRAM(S): at 05:29

## 2019-01-04 RX ADMIN — Medication 300 MILLIGRAM(S): at 13:36

## 2019-01-04 RX ADMIN — ATORVASTATIN CALCIUM 40 MILLIGRAM(S): 80 TABLET, FILM COATED ORAL at 22:12

## 2019-01-04 RX ADMIN — Medication 300 MILLIGRAM(S): at 22:13

## 2019-01-04 RX ADMIN — ISOSORBIDE DINITRATE 10 MILLIGRAM(S): 5 TABLET ORAL at 05:30

## 2019-01-04 RX ADMIN — HEPARIN SODIUM 5000 UNIT(S): 5000 INJECTION INTRAVENOUS; SUBCUTANEOUS at 13:36

## 2019-01-04 RX ADMIN — Medication 81 MILLIGRAM(S): at 12:02

## 2019-01-04 RX ADMIN — Medication 100 MILLIGRAM(S): at 13:36

## 2019-01-04 RX ADMIN — Medication 667 MILLIGRAM(S): at 17:10

## 2019-01-04 RX ADMIN — HEPARIN SODIUM 5000 UNIT(S): 5000 INJECTION INTRAVENOUS; SUBCUTANEOUS at 22:13

## 2019-01-04 RX ADMIN — ISOSORBIDE DINITRATE 10 MILLIGRAM(S): 5 TABLET ORAL at 22:12

## 2019-01-04 RX ADMIN — Medication 300 MILLIGRAM(S): at 05:30

## 2019-01-04 RX ADMIN — Medication 667 MILLIGRAM(S): at 12:02

## 2019-01-04 RX ADMIN — Medication 100 MILLIGRAM(S): at 05:30

## 2019-01-04 RX ADMIN — Medication 1: at 17:10

## 2019-01-04 RX ADMIN — HEPARIN SODIUM 5000 UNIT(S): 5000 INJECTION INTRAVENOUS; SUBCUTANEOUS at 05:29

## 2019-01-04 RX ADMIN — ISOSORBIDE DINITRATE 10 MILLIGRAM(S): 5 TABLET ORAL at 13:36

## 2019-01-04 RX ADMIN — Medication 100 MILLIGRAM(S): at 22:13

## 2019-01-04 NOTE — PROGRESS NOTE ADULT - SUBJECTIVE AND OBJECTIVE BOX
CHIEF COMPLAINT:  Pt seen and examined at bedside. No acute events overnight. still with  Pain  . Dressing changed x1 overnight for SS drainage.   SUBJECTIVE:     REVIEW OF SYSTEMS:    CONSTITUTIONAL: (  x )  weakness,  (  ) fevers or chills  EYES/ENT: (  )visual changes;     NECK: (  ) pain or stiffness  RESPIRATORY:   (  )cough, wheezing, hemoptysis;  (  ) shortness of breath  CARDIOVASCULAR:  (  )chest pain or palpitations  GASTROINTESTINAL:   (  )abdominal or epigastric pain.  (  ) nausea, vomiting, or hematemesis;   (   ) diarrhea or constipation.   GENITOURINARY:   (    ) dysuria, frequency or hematuria  NEUROLOGICAL:  (   ) numbness or weakness   All other review of systems is negative unless indicated above    Vital Signs Last 24 Hrs  T(C): 37.4 (04 Jan 2019 04:00), Max: 37.4 (04 Jan 2019 04:00)  T(F): 99.4 (04 Jan 2019 04:00), Max: 99.4 (04 Jan 2019 04:00)  HR: 68 (04 Jan 2019 04:00) (58 - 69)  BP: 173/65 (04 Jan 2019 04:00) (133/65 - 205/88)  BP(mean): 108 (03 Jan 2019 18:00) (89 - 108)  RR: 18 (04 Jan 2019 04:00) (14 - 18)  SpO2: 97% (04 Jan 2019 04:00) (97% - 100%)    I&O's Summary    03 Jan 2019 07:01  -  04 Jan 2019 07:00  --------------------------------------------------------  IN: 240 mL / OUT: 200 mL / NET: 40 mL        CAPILLARY BLOOD GLUCOSE      POCT Blood Glucose.: 94 mg/dL (04 Jan 2019 07:36)  POCT Blood Glucose.: 178 mg/dL (03 Jan 2019 21:40)  POCT Blood Glucose.: 81 mg/dL (03 Jan 2019 16:37)  POCT Blood Glucose.: 79 mg/dL (03 Jan 2019 11:51)      PHYSICAL EXAM:    Constitutional:  ( x  ) NAD,   (   )awake and alert  HEENT: PERR, EOMI,    Neck: Soft and supple, No LAD, No JVD  Respiratory:  (   x Breath sounds are clear bilaterally,    (   ) wheezing, rales or rhonchi  Cardiovascular:     (  x )S1 and S2, regular rate and rhythm, no Murmurs, gallops or rubs  Gastrointestinal:  (x   )Bowel Sounds present, soft,   (  )nontender, nondistended,    Extremities:    (  ) peripheral edema  Vascular: 2+ peripheral pulses  Neurological:    ( x   )A/O x 3,   (  ) focal deficits  Musculoskeletal:    (  x )  normal strength b/l upper  (     ) normal  lower extremities  Skin:  LEFT fistula    MEDICATIONS:  MEDICATIONS  (STANDING):  aspirin enteric coated 81 milliGRAM(s) Oral daily  atorvastatin 40 milliGRAM(s) Oral at bedtime  calcium acetate 667 milliGRAM(s) Oral three times a day with meals  ceFAZolin   IVPB 1000 milliGRAM(s) IV Intermittent daily  dextrose 5%. 1000 milliLiter(s) (50 mL/Hr) IV Continuous <Continuous>  dextrose 50% Injectable 12.5 Gram(s) IV Push once  dextrose 50% Injectable 25 Gram(s) IV Push once  dextrose 50% Injectable 25 Gram(s) IV Push once  doxercalciferol Injectable 3 MICROGram(s) IV Push <User Schedule>  epoetin theodore Injectable 31363 Unit(s) IV Push <User Schedule>  furosemide    Tablet 80 milliGRAM(s) Oral daily  heparin  Injectable 5000 Unit(s) SubCutaneous every 8 hours  hydrALAZINE 100 milliGRAM(s) Oral three times a day  insulin glargine Injectable (LANTUS) 4 Unit(s) SubCutaneous at bedtime  insulin lispro (HumaLOG) corrective regimen sliding scale   SubCutaneous three times a day before meals  insulin lispro (HumaLOG) corrective regimen sliding scale   SubCutaneous at bedtime  isosorbide   dinitrate Tablet (ISORDIL) 10 milliGRAM(s) Oral three times a day  labetalol 300 milliGRAM(s) Oral three times a day      LABS: All Labs Reviewed:                        8.8    8.30  )-----------( 277      ( 04 Jan 2019 08:15 )             28.2     01-04    133<L>  |  94<L>  |  32<H>  ----------------------------<  112<H>  4.4   |  25  |  5.53<H>    Ca    8.1<L>      04 Jan 2019 06:02  Phos  4.8     01-03  Mg     2.0     01-03      PT/INR - ( 03 Jan 2019 08:52 )   PT: 11.6 sec;   INR: 1.03 ratio         PTT - ( 03 Jan 2019 08:52 )  PTT:30.4 sec      Blood Culture:   Urine Culture      RADIOLOGY/EKG:    ASSESSMENT AND PLAN:   S/P  LUE AVF creation   DC AFTER  RENAL  FU  DVT PPX:    ADVANCED DIRECTIVE:    DISPOSITION:

## 2019-01-04 NOTE — PROGRESS NOTE ADULT - PROBLEM SELECTOR PLAN 1
I have personally  seen and examined the patient today and have noted the findings and formulated the plan of care.
I have seen and examined the patient today and agree with  the  evaluation, assessment and plan of the surgical house officer
I have seen and examined the patient today and agree with  the  evaluation, assessment and plan of the surgical house officer

## 2019-01-04 NOTE — PROGRESS NOTE ADULT - ASSESSMENT
67 yo male with past medical history of HTN, DM chronic kidney disease stage V, now presents with uremia. He also has hypertensive urgency with accompanying severe pulmonary hypertension.  Goals of therapy are blood pressure control and dialysis.     Secondary hyperparathyroidism  Anemia - s/p transfusion, on Epogen 10,000 units IV at each HD  Hypertensive urgency -    ESRD now on HD -    s/p RCW permacath placement 12/31/18;  AVF on 1/3    RECOMMEND:  Renal: Next HD in am  Cardiology: Monitor BPs, Continue labetalol 300mg po tid.  Allergy to ACE. Will not start ACE or ARB.  If the BP remains elevated then Norvasc.  I suspect some of the increased BP is from pain?  Anemia: continue Epogen IV q HD, trend H/H;  If HH remains low then blood xfusion   Endocrine: Cont Hectorol   Vascular: Successful AVF           [facility]  [phone]

## 2019-01-04 NOTE — PROGRESS NOTE ADULT - ASSESSMENT
68 Male s/p Left radiocephalic fistula creation and revision of fistula with ligation of branching veins 1/3/19     PLAN:  - Pain management  - dressing changes as needed  - C/w two more doses of ancef during HD  - Signing off- call with questions  p. [phone] 68 Male s/p Left radiocephalic fistula creation and revision of fistula with ligation of branching veins 1/3/19     PLAN:  - Pain management  - dressing changes as needed  - C/w two more doses of ancef during HD  -doing well from vasc standpoint   f/u in office 2 weeks  continue rt permacath hd access  lue elev prn

## 2019-01-04 NOTE — PROGRESS NOTE ADULT - SUBJECTIVE AND OBJECTIVE BOX
[facility] [phone]        Patient seen and examined in bed.  He had the surgery on the left arm        MEDICATIONS  (STANDING):  aspirin enteric coated 81 milliGRAM(s) Oral daily  atorvastatin 40 milliGRAM(s) Oral at bedtime  calcium acetate 667 milliGRAM(s) Oral three times a day with meals  ceFAZolin   IVPB 1000 milliGRAM(s) IV Intermittent daily  dextrose 5%. 1000 milliLiter(s) (50 mL/Hr) IV Continuous <Continuous>  dextrose 50% Injectable 12.5 Gram(s) IV Push once  dextrose 50% Injectable 25 Gram(s) IV Push once  dextrose 50% Injectable 25 Gram(s) IV Push once  doxercalciferol Injectable 3 MICROGram(s) IV Push <User Schedule>  epoetin theodore Injectable 05483 Unit(s) IV Push <User Schedule>  furosemide    Tablet 80 milliGRAM(s) Oral daily  heparin  Injectable 5000 Unit(s) SubCutaneous every 8 hours  hydrALAZINE 100 milliGRAM(s) Oral three times a day  insulin glargine Injectable (LANTUS) 4 Unit(s) SubCutaneous at bedtime  insulin lispro (HumaLOG) corrective regimen sliding scale   SubCutaneous three times a day before meals  insulin lispro (HumaLOG) corrective regimen sliding scale   SubCutaneous at bedtime  isosorbide   dinitrate Tablet (ISORDIL) 10 milliGRAM(s) Oral three times a day  labetalol 300 milliGRAM(s) Oral three times a day      VITAL:  T(C): , Max: 37.4 (01-04-19 @ 04:00)  T(F): , Max: 99.4 (01-04-19 @ 04:00)  HR: 68 (01-04-19 @ 04:00)  BP: 173/65 (01-04-19 @ 04:00)  BP(mean): 108 (01-03-19 @ 18:00)  RR: 18 (01-04-19 @ 04:00)  SpO2: 97% (01-04-19 @ 04:00)  Wt(kg): --    I and O's:    01-03 @ 07:01  -  01-04 @ 07:00  --------------------------------------------------------  IN: 240 mL / OUT: 200 mL / NET: 40 mL          PHYSICAL EXAM:    Constitutional: NAD  Neck:  No JVD  Respiratory: CTAB/L  Cardiovascular: S1 and S2  Gastrointestinal: BS+, soft, NT/ND  Extremities: No peripheral edema  Neurological: A/O x 3, no focal deficits  Psychiatric: Normal mood, normal affect  : No Palac[place]  Skin: No rashes  Access: Not applicable    LABS:                        8.8    8.30  )-----------( 277      ( 04 Jan 2019 08:15 )             28.2     01-04    133<L>  |  94<L>  |  32<H>  ----------------------------<  112<H>  4.4   |  25  |  5.53<H>    Ca    8.1<L>      04 Jan 2019 06:02  Phos  4.8     01-03  Mg     2.0     01-03            Urine Studies:          RADIOLOGY & ADDITIONAL STUDIES:

## 2019-01-04 NOTE — PROGRESS NOTE ADULT - SUBJECTIVE AND OBJECTIVE BOX
GENERAL SURGERY DAILY PROGRESS NOTE:       Subjective:  Pt seen and examined at bedside. No acute events overnight. Pain controlled. Dressing changed x1 overnight for SS drainage.         Objective:    PE:  Gen: NAD, quiet  Resp: No increased work of breathing  Vasc: Palpable radial/ulnar pulses b/l , palpable thrill  LUE dressings w/ SS drainage on the bandage. No active drainage. No palpable hematomas. Motor/sensation in tact. Bandage changed.   4 incision sites clean/dry/intact.    Vital Signs Last 24 Hrs  T(C): 37.4 (2019 04:00), Max: 37.4 (2019 04:00)  T(F): 99.4 (2019 04:00), Max: 99.4 (2019 04:00)  HR: 68 (2019 04:00) (58 - 78)  BP: 173/65 (2019 04:00) (133/65 - 205/88)  BP(mean): 108 (2019 18:00) (89 - 108)  RR: 18 (2019 04:00) (14 - 18)  SpO2: 97% (2019 04:00) (97% - 100%)    I&O's Detail    2019 07:01  -  2019 07:00  --------------------------------------------------------  IN:    Oral Fluid: 240 mL  Total IN: 240 mL    OUT:    Voided: 200 mL  Total OUT: 200 mL    Total NET: 40 mL          Daily Height in cm: 167.6 (2019 09:11)    Daily Weight in k.1 (2019 07:34)    MEDICATIONS  (STANDING):  aspirin enteric coated 81 milliGRAM(s) Oral daily  atorvastatin 40 milliGRAM(s) Oral at bedtime  calcium acetate 667 milliGRAM(s) Oral three times a day with meals  ceFAZolin   IVPB 1000 milliGRAM(s) IV Intermittent daily  dextrose 5%. 1000 milliLiter(s) (50 mL/Hr) IV Continuous <Continuous>  dextrose 50% Injectable 12.5 Gram(s) IV Push once  dextrose 50% Injectable 25 Gram(s) IV Push once  dextrose 50% Injectable 25 Gram(s) IV Push once  doxercalciferol Injectable 3 MICROGram(s) IV Push <User Schedule>  epoetin theodore Injectable 86750 Unit(s) IV Push <User Schedule>  furosemide    Tablet 80 milliGRAM(s) Oral daily  heparin  Injectable 5000 Unit(s) SubCutaneous every 8 hours  hydrALAZINE 100 milliGRAM(s) Oral three times a day  insulin glargine Injectable (LANTUS) 4 Unit(s) SubCutaneous at bedtime  insulin lispro (HumaLOG) corrective regimen sliding scale   SubCutaneous three times a day before meals  insulin lispro (HumaLOG) corrective regimen sliding scale   SubCutaneous at bedtime  isosorbide   dinitrate Tablet (ISORDIL) 10 milliGRAM(s) Oral three times a day  labetalol 300 milliGRAM(s) Oral three times a day    MEDICATIONS  (PRN):  dextrose 40% Gel 15 Gram(s) Oral once PRN Blood Glucose LESS THAN 70 milliGRAM(s)/deciliter  glucagon  Injectable 1 milliGRAM(s) IntraMuscular once PRN Glucose LESS THAN 70 milligrams/deciliter      LABS:                        8.9    7.6   )-----------( 238      ( 2019 06:29 )             27.3     01-04    133<L>  |  94<L>  |  32<H>  ----------------------------<  112<H>  4.4   |  25  |  5.53<H>    Ca    8.1<L>      2019 06:02  Phos  4.8     -03  Mg     2.0     -03      PT/INR - ( 2019 08:52 )   PT: 11.6 sec;   INR: 1.03 ratio         PTT - ( 2019 08:52 )  PTT:30.4 sec      RADIOLOGY & ADDITIONAL STUDIES: GENERAL SURGERY DAILY PROGRESS NOTE:       Subjective:  Pt seen and examined at bedside. No acute events overnight. Pain controlled. Dressing changed x1 overnight for SS drainage.       Objective:    PE:  Gen: NAD, quiet  Resp: No increased work of breathing  Vasc: Palpable radial/ulnar pulses b/l , palpable thrill  LUE dressings w/ SS drainage on the bandage. No active drainage. No palpable hematomas. Motor/sensation in tact. Bandage changed.   4 incision sites clean/dry/intact.    Vital Signs Last 24 Hrs  T(C): 37.4 (2019 04:00), Max: 37.4 (2019 04:00)  T(F): 99.4 (2019 04:00), Max: 99.4 (2019 04:00)  HR: 68 (2019 04:00) (58 - 78)  BP: 173/65 (2019 04:00) (133/65 - 205/88)  BP(mean): 108 (2019 18:00) (89 - 108)  RR: 18 (2019 04:00) (14 - 18)  SpO2: 97% (2019 04:00) (97% - 100%)    I&O's Detail    2019 07:01  -  2019 07:00  --------------------------------------------------------  IN:    Oral Fluid: 240 mL  Total IN: 240 mL    OUT:    Voided: 200 mL  Total OUT: 200 mL    Total NET: 40 mL      Daily Height in cm: 167.6 (2019 09:11)    Daily Weight in k.1 (2019 07:34)    MEDICATIONS  (STANDING):  aspirin enteric coated 81 milliGRAM(s) Oral daily  atorvastatin 40 milliGRAM(s) Oral at bedtime  calcium acetate 667 milliGRAM(s) Oral three times a day with meals  ceFAZolin   IVPB 1000 milliGRAM(s) IV Intermittent daily  dextrose 5%. 1000 milliLiter(s) (50 mL/Hr) IV Continuous <Continuous>  dextrose 50% Injectable 12.5 Gram(s) IV Push once  dextrose 50% Injectable 25 Gram(s) IV Push once  dextrose 50% Injectable 25 Gram(s) IV Push once  doxercalciferol Injectable 3 MICROGram(s) IV Push <User Schedule>  epoetin theodore Injectable 78614 Unit(s) IV Push <User Schedule>  furosemide    Tablet 80 milliGRAM(s) Oral daily  heparin  Injectable 5000 Unit(s) SubCutaneous every 8 hours  hydrALAZINE 100 milliGRAM(s) Oral three times a day  insulin glargine Injectable (LANTUS) 4 Unit(s) SubCutaneous at bedtime  insulin lispro (HumaLOG) corrective regimen sliding scale   SubCutaneous three times a day before meals  insulin lispro (HumaLOG) corrective regimen sliding scale   SubCutaneous at bedtime  isosorbide   dinitrate Tablet (ISORDIL) 10 milliGRAM(s) Oral three times a day  labetalol 300 milliGRAM(s) Oral three times a day    MEDICATIONS  (PRN):  dextrose 40% Gel 15 Gram(s) Oral once PRN Blood Glucose LESS THAN 70 milliGRAM(s)/deciliter  glucagon  Injectable 1 milliGRAM(s) IntraMuscular once PRN Glucose LESS THAN 70 milligrams/deciliter      LABS:                        8.9    7.6   )-----------( 238      ( 2019 06:29 )             27.3     01-04    133<L>  |  94<L>  |  32<H>  ----------------------------<  112<H>  4.4   |  25  |  5.53<H>    Ca    8.1<L>      2019 06:02  Phos  4.8     -03  Mg     2.0     -03      PT/INR - ( 2019 08:52 )   PT: 11.6 sec;   INR: 1.03 ratio         PTT - ( 2019 08:52 )  PTT:30.4 sec

## 2019-01-04 NOTE — PROGRESS NOTE ADULT - PROBLEM SELECTOR PROBLEM 1
CKD (chronic kidney disease) stage 5, GFR less than 15 ml/min

## 2019-01-05 LAB
ANION GAP SERPL CALC-SCNC: 16 MMOL/L — SIGNIFICANT CHANGE UP (ref 5–17)
BUN SERPL-MCNC: 40 MG/DL — HIGH (ref 7–23)
CALCIUM SERPL-MCNC: 8.5 MG/DL — SIGNIFICANT CHANGE UP (ref 8.4–10.5)
CHLORIDE SERPL-SCNC: 93 MMOL/L — LOW (ref 96–108)
CO2 SERPL-SCNC: 24 MMOL/L — SIGNIFICANT CHANGE UP (ref 22–31)
CREAT SERPL-MCNC: 6.51 MG/DL — HIGH (ref 0.5–1.3)
GLUCOSE BLDC GLUCOMTR-MCNC: 135 MG/DL — HIGH (ref 70–99)
GLUCOSE BLDC GLUCOMTR-MCNC: 145 MG/DL — HIGH (ref 70–99)
GLUCOSE BLDC GLUCOMTR-MCNC: 149 MG/DL — HIGH (ref 70–99)
GLUCOSE BLDC GLUCOMTR-MCNC: 95 MG/DL — SIGNIFICANT CHANGE UP (ref 70–99)
GLUCOSE SERPL-MCNC: 93 MG/DL — SIGNIFICANT CHANGE UP (ref 70–99)
HCT VFR BLD CALC: 27.5 % — LOW (ref 39–50)
HGB BLD-MCNC: 8.7 G/DL — LOW (ref 13–17)
MCHC RBC-ENTMCNC: 26.9 PG — LOW (ref 27–34)
MCHC RBC-ENTMCNC: 31.6 GM/DL — LOW (ref 32–36)
MCV RBC AUTO: 84.9 FL — SIGNIFICANT CHANGE UP (ref 80–100)
PLATELET # BLD AUTO: 283 K/UL — SIGNIFICANT CHANGE UP (ref 150–400)
POTASSIUM SERPL-MCNC: 4.4 MMOL/L — SIGNIFICANT CHANGE UP (ref 3.5–5.3)
POTASSIUM SERPL-SCNC: 4.4 MMOL/L — SIGNIFICANT CHANGE UP (ref 3.5–5.3)
RBC # BLD: 3.24 M/UL — LOW (ref 4.2–5.8)
RBC # FLD: 19 % — HIGH (ref 10.3–14.5)
SODIUM SERPL-SCNC: 133 MMOL/L — LOW (ref 135–145)
WBC # BLD: 7.14 K/UL — SIGNIFICANT CHANGE UP (ref 3.8–10.5)
WBC # FLD AUTO: 7.14 K/UL — SIGNIFICANT CHANGE UP (ref 3.8–10.5)

## 2019-01-05 RX ORDER — SENNA PLUS 8.6 MG/1
2 TABLET ORAL AT BEDTIME
Qty: 0 | Refills: 0 | Status: DISCONTINUED | OUTPATIENT
Start: 2019-01-05 | End: 2019-01-06

## 2019-01-05 RX ORDER — HYDRALAZINE HCL 50 MG
5 TABLET ORAL ONCE
Qty: 0 | Refills: 0 | Status: COMPLETED | OUTPATIENT
Start: 2019-01-05 | End: 2019-01-05

## 2019-01-05 RX ORDER — DOCUSATE SODIUM 100 MG
100 CAPSULE ORAL
Qty: 0 | Refills: 0 | Status: DISCONTINUED | OUTPATIENT
Start: 2019-01-05 | End: 2019-01-06

## 2019-01-05 RX ADMIN — Medication 100 MILLIGRAM(S): at 16:54

## 2019-01-05 RX ADMIN — Medication 100 MILLIGRAM(S): at 06:05

## 2019-01-05 RX ADMIN — Medication 667 MILLIGRAM(S): at 16:55

## 2019-01-05 RX ADMIN — Medication 100 MILLIGRAM(S): at 21:26

## 2019-01-05 RX ADMIN — ERYTHROPOIETIN 10000 UNIT(S): 10000 INJECTION, SOLUTION INTRAVENOUS; SUBCUTANEOUS at 14:59

## 2019-01-05 RX ADMIN — Medication 300 MILLIGRAM(S): at 21:26

## 2019-01-05 RX ADMIN — HEPARIN SODIUM 5000 UNIT(S): 5000 INJECTION INTRAVENOUS; SUBCUTANEOUS at 06:05

## 2019-01-05 RX ADMIN — ATORVASTATIN CALCIUM 40 MILLIGRAM(S): 80 TABLET, FILM COATED ORAL at 21:26

## 2019-01-05 RX ADMIN — Medication 667 MILLIGRAM(S): at 12:11

## 2019-01-05 RX ADMIN — Medication 5 MILLIGRAM(S): at 23:05

## 2019-01-05 RX ADMIN — DOXERCALCIFEROL 3 MICROGRAM(S): 2.5 CAPSULE ORAL at 14:58

## 2019-01-05 RX ADMIN — Medication 300 MILLIGRAM(S): at 06:05

## 2019-01-05 RX ADMIN — ISOSORBIDE DINITRATE 10 MILLIGRAM(S): 5 TABLET ORAL at 06:05

## 2019-01-05 RX ADMIN — INSULIN GLARGINE 4 UNIT(S): 100 INJECTION, SOLUTION SUBCUTANEOUS at 21:32

## 2019-01-05 RX ADMIN — SENNA PLUS 2 TABLET(S): 8.6 TABLET ORAL at 21:26

## 2019-01-05 RX ADMIN — Medication 80 MILLIGRAM(S): at 06:05

## 2019-01-05 RX ADMIN — Medication 81 MILLIGRAM(S): at 12:11

## 2019-01-05 RX ADMIN — ISOSORBIDE DINITRATE 10 MILLIGRAM(S): 5 TABLET ORAL at 21:26

## 2019-01-05 NOTE — PROGRESS NOTE ADULT - SUBJECTIVE AND OBJECTIVE BOX
GENERAL SURGERY DAILY PROGRESS NOTE:       Subjective:  Pt seen and examined at bedside. No acute events overnight. Pain controlled.         Objective:    PE:  Gen: NAD, quiet  Resp: No increased work of breathing  Vasc: Palpable radial/ulnar pulses b/l , palpable thrill  LUE dressings w/ SS drainage on the bandage. No active drainage. No palpable hematomas. Motor/sensation in tact. Bandage changed.   4 incision sites clean/dry/intact.    Vital Signs Last 24 Hrs  T(C): 36.6 (2019 04:31), Max: 36.7 (2019 21:41)  T(F): 97.8 (2019 04:31), Max: 98.1 (2019 21:41)  HR: 66 (2019 04:50) (60 - 66)  BP: 172/86 (2019 04:50) (149/63 - 201/93)  BP(mean): --  RR: 18 (2019 04:31) (17 - 18)  SpO2: 98% (2019 04:31) (96% - 98%)    I&O's Detail    2019 07:01  -  2019 07:00  --------------------------------------------------------  IN:    Oral Fluid: 1190 mL  Total IN: 1190 mL    OUT:  Total OUT: 0 mL    Total NET: 1190 mL          Daily     Daily Weight in k.5 (2019 07:34)    MEDICATIONS  (STANDING):  aspirin enteric coated 81 milliGRAM(s) Oral daily  atorvastatin 40 milliGRAM(s) Oral at bedtime  calcium acetate 667 milliGRAM(s) Oral three times a day with meals  ceFAZolin   IVPB 1000 milliGRAM(s) IV Intermittent daily  dextrose 5%. 1000 milliLiter(s) (50 mL/Hr) IV Continuous <Continuous>  dextrose 50% Injectable 12.5 Gram(s) IV Push once  dextrose 50% Injectable 25 Gram(s) IV Push once  dextrose 50% Injectable 25 Gram(s) IV Push once  doxercalciferol Injectable 3 MICROGram(s) IV Push <User Schedule>  epoetin theodore Injectable 94072 Unit(s) IV Push <User Schedule>  furosemide    Tablet 80 milliGRAM(s) Oral daily  heparin  Injectable 5000 Unit(s) SubCutaneous every 8 hours  hydrALAZINE 100 milliGRAM(s) Oral three times a day  insulin glargine Injectable (LANTUS) 4 Unit(s) SubCutaneous at bedtime  insulin lispro (HumaLOG) corrective regimen sliding scale   SubCutaneous three times a day before meals  insulin lispro (HumaLOG) corrective regimen sliding scale   SubCutaneous at bedtime  isosorbide   dinitrate Tablet (ISORDIL) 10 milliGRAM(s) Oral three times a day  labetalol 300 milliGRAM(s) Oral three times a day    MEDICATIONS  (PRN):  dextrose 40% Gel 15 Gram(s) Oral once PRN Blood Glucose LESS THAN 70 milliGRAM(s)/deciliter  glucagon  Injectable 1 milliGRAM(s) IntraMuscular once PRN Glucose LESS THAN 70 milligrams/deciliter      LABS:                        8.7    7.14  )-----------( 283      ( 2019 10:40 )             27.5     01-05    133<L>  |  93<L>  |  40<H>  ----------------------------<  93  4.4   |  24  |  6.51<H>    Ca    8.5      2019 07:15            RADIOLOGY & ADDITIONAL STUDIES:

## 2019-01-05 NOTE — PROGRESS NOTE ADULT - ASSESSMENT
68 Male s/p Left radiocephalic fistula creation and revision of fistula with ligation of branching veins 1/3/19     PLAN:  - Pain management  - dressing changes daily. Paint incisions daily w/ betadine. Serosanguinous drainage to be expected   - f/u in office 2 weeks w/ Dr. Dotson   - continue rt permacath hd access  - lue elev prn   - Signing off. Call with questions     Vascular surgery   p. [phone]

## 2019-01-05 NOTE — PROGRESS NOTE ADULT - SUBJECTIVE AND OBJECTIVE BOX
CHIEF COMPLAINT:    SUBJECTIVE:     REVIEW OF SYSTEMS:    CONSTITUTIONAL: (  )  weakness,  (  ) fevers or chills  EYES/ENT: (  )visual changes;     NECK: (  ) pain or stiffness  RESPIRATORY:   (  )cough, wheezing, hemoptysis;  (  ) shortness of breath  CARDIOVASCULAR:  (  )chest pain or palpitations  GASTROINTESTINAL:   (  )abdominal or epigastric pain.  (  ) nausea, vomiting, or hematemesis;   (   ) diarrhea or constipation.   GENITOURINARY:   (    ) dysuria, frequency or hematuria  NEUROLOGICAL:  (   ) numbness or weakness   All other review of systems is negative unless indicated above    Vital Signs Last 24 Hrs  T(C): 36.6 (05 Jan 2019 13:01), Max: 36.7 (04 Jan 2019 21:41)  T(F): 97.8 (05 Jan 2019 13:01), Max: 98.1 (04 Jan 2019 21:41)  HR: 61 (05 Jan 2019 13:01) (61 - 66)  BP: 175/79 (05 Jan 2019 13:01) (172/86 - 201/93)  BP(mean): --  RR: 18 (05 Jan 2019 13:01) (17 - 18)  SpO2: 96% (05 Jan 2019 13:01) (96% - 98%)    I&O's Summary    04 Jan 2019 07:01  -  05 Jan 2019 07:00  --------------------------------------------------------  IN: 1190 mL / OUT: 0 mL / NET: 1190 mL    05 Jan 2019 07:01  -  05 Jan 2019 18:26  --------------------------------------------------------  IN: 50 mL / OUT: 200 mL / NET: -150 mL        CAPILLARY BLOOD GLUCOSE      POCT Blood Glucose.: 145 mg/dL (05 Jan 2019 16:47)  POCT Blood Glucose.: 149 mg/dL (05 Jan 2019 11:58)  POCT Blood Glucose.: 95 mg/dL (05 Jan 2019 08:01)  POCT Blood Glucose.: 129 mg/dL (04 Jan 2019 21:44)      PHYSICAL EXAM:    Constitutional:  (   ) NAD,   (   )awake and alert  HEENT: PERR, EOMI,    Neck: Soft and supple, No LAD, No JVD  Respiratory:  (    Breath sounds are clear bilaterally,    (   ) wheezing, rales or rhonchi  Cardiovascular:     (   )S1 and S2, regular rate and rhythm, no Murmurs, gallops or rubs  Gastrointestinal:  (   )Bowel Sounds present, soft,   (  )nontender, nondistended,    Extremities:    (  ) peripheral edema  Vascular: 2+ peripheral pulses  Neurological:    (    )A/O x 3,   (  ) focal deficits  Musculoskeletal:    (   )  normal strength b/l upper  (     ) normal  lower extremities  Skin: No rashes    MEDICATIONS:  MEDICATIONS  (STANDING):  aspirin enteric coated 81 milliGRAM(s) Oral daily  atorvastatin 40 milliGRAM(s) Oral at bedtime  calcium acetate 667 milliGRAM(s) Oral three times a day with meals  dextrose 5%. 1000 milliLiter(s) (50 mL/Hr) IV Continuous <Continuous>  dextrose 50% Injectable 12.5 Gram(s) IV Push once  dextrose 50% Injectable 25 Gram(s) IV Push once  dextrose 50% Injectable 25 Gram(s) IV Push once  doxercalciferol Injectable 3 MICROGram(s) IV Push <User Schedule>  epoetin theodore Injectable 86899 Unit(s) IV Push <User Schedule>  furosemide    Tablet 80 milliGRAM(s) Oral daily  heparin  Injectable 5000 Unit(s) SubCutaneous every 8 hours  hydrALAZINE 100 milliGRAM(s) Oral three times a day  insulin glargine Injectable (LANTUS) 4 Unit(s) SubCutaneous at bedtime  insulin lispro (HumaLOG) corrective regimen sliding scale   SubCutaneous three times a day before meals  insulin lispro (HumaLOG) corrective regimen sliding scale   SubCutaneous at bedtime  isosorbide   dinitrate Tablet (ISORDIL) 10 milliGRAM(s) Oral three times a day  labetalol 300 milliGRAM(s) Oral three times a day      LABS: All Labs Reviewed:                        8.7    7.14  )-----------( 283      ( 05 Jan 2019 10:40 )             27.5     01-05    133<L>  |  93<L>  |  40<H>  ----------------------------<  93  4.4   |  24  |  6.51<H>    Ca    8.5      05 Jan 2019 07:15            Blood Culture:   Urine Culture      RADIOLOGY/EKG:    ASSESSMENT AND PLAN:    DVT PPX:    ADVANCED DIRECTIVE:    DISPOSITION: CHIEF COMPLAINT:  Pt seen and examined at bedside. No acute events overnight. Pain controlled  SUBJECTIVE:     REVIEW OF SYSTEMS:    CONSTITUTIONAL: (x  )  weakness,  (  ) fevers or chills  EYES/ENT: (  )visual changes;     NECK: (  ) pain or stiffness  RESPIRATORY:   (  )cough, wheezing, hemoptysis;  (  ) shortness of breath  CARDIOVASCULAR:  (  )chest pain or palpitations  GASTROINTESTINAL:   (  )abdominal or epigastric pain.  (  ) nausea, vomiting, or hematemesis;   (   ) diarrhea or constipation.   GENITOURINARY:   (    ) dysuria, frequency or hematuria  NEUROLOGICAL:  (   ) numbness or weakness   All other review of systems is negative unless indicated above    Vital Signs Last 24 Hrs  T(C): 36.6 (05 Jan 2019 13:01), Max: 36.7 (04 Jan 2019 21:41)  T(F): 97.8 (05 Jan 2019 13:01), Max: 98.1 (04 Jan 2019 21:41)  HR: 61 (05 Jan 2019 13:01) (61 - 66)  BP: 175/79 (05 Jan 2019 13:01) (172/86 - 201/93)  BP(mean): --  RR: 18 (05 Jan 2019 13:01) (17 - 18)  SpO2: 96% (05 Jan 2019 13:01) (96% - 98%)    I&O's Summary    04 Jan 2019 07:01  -  05 Jan 2019 07:00  --------------------------------------------------------  IN: 1190 mL / OUT: 0 mL / NET: 1190 mL    05 Jan 2019 07:01  -  05 Jan 2019 18:26  --------------------------------------------------------  IN: 50 mL / OUT: 200 mL / NET: -150 mL        CAPILLARY BLOOD GLUCOSE      POCT Blood Glucose.: 145 mg/dL (05 Jan 2019 16:47)  POCT Blood Glucose.: 149 mg/dL (05 Jan 2019 11:58)  POCT Blood Glucose.: 95 mg/dL (05 Jan 2019 08:01)  POCT Blood Glucose.: 129 mg/dL (04 Jan 2019 21:44)      PHYSICAL EXAM:    Constitutional:  (  x ) NAD,   ( x  )awake and alert  HEENT: PERR, EOMI,    Neck: Soft and supple, No LAD, No JVD  Respiratory:  (  x  Breath sounds are clear bilaterally,    (   ) wheezing, rales or rhonchi  Cardiovascular:     ( x  )S1 and S2, regular rate and rhythm, no Murmurs, gallops or rubs  Gastrointestinal:  ( x  )Bowel Sounds present, soft,   (  )nontender, nondistended,    Extremities:   LUE dressings w/ SS drainage on the bandage. No active drainage. No palpable hematomas  4 incision sites clean/dry/intact.  Neurological:    (  x  )A/O x 3,   (  ) focal deficits  Musculoskeletal:    ( x  )  normal strength b/l upper  (   x  ) normal  lower extremities  Skin: No rashes    MEDICATIONS:  MEDICATIONS  (STANDING):  aspirin enteric coated 81 milliGRAM(s) Oral daily  atorvastatin 40 milliGRAM(s) Oral at bedtime  calcium acetate 667 milliGRAM(s) Oral three times a day with meals  dextrose 5%. 1000 milliLiter(s) (50 mL/Hr) IV Continuous <Continuous>  dextrose 50% Injectable 12.5 Gram(s) IV Push once  dextrose 50% Injectable 25 Gram(s) IV Push once  dextrose 50% Injectable 25 Gram(s) IV Push once  doxercalciferol Injectable 3 MICROGram(s) IV Push <User Schedule>  epoetin theodore Injectable 89711 Unit(s) IV Push <User Schedule>  furosemide    Tablet 80 milliGRAM(s) Oral daily  heparin  Injectable 5000 Unit(s) SubCutaneous every 8 hours  hydrALAZINE 100 milliGRAM(s) Oral three times a day  insulin glargine Injectable (LANTUS) 4 Unit(s) SubCutaneous at bedtime  insulin lispro (HumaLOG) corrective regimen sliding scale   SubCutaneous three times a day before meals  insulin lispro (HumaLOG) corrective regimen sliding scale   SubCutaneous at bedtime  isosorbide   dinitrate Tablet (ISORDIL) 10 milliGRAM(s) Oral three times a day  labetalol 300 milliGRAM(s) Oral three times a day      LABS: All Labs Reviewed:                        8.7    7.14  )-----------( 283      ( 05 Jan 2019 10:40 )             27.5     01-05    133<L>  |  93<L>  |  40<H>  ----------------------------<  93  4.4   |  24  |  6.51<H>    Ca    8.5      05 Jan 2019 07:15            Blood Culture:   Urine Culture      RADIOLOGY/EKG:    ASSESSMENT AND PLAN:    68 Male s/p Left radiocephalic fistula creation and revision of fistula with ligation of branching veins 1/3/19   68M PMH CAD/CABG, HFrEF (30%), T2DM, HTN, CKD 5 (not on HD), aortic valve replacement, chronic anemia occasionally requiring transfusions presents with SOB, admitted with acute decompensated systolic heart failure with hypertensive emergency, demand ischemia, worsening renal failure, transient afib RVR.     Problem/Plan - 1:  ·  Problem: Acute systolic heart failure.  Plan: --from med non-adherence, especially with uncontrolled HTN, and worsening CKD5     --patient reports feeling a little better after lasix in the ED. Start lasix 60mg IV BID.     --strict I/Os, daily weights       Problem/Plan - 2:   hypertension  · on  hydralazine 100mg TID, isordil 10mg TID. On IV lasix for CHF. labetalol 300 milliGRAM(s) Oral three times a day  --   Problem/Plan - 3:         renal failure started on hemodialysis, condition  improving - continue rt permacath hd access   DVT PPX:     -       Problem/Plan - 4:  ·  Problem: Respiratory failure with hypoxia.  Plan: --from CHF exacerbation and volume overload  --diuresis  --oxygen PRN.       Problem/Plan - 5:  ·  Problem: Paroxysmal A-fib.  Plan: --converted to sinus rhythm on way to the hospital  --no history of afib.             DISPOSITION: home

## 2019-01-06 VITALS — SYSTOLIC BLOOD PRESSURE: 174 MMHG | DIASTOLIC BLOOD PRESSURE: 70 MMHG

## 2019-01-06 LAB
ANION GAP SERPL CALC-SCNC: 11 MMOL/L — SIGNIFICANT CHANGE UP (ref 5–17)
BUN SERPL-MCNC: 26 MG/DL — HIGH (ref 7–23)
CALCIUM SERPL-MCNC: 8.4 MG/DL — SIGNIFICANT CHANGE UP (ref 8.4–10.5)
CHLORIDE SERPL-SCNC: 95 MMOL/L — LOW (ref 96–108)
CO2 SERPL-SCNC: 27 MMOL/L — SIGNIFICANT CHANGE UP (ref 22–31)
CREAT SERPL-MCNC: 4.64 MG/DL — HIGH (ref 0.5–1.3)
GLUCOSE BLDC GLUCOMTR-MCNC: 103 MG/DL — HIGH (ref 70–99)
GLUCOSE BLDC GLUCOMTR-MCNC: 108 MG/DL — HIGH (ref 70–99)
GLUCOSE BLDC GLUCOMTR-MCNC: 123 MG/DL — HIGH (ref 70–99)
GLUCOSE SERPL-MCNC: 85 MG/DL — SIGNIFICANT CHANGE UP (ref 70–99)
HCT VFR BLD CALC: 26.3 % — LOW (ref 39–50)
HGB BLD-MCNC: 8.2 G/DL — LOW (ref 13–17)
MCHC RBC-ENTMCNC: 26.7 PG — LOW (ref 27–34)
MCHC RBC-ENTMCNC: 31.2 GM/DL — LOW (ref 32–36)
MCV RBC AUTO: 85.7 FL — SIGNIFICANT CHANGE UP (ref 80–100)
PLATELET # BLD AUTO: 294 K/UL — SIGNIFICANT CHANGE UP (ref 150–400)
POTASSIUM SERPL-MCNC: 4.1 MMOL/L — SIGNIFICANT CHANGE UP (ref 3.5–5.3)
POTASSIUM SERPL-SCNC: 4.1 MMOL/L — SIGNIFICANT CHANGE UP (ref 3.5–5.3)
RBC # BLD: 3.07 M/UL — LOW (ref 4.2–5.8)
RBC # FLD: 19 % — HIGH (ref 10.3–14.5)
SODIUM SERPL-SCNC: 133 MMOL/L — LOW (ref 135–145)
WBC # BLD: 7.47 K/UL — SIGNIFICANT CHANGE UP (ref 3.8–10.5)
WBC # FLD AUTO: 7.47 K/UL — SIGNIFICANT CHANGE UP (ref 3.8–10.5)

## 2019-01-06 PROCEDURE — 82803 BLOOD GASES ANY COMBINATION: CPT

## 2019-01-06 PROCEDURE — 84100 ASSAY OF PHOSPHORUS: CPT

## 2019-01-06 PROCEDURE — 87340 HEPATITIS B SURFACE AG IA: CPT

## 2019-01-06 PROCEDURE — 86705 HEP B CORE ANTIBODY IGM: CPT

## 2019-01-06 PROCEDURE — P9011: CPT

## 2019-01-06 PROCEDURE — 84295 ASSAY OF SERUM SODIUM: CPT

## 2019-01-06 PROCEDURE — 93306 TTE W/DOPPLER COMPLETE: CPT

## 2019-01-06 PROCEDURE — 85730 THROMBOPLASTIN TIME PARTIAL: CPT

## 2019-01-06 PROCEDURE — 82962 GLUCOSE BLOOD TEST: CPT

## 2019-01-06 PROCEDURE — 83735 ASSAY OF MAGNESIUM: CPT

## 2019-01-06 PROCEDURE — 80048 BASIC METABOLIC PNL TOTAL CA: CPT

## 2019-01-06 PROCEDURE — 85027 COMPLETE CBC AUTOMATED: CPT

## 2019-01-06 PROCEDURE — 86850 RBC ANTIBODY SCREEN: CPT

## 2019-01-06 PROCEDURE — 84132 ASSAY OF SERUM POTASSIUM: CPT

## 2019-01-06 PROCEDURE — 83550 IRON BINDING TEST: CPT

## 2019-01-06 PROCEDURE — 83605 ASSAY OF LACTIC ACID: CPT

## 2019-01-06 PROCEDURE — 96375 TX/PRO/DX INJ NEW DRUG ADDON: CPT

## 2019-01-06 PROCEDURE — 83540 ASSAY OF IRON: CPT

## 2019-01-06 PROCEDURE — 83036 HEMOGLOBIN GLYCOSYLATED A1C: CPT

## 2019-01-06 PROCEDURE — 84484 ASSAY OF TROPONIN QUANT: CPT

## 2019-01-06 PROCEDURE — 82947 ASSAY GLUCOSE BLOOD QUANT: CPT

## 2019-01-06 PROCEDURE — 86709 HEPATITIS A IGM ANTIBODY: CPT

## 2019-01-06 PROCEDURE — 82728 ASSAY OF FERRITIN: CPT

## 2019-01-06 PROCEDURE — 99261: CPT

## 2019-01-06 PROCEDURE — 86923 COMPATIBILITY TEST ELECTRIC: CPT

## 2019-01-06 PROCEDURE — 86706 HEP B SURFACE ANTIBODY: CPT

## 2019-01-06 PROCEDURE — 36556 INSERT NON-TUNNEL CV CATH: CPT

## 2019-01-06 PROCEDURE — 83880 ASSAY OF NATRIURETIC PEPTIDE: CPT

## 2019-01-06 PROCEDURE — 99291 CRITICAL CARE FIRST HOUR: CPT | Mod: 25

## 2019-01-06 PROCEDURE — 36430 TRANSFUSION BLD/BLD COMPNT: CPT

## 2019-01-06 PROCEDURE — 82435 ASSAY OF BLOOD CHLORIDE: CPT

## 2019-01-06 PROCEDURE — P9016: CPT

## 2019-01-06 PROCEDURE — 76937 US GUIDE VASCULAR ACCESS: CPT

## 2019-01-06 PROCEDURE — 71045 X-RAY EXAM CHEST 1 VIEW: CPT

## 2019-01-06 PROCEDURE — C1894: CPT

## 2019-01-06 PROCEDURE — 86803 HEPATITIS C AB TEST: CPT

## 2019-01-06 PROCEDURE — C1769: CPT

## 2019-01-06 PROCEDURE — 36558 INSERT TUNNELED CV CATH: CPT

## 2019-01-06 PROCEDURE — 86901 BLOOD TYPING SEROLOGIC RH(D): CPT

## 2019-01-06 PROCEDURE — C1752: CPT

## 2019-01-06 PROCEDURE — 86900 BLOOD TYPING SEROLOGIC ABO: CPT

## 2019-01-06 PROCEDURE — 85014 HEMATOCRIT: CPT

## 2019-01-06 PROCEDURE — 85610 PROTHROMBIN TIME: CPT

## 2019-01-06 PROCEDURE — C1889: CPT

## 2019-01-06 PROCEDURE — 93970 EXTREMITY STUDY: CPT

## 2019-01-06 PROCEDURE — 80053 COMPREHEN METABOLIC PANEL: CPT

## 2019-01-06 PROCEDURE — 93005 ELECTROCARDIOGRAM TRACING: CPT

## 2019-01-06 PROCEDURE — 77001 FLUOROGUIDE FOR VEIN DEVICE: CPT

## 2019-01-06 PROCEDURE — 96374 THER/PROPH/DIAG INJ IV PUSH: CPT

## 2019-01-06 PROCEDURE — 83970 ASSAY OF PARATHORMONE: CPT

## 2019-01-06 PROCEDURE — 86704 HEP B CORE ANTIBODY TOTAL: CPT

## 2019-01-06 PROCEDURE — C1750: CPT

## 2019-01-06 PROCEDURE — 82330 ASSAY OF CALCIUM: CPT

## 2019-01-06 PROCEDURE — 97162 PT EVAL MOD COMPLEX 30 MIN: CPT

## 2019-01-06 PROCEDURE — 82310 ASSAY OF CALCIUM: CPT

## 2019-01-06 RX ORDER — LABETALOL HCL 100 MG
1 TABLET ORAL
Qty: 90 | Refills: 0 | OUTPATIENT
Start: 2019-01-06 | End: 2019-02-04

## 2019-01-06 RX ORDER — HYDRALAZINE HCL 50 MG
10 TABLET ORAL ONCE
Qty: 0 | Refills: 0 | Status: COMPLETED | OUTPATIENT
Start: 2019-01-06 | End: 2019-01-06

## 2019-01-06 RX ORDER — DOXERCALCIFEROL 2.5 UG/1
1 CAPSULE ORAL
Qty: 12 | Refills: 0 | OUTPATIENT
Start: 2019-01-06 | End: 2019-02-04

## 2019-01-06 RX ORDER — ATORVASTATIN CALCIUM 80 MG/1
1 TABLET, FILM COATED ORAL
Qty: 30 | Refills: 0 | OUTPATIENT
Start: 2019-01-06 | End: 2019-02-04

## 2019-01-06 RX ORDER — AMLODIPINE BESYLATE 2.5 MG/1
1 TABLET ORAL
Qty: 30 | Refills: 0 | OUTPATIENT
Start: 2019-01-06 | End: 2019-02-04

## 2019-01-06 RX ORDER — AMLODIPINE BESYLATE 2.5 MG/1
5 TABLET ORAL DAILY
Qty: 0 | Refills: 0 | Status: DISCONTINUED | OUTPATIENT
Start: 2019-01-06 | End: 2019-01-06

## 2019-01-06 RX ORDER — CLOPIDOGREL BISULFATE 75 MG/1
1 TABLET, FILM COATED ORAL
Qty: 0 | Refills: 0 | COMMUNITY

## 2019-01-06 RX ORDER — METOPROLOL TARTRATE 50 MG
1 TABLET ORAL
Qty: 0 | Refills: 0 | COMMUNITY

## 2019-01-06 RX ORDER — CALCIUM ACETATE 667 MG
1 TABLET ORAL
Qty: 90 | Refills: 0 | OUTPATIENT
Start: 2019-01-06 | End: 2019-02-04

## 2019-01-06 RX ORDER — ERYTHROPOIETIN 10000 [IU]/ML
0 INJECTION, SOLUTION INTRAVENOUS; SUBCUTANEOUS
Qty: 0 | Refills: 0 | COMMUNITY

## 2019-01-06 RX ORDER — DOCUSATE SODIUM 100 MG
1 CAPSULE ORAL
Qty: 60 | Refills: 0 | OUTPATIENT
Start: 2019-01-06 | End: 2019-02-04

## 2019-01-06 RX ORDER — ISOSORBIDE DINITRATE 5 MG/1
1 TABLET ORAL
Qty: 90 | Refills: 0 | OUTPATIENT
Start: 2019-01-06 | End: 2019-02-04

## 2019-01-06 RX ORDER — HYDRALAZINE HCL 50 MG
1 TABLET ORAL
Qty: 90 | Refills: 0 | OUTPATIENT
Start: 2019-01-06 | End: 2019-02-04

## 2019-01-06 RX ADMIN — ISOSORBIDE DINITRATE 10 MILLIGRAM(S): 5 TABLET ORAL at 13:21

## 2019-01-06 RX ADMIN — Medication 667 MILLIGRAM(S): at 13:22

## 2019-01-06 RX ADMIN — Medication 300 MILLIGRAM(S): at 13:22

## 2019-01-06 RX ADMIN — Medication 100 MILLIGRAM(S): at 17:24

## 2019-01-06 RX ADMIN — ISOSORBIDE DINITRATE 10 MILLIGRAM(S): 5 TABLET ORAL at 05:21

## 2019-01-06 RX ADMIN — AMLODIPINE BESYLATE 5 MILLIGRAM(S): 2.5 TABLET ORAL at 13:21

## 2019-01-06 RX ADMIN — Medication 100 MILLIGRAM(S): at 05:22

## 2019-01-06 RX ADMIN — Medication 10 MILLIGRAM(S): at 17:23

## 2019-01-06 RX ADMIN — HEPARIN SODIUM 5000 UNIT(S): 5000 INJECTION INTRAVENOUS; SUBCUTANEOUS at 05:22

## 2019-01-06 RX ADMIN — Medication 300 MILLIGRAM(S): at 05:22

## 2019-01-06 RX ADMIN — Medication 81 MILLIGRAM(S): at 13:22

## 2019-01-06 RX ADMIN — Medication 667 MILLIGRAM(S): at 08:22

## 2019-01-06 RX ADMIN — Medication 80 MILLIGRAM(S): at 05:21

## 2019-01-06 RX ADMIN — Medication 100 MILLIGRAM(S): at 13:22

## 2019-01-06 RX ADMIN — Medication 667 MILLIGRAM(S): at 17:24

## 2019-01-06 NOTE — PROVIDER CONTACT NOTE (OTHER) - REASON
Elevated BP
Hypertension
Hypoglycemia
IJ Roseanna Oozing Blood
PT Hemoglobin 6.2 and hematocrit 20.0
Pt's right IJ appears to be bleeding.
RUE swelling
Right SC Permacath dressing site bleeding
elevated BP
high /85
hypertension
low blood sugar
manual BP: 184/72 R arm
pt /100 HR 59
pt c/o severe L leg pain
Pt FS 65, repeat 81
pt is NPO for am permacath procedure and HD, should all BP meds be administered?

## 2019-01-06 NOTE — CHART NOTE - NSCHARTNOTEFT_GEN_A_CORE
Called by RN that, pt was noted to have BP of 184/72 at HR 92. Pt was evaluated. Denies CP/SOB/Palpitations/Diaphoresis, HA/Dizziness/ Blurry vision/ syncope, fever/ chills, Abdominal Pain//N/V/D/C.     PAST MEDICAL & SURGICAL HISTORY:  HTN (hypertension)  Aortic valve replaced  CKD (chronic kidney disease)  Anemia  CHF (congestive heart failure)  Diabetes mellitus  Hypertension  S/P CABG (coronary artery bypass graft)  S/P appendectomy    MEDICATIONS  (STANDING):  aspirin enteric coated 81 milliGRAM(s) Oral daily  atorvastatin 40 milliGRAM(s) Oral at bedtime  calcium acetate 667 milliGRAM(s) Oral three times a day with meals  dextrose 5%. 1000 milliLiter(s) (50 mL/Hr) IV Continuous <Continuous>  dextrose 50% Injectable 12.5 Gram(s) IV Push once  dextrose 50% Injectable 25 Gram(s) IV Push once  dextrose 50% Injectable 25 Gram(s) IV Push once  docusate sodium 100 milliGRAM(s) Oral two times a day  doxercalciferol Injectable 3 MICROGram(s) IV Push <User Schedule>  epoetin theodore Injectable 23131 Unit(s) IV Push <User Schedule>  furosemide    Tablet 80 milliGRAM(s) Oral daily  heparin  Injectable 5000 Unit(s) SubCutaneous every 8 hours  hydrALAZINE 100 milliGRAM(s) Oral three times a day  insulin glargine Injectable (LANTUS) 4 Unit(s) SubCutaneous at bedtime  insulin lispro (HumaLOG) corrective regimen sliding scale   SubCutaneous three times a day before meals  insulin lispro (HumaLOG) corrective regimen sliding scale   SubCutaneous at bedtime  isosorbide   dinitrate Tablet (ISORDIL) 10 milliGRAM(s) Oral three times a day  labetalol 300 milliGRAM(s) Oral three times a day  senna 2 Tablet(s) Oral at bedtime    MEDICATIONS  (PRN):  dextrose 40% Gel 15 Gram(s) Oral once PRN Blood Glucose LESS THAN 70 milliGRAM(s)/deciliter  glucagon  Injectable 1 milliGRAM(s) IntraMuscular once PRN Glucose LESS THAN 70 milligrams/deciliter    Vital Signs Last 24 Hrs  T(C): 37.1 (05 Jan 2019 20:42), Max: 37.1 (05 Jan 2019 20:42)  T(F): 98.7 (05 Jan 2019 20:42), Max: 98.7 (05 Jan 2019 20:42)  HR: 92 (05 Jan 2019 22:12) (61 - 92)  BP: 174/70 (05 Jan 2019 23:35) (164/62 - 201/93)  BP(mean): --  RR: 18 (05 Jan 2019 20:42) (17 - 18)  SpO2: 93% (05 Jan 2019 20:42) (93% - 98%)                          8.7    7.14  )-----------( 283      ( 05 Jan 2019 10:40 )             27.5     01-05    133<L>  |  93<L>  |  40<H>  ----------------------------<  93  4.4   |  24  |  6.51<H>    Ca    8.5      05 Jan 2019 07:15    A/P:  68M PMH CAD/CABG, HFrEF (30%), T2DM, HTN, CKD 5 (not on HD), aortic valve replacement, chronic anemia occasionally requiring transfusions presents with SOB. Pt presents with uremia. He also has hypertensive urgency with accompanying severe pulmonary hypertension.     # HTN Urgency.   - Pt received Hydralazine 100mg, Labetalol 300mg and Imdur 10mg @ 2100.   - Hydralazine 5mg IVP STAT.   - Tele: No events.   - Pt asymptomatic.   - Monitor On Tele.   - Keep K/Mg> 4.0/2.0.   - Pt s/p HD.   - As per Renal, if the BP remains elevated then will add Norvasc.  - f/u Renal recs.   - Will endorse to primary team in amBam ESPITIA. SYMONE WHITE   # [id number]  Medicine PA. Called by RN that, pt was noted to have BP of 184/72 at HR 92. Pt was evaluated. Denies CP/SOB/Palpitations/Diaphoresis, HA/Dizziness/ Blurry vision/ syncope, fever/ chills, Abdominal Pain//N/V/D/C.     PAST MEDICAL & SURGICAL HISTORY:  HTN (hypertension)  Aortic valve replaced  CKD (chronic kidney disease)  Anemia  CHF (congestive heart failure)  Diabetes mellitus  Hypertension  S/P CABG (coronary artery bypass graft)  S/P appendectomy    MEDICATIONS  (STANDING):  aspirin enteric coated 81 milliGRAM(s) Oral daily  atorvastatin 40 milliGRAM(s) Oral at bedtime  calcium acetate 667 milliGRAM(s) Oral three times a day with meals  dextrose 5%. 1000 milliLiter(s) (50 mL/Hr) IV Continuous <Continuous>  dextrose 50% Injectable 12.5 Gram(s) IV Push once  dextrose 50% Injectable 25 Gram(s) IV Push once  dextrose 50% Injectable 25 Gram(s) IV Push once  docusate sodium 100 milliGRAM(s) Oral two times a day  doxercalciferol Injectable 3 MICROGram(s) IV Push <User Schedule>  epoetin theodore Injectable 89517 Unit(s) IV Push <User Schedule>  furosemide    Tablet 80 milliGRAM(s) Oral daily  heparin  Injectable 5000 Unit(s) SubCutaneous every 8 hours  hydrALAZINE 100 milliGRAM(s) Oral three times a day  insulin glargine Injectable (LANTUS) 4 Unit(s) SubCutaneous at bedtime  insulin lispro (HumaLOG) corrective regimen sliding scale   SubCutaneous three times a day before meals  insulin lispro (HumaLOG) corrective regimen sliding scale   SubCutaneous at bedtime  isosorbide   dinitrate Tablet (ISORDIL) 10 milliGRAM(s) Oral three times a day  labetalol 300 milliGRAM(s) Oral three times a day  senna 2 Tablet(s) Oral at bedtime    MEDICATIONS  (PRN):  dextrose 40% Gel 15 Gram(s) Oral once PRN Blood Glucose LESS THAN 70 milliGRAM(s)/deciliter  glucagon  Injectable 1 milliGRAM(s) IntraMuscular once PRN Glucose LESS THAN 70 milligrams/deciliter    Vital Signs Last 24 Hrs  T(C): 37.1 (05 Jan 2019 20:42), Max: 37.1 (05 Jan 2019 20:42)  T(F): 98.7 (05 Jan 2019 20:42), Max: 98.7 (05 Jan 2019 20:42)  HR: 92 (05 Jan 2019 22:12) (61 - 92)  BP: 174/70 (05 Jan 2019 23:35) (164/62 - 201/93)  BP(mean): --  RR: 18 (05 Jan 2019 20:42) (17 - 18)  SpO2: 93% (05 Jan 2019 20:42) (93% - 98%)                          8.7    7.14  )-----------( 283      ( 05 Jan 2019 10:40 )             27.5     01-05    133<L>  |  93<L>  |  40<H>  ----------------------------<  93  4.4   |  24  |  6.51<H>    Ca    8.5      05 Jan 2019 07:15    A/P:  68M PMH CAD/CABG, HFrEF (30%), T2DM, HTN, CKD 5 (not on HD), aortic valve replacement, chronic anemia occasionally requiring transfusions presents with SOB. Pt presents with uremia s/p  Left radiocephalic fistula creation and revision of fistula with ligation of branching veins 1/3/19. Pt noted to have  hypertensive urgency with accompanying severe pulmonary hypertension.     # HTN Urgency.   - Pt received Hydralazine 100mg, Labetalol 300mg and Imdur 10mg @ 2100.   - Hydralazine 5mg IVP STAT.   - Tele: No events.   - Pt asymptomatic.   - Monitor On Tele.   - Keep K/Mg> 4.0/2.0.   - Pt s/p HD.   - As per Renal, if the BP remains elevated then will add Norvasc.  - f/u Renal recs.   - Will endorse to primary team in am.     OMKAR. SYMONE WHITE   # [id number]  Medicine PA.

## 2019-01-06 NOTE — PROVIDER CONTACT NOTE (OTHER) - ASSESSMENT
pt is NPO and going for AM permacath procedure and HD today. Pt due for hydralazine 100mg, isordil 10mg, labetalol 300mg, lasix and hepain this AM. pt bp is 175/75 HR 73. asymptomaitc, no complaints of chest pain or SOB.
Pt is alert and oriented x3, pt BP is 210/90  and 200/90 manually. Pt has no chest pain, SOB, palpitation, headache or vision change reported. Pt's creatinine is high 10.84
A&Ox3. Denies headache, dizziness, lightheadedness or blurry vision.
Patient /85 HR of 79 electronic. Repeated manually 210/75. Patient has hx of HTN. Patient BP runs high 160's-170's systolic. No neurological changed noted. Denies h/a, visual disturbances, etc.
Patient denies any dizziness, chest pain, heart palpitations, SOB.
Patient lying in bed. Patient complaining of pain 6/10 at [facility] site. Dressing completely saturated. Pressure being held. No relief in bleeding. VSS.
Pt VSS. No c/o of pain, discomfort of cp.
Pt alert, asymptomatic
Pt in bed, VSS. No c/o pain/discomfort. No s/s SOB/discomfort.
pt /100 HR 59, pt asymptomatic. in the middle of a blood trasnfusion
pt asymptomatic;
pt c/o severe L leg pain. foot edematous x2 pitting
pt does not c/o headache or chest pain; no nausea; see recorded vitals
pt is asymptomatic, no headache, no vision changes
Pt a&o 3, no signs of distress, not sweating, no nausea, NPO for permacath placement

## 2019-01-06 NOTE — PROVIDER CONTACT NOTE (OTHER) - BACKGROUND
pt admitted for heart failure
68M PMH CAD/CABG, HFrEF (30%), T2DM, HTN, CKD 5 (not on HD), aortic valve replacement, chronic anemia occasionally requiring transfusions presents with SOB.
Admitted for sob.
Dx: HF
Patient admitted for shortness of breath. Hx of heart failure, CKD, and HTN.
Patient admitted with chronic anemia, SOB, heart failure.
Patient admitted with chronic anemia, SOB, heart failure. IJ shiley placed on 12/26.
Pt admitted with CHF  Hx of CKD and DM
Pt admitted with chronic anemia and heart failure.  Pt IJ was bleeding earlier during the day per day nurse, quick clot applied.
admit for chf, sob, ckd
admit for sob/chf with ckd
pt admitted for CHF
pt admitted for HF
pt admitted with HF; Hx of severe pulmonary HTN
PMH DM2, HTN, CKD

## 2019-01-06 NOTE — PROVIDER CONTACT NOTE (OTHER) - ACTION/TREATMENT ORDERED:
PA ordered: 5mg Hydralazine IVP; reassess BP manually 30 min-notify provider of BP; notify of any changes or if symptoms arise

## 2019-01-06 NOTE — PROGRESS NOTE ADULT - REASON FOR ADMISSION
SOB

## 2019-01-06 NOTE — PROVIDER CONTACT NOTE (OTHER) - RECOMMENDATIONS
administer BP meds, keep pt NPO, follow up with IR when permacath will be placed today.
Hydralazine 100mg and Isordil 10mg is due at 2pm, give it now ?
Give HTN meds as ordered. Hydralazine 100mg, labetalol 300mg, and isosorbide dinitrate 10mg.
Hydralazine 10mg IVP.
Hypoglycemia protocol
Notify NP, instruction given to given Noon BP meds and recheck BP. will continue to observe
Notify NP, orders for rec'ds. will continue to observe
Notify NP, transfuse Blood, monitor pt hem and hemato
Notify NP. Reinforce dressing.
Notify PA. Pain medication.
continue monitoring; 1 time dose BP med IVP
give BP medication
give IV tylenol

## 2019-01-06 NOTE — PROGRESS NOTE ADULT - PROVIDER SPECIALTY LIST ADULT
Internal Medicine
Intervent Radiology
Nephrology
Vascular Surgery
Infectious Disease
Vascular Surgery
Internal Medicine
Internal Medicine
Vascular Surgery

## 2019-01-06 NOTE — PROGRESS NOTE ADULT - SUBJECTIVE AND OBJECTIVE BOX
NEPHROLOGY - NSN      Patient seen and examined. Pt seen laying in bed, reports feeling well, no complaints offered.     MEDICATIONS  (STANDING):  aspirin enteric coated 81 milliGRAM(s) Oral daily  atorvastatin 40 milliGRAM(s) Oral at bedtime  calcium acetate 667 milliGRAM(s) Oral three times a day with meals  dextrose 5%. 1000 milliLiter(s) (50 mL/Hr) IV Continuous <Continuous>  dextrose 50% Injectable 12.5 Gram(s) IV Push once  dextrose 50% Injectable 25 Gram(s) IV Push once  dextrose 50% Injectable 25 Gram(s) IV Push once  docusate sodium 100 milliGRAM(s) Oral two times a day  doxercalciferol Injectable 3 MICROGram(s) IV Push <User Schedule>  epoetin theodore Injectable 20895 Unit(s) IV Push <User Schedule>  furosemide    Tablet 80 milliGRAM(s) Oral daily  heparin  Injectable 5000 Unit(s) SubCutaneous every 8 hours  hydrALAZINE 100 milliGRAM(s) Oral three times a day  insulin glargine Injectable (LANTUS) 4 Unit(s) SubCutaneous at bedtime  insulin lispro (HumaLOG) corrective regimen sliding scale   SubCutaneous three times a day before meals  insulin lispro (HumaLOG) corrective regimen sliding scale   SubCutaneous at bedtime  isosorbide   dinitrate Tablet (ISORDIL) 10 milliGRAM(s) Oral three times a day  labetalol 300 milliGRAM(s) Oral three times a day  senna 2 Tablet(s) Oral at bedtime    VITALS:  T(C): , Max: 37.1 (01-05-19 @ 20:42)  T(F): , Max: 98.7 (01-05-19 @ 20:42)  HR: 66 (01-06-19 @ 11:33)  BP: 175/74 (01-06-19 @ 11:33)  RR: 18 (01-06-19 @ 11:33)  SpO2: 93% (01-06-19 @ 11:33)    I and O's:    01-05 @ 07:01  -  01-06 @ 07:00  --------------------------------------------------------  IN: 50 mL / OUT: 900 mL / NET: -850 mL    REVIEW OF SYSTEMS:  Full ROS done and were negative unless otherwise indicated in HPI/assessment.     PHYSICAL EXAM:  Constitutional: NAD  Neck:  No JVD  Respiratory: CTAB/L  Cardiovascular: S1 and S2  Gastrointestinal: BS+, soft, NT/ND  Extremities: No peripheral edema  Neurological: A/O x 3, no focal deficits  Psychiatric: Normal mood, normal affect  : No Palacios  Skin: No rashes  Access: Right non tunneled cath, LUE AVF- wrapping in dressing    LABS:                        8.2    7.47  )-----------( 294      ( 06 Jan 2019 08:23 )             26.3     01-06    133<L>  |  95<L>  |  26<H>  ----------------------------<  85  4.1   |  27  |  4.64<H>    Ca    8.4      06 Jan 2019 06:33

## 2019-01-06 NOTE — PROGRESS NOTE ADULT - ASSESSMENT
ASSESSMENT/RECOMMEND      Assessment and Plan:   69 yo male with past medical history of HTN, DM chronic kidney disease stage V, now presents with uremia. He also has hypertensive urgency with accompanying severe pulmonary hypertension.  Goals of therapy are blood pressure control and dialysis. Last HD 1/5/19: 0.7L removed.  Secondary hyperparathyroidism  Anemia - s/p transfusion, on Epogen 10,000 units IV at each HD  Hypertensive urgency -    ESRD now on HD -    s/p RCW permacath placement 12/31/18;  AVF on 1/3    RECOMMEND:  Renal: Plan next HD: Tuesday  Cardiology: Monitor BPs, Continue labetalol 300mg po tid.  Allergy to ACE. Will not start ACE or ARB.  If the BP remains elevated then Norvasc.  I suspect some of the increased BP is from pain?  Anemia: continue Epogen IV q HD, trend H/H;  If HH remains low then blood xfusion   Endocrine: Cont Hectorol   Vascular: NIDIAE vineet Bess NP  [facility]  [phone] ASSESSMENT  67 yo male with past medical history of HTN, DM chronic kidney disease stage V, now presents with uremia. He also has hypertensive urgency with accompanying severe pulmonary hypertension.  Goals of therapy are blood pressure control and dialysis. Last HD 1/5/19: 0.7L removed.  Secondary hyperparathyroidism  Anemia - s/p transfusion, on Epogen 10,000 units IV at each HD  Hypertensive urgency -    ESRD now on HD -    s/p RCW permacath placement 12/31/18;  AVF on 1/3    RECOMMEND:  Renal: Plan next HD: Tuesday  Cardiology: Monitor BPs, Continue labetalol 300mg po tid.  Allergy to ACE. Will not start ACE or ARB.  BP remains elevated, to start Norvasc 5 mg PO  Anemia: continue Epogen IV q HD, trend H/H;  If HH remains low then blood xfusion   Endocrine: Cont Hectorol   Vascular: LUE precautions    D/W Dr. Billy Bess,  NP  [facility]  [phone]

## 2019-01-06 NOTE — PROVIDER CONTACT NOTE (OTHER) - DATE AND TIME:
03-Jan-2019 03:10
05-Jan-2019 22:48
21-Dec-2018 04:08
21-Dec-2018 09:20
21-Dec-2018 20:35
22-Dec-2018 09:30
22-Dec-2018 12:00
23-Dec-2018 07:50
24-Dec-2018 11:39
26-Dec-2018 20:50
27-Dec-2018 10:00
28-Dec-2018 14:30
28-Dec-2018 14:33
28-Dec-2018 21:40
31-Dec-2018 21:15
31-Dec-2018 07:50
31-Dec-2018 05:07

## 2019-01-06 NOTE — PROGRESS NOTE ADULT - SUBJECTIVE AND OBJECTIVE BOX
CHIEF COMPLAINT:  Pt seen and examined at bedside. No acute events overnight  SUBJECTIVE:     REVIEW OF SYSTEMS:    CONSTITUTIONAL: (  )  weakness,  (  ) fevers or chills  EYES/ENT: (  )visual changes;     NECK: (  ) pain or stiffness  RESPIRATORY:   (  )cough, wheezing, hemoptysis;  (  ) shortness of breath  CARDIOVASCULAR:  (  )chest pain or palpitations  GASTROINTESTINAL:   (  )abdominal or epigastric pain.  (  ) nausea, vomiting, or hematemesis;   (   ) diarrhea or constipation.   GENITOURINARY:   (    ) dysuria, frequency or hematuria  NEUROLOGICAL:  (   ) numbness or weakness   All other review of systems is negative unless indicated above    Vital Signs Last 24 Hrs  T(C): 36.8 (06 Jan 2019 04:31), Max: 37.1 (05 Jan 2019 20:42)  T(F): 98.3 (06 Jan 2019 04:31), Max: 98.7 (05 Jan 2019 20:42)  HR: 80 (06 Jan 2019 04:31) (61 - 92)  BP: 158/68 (06 Jan 2019 06:25) (158/68 - 196/72)  BP(mean): --  RR: 18 (06 Jan 2019 04:31) (17 - 18)  SpO2: 100% (06 Jan 2019 04:31) (93% - 100%)    I&O's Summary    05 Jan 2019 07:01  -  06 Jan 2019 07:00  --------------------------------------------------------  IN: 50 mL / OUT: 900 mL / NET: -850 mL        CAPILLARY BLOOD GLUCOSE      POCT Blood Glucose.: 103 mg/dL (06 Jan 2019 07:55)  POCT Blood Glucose.: 135 mg/dL (05 Jan 2019 21:29)  POCT Blood Glucose.: 145 mg/dL (05 Jan 2019 16:47)  POCT Blood Glucose.: 149 mg/dL (05 Jan 2019 11:58)      PHYSICAL EXAM:    Constitutional:  (   ) NAD,   (   )awake and alert  HEENT: PERR, EOMI,    Neck: Soft and supple, No LAD, No JVD  Respiratory:  (    Breath sounds are clear bilaterally,    (   ) wheezing, rales or rhonchi  Cardiovascular:     (   )S1 and S2, regular rate and rhythm, no Murmurs, gallops or rubs  Gastrointestinal:  (   )Bowel Sounds present, soft,   (  )nontender, nondistended,    Extremities:    (  ) peripheral edema  Vascular: 2+ peripheral pulses  Neurological:    (    )A/O x 3,   (  ) focal deficits  Musculoskeletal:    (   )  normal strength b/l upper  (     ) normal  lower extremities  Skin: No rashes    MEDICATIONS:  MEDICATIONS  (STANDING):  aspirin enteric coated 81 milliGRAM(s) Oral daily  atorvastatin 40 milliGRAM(s) Oral at bedtime  calcium acetate 667 milliGRAM(s) Oral three times a day with meals  dextrose 5%. 1000 milliLiter(s) (50 mL/Hr) IV Continuous <Continuous>  dextrose 50% Injectable 12.5 Gram(s) IV Push once  dextrose 50% Injectable 25 Gram(s) IV Push once  dextrose 50% Injectable 25 Gram(s) IV Push once  docusate sodium 100 milliGRAM(s) Oral two times a day  doxercalciferol Injectable 3 MICROGram(s) IV Push <User Schedule>  epoetin theodore Injectable 02885 Unit(s) IV Push <User Schedule>  furosemide    Tablet 80 milliGRAM(s) Oral daily  heparin  Injectable 5000 Unit(s) SubCutaneous every 8 hours  hydrALAZINE 100 milliGRAM(s) Oral three times a day  insulin glargine Injectable (LANTUS) 4 Unit(s) SubCutaneous at bedtime  insulin lispro (HumaLOG) corrective regimen sliding scale   SubCutaneous three times a day before meals  insulin lispro (HumaLOG) corrective regimen sliding scale   SubCutaneous at bedtime  isosorbide   dinitrate Tablet (ISORDIL) 10 milliGRAM(s) Oral three times a day  labetalol 300 milliGRAM(s) Oral three times a day  senna 2 Tablet(s) Oral at bedtime      LABS: All Labs Reviewed:                        8.2    7.47  )-----------( 294      ( 06 Jan 2019 08:23 )             26.3     01-06    133<L>  |  95<L>  |  26<H>  ----------------------------<  85  4.1   |  27  |  4.64<H>    Ca    8.4      06 Jan 2019 06:33            Blood Culture:   Urine Culture      RADIOLOGY/EKG:    ASSESSMENT AND PLAN:    DVT PPX:    ADVANCED DIRECTIVE:    DISPOSITION: CHIEF COMPLAINT:  Pt seen and examined at bedside. No acute events overnight  SUBJECTIVE:     REVIEW OF SYSTEMS:    CONSTITUTIONAL: (x  )  weakness,  (  ) fevers or chills  EYES/ENT: (  )visual changes;     NECK: (  ) pain or stiffness  RESPIRATORY:   (  )cough, wheezing, hemoptysis;  (  ) shortness of breath  CARDIOVASCULAR:  (  )chest pain or palpitations  GASTROINTESTINAL:   (  )abdominal or epigastric pain.  (  ) nausea, vomiting, or hematemesis;   (   ) diarrhea or constipation.   GENITOURINARY:   (    ) dysuria, frequency or hematuria  NEUROLOGICAL:  (   ) numbness or weakness   All other review of systems is negative unless indicated above    Vital Signs Last 24 Hrs  T(C): 36.8 (06 Jan 2019 04:31), Max: 37.1 (05 Jan 2019 20:42)  T(F): 98.3 (06 Jan 2019 04:31), Max: 98.7 (05 Jan 2019 20:42)  HR: 80 (06 Jan 2019 04:31) (61 - 92)  BP: 158/68 (06 Jan 2019 06:25) (158/68 - 196/72)  BP(mean): --  RR: 18 (06 Jan 2019 04:31) (17 - 18)  SpO2: 100% (06 Jan 2019 04:31) (93% - 100%)    I&O's Summary    05 Jan 2019 07:01  -  06 Jan 2019 07:00  --------------------------------------------------------  IN: 50 mL / OUT: 900 mL / NET: -850 mL        CAPILLARY BLOOD GLUCOSE      POCT Blood Glucose.: 103 mg/dL (06 Jan 2019 07:55)  POCT Blood Glucose.: 135 mg/dL (05 Jan 2019 21:29)  POCT Blood Glucose.: 145 mg/dL (05 Jan 2019 16:47)  POCT Blood Glucose.: 149 mg/dL (05 Jan 2019 11:58)      PHYSICAL EXAM:  Constitutional: NAD  Neck:  No JVD  Respiratory: CTAB/L  Cardiovascular: S1 and S2  Gastrointestinal: BS+, soft, NT/ND  Extremities: No peripheral edema  Neurological: A/O x 3, no focal deficits  Psychiatric: Normal mood, normal affect  : No Palacios  Skin: No rashes  Access: Right non tunneled cath, LUE AVF- wrapping in dressing       MEDICATIONS:  MEDICATIONS  (STANDING):  aspirin enteric coated 81 milliGRAM(s) Oral daily  atorvastatin 40 milliGRAM(s) Oral at bedtime  calcium acetate 667 milliGRAM(s) Oral three times a day with meals  dextrose 5%. 1000 milliLiter(s) (50 mL/Hr) IV Continuous <Continuous>  dextrose 50% Injectable 12.5 Gram(s) IV Push once  dextrose 50% Injectable 25 Gram(s) IV Push once  dextrose 50% Injectable 25 Gram(s) IV Push once  docusate sodium 100 milliGRAM(s) Oral two times a day  doxercalciferol Injectable 3 MICROGram(s) IV Push <User Schedule>  epoetin theodore Injectable 80792 Unit(s) IV Push <User Schedule>  furosemide    Tablet 80 milliGRAM(s) Oral daily  heparin  Injectable 5000 Unit(s) SubCutaneous every 8 hours  hydrALAZINE 100 milliGRAM(s) Oral three times a day  insulin glargine Injectable (LANTUS) 4 Unit(s) SubCutaneous at bedtime  insulin lispro (HumaLOG) corrective regimen sliding scale   SubCutaneous three times a day before meals  insulin lispro (HumaLOG) corrective regimen sliding scale   SubCutaneous at bedtime  isosorbide   dinitrate Tablet (ISORDIL) 10 milliGRAM(s) Oral three times a day  labetalol 300 milliGRAM(s) Oral three times a day  senna 2 Tablet(s) Oral at bedtime      LABS: All Labs Reviewed:                        8.2    7.47  )-----------( 294      ( 06 Jan 2019 08:23 )             26.3     01-06    133<L>  |  95<L>  |  26<H>  ----------------------------<  85  4.1   |  27  |  4.64<H>    Ca    8.4      06 Jan 2019 06:33            Blood Culture:   Urine Culture      RADIOLOGY/EKG:    ASSESSMENT AND PLAN:  69 yo male with past medical history of HTN, DM chronic kidney disease stage V, now presents with uremia. He also has hypertensive urgency with accompanying severe pulmonary hypertension.  Goals of therapy are blood pressure control and dialysis. Last HD 1/5/19: 0.7L removed.  Secondary hyperparathyroidism  Anemia - s/p transfusion, on Epogen 10,000 units IV at each HD  Hypertensive urgency  -   Monitor BPs, Continue labetalol 300mg po tid.  Allergy to ACE. Will not start ACE or ARB.  BP remains elevated, to start Norvasc 5 mg PO  ESRD now on HD -     next HD: Tuesday  s/p RCW permacath placement 12/31/18;  AVF on 1/3       DVT PPX:    ADVANCED DIRECTIVE:    DISPOSITION:  discharge home discuss with medical team

## 2019-01-06 NOTE — PROVIDER CONTACT NOTE (OTHER) - NAME OF MD/NP/PA/DO NOTIFIED:
Hollie ANDRE
Hollie Barrios PA
Kervin Barrios. PA
Mindi, NP
Miroslava Gonzales
Miroslava Gonzales
Miroslava Gonzales NP
Nadia NP
Sabina Barrios
Sapphire Parra, NP
Skylar Obregon
THAI Bonner
THAI Gonzales
nette, NP
nette, NP
THAI Razo
JERICA Browne

## 2019-01-08 ENCOUNTER — RX RENEWAL (OUTPATIENT)
Age: 69
End: 2019-01-08

## 2019-01-09 ENCOUNTER — EMERGENCY (EMERGENCY)
Facility: HOSPITAL | Age: 69
LOS: 1 days | Discharge: HOME | End: 2019-01-09
Attending: EMERGENCY MEDICINE
Payer: MEDICARE

## 2019-01-09 VITALS
SYSTOLIC BLOOD PRESSURE: 215 MMHG | HEART RATE: 73 BPM | TEMPERATURE: 98 F | RESPIRATION RATE: 22 BRPM | WEIGHT: 158.95 LBS | OXYGEN SATURATION: 94 % | HEIGHT: 66 IN | DIASTOLIC BLOOD PRESSURE: 87 MMHG

## 2019-01-09 VITALS
OXYGEN SATURATION: 95 % | HEART RATE: 69 BPM | TEMPERATURE: 98 F | DIASTOLIC BLOOD PRESSURE: 83 MMHG | SYSTOLIC BLOOD PRESSURE: 194 MMHG | RESPIRATION RATE: 18 BRPM

## 2019-01-09 DIAGNOSIS — Z95.1 PRESENCE OF AORTOCORONARY BYPASS GRAFT: Chronic | ICD-10-CM

## 2019-01-09 PROBLEM — Z95.2 PRESENCE OF PROSTHETIC HEART VALVE: Chronic | Status: ACTIVE | Noted: 2018-12-20

## 2019-01-09 PROBLEM — I10 ESSENTIAL (PRIMARY) HYPERTENSION: Chronic | Status: ACTIVE | Noted: 2018-12-20

## 2019-01-09 LAB
ALBUMIN SERPL ELPH-MCNC: 3.4 G/DL — SIGNIFICANT CHANGE UP (ref 3.3–5)
ALP SERPL-CCNC: 114 U/L — SIGNIFICANT CHANGE UP (ref 40–120)
ALT FLD-CCNC: <5 U/L — LOW (ref 10–45)
ANION GAP SERPL CALC-SCNC: 12 MMOL/L — SIGNIFICANT CHANGE UP (ref 5–17)
APTT BLD: 30.5 SEC — SIGNIFICANT CHANGE UP (ref 27.5–36.3)
AST SERPL-CCNC: 25 U/L — SIGNIFICANT CHANGE UP (ref 10–40)
BASOPHILS # BLD AUTO: 0 K/UL — SIGNIFICANT CHANGE UP (ref 0–0.2)
BASOPHILS NFR BLD AUTO: 0.4 % — SIGNIFICANT CHANGE UP (ref 0–2)
BILIRUB SERPL-MCNC: 0.3 MG/DL — SIGNIFICANT CHANGE UP (ref 0.2–1.2)
BLD GP AB SCN SERPL QL: NEGATIVE — SIGNIFICANT CHANGE UP
BUN SERPL-MCNC: 24 MG/DL — HIGH (ref 7–23)
CALCIUM SERPL-MCNC: 8.6 MG/DL — SIGNIFICANT CHANGE UP (ref 8.4–10.5)
CHLORIDE SERPL-SCNC: 96 MMOL/L — SIGNIFICANT CHANGE UP (ref 96–108)
CO2 SERPL-SCNC: 30 MMOL/L — SIGNIFICANT CHANGE UP (ref 22–31)
CREAT SERPL-MCNC: 4.54 MG/DL — HIGH (ref 0.5–1.3)
EOSINOPHIL # BLD AUTO: 0.3 K/UL — SIGNIFICANT CHANGE UP (ref 0–0.5)
EOSINOPHIL NFR BLD AUTO: 3.8 % — SIGNIFICANT CHANGE UP (ref 0–6)
GLUCOSE SERPL-MCNC: 201 MG/DL — HIGH (ref 70–99)
HCT VFR BLD CALC: 27.7 % — LOW (ref 39–50)
HGB BLD-MCNC: 9.1 G/DL — LOW (ref 13–17)
INR BLD: 1.1 RATIO — SIGNIFICANT CHANGE UP (ref 0.88–1.16)
LYMPHOCYTES # BLD AUTO: 0.4 K/UL — LOW (ref 1–3.3)
LYMPHOCYTES # BLD AUTO: 6.1 % — LOW (ref 13–44)
MCHC RBC-ENTMCNC: 28 PG — SIGNIFICANT CHANGE UP (ref 27–34)
MCHC RBC-ENTMCNC: 32.7 GM/DL — SIGNIFICANT CHANGE UP (ref 32–36)
MCV RBC AUTO: 85.4 FL — SIGNIFICANT CHANGE UP (ref 80–100)
MONOCYTES # BLD AUTO: 1.1 K/UL — HIGH (ref 0–0.9)
MONOCYTES NFR BLD AUTO: 16 % — HIGH (ref 2–14)
NEUTROPHILS # BLD AUTO: 5.2 K/UL — SIGNIFICANT CHANGE UP (ref 1.8–7.4)
NEUTROPHILS NFR BLD AUTO: 73.7 % — SIGNIFICANT CHANGE UP (ref 43–77)
PLATELET # BLD AUTO: 258 K/UL — SIGNIFICANT CHANGE UP (ref 150–400)
POTASSIUM SERPL-MCNC: 4.5 MMOL/L — SIGNIFICANT CHANGE UP (ref 3.5–5.3)
POTASSIUM SERPL-SCNC: 4.5 MMOL/L — SIGNIFICANT CHANGE UP (ref 3.5–5.3)
PROT SERPL-MCNC: 6.6 G/DL — SIGNIFICANT CHANGE UP (ref 6–8.3)
PROTHROM AB SERPL-ACNC: 12.6 SEC — SIGNIFICANT CHANGE UP (ref 10–12.9)
RBC # BLD: 3.24 M/UL — LOW (ref 4.2–5.8)
RBC # FLD: 20.8 % — HIGH (ref 10.3–14.5)
RH IG SCN BLD-IMP: POSITIVE — SIGNIFICANT CHANGE UP
SODIUM SERPL-SCNC: 138 MMOL/L — SIGNIFICANT CHANGE UP (ref 135–145)
WBC # BLD: 7.1 K/UL — SIGNIFICANT CHANGE UP (ref 3.8–10.5)
WBC # FLD AUTO: 7.1 K/UL — SIGNIFICANT CHANGE UP (ref 3.8–10.5)

## 2019-01-09 PROCEDURE — 85730 THROMBOPLASTIN TIME PARTIAL: CPT

## 2019-01-09 PROCEDURE — 71045 X-RAY EXAM CHEST 1 VIEW: CPT | Mod: 26

## 2019-01-09 PROCEDURE — 86850 RBC ANTIBODY SCREEN: CPT

## 2019-01-09 PROCEDURE — 86901 BLOOD TYPING SEROLOGIC RH(D): CPT

## 2019-01-09 PROCEDURE — 85027 COMPLETE CBC AUTOMATED: CPT

## 2019-01-09 PROCEDURE — 99282 EMERGENCY DEPT VISIT SF MDM: CPT

## 2019-01-09 PROCEDURE — 71045 X-RAY EXAM CHEST 1 VIEW: CPT

## 2019-01-09 PROCEDURE — 80053 COMPREHEN METABOLIC PANEL: CPT

## 2019-01-09 PROCEDURE — 99283 EMERGENCY DEPT VISIT LOW MDM: CPT | Mod: 25

## 2019-01-09 PROCEDURE — 99284 EMERGENCY DEPT VISIT MOD MDM: CPT

## 2019-01-09 PROCEDURE — 85610 PROTHROMBIN TIME: CPT

## 2019-01-09 PROCEDURE — 86900 BLOOD TYPING SEROLOGIC ABO: CPT

## 2019-01-09 NOTE — ED PROVIDER NOTE - MEDICAL DECISION MAKING DETAILS
Attending MD Cornelius: 68M with ESRD on HD via tunneled catheter R chest wall, HTN presenting with bleeding from tunneled catheter site after HD session the night prior. Exam with no active bleeding but significant adherent clot overlying catheter entry site, urgent IR and vascular surgery consult placed. On aspirin reportedly no other anticoagulants

## 2019-01-09 NOTE — ED PROVIDER NOTE - PHYSICAL EXAMINATION
Attending MD Adryan:    Gen:  no distress noted, oriented x 3  Neck: supple, no swelling, trachea midline  CV: heart with reg rhythm, no obvious murmur appreciated, healed median sternotomy scar, right upper chest wall tunneled catheter site with large amount of clot overlying entry site, unable to visualize active bleeding but cannot visualzied catheter entry site itself given adherent clot   Resp: CTAB, breathing comfortably  Abd: soft, NT, ND  Extremities: extremities warm to the touch, no peripheral edema   Msk: no extremity deformities or bony tenderness  Pysch: appropriate affect    Neuro: moves all extremities spontaneously, no gross motor or sensory deficits

## 2019-01-09 NOTE — ED ADULT NURSE NOTE - OBJECTIVE STATEMENT
69 yo M presents to ED A+Ox3 by EMS c/o bleeding from dialysis catheter. Pt. states he started dialysis 2 weeks ago, has a dialysis catheter to right chest wall and AV fistula to left wrist. Pt. states he had dialysis yesterday and states he "noticed a little" bleeding yesterday but "my wife told me it was bleeding a lot this morning." Pt. noted to have large amount of blood on shirt upon arrival. Large clot noted to dialysis catheter site. Pt. states he is on plavix and aspirin. Pt. denies pain/discomfort, shortness of breath, chest pain, dizziness, lightheadedness. Pt. states "my blood pressure is always high." Breathing unlabored on RA. Skin warm dry and of color appropriate for ethnicity. Bed in lowest position, side rails up. Comfort and safety measures in place. 69 yo M presents to ED A+Ox3 by EMS c/o bleeding from dialysis catheter. Pt. states he started dialysis 2 weeks ago, has a dialysis catheter to right chest wall and AV fistula to left wrist. Pt. states he had dialysis yesterday, site to right chest wall was used during dialysis session and patient states he did not notice  bleeding yesterday but "my wife told me it was bleeding a lot this morning." Pt. noted to have large amount of blood on shirt upon arrival. Large clot noted to dialysis catheter site. Pt. states he is on plavix and aspirin. Pt. denies pain/discomfort, shortness of breath, chest pain, dizziness, lightheadedness. Pt. states "my blood pressure is always high." Breathing unlabored on RA. Skin warm dry and of color appropriate for ethnicity. Bed in lowest position, side rails up. Comfort and safety measures in place.

## 2019-01-09 NOTE — PROGRESS NOTE ADULT - SUBJECTIVE AND OBJECTIVE BOX
IR requested to evaluate pt's RIGHT IJ tunnnelled HD catheter site for bleeding from the site.  Pt reported to ER with c/o a large amount of bleeding from Right side tunnelled HD catheter.  Tunnelled HD Catheter was placed by IR on 12/31/2018.  As per discussion with the pt and his wife the pt had no bleeding at the site since placement of the catheter and he had his first HD yesterday and there was no bleeding before or after HD.  This morning was the first time the pt noticed any bleeding from the site and his wife noticed it first.  Pt is not sure if the catheter became inadvertently pulled on.  Pt reported being on aspirin and Plavix.      LABS:   (1/9/19 CBC) WBC 7.1, Hgb 9.1, Hct 27.7, plt 258  (1/9/19 COAGS) PT 12.6, INR 1.1, PTT 30.5    Upon evaluation of the site the dressing around the catheter was found saturated.  The dressing was removed and there was some clot under the dressing.   The site was cleaned and inspected and there was no active bleeding from the catheter entry site but one of the sutures was found to have blood oozing from it.  No hematoma was noted at the site.  The suture that was causing bleeding was removed and manual compression was held over that site until hemostasis.  A new anchor suture was placed away from the previous suture.   Manual compression was applied over the sites again and then there was no bleeding noted from the catheter or suture sites.  New hemostatic dressings were applied over the site.     case was discussed with covering ER Fellow Dr. JULIO Warner.  Pt can be monitored for any further bleeding.  Pt should sit upright for 2 hours.  Please contact IR at ext [phone] if there is any further bleeding from the site.  Pt was given IR contact information if there is any further non-emergent issues with tunnelled HD catheter they can contact [phone].  For any emergency situations or severe bleeding pt should go to the emergency room.      DEDRICK Moon  [facility] [id number]  Ext [phone]

## 2019-01-09 NOTE — ED PROVIDER NOTE - NSFOLLOWUPINSTRUCTIONS_ED_ALL_ED_FT
You were seen in the Emergency Department for bleeding from you catheter. It was repaired by IR. Please follow up with your regular physician this week for reevaluation. Please return to the Emergency Department if you have any new concerning symptoms such as severe pain, weakness, or any other concerning symptoms.

## 2019-01-09 NOTE — ED ADULT NURSE NOTE - NSIMPLEMENTINTERV_GEN_ALL_ED
Implemented All Universal Safety Interventions:  [place] to call system. Call bell, personal items and telephone within reach. Instruct patient to call for assistance. Room bathroom lighting operational. Non-slip footwear when patient is off stretcher. Physically safe environment: no spills, clutter or unnecessary equipment. Stretcher in lowest position, wheels locked, appropriate side rails in place.

## 2019-01-09 NOTE — ED PROVIDER NOTE - OBJECTIVE STATEMENT
67yo male p/w bleeding from permacath site. Pt. had catheter recently placed on last admission by IR, used for the first time yesterday. EMS applied dressings to the site. Denies any cp, sob, lightheadedness, weakness.

## 2019-01-09 NOTE — ED PROVIDER NOTE - CARE PLAN
Principal Discharge DX:	Dialysis complication Principal Discharge DX:	Bleeding due to dialysis catheter placement, initial encounter

## 2019-01-09 NOTE — ED PROVIDER NOTE - ATTENDING CONTRIBUTION TO CARE
Attending MD Cornelius:  I personally have seen and examined this patient.  Resident note reviewed and agree on plan of care and except where noted.  See HPI, PE, and MDM for details.

## 2019-01-09 NOTE — ED ADULT NURSE REASSESSMENT NOTE - NS ED NURSE REASSESS COMMENT FT1
Pt. sitting up in stretcher, no apparent distress. Will continue to monitor dialysis catheter site for bleeding.

## 2019-01-09 NOTE — ED ADULT NURSE REASSESSMENT NOTE - NS ED NURSE REASSESS COMMENT FT1
Pt. in no apparent distress, breathing unlabored on RA. No bleeding noted to dialysis catheter site. Pt. states "I am ready to go home." As per MD, pt. ok for d/c at this time.

## 2019-01-09 NOTE — ED PROVIDER NOTE - PROGRESS NOTE DETAILS
pt. cleared by IR to d/c and can use permacath. Attending MD Cornelius: hemostasis achieved with assistance of IR. No recurrent bleeding after period of observation. Patient eager for discharge, will discharge now

## 2019-01-22 ENCOUNTER — APPOINTMENT (OUTPATIENT)
Dept: VASCULAR SURGERY | Facility: CLINIC | Age: 69
End: 2019-01-22
Payer: MEDICARE

## 2019-01-22 VITALS
HEART RATE: 72 BPM | WEIGHT: 146 LBS | BODY MASS INDEX: 23.46 KG/M2 | DIASTOLIC BLOOD PRESSURE: 103 MMHG | HEIGHT: 66 IN | SYSTOLIC BLOOD PRESSURE: 219 MMHG

## 2019-01-22 PROCEDURE — 99024 POSTOP FOLLOW-UP VISIT: CPT

## 2019-01-22 RX ORDER — SIMVASTATIN 20 MG/1
20 TABLET, FILM COATED ORAL
Refills: 0 | Status: COMPLETED | COMMUNITY
End: 2019-01-22

## 2019-01-22 NOTE — ASSESSMENT
[FreeTextEntry1] : Impression lue avf maturing well \par \par Plan Med Conservative woundcare (betadine)\par rto in 2 weeks to d/c lue new suture\par ov w lue avf fistula duplex  s/o stenosis  1mo to re eval \par start augmentin 500 bid 7 days  [Arterial/Venous Disease] : arterial/venous disease [Medication Management] : medication management [Other: _____] : [unfilled]

## 2019-01-22 NOTE — PHYSICAL EXAM
[JVD] : no jugular venous distention  [Normal Breath Sounds] : Normal breath sounds [2+] : left 2+ [Ankle Swelling (On Exam)] : not present [Varicose Veins Of Lower Extremities] : not present [] : not present [Abdomen Masses] : No abdominal masses [No HSM] : no hepatosplenomegaly [Tender] : was nontender [Stool Sample Taken] : No stool obtained  on rectal exam [No Rash or Lesion] : No rash or lesion [Alert] : alert [Oriented to Person] : oriented to person [Oriented to Place] : oriented to place [Oriented to Time] : oriented to time [Calm] : calm [de-identified] : nad [de-identified] : wnl [FreeTextEntry1] : lue avf maturing well \par all incisions healing well \par  [de-identified] : wnl [de-identified] : wnl [de-identified] : Reggie Cranial nerves 2-12 reggie grossly intact [de-identified] : cooperative

## 2019-01-22 NOTE — PROCEDURE
[FreeTextEntry1] : all lue sutures removed\par prox brachia incision w limited healing \par new suture applied

## 2019-02-06 ENCOUNTER — FORM ENCOUNTER (OUTPATIENT)
Age: 69
End: 2019-02-06

## 2019-02-06 ENCOUNTER — APPOINTMENT (OUTPATIENT)
Dept: VASCULAR SURGERY | Facility: CLINIC | Age: 69
End: 2019-02-06
Payer: MEDICARE

## 2019-02-06 VITALS
HEIGHT: 66 IN | WEIGHT: 148 LBS | BODY MASS INDEX: 23.78 KG/M2 | DIASTOLIC BLOOD PRESSURE: 71 MMHG | SYSTOLIC BLOOD PRESSURE: 148 MMHG | HEART RATE: 78 BPM

## 2019-02-06 DIAGNOSIS — N28.9 DISORDER OF KIDNEY AND URETER, UNSPECIFIED: ICD-10-CM

## 2019-02-06 PROCEDURE — 99024 POSTOP FOLLOW-UP VISIT: CPT

## 2019-02-06 RX ORDER — LABETALOL HYDROCHLORIDE 300 MG/1
300 TABLET, FILM COATED ORAL
Refills: 0 | Status: ACTIVE | COMMUNITY

## 2019-02-06 RX ORDER — CALCIUM ACETATE 667 MG/1
667 CAPSULE ORAL
Refills: 0 | Status: ACTIVE | COMMUNITY

## 2019-02-06 NOTE — ASSESSMENT
[Arterial/Venous Disease] : arterial/venous disease [Medication Management] : medication management [Other: _____] : [unfilled] [FreeTextEntry1] : Impression lue avf maturing well \par \par Plan Med Conservative woundcare (betadine) prn \par ov w lue avf fistula duplex  s/o stenosis  1mo to re eval

## 2019-02-06 NOTE — PHYSICAL EXAM
[Normal Breath Sounds] : Normal breath sounds [2+] : left 2+ [No HSM] : no hepatosplenomegaly [No Rash or Lesion] : No rash or lesion [Alert] : alert [Oriented to Person] : oriented to person [Oriented to Place] : oriented to place [Oriented to Time] : oriented to time [Calm] : calm [JVD] : no jugular venous distention  [Ankle Swelling (On Exam)] : not present [Varicose Veins Of Lower Extremities] : not present [] : not present [Abdomen Masses] : No abdominal masses [Tender] : was nontender [Stool Sample Taken] : No stool obtained  on rectal exam [de-identified] : nad [de-identified] : wnl [FreeTextEntry1] : lue avf maturing well \par all incisions healing well \par  [de-identified] : wnl [de-identified] : wnl [de-identified] : Reggie Cranial nerves 2-12 reggie grossly intact [de-identified] : cooperative

## 2019-02-06 NOTE — HISTORY OF PRESENT ILLNESS
[FreeTextEntry1] : pt is s/p lue avf  creation and revision \par pt presents for  removal of lue stitch \par pt has completed po abx rx

## 2019-02-07 ENCOUNTER — APPOINTMENT (OUTPATIENT)
Dept: CT IMAGING | Facility: IMAGING CENTER | Age: 69
End: 2019-02-07
Payer: MEDICARE

## 2019-02-07 ENCOUNTER — OUTPATIENT (OUTPATIENT)
Dept: OUTPATIENT SERVICES | Facility: HOSPITAL | Age: 69
LOS: 1 days | End: 2019-02-07
Payer: MEDICARE

## 2019-02-07 DIAGNOSIS — R91.1 SOLITARY PULMONARY NODULE: ICD-10-CM

## 2019-02-07 DIAGNOSIS — Z95.1 PRESENCE OF AORTOCORONARY BYPASS GRAFT: Chronic | ICD-10-CM

## 2019-02-07 PROCEDURE — 71250 CT THORAX DX C-: CPT | Mod: 26

## 2019-02-07 PROCEDURE — 71250 CT THORAX DX C-: CPT

## 2019-02-20 ENCOUNTER — APPOINTMENT (OUTPATIENT)
Dept: VASCULAR SURGERY | Facility: CLINIC | Age: 69
End: 2019-02-20

## 2019-02-20 ENCOUNTER — APPOINTMENT (OUTPATIENT)
Dept: PULMONOLOGY | Facility: CLINIC | Age: 69
End: 2019-02-20
Payer: MEDICARE

## 2019-02-20 VITALS
WEIGHT: 150 LBS | OXYGEN SATURATION: 95 % | SYSTOLIC BLOOD PRESSURE: 146 MMHG | BODY MASS INDEX: 24.11 KG/M2 | HEART RATE: 73 BPM | HEIGHT: 66 IN | DIASTOLIC BLOOD PRESSURE: 64 MMHG | RESPIRATION RATE: 16 BRPM | TEMPERATURE: 97.9 F

## 2019-02-20 DIAGNOSIS — R91.1 SOLITARY PULMONARY NODULE: ICD-10-CM

## 2019-02-20 PROCEDURE — 94060 EVALUATION OF WHEEZING: CPT

## 2019-02-20 PROCEDURE — 99214 OFFICE O/P EST MOD 30 MIN: CPT | Mod: 25

## 2019-02-20 PROCEDURE — 94729 DIFFUSING CAPACITY: CPT

## 2019-02-20 PROCEDURE — 94727 GAS DIL/WSHOT DETER LNG VOL: CPT

## 2019-02-20 PROCEDURE — 88738 HGB QUANT TRANSCUTANEOUS: CPT

## 2019-02-20 RX ORDER — AMOXICILLIN AND CLAVULANATE POTASSIUM 500; 125 MG/1; MG/1
500-125 TABLET, FILM COATED ORAL
Qty: 14 | Refills: 1 | Status: DISCONTINUED | COMMUNITY
Start: 2019-01-22 | End: 2019-02-20

## 2019-02-20 RX ORDER — ALBUTEROL SULFATE 90 UG/1
108 (90 BASE) AEROSOL, METERED RESPIRATORY (INHALATION)
Refills: 0 | Status: ACTIVE | COMMUNITY

## 2019-02-20 RX ORDER — CEFUROXIME AXETIL 250 MG/1
250 TABLET ORAL
Qty: 14 | Refills: 0 | Status: DISCONTINUED | COMMUNITY
Start: 2018-08-03 | End: 2019-02-20

## 2019-02-20 RX ORDER — AMLODIPINE BESYLATE 10 MG/1
10 TABLET ORAL
Refills: 0 | Status: DISCONTINUED | COMMUNITY
End: 2019-02-20

## 2019-02-20 RX ORDER — CLOPIDOGREL BISULFATE 75 MG/1
75 TABLET, FILM COATED ORAL
Refills: 0 | Status: DISCONTINUED | COMMUNITY
End: 2019-02-20

## 2019-02-21 NOTE — REASON FOR VISIT
[Follow-Up] : a follow-up visit [Abnormal CT Scan] : abnormal CT Scan [FreeTextEntry2] : Restrictive lUng Disease

## 2019-02-21 NOTE — PHYSICAL EXAM
[Normal Appearance] : normal appearance [Normal Conjunctiva] : the conjunctiva exhibited no abnormalities [Normal Oropharynx] : normal oropharynx [Heart Rate And Rhythm] : heart rate and rhythm were normal [Heart Sounds] : normal S1 and S2 [] : no respiratory distress [Respiration, Rhythm And Depth] : normal respiratory rhythm and effort [Bowel Sounds] : normal bowel sounds [2+ Pitting] : 2+  pitting [Skin Color & Pigmentation] : normal skin color and pigmentation [Skin Turgor] : normal skin turgor [FreeTextEntry1] : decreased bilateral bases, few crackles [FreeTextEntry2] : r/l ankle

## 2019-02-21 NOTE — DISCUSSION/SUMMARY
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 \par Possible diaphragmatic dysfunction.

## 2019-02-21 NOTE — HISTORY OF PRESENT ILLNESS
[FreeTextEntry1] : Treated for significant cough by PMD. Treated with albuterol with positive response. Now mild.\par \par On dialysis.

## 2019-03-06 ENCOUNTER — APPOINTMENT (OUTPATIENT)
Dept: VASCULAR SURGERY | Facility: CLINIC | Age: 69
End: 2019-03-06
Payer: MEDICARE

## 2019-03-06 ENCOUNTER — APPOINTMENT (OUTPATIENT)
Dept: VASCULAR SURGERY | Facility: CLINIC | Age: 69
End: 2019-03-06

## 2019-03-06 ENCOUNTER — APPOINTMENT (OUTPATIENT)
Age: 69
End: 2019-03-06
Payer: MEDICARE

## 2019-03-06 VITALS — DIASTOLIC BLOOD PRESSURE: 99 MMHG | TEMPERATURE: 69 F | SYSTOLIC BLOOD PRESSURE: 214 MMHG

## 2019-03-06 VITALS
BODY MASS INDEX: 24.11 KG/M2 | DIASTOLIC BLOOD PRESSURE: 99 MMHG | HEIGHT: 66 IN | TEMPERATURE: 98.7 F | WEIGHT: 150 LBS | HEART RATE: 71 BPM | SYSTOLIC BLOOD PRESSURE: 221 MMHG

## 2019-03-06 PROCEDURE — 93990 DOPPLER FLOW TESTING: CPT | Mod: LT

## 2019-03-06 PROCEDURE — 99024 POSTOP FOLLOW-UP VISIT: CPT

## 2019-03-06 NOTE — PHYSICAL EXAM
[Normal Breath Sounds] : Normal breath sounds [2+] : left 2+ [No HSM] : no hepatosplenomegaly [No Rash or Lesion] : No rash or lesion [Alert] : alert [Oriented to Person] : oriented to person [Oriented to Place] : oriented to place [Oriented to Time] : oriented to time [Calm] : calm [JVD] : no jugular venous distention  [Ankle Swelling (On Exam)] : not present [Varicose Veins Of Lower Extremities] : not present [] : not present [Abdomen Masses] : No abdominal masses [Tender] : was nontender [Stool Sample Taken] : No stool obtained  on rectal exam [de-identified] : nad [de-identified] : wnl [FreeTextEntry1] : lue avf maturing well \par all incisions healing well \par Mild arterial insufficiency w mild  trophic skin changes \par no wounds/ulcers\par  [de-identified] : wnl [de-identified] : wnl [de-identified] : Reggie Cranial nerves 2-12 reggie grossly intact [de-identified] : cooperative

## 2019-03-06 NOTE — ASSESSMENT
[Arterial/Venous Disease] : arterial/venous disease [Medication Management] : medication management [Other: _____] : [unfilled] [FreeTextEntry1] : Impression lue avf maturing well and art insuff w sx \par \par Plan Med Conservative  may start lue avf hd access\par rto after 3 sessions for  rt permacath d/c\par ov w lue avf duplex s/o stenosis 6mo sept 2019\par ov w maik/pvr s/o art insuff next avail

## 2019-03-06 NOTE — HISTORY OF PRESENT ILLNESS
[FreeTextEntry1] : pt is s/p lue avf  creation and revision \par pt presents to re eval of avf\par pt also c/o cassie le nightly leg and foot cramps \par pt also c/o intermittent claudication at sev blocks \par intensity moderate\par onset sev weeks  ago \par pt denies recent inj

## 2019-03-26 ENCOUNTER — APPOINTMENT (OUTPATIENT)
Dept: VASCULAR SURGERY | Facility: CLINIC | Age: 69
End: 2019-03-26

## 2019-04-17 NOTE — ED CLERICAL - DIVISION
[facility]...
What Reading Time Point?: 48 hour
Detail Level: Zone
Number Of Patches Read: 11
Show Negative Results In The Note?: Yes
Show Allergen Counseling In The Note?: No
Allergen 1 Reaction: no reaction
Name Of Allergen 37: (2- hydroxyethyl) methacrylate
Name Of Allergen 38: amidoamine
Name Of Allergen 39: ammonium persulphate
Name Of Allergen 40: cocamidopropylbetaine
Name Of Allergen 41: dimethylaminopropylamine
Name Of Allergen 42: ethyl acrylate
Name Of Allergen 43: formaldehyde
Name Of Allergen 44: fragrance mix II
Name Of Allergen 45: methylisothiazolinone
Name Of Allergen 46: propolis
Allergen 46 Reaction: 1+
Name Of Allergen 47: propylene glycol

## 2019-04-21 ENCOUNTER — EMERGENCY (EMERGENCY)
Facility: HOSPITAL | Age: 69
LOS: 1 days | Discharge: HOME | End: 2019-04-21
Attending: EMERGENCY MEDICINE
Payer: MEDICARE

## 2019-04-21 VITALS
DIASTOLIC BLOOD PRESSURE: 77 MMHG | RESPIRATION RATE: 16 BRPM | HEART RATE: 53 BPM | SYSTOLIC BLOOD PRESSURE: 152 MMHG | OXYGEN SATURATION: 95 % | WEIGHT: 164.91 LBS

## 2019-04-21 VITALS
HEART RATE: 68 BPM | RESPIRATION RATE: 18 BRPM | DIASTOLIC BLOOD PRESSURE: 70 MMHG | OXYGEN SATURATION: 96 % | TEMPERATURE: 98 F | SYSTOLIC BLOOD PRESSURE: 151 MMHG

## 2019-04-21 DIAGNOSIS — Z95.1 PRESENCE OF AORTOCORONARY BYPASS GRAFT: Chronic | ICD-10-CM

## 2019-04-21 LAB
ALBUMIN SERPL ELPH-MCNC: 4 G/DL — SIGNIFICANT CHANGE UP (ref 3.3–5)
ALP SERPL-CCNC: 153 U/L — HIGH (ref 40–120)
ALT FLD-CCNC: 17 U/L — SIGNIFICANT CHANGE UP (ref 10–45)
ANION GAP SERPL CALC-SCNC: 13 MMOL/L — SIGNIFICANT CHANGE UP (ref 5–17)
ANION GAP SERPL CALC-SCNC: 15 MMOL/L — SIGNIFICANT CHANGE UP (ref 5–17)
AST SERPL-CCNC: 28 U/L — SIGNIFICANT CHANGE UP (ref 10–40)
BASOPHILS # BLD AUTO: 0.1 K/UL — SIGNIFICANT CHANGE UP (ref 0–0.2)
BASOPHILS NFR BLD AUTO: 0.8 % — SIGNIFICANT CHANGE UP (ref 0–2)
BILIRUB SERPL-MCNC: 0.3 MG/DL — SIGNIFICANT CHANGE UP (ref 0.2–1.2)
BUN SERPL-MCNC: 25 MG/DL — HIGH (ref 7–23)
BUN SERPL-MCNC: 27 MG/DL — HIGH (ref 7–23)
CALCIUM SERPL-MCNC: 9 MG/DL — SIGNIFICANT CHANGE UP (ref 8.4–10.5)
CALCIUM SERPL-MCNC: 9.3 MG/DL — SIGNIFICANT CHANGE UP (ref 8.4–10.5)
CHLORIDE SERPL-SCNC: 94 MMOL/L — LOW (ref 96–108)
CHLORIDE SERPL-SCNC: 94 MMOL/L — LOW (ref 96–108)
CO2 SERPL-SCNC: 29 MMOL/L — SIGNIFICANT CHANGE UP (ref 22–31)
CO2 SERPL-SCNC: 30 MMOL/L — SIGNIFICANT CHANGE UP (ref 22–31)
CREAT SERPL-MCNC: 4.12 MG/DL — HIGH (ref 0.5–1.3)
CREAT SERPL-MCNC: 4.27 MG/DL — HIGH (ref 0.5–1.3)
EOSINOPHIL # BLD AUTO: 0.3 K/UL — SIGNIFICANT CHANGE UP (ref 0–0.5)
EOSINOPHIL NFR BLD AUTO: 3.2 % — SIGNIFICANT CHANGE UP (ref 0–6)
GAS PNL BLDV: SIGNIFICANT CHANGE UP
GAS PNL BLDV: SIGNIFICANT CHANGE UP
GLUCOSE SERPL-MCNC: 115 MG/DL — HIGH (ref 70–99)
GLUCOSE SERPL-MCNC: 165 MG/DL — HIGH (ref 70–99)
HCT VFR BLD CALC: 36.2 % — LOW (ref 39–50)
HGB BLD-MCNC: 11.5 G/DL — LOW (ref 13–17)
LYMPHOCYTES # BLD AUTO: 1 K/UL — SIGNIFICANT CHANGE UP (ref 1–3.3)
LYMPHOCYTES # BLD AUTO: 9.4 % — LOW (ref 13–44)
MAGNESIUM SERPL-MCNC: 2.3 MG/DL — SIGNIFICANT CHANGE UP (ref 1.6–2.6)
MAGNESIUM SERPL-MCNC: 2.3 MG/DL — SIGNIFICANT CHANGE UP (ref 1.6–2.6)
MCHC RBC-ENTMCNC: 28.3 PG — SIGNIFICANT CHANGE UP (ref 27–34)
MCHC RBC-ENTMCNC: 31.8 GM/DL — LOW (ref 32–36)
MCV RBC AUTO: 89.1 FL — SIGNIFICANT CHANGE UP (ref 80–100)
MONOCYTES # BLD AUTO: 1.4 K/UL — HIGH (ref 0–0.9)
MONOCYTES NFR BLD AUTO: 13.8 % — SIGNIFICANT CHANGE UP (ref 2–14)
NEUTROPHILS # BLD AUTO: 7.3 K/UL — SIGNIFICANT CHANGE UP (ref 1.8–7.4)
NEUTROPHILS NFR BLD AUTO: 72.8 % — SIGNIFICANT CHANGE UP (ref 43–77)
PHOSPHATE SERPL-MCNC: 5.8 MG/DL — HIGH (ref 2.5–4.5)
PLATELET # BLD AUTO: 200 K/UL — SIGNIFICANT CHANGE UP (ref 150–400)
POTASSIUM SERPL-MCNC: 4 MMOL/L — SIGNIFICANT CHANGE UP (ref 3.5–5.3)
POTASSIUM SERPL-MCNC: 4.6 MMOL/L — SIGNIFICANT CHANGE UP (ref 3.5–5.3)
POTASSIUM SERPL-SCNC: 4 MMOL/L — SIGNIFICANT CHANGE UP (ref 3.5–5.3)
POTASSIUM SERPL-SCNC: 4.6 MMOL/L — SIGNIFICANT CHANGE UP (ref 3.5–5.3)
PROT SERPL-MCNC: 7.7 G/DL — SIGNIFICANT CHANGE UP (ref 6–8.3)
RBC # BLD: 4.07 M/UL — LOW (ref 4.2–5.8)
RBC # FLD: 20.3 % — HIGH (ref 10.3–14.5)
SODIUM SERPL-SCNC: 137 MMOL/L — SIGNIFICANT CHANGE UP (ref 135–145)
SODIUM SERPL-SCNC: 138 MMOL/L — SIGNIFICANT CHANGE UP (ref 135–145)
WBC # BLD: 10.1 K/UL — SIGNIFICANT CHANGE UP (ref 3.8–10.5)
WBC # FLD AUTO: 10.1 K/UL — SIGNIFICANT CHANGE UP (ref 3.8–10.5)

## 2019-04-21 PROCEDURE — 99284 EMERGENCY DEPT VISIT MOD MDM: CPT | Mod: 25

## 2019-04-21 PROCEDURE — 87040 BLOOD CULTURE FOR BACTERIA: CPT

## 2019-04-21 PROCEDURE — 71045 X-RAY EXAM CHEST 1 VIEW: CPT

## 2019-04-21 PROCEDURE — 84100 ASSAY OF PHOSPHORUS: CPT

## 2019-04-21 PROCEDURE — 82435 ASSAY OF BLOOD CHLORIDE: CPT

## 2019-04-21 PROCEDURE — 84295 ASSAY OF SERUM SODIUM: CPT

## 2019-04-21 PROCEDURE — 82803 BLOOD GASES ANY COMBINATION: CPT

## 2019-04-21 PROCEDURE — 93010 ELECTROCARDIOGRAM REPORT: CPT

## 2019-04-21 PROCEDURE — 83605 ASSAY OF LACTIC ACID: CPT

## 2019-04-21 PROCEDURE — 82330 ASSAY OF CALCIUM: CPT

## 2019-04-21 PROCEDURE — 70450 CT HEAD/BRAIN W/O DYE: CPT | Mod: 26

## 2019-04-21 PROCEDURE — 84132 ASSAY OF SERUM POTASSIUM: CPT

## 2019-04-21 PROCEDURE — 85027 COMPLETE CBC AUTOMATED: CPT

## 2019-04-21 PROCEDURE — 82947 ASSAY GLUCOSE BLOOD QUANT: CPT

## 2019-04-21 PROCEDURE — 85014 HEMATOCRIT: CPT

## 2019-04-21 PROCEDURE — 71045 X-RAY EXAM CHEST 1 VIEW: CPT | Mod: 26

## 2019-04-21 PROCEDURE — 80053 COMPREHEN METABOLIC PANEL: CPT

## 2019-04-21 PROCEDURE — 93005 ELECTROCARDIOGRAM TRACING: CPT

## 2019-04-21 PROCEDURE — 80048 BASIC METABOLIC PNL TOTAL CA: CPT

## 2019-04-21 PROCEDURE — 70450 CT HEAD/BRAIN W/O DYE: CPT

## 2019-04-21 PROCEDURE — 83735 ASSAY OF MAGNESIUM: CPT

## 2019-04-21 PROCEDURE — 82962 GLUCOSE BLOOD TEST: CPT

## 2019-04-21 RX ORDER — ISOSORBIDE DINITRATE 5 MG/1
10 TABLET ORAL ONCE
Qty: 0 | Refills: 0 | Status: COMPLETED | OUTPATIENT
Start: 2019-04-21 | End: 2019-04-21

## 2019-04-21 RX ORDER — LABETALOL HCL 100 MG
300 TABLET ORAL ONCE
Qty: 0 | Refills: 0 | Status: COMPLETED | OUTPATIENT
Start: 2019-04-21 | End: 2019-04-21

## 2019-04-21 RX ORDER — HYDRALAZINE HCL 50 MG
50 TABLET ORAL ONCE
Qty: 0 | Refills: 0 | Status: COMPLETED | OUTPATIENT
Start: 2019-04-21 | End: 2019-04-21

## 2019-04-21 RX ORDER — ISOSORBIDE MONONITRATE 60 MG/1
10 TABLET, EXTENDED RELEASE ORAL ONCE
Qty: 0 | Refills: 0 | Status: DISCONTINUED | OUTPATIENT
Start: 2019-04-21 | End: 2019-04-21

## 2019-04-21 RX ADMIN — Medication 50 MILLIGRAM(S): at 08:58

## 2019-04-21 RX ADMIN — Medication 300 MILLIGRAM(S): at 08:58

## 2019-04-21 RX ADMIN — ISOSORBIDE DINITRATE 10 MILLIGRAM(S): 5 TABLET ORAL at 09:16

## 2019-04-21 NOTE — ED PROVIDER NOTE - ATTENDING CONTRIBUTION TO CARE
Dr Tovar Note: 68yo M with pmhx  HTN, CHF, DM, ESRD on HD (TTS), s/p AVR,   BIBEMS for hypoglycemia  Wife states after HD yesterday seemed confused, checked FS was 150 around 10pm she gave 10 units of insulin (70/30, novolog mix),  Wife then saw him get up in night to use restroom, was attending in doorway, seemed confused, she checked FS again and was 53, EMS called    EMS gave him 50 mL of 50% dextrose  No hx of fevers, chills, cough, rhinorrhea.   Wife states known hematoma to left forearm from 2 weeks ago     Gen: no acute distress non toxic alert, no cyanosis   HEENT: no scleral icterus PERRL EOMI   Lungs: Air entry good, clear to auscultation and percussion   CVS: reg HR S1/S2 no murmur no gallop   ABD: non distended, soft to palpation, non palpable liver and spleen and no other masses. no hernias. Extremities: No deformities, no rash, +thrill LUE, +indurated area of hematoma, no erythema, no open lesions, not warm   Neuro: AA and Ox3, no focal deficits, CNII-XII grossly intact     HYPOGLYCEMIA  [place] 2/2 insulin dose (has not been taking regularly after starting HD, glucose level has been maintained without  meds)  Did not eat much yesterday as per pt and wide   No e/o infection   plan to check labs, give PO, monitor in ED  Dispo pending above

## 2019-04-21 NOTE — ED PROVIDER NOTE - PROGRESS NOTE DETAILS
Dr Tovar Note: pt s/o to DR Mcdaniel, no e/o infection, pt pending repeat VBG and FS, dispo and re eval pending Dr. Venu Nguyen, PGY-1: pt feeling well. BP improved. BGL improved. Ready for DC. Return precautions provided and pt verbalized understanding. Pt to follow up with his PCP.

## 2019-04-21 NOTE — ED ADULT NURSE REASSESSMENT NOTE - NS ED NURSE REASSESS COMMENT FT1
Patient a&ox3, no acute distress, resp nonlabored, resting comfortably in bed with family at bedside.  FS currently 83, orange juice/apple juice provided.  Denies headache, dizziness, chest pain, palpitations, SOB, abd pain, n/v/d, urinary symptoms, fevers, chills, weakness at this time. Patient awaiting lab results . Safety maintained.

## 2019-04-21 NOTE — ED ADULT NURSE REASSESSMENT NOTE - NS ED NURSE REASSESS COMMENT FT1
Pt. suddenly feeling "woozy". Per wife pt. is not acting himself. Pt found to be hypotensive 90/56 heart rate 78. Pt. laid flat, and legs elevated. Safety and comfort measures maintained.

## 2019-04-21 NOTE — ED ADULT NURSE NOTE - CHPI ED NUR SYMPTOMS NEG
no headache/no dizziness/no chills/no back pain/no fever/no pain/no vomiting/no decreased eating/drinking/no nausea/no loss of consciousness

## 2019-04-21 NOTE — ED ADULT NURSE NOTE - OBJECTIVE STATEMENT
70 y/o male presenting to ED c/o hypoglycemia. Pt. has multiple medical comorbidities, and is actively receiving dialysis through a right upper chest wall shiley catheter. Pt has a left arm precaution in place due to a fistula that is not currently working. Per wife she gave him insulin last night after checking his FS, and around 3am he was lethargic and displaying altered mental status. His FS was 53 on EMS arrival, and pt. was given 1 amp of D50 by EMS. On arrival to ED FS is 134. Pt denies headache, dizziness, chest pain, palpitations, cough, SOB, abdominal pain, n/v/d, urinary symptoms, fevers, chills, weakness at this time. A&Ox4 gross neuro intact, lungs cta bilaterally, no difficulty speaking in complete sentences, s1s2 heart sounds heard, pulses x 4, knapp x4, abdomen soft nontender nondistended, skin intact, left arm has mild swelling. Safety and comfort measures maintained.

## 2019-04-21 NOTE — ED PROVIDER NOTE - PHYSICAL EXAMINATION
PGY1/MD Dino.   VITALS: reviewed  GEN: No apparent distress, A & O x 3, drowsy, but responsible, arousable by minimum stimuli  HEAD/EYES: NC/AT, PERRL, EOMI, anicteric sclerae, no conjunctival pallor  ENT: mucus membranes moist, oropharynx WNL, trachea midline, no JVD, neck is supple  RESP: lungs CTA with equal breath sounds bilaterally, chest wall nontender and atraumatic  CV: heart with reg rhythm S1, S2, no murmur; distal pulses intact and symmetric bilaterally  ABDOMEN: normoactive bowel sounds, soft, nondistended, nontender, no palpable masses  : no CVAT  MSK: extremities atraumatic and nontender, no edema, no asymmetry.   SKIN: warm, dry, no rash, no bruising, no cyanosis. color appropriate for ethnicity, +left fissure, with good thrill, + fore arm rigid hematoma, with mild erythema and warmth.  NEURO: alert, mentating appropriately, no facial asymmetry.  PSYCH: Affect appropriate

## 2019-04-21 NOTE — ED ADULT NURSE NOTE - NSIMPLEMENTINTERV_GEN_ALL_ED
Implemented All Fall Risk Interventions:  Stout to call system. Call bell, personal items and telephone within reach. Instruct patient to call for assistance. Room bathroom lighting operational. Non-slip footwear when patient is off stretcher. Physically safe environment: no spills, clutter or unnecessary equipment. Stretcher in lowest position, wheels locked, appropriate side rails in place. Provide visual cue, wrist band, yellow gown, etc. Monitor gait and stability. Monitor for mental status changes and reorient to person, place, and time. Review medications for side effects contributing to fall risk. Reinforce activity limits and safety measures with patient and family.

## 2019-04-21 NOTE — ED PROVIDER NOTE - NSFOLLOWUPINSTRUCTIONS_ED_ALL_ED_FT
1) Follow up with your doctor within the next 1-3 days.   2) Return to the ED immediately for new or worsening symptoms   3) Your test results from your ED visit were discussed with you prior to discharge  4) You were provided with a copy of your test results  5) Please continue to take your medications as directed

## 2019-04-21 NOTE — ED PROVIDER NOTE - OBJECTIVE STATEMENT
PGY1/MD Dino. 68 yo M with HTN, CHF, DM, ESRD on HD (TTS), s/p AVR, p/f hypoglycemia. Pt competed his HD session yesterday, after that pt was confused, and disoriented. The wife checked his sugar, which was 150, then gave him 10 units of insulin (70/30, novolog mix), then found to be hypoglycemia, 53, this morning. Pt was not arousal and diaphoretic. BIBEMS, EMS gave him 50 mL of 50% dextrose. No fever, cough, or rhinorrhea. No trauma is appreciated. Per his wife, during HD he had mispancutred, and hematoma was developed, that was about 2 wks ago.

## 2019-04-26 LAB
CULTURE RESULTS: SIGNIFICANT CHANGE UP
CULTURE RESULTS: SIGNIFICANT CHANGE UP
SPECIMEN SOURCE: SIGNIFICANT CHANGE UP
SPECIMEN SOURCE: SIGNIFICANT CHANGE UP

## 2019-05-03 ENCOUNTER — APPOINTMENT (OUTPATIENT)
Dept: VASCULAR SURGERY | Facility: CLINIC | Age: 69
End: 2019-05-03
Payer: MEDICARE

## 2019-05-03 VITALS
DIASTOLIC BLOOD PRESSURE: 62 MMHG | TEMPERATURE: 98.8 F | HEIGHT: 60 IN | WEIGHT: 165 LBS | HEART RATE: 77 BPM | SYSTOLIC BLOOD PRESSURE: 133 MMHG | BODY MASS INDEX: 32.39 KG/M2

## 2019-05-03 DIAGNOSIS — I73.9 PERIPHERAL VASCULAR DISEASE, UNSPECIFIED: ICD-10-CM

## 2019-05-03 DIAGNOSIS — G47.62 SLEEP RELATED LEG CRAMPS: ICD-10-CM

## 2019-05-03 DIAGNOSIS — R25.2 CRAMP AND SPASM: ICD-10-CM

## 2019-05-03 DIAGNOSIS — T82.898A OTHER SPECIFIED COMPLICATION OF VASCULAR PROSTHETIC DEVICES, IMPLANTS AND GRAFTS, INITIAL ENCOUNTER: ICD-10-CM

## 2019-05-03 PROCEDURE — 93923 UPR/LXTR ART STDY 3+ LVLS: CPT

## 2019-05-03 PROCEDURE — 99214 OFFICE O/P EST MOD 30 MIN: CPT

## 2019-05-03 PROCEDURE — 93990 DOPPLER FLOW TESTING: CPT | Mod: 59

## 2019-05-03 NOTE — HISTORY OF PRESENT ILLNESS
[FreeTextEntry1] : pt presents to re eval of avf and states lue avf hd access was d/c due to hematoma \par pt also c/o cassie le nightly leg and foot cramps \par pt also c/o intermittent claudication at sev blocks overall unchanged \par intensity moderate since last ov

## 2019-05-03 NOTE — ASSESSMENT
[Arterial/Venous Disease] : arterial/venous disease [Medication Management] : medication management [Other: _____] : [unfilled] [FreeTextEntry1] : Impression lue avf  w limited maturation  and art insuff w sx \par \par Plan Med Conservative \par continue rt permacath hd access\par d/w pt and wife to proceed wlue avf revision to  resque the avf and continue maturation\par Indications risks and benefits of avf revision were explained to pt who understands and consents\par d/w pt to proceed w HD mon and wed in prder to proceed w thurs  intervention\par will  d/w pt art insuff plan pot op\par ov w maik/pvr s/o art insuff 12mo 5 /2020\par \par \par

## 2019-05-03 NOTE — PHYSICAL EXAM
[Normal Breath Sounds] : Normal breath sounds [2+] : left 2+ [No HSM] : no hepatosplenomegaly [No Rash or Lesion] : No rash or lesion [Oriented to Time] : oriented to time [Oriented to Person] : oriented to person [Oriented to Place] : oriented to place [Alert] : alert [Calm] : calm [JVD] : no jugular venous distention  [Ankle Swelling (On Exam)] : not present [Varicose Veins Of Lower Extremities] : not present [Abdomen Masses] : No abdominal masses [] : not present [Tender] : was nontender [Stool Sample Taken] : No stool obtained  on rectal exam [de-identified] : nad [de-identified] : wnl [FreeTextEntry1] : lue avf  w low bruit and thill and mid lateral level of the antebrachia  hematoma 6cm \par all incisions healing well \par Mild arterial insufficiency w mild  trophic skin changes \par no wounds/ulcers\par  [de-identified] : wnl [de-identified] : wnl [de-identified] : cooperative [de-identified] : Reggie Cranial nerves 2-12 reggie grossly intact

## 2019-05-03 NOTE — DATA REVIEWED
[FreeTextEntry1] : 3/6/2019 LUE AVF patent flow 636 ml/min no flow restrictive lesions \par \par 5/3/2019 SHAVON/PVR RLE mild infra geniculate and LLE mild  infra geniculate arterial insuff w vessel calcification\par                              Rt SHAVON  .81 Lt SHAVON  NC\par                                \par 5/3/2019 LUE AVF  flow low flow volume w proximal avf segment  w stenosis limiting maturation\par \par

## 2019-05-07 ENCOUNTER — OUTPATIENT (OUTPATIENT)
Dept: OUTPATIENT SERVICES | Facility: HOSPITAL | Age: 69
LOS: 1 days | End: 2019-05-07

## 2019-05-07 VITALS
TEMPERATURE: 98 F | SYSTOLIC BLOOD PRESSURE: 140 MMHG | DIASTOLIC BLOOD PRESSURE: 70 MMHG | HEART RATE: 71 BPM | RESPIRATION RATE: 16 BRPM | HEIGHT: 63.5 IN | WEIGHT: 147.05 LBS | OXYGEN SATURATION: 97 %

## 2019-05-07 DIAGNOSIS — Z95.1 PRESENCE OF AORTOCORONARY BYPASS GRAFT: Chronic | ICD-10-CM

## 2019-05-07 DIAGNOSIS — N18.6 END STAGE RENAL DISEASE: ICD-10-CM

## 2019-05-07 LAB
ALBUMIN SERPL ELPH-MCNC: 4.2 G/DL — SIGNIFICANT CHANGE UP (ref 3.3–5)
ALP SERPL-CCNC: 154 U/L — HIGH (ref 40–120)
ALT FLD-CCNC: 19 U/L — SIGNIFICANT CHANGE UP (ref 4–41)
ANION GAP SERPL CALC-SCNC: 18 MMO/L — HIGH (ref 7–14)
AST SERPL-CCNC: 26 U/L — SIGNIFICANT CHANGE UP (ref 4–40)
BILIRUB SERPL-MCNC: 0.3 MG/DL — SIGNIFICANT CHANGE UP (ref 0.2–1.2)
BUN SERPL-MCNC: 48 MG/DL — HIGH (ref 7–23)
CALCIUM SERPL-MCNC: 9.1 MG/DL — SIGNIFICANT CHANGE UP (ref 8.4–10.5)
CHLORIDE SERPL-SCNC: 101 MMOL/L — SIGNIFICANT CHANGE UP (ref 98–107)
CO2 SERPL-SCNC: 25 MMOL/L — SIGNIFICANT CHANGE UP (ref 22–31)
CREAT SERPL-MCNC: 5.71 MG/DL — HIGH (ref 0.5–1.3)
GLUCOSE SERPL-MCNC: 189 MG/DL — HIGH (ref 70–99)
HCT VFR BLD CALC: 34 % — LOW (ref 39–50)
HGB BLD-MCNC: 10.6 G/DL — LOW (ref 13–17)
MCHC RBC-ENTMCNC: 26.8 PG — LOW (ref 27–34)
MCHC RBC-ENTMCNC: 31.2 % — LOW (ref 32–36)
MCV RBC AUTO: 85.9 FL — SIGNIFICANT CHANGE UP (ref 80–100)
NRBC # FLD: 0 K/UL — SIGNIFICANT CHANGE UP (ref 0–0)
PLATELET # BLD AUTO: 240 K/UL — SIGNIFICANT CHANGE UP (ref 150–400)
PMV BLD: 11.1 FL — SIGNIFICANT CHANGE UP (ref 7–13)
POTASSIUM SERPL-MCNC: 4.9 MMOL/L — SIGNIFICANT CHANGE UP (ref 3.5–5.3)
POTASSIUM SERPL-SCNC: 4.9 MMOL/L — SIGNIFICANT CHANGE UP (ref 3.5–5.3)
PROT SERPL-MCNC: 7.7 G/DL — SIGNIFICANT CHANGE UP (ref 6–8.3)
RBC # BLD: 3.96 M/UL — LOW (ref 4.2–5.8)
RBC # FLD: 19.1 % — HIGH (ref 10.3–14.5)
SODIUM SERPL-SCNC: 144 MMOL/L — SIGNIFICANT CHANGE UP (ref 135–145)
WBC # BLD: 7.63 K/UL — SIGNIFICANT CHANGE UP (ref 3.8–10.5)
WBC # FLD AUTO: 7.63 K/UL — SIGNIFICANT CHANGE UP (ref 3.8–10.5)

## 2019-05-07 RX ORDER — ERYTHROPOIETIN 10000 [IU]/ML
1 INJECTION, SOLUTION INTRAVENOUS; SUBCUTANEOUS
Qty: 0 | Refills: 0 | COMMUNITY

## 2019-05-07 RX ORDER — ASPIRIN/CALCIUM CARB/MAGNESIUM 324 MG
1 TABLET ORAL
Qty: 0 | Refills: 0 | COMMUNITY

## 2019-05-07 RX ORDER — SENNA PLUS 8.6 MG/1
1 TABLET ORAL
Qty: 0 | Refills: 0 | COMMUNITY

## 2019-05-07 RX ORDER — INSULIN ASPART 100 [IU]/ML
10 INJECTION, SUSPENSION SUBCUTANEOUS
Qty: 0 | Refills: 0 | COMMUNITY

## 2019-05-07 RX ORDER — SODIUM CHLORIDE 9 MG/ML
1000 INJECTION INTRAMUSCULAR; INTRAVENOUS; SUBCUTANEOUS
Refills: 0 | Status: DISCONTINUED | OUTPATIENT
Start: 2019-05-09 | End: 2019-05-24

## 2019-05-07 NOTE — H&P PST ADULT - NEGATIVE GENERAL GENITOURINARY SYMPTOMS
normal urinary frequency/no incontinence/no urinary hesitancy/no urine discoloration/no bladder infections/no flank pain R/no renal colic/no flank pain L/no dysuria/no hematuria/pt urinates 3-4 times per day. On HD T/Th/Sat

## 2019-05-07 NOTE — H&P PST ADULT - PRO INTERPRETER NEED 2
Change of MRI order from right foot to left  FYI
Pt needs MRI order to be changed from Rt foot to Left foot - pls correct
Yes fine thank you
English

## 2019-05-07 NOTE — H&P PST ADULT - NSICDXPASTMEDICALHX_GEN_ALL_CORE_FT
PAST MEDICAL HISTORY:  Anemia     Aortic valve replaced     CHF (congestive heart failure)     CKD (chronic kidney disease)     Diabetes mellitus     HLD (hyperlipidemia)     HTN (hypertension)     Hypertension

## 2019-05-07 NOTE — H&P PST ADULT - NEGATIVE PSYCHIATRIC SYMPTOMS
no agitation/no visual hallucinations/no mood swings/no paranoia/no auditory hallucinations/no anxiety/no depression/no insomnia/no suicidal ideation

## 2019-05-07 NOTE — H&P PST ADULT - VASCULAR DETAILS
Left AVF T&B:  Rt Shiley in place rt ACW, dsg dci Left AVF T&B + at distal refion (radial) none palpated or auscultated by AC area   Rt Roseanna in place Rt ACW, dsg dci

## 2019-05-07 NOTE — H&P PST ADULT - NSANTHOSAYNRD_GEN_A_CORE
No. MARYJO screening performed.  STOP BANG Legend: 0-2 = LOW Risk; 3-4 = INTERMEDIATE Risk; 5-8 = HIGH Risk/never tested

## 2019-05-07 NOTE — H&P PST ADULT - NEGATIVE CARDIOVASCULAR SYMPTOMS
no palpitations/no dyspnea on exertion/no orthopnea/no chest pain/no claudication/no paroxysmal nocturnal dyspnea/no peripheral edema

## 2019-05-07 NOTE — H&P PST ADULT - NSICDXPROBLEM_GEN_ALL_CORE_FT
PROBLEM DIAGNOSES  Problem: ESRD on dialysis  Assessment and Plan:   ESRD and evaluated for a scheduled left arm AVF revision on 5/9/19.   Preop instructions provided including NPO status, Hibiclens wash. Aware to c/w all pm and am meds as ordered and aware to take in am dos w/ a sip of water hydralazine, labetalol and isosorbide. Hold Lasix in am dos but take pm dose as ordered. ASA to continue as ordered (states is never stopped prior to sx due to OHS/Valve replacement). Aware to clarify on MC, form provided. states MC will be obtained tomorrow w/ HD clinic (wife \[facility]\"" clinic to have MD's on call). form provided. Verbalized understanding.

## 2019-05-07 NOTE — H&P PST ADULT - NEGATIVE MUSCULOSKELETAL SYMPTOMS
no leg pain R/no arthritis/no joint swelling/no muscle cramps/no muscle weakness/no back pain/no neck pain/no arm pain R/no leg pain L/no myalgia/no stiffness/no arm pain L

## 2019-05-07 NOTE — H&P PST ADULT - NEGATIVE MALE-SPECIFIC SYMPTOMS
no impotence/no ejaculatory dysfunction/no scrotal mass R/no undescended testicle R/no genital sores/no undescended testicle L/no erectile dysfunction/no scrotal mass L/no urethral discharge

## 2019-05-07 NOTE — H&P PST ADULT - HISTORY OF PRESENT ILLNESS
68M PMH CAD/CABG, CHF EF (30%), T2DM, HTN, CKD 5 (not on HD), aortic valve replacement, chronic anemia occasionally requiring transfusions and AFIB (rate controlled and resolved no a/c required as per wife. Pt states "my husbands AVF is collapsing for > 1 mo so was recommended revision at this time". Pt has a Shiley on RACW placed Jan 2019. 68M PMH CAD/CABG, CHF EF (30%), T2DM, HTN, CKD 5 (on HD), aortic valve replacement, chronic anemia occasionally requiring transfusions and AFIB (rate controlled and resolved no a/c required as per wife). Pt is a poor historian, mildly confused at times, wife answering questions when pt unsure.  Presents to PST w/ a preop dx of ESRD and to be evaluated for a scheduled left arm AVF revision on 5/9/19. Pt wife states "my husbands AVF is collapsing for > 1 mo so was recommended revision at this time". Pt has a Shiley on RACW placed Jan 2019, gets HD T/TH/F.

## 2019-05-08 ENCOUNTER — TRANSCRIPTION ENCOUNTER (OUTPATIENT)
Age: 69
End: 2019-05-08

## 2019-05-09 ENCOUNTER — APPOINTMENT (OUTPATIENT)
Dept: VASCULAR SURGERY | Facility: HOSPITAL | Age: 69
End: 2019-05-09

## 2019-05-09 ENCOUNTER — OUTPATIENT (OUTPATIENT)
Dept: OUTPATIENT SERVICES | Facility: HOSPITAL | Age: 69
LOS: 1 days | Discharge: HOME | End: 2019-05-09
Payer: MEDICARE

## 2019-05-09 VITALS
DIASTOLIC BLOOD PRESSURE: 66 MMHG | HEIGHT: 63.5 IN | HEART RATE: 79 BPM | WEIGHT: 147.05 LBS | RESPIRATION RATE: 14 BRPM | TEMPERATURE: 98 F | OXYGEN SATURATION: 96 % | SYSTOLIC BLOOD PRESSURE: 133 MMHG

## 2019-05-09 VITALS
RESPIRATION RATE: 15 BRPM | DIASTOLIC BLOOD PRESSURE: 65 MMHG | OXYGEN SATURATION: 95 % | SYSTOLIC BLOOD PRESSURE: 119 MMHG | HEART RATE: 69 BPM

## 2019-05-09 DIAGNOSIS — Z95.1 PRESENCE OF AORTOCORONARY BYPASS GRAFT: Chronic | ICD-10-CM

## 2019-05-09 DIAGNOSIS — N18.6 END STAGE RENAL DISEASE: ICD-10-CM

## 2019-05-09 LAB
GLUCOSE BLDV-MCNC: 145 MG/DL — HIGH (ref 70–99)
POTASSIUM BLDV-SCNC: 4.5 MMOL/L — SIGNIFICANT CHANGE UP (ref 3.4–4.5)

## 2019-05-09 PROCEDURE — 36832 AV FISTULA REVISION OPEN: CPT | Mod: LT

## 2019-05-09 RX ORDER — SODIUM CHLORIDE 9 MG/ML
3 INJECTION INTRAMUSCULAR; INTRAVENOUS; SUBCUTANEOUS EVERY 8 HOURS
Refills: 0 | Status: DISCONTINUED | OUTPATIENT
Start: 2019-05-09 | End: 2019-05-24

## 2019-05-09 RX ORDER — ACETAMINOPHEN WITH CODEINE 300MG-30MG
1 TABLET ORAL
Qty: 10 | Refills: 0
Start: 2019-05-09

## 2019-05-09 NOTE — ASU DISCHARGE PLAN (ADULT/PEDIATRIC) - CARE PROVIDER_API CALL
Ar Dotson)  Vascular Surgery  [address], [address]  [place] [address]  Phone: [phone]  Fax: [phone]  Follow Up Time:

## 2019-05-09 NOTE — BRIEF OPERATIVE NOTE - OPERATION/FINDINGS
Proximalization of a former radiocephalic AVF into a brachiocephalic with PTFE. ligation of AC branch. Doppler and thrill postop

## 2019-05-09 NOTE — ASU DISCHARGE PLAN (ADULT/PEDIATRIC) - CALL YOUR DOCTOR IF YOU HAVE ANY OF THE FOLLOWING:
Unable to urinate/Wound/Surgical Site with redness, or foul smelling discharge or pus/Swelling that gets worse/Fever greater than (need to indicate Fahrenheit or Celsius)/Nausea and vomiting that does not stop/Numbness, tingling, color or temperature change to extremity/Inability to tolerate liquids or foods/Bleeding that does not stop/Pain not relieved by Medications

## 2019-05-09 NOTE — PACU DISCHARGE NOTE - NSCLINEINSERTRD_GEN_ALL_CORE
PROCEDURE NOTE: Lucentis 0.5mg PFS OD. Diagnosis: Neovascular AMD with Active CNV. Anesthesia: Subconjunctival. Prep: Betadine Flush. Prior to injection, risks/benefits/alternatives discussed including but not limited to infection, loss of vision or eye, hemorrhage, cataract, glaucoma, retinal tears or detachment. The patient wished to proceed with treatment. Topical anesthesia was induced with Alcaine. Additional anesthesia was achieved using drop(s) or injection checked above. A drop of Povidone-iodine 5% ophthalmic solution was instilled over the injection site and in the inferior fornix. Betadine prep was performed. A single use prefilled syringe of intravitreal Lucentis 0.5mg/0.05ml was used and excess discarded. The needle was passed 3.0 mm posterior to the limbus in pseudophakic patients, and 3.5 mm posterior to the limbus in phakic patients. The remainder of the Lucentis 0.5mg in the single-use vial was then discarded in a medical waste disposal container. The eye was irrigated with sterile irrigating solution. Patient tolerated the procedure well. There were no complications. Post procedure instructions given. CF vision checked. Injection Time 10:41AM. The patient was instructed to return for re-evaluation in approximately 4-12 weeks depending on his/her condition and was told to call immediately if vision decreases and/or if his/her eye becomes red, painful, and/or light sensitive. The patient was instructed to go to the emergency room or call 911 if unable to reach the doctor within an hour or two of trying or calling. Galina Mari PROCEDURE NOTE: Lucentis 0.5mg PFS OS. Diagnosis: Neovascular AMD with Active CNV. Anesthesia: Subconjunctival. Prep: Betadine Flush. Prior to injection, risks/benefits/alternatives discussed including but not limited to infection, loss of vision or eye, hemorrhage, cataract, glaucoma, retinal tears or detachment. The patient wished to proceed with treatment. Topical anesthesia was induced with Alcaine. Additional anesthesia was achieved using drop(s) or injection checked above. A drop of Povidone-iodine 5% ophthalmic solution was instilled over the injection site and in the inferior fornix. Betadine prep was performed. A single use prefilled syringe of intravitreal Lucentis 0.5mg/0.05ml was used and excess discarded. The needle was passed 3.0 mm posterior to the limbus in pseudophakic patients, and 3.5 mm posterior to the limbus in phakic patients. The remainder of the Lucentis 0.5mg in the single-use vial was then discarded in a medical waste disposal container. The eye was irrigated with sterile irrigating solution. Patient tolerated the procedure well. There were no complications. Post procedure instructions given. CF vision checked. Injection Time 10:42AM. The patient was instructed to return for re-evaluation in approximately 4-12 weeks depending on his/her condition and was told to call immediately if vision decreases and/or if his/her eye becomes red, painful, and/or light sensitive. The patient was instructed to go to the emergency room or call 911 if unable to reach the doctor within an hour or two of trying or calling. Galina Mari
No

## 2019-05-14 PROBLEM — E78.5 HYPERLIPIDEMIA, UNSPECIFIED: Chronic | Status: ACTIVE | Noted: 2019-05-07

## 2019-05-23 ENCOUNTER — APPOINTMENT (OUTPATIENT)
Dept: VASCULAR SURGERY | Facility: CLINIC | Age: 69
End: 2019-05-23
Payer: MEDICARE

## 2019-05-23 VITALS
HEART RATE: 78 BPM | HEIGHT: 60 IN | DIASTOLIC BLOOD PRESSURE: 87 MMHG | SYSTOLIC BLOOD PRESSURE: 200 MMHG | BODY MASS INDEX: 32.39 KG/M2 | TEMPERATURE: 97.8 F | WEIGHT: 165 LBS

## 2019-05-23 PROCEDURE — 99024 POSTOP FOLLOW-UP VISIT: CPT

## 2019-05-23 NOTE — HISTORY OF PRESENT ILLNESS
[FreeTextEntry1] : pt presents to re eval of avf and states lue avf hd access was d/c due to hematoma \par pt also c/o cassie le nightly leg and foot cramps \par pt also c/o intermittent claudication at sev blocks overall unchanged \par intensity moderate since last ov  [de-identified] : Pt is now 2 weeks s/p left arm AVF revision to an AVG on 5/9/19. Currently being dialyzed via right chest permacath Tues Thurs Sat. He c/o of left arm soreness. But denies any significant pain or swelling. Denies any other new complaints since procedure. No fever or chills.

## 2019-05-23 NOTE — ASSESSMENT
[Arterial/Venous Disease] : arterial/venous disease [Medication Management] : medication management [Other: _____] : [unfilled] [FreeTextEntry1] : Impression: ESRD on HD s/p left arm revision of AVF to AVG. Excellent bruit and thrill on exam today\par \par Plan Medical Conservative Management\par Continue right permacath HD access\par RTO in 1 week for remainder of suture removal and eval AVG for HD use\par ov w maik/pvr s/o art insuff 12mo 5 /2020 as per Dr. Dotson\par \par \par

## 2019-05-23 NOTE — PHYSICAL EXAM
[2+] : left 2+ [No HSM] : no hepatosplenomegaly [No Rash or Lesion] : No rash or lesion [Alert] : alert [Oriented to Person] : oriented to person [Oriented to Place] : oriented to place [Oriented to Time] : oriented to time [Calm] : calm [Ankle Swelling (On Exam)] : not present [Varicose Veins Of Lower Extremities] : not present [] : not present [Abdomen Masses] : No abdominal masses [Tender] : was nontender [Stool Sample Taken] : No stool obtained  on rectal exam [de-identified] : well [de-identified] : betsyl [FreeTextEntry1] : Left arm AVG w/ excellent bruit and thrill \par All incisions healing well sutures and staples intact. No s/s of infection. Trace edema.\par \par  [de-identified] : shun [de-identified] : betsyl [de-identified] : Reggie Cranial nerves 2-12 reggie grossly intact [de-identified] : cooperative

## 2019-05-30 ENCOUNTER — APPOINTMENT (OUTPATIENT)
Dept: VASCULAR SURGERY | Facility: CLINIC | Age: 69
End: 2019-05-30
Payer: MEDICARE

## 2019-05-30 VITALS
HEART RATE: 97 BPM | HEIGHT: 66 IN | WEIGHT: 164 LBS | TEMPERATURE: 98.3 F | BODY MASS INDEX: 26.36 KG/M2 | DIASTOLIC BLOOD PRESSURE: 102 MMHG | SYSTOLIC BLOOD PRESSURE: 227 MMHG

## 2019-05-30 PROCEDURE — 99024 POSTOP FOLLOW-UP VISIT: CPT

## 2019-05-30 NOTE — ASSESSMENT
[Arterial/Venous Disease] : arterial/venous disease [Medication Management] : medication management [Other: _____] : [unfilled] [FreeTextEntry1] : Impression: ESRD on HD s/p left arm revision of AVF to AVG. Excellent bruit and thrill on exam today\par \par Plan Medical Conservative Management betadine to incisions daily\par Continue right permacath HD access\par RTO in 2 weeks for left arm doppler and eval AVG for HD use\par ov w maik/pvr s/o art insuff 12mo 5 /2020 as per Dr. Dotson\par \par \par In depth discussion w/ pt and wife regarding the risk of not taking BP medication. Both pt and wife verbalizes understanding. I encouraged taking BP medication prior to office visits\par \par

## 2019-05-30 NOTE — HISTORY OF PRESENT ILLNESS
[FreeTextEntry1] : pt presents to re eval of avf and states lue avf hd access was d/c due to hematoma \par pt also c/o cassie le nightly leg and foot cramps \par pt also c/o intermittent claudication at sev blocks overall unchanged \par intensity moderate since last ov  [de-identified] : Pt is now 3 weeks s/p left arm AVF revision to an AVG on 5/9/19. Currently being dialyzed via right chest permacath Tues Thurs Sat. He reports improvement in left arm soreness. But denies any significant pain or swelling. Denies any other new complaints since procedure. No fever or chills. His BP is extremely high today. He reports "it is always like this" and he was in a rush to get here and did not take his medication this morning. He will take it as soon as he gets home before his dialysis today. Denies headache or dizziness.

## 2019-05-30 NOTE — PHYSICAL EXAM
[2+] : left 2+ [No HSM] : no hepatosplenomegaly [No Rash or Lesion] : No rash or lesion [Alert] : alert [Oriented to Person] : oriented to person [Oriented to Place] : oriented to place [Oriented to Time] : oriented to time [Calm] : calm [Ankle Swelling (On Exam)] : not present [Varicose Veins Of Lower Extremities] : not present [] : not present [Abdomen Masses] : No abdominal masses [Tender] : was nontender [Stool Sample Taken] : No stool obtained  on rectal exam [de-identified] : well [de-identified] : betsyl [FreeTextEntry1] : Left arm AVG w/ excellent bruit and thrill \par All incisions healing well remaining sutures intact. No s/s of infection. Edema resolved. \par \par  [de-identified] : shun [de-identified] : betsyl [de-identified] : Reggie Cranial nerves 2-12 reggie grossly intact [de-identified] : cooperative

## 2019-06-12 ENCOUNTER — APPOINTMENT (OUTPATIENT)
Dept: VASCULAR SURGERY | Facility: CLINIC | Age: 69
End: 2019-06-12

## 2019-06-19 ENCOUNTER — APPOINTMENT (OUTPATIENT)
Dept: PULMONOLOGY | Facility: CLINIC | Age: 69
End: 2019-06-19

## 2019-06-28 ENCOUNTER — APPOINTMENT (OUTPATIENT)
Dept: VASCULAR SURGERY | Facility: CLINIC | Age: 69
End: 2019-06-28
Payer: MEDICARE

## 2019-06-28 VITALS
TEMPERATURE: 98.8 F | HEART RATE: 83 BPM | SYSTOLIC BLOOD PRESSURE: 185 MMHG | DIASTOLIC BLOOD PRESSURE: 81 MMHG | WEIGHT: 168 LBS | BODY MASS INDEX: 27 KG/M2 | HEIGHT: 66 IN

## 2019-06-28 DIAGNOSIS — T82.898D OTHER SPECIFIED COMPLICATION OF VASCULAR PROSTHETIC DEVICES, IMPLANTS AND GRAFTS, SUBSEQUENT ENCOUNTER: ICD-10-CM

## 2019-06-28 PROCEDURE — 99024 POSTOP FOLLOW-UP VISIT: CPT

## 2019-06-28 PROCEDURE — 93990 DOPPLER FLOW TESTING: CPT | Mod: LT

## 2019-06-28 NOTE — HISTORY OF PRESENT ILLNESS
[de-identified] : Pt presents for lue avf/g hybrid eval for hd access start\par pt is receiving hd via rt permacath \par pt states left forearm  swelling has resolved\par  [FreeTextEntry1] : pt presents to re eval of avf and states lue avf hd access was d/c due to hematoma \par pt also c/o cassie le nightly leg and foot cramps \par pt also c/o intermittent claudication at sev blocks overall unchanged \par intensity moderate since last ov

## 2019-06-28 NOTE — ASSESSMENT
[Arterial/Venous Disease] : arterial/venous disease [Medication Management] : medication management [Other: _____] : [unfilled] [FreeTextEntry1] : Impression: ESRD on HD s/p left arm revision of AVF/G. Excellent bruit and thrill on exam today\par \par Plan Medical Conservative Management\par may start  lue avf/g hd access\par rto after 3 hd sessions to d/c [facility]\par pt plans to travel to [place]  july 13 2019\par ov w maik/pvr s/o art insuff 12mo 5 /2020 as per Dr. Dotson\par ov w lue avf/g duplex s/o stenosis 6mo dec 2019 \par

## 2019-06-28 NOTE — REASON FOR VISIT
[Post Op: _________] : a [unfilled] post op visit [Spouse] : spouse [FreeTextEntry1] : s/p lue avf revision to avf/g hybrid

## 2019-06-28 NOTE — DATA REVIEWED
[FreeTextEntry1] : 3/6/2019 LUE AVF patent flow 636 ml/min no flow restrictive lesions \par \par 5/3/2019 SHAVON/PVR RLE mild infra geniculate and LLE mild  infra geniculate arterial insuff w vessel calcification\par                              Rt SHAVON  .81 Lt SHAVON  NC\par                                \par 5/3/2019 LUE AVF  flow low flow volume w proximal avf segment  w stenosis limiting maturation\par \par 6/28/2019 LUE AVF/G hybrid w excellent bruit and thrill flow 1168 ml/min no flow restrictive lesions

## 2019-06-28 NOTE — PHYSICAL EXAM
[2+] : left 2+ [No HSM] : no hepatosplenomegaly [No Rash or Lesion] : No rash or lesion [Alert] : alert [Oriented to Person] : oriented to person [Oriented to Place] : oriented to place [Oriented to Time] : oriented to time [Calm] : calm [Varicose Veins Of Lower Extremities] : not present [Ankle Swelling (On Exam)] : not present [] : not present [Tender] : was nontender [Stool Sample Taken] : No stool obtained  on rectal exam [Abdomen Masses] : No abdominal masses [de-identified] : well [FreeTextEntry1] : Left arm AVF/G w/ excellent bruit and thrill \par all incisions healed \par \par  [de-identified] : betsyl [de-identified] : betsyl [de-identified] : Erggie Cranial nerves 2-12 reggie grossly intact [de-identified] : shun [de-identified] : cooperative

## 2019-07-09 ENCOUNTER — EMERGENCY (EMERGENCY)
Facility: HOSPITAL | Age: 69
LOS: 1 days | Discharge: HOME | End: 2019-07-09
Attending: EMERGENCY MEDICINE
Payer: MEDICARE

## 2019-07-09 ENCOUNTER — APPOINTMENT (OUTPATIENT)
Dept: VASCULAR SURGERY | Facility: CLINIC | Age: 69
End: 2019-07-09
Payer: MEDICARE

## 2019-07-09 VITALS
RESPIRATION RATE: 16 BRPM | TEMPERATURE: 98 F | DIASTOLIC BLOOD PRESSURE: 84 MMHG | HEART RATE: 86 BPM | OXYGEN SATURATION: 96 % | SYSTOLIC BLOOD PRESSURE: 150 MMHG

## 2019-07-09 VITALS
SYSTOLIC BLOOD PRESSURE: 224 MMHG | TEMPERATURE: 98.5 F | HEART RATE: 91 BPM | BODY MASS INDEX: 27 KG/M2 | WEIGHT: 168 LBS | HEIGHT: 66 IN | DIASTOLIC BLOOD PRESSURE: 106 MMHG

## 2019-07-09 VITALS
SYSTOLIC BLOOD PRESSURE: 150 MMHG | OXYGEN SATURATION: 95 % | RESPIRATION RATE: 18 BRPM | TEMPERATURE: 98 F | WEIGHT: 179.9 LBS | HEART RATE: 118 BPM | HEIGHT: 65 IN | DIASTOLIC BLOOD PRESSURE: 98 MMHG

## 2019-07-09 DIAGNOSIS — Z95.1 PRESENCE OF AORTOCORONARY BYPASS GRAFT: Chronic | ICD-10-CM

## 2019-07-09 LAB
ALBUMIN SERPL ELPH-MCNC: 3.9 G/DL — SIGNIFICANT CHANGE UP (ref 3.3–5)
ALP SERPL-CCNC: 124 U/L — HIGH (ref 40–120)
ALT FLD-CCNC: 15 U/L — SIGNIFICANT CHANGE UP (ref 10–45)
ANION GAP SERPL CALC-SCNC: 19 MMOL/L — HIGH (ref 5–17)
APTT BLD: 31.5 SEC — SIGNIFICANT CHANGE UP (ref 27.5–36.3)
AST SERPL-CCNC: 27 U/L — SIGNIFICANT CHANGE UP (ref 10–40)
BASOPHILS # BLD AUTO: 0 K/UL — SIGNIFICANT CHANGE UP (ref 0–0.2)
BASOPHILS NFR BLD AUTO: 0.5 % — SIGNIFICANT CHANGE UP (ref 0–2)
BILIRUB SERPL-MCNC: 0.2 MG/DL — SIGNIFICANT CHANGE UP (ref 0.2–1.2)
BLD GP AB SCN SERPL QL: NEGATIVE — SIGNIFICANT CHANGE UP
BUN SERPL-MCNC: 69 MG/DL — HIGH (ref 7–23)
CALCIUM SERPL-MCNC: 9 MG/DL — SIGNIFICANT CHANGE UP (ref 8.4–10.5)
CHLORIDE SERPL-SCNC: 92 MMOL/L — LOW (ref 96–108)
CO2 SERPL-SCNC: 26 MMOL/L — SIGNIFICANT CHANGE UP (ref 22–31)
CREAT SERPL-MCNC: 11.29 MG/DL — HIGH (ref 0.5–1.3)
EOSINOPHIL # BLD AUTO: 0.4 K/UL — SIGNIFICANT CHANGE UP (ref 0–0.5)
EOSINOPHIL NFR BLD AUTO: 4.1 % — SIGNIFICANT CHANGE UP (ref 0–6)
GLUCOSE SERPL-MCNC: 200 MG/DL — HIGH (ref 70–99)
HCT VFR BLD CALC: 28.6 % — LOW (ref 39–50)
HGB BLD-MCNC: 9.8 G/DL — LOW (ref 13–17)
INR BLD: 0.96 RATIO — SIGNIFICANT CHANGE UP (ref 0.88–1.16)
LYMPHOCYTES # BLD AUTO: 1.5 K/UL — SIGNIFICANT CHANGE UP (ref 1–3.3)
LYMPHOCYTES # BLD AUTO: 17 % — SIGNIFICANT CHANGE UP (ref 13–44)
MCHC RBC-ENTMCNC: 31.9 PG — SIGNIFICANT CHANGE UP (ref 27–34)
MCHC RBC-ENTMCNC: 34.2 GM/DL — SIGNIFICANT CHANGE UP (ref 32–36)
MCV RBC AUTO: 93.3 FL — SIGNIFICANT CHANGE UP (ref 80–100)
MONOCYTES # BLD AUTO: 1.3 K/UL — HIGH (ref 0–0.9)
MONOCYTES NFR BLD AUTO: 14.5 % — HIGH (ref 2–14)
NEUTROPHILS # BLD AUTO: 5.7 K/UL — SIGNIFICANT CHANGE UP (ref 1.8–7.4)
NEUTROPHILS NFR BLD AUTO: 63.9 % — SIGNIFICANT CHANGE UP (ref 43–77)
PLATELET # BLD AUTO: 220 K/UL — SIGNIFICANT CHANGE UP (ref 150–400)
POTASSIUM SERPL-MCNC: 5.3 MMOL/L — SIGNIFICANT CHANGE UP (ref 3.5–5.3)
POTASSIUM SERPL-SCNC: 5.3 MMOL/L — SIGNIFICANT CHANGE UP (ref 3.5–5.3)
PROT SERPL-MCNC: 7.3 G/DL — SIGNIFICANT CHANGE UP (ref 6–8.3)
PROTHROM AB SERPL-ACNC: 10.9 SEC — SIGNIFICANT CHANGE UP (ref 10–12.9)
RBC # BLD: 3.06 M/UL — LOW (ref 4.2–5.8)
RBC # FLD: 20.7 % — HIGH (ref 10.3–14.5)
RH IG SCN BLD-IMP: POSITIVE — SIGNIFICANT CHANGE UP
SODIUM SERPL-SCNC: 137 MMOL/L — SIGNIFICANT CHANGE UP (ref 135–145)
WBC # BLD: 8.9 K/UL — SIGNIFICANT CHANGE UP (ref 3.8–10.5)
WBC # FLD AUTO: 8.9 K/UL — SIGNIFICANT CHANGE UP (ref 3.8–10.5)

## 2019-07-09 PROCEDURE — 80053 COMPREHEN METABOLIC PANEL: CPT

## 2019-07-09 PROCEDURE — 85027 COMPLETE CBC AUTOMATED: CPT

## 2019-07-09 PROCEDURE — 86900 BLOOD TYPING SEROLOGIC ABO: CPT

## 2019-07-09 PROCEDURE — 99284 EMERGENCY DEPT VISIT MOD MDM: CPT

## 2019-07-09 PROCEDURE — 99024 POSTOP FOLLOW-UP VISIT: CPT

## 2019-07-09 PROCEDURE — 36589 REMOVAL TUNNELED CV CATH: CPT | Mod: 58

## 2019-07-09 PROCEDURE — 86850 RBC ANTIBODY SCREEN: CPT

## 2019-07-09 PROCEDURE — 85610 PROTHROMBIN TIME: CPT

## 2019-07-09 PROCEDURE — 86901 BLOOD TYPING SEROLOGIC RH(D): CPT

## 2019-07-09 PROCEDURE — 99283 EMERGENCY DEPT VISIT LOW MDM: CPT

## 2019-07-09 PROCEDURE — 85730 THROMBOPLASTIN TIME PARTIAL: CPT

## 2019-07-09 NOTE — ED PROVIDER NOTE - ATTENDING CONTRIBUTION TO CARE
Attending MD Conway: I personally have seen and examined this patient.  Resident note reviewed and agree on plan of care and except where noted.  See below for details.     Seen in [place], unaccompanied    69M with PMH/PSH including CAD s/p CABG, CHF, HTN, HLD, AV replacement, CKD on HD TuThSa, not dialyzed today, s/p lue avf revision to avf/g hybrid and had R chest wall HD cath removed presents to the ED with bleeding since then.      Review of HIE notes reveals seen by vascular surgeon Dr Ar Dotson, AVF/AVG matured and able to be accessed, instructed patient to return to office after 3 HD sessions for removal of permacath. Attending MD Conway: I personally have seen and examined this patient.  Resident note reviewed and agree on plan of care and except where noted.  See below for details.     Seen in [place], unaccompanied    69M with PMH/PSH including CAD s/p CABG, CHF, HTN, HLD, AV replacement, CKD on HD TuThSa, not dialyzed today, s/p lue avf revision to avf/g hybrid and had R chest wall HD cath removed presents to the ED with bleeding since then.  Review of HIE notes reveals seen by vascular surgeon Dr Ar Dotson, AVF/AVG matured and able to be accessed, instructed patient to return to office after 3 HD sessions for removal of permacath.  Patient reports went to office today and had permacath removed and since then has had bleeding from site.  Denies chest pain, shortness of breath, palpitations. Denies abdominal pain, nausea, vomiting, diarrhea, blood in stools. Denies fevers, chills, dizziness, weakness, LOC.  On exam, large clots on neck and upon removal of soaked abdominal pads on R anterior chest, active brisk ooze from presumed permacath site, NAD, head NCAT, PERRL, FROM at neck, no tenderness to midline palpation, no stepoffs along length of spine, lungs CTAB with good inspiratory effort, +S1S2, no m/r/g, abdomen soft with +BS, NT, ND, no CVAT, moving all extremities; A/P: 69M with bleeding from R chest wall at site of previous permacath, held immediate direct pressure for abot 15 min without cessation of bleeding, will check labs to make sure no drop in H/H, will consult vascular, reassess

## 2019-07-09 NOTE — CONSULT NOTE ADULT - ASSESSMENT
69M presents with some bleeding from permcath site, now resolved  - No vascular surgery intervention  - D/w fellow    [id number]

## 2019-07-09 NOTE — ED PROVIDER NOTE - NS ED ROS FT
GENERAL: no fever, chills  HEENT: no cough, congestion, odynophagia, dysphagia  CARDIAC: no chest pain, palpitations, lightheadedness  PULM: no dyspnea, wheezing   GI: no abdominal pain, nausea, vomiting, diarrhea, constipation, melena, hematochezia  : no urinary dysuria, frequency, incontinence, hematuria  NEURO: no headache, motor weakness, sensory changes  MSK: no joint or muscle pain  SKIN: no rashes, hives  HEME: + bleeding from right chest wall dialysis site

## 2019-07-09 NOTE — ED ADULT NURSE NOTE - NSIMPLEMENTINTERV_GEN_ALL_ED
Implemented All Fall with Harm Risk Interventions:  [place] to call system. Call bell, personal items and telephone within reach. Instruct patient to call for assistance. Room bathroom lighting operational. Non-slip footwear when patient is off stretcher. Physically safe environment: no spills, clutter or unnecessary equipment. Stretcher in lowest position, wheels locked, appropriate side rails in place. Provide visual cue, wrist band, yellow gown, etc. Monitor gait and stability. Monitor for mental status changes and reorient to person, place, and time. Review medications for side effects contributing to fall risk. Reinforce activity limits and safety measures with patient and family. Provide visual clues: red socks.

## 2019-07-09 NOTE — ED PROVIDER NOTE - CARE PLAN
Principal Discharge DX:	Bleeding from open wound of chest wall, right, initial encounter  Goal:	bleeding from d/c'ed permacath site

## 2019-07-09 NOTE — ED PROVIDER NOTE - OBJECTIVE STATEMENT
69M with hx of HTN, HLD, AVR, CKD (dialysis TTS), CHF, DM, CABG presenting with bleeding from right chest wall dialysis site after catheter was removed today. No complaints of lightheadedness, chest pain, palpitations, dyspnea, fever, chills.

## 2019-07-09 NOTE — ED PROVIDER NOTE - PROGRESS NOTE DETAILS
Attending MD Conway: Call placed to Dr Ar Dotson's office [phone] Attending MD Conway: Call placed to Dr Ar Dotson's office [phone], will await call back Attending MD Conway: Discussed with vascular team, will come to see.  This MD held uninterrupted direct pressure for 10 min and upon release, continued bleeding.  Pressure dressing in place. Attending MD Conway: Discussed with vascular team in the ED, area hemostatic, will discuss with the fellow but likely can be d/c'ed.  Will await.  Did not receive call back from Dr. Dotson's office.  Received call from a service stating "Dr. Robison is sending his team", unclear if this is from Dr. Dotson's office. Attending MD Conway: Discussed with vascular team here in the ED, area hemostatic, will discuss with the fellow but likely can be d/c'ed.  Will await.  Did not receive call back from Dr. Dotson's office.  Received call from a service stating "Dr. Robison is sending his team", unclear if this is from Dr. Dotson's office. Attending MD Conway: Patient does not wish to continue waiting, paged vascular, report patient is cleared for discharge.  Patient is to keep dressing on until tomorrow and to return to ED if bleeding returns.  Discussed Hb 10.6 (May) to 9.8 (today).  Report that they can address this in HD.  Cleared for discharge from vascular surgery standpoint.  Will discharge. Follow up instructions given, importance of follow up emphasized, return to ED parameters reviewed and patient verbalized understanding.  All questions answered, all concerns addressed.

## 2019-07-09 NOTE — HISTORY OF PRESENT ILLNESS
[FreeTextEntry1] : pt has been receiving lue avf/g hd access w/o issues\par pt presents for permacath d/c

## 2019-07-09 NOTE — ED PROVIDER NOTE - NSFOLLOWUPINSTRUCTIONS_ED_ALL_ED_FT
- Return to ED for return of bleeding  - Return to ED for any new or worsening symptoms  - Return to your regularly scheduled dialysis and with your follow up with Dr. Dotson.

## 2019-07-09 NOTE — CONSULT NOTE ADULT - SUBJECTIVE AND OBJECTIVE BOX
General Surgery Consult  Consulting surgical team: Vascular  Consulting attending: Nila    HPI: 69M on with ESRD on HD T/Th/Sat presents with bleeding from recently removed permcath site, for which vascular surgery was called. Permcath was taken out this afternoon in the office. Upon arrival, site had stopped bleeding.    The patient notes that he missed HD today and his next HD will be on Thursday.      PAST MEDICAL HISTORY:  HLD (hyperlipidemia)  HTN (hypertension)  Aortic valve replaced  CKD (chronic kidney disease)  Anemia  CHF (congestive heart failure)  Diabetes mellitus  Hypertension      PAST SURGICAL HISTORY:  S/P CABG (coronary artery bypass graft)  S/P appendectomy      MEDICATIONS:      ALLERGIES:  lisinopril (Anaphylaxis)      VITALS & I/Os:  Vital Signs Last 24 Hrs  T(C): 36.7 (09 Jul 2019 19:25), Max: 36.7 (09 Jul 2019 18:59)  T(F): 98 (09 Jul 2019 19:25), Max: 98 (09 Jul 2019 18:59)  HR: 95 (09 Jul 2019 20:11) (95 - 118)  BP: 206/96 (09 Jul 2019 20:11) (150/98 - 210/100)  BP(mean): --  RR: 17 (09 Jul 2019 20:11) (17 - 18)  SpO2: 95% (09 Jul 2019 20:11) (95% - 95%)    I&O's Summary      PHYSICAL EXAM:  General: No acute distress  Respiratory: Nonlabored  Cardiovascular: RRR  Chest wall: two small incisions over permcath site c/d/i, no active bleeding or oozing. No hematoma around area.  Abdominal: Soft, nondistended, nontender.  Extremities: Warm    LABS:                        9.8    8.9   )-----------( 220      ( 09 Jul 2019 19:46 )             28.6     07-09    137  |  92<L>  |  69<H>  ----------------------------<  200<H>  5.3   |  26  |  11.29<H>    Ca    9.0      09 Jul 2019 19:46    TPro  7.3  /  Alb  3.9  /  TBili  0.2  /  DBili  x   /  AST  27  /  ALT  15  /  AlkPhos  124<H>  07-09    Lactate:    PT/INR - ( 09 Jul 2019 19:46 )   PT: 10.9 sec;   INR: 0.96 ratio         PTT - ( 09 Jul 2019 19:46 )  PTT:31.5 sec        IMAGING: None

## 2019-07-09 NOTE — ED PROVIDER NOTE - CLINICAL SUMMARY MEDICAL DECISION MAKING FREE TEXT BOX
69M with hx of HTN, HLD, AVR, CKD (dialysis TTS), CHF, DM, CABG presenting with bleeding from right chest wall dialysis site after catheter was removed today. Plan - basics, coags, type and screen, vascular consult.

## 2019-07-09 NOTE — PHYSICAL EXAM
[2+] : left 2+ [No HSM] : no hepatosplenomegaly [No Rash or Lesion] : No rash or lesion [Alert] : alert [Oriented to Person] : oriented to person [Oriented to Place] : oriented to place [Oriented to Time] : oriented to time [Calm] : calm [Ankle Swelling (On Exam)] : not present [] : not present [Varicose Veins Of Lower Extremities] : not present [Abdomen Masses] : No abdominal masses [Stool Sample Taken] : No stool obtained  on rectal exam [Tender] : was nontender [de-identified] : betsyl [de-identified] : well [FreeTextEntry1] : Left arm AVF/G w/ excellent bruit and thrill \par all incisions healed \par \par  [de-identified] : betsyl [de-identified] : shun [de-identified] : cooperative [de-identified] : Reggie Cranial nerves 2-12 reggie grossly intact

## 2019-07-09 NOTE — ED ADULT NURSE NOTE - PMH
Anemia    Aortic valve replaced    CHF (congestive heart failure)    CKD (chronic kidney disease)    Diabetes mellitus    HLD (hyperlipidemia)    HTN (hypertension)    Hypertension

## 2019-07-09 NOTE — PROCEDURE
[FreeTextEntry1] :  Right anterior chest wall appropriately prepped and draped in a sterile fashion\par 10cc 1% Lidocaine was administered  subcutaneously  over the permacath tract w good local anesthesia\par Using  hemostat  the tunnel was undermined and the catheter was removed in its entirety \par A counterincision was performed superiorly to assist in the removal of the permacath \par Permacath was removed in its entirety  pressure dressing was applied w good hemostasis\par Pt tolerated procedure well\par

## 2019-07-09 NOTE — ED PROVIDER NOTE - CARE PROVIDER_API CALL
Ar Dotson)  Vascular Surgery  [address], [address]  [place] [address]  Phone: [phone]  Fax: [phone]  Follow Up Time: 1-3 Days

## 2019-07-09 NOTE — ASSESSMENT
[Arterial/Venous Disease] : arterial/venous disease [Medication Management] : medication management [FreeTextEntry1] : Impression: ESRD on HD s/p left arm revision of AVF/G. Excellent bruit and thrill on exam today\par \par Plan Medical Conservative Management\par ov w maik/pvr s/o art insuff 12mo 5 /2020 as per Dr. Dotson\par ov w lue avf/g duplex s/o stenosis 6mo dec 2019 \par rto if any hd issues

## 2019-07-09 NOTE — ED ADULT NURSE NOTE - OBJECTIVE STATEMENT
69 y.o M presents to the ED from home via EMS for bleeding. As per EMS "the patient had his R chest wall catheter use for dialysis removed this afternoon and has been bleeding for the 3-4 hours. EMS reports placing a compression bandage on the site. Patient is awake, alert and speaking coherently. As per patient, he states he had the catheter removed today because he was now going to be doing dialysis with a fistula in the left arm- patient states the bleeding from the right chest has been consistent since removal. Patient presents A&Ox3, afebrile and ambulatory with assistance, denies dizziness, chest pain, SOB, nausea and vomiting. Site actively bleeding on arrival- new compression dressing placed by Dr Conway.

## 2019-07-09 NOTE — ED PROVIDER NOTE - PHYSICAL EXAMINATION
GENERAL: no acute distress, non-toxic appearing  HEAD: normocephalic, atraumatic  HEENT: normal conjunctiva, oral mucosa moist, neck supple  CARDIAC: regular rate and rhythm, normal S1 and S2,  no appreciable murmurs  PULM: clear to ascultation bilaterally, no crackles, rales, rhonchi, or wheezing  GI: abdomen nondistended, soft, nontender, no guarding or rebound tenderness  NEURO: alert and oriented x 3, normal speech, PERRLA, EOMI, no focal motor or sensory deficits, nonantalgic gait  MSK: no visible deformities, no peripheral edema, calf tenderness/redness/swelling  SKIN: no active bleeding from right chest wall dialysis site  PSYCH: appropriate mood and affect  VASC: left arm AV fistula with thrill
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
## 2019-08-20 NOTE — ASU PATIENT PROFILE, ADULT - HEALTHCARE INFORMATION NEEDED, PROFILE
[FreeTextEntry1] : We discussed PSA  MADDISON benign .  He is not bothered by his nocturia \par PVR is zero .
none

## 2019-09-17 ENCOUNTER — INPATIENT (INPATIENT)
Facility: HOSPITAL | Age: 69
LOS: 2 days | Discharge: HOME | End: 2019-09-20
Attending: INTERNAL MEDICINE | Admitting: INTERNAL MEDICINE
Payer: MEDICARE

## 2019-09-17 VITALS
HEART RATE: 78 BPM | SYSTOLIC BLOOD PRESSURE: 232 MMHG | RESPIRATION RATE: 26 BRPM | DIASTOLIC BLOOD PRESSURE: 118 MMHG | OXYGEN SATURATION: 89 % | WEIGHT: 160.06 LBS

## 2019-09-17 DIAGNOSIS — I16.1 HYPERTENSIVE EMERGENCY: ICD-10-CM

## 2019-09-17 DIAGNOSIS — Z95.1 PRESENCE OF AORTOCORONARY BYPASS GRAFT: Chronic | ICD-10-CM

## 2019-09-17 DIAGNOSIS — E11.9 TYPE 2 DIABETES MELLITUS WITHOUT COMPLICATIONS: ICD-10-CM

## 2019-09-17 DIAGNOSIS — E87.5 HYPERKALEMIA: ICD-10-CM

## 2019-09-17 DIAGNOSIS — J81.0 ACUTE PULMONARY EDEMA: ICD-10-CM

## 2019-09-17 DIAGNOSIS — Z29.9 ENCOUNTER FOR PROPHYLACTIC MEASURES, UNSPECIFIED: ICD-10-CM

## 2019-09-17 DIAGNOSIS — N18.9 CHRONIC KIDNEY DISEASE, UNSPECIFIED: ICD-10-CM

## 2019-09-17 DIAGNOSIS — N18.6 END STAGE RENAL DISEASE: ICD-10-CM

## 2019-09-17 DIAGNOSIS — R60.9 EDEMA, UNSPECIFIED: ICD-10-CM

## 2019-09-17 DIAGNOSIS — E78.5 HYPERLIPIDEMIA, UNSPECIFIED: ICD-10-CM

## 2019-09-17 LAB
ALBUMIN SERPL ELPH-MCNC: 3.6 G/DL — SIGNIFICANT CHANGE UP (ref 3.3–5)
ALP SERPL-CCNC: 137 U/L — HIGH (ref 40–120)
ALT FLD-CCNC: 23 U/L — SIGNIFICANT CHANGE UP (ref 12–78)
ANION GAP SERPL CALC-SCNC: 17 MMOL/L — SIGNIFICANT CHANGE UP (ref 5–17)
APTT BLD: 36.2 SEC — SIGNIFICANT CHANGE UP (ref 27.5–36.3)
AST SERPL-CCNC: 31 U/L — SIGNIFICANT CHANGE UP (ref 15–37)
BASE EXCESS BLDA CALC-SCNC: 3.4 MMOL/L — HIGH (ref -2–2)
BILIRUB SERPL-MCNC: 0.6 MG/DL — SIGNIFICANT CHANGE UP (ref 0.2–1.2)
BLOOD GAS COMMENTS: SIGNIFICANT CHANGE UP
BLOOD GAS COMMENTS: SIGNIFICANT CHANGE UP
BLOOD GAS SOURCE: SIGNIFICANT CHANGE UP
BUN SERPL-MCNC: 86 MG/DL — HIGH (ref 7–23)
CALCIUM SERPL-MCNC: 7.7 MG/DL — LOW (ref 8.5–10.1)
CHLORIDE SERPL-SCNC: 95 MMOL/L — LOW (ref 96–108)
CO2 SERPL-SCNC: 22 MMOL/L — SIGNIFICANT CHANGE UP (ref 22–31)
CREAT SERPL-MCNC: 14.9 MG/DL — HIGH (ref 0.5–1.3)
GLUCOSE BLDC GLUCOMTR-MCNC: 87 MG/DL — SIGNIFICANT CHANGE UP (ref 70–99)
GLUCOSE SERPL-MCNC: 123 MG/DL — HIGH (ref 70–99)
HCO3 BLDA-SCNC: 22 MMOL/L — SIGNIFICANT CHANGE UP (ref 21–29)
HCT VFR BLD CALC: 43.9 % — SIGNIFICANT CHANGE UP (ref 39–50)
HGB BLD-MCNC: 13.7 G/DL — SIGNIFICANT CHANGE UP (ref 13–17)
HOROWITZ INDEX BLDA+IHG-RTO: 60 — SIGNIFICANT CHANGE UP
INR BLD: 0.97 RATIO — SIGNIFICANT CHANGE UP (ref 0.88–1.16)
MCHC RBC-ENTMCNC: 29.2 PG — SIGNIFICANT CHANGE UP (ref 27–34)
MCHC RBC-ENTMCNC: 31.2 GM/DL — LOW (ref 32–36)
MCV RBC AUTO: 93.6 FL — SIGNIFICANT CHANGE UP (ref 80–100)
NRBC # BLD: 0 /100 WBCS — SIGNIFICANT CHANGE UP (ref 0–0)
NT-PROBNP SERPL-SCNC: HIGH PG/ML (ref 0–125)
PCO2 BLDA: 42 MMHG — SIGNIFICANT CHANGE UP (ref 32–46)
PH BLD: 7.33 — LOW (ref 7.35–7.45)
PLATELET # BLD AUTO: 240 K/UL — SIGNIFICANT CHANGE UP (ref 150–400)
PO2 BLDA: 294 MMHG — HIGH (ref 74–108)
POTASSIUM SERPL-MCNC: 6.1 MMOL/L — HIGH (ref 3.5–5.3)
POTASSIUM SERPL-SCNC: 6.1 MMOL/L — HIGH (ref 3.5–5.3)
PROT SERPL-MCNC: 8.9 GM/DL — HIGH (ref 6–8.3)
PROTHROM AB SERPL-ACNC: 10.9 SEC — SIGNIFICANT CHANGE UP (ref 10–12.9)
RBC # BLD: 4.69 M/UL — SIGNIFICANT CHANGE UP (ref 4.2–5.8)
RBC # FLD: 21.9 % — HIGH (ref 10.3–14.5)
SAO2 % BLDA: 100 % — HIGH (ref 92–96)
SODIUM SERPL-SCNC: 134 MMOL/L — LOW (ref 135–145)
TROPONIN I SERPL-MCNC: 0.11 NG/ML — HIGH (ref 0.01–0.04)
WBC # BLD: 7.33 K/UL — SIGNIFICANT CHANGE UP (ref 3.8–10.5)
WBC # FLD AUTO: 7.33 K/UL — SIGNIFICANT CHANGE UP (ref 3.8–10.5)

## 2019-09-17 PROCEDURE — 99222 1ST HOSP IP/OBS MODERATE 55: CPT

## 2019-09-17 PROCEDURE — 93010 ELECTROCARDIOGRAM REPORT: CPT

## 2019-09-17 PROCEDURE — 99291 CRITICAL CARE FIRST HOUR: CPT

## 2019-09-17 PROCEDURE — 71045 X-RAY EXAM CHEST 1 VIEW: CPT | Mod: 26

## 2019-09-17 RX ORDER — HEPARIN SODIUM 5000 [USP'U]/ML
5000 INJECTION INTRAVENOUS; SUBCUTANEOUS EVERY 8 HOURS
Refills: 0 | Status: DISCONTINUED | OUTPATIENT
Start: 2019-09-17 | End: 2019-09-20

## 2019-09-17 RX ORDER — CALCIUM ACETATE 667 MG
667 TABLET ORAL
Refills: 0 | Status: DISCONTINUED | OUTPATIENT
Start: 2019-09-17 | End: 2019-09-20

## 2019-09-17 RX ORDER — INSULIN LISPRO 100/ML
VIAL (ML) SUBCUTANEOUS AT BEDTIME
Refills: 0 | Status: DISCONTINUED | OUTPATIENT
Start: 2019-09-17 | End: 2019-09-20

## 2019-09-17 RX ORDER — HYDRALAZINE HCL 50 MG
25 TABLET ORAL
Refills: 0 | Status: DISCONTINUED | OUTPATIENT
Start: 2019-09-17 | End: 2019-09-20

## 2019-09-17 RX ORDER — SODIUM CHLORIDE 9 MG/ML
1000 INJECTION, SOLUTION INTRAVENOUS
Refills: 0 | Status: DISCONTINUED | OUTPATIENT
Start: 2019-09-17 | End: 2019-09-20

## 2019-09-17 RX ORDER — INSULIN LISPRO 100/ML
VIAL (ML) SUBCUTANEOUS
Refills: 0 | Status: DISCONTINUED | OUTPATIENT
Start: 2019-09-17 | End: 2019-09-20

## 2019-09-17 RX ORDER — DEXTROSE 50 % IN WATER 50 %
25 SYRINGE (ML) INTRAVENOUS ONCE
Refills: 0 | Status: DISCONTINUED | OUTPATIENT
Start: 2019-09-17 | End: 2019-09-20

## 2019-09-17 RX ORDER — FUROSEMIDE 40 MG
80 TABLET ORAL ONCE
Refills: 0 | Status: COMPLETED | OUTPATIENT
Start: 2019-09-17 | End: 2019-09-17

## 2019-09-17 RX ORDER — DEXTROSE 50 % IN WATER 50 %
12.5 SYRINGE (ML) INTRAVENOUS ONCE
Refills: 0 | Status: DISCONTINUED | OUTPATIENT
Start: 2019-09-17 | End: 2019-09-20

## 2019-09-17 RX ORDER — ATORVASTATIN CALCIUM 80 MG/1
40 TABLET, FILM COATED ORAL AT BEDTIME
Refills: 0 | Status: DISCONTINUED | OUTPATIENT
Start: 2019-09-17 | End: 2019-09-20

## 2019-09-17 RX ORDER — DEXTROSE 50 % IN WATER 50 %
15 SYRINGE (ML) INTRAVENOUS ONCE
Refills: 0 | Status: DISCONTINUED | OUTPATIENT
Start: 2019-09-17 | End: 2019-09-20

## 2019-09-17 RX ORDER — FUROSEMIDE 40 MG
60 TABLET ORAL
Refills: 0 | Status: DISCONTINUED | OUTPATIENT
Start: 2019-09-17 | End: 2019-09-20

## 2019-09-17 RX ORDER — HYDRALAZINE HCL 50 MG
10 TABLET ORAL ONCE
Refills: 0 | Status: COMPLETED | OUTPATIENT
Start: 2019-09-17 | End: 2019-09-17

## 2019-09-17 RX ORDER — NITROGLYCERIN 6.5 MG
0.4 CAPSULE, EXTENDED RELEASE ORAL ONCE
Refills: 0 | Status: COMPLETED | OUTPATIENT
Start: 2019-09-17 | End: 2019-09-17

## 2019-09-17 RX ORDER — ASPIRIN/CALCIUM CARB/MAGNESIUM 324 MG
81 TABLET ORAL DAILY
Refills: 0 | Status: DISCONTINUED | OUTPATIENT
Start: 2019-09-17 | End: 2019-09-20

## 2019-09-17 RX ORDER — NITROGLYCERIN 6.5 MG
20 CAPSULE, EXTENDED RELEASE ORAL
Qty: 50 | Refills: 0 | Status: DISCONTINUED | OUTPATIENT
Start: 2019-09-17 | End: 2019-09-17

## 2019-09-17 RX ORDER — LABETALOL HCL 100 MG
300 TABLET ORAL THREE TIMES A DAY
Refills: 0 | Status: DISCONTINUED | OUTPATIENT
Start: 2019-09-17 | End: 2019-09-20

## 2019-09-17 RX ORDER — SEVELAMER CARBONATE 2400 MG/1
800 POWDER, FOR SUSPENSION ORAL
Refills: 0 | Status: DISCONTINUED | OUTPATIENT
Start: 2019-09-17 | End: 2019-09-20

## 2019-09-17 RX ORDER — GLUCAGON INJECTION, SOLUTION 0.5 MG/.1ML
1 INJECTION, SOLUTION SUBCUTANEOUS ONCE
Refills: 0 | Status: DISCONTINUED | OUTPATIENT
Start: 2019-09-17 | End: 2019-09-20

## 2019-09-17 RX ADMIN — Medication 80 MILLIGRAM(S): at 17:54

## 2019-09-17 RX ADMIN — Medication 6 MICROGRAM(S)/MIN: at 17:55

## 2019-09-17 RX ADMIN — Medication 20 MICROGRAM(S)/MIN: at 18:20

## 2019-09-17 RX ADMIN — Medication 0.4 MILLIGRAM(S): at 17:54

## 2019-09-17 NOTE — ED PROVIDER NOTE - PROGRESS NOTE DETAILS
Pt improved markedly after nitro and bipap. Now is asking to take the bipap off, and able to wean off of the nitro drip. Spoke with pt nephrologist Dr. Calix who will come in to hospital to set up dialysis tonight. Admitted to hospital for further management.

## 2019-09-17 NOTE — H&P ADULT - NSHPLABSRESULTS_GEN_ALL_CORE
13.7   7.33  )-----------( 240      ( 17 Sep 2019 18:21 )             43.9     09-17    134<L>  |  95<L>  |  86<H>  ----------------------------<  123<H>  6.1<H>   |  22  |  14.90<H>    Ca    7.7<L>      17 Sep 2019 18:21    TPro  8.9<H>  /  Alb  3.6  /  TBili  0.6  /  DBili  x   /  AST  31  /  ALT  23  /  AlkPhos  137<H>  09-17    PT/INR - ( 17 Sep 2019 18:21 )   PT: 10.9 sec;   INR: 0.97 ratio         PTT - ( 17 Sep 2019 18:21 )  PTT:36.2 sec  LIVER FUNCTIONS - ( 17 Sep 2019 18:21 )  Alb: 3.6 g/dL / Pro: 8.9 gm/dL / ALK PHOS: 137 U/L / ALT: 23 U/L / AST: 31 U/L / GGT: x               Home Medications:  aspirin 81 mg oral delayed release tablet: 1 tab(s) orally once a day (09 May 2019 07:48)  atorvastatin 40 mg oral tablet: 1 tab(s) orally once a day (at bedtime) (09 May 2019 07:48)  calcium acetate 667 mg oral tablet: 1 tab(s) orally 3 times a day (09 May 2019 07:48)  docusate sodium 100 mg oral tablet: 1 tab(s) orally , As Needed (09 May 2019 07:48)  furosemide 80 mg oral tablet: 1 tab(s) orally 2 times a day (09 May 2019 07:48)  hydrALAZINE 25 mg oral tablet: 1 tab(s) orally 2 times a day (09 May 2019 07:48)  isosorbide dinitrate 10 mg oral tablet: 1 tab(s) orally once a day in morning (09 May 2019 07:48)  labetalol 300 mg oral tablet: 1 tab(s) orally 3 times a day (09 May 2019 07:48)  sevelamer hydrochloride 800 mg oral tablet: 1 tab(s) orally 3 times a day (09 May 2019 07:48)

## 2019-09-17 NOTE — ED ADULT NURSE NOTE - OBJECTIVE STATEMENT
69 year old male to ed from dialysis in respiratory distress Rales bilateral. He missed dialysis Saturday and today. Respirations labored and he was unable to speak. Dialysis access left arm

## 2019-09-17 NOTE — H&P ADULT - ASSESSMENT
71 M with ESRD presents with elevated BP and dyspnea after missing HD treatment.  Will admit to tele for diuresis, patient still makes urine.  Nephro consulted for HD.  Labs also show hyperkalemia, will f/u w/ morning labs.      IMPROVE VTE Individual Risk Assessment          RISK                                                          Points  [  ] Previous VTE                                                3  [  ] Thrombophilia                                             2  [  ] Lower limb paralysis                                    2        (unable to hold up >15 seconds)    [  ] Current Cancer                                             2         (within 6 months)  [  ] Immobilization > 24 hrs                              1  [  ] ICU/CCU stay > 24 hours                            1  [  ] Age > 60                                                    1    IMPROVE VTE Score ____1_____

## 2019-09-17 NOTE — CONSULT NOTE ADULT - SUBJECTIVE AND OBJECTIVE BOX
[facility] Consult Note Nephrology - CONSULTATION NOTE    70 yo gentleman with a pmhx of ESRD on HD, T, Th, Sa who presents to the ED with sob bibems. He missed his Sat dialysis session, and was at dialysis today when he got sob and was sent by EMS. Given 4 Sl nitro, pt having respiratory distress. 2 word sentences.'    Renal consult for esrd on hd via left forearm avf- routine schedule is TTS- pt missed saturdays HD  now in ed with sob, volume overload and hyperkalemia  denies any chest pain nausea or vomiting    PAST MEDICAL & SURGICAL HISTORY:  HLD (hyperlipidemia)  HTN (hypertension)  Aortic valve replaced  CKD (chronic kidney disease)  Anemia  CHF (congestive heart failure)  Diabetes mellitus  Hypertension  S/P CABG (coronary artery bypass graft)  S/P appendectomy    lisinopril (Anaphylaxis)    Home Medications Reviewed  Hospital Medications:   MEDICATIONS  (STANDING):    SOCIAL HISTORY:  Denies ETOh,Smoking,   FAMILY HISTORY:  FH: type 2 diabetes: brother    REVIEW OF SYSTEMS:  CONSTITUTIONAL: No weakness, fevers or chills  EYES/ENT: No visual changes;  No vertigo or throat pain   NECK: No pain or stiffness  RESPIRATORY: +_shortness of breath  CARDIOVASCULAR: No chest pain or palpitations.  GASTROINTESTINAL: No abdominal or epigastric pain. No nausea, vomiting, or hematemesis; No diarrhea or constipation. No melena or hematochezia.  GENITOURINARY: No dysuria, frequency, foamy urine, urinary urgency, incontinence or hematuria  NEUROLOGICAL: No numbness or weakness  SKIN: No itching, burning, rashes, or lesions   VASCULAR: ++bilateral lower extremity edema.   All other review of systems is negative unless indicated above.    VITALS:  T(F): --  HR: 72 (09-17-19 @ 18:37)  BP: 175/91 (09-17-19 @ 18:37)  RR: 17 (09-17-19 @ 18:37)  SpO2: 99% (09-17-19 @ 18:37)  Wt(kg): --      Weight (kg): 72.6 (09-17 @ 17:05)  PHYSICAL EXAM:  Constitutional: NAD  HEENT: anicteric sclera, oropharynx clear, MMM  Neck: No JVD  Respiratory: CTAB, no wheezes, rales or rhonchi  Cardiovascular: S1, S2, RRR  Gastrointestinal: BS+, soft, NT/ND  Extremities: ++ peripheral edema  Neurological: A/O x 3, no focal deficits  Psychiatric: Normal mood, normal affect  : No CVA tenderness. No bee.   Skin: No rashes      LABS:  09-17    134<L>  |  95<L>  |  86<H>  ----------------------------<  123<H>  6.1<H>   |  22  |  14.90<H>    Ca    7.7<L>      17 Sep 2019 18:21    TPro  8.9<H>  /  Alb  3.6  /  TBili  0.6  /  DBili      /  AST  31  /  ALT  23  /  AlkPhos  137<H>  09-17    Creatinine Trend: 14.90 <--                        13.7   7.33  )-----------( 240      ( 17 Sep 2019 18:21 )             43.9     Urine Studies:      RADIOLOGY & ADDITIONAL STUDIES:

## 2019-09-17 NOTE — ED ADULT NURSE REASSESSMENT NOTE - NS ED NURSE REASSESS COMMENT FT1
Pt request the BiPAP to be removed Dr Garg to bedside Pt placed on nasal cannula Monitoring ongoing. Pt with elevated Potassium treatment pending

## 2019-09-17 NOTE — H&P ADULT - PROBLEM SELECTOR PLAN 2
/118 at triage.  Presents with flash pulmonary edema  Received lasix IV 80 in ED.  Will continue lasix 60 BID starting tomorrow  Hydralazine 10 IVP, continue home meds Labetalol 300 TID, hydralazine 25 BID

## 2019-09-17 NOTE — ED ADULT TRIAGE NOTE - CHIEF COMPLAINT QUOTE
pt picked up at HD, was called for elevated BP en route went into respiratory distress, bilateral rales upon ascultation, missed HD today and Saturday, upon arrival to ED unable to speak and  labored medical team to bedside STAT, 4 tabs nitro 0.4 SL given

## 2019-09-17 NOTE — H&P ADULT - NSHPPHYSICALEXAM_GEN_ALL_CORE
Physical exam:  General: patient in no acute distress, resting comfortably of nasal cannula  Cardio: S1/S2 +ve, regular rate and rhythm, no M/G/R  Resp: mild to moderate rales bilaterally, decreased breath sounds in lower zones bilaterally  GI: abdomen soft, nontender, non distended, no guarding, BS +ve x 4  Ext:  pedal edema +ve  Neuro: CN 2-12 intact, no significant motor or sensory deficits.

## 2019-09-17 NOTE — ED PROVIDER NOTE - OBJECTIVE STATEMENT
Pt is a 70 yo gentleman with a pmhx of ESRD on HD, T, Th, Sa who presents to the ED with sob bibems. He missed his Sat dialysis session, and was at dialysis today when he got sob and was sent by EMS. Given 4 Sl nitro, pt having respiratory distress. 2 word sentences.

## 2019-09-17 NOTE — H&P ADULT - HISTORY OF PRESENT ILLNESS
Patient is a 69M with a PMH of ESRD on HD TTS, HTN, Bioprosthetic Aortic replacement, DM, and HLD who presents from HD center with dyspnea.  Patient reports that he missed his HD session last Saturday and when to his center with shortness of breath.  At HD center, he was refused HD because his BP was elevated.  Patient denies hx of chest pain, diaphoresis, palpitations, lightheadedness, or dizziness during episodes of dyspnea.  Patient reports that he also missed his HTN meds this am which has resulted in elevated BP.

## 2019-09-17 NOTE — ED ADULT NURSE NOTE - NSIMPLEMENTINTERV_GEN_ALL_ED
Implemented All Universal Safety Interventions:  [place] to call system. Call bell, personal items and telephone within reach. Instruct patient to call for assistance. Room bathroom lighting operational. Non-slip footwear when patient is off stretcher. Physically safe environment: no spills, clutter or unnecessary equipment. Stretcher in lowest position, wheels locked, appropriate side rails in place.

## 2019-09-17 NOTE — ED PROVIDER NOTE - MDM ORDERS SUBMITTED SELECTION
After Visit Summary   11/9/2017    Duane D Backes    MRN: [id number]           Patient Information     Date Of Birth          1984        Visit Information        Provider Department      11/9/2017 3:40 PM Rajat Parkinson MD [facility]        Today's Diagnoses     Perioral dermatitis    -  1    Need for prophylactic vaccination and inoculation against influenza          Care Instructions    At [facility], we strive to deliver an exceptional experience to you, every time we see you.  If you receive a survey in the mail, please send us back your thoughts. We really do value your feedback.    Based on your medical history, these are the current health maintenance/preventive care services that you are due for (some may have been done at this visit.)  Health Maintenance Due   Topic Date Due     MEDICARE ANNUAL WELLNESS VISIT  07/22/2000     EYE EXAM Q1 YEAR  08/13/2016     INFLUENZA VACCINE (SYSTEM ASSIGNED)  09/01/2017         Suggested websites for health information:  Www.Orthos : Up to date and easily searchable information on multiple topics.  Www.medlineplus.gov : medication info, interactive tutorials, watch real surgeries online  Www.familydoctor.org : good info from the Academy of Family Physicians  Www.cdc.gov : public health info, travel advisories, epidemics (H1N1)  Www.aap.org : children's health info, normal development, vaccinations  Www.health.state.[place].us : [place] dept of health, public health issues in [place], N1N1    Your care team:                            Family Medicine Internal Medicine   MD Scooter Gao MD Shantel Branch-Fleming, MD Katya Georgiev PA-C Nam Ho, MD Pediatrics   RAMA Oliveira, STEVEN Barrientos APRN MD Estelle Goodman MD Deborah Mielke, MD Kim Thein, APRN CNP      Clinic hours: Monday - Thursday 7 am-7 pm; Fridays 7 am-5 pm.   Urgent care: Monday -  "Friday 11 am-9 pm; Saturday and Sunday 9 am-5 pm.  Pharmacy : Monday -Thursday 8 am-8 pm; Friday 8 am-6 pm; Saturday and Sunday 9 am-5 pm.     Clinic: [phone]   Pharmacy: [phone]    Apply moisturizing cream/lotion (such as Eucerin) 4 times per day.     Minimize water exposure to the face. When showering, use as cool of water as tolerated (tepid).           Follow-ups after your visit        Your next 10 appointments already scheduled     Jul 10, 2018  3:30 PM CDT   Return Visit with Davidson Villar MD   [facility]    [address] [place] [id number] [phone]              Who to contact     If you have questions or need follow up information about today's clinic visit or your schedule please contact [facility] directly at [phone].  Normal or non-critical lab and imaging results will be communicated to you by MyChart, letter or phone within 4 business days after the clinic has received the results. If you do not hear from us within 7 days, please contact the clinic through [employer] or phone. If you have a critical or abnormal lab result, we will notify you by phone as soon as possible.  Submit refill requests through [employer] or call your pharmacy and they will forward the refill request to us. Please allow 3 business days for your refill to be completed.          Additional Information About Your Visit        [employer] Information     [employer] lets you send messages to your doctor, view your test results, renew your prescriptions, schedule appointments and more. To sign up, go to [email or web address] . Click on \"Log in\" on the left side of the screen, which will take you to the Welcome page. Then click on \"Sign up Now\" on the right side of the page.     You will be asked to enter the access code listed below, as well as some personal information. Please follow the directions to create your username and " "password.     Your access code is: [email or web address]  Expires: 2018  4:16 PM     Your access code will  in 90 days. If you need help or a new code, please call your [place] clinic or [phone].        Care EveryWhere ID     This is your Care EveryWhere ID. This could be used by other organizations to access your [place] medical records  [id number]        Your Vitals Were     Pulse Height Pulse Oximetry BMI (Body Mass Index)          75 1.88 m (6' 2.02\") 99% 28.59 kg/m2         Blood Pressure from Last 3 Encounters:   17 132/76   08/15/17 120/73   17 105/68    Weight from Last 3 Encounters:   17 101.1 kg (222 lb 12.8 oz)   08/15/17 102.3 kg (225 lb 9.6 oz)   17 103.4 kg (228 lb)              Today, you had the following     No orders found for display         Today's Medication Changes          These changes are accurate as of: 17  4:17 PM.  If you have any questions, ask your nurse or doctor.               Start taking these medicines.        Dose/Directions    triamcinolone 0.1 % ointment   Commonly known as:  KENALOG   Used for:  Perioral dermatitis   Started by:  Rajat Parkinson MD        Apply sparingly to affected area 3 times daily for up to 14 days. Do not apply to lips.   Quantity:  30 g   Refills:  0            Where to get your medicines      These medications were sent to [facility] [address] [place] [address] [address]  [address] [facility] [address]     Phone:  [phone]     triamcinolone 0.1 % ointment                Primary Care Provider Office Phone # Fax #    Rajat Parkinson [phone]       [address]  [place] [facility] [address]        Equal Access to Services     MIKEY FROST AH: Hadii roel ospina hadasho Soomaali, waaxda luqadaha, qaybta kaalmada adeegyada, waxay sahra vicente. So [facility] [phone].    ATENCIÓN: Si habla español, tiene a mcnally disposición servicios gratuitos de asistencia " lingüística. Camila al [phone].    We comply with applicable federal civil rights laws and [place] laws. We do not discriminate on the basis of race, color, national origin, age, disability, sex, sexual orientation, or gender identity.            Thank you!     Thank you for choosing [facility]  for your care. Our goal is always to provide you with excellent care. Hearing back from our patients is one way we can continue to improve our services. Please take a few minutes to complete the written survey that you may receive in the mail after your visit with us. Thank you!             Your Updated Medication List - Protect others around you: Learn how to safely use, store and throw away your medicines at www.disposemymeds.org.          This list is accurate as of: 11/9/17  4:17 PM.  Always use your most recent med list.                   Brand Name Dispense Instructions for use Diagnosis    ammonium lactate 12 % cream    AMLACTIN     Apply topically daily as needed for dry skin        Briefs Large Misc     90    use prn during night for incontinence    Moderate intellectual disabilities, Autistic disorder, current or active state       diazepam 2 MG tablet    VALIUM    10 tablet    Take 1 tablet (2 mg) by mouth once as needed (60 minutes prior to medical or dental appointment)    Active autistic disorder, Moderate mental retardation       divalproex 500 MG 24 hr tablet    DEPAKOTE ER     Take 3 tablets by mouth 2 times daily.        docusate sodium 100 MG capsule    COLACE     Take 1 capsule by mouth 2 times daily.        EUCERIN Lotn      Externally apply topically daily        fish oil-omega-3 fatty acids 1000 MG capsule     60    1 PO BID    Autistic disorder, current or active state       lactulose 10 GM/15ML solution    CHRONULAC     Take 30 mLs by mouth daily. & additional 30mL 2 times per day as needed        levothyroxine 200 MCG tablet    SYNTHROID/LEVOTHROID    30 tablet    Take 1  tablet (200 mcg) by mouth daily for thyroid.    Hypothyroidism due to acquired atrophy of thyroid       lithium 150 MG capsule      150 mg 1 cap a.m., ., 3 caps bedtime        * MILK OF MAGNESIA PO      Take  by mouth. 30 cc every PM.        * MILK OF MAGNESIA 400 MG/5ML suspension   Generic drug:  magnesium hydroxide           polyethylene glycol powder    MIRALAX    510 g    Take 17 g by mouth daily.    Constipation       RISPERDAL 2 MG tablet   Generic drug:  risperiDONE      Take 2 mg by mouth 1 tab every morning & 1 tab every bedtime        sertraline 100 MG tablet    ZOLOFT     Take 1.5 tablets by mouth every morning.        * topiramate 100 MG tablet    TOPAMAX     Take 1 tablet by mouth 2 times daily.        * topiramate 50 MG tablet    TOPAMAX    60 tablet    Take 1 tablet (50 mg) by mouth 2 times daily    Generalized convulsive epilepsy (H)       triamcinolone 0.1 % ointment    KENALOG    30 g    Apply sparingly to affected area 3 times daily for up to 14 days. Do not apply to lips.    Perioral dermatitis       Vitamin D (Cholecalciferol) 1000 UNITS Caps     90 capsule    Take 3,000 Units by mouth daily    Vitamin D deficiency       * Notice:  This list has 4 medication(s) that are the same as other medications prescribed for you. Read the directions carefully, and ask your doctor or other care provider to review them with you.       Imaging Studies/Medications/EKG/Labs

## 2019-09-18 LAB
ANION GAP SERPL CALC-SCNC: 11 MMOL/L — SIGNIFICANT CHANGE UP (ref 5–17)
BUN SERPL-MCNC: 44 MG/DL — HIGH (ref 7–23)
CALCIUM SERPL-MCNC: 8.2 MG/DL — LOW (ref 8.5–10.1)
CHLORIDE SERPL-SCNC: 99 MMOL/L — SIGNIFICANT CHANGE UP (ref 96–108)
CO2 SERPL-SCNC: 29 MMOL/L — SIGNIFICANT CHANGE UP (ref 22–31)
CREAT SERPL-MCNC: 9.45 MG/DL — HIGH (ref 0.5–1.3)
GLUCOSE BLDC GLUCOMTR-MCNC: 86 MG/DL — SIGNIFICANT CHANGE UP (ref 70–99)
GLUCOSE SERPL-MCNC: 89 MG/DL — SIGNIFICANT CHANGE UP (ref 70–99)
HBA1C BLD-MCNC: 6.4 % — HIGH (ref 4–5.6)
POTASSIUM SERPL-MCNC: 5 MMOL/L — SIGNIFICANT CHANGE UP (ref 3.5–5.3)
POTASSIUM SERPL-SCNC: 5 MMOL/L — SIGNIFICANT CHANGE UP (ref 3.5–5.3)
SODIUM SERPL-SCNC: 139 MMOL/L — SIGNIFICANT CHANGE UP (ref 135–145)

## 2019-09-18 PROCEDURE — 93010 ELECTROCARDIOGRAM REPORT: CPT

## 2019-09-18 PROCEDURE — 99233 SBSQ HOSP IP/OBS HIGH 50: CPT

## 2019-09-18 RX ORDER — LABETALOL HCL 100 MG
300 TABLET ORAL ONCE
Refills: 0 | Status: COMPLETED | OUTPATIENT
Start: 2019-09-18 | End: 2019-09-18

## 2019-09-18 RX ORDER — METOPROLOL TARTRATE 50 MG
5 TABLET ORAL
Refills: 0 | Status: DISCONTINUED | OUTPATIENT
Start: 2019-09-18 | End: 2019-09-18

## 2019-09-18 RX ORDER — CHLORHEXIDINE GLUCONATE 213 G/1000ML
1 SOLUTION TOPICAL DAILY
Refills: 0 | Status: DISCONTINUED | OUTPATIENT
Start: 2019-09-18 | End: 2019-09-20

## 2019-09-18 RX ADMIN — Medication 300 MILLIGRAM(S): at 22:00

## 2019-09-18 RX ADMIN — Medication 300 MILLIGRAM(S): at 02:58

## 2019-09-18 RX ADMIN — HEPARIN SODIUM 5000 UNIT(S): 5000 INJECTION INTRAVENOUS; SUBCUTANEOUS at 21:59

## 2019-09-18 RX ADMIN — Medication 81 MILLIGRAM(S): at 17:11

## 2019-09-18 RX ADMIN — ATORVASTATIN CALCIUM 40 MILLIGRAM(S): 80 TABLET, FILM COATED ORAL at 21:59

## 2019-09-18 RX ADMIN — HEPARIN SODIUM 5000 UNIT(S): 5000 INJECTION INTRAVENOUS; SUBCUTANEOUS at 05:32

## 2019-09-18 RX ADMIN — Medication 60 MILLIGRAM(S): at 17:13

## 2019-09-18 RX ADMIN — Medication 25 MILLIGRAM(S): at 05:32

## 2019-09-18 RX ADMIN — Medication 60 MILLIGRAM(S): at 05:31

## 2019-09-18 RX ADMIN — Medication 25 MILLIGRAM(S): at 15:14

## 2019-09-18 RX ADMIN — Medication 1: at 12:03

## 2019-09-18 RX ADMIN — Medication 300 MILLIGRAM(S): at 05:32

## 2019-09-18 RX ADMIN — Medication 300 MILLIGRAM(S): at 15:12

## 2019-09-18 RX ADMIN — Medication 667 MILLIGRAM(S): at 17:13

## 2019-09-18 RX ADMIN — SEVELAMER CARBONATE 800 MILLIGRAM(S): 2400 POWDER, FOR SUSPENSION ORAL at 17:13

## 2019-09-18 RX ADMIN — SEVELAMER CARBONATE 800 MILLIGRAM(S): 2400 POWDER, FOR SUSPENSION ORAL at 11:58

## 2019-09-18 RX ADMIN — HEPARIN SODIUM 5000 UNIT(S): 5000 INJECTION INTRAVENOUS; SUBCUTANEOUS at 14:11

## 2019-09-18 NOTE — PROGRESS NOTE ADULT - SUBJECTIVE AND OBJECTIVE BOX
Patient is a 69y old  Male who presents with a chief complaint of dyspnea (17 Sep 2019 21:25)      INTERVAL HPI/OVERNIGHT EVENTS:  Pt was seen and examined, no acute events.    MEDICATIONS  (STANDING):  aspirin enteric coated 81 milliGRAM(s) Oral daily  atorvastatin 40 milliGRAM(s) Oral at bedtime  calcium acetate 667 milliGRAM(s) Oral three times a day with meals  chlorhexidine 4% Liquid 1 Application(s) Topical daily  dextrose 5%. 1000 milliLiter(s) (50 mL/Hr) IV Continuous <Continuous>  dextrose 50% Injectable 12.5 Gram(s) IV Push once  dextrose 50% Injectable 25 Gram(s) IV Push once  dextrose 50% Injectable 25 Gram(s) IV Push once  furosemide   Injectable 60 milliGRAM(s) IV Push two times a day  heparin  Injectable 5000 Unit(s) SubCutaneous every 8 hours  hydrALAZINE 25 milliGRAM(s) Oral two times a day  insulin lispro (HumaLOG) corrective regimen sliding scale   SubCutaneous three times a day before meals  insulin lispro (HumaLOG) corrective regimen sliding scale   SubCutaneous at bedtime  labetalol 300 milliGRAM(s) Oral three times a day  sevelamer carbonate 800 milliGRAM(s) Oral three times a day with meals    MEDICATIONS  (PRN):  dextrose 40% Gel 15 Gram(s) Oral once PRN Blood Glucose LESS THAN 70 milliGRAM(s)/deciliter  glucagon  Injectable 1 milliGRAM(s) IntraMuscular once PRN Glucose LESS THAN 70 milligrams/deciliter      Allergies  lisinopril (Anaphylaxis)      Vital Signs Last 24 Hrs  T(C): 36.6 (18 Sep 2019 10:59), Max: 36.6 (18 Sep 2019 01:50)  T(F): 97.8 (18 Sep 2019 10:59), Max: 97.8 (18 Sep 2019 01:50)  HR: 72 (18 Sep 2019 10:59) (72 - 155)  BP: 153/73 (18 Sep 2019 10:59) (152/97 - 232/118)  BP(mean): --  RR: 18 (18 Sep 2019 10:59) (17 - 26)  SpO2: 100% (18 Sep 2019 10:59) (89% - 100%)    PHYSICAL EXAM:  GENERAL: NAD  HEAD:  Atraumatic  EYES: PERRLA  NERVOUS SYSTEM:  A, O x 3, non focal  CHEST/LUNG: Diminished  HEART: RRR  ABDOMEN: Soft, non tender  EXTREMITIES:  no edema      LABS:                        13.7   7.33  )-----------( 240      ( 17 Sep 2019 18:21 )             43.9     09-18    139  |  99  |  44<H>  ----------------------------<  89  5.0   |  29  |  9.45<H>    Ca    8.2<L>      18 Sep 2019 10:02    TPro  8.9<H>  /  Alb  3.6  /  TBili  0.6  /  DBili  x   /  AST  31  /  ALT  23  /  AlkPhos  137<H>  09-17    PT/INR - ( 17 Sep 2019 18:21 )   PT: 10.9 sec;   INR: 0.97 ratio         PTT - ( 17 Sep 2019 18:21 )  PTT:36.2 sec    CAPILLARY BLOOD GLUCOSE      POCT Blood Glucose.: 189 mg/dL (18 Sep 2019 11:30)  POCT Blood Glucose.: 86 mg/dL (18 Sep 2019 07:24)  POCT Blood Glucose.: 87 mg/dL (17 Sep 2019 23:08)      RADIOLOGY & ADDITIONAL TESTS:    Imaging Personally Reviewed:  [ ] YES  [ ] NO    Consultant(s) Notes Reviewed:  [ ] YES  [ ] NO    Care Discussed with Consultants/Other Providers [ ] YES  [ ] NO

## 2019-09-18 NOTE — PROGRESS NOTE ADULT - PROBLEM SELECTOR PLAN 1
s/p HD yesterday  still volume overloaded  will plan for extra hd session tomm and routine schedule tomm  trend bmp

## 2019-09-18 NOTE — PROGRESS NOTE ADULT - SUBJECTIVE AND OBJECTIVE BOX
Patient seen and examined  no complaints      lisinopril (Anaphylaxis)    Hospital Medications:   MEDICATIONS  (STANDING):  aspirin enteric coated 81 milliGRAM(s) Oral daily  atorvastatin 40 milliGRAM(s) Oral at bedtime  calcium acetate 667 milliGRAM(s) Oral three times a day with meals  chlorhexidine 4% Liquid 1 Application(s) Topical daily  dextrose 5%. 1000 milliLiter(s) (50 mL/Hr) IV Continuous <Continuous>  dextrose 50% Injectable 12.5 Gram(s) IV Push once  dextrose 50% Injectable 25 Gram(s) IV Push once  dextrose 50% Injectable 25 Gram(s) IV Push once  furosemide   Injectable 60 milliGRAM(s) IV Push two times a day  heparin  Injectable 5000 Unit(s) SubCutaneous every 8 hours  hydrALAZINE 25 milliGRAM(s) Oral two times a day  insulin lispro (HumaLOG) corrective regimen sliding scale   SubCutaneous three times a day before meals  insulin lispro (HumaLOG) corrective regimen sliding scale   SubCutaneous at bedtime  labetalol 300 milliGRAM(s) Oral three times a day  sevelamer carbonate 800 milliGRAM(s) Oral three times a day with meals      VITALS:  T(F): 97.8 (09-18-19 @ 10:59), Max: 97.8 (09-18-19 @ 01:50)  HR: 72 (09-18-19 @ 10:59)  BP: 153/73 (09-18-19 @ 10:59)  RR: 18 (09-18-19 @ 10:59)  SpO2: 100% (09-18-19 @ 10:59)  Wt(kg): --    09-17 @ 07:01  -  09-18 @ 07:00  --------------------------------------------------------  IN: 700 mL / OUT: 3700 mL / NET: -3000 mL        Weight (kg): 72.6 (09-17 @ 17:05)  PHYSICAL EXAM:  Constitutional: NAD  HEENT: anicteric sclera, oropharynx clear, MMM  Neck: No JVD  Respiratory: CTAB, no wheezes, rales or rhonchi  Cardiovascular: S1, S2, RRR  Gastrointestinal: BS+, soft, NT/ND  Extremities: ++ peripheral edema  Neurological: A/O x 3, no focal deficits  Psychiatric: Normal mood, normal affect  : No CVA tenderness. No bee.   Skin: No rashes    LABS:  09-18    139  |  99  |  44<H>  ----------------------------<  89  5.0   |  29  |  9.45<H>    Ca    8.2<L>      18 Sep 2019 10:02    TPro  8.9<H>  /  Alb  3.6  /  TBili  0.6  /  DBili      /  AST  31  /  ALT  23  /  AlkPhos  137<H>  09-17    Creatinine Trend: 9.45 <--, 14.90 <--                        13.7   7.33  )-----------( 240      ( 17 Sep 2019 18:21 )             43.9     Urine Studies:      RADIOLOGY & ADDITIONAL STUDIES:

## 2019-09-18 NOTE — PROGRESS NOTE ADULT - ASSESSMENT
71 M with ESRD presents with elevated BP and dyspnea after missing HD treatment.  Will admit to tele for diuresis, patient still makes urine.  Nephro consulted for HD.        Acute pulm edema:  - In the setting of missed HD and HTN urgency  - S/P HD last night  - Improved  - Follow up 2D echo  - Continue lasix  - Fluid and salt restriction      ESRD on HD:  - HD last night  - Renal following      HTN urgency:  - BP improved  - Continue current med    DM:  - Continue current insulin regimen.      Hyperkalemia:  - Improved after HD  - Low K diet      HLD:  - Continue statin      DVT ppx:  - S/Q heparin

## 2019-09-19 LAB
ALBUMIN SERPL ELPH-MCNC: 3 G/DL — LOW (ref 3.3–5)
ALP SERPL-CCNC: 103 U/L — SIGNIFICANT CHANGE UP (ref 40–120)
ALT FLD-CCNC: 21 U/L — SIGNIFICANT CHANGE UP (ref 12–78)
ANION GAP SERPL CALC-SCNC: 9 MMOL/L — SIGNIFICANT CHANGE UP (ref 5–17)
AST SERPL-CCNC: 28 U/L — SIGNIFICANT CHANGE UP (ref 15–37)
BILIRUB SERPL-MCNC: 0.5 MG/DL — SIGNIFICANT CHANGE UP (ref 0.2–1.2)
BUN SERPL-MCNC: 40 MG/DL — HIGH (ref 7–23)
CALCIUM SERPL-MCNC: 7.9 MG/DL — LOW (ref 8.5–10.1)
CHLORIDE SERPL-SCNC: 100 MMOL/L — SIGNIFICANT CHANGE UP (ref 96–108)
CO2 SERPL-SCNC: 27 MMOL/L — SIGNIFICANT CHANGE UP (ref 22–31)
CREAT SERPL-MCNC: 8.62 MG/DL — HIGH (ref 0.5–1.3)
GLUCOSE SERPL-MCNC: 91 MG/DL — SIGNIFICANT CHANGE UP (ref 70–99)
HCT VFR BLD CALC: 40 % — SIGNIFICANT CHANGE UP (ref 39–50)
HGB BLD-MCNC: 12.3 G/DL — LOW (ref 13–17)
MCHC RBC-ENTMCNC: 28.9 PG — SIGNIFICANT CHANGE UP (ref 27–34)
MCHC RBC-ENTMCNC: 30.8 GM/DL — LOW (ref 32–36)
MCV RBC AUTO: 94.1 FL — SIGNIFICANT CHANGE UP (ref 80–100)
NRBC # BLD: 0 /100 WBCS — SIGNIFICANT CHANGE UP (ref 0–0)
PLATELET # BLD AUTO: 215 K/UL — SIGNIFICANT CHANGE UP (ref 150–400)
POTASSIUM SERPL-MCNC: 5.4 MMOL/L — HIGH (ref 3.5–5.3)
POTASSIUM SERPL-SCNC: 5.4 MMOL/L — HIGH (ref 3.5–5.3)
PROT SERPL-MCNC: 7.2 GM/DL — SIGNIFICANT CHANGE UP (ref 6–8.3)
RBC # BLD: 4.25 M/UL — SIGNIFICANT CHANGE UP (ref 4.2–5.8)
RBC # FLD: 21 % — HIGH (ref 10.3–14.5)
SODIUM SERPL-SCNC: 136 MMOL/L — SIGNIFICANT CHANGE UP (ref 135–145)
WBC # BLD: 6.4 K/UL — SIGNIFICANT CHANGE UP (ref 3.8–10.5)
WBC # FLD AUTO: 6.4 K/UL — SIGNIFICANT CHANGE UP (ref 3.8–10.5)

## 2019-09-19 PROCEDURE — 99233 SBSQ HOSP IP/OBS HIGH 50: CPT

## 2019-09-19 RX ADMIN — HEPARIN SODIUM 5000 UNIT(S): 5000 INJECTION INTRAVENOUS; SUBCUTANEOUS at 15:09

## 2019-09-19 RX ADMIN — ATORVASTATIN CALCIUM 40 MILLIGRAM(S): 80 TABLET, FILM COATED ORAL at 22:21

## 2019-09-19 RX ADMIN — HEPARIN SODIUM 5000 UNIT(S): 5000 INJECTION INTRAVENOUS; SUBCUTANEOUS at 05:46

## 2019-09-19 RX ADMIN — Medication 25 MILLIGRAM(S): at 05:47

## 2019-09-19 RX ADMIN — Medication 300 MILLIGRAM(S): at 05:47

## 2019-09-19 RX ADMIN — Medication 667 MILLIGRAM(S): at 17:50

## 2019-09-19 RX ADMIN — SEVELAMER CARBONATE 800 MILLIGRAM(S): 2400 POWDER, FOR SUSPENSION ORAL at 17:50

## 2019-09-19 RX ADMIN — SEVELAMER CARBONATE 800 MILLIGRAM(S): 2400 POWDER, FOR SUSPENSION ORAL at 08:23

## 2019-09-19 RX ADMIN — Medication 25 MILLIGRAM(S): at 17:50

## 2019-09-19 RX ADMIN — Medication 300 MILLIGRAM(S): at 15:09

## 2019-09-19 RX ADMIN — Medication 60 MILLIGRAM(S): at 05:46

## 2019-09-19 RX ADMIN — Medication 81 MILLIGRAM(S): at 15:10

## 2019-09-19 RX ADMIN — CHLORHEXIDINE GLUCONATE 1 APPLICATION(S): 213 SOLUTION TOPICAL at 05:46

## 2019-09-19 RX ADMIN — SEVELAMER CARBONATE 800 MILLIGRAM(S): 2400 POWDER, FOR SUSPENSION ORAL at 15:08

## 2019-09-19 RX ADMIN — Medication 300 MILLIGRAM(S): at 22:21

## 2019-09-19 RX ADMIN — Medication 667 MILLIGRAM(S): at 08:23

## 2019-09-19 RX ADMIN — Medication 667 MILLIGRAM(S): at 15:08

## 2019-09-19 NOTE — PROGRESS NOTE ADULT - ASSESSMENT
71 M with ESRD presents with elevated BP and dyspnea after missing HD treatment.  Will admit to tele for diuresis, patient still makes urine.  Nephro consulted for HD.        Acute pulm edema:  - In the setting of missed HD and HTN urgency  - S/P HD X 2, today regular session.   - Improved  - Follow up 2D echo  - Continue lasix  - Fluid and salt restriction      ESRD on HD:  - HD last night  - Renal following      HTN urgency:  - BP improved  - Continue current med    DM:  - Continue current insulin regimen.      Hyperkalemia:  - Improved after HD  - Low K diet      HLD:  - Continue statin      DVT ppx:  - S/Q heparin

## 2019-09-19 NOTE — PROGRESS NOTE ADULT - SUBJECTIVE AND OBJECTIVE BOX
Patient seen and examined  no complaints    lisinopril (Anaphylaxis)    Hospital Medications:   MEDICATIONS  (STANDING):  aspirin enteric coated 81 milliGRAM(s) Oral daily  atorvastatin 40 milliGRAM(s) Oral at bedtime  calcium acetate 667 milliGRAM(s) Oral three times a day with meals  chlorhexidine 4% Liquid 1 Application(s) Topical daily  dextrose 5%. 1000 milliLiter(s) (50 mL/Hr) IV Continuous <Continuous>  dextrose 50% Injectable 12.5 Gram(s) IV Push once  dextrose 50% Injectable 25 Gram(s) IV Push once  dextrose 50% Injectable 25 Gram(s) IV Push once  furosemide   Injectable 60 milliGRAM(s) IV Push two times a day  heparin  Injectable 5000 Unit(s) SubCutaneous every 8 hours  hydrALAZINE 25 milliGRAM(s) Oral two times a day  insulin lispro (HumaLOG) corrective regimen sliding scale   SubCutaneous three times a day before meals  insulin lispro (HumaLOG) corrective regimen sliding scale   SubCutaneous at bedtime  labetalol 300 milliGRAM(s) Oral three times a day  sevelamer carbonate 800 milliGRAM(s) Oral three times a day with meals        VITALS:  T(F): 98 (09-19-19 @ 09:55), Max: 98.7 (09-18-19 @ 17:55)  HR: 66 (09-19-19 @ 09:55)  BP: 138/65 (09-19-19 @ 09:55)  RR: 18 (09-19-19 @ 09:55)  SpO2: 100% (09-19-19 @ 09:55)  Wt(kg): --    09-18 @ 07:01  -  09-19 @ 07:00  --------------------------------------------------------  IN: 360 mL / OUT: 0 mL / NET: 360 mL        PHYSICAL EXAM:  Constitutional: NAD  HEENT: anicteric sclera, oropharynx clear, MMM  Neck: No JVD  Respiratory: CTAB, no wheezes, rales or rhonchi  Cardiovascular: S1, S2, RRR  Gastrointestinal: BS+, soft, NT/ND  Extremities: ++ peripheral edema  Neurological: A/O x 3, no focal deficits  Psychiatric: Normal mood, normal affect  : No CVA tenderness. No bee.   Skin: No rashes      LABS:  09-19    136  |  100  |  40<H>  ----------------------------<  91  5.4<H>   |  27  |  8.62<H>    Ca    7.9<L>      19 Sep 2019 06:58    TPro  7.2  /  Alb  3.0<L>  /  TBili  0.5  /  DBili      /  AST  28  /  ALT  21  /  AlkPhos  103  09-19    Creatinine Trend: 8.62 <--, 9.45 <--, 14.90 <--                        12.3   6.40  )-----------( 215      ( 19 Sep 2019 06:58 )             40.0     Urine Studies:      RADIOLOGY & ADDITIONAL STUDIES:

## 2019-09-19 NOTE — PROGRESS NOTE ADULT - PROBLEM SELECTOR PLAN 1
s/p Extra HD yesterday- bp dropped and tx cut short- asymptomatic  pt seen and examined on hd via left upper avf with good bfr; bp stable- uf 2.5 to 3kg as tolerated- 2k bath- no complaints- will monitor  trend bmp

## 2019-09-19 NOTE — PROGRESS NOTE ADULT - SUBJECTIVE AND OBJECTIVE BOX
Patient is a 69y old  Male who presents with a chief complaint of dyspnea (19 Sep 2019 13:23)      INTERVAL HPI/OVERNIGHT EVENTS:  Pt was seen and examined, no acute events.    MEDICATIONS  (STANDING):  aspirin enteric coated 81 milliGRAM(s) Oral daily  atorvastatin 40 milliGRAM(s) Oral at bedtime  calcium acetate 667 milliGRAM(s) Oral three times a day with meals  chlorhexidine 4% Liquid 1 Application(s) Topical daily  dextrose 5%. 1000 milliLiter(s) (50 mL/Hr) IV Continuous <Continuous>  dextrose 50% Injectable 12.5 Gram(s) IV Push once  dextrose 50% Injectable 25 Gram(s) IV Push once  dextrose 50% Injectable 25 Gram(s) IV Push once  furosemide   Injectable 60 milliGRAM(s) IV Push two times a day  heparin  Injectable 5000 Unit(s) SubCutaneous every 8 hours  hydrALAZINE 25 milliGRAM(s) Oral two times a day  insulin lispro (HumaLOG) corrective regimen sliding scale   SubCutaneous three times a day before meals  insulin lispro (HumaLOG) corrective regimen sliding scale   SubCutaneous at bedtime  labetalol 300 milliGRAM(s) Oral three times a day  sevelamer carbonate 800 milliGRAM(s) Oral three times a day with meals    MEDICATIONS  (PRN):  dextrose 40% Gel 15 Gram(s) Oral once PRN Blood Glucose LESS THAN 70 milliGRAM(s)/deciliter  glucagon  Injectable 1 milliGRAM(s) IntraMuscular once PRN Glucose LESS THAN 70 milligrams/deciliter      Allergies  lisinopril (Anaphylaxis)        Vital Signs Last 24 Hrs  T(C): 36.7 (19 Sep 2019 15:10), Max: 37.1 (18 Sep 2019 17:55)  T(F): 98 (19 Sep 2019 15:10), Max: 98.7 (18 Sep 2019 17:55)  HR: 73 (19 Sep 2019 15:10) (63 - 90)  BP: 127/69 (19 Sep 2019 15:10) (103/50 - 202/100)  BP(mean): --  RR: 18 (19 Sep 2019 15:10) (17 - 20)  SpO2: 100% (19 Sep 2019 15:10) (93% - 100%)    PHYSICAL EXAM:  GENERAL: NAD  HEAD:  Atraumatic  EYES: PERRLA  NERVOUS SYSTEM:  A, O x 3, non focal.  CHEST/LUNG: Diminished  HEART: RRR  ABDOMEN: Soft, non tender  EXTREMITIES:  no edema      LABS:                        12.3   6.40  )-----------( 215      ( 19 Sep 2019 06:58 )             40.0     09-19    136  |  100  |  40<H>  ----------------------------<  91  5.4<H>   |  27  |  8.62<H>    Ca    7.9<L>      19 Sep 2019 06:58    TPro  7.2  /  Alb  3.0<L>  /  TBili  0.5  /  DBili  x   /  AST  28  /  ALT  21  /  AlkPhos  103  09-19    PT/INR - ( 17 Sep 2019 18:21 )   PT: 10.9 sec;   INR: 0.97 ratio         PTT - ( 17 Sep 2019 18:21 )  PTT:36.2 sec    CAPILLARY BLOOD GLUCOSE      POCT Blood Glucose.: 155 mg/dL (19 Sep 2019 11:42)  POCT Blood Glucose.: 115 mg/dL (19 Sep 2019 07:31)  POCT Blood Glucose.: 147 mg/dL (18 Sep 2019 19:57)  POCT Blood Glucose.: 101 mg/dL (18 Sep 2019 17:09)      RADIOLOGY & ADDITIONAL TESTS:    Imaging Personally Reviewed:  [ ] YES  [ ] NO    Consultant(s) Notes Reviewed:  [ ] YES  [ ] NO    Care Discussed with Consultants/Other Providers [ ] YES  [ ] NO

## 2019-09-20 ENCOUNTER — TRANSCRIPTION ENCOUNTER (OUTPATIENT)
Age: 69
End: 2019-09-20

## 2019-09-20 VITALS
TEMPERATURE: 97 F | SYSTOLIC BLOOD PRESSURE: 120 MMHG | OXYGEN SATURATION: 98 % | RESPIRATION RATE: 17 BRPM | HEART RATE: 67 BPM | DIASTOLIC BLOOD PRESSURE: 69 MMHG

## 2019-09-20 LAB
ALBUMIN SERPL ELPH-MCNC: 3.2 G/DL — LOW (ref 3.3–5)
ALP SERPL-CCNC: 103 U/L — SIGNIFICANT CHANGE UP (ref 40–120)
ALT FLD-CCNC: 20 U/L — SIGNIFICANT CHANGE UP (ref 12–78)
ANION GAP SERPL CALC-SCNC: 13 MMOL/L — SIGNIFICANT CHANGE UP (ref 5–17)
AST SERPL-CCNC: 23 U/L — SIGNIFICANT CHANGE UP (ref 15–37)
BILIRUB SERPL-MCNC: 0.7 MG/DL — SIGNIFICANT CHANGE UP (ref 0.2–1.2)
BUN SERPL-MCNC: 35 MG/DL — HIGH (ref 7–23)
CALCIUM SERPL-MCNC: 8.4 MG/DL — LOW (ref 8.5–10.1)
CHLORIDE SERPL-SCNC: 96 MMOL/L — SIGNIFICANT CHANGE UP (ref 96–108)
CO2 SERPL-SCNC: 26 MMOL/L — SIGNIFICANT CHANGE UP (ref 22–31)
CREAT SERPL-MCNC: 7.58 MG/DL — HIGH (ref 0.5–1.3)
GLUCOSE SERPL-MCNC: 117 MG/DL — HIGH (ref 70–99)
HCT VFR BLD CALC: 43.9 % — SIGNIFICANT CHANGE UP (ref 39–50)
HGB BLD-MCNC: 13.5 G/DL — SIGNIFICANT CHANGE UP (ref 13–17)
MCHC RBC-ENTMCNC: 28.5 PG — SIGNIFICANT CHANGE UP (ref 27–34)
MCHC RBC-ENTMCNC: 30.8 GM/DL — LOW (ref 32–36)
MCV RBC AUTO: 92.6 FL — SIGNIFICANT CHANGE UP (ref 80–100)
NRBC # BLD: 0 /100 WBCS — SIGNIFICANT CHANGE UP (ref 0–0)
PLATELET # BLD AUTO: 223 K/UL — SIGNIFICANT CHANGE UP (ref 150–400)
POTASSIUM SERPL-MCNC: 4.7 MMOL/L — SIGNIFICANT CHANGE UP (ref 3.5–5.3)
POTASSIUM SERPL-SCNC: 4.7 MMOL/L — SIGNIFICANT CHANGE UP (ref 3.5–5.3)
PROT SERPL-MCNC: 7.8 GM/DL — SIGNIFICANT CHANGE UP (ref 6–8.3)
RBC # BLD: 4.74 M/UL — SIGNIFICANT CHANGE UP (ref 4.2–5.8)
RBC # FLD: 21 % — HIGH (ref 10.3–14.5)
SODIUM SERPL-SCNC: 135 MMOL/L — SIGNIFICANT CHANGE UP (ref 135–145)
WBC # BLD: 6.4 K/UL — SIGNIFICANT CHANGE UP (ref 3.8–10.5)
WBC # FLD AUTO: 6.4 K/UL — SIGNIFICANT CHANGE UP (ref 3.8–10.5)

## 2019-09-20 PROCEDURE — 99239 HOSP IP/OBS DSCHRG MGMT >30: CPT

## 2019-09-20 RX ADMIN — Medication 2: at 11:48

## 2019-09-20 RX ADMIN — Medication 667 MILLIGRAM(S): at 08:07

## 2019-09-20 RX ADMIN — Medication 300 MILLIGRAM(S): at 05:55

## 2019-09-20 RX ADMIN — SEVELAMER CARBONATE 800 MILLIGRAM(S): 2400 POWDER, FOR SUSPENSION ORAL at 08:07

## 2019-09-20 RX ADMIN — Medication 60 MILLIGRAM(S): at 05:57

## 2019-09-20 RX ADMIN — HEPARIN SODIUM 5000 UNIT(S): 5000 INJECTION INTRAVENOUS; SUBCUTANEOUS at 14:00

## 2019-09-20 RX ADMIN — Medication 667 MILLIGRAM(S): at 14:00

## 2019-09-20 RX ADMIN — Medication 300 MILLIGRAM(S): at 14:00

## 2019-09-20 RX ADMIN — CHLORHEXIDINE GLUCONATE 1 APPLICATION(S): 213 SOLUTION TOPICAL at 05:54

## 2019-09-20 RX ADMIN — Medication 81 MILLIGRAM(S): at 11:48

## 2019-09-20 RX ADMIN — Medication 25 MILLIGRAM(S): at 05:55

## 2019-09-20 RX ADMIN — SEVELAMER CARBONATE 800 MILLIGRAM(S): 2400 POWDER, FOR SUSPENSION ORAL at 11:48

## 2019-09-20 NOTE — DISCHARGE NOTE PROVIDER - HOSPITAL COURSE
71 M with ESRD on HD TTS, HTN, Bioprosthetic Aortic replacement, diet controlled DM II, and HLD presented with Hypertensive emergency (/118 at triage), Hyperkalemia and dyspnea after missing HD treatment. He underwent daily dialysis for 3 days in a row and his symptoms resolved. His BP improved. Screening Echo showed EF 55-60% w/ a normal left ventricular size and wall thicknesses & normal systolic and diastolic function. As per my conversation with Dr. Cruz today, patient has been cleared from renal standpoint and will get HD tomorrow as outpatient.         Discharge time: 43 minutes

## 2019-09-20 NOTE — DISCHARGE NOTE NURSING/CASE MANAGEMENT/SOCIAL WORK - PATIENT PORTAL LINK FT
You can access the FollowMyHealth Patient Portal offered by [facility] by registering at the following website: [email or web address]. By joining [facility]’s FollowMyHealth portal, you will also be able to view your health information using other applications (apps) compatible with our system.

## 2019-09-20 NOTE — PROGRESS NOTE ADULT - SUBJECTIVE AND OBJECTIVE BOX
Patient seen and examined  no complaints    lisinopril (Anaphylaxis)    Hospital Medications:   MEDICATIONS  (STANDING):  aspirin enteric coated 81 milliGRAM(s) Oral daily  atorvastatin 40 milliGRAM(s) Oral at bedtime  calcium acetate 667 milliGRAM(s) Oral three times a day with meals  chlorhexidine 4% Liquid 1 Application(s) Topical daily  dextrose 5%. 1000 milliLiter(s) (50 mL/Hr) IV Continuous <Continuous>  dextrose 50% Injectable 12.5 Gram(s) IV Push once  dextrose 50% Injectable 25 Gram(s) IV Push once  dextrose 50% Injectable 25 Gram(s) IV Push once  furosemide   Injectable 60 milliGRAM(s) IV Push two times a day  heparin  Injectable 5000 Unit(s) SubCutaneous every 8 hours  hydrALAZINE 25 milliGRAM(s) Oral two times a day  insulin lispro (HumaLOG) corrective regimen sliding scale   SubCutaneous three times a day before meals  insulin lispro (HumaLOG) corrective regimen sliding scale   SubCutaneous at bedtime  labetalol 300 milliGRAM(s) Oral three times a day  sevelamer carbonate 800 milliGRAM(s) Oral three times a day with meals        VITALS:  T(F): 96.5 (09-20-19 @ 06:07), Max: 100 (09-19-19 @ 13:25)  HR: 63 (09-20-19 @ 06:07)  BP: 151/67 (09-20-19 @ 06:07)  RR: 17 (09-20-19 @ 06:07)  SpO2: 96% (09-20-19 @ 06:07)  Wt(kg): --    09-19 @ 07:01  -  09-20 @ 07:00  --------------------------------------------------------  IN: 1260 mL / OUT: 3400 mL / NET: -2140 mL        PHYSICAL EXAM:  Constitutional: NAD  HEENT: anicteric sclera, oropharynx clear, MMM  Neck: No JVD  Respiratory: CTAB, no wheezes, rales or rhonchi  Cardiovascular: S1, S2, RRR  Gastrointestinal: BS+, soft, NT/ND  Extremities: ++ peripheral edema  Neurological: A/O x 3, no focal deficits  Psychiatric: Normal mood, normal affect  : No CVA tenderness. No bee.   Skin: No rashes    LABS:  09-20    135  |  96  |  35<H>  ----------------------------<  117<H>  4.7   |  26  |  7.58<H>    Ca    8.4<L>      20 Sep 2019 07:30    TPro  7.8  /  Alb  3.2<L>  /  TBili  0.7  /  DBili      /  AST  23  /  ALT  20  /  AlkPhos  103  09-20    Creatinine Trend: 7.58 <--, 8.62 <--, 9.45 <--, 14.90 <--                        13.5   6.40  )-----------( 223      ( 20 Sep 2019 07:30 )             43.9     Urine Studies:      RADIOLOGY & ADDITIONAL STUDIES:

## 2019-09-20 NOTE — DISCHARGE NOTE PROVIDER - PROVIDER TOKENS
FREE:[LAST:[Sheridan],FIRST:[Carlos],PHONE:[phone]],FAX:[(   [phone]    -],ADDRESS:[[address] [place] [address]]]

## 2019-09-20 NOTE — PROGRESS NOTE ADULT - PROBLEM SELECTOR PLAN 1
s/p HD yesterday- flowsheet reviewed  had 3 sessions in a row- sob improving and bp improving  plan for next hd tomm  trend bmp

## 2019-09-26 DIAGNOSIS — D63.1 ANEMIA IN CHRONIC KIDNEY DISEASE: ICD-10-CM

## 2019-09-26 DIAGNOSIS — Z90.49 ACQUIRED ABSENCE OF OTHER SPECIFIED PARTS OF DIGESTIVE TRACT: ICD-10-CM

## 2019-09-26 DIAGNOSIS — E87.70 FLUID OVERLOAD, UNSPECIFIED: ICD-10-CM

## 2019-09-26 DIAGNOSIS — J81.1 CHRONIC PULMONARY EDEMA: ICD-10-CM

## 2019-09-26 DIAGNOSIS — Z79.82 LONG TERM (CURRENT) USE OF ASPIRIN: ICD-10-CM

## 2019-09-26 DIAGNOSIS — E11.22 TYPE 2 DIABETES MELLITUS WITH DIABETIC CHRONIC KIDNEY DISEASE: ICD-10-CM

## 2019-09-26 DIAGNOSIS — Z99.2 DEPENDENCE ON RENAL DIALYSIS: ICD-10-CM

## 2019-09-26 DIAGNOSIS — Z91.15 PATIENT'S NONCOMPLIANCE WITH RENAL DIALYSIS: ICD-10-CM

## 2019-09-26 DIAGNOSIS — Z91.14 PATIENT'S OTHER NONCOMPLIANCE WITH MEDICATION REGIMEN: ICD-10-CM

## 2019-09-26 DIAGNOSIS — Z95.1 PRESENCE OF AORTOCORONARY BYPASS GRAFT: ICD-10-CM

## 2019-09-26 DIAGNOSIS — R06.03 ACUTE RESPIRATORY DISTRESS: ICD-10-CM

## 2019-09-26 DIAGNOSIS — E87.5 HYPERKALEMIA: ICD-10-CM

## 2019-09-26 DIAGNOSIS — Z95.2 PRESENCE OF PROSTHETIC HEART VALVE: ICD-10-CM

## 2019-09-26 DIAGNOSIS — I16.1 HYPERTENSIVE EMERGENCY: ICD-10-CM

## 2019-09-26 DIAGNOSIS — I12.0 HYPERTENSIVE CHRONIC KIDNEY DISEASE WITH STAGE 5 CHRONIC KIDNEY DISEASE OR END STAGE RENAL DISEASE: ICD-10-CM

## 2019-09-26 DIAGNOSIS — N18.6 END STAGE RENAL DISEASE: ICD-10-CM

## 2019-09-26 DIAGNOSIS — E78.5 HYPERLIPIDEMIA, UNSPECIFIED: ICD-10-CM

## 2019-10-30 ENCOUNTER — INPATIENT (INPATIENT)
Facility: HOSPITAL | Age: 69
LOS: 0 days | Discharge: HOME | End: 2019-10-31
Attending: INTERNAL MEDICINE | Admitting: INTERNAL MEDICINE
Payer: MEDICARE

## 2019-10-30 VITALS
HEIGHT: 66 IN | DIASTOLIC BLOOD PRESSURE: 69 MMHG | RESPIRATION RATE: 20 BRPM | WEIGHT: 160.06 LBS | OXYGEN SATURATION: 97 % | TEMPERATURE: 98 F | HEART RATE: 71 BPM | SYSTOLIC BLOOD PRESSURE: 155 MMHG

## 2019-10-30 DIAGNOSIS — T82.590A OTHER MECHANICAL COMPLICATION OF SURGICALLY CREATED ARTERIOVENOUS FISTULA, INITIAL ENCOUNTER: ICD-10-CM

## 2019-10-30 DIAGNOSIS — I10 ESSENTIAL (PRIMARY) HYPERTENSION: ICD-10-CM

## 2019-10-30 DIAGNOSIS — E87.5 HYPERKALEMIA: ICD-10-CM

## 2019-10-30 DIAGNOSIS — Z95.1 PRESENCE OF AORTOCORONARY BYPASS GRAFT: Chronic | ICD-10-CM

## 2019-10-30 DIAGNOSIS — I12.0 HYPERTENSIVE CHRONIC KIDNEY DISEASE WITH STAGE 5 CHRONIC KIDNEY DISEASE OR END STAGE RENAL DISEASE: ICD-10-CM

## 2019-10-30 DIAGNOSIS — N18.6 END STAGE RENAL DISEASE: ICD-10-CM

## 2019-10-30 DIAGNOSIS — E11.22 TYPE 2 DIABETES MELLITUS WITH DIABETIC CHRONIC KIDNEY DISEASE: ICD-10-CM

## 2019-10-30 LAB
ALBUMIN SERPL ELPH-MCNC: 3.5 G/DL — SIGNIFICANT CHANGE UP (ref 3.3–5)
ALP SERPL-CCNC: 155 U/L — HIGH (ref 40–120)
ALT FLD-CCNC: 25 U/L — SIGNIFICANT CHANGE UP (ref 12–78)
ANION GAP SERPL CALC-SCNC: 14 MMOL/L — SIGNIFICANT CHANGE UP (ref 5–17)
APTT BLD: 33.2 SEC — SIGNIFICANT CHANGE UP (ref 28.5–37)
AST SERPL-CCNC: 34 U/L — SIGNIFICANT CHANGE UP (ref 15–37)
BASOPHILS # BLD AUTO: 0.03 K/UL — SIGNIFICANT CHANGE UP (ref 0–0.2)
BASOPHILS NFR BLD AUTO: 0.4 % — SIGNIFICANT CHANGE UP (ref 0–2)
BILIRUB SERPL-MCNC: 0.5 MG/DL — SIGNIFICANT CHANGE UP (ref 0.2–1.2)
BUN SERPL-MCNC: 93 MG/DL — HIGH (ref 7–23)
CALCIUM SERPL-MCNC: 8.4 MG/DL — LOW (ref 8.5–10.1)
CHLORIDE SERPL-SCNC: 97 MMOL/L — SIGNIFICANT CHANGE UP (ref 96–108)
CO2 SERPL-SCNC: 27 MMOL/L — SIGNIFICANT CHANGE UP (ref 22–31)
CREAT SERPL-MCNC: 12.3 MG/DL — HIGH (ref 0.5–1.3)
EOSINOPHIL # BLD AUTO: 0.28 K/UL — SIGNIFICANT CHANGE UP (ref 0–0.5)
EOSINOPHIL NFR BLD AUTO: 3.4 % — SIGNIFICANT CHANGE UP (ref 0–6)
GLUCOSE BLDC GLUCOMTR-MCNC: 108 MG/DL — HIGH (ref 70–99)
GLUCOSE BLDC GLUCOMTR-MCNC: 136 MG/DL — HIGH (ref 70–99)
GLUCOSE SERPL-MCNC: 139 MG/DL — HIGH (ref 70–99)
HCT VFR BLD CALC: 35.1 % — LOW (ref 39–50)
HGB BLD-MCNC: 11.3 G/DL — LOW (ref 13–17)
IMM GRANULOCYTES NFR BLD AUTO: 0.4 % — SIGNIFICANT CHANGE UP (ref 0–1.5)
INR BLD: 0.97 RATIO — SIGNIFICANT CHANGE UP (ref 0.88–1.16)
LYMPHOCYTES # BLD AUTO: 0.86 K/UL — LOW (ref 1–3.3)
LYMPHOCYTES # BLD AUTO: 10.6 % — LOW (ref 13–44)
MAGNESIUM SERPL-MCNC: 2.5 MG/DL — SIGNIFICANT CHANGE UP (ref 1.6–2.6)
MCHC RBC-ENTMCNC: 28.3 PG — SIGNIFICANT CHANGE UP (ref 27–34)
MCHC RBC-ENTMCNC: 32.2 GM/DL — SIGNIFICANT CHANGE UP (ref 32–36)
MCV RBC AUTO: 88 FL — SIGNIFICANT CHANGE UP (ref 80–100)
MONOCYTES # BLD AUTO: 1.07 K/UL — HIGH (ref 0–0.9)
MONOCYTES NFR BLD AUTO: 13.1 % — SIGNIFICANT CHANGE UP (ref 2–14)
NEUTROPHILS # BLD AUTO: 5.88 K/UL — SIGNIFICANT CHANGE UP (ref 1.8–7.4)
NEUTROPHILS NFR BLD AUTO: 72.1 % — SIGNIFICANT CHANGE UP (ref 43–77)
NRBC # BLD: 0 /100 WBCS — SIGNIFICANT CHANGE UP (ref 0–0)
PLATELET # BLD AUTO: 226 K/UL — SIGNIFICANT CHANGE UP (ref 150–400)
POTASSIUM SERPL-MCNC: 5.6 MMOL/L — HIGH (ref 3.5–5.3)
POTASSIUM SERPL-SCNC: 5.6 MMOL/L — HIGH (ref 3.5–5.3)
PROT SERPL-MCNC: 7.7 GM/DL — SIGNIFICANT CHANGE UP (ref 6–8.3)
PROTHROM AB SERPL-ACNC: 10.8 SEC — SIGNIFICANT CHANGE UP (ref 10–12.9)
RBC # BLD: 3.99 M/UL — LOW (ref 4.2–5.8)
RBC # FLD: 18.6 % — HIGH (ref 10.3–14.5)
SODIUM SERPL-SCNC: 138 MMOL/L — SIGNIFICANT CHANGE UP (ref 135–145)
WBC # BLD: 8.15 K/UL — SIGNIFICANT CHANGE UP (ref 3.8–10.5)
WBC # FLD AUTO: 8.15 K/UL — SIGNIFICANT CHANGE UP (ref 3.8–10.5)

## 2019-10-30 PROCEDURE — 93010 ELECTROCARDIOGRAM REPORT: CPT

## 2019-10-30 PROCEDURE — 76937 US GUIDE VASCULAR ACCESS: CPT | Mod: 26,59,76

## 2019-10-30 PROCEDURE — 99223 1ST HOSP IP/OBS HIGH 75: CPT

## 2019-10-30 PROCEDURE — 99285 EMERGENCY DEPT VISIT HI MDM: CPT

## 2019-10-30 PROCEDURE — 71045 X-RAY EXAM CHEST 1 VIEW: CPT | Mod: 26

## 2019-10-30 PROCEDURE — 36905 THRMBC/NFS DIALYSIS CIRCUIT: CPT

## 2019-10-30 PROCEDURE — 99221 1ST HOSP IP/OBS SF/LOW 40: CPT | Mod: 25

## 2019-10-30 RX ORDER — FUROSEMIDE 40 MG
80 TABLET ORAL
Refills: 0 | Status: DISCONTINUED | OUTPATIENT
Start: 2019-10-30 | End: 2019-10-31

## 2019-10-30 RX ORDER — HYDRALAZINE HCL 50 MG
10 TABLET ORAL ONCE
Refills: 0 | Status: COMPLETED | OUTPATIENT
Start: 2019-10-30 | End: 2019-10-30

## 2019-10-30 RX ORDER — ISOSORBIDE DINITRATE 5 MG/1
10 TABLET ORAL THREE TIMES A DAY
Refills: 0 | Status: DISCONTINUED | OUTPATIENT
Start: 2019-10-30 | End: 2019-10-31

## 2019-10-30 RX ORDER — SEVELAMER CARBONATE 2400 MG/1
800 POWDER, FOR SUSPENSION ORAL
Refills: 0 | Status: DISCONTINUED | OUTPATIENT
Start: 2019-10-30 | End: 2019-10-31

## 2019-10-30 RX ORDER — ATORVASTATIN CALCIUM 80 MG/1
40 TABLET, FILM COATED ORAL AT BEDTIME
Refills: 0 | Status: DISCONTINUED | OUTPATIENT
Start: 2019-10-30 | End: 2019-10-31

## 2019-10-30 RX ORDER — CALCIUM ACETATE 667 MG
667 TABLET ORAL
Refills: 0 | Status: DISCONTINUED | OUTPATIENT
Start: 2019-10-30 | End: 2019-10-31

## 2019-10-30 RX ORDER — LABETALOL HCL 100 MG
300 TABLET ORAL ONCE
Refills: 0 | Status: COMPLETED | OUTPATIENT
Start: 2019-10-30 | End: 2019-10-30

## 2019-10-30 RX ORDER — ALTEPLASE 100 MG
6 KIT INTRAVENOUS ONCE
Refills: 0 | Status: DISCONTINUED | OUTPATIENT
Start: 2019-10-30 | End: 2019-10-31

## 2019-10-30 RX ORDER — HYDRALAZINE HCL 50 MG
50 TABLET ORAL EVERY 8 HOURS
Refills: 0 | Status: DISCONTINUED | OUTPATIENT
Start: 2019-10-30 | End: 2019-10-31

## 2019-10-30 RX ORDER — LABETALOL HCL 100 MG
300 TABLET ORAL EVERY 8 HOURS
Refills: 0 | Status: DISCONTINUED | OUTPATIENT
Start: 2019-10-30 | End: 2019-10-31

## 2019-10-30 RX ORDER — LABETALOL HCL 100 MG
300 TABLET ORAL THREE TIMES A DAY
Refills: 0 | Status: DISCONTINUED | OUTPATIENT
Start: 2019-10-30 | End: 2019-10-30

## 2019-10-30 RX ORDER — ASPIRIN/CALCIUM CARB/MAGNESIUM 324 MG
81 TABLET ORAL DAILY
Refills: 0 | Status: DISCONTINUED | OUTPATIENT
Start: 2019-10-30 | End: 2019-10-31

## 2019-10-30 RX ADMIN — Medication 10 MILLIGRAM(S): at 13:07

## 2019-10-30 RX ADMIN — Medication 0.1 MILLIGRAM(S): at 14:21

## 2019-10-30 NOTE — ED PROVIDER NOTE - PROGRESS NOTE DETAILS
Dr. Mena vascular sx is called and left message thru his office. Dr. Mena's  called back sts Dr. Mena is in the surgery and he will call me back. Dr. Lilly nephrologist is here eval pt now. Pt will get temperary catheter for dialysis. Dr. Hickey is notified and admit pt.

## 2019-10-30 NOTE — CONSULT NOTE ADULT - ASSESSMENT
70 yo male with ESRD on HD, DM and HTN.  Pts LUE AV graft not working  IR consulted for possible thrombectomy or temporary dialysis catheter

## 2019-10-30 NOTE — ED PROVIDER NOTE - CONSTITUTIONAL, MLM
normal... Well appearing, well nourished, awake, alert, oriented to person, place, time/situation and in no apparent distress. Speaking in clear full sentences no nasal flaring no shoulders retractions no diaphoresis, appears very comfortable lying in the stretcher in a bright light room

## 2019-10-30 NOTE — ED ADULT NURSE NOTE - NSIMPLEMENTINTERV_GEN_ALL_ED
Implemented All Fall Risk Interventions:  [place] to call system. Call bell, personal items and telephone within reach. Instruct patient to call for assistance. Room bathroom lighting operational. Non-slip footwear when patient is off stretcher. Physically safe environment: no spills, clutter or unnecessary equipment. Stretcher in lowest position, wheels locked, appropriate side rails in place. Provide visual cue, wrist band, yellow gown, etc. Monitor gait and stability. Monitor for mental status changes and reorient to person, place, and time. Review medications for side effects contributing to fall risk. Reinforce activity limits and safety measures with patient and family.

## 2019-10-30 NOTE — CONSULT NOTE ADULT - PROBLEM SELECTOR RECOMMENDATION 9
-As per anesthesia pt is amenable to procedure, will perform today  -meds, labs and vitals reviewed  -consent obtained from spouse also d/w

## 2019-10-30 NOTE — ED ADULT NURSE NOTE - OBJECTIVE STATEMENT
pt went to dialysis yesterday and av shunt unable to be accessed sent to another center who couldn't access b/c pt b/p too high. No bruit or thrill felt

## 2019-10-30 NOTE — ED ADULT NURSE NOTE - CHPI ED NUR SYMPTOMS NEG
no abdominal distension/no chills/no diarrhea/no nausea/no blood in stool/no fever/no dysuria/no hematuria

## 2019-10-30 NOTE — ED PROVIDER NOTE - OBJECTIVE STATEMENT
69 years old male with wife c/o pt's av graft for hemodialysis in the left arm is not working at the dialysis center in [facility]. Pt sts the last full dialysis was 4 days ago. Pt is alert and oriented x 3 denies headache, recent hx of trauma, dizziness, blurred visions, light sensitivities, focal/distal weakness or numbness, cough, sob, chest pain, nausea, vomiting, fever, chills, abd pain. Pt sts has been on hemodialysis since Jan of this year.

## 2019-10-30 NOTE — ED PROVIDER NOTE - CARE PLAN
Principal Discharge DX:	Hyperkalemia  Secondary Diagnosis:	ESRD needing dialysis  Secondary Diagnosis:	Hypertension

## 2019-10-30 NOTE — CONSULT NOTE ADULT - SUBJECTIVE AND OBJECTIVE BOX
Patient is a 69y old  Male who presents with a chief complaint of blocked graft (30 Oct 2019 14:26)    IR consulted for AV graft declotting.  Pt has a h/o HTN and DM.  Pt stated on Dialysis January of this year and access was created May of this year.  Pt has not had any problems previously with the graft.  Non pulsating, neg thrill palpated.  Pt still making urine          HPI:  ·  69 years old male with wife c/o pt's av graft for hemodialysis in the left arm is not working at the dialysis center in [facility]. Pt sts the last full dialysis was 4 days ago. Pt is alert and oriented x 3 denies headache, recent hx of trauma, dizziness, blurred visions, light sensitivities, focal/distal weakness or numbness, cough, sob, chest pain, nausea, vomiting, fever, chills, abd pain. Pt sts has been on hemodialysis since Jan of this year. (30 Oct 2019 14:26)          PAST MEDICAL & SURGICAL HISTORY:  HLD (hyperlipidemia)  HTN (hypertension)  Aortic valve replaced  CKD (chronic kidney disease)  Anemia  CHF (congestive heart failure)  Diabetes mellitus  Hypertension  S/P CABG (coronary artery bypass graft)  S/P appendectomy      Allergies    lisinopril (Anaphylaxis)    Intolerances        MEDICATIONS  (STANDING):  aspirin enteric coated 81 milliGRAM(s) Oral daily  atorvastatin 40 milliGRAM(s) Oral at bedtime  calcium acetate 667 milliGRAM(s) Oral four times a day with meals  furosemide    Tablet 80 milliGRAM(s) Oral two times a day  hydrALAZINE 50 milliGRAM(s) Oral every 8 hours  hydrALAZINE Injectable 10 milliGRAM(s) IV Push Once  isosorbide   dinitrate Tablet (ISORDIL) 10 milliGRAM(s) Oral three times a day  labetalol 300 milliGRAM(s) Oral three times a day  labetalol 300 milliGRAM(s) Oral Once  labetalol 300 milliGRAM(s) Oral every 8 hours  sevelamer carbonate 800 milliGRAM(s) Oral three times a day with meals    MEDICATIONS  (PRN):  cloNIDine 0.1 milliGRAM(s) Oral every 6 hours PRN SBP > 170        SOCIAL HISTORY:    FAMILY HISTORY:  FH: type 2 diabetes: brother        PHYSICAL EXAM:    Vital Signs Last 24 Hrs  T(C): 36.8 (30 Oct 2019 12:09), Max: 36.8 (30 Oct 2019 12:09)  T(F): 98.3 (30 Oct 2019 12:09), Max: 98.3 (30 Oct 2019 12:09)  HR: 68 (30 Oct 2019 14:11) (68 - 71)  BP: 166/79 (30 Oct 2019 14:11) (155/69 - 187/92)  BP(mean): --  RR: 16 (30 Oct 2019 14:11) (16 - 22)  SpO2: 96% (30 Oct 2019 14:11) (96% - 97%)    General:  Appears stated age, well-groomed, well-nourished, no distress  Lungs:  CTAB  Cardiovascular:  good S1, S2,   Abdomen:  Soft, non-tender, non-distended,   Extremities:  b/l LE edema  Neuro/Psych:  A &O x 3    LABS:                        11.3   8.15  )-----------( 226      ( 30 Oct 2019 13:15 )             35.1     10-30    138  |  97  |  93<H>  ----------------------------<  139<H>  5.6<H>   |  27  |  12.30<H>    Ca    8.4<L>      30 Oct 2019 13:15  Mg     2.5     10-30    TPro  7.7  /  Alb  3.5  /  TBili  0.5  /  DBili  x   /  AST  34  /  ALT  25  /  AlkPhos  155<H>  10-30    PT/INR - ( 30 Oct 2019 13:15 )   PT: 10.8 sec;   INR: 0.97 ratio         PTT - ( 30 Oct 2019 13:15 )  PTT:33.2 sec      RADIOLOGY & ADDITIONAL STUDIES:  < from: Xray Chest 1 View-PORTABLE IMMEDIATE (10.30.19 @ 13:25) >  EXAM:  XR CHEST PORTABLE IMMED 1V                            PROCEDURE DATE:  10/30/2019          INTERPRETATION:  PROCEDURE: AP view of the chest.    CLINICAL INFORMATION: Hypertension.    COMPARISON: 9/17/2019.    FINDINGS:     The patient is status post sternotomy and cardiac valve placement.    Lungs: There are no lung consolidations.  Heart: There is cardiomegaly.  Mediastinum: The mediastinum is within normal limits.    IMPRESSION:    No lung consolidations.    < end of copied text >

## 2019-10-30 NOTE — H&P ADULT - NSHPPHYSICALEXAM_GEN_ALL_CORE
ICU Vital Signs Last 24 Hrs  T(C): 36.8 (30 Oct 2019 12:09), Max: 36.8 (30 Oct 2019 12:09)  T(F): 98.3 (30 Oct 2019 12:09), Max: 98.3 (30 Oct 2019 12:09)  HR: 68 (30 Oct 2019 14:11) (68 - 71)  BP: 166/79 (30 Oct 2019 14:11) (155/69 - 187/92)  BP(mean): --  ABP: --  ABP(mean): --  RR: 16 (30 Oct 2019 14:11) (16 - 22)  SpO2: 96% (30 Oct 2019 14:11) (96% - 97%)  GENERAL: NAD well-developed  HEAD:  Atraumatic, Normocephalic  EYES: EOMI, PERRLA, conjunctiva and sclera clear  ENMT: No tonsillar erythema, exudates, or enlargement; Moist mucous membranes, Good dentition, No lesions  NECK: Supple, No JVD, Normal thyroid  NERVOUS SYSTEM:  Alert & Oriented X3, Good concentration; Motor Strength 5/5 B/L upper and lower extremities; DTRs 2+ intact and symmetric  CHEST/LUNG: Clear to percussion bilaterally; No rales, rhonchi, wheezing, or rubs  HEART: Regular rate and rhythm; No murmurs, rubs, or gallops  ABDOMEN: Soft, Nontender, Nondistended; Bowel sounds present  EXTREMITIES:  2+ Peripheral Pulses, No clubbing, cyanosis, or edema  LYMPH: No lymphadenopathy   SKIN: No rashes or lesions ICU Vital Signs Last 24 Hrs  T(C): 36.8 (30 Oct 2019 12:09), Max: 36.8 (30 Oct 2019 12:09)  T(F): 98.3 (30 Oct 2019 12:09), Max: 98.3 (30 Oct 2019 12:09)  HR: 68 (30 Oct 2019 14:11) (68 - 71)  BP: 166/79 (30 Oct 2019 14:11) (155/69 - 187/92)  BP(mean): --  ABP: --  ABP(mean): --  RR: 16 (30 Oct 2019 14:11) (16 - 22)  SpO2: 96% (30 Oct 2019 14:11) (96% - 97%)  GENERAL: NAD well-developed  HEAD:  Atraumatic, Normocephalic  EYES: EOMI, PERRLA, conjunctiva and sclera clear  ENMT: No tonsillar erythema, exudates, or enlargement; Moist mucous membranes, Good dentition, No lesions  NECK: Supple, No JVD, Normal thyroid  NERVOUS SYSTEM:  Alert & Oriented X3, Good concentration; Motor Strength 5/5 B/L upper and lower extremities; DTRs 2+ intact and symmetric  CHEST/LUNG: Clear to percussion bilaterally; No rales, rhonchi, wheezing, or rubs  HEART: Regular rate and rhythm; No murmurs, rubs, or gallops  ABDOMEN: Soft, Nontender, Nondistended; Bowel sounds present  EXTREMITIES:  2+ Peripheral Pulses, No clubbing, cyanosis, or edema no thrill in shunt   LYMPH: No lymphadenopathy   SKIN: No rashes or lesions

## 2019-10-30 NOTE — CONSULT NOTE ADULT - SUBJECTIVE AND OBJECTIVE BOX
[facility] NEPHROLOGY SERVICES, [facility]  NEPHROLOGY AND HYPERTENSION  [address]  [address]  [place] [address]  [phone]    MD WHIT NICKERSON MD ANDREY GONCHARUK, MD MADHU KORRAPATI, MD YELENA ROSENBERG, MD BINNY KOSHY, MD CHRISTOPHER CAPUTO, MD EDWARD BOVER, MD    From chart:  · HPI Objective Statement: 69 years old male with wife c/o pt's av graft for hemodialysis in the left arm is not working at the dialysis center in [facility]. Pt sts the last full dialysis was 4 days ago. Pt is alert and oriented x 3 denies headache, recent hx of trauma, dizziness, blurred visions, light sensitivities, focal/distal weakness or numbness, cough, sob, chest pain, nausea, vomiting, fever, chills, abd pain. Pt sts has been on hemodialysis since Jan of this year.	    Patient for AVF thrombectomy at [facility]; procedure aborted due to elevated MAP.  Comfortable no  distress  Last HD Saturday uneventful; [facility] KEYLA Calix.    PAST MEDICAL & SURGICAL HISTORY:  HLD (hyperlipidemia)  HTN (hypertension)  Aortic valve replaced  CKD (chronic kidney disease)  Anemia  CHF (congestive heart failure)  Diabetes mellitus  Hypertension  S/P CABG (coronary artery bypass graft)  S/P appendectomy    FAMILY HISTORY:  FH: type 2 diabetes: brother    Allergies    lisinopril (Anaphylaxis)    Intolerances      Home Medications:  aspirin 81 mg oral delayed release tablet: 1 tab(s) orally once a day (30 Oct 2019 12:41)  atorvastatin 40 mg oral tablet: 1 tab(s) orally once a day (at bedtime) (30 Oct 2019 12:41)  calcium acetate 667 mg oral tablet: 1 tab(s) orally 3 times a day (30 Oct 2019 12:41)  docusate sodium 100 mg oral tablet: 1 tab(s) orally , As Needed (30 Oct 2019 12:41)  furosemide 80 mg oral tablet: 1 tab(s) orally 2 times a day (30 Oct 2019 12:41)  hydrALAZINE 25 mg oral tablet: 1 tab(s) orally 2 times a day (30 Oct 2019 12:41)  isosorbide dinitrate 10 mg oral tablet: 1 tab(s) orally once a day in morning (18 Sep 2019 10:58)  labetalol 300 mg oral tablet: 1 tab(s) orally 3 times a day (18 Sep 2019 10:58)  sevelamer hydrochloride 800 mg oral tablet: 1 tab(s) orally 3 times a day (18 Sep 2019 10:58)    MEDICATIONS  (STANDING):  cloNIDine 0.1 milliGRAM(s) Oral Once ordered  hydrALAZINE Injectable 10 milliGRAM(s) IV Push Once ordered  labetalol 300 milliGRAM(s) Oral Once ordered    MEDICATIONS  (PRN):    Vital Signs Last 24 Hrs  T(C): 36.8 (30 Oct 2019 12:09), Max: 36.8 (30 Oct 2019 12:09)  T(F): 98.3 (30 Oct 2019 12:09), Max: 98.3 (30 Oct 2019 12:09)  HR: 68 (30 Oct 2019 14:11) (68 - 71)  BP: 166/79 (30 Oct 2019 14:11) (155/69 - 187/92)  BP(mean): --  RR: 16 (30 Oct 2019 14:11) (16 - 22)  SpO2: 96% (30 Oct 2019 14:11) (96% - 97%)    Daily Height in cm: 167.64 (30 Oct 2019 12:09)    Daily     CAPILLARY BLOOD GLUCOSE        PHYSICAL EXAM:  Alert no idstress;     T(C): 36.8 (10-30-19 @ 12:09), Max: 36.8 (10-30-19 @ 12:09)  HR: 68 (10-30-19 @ 14:11) (68 - 71)  BP: 166/79 (10-30-19 @ 14:11) (155/69 - 187/92)  RR: 16 (10-30-19 @ 14:11) (16 - 22)  SpO2: 96% (10-30-19 @ 14:11) (96% - 97%)  Wt(kg): --  Respiratory: clear anteriorly, decreased BS at bases  Cardiovascular: S1 S2  Gastrointestinal: soft NT ND +BS  Extremities: 1+ edema L AV access no bruit or thrill              10-30    138  |  97  |  93<H>  ----------------------------<  139<H>  5.6<H>   |  27  |  12.30<H>    Ca    8.4<L>      30 Oct 2019 13:15  Mg     2.5     10-30    TPro  7.7  /  Alb  3.5  /  TBili  0.5  /  DBili  x   /  AST  34  /  ALT  25  /  AlkPhos  155<H>  10-30                          11.3   8.15  )-----------( 226      ( 30 Oct 2019 13:15 )             35.1     Creatinine Trend: 12.30<--          Assessment   ESRD, clotted AV access, HTN urgency; hyperkalemia;     Plan  Discussed with IR; will attempt Thrombectomy if MAP allows, otherwise Niraj catheter  HD for today and as scheduled for tomorrow;   AV thrombectomy pending course, tomorrow as outpatient vs Friday.  Resume outpatient BP medications with addition of clonidine prn today;  Discussed with wife at bedside and his nephrologist Dr. Cruz.    Payam Lilly MD [facility] NEPHROLOGY SERVICES, [facility]  NEPHROLOGY AND HYPERTENSION  [address]  [address]  [place] [address]  [phone]    MD WHIT NICKERSON MD ANDREY GONCHARUK, MD MADHU KORRAPATI, MD YELENA ROSENBERG, MD BINNY KOSHY, MD CHRISTOPHER CAPUTO, MD EDWARD BOVER, MD    From chart:  · HPI Objective Statement: 69 years old male with wife c/o pt's av graft for hemodialysis in the left arm is not working at the dialysis center in [facility]. Pt sts the last full dialysis was 4 days ago. Pt is alert and oriented x 3 denies headache, recent hx of trauma, dizziness, blurred visions, light sensitivities, focal/distal weakness or numbness, cough, sob, chest pain, nausea, vomiting, fever, chills, abd pain. Pt sts has been on hemodialysis since Jan of this year.	    Patient for AVF thrombectomy at [facility]; procedure aborted due to elevated MAP.  Comfortable no  distress  Last HD Saturday un[facility]ful; [facility] KEYLA Cruz.    PAST MEDICAL & SURGICAL HISTORY:  HLD (hyperlipidemia)  HTN (hypertension)  Aortic valve replaced  CKD (chronic kidney disease)  Anemia  CHF (congestive heart failure)  Diabetes mellitus  Hypertension  S/P CABG (coronary artery bypass graft)  S/P appendectomy    FAMILY HISTORY:  FH: type 2 diabetes: brother    Allergies    lisinopril (Anaphylaxis)    Intolerances      Home Medications:  aspirin 81 mg oral delayed release tablet: 1 tab(s) orally once a day (30 Oct 2019 12:41)  atorvastatin 40 mg oral tablet: 1 tab(s) orally once a day (at bedtime) (30 Oct 2019 12:41)  calcium acetate 667 mg oral tablet: 1 tab(s) orally 3 times a day (30 Oct 2019 12:41)  docusate sodium 100 mg oral tablet: 1 tab(s) orally , As Needed (30 Oct 2019 12:41)  furosemide 80 mg oral tablet: 1 tab(s) orally 2 times a day (30 Oct 2019 12:41)  hydrALAZINE 25 mg oral tablet: 1 tab(s) orally 2 times a day (30 Oct 2019 12:41)  isosorbide dinitrate 10 mg oral tablet: 1 tab(s) orally once a day in morning (18 Sep 2019 10:58)  labetalol 300 mg oral tablet: 1 tab(s) orally 3 times a day (18 Sep 2019 10:58)  sevelamer hydrochloride 800 mg oral tablet: 1 tab(s) orally 3 times a day (18 Sep 2019 10:58)    MEDICATIONS  (STANDING):  cloNIDine 0.1 milliGRAM(s) Oral Once ordered  hydrALAZINE Injectable 10 milliGRAM(s) IV Push Once ordered  labetalol 300 milliGRAM(s) Oral Once ordered    MEDICATIONS  (PRN):    Vital Signs Last 24 Hrs  T(C): 36.8 (30 Oct 2019 12:09), Max: 36.8 (30 Oct 2019 12:09)  T(F): 98.3 (30 Oct 2019 12:09), Max: 98.3 (30 Oct 2019 12:09)  HR: 68 (30 Oct 2019 14:11) (68 - 71)  BP: 166/79 (30 Oct 2019 14:11) (155/69 - 187/92)  BP(mean): --  RR: 16 (30 Oct 2019 14:11) (16 - 22)  SpO2: 96% (30 Oct 2019 14:11) (96% - 97%)    Daily Height in cm: 167.64 (30 Oct 2019 12:09)    Daily     CAPILLARY BLOOD GLUCOSE        PHYSICAL EXAM:  Alert no idstress;     T(C): 36.8 (10-30-19 @ 12:09), Max: 36.8 (10-30-19 @ 12:09)  HR: 68 (10-30-19 @ 14:11) (68 - 71)  BP: 166/79 (10-30-19 @ 14:11) (155/69 - 187/92)  RR: 16 (10-30-19 @ 14:11) (16 - 22)  SpO2: 96% (10-30-19 @ 14:11) (96% - 97%)  Wt(kg): --  Respiratory: clear anteriorly, decreased BS at bases  Cardiovascular: S1 S2  Gastrointestinal: soft NT ND +BS  Extremities: 1+ edema L AV access no bruit or thrill              10-30    138  |  97  |  93<H>  ----------------------------<  139<H>  5.6<H>   |  27  |  12.30<H>    Ca    8.4<L>      30 Oct 2019 13:15  Mg     2.5     10-30    TPro  7.7  /  Alb  3.5  /  TBili  0.5  /  DBili  x   /  AST  34  /  ALT  25  /  AlkPhos  155<H>  10-30                          11.3   8.15  )-----------( 226      ( 30 Oct 2019 13:15 )             35.1     Creatinine Trend: 12.30<--          Assessment   ESRD, clotted AV access, HTN urgency; hyperkalemia;     Plan  Discussed with IR; will attempt Thrombectomy if MAP allows, otherwise Niraj catheter  HD for today and as scheduled for tomorrow;   Resume outpatient BP medications with addition of clonidine prn today;  Discussed with wife at bedside and his nephrologist Dr. Cruz.    Payam Lilly MD

## 2019-10-30 NOTE — BRIEF OPERATIVE NOTE - NSICDXBRIEFPREOP_GEN_ALL_CORE_FT
PRE-OP DIAGNOSIS:  AV graft thrombosis 30-Oct-2019 17:00:02  Khalida, No  Chronic kidney disease with end stage renal failure on dialysis 30-Oct-2019 16:59:28  Khalida No

## 2019-10-30 NOTE — H&P ADULT - ASSESSMENT
69m with history of end stage renal disease with blocked fistula comes in for revision of shunt and hemodialysis     IMPROVE VTE Individual Risk Assessment    RISK                                                          Points  [] Previous VTE                                           3  [] Thrombophilia                                        2  [] Lower limb paralysis                              2   [] Current Cancer                                       2   [] Immobilization > 24 hrs                        1  [] ICU/CCU stay > 24 hours                       1  [] Age > 60                                                   1    IMPROVE VTE Score:2

## 2019-10-30 NOTE — ED ADULT TRIAGE NOTE - CHIEF COMPLAINT QUOTE
wife states, ' His av fistula to the left arm is not working , was sent to access center who attempted to place catheter in chest but coud not because hs bp was elevated ' last dialysis was on Saturday

## 2019-10-30 NOTE — H&P ADULT - HISTORY OF PRESENT ILLNESS
·  69 years old male with wife c/o pt's av graft for hemodialysis in the left arm is not working at the dialysis center in [facility]. Pt sts the last full dialysis was 4 days ago. Pt is alert and oriented x 3 denies headache, recent hx of trauma, dizziness, blurred visions, light sensitivities, focal/distal weakness or numbness, cough, sob, chest pain, nausea, vomiting, fever, chills, abd pain. Pt sts has been on hemodialysis since Jan of this year.

## 2019-10-31 ENCOUNTER — TRANSCRIPTION ENCOUNTER (OUTPATIENT)
Age: 69
End: 2019-10-31

## 2019-10-31 VITALS
OXYGEN SATURATION: 98 % | HEART RATE: 71 BPM | TEMPERATURE: 98 F | SYSTOLIC BLOOD PRESSURE: 136 MMHG | RESPIRATION RATE: 18 BRPM | DIASTOLIC BLOOD PRESSURE: 76 MMHG

## 2019-10-31 DIAGNOSIS — N18.9 CHRONIC KIDNEY DISEASE, UNSPECIFIED: ICD-10-CM

## 2019-10-31 PROCEDURE — 99239 HOSP IP/OBS DSCHRG MGMT >30: CPT

## 2019-10-31 RX ADMIN — Medication 667 MILLIGRAM(S): at 17:28

## 2019-10-31 RX ADMIN — Medication 80 MILLIGRAM(S): at 17:28

## 2019-10-31 RX ADMIN — Medication 667 MILLIGRAM(S): at 11:16

## 2019-10-31 RX ADMIN — Medication 300 MILLIGRAM(S): at 06:22

## 2019-10-31 RX ADMIN — Medication 50 MILLIGRAM(S): at 00:11

## 2019-10-31 RX ADMIN — SEVELAMER CARBONATE 800 MILLIGRAM(S): 2400 POWDER, FOR SUSPENSION ORAL at 17:28

## 2019-10-31 RX ADMIN — SEVELAMER CARBONATE 800 MILLIGRAM(S): 2400 POWDER, FOR SUSPENSION ORAL at 11:17

## 2019-10-31 RX ADMIN — Medication 50 MILLIGRAM(S): at 06:22

## 2019-10-31 RX ADMIN — Medication 50 MILLIGRAM(S): at 11:16

## 2019-10-31 RX ADMIN — Medication 81 MILLIGRAM(S): at 11:20

## 2019-10-31 RX ADMIN — ISOSORBIDE DINITRATE 10 MILLIGRAM(S): 5 TABLET ORAL at 06:22

## 2019-10-31 RX ADMIN — ISOSORBIDE DINITRATE 10 MILLIGRAM(S): 5 TABLET ORAL at 11:17

## 2019-10-31 RX ADMIN — ATORVASTATIN CALCIUM 40 MILLIGRAM(S): 80 TABLET, FILM COATED ORAL at 00:11

## 2019-10-31 RX ADMIN — Medication 80 MILLIGRAM(S): at 06:21

## 2019-10-31 NOTE — DISCHARGE NOTE PROVIDER - CARE PROVIDER_API CALL
Narciso Cruz)  Internal Medicine  [address] [place] [address]  Phone: [phone]  Fax: [phone]  Follow Up Time:     Dr. Viera, vascular,   Phone: (   )    -  Fax: (   )    -  Follow Up Time:

## 2019-10-31 NOTE — DISCHARGE NOTE PROVIDER - NSDCFUSCHEDAPPT_GEN_ALL_CORE_FT
NATANAEL MASSEY ; 11/01/2019 ; [facility] [address] ; 11/01/2019 ; [facility] [address] ; 01/07/2020 ; [facility] [address] ; 01/07/2020 ; [facility] [address]

## 2019-10-31 NOTE — CONSULT NOTE ADULT - ASSESSMENT
69 years old male with ESRD on HD, presented to ED due to blocked AV Fistula. Patient s/p thrombectomy POD#1. Received HD last night.

## 2019-10-31 NOTE — CONSULT NOTE ADULT - SUBJECTIVE AND OBJECTIVE BOX
HPI:  ·  69 years old male with wife c/o pt's av graft for hemodialysis in the left arm is not working at the dialysis center in [facility]. Pt sts the last full dialysis was 4 days ago. Pt is alert and oriented x 3 denies headache, recent hx of trauma, dizziness, blurred visions, light sensitivities, focal/distal weakness or numbness, cough, sob, chest pain, nausea, vomiting, fever, chills, abd pain. Pt sts has been on hemodialysis since Jan of this year. (30 Oct 2019 14:26)    Patient was seen by IR Dr. Gaxiola. S/p AV fistula Thrombectomy 10/30, POD# 1.    PAST MEDICAL & SURGICAL HISTORY:  HLD (hyperlipidemia)  HTN (hypertension)  Aortic valve replaced  CKD (chronic kidney disease)  Anemia  CHF (congestive heart failure)  Diabetes mellitus  Hypertension  S/P CABG (coronary artery bypass graft)  S/P appendectomy      Allergies    lisinopril (Anaphylaxis)      FAMILY HISTORY:  FH: type 2 diabetes: brother      MEDICATIONS  (STANDING):  alteplase for catheter clearance 6 milliGRAM(s) Catheter once  aspirin enteric coated 81 milliGRAM(s) Oral daily  atorvastatin 40 milliGRAM(s) Oral at bedtime  calcium acetate 667 milliGRAM(s) Oral four times a day with meals  furosemide    Tablet 80 milliGRAM(s) Oral two times a day  hydrALAZINE 50 milliGRAM(s) Oral every 8 hours  isosorbide   dinitrate Tablet (ISORDIL) 10 milliGRAM(s) Oral three times a day  labetalol 300 milliGRAM(s) Oral every 8 hours  sevelamer carbonate 800 milliGRAM(s) Oral three times a day with meals    MEDICATIONS  (PRN):  cloNIDine 0.1 milliGRAM(s) Oral every 6 hours PRN SBP > 170    REVIEW OF SYSTEMS:  Constitutional: Denies fever, weight loss, fatigue  Eye: Denies eye pain, visual changes, discharge, blurred vision  ENT: Denies hearing changes, tinnitus, vertigo, sinus congestion, sore throat  Neck: Denies pain or stiffness  Respiratory: Denies cough, wheezing, chills, hemoptysis, shortness of breath, difficulty breathing  Cardiovascular: Denies chest pain, palpitations, dizziness, leg swelling  Gastrointestinal: Denies abdominal pain, nausea, vomiting, hematemesis, diarrhea, constipation, melena, hematochezia  Genitourinary: Denies dysuria, frequency, hematuria, retention, incontinence  Neurological: Denies headaches, memory loss, loss of strength, numbness, tremors  Skin: Denies itching, burning, rashes, lesions   Endocrine: Denies heat or cold intolerance, hair loss  Musculoskeletal: Denies joint pain or swelling, back, extremity pain  Psychiatric: Denies depression, anxiety, mood swings, difficulty sleeping, suicidal ideation  Hematology: Denies easy bruising, bleeding gums  Immunologic: Denies hives or eczema    Vital Signs Last 24 Hrs  T(C): 36.5 (31 Oct 2019 11:15), Max: 36.8 (30 Oct 2019 12:09)  T(F): 97.7 (31 Oct 2019 11:15), Max: 98.3 (30 Oct 2019 12:09)  HR: 71 (31 Oct 2019 11:15) (64 - 79)  BP: 136/76 (31 Oct 2019 11:15) (136/76 - 187/92)  RR: 18 (31 Oct 2019 11:15) (16 - 22)  SpO2: 98% (31 Oct 2019 11:15) (95% - 100%)    PHYSICAL EXAM:  GENERAL: NAD, well-groomed, well-developed  HEAD:  Atraumatic, Normocephalic  EYES: EOMI, PERRLA, conjunctiva and sclera clear  ENMT: No tonsillar erythema, exudates, or enlargement; Moist mucous membranes, Good dentition, No lesions  NECK: Supple, No JVD, Normal thyroid  NERVOUS SYSTEM:  Alert & Oriented X3, Good concentration; Motor Strength 5/5 B/L upper and lower extremities; DTRs 2+ intact and symmetric  CHEST/LUNG: Clear to auscultation bilaterally; No rales, rhonchi, wheezing, or rubs  HEART: Regular rate and rhythm; No murmurs appreciated  ABDOMEN: Soft, Nontender, Nondistended; Bowel sounds present  MSK: ROM intact in all extremities, 5/5 strength in upper and lower extremities, B/L.  EXTREMITIES:  2+ Peripheral Pulses, No clubbing, cyanosis, or edema, AV fistula with 2 sutures in place, with thrill  LYMPH: No lymphadenopathy noted  SKIN: No rashes or lesions      LABS:                        11.3   8.15  )-----------( 226      ( 30 Oct 2019 13:15 )             35.1     10-30    138  |  97  |  93<H>  ----------------------------<  139<H>  5.6<H>   |  27  |  12.30<H>    Ca    8.4<L>      30 Oct 2019 13:15  Mg     2.5     10-30    TPro  7.7  /  Alb  3.5  /  TBili  0.5  /  DBili  x   /  AST  34  /  ALT  25  /  AlkPhos  155<H>  10-30    PT/INR - ( 30 Oct 2019 13:15 )   PT: 10.8 sec;   INR: 0.97 ratio         PTT - ( 30 Oct 2019 13:15 )  PTT:33.2 sec

## 2019-10-31 NOTE — DISCHARGE NOTE PROVIDER - PROVIDER TOKENS
PROVIDER:[TOKEN:[[id number]]],FREE:[LAST:[Dr. Viera, vascular],PHONE:[(   )    -],FAX:[(   )    -]]

## 2019-10-31 NOTE — DISCHARGE NOTE PROVIDER - NSDCCPCAREPLAN_GEN_ALL_CORE_FT
PRINCIPAL DISCHARGE DIAGNOSIS  Diagnosis: AV graft malfunction  Assessment and Plan of Treatment: Follow with your nephrologist and vascular surgeon.      SECONDARY DISCHARGE DIAGNOSES  Diagnosis: Hypertension  Assessment and Plan of Treatment:     Diagnosis: ESRD needing dialysis  Assessment and Plan of Treatment:

## 2019-10-31 NOTE — PROGRESS NOTE ADULT - SUBJECTIVE AND OBJECTIVE BOX
Patient seen and examined  no complaints    lisinopril (Anaphylaxis)    Hospital Medications:   MEDICATIONS  (STANDING):  alteplase for catheter clearance 6 milliGRAM(s) Catheter once  aspirin enteric coated 81 milliGRAM(s) Oral daily  atorvastatin 40 milliGRAM(s) Oral at bedtime  calcium acetate 667 milliGRAM(s) Oral four times a day with meals  furosemide    Tablet 80 milliGRAM(s) Oral two times a day  hydrALAZINE 50 milliGRAM(s) Oral every 8 hours  isosorbide   dinitrate Tablet (ISORDIL) 10 milliGRAM(s) Oral three times a day  labetalol 300 milliGRAM(s) Oral every 8 hours  sevelamer carbonate 800 milliGRAM(s) Oral three times a day with meals      VITALS:  T(F): 97.7 (10-31-19 @ 11:15), Max: 98 (10-30-19 @ 22:05)  HR: 71 (10-31-19 @ 11:15)  BP: 136/76 (10-31-19 @ 11:15)  RR: 18 (10-31-19 @ 11:15)  SpO2: 98% (10-31-19 @ 11:15)  Wt(kg): --      Weight (kg): 69.4 (10-31 @ 06:02)  PHYSICAL EXAM:  Constitutional: NAD  HEENT: anicteric sclera, oropharynx clear, MMM  Neck: No JVD  Respiratory: CTAB, no wheezes, rales or rhonchi  Cardiovascular: S1, S2, RRR  Gastrointestinal: BS+, soft, NT/ND  Vascular Access: left arm avg + thrill    LABS:  10-30    138  |  97  |  93<H>  ----------------------------<  139<H>  5.6<H>   |  27  |  12.30<H>    Ca    8.4<L>      30 Oct 2019 13:15  Mg     2.5     10-30    TPro  7.7  /  Alb  3.5  /  TBili  0.5  /  DBili      /  AST  34  /  ALT  25  /  AlkPhos  155<H>  10-30    Creatinine Trend: 12.30 <--                        11.3   8.15  )-----------( 226      ( 30 Oct 2019 13:15 )             35.1     Urine Studies:      RADIOLOGY & ADDITIONAL STUDIES:

## 2019-10-31 NOTE — DISCHARGE NOTE NURSING/CASE MANAGEMENT/SOCIAL WORK - PATIENT PORTAL LINK FT
You can access the FollowMyHealth Patient Portal offered by [facility] by registering at the following website: [email or web address]. By joining [email or web address]’s FollowMyHealth portal, you will also be able to view your health information using other applications (apps) compatible with our system.

## 2019-10-31 NOTE — CONSULT NOTE ADULT - PROBLEM SELECTOR RECOMMENDATION 9
Repaired by IR on 10/30  Sutures in place to be removed outpatient by Dr. Viera  Patient stable for discharge from vascular standpoint

## 2019-10-31 NOTE — DISCHARGE NOTE PROVIDER - HOSPITAL COURSE
HPI as per admission:    69 years old male with wife c/o pt's av graft for hemodialysis in the left arm is not working at the dialysis center in [facility]. Pt stated the last full dialysis was 4 days ago. Pt is alert and oriented x 3 denies headache, recent hx of trauma, dizziness, blurred visions, light sensitivities, focal/distal weakness or numbness, cough, sob, chest pain, nausea, vomiting, fever, chills, abd pain. Pt sts has been on hemodialysis since Jan of this year.        Hospital course:    Pt's graft was repaired by IR 10/30/19.      Sutures in place; will need to be removed by his surgeon Dr. Viera.    Pt underwent HD yesterday.  I spoke with patient's nephrologist Dr. Cruz; pt is to undergo HD tomorrow (Friday Nov 1).    Pt currently stable for DC to home.

## 2019-10-31 NOTE — DISCHARGE NOTE PROVIDER - NSDCMRMEDTOKEN_GEN_ALL_CORE_FT
aspirin 81 mg oral delayed release tablet: 1 tab(s) orally once a day  atorvastatin 40 mg oral tablet: 1 tab(s) orally once a day (at bedtime)  calcium acetate 667 mg oral tablet: 1 tab(s) orally 3 times a day  docusate sodium 100 mg oral tablet: 1 tab(s) orally , As Needed  furosemide 80 mg oral tablet: 1 tab(s) orally 2 times a day  hydrALAZINE 25 mg oral tablet: 1 tab(s) orally 2 times a day  isosorbide dinitrate 10 mg oral tablet: 1 tab(s) orally once a day in morning  labetalol 300 mg oral tablet: 1 tab(s) orally 3 times a day  sevelamer hydrochloride 800 mg oral tablet: 1 tab(s) orally 3 times a day

## 2019-11-01 ENCOUNTER — APPOINTMENT (OUTPATIENT)
Dept: VASCULAR SURGERY | Facility: CLINIC | Age: 69
End: 2019-11-01

## 2019-11-04 DIAGNOSIS — E11.22 TYPE 2 DIABETES MELLITUS WITH DIABETIC CHRONIC KIDNEY DISEASE: ICD-10-CM

## 2019-11-04 DIAGNOSIS — Z99.2 DEPENDENCE ON RENAL DIALYSIS: ICD-10-CM

## 2019-11-04 DIAGNOSIS — T82.41XA BREAKDOWN (MECHANICAL) OF VASCULAR DIALYSIS CATHETER, INITIAL ENCOUNTER: ICD-10-CM
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
## 2019-11-04 DIAGNOSIS — I50.9 HEART FAILURE, UNSPECIFIED: ICD-10-CM

## 2019-11-04 DIAGNOSIS — T82.510A BREAKDOWN (MECHANICAL) OF SURGICALLY CREATED ARTERIOVENOUS FISTULA, INITIAL ENCOUNTER: ICD-10-CM

## 2019-11-04 DIAGNOSIS — Z95.2 PRESENCE OF PROSTHETIC HEART VALVE: ICD-10-CM

## 2019-11-04 DIAGNOSIS — I13.2 HYPERTENSIVE HEART AND CHRONIC KIDNEY DISEASE WITH HEART FAILURE AND WITH STAGE 5 CHRONIC KIDNEY DISEASE, OR END STAGE RENAL DISEASE: ICD-10-CM

## 2019-11-04 DIAGNOSIS — D63.1 ANEMIA IN CHRONIC KIDNEY DISEASE: ICD-10-CM

## 2019-11-04 DIAGNOSIS — Z95.1 PRESENCE OF AORTOCORONARY BYPASS GRAFT: ICD-10-CM

## 2019-11-04 DIAGNOSIS — E87.5 HYPERKALEMIA: ICD-10-CM

## 2019-11-04 DIAGNOSIS — Z88.8 ALLERGY STATUS TO OTHER DRUGS, MEDICAMENTS AND BIOLOGICAL SUBSTANCES STATUS: ICD-10-CM

## 2019-11-04 DIAGNOSIS — N18.6 END STAGE RENAL DISEASE: ICD-10-CM

## 2019-11-04 DIAGNOSIS — Z79.82 LONG TERM (CURRENT) USE OF ASPIRIN: ICD-10-CM

## 2019-11-04 DIAGNOSIS — I16.0 HYPERTENSIVE URGENCY: ICD-10-CM
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
## 2019-11-04 DIAGNOSIS — E78.5 HYPERLIPIDEMIA, UNSPECIFIED: ICD-10-CM

## 2019-11-27 PROBLEM — Z28.21 REFUSED INFLUENZA VACCINE: Status: ACTIVE | Noted: 2018-09-01

## 2020-01-07 ENCOUNTER — APPOINTMENT (OUTPATIENT)
Dept: VASCULAR SURGERY | Facility: CLINIC | Age: 70
End: 2020-01-07

## 2020-01-16 ENCOUNTER — INPATIENT (INPATIENT)
Facility: HOSPITAL | Age: 70
LOS: 3 days | Discharge: HOME | End: 2020-01-20
Attending: INTERNAL MEDICINE | Admitting: INTERNAL MEDICINE
Payer: MEDICARE

## 2020-01-16 VITALS
HEART RATE: 81 BPM | TEMPERATURE: 98 F | OXYGEN SATURATION: 98 % | RESPIRATION RATE: 18 BRPM | SYSTOLIC BLOOD PRESSURE: 139 MMHG | DIASTOLIC BLOOD PRESSURE: 63 MMHG

## 2020-01-16 DIAGNOSIS — E78.5 HYPERLIPIDEMIA, UNSPECIFIED: ICD-10-CM

## 2020-01-16 DIAGNOSIS — Z95.1 PRESENCE OF AORTOCORONARY BYPASS GRAFT: Chronic | ICD-10-CM

## 2020-01-16 DIAGNOSIS — I10 ESSENTIAL (PRIMARY) HYPERTENSION: ICD-10-CM

## 2020-01-16 DIAGNOSIS — R11.2 NAUSEA WITH VOMITING, UNSPECIFIED: ICD-10-CM

## 2020-01-16 DIAGNOSIS — D50.0 IRON DEFICIENCY ANEMIA SECONDARY TO BLOOD LOSS (CHRONIC): ICD-10-CM

## 2020-01-16 DIAGNOSIS — N18.6 END STAGE RENAL DISEASE: ICD-10-CM

## 2020-01-16 DIAGNOSIS — E11.22 TYPE 2 DIABETES MELLITUS WITH DIABETIC CHRONIC KIDNEY DISEASE: ICD-10-CM

## 2020-01-16 DIAGNOSIS — N18.9 CHRONIC KIDNEY DISEASE, UNSPECIFIED: ICD-10-CM

## 2020-01-16 DIAGNOSIS — E87.5 HYPERKALEMIA: ICD-10-CM

## 2020-01-16 LAB
ABO RH CONFIRMATION: SIGNIFICANT CHANGE UP
ALBUMIN SERPL ELPH-MCNC: 2.6 G/DL — LOW (ref 3.3–5)
ALP SERPL-CCNC: 100 U/L — SIGNIFICANT CHANGE UP (ref 40–120)
ALT FLD-CCNC: 15 U/L — SIGNIFICANT CHANGE UP (ref 12–78)
ANION GAP SERPL CALC-SCNC: 15 MMOL/L — SIGNIFICANT CHANGE UP (ref 5–17)
APTT BLD: 27.2 SEC — LOW (ref 28.5–37)
AST SERPL-CCNC: 18 U/L — SIGNIFICANT CHANGE UP (ref 15–37)
BASE EXCESS BLDA CALC-SCNC: 7.1 MMOL/L — HIGH (ref -2–2)
BASOPHILS # BLD AUTO: 0.02 K/UL — SIGNIFICANT CHANGE UP (ref 0–0.2)
BASOPHILS NFR BLD AUTO: 0.2 % — SIGNIFICANT CHANGE UP (ref 0–2)
BILIRUB SERPL-MCNC: 0.3 MG/DL — SIGNIFICANT CHANGE UP (ref 0.2–1.2)
BLD GP AB SCN SERPL QL: SIGNIFICANT CHANGE UP
BLOOD GAS COMMENTS: SIGNIFICANT CHANGE UP
BLOOD GAS COMMENTS: SIGNIFICANT CHANGE UP
BLOOD GAS SOURCE: SIGNIFICANT CHANGE UP
BUN SERPL-MCNC: 145 MG/DL — HIGH (ref 7–23)
CALCIUM SERPL-MCNC: 7.9 MG/DL — LOW (ref 8.5–10.1)
CHLORIDE SERPL-SCNC: 92 MMOL/L — LOW (ref 96–108)
CK MB CFR SERPL CALC: 5.5 NG/ML — HIGH (ref 0.5–3.6)
CO2 SERPL-SCNC: 33 MMOL/L — HIGH (ref 22–31)
CREAT SERPL-MCNC: 9.34 MG/DL — HIGH (ref 0.5–1.3)
EOSINOPHIL # BLD AUTO: 0.04 K/UL — SIGNIFICANT CHANGE UP (ref 0–0.5)
EOSINOPHIL NFR BLD AUTO: 0.4 % — SIGNIFICANT CHANGE UP (ref 0–6)
GLUCOSE BLDC GLUCOMTR-MCNC: 122 MG/DL — HIGH (ref 70–99)
GLUCOSE SERPL-MCNC: 201 MG/DL — HIGH (ref 70–99)
HCO3 BLDA-SCNC: 31 MMOL/L — HIGH (ref 21–29)
HCT VFR BLD CALC: 17 % — CRITICAL LOW (ref 39–50)
HGB BLD-MCNC: 5.4 G/DL — CRITICAL LOW (ref 13–17)
HOROWITZ INDEX BLDA+IHG-RTO: 21 — SIGNIFICANT CHANGE UP
IMM GRANULOCYTES NFR BLD AUTO: 0.4 % — SIGNIFICANT CHANGE UP (ref 0–1.5)
INR BLD: 1.08 RATIO — SIGNIFICANT CHANGE UP (ref 0.88–1.16)
LACTATE SERPL-SCNC: 1.9 MMOL/L — SIGNIFICANT CHANGE UP (ref 0.7–2)
LIDOCAIN IGE QN: 162 U/L — SIGNIFICANT CHANGE UP (ref 73–393)
LYMPHOCYTES # BLD AUTO: 0.62 K/UL — LOW (ref 1–3.3)
LYMPHOCYTES # BLD AUTO: 6 % — LOW (ref 13–44)
MAGNESIUM SERPL-MCNC: 2.5 MG/DL — SIGNIFICANT CHANGE UP (ref 1.6–2.6)
MCHC RBC-ENTMCNC: 29.7 PG — SIGNIFICANT CHANGE UP (ref 27–34)
MCHC RBC-ENTMCNC: 31.8 GM/DL — LOW (ref 32–36)
MCV RBC AUTO: 93.4 FL — SIGNIFICANT CHANGE UP (ref 80–100)
MONOCYTES # BLD AUTO: 0.79 K/UL — SIGNIFICANT CHANGE UP (ref 0–0.9)
MONOCYTES NFR BLD AUTO: 7.6 % — SIGNIFICANT CHANGE UP (ref 2–14)
NEUTROPHILS # BLD AUTO: 8.88 K/UL — HIGH (ref 1.8–7.4)
NEUTROPHILS NFR BLD AUTO: 85.4 % — HIGH (ref 43–77)
NRBC # BLD: 0 /100 WBCS — SIGNIFICANT CHANGE UP (ref 0–0)
OB PNL STL: NEGATIVE — SIGNIFICANT CHANGE UP
PCO2 BLDA: 47 MMHG — HIGH (ref 32–46)
PH BLD: 7.44 — SIGNIFICANT CHANGE UP (ref 7.35–7.45)
PLATELET # BLD AUTO: 183 K/UL — SIGNIFICANT CHANGE UP (ref 150–400)
PO2 BLDA: 68 MMHG — LOW (ref 74–108)
POTASSIUM SERPL-MCNC: 5.7 MMOL/L — HIGH (ref 3.5–5.3)
POTASSIUM SERPL-SCNC: 5.7 MMOL/L — HIGH (ref 3.5–5.3)
PROT SERPL-MCNC: 5.8 GM/DL — LOW (ref 6–8.3)
PROTHROM AB SERPL-ACNC: 12.1 SEC — SIGNIFICANT CHANGE UP (ref 10–12.9)
RBC # BLD: 1.82 M/UL — LOW (ref 4.2–5.8)
RBC # FLD: 19.3 % — HIGH (ref 10.3–14.5)
SAO2 % BLDA: 92 % — SIGNIFICANT CHANGE UP (ref 92–96)
SODIUM SERPL-SCNC: 140 MMOL/L — SIGNIFICANT CHANGE UP (ref 135–145)
TROPONIN I SERPL-MCNC: 0.07 NG/ML — HIGH (ref 0.01–0.04)
WBC # BLD: 10.39 K/UL — SIGNIFICANT CHANGE UP (ref 3.8–10.5)
WBC # FLD AUTO: 10.39 K/UL — SIGNIFICANT CHANGE UP (ref 3.8–10.5)

## 2020-01-16 PROCEDURE — 93010 ELECTROCARDIOGRAM REPORT: CPT

## 2020-01-16 PROCEDURE — 99223 1ST HOSP IP/OBS HIGH 75: CPT

## 2020-01-16 PROCEDURE — 99291 CRITICAL CARE FIRST HOUR: CPT

## 2020-01-16 PROCEDURE — 74176 CT ABD & PELVIS W/O CONTRAST: CPT | Mod: 26

## 2020-01-16 PROCEDURE — 71045 X-RAY EXAM CHEST 1 VIEW: CPT | Mod: 26

## 2020-01-16 RX ORDER — SODIUM CHLORIDE 9 MG/ML
1000 INJECTION, SOLUTION INTRAVENOUS
Refills: 0 | Status: DISCONTINUED | OUTPATIENT
Start: 2020-01-16 | End: 2020-01-20

## 2020-01-16 RX ORDER — DEXTROSE 50 % IN WATER 50 %
25 SYRINGE (ML) INTRAVENOUS ONCE
Refills: 0 | Status: DISCONTINUED | OUTPATIENT
Start: 2020-01-16 | End: 2020-01-20

## 2020-01-16 RX ORDER — DEXTROSE 50 % IN WATER 50 %
15 SYRINGE (ML) INTRAVENOUS ONCE
Refills: 0 | Status: DISCONTINUED | OUTPATIENT
Start: 2020-01-16 | End: 2020-01-20

## 2020-01-16 RX ORDER — PANTOPRAZOLE SODIUM 20 MG/1
40 TABLET, DELAYED RELEASE ORAL EVERY 12 HOURS
Refills: 0 | Status: DISCONTINUED | OUTPATIENT
Start: 2020-01-16 | End: 2020-01-20

## 2020-01-16 RX ORDER — PANTOPRAZOLE SODIUM 20 MG/1
40 TABLET, DELAYED RELEASE ORAL ONCE
Refills: 0 | Status: COMPLETED | OUTPATIENT
Start: 2020-01-16 | End: 2020-01-16

## 2020-01-16 RX ORDER — GLUCAGON INJECTION, SOLUTION 0.5 MG/.1ML
1 INJECTION, SOLUTION SUBCUTANEOUS ONCE
Refills: 0 | Status: DISCONTINUED | OUTPATIENT
Start: 2020-01-16 | End: 2020-01-20

## 2020-01-16 RX ORDER — LABETALOL HCL 100 MG
300 TABLET ORAL THREE TIMES A DAY
Refills: 0 | Status: DISCONTINUED | OUTPATIENT
Start: 2020-01-16 | End: 2020-01-20

## 2020-01-16 RX ORDER — DEXTROSE 50 % IN WATER 50 %
12.5 SYRINGE (ML) INTRAVENOUS ONCE
Refills: 0 | Status: DISCONTINUED | OUTPATIENT
Start: 2020-01-16 | End: 2020-01-20

## 2020-01-16 RX ORDER — HYDRALAZINE HCL 50 MG
50 TABLET ORAL
Refills: 0 | Status: DISCONTINUED | OUTPATIENT
Start: 2020-01-16 | End: 2020-01-20

## 2020-01-16 RX ORDER — ATORVASTATIN CALCIUM 80 MG/1
40 TABLET, FILM COATED ORAL AT BEDTIME
Refills: 0 | Status: DISCONTINUED | OUTPATIENT
Start: 2020-01-16 | End: 2020-01-20

## 2020-01-16 RX ORDER — SEVELAMER CARBONATE 2400 MG/1
800 POWDER, FOR SUSPENSION ORAL EVERY 8 HOURS
Refills: 0 | Status: DISCONTINUED | OUTPATIENT
Start: 2020-01-16 | End: 2020-01-20

## 2020-01-16 RX ORDER — ONDANSETRON 8 MG/1
4 TABLET, FILM COATED ORAL ONCE
Refills: 0 | Status: COMPLETED | OUTPATIENT
Start: 2020-01-16 | End: 2020-01-16

## 2020-01-16 RX ORDER — SEVELAMER CARBONATE 2400 MG/1
1 POWDER, FOR SUSPENSION ORAL
Qty: 0 | Refills: 0 | DISCHARGE

## 2020-01-16 RX ORDER — ERYTHROPOIETIN 10000 [IU]/ML
20000 INJECTION, SOLUTION INTRAVENOUS; SUBCUTANEOUS
Refills: 0 | Status: DISCONTINUED | OUTPATIENT
Start: 2020-01-16 | End: 2020-01-20

## 2020-01-16 RX ORDER — FUROSEMIDE 40 MG
80 TABLET ORAL
Refills: 0 | Status: DISCONTINUED | OUTPATIENT
Start: 2020-01-16 | End: 2020-01-20

## 2020-01-16 RX ORDER — CALCIUM GLUCONATE 100 MG/ML
2 VIAL (ML) INTRAVENOUS ONCE
Refills: 0 | Status: DISCONTINUED | OUTPATIENT
Start: 2020-01-16 | End: 2020-01-16

## 2020-01-16 RX ORDER — ONDANSETRON 8 MG/1
4 TABLET, FILM COATED ORAL EVERY 6 HOURS
Refills: 0 | Status: DISCONTINUED | OUTPATIENT
Start: 2020-01-16 | End: 2020-01-20

## 2020-01-16 RX ADMIN — ONDANSETRON 4 MILLIGRAM(S): 8 TABLET, FILM COATED ORAL at 15:22

## 2020-01-16 RX ADMIN — ERYTHROPOIETIN 20000 UNIT(S): 10000 INJECTION, SOLUTION INTRAVENOUS; SUBCUTANEOUS at 21:58

## 2020-01-16 RX ADMIN — PANTOPRAZOLE SODIUM 40 MILLIGRAM(S): 20 TABLET, DELAYED RELEASE ORAL at 16:06

## 2020-01-16 NOTE — ED PROVIDER NOTE - PHYSICAL EXAMINATION
Gen: Alert, NAD, PT is ill appearing and weak.  Head: NC, AT   Eyes: PERRL, EOMI, normal lids/conjunctiva  ENT: normal hearing, patent oropharynx without erythema/exudate, uvula midline, fetid breath.  Neck: supple, no tenderness, Trachea midline  Pulm: Bilateral BS, normal resp effort, no wheeze/stridor/retractions, lungs clear to auscultation.  CV: RRR, no M/R/G, 2+ radial and dp pulses bl, no edema  Abd: soft, NT/ND, +BS, no hepatosplenomegaly  Mskel: extremities x4 with normal ROM and no joint effusions. no ctl spine ttp, + Left upper extremity fistula.   Skin: no rash, no bruising, skin is pale and dry, healed sternotomy scar on mid chest.  Neuro: AAOx3, no sensory/motor deficits, CN 2-12 intact

## 2020-01-16 NOTE — H&P ADULT - ASSESSMENT
· 69 year old female PMH of HTN, HLD, CKD on hemodialysis, anemia, CHF, and DM presents BIB EMS c/o vomiting brown liquid since Tuesday with latest episode today while eating. Patient missed his dialysis session on Tuesday but was able to get full session yesterday. Patient reports nausea, generalized weakness, and dizziness. Pt also reports having passed out while eating today at home. Pt denies any general pain, or trouble breathing. Dialysis recommended to send him to ED. Pt is allergic to Lisinopril, and his blood sugar level at home was 300.	    IMPROVE VTE Individual Risk Assessment          RISK                                                          Points    [  ] Previous VTE                                                3    [  ] Thrombophilia                                             2    [  ] Lower limb paralysis                                    2        (unable to hold up >15 seconds)      [  ] Current Cancer                                             2         (within 6 months)    [  ] Immobilization > 24 hrs                              1    [  ] ICU/CCU stay > 24 hours                            1    [x  ] Age > 60                                                    1    IMPROVE VTE Score ________1_    Low risk 0-1: [  ] Early ambulation                          [  ] Intermittent Compression Device    High Risk 2-12: [  ] Heparin 5,000 units SC Q8 H                              [  ] Heparin 5,000 units SC Q 12 H                              [  ] LMWH 40 mg SC daily (CrCl > 30 ml)

## 2020-01-16 NOTE — ED ADULT TRIAGE NOTE - CHIEF COMPLAINT QUOTE
pt complaining of vomiting, cough and weakness times 2 days. History of ESRD on HD. states he missed dialysis on Tuesday was dialyzed yesterday instead. denies abdominal pain, headache fever or chills.

## 2020-01-16 NOTE — H&P ADULT - PROBLEM SELECTOR PROBLEM 6
Type 2 diabetes mellitus with chronic kidney disease on chronic dialysis, unspecified whether long term insulin use

## 2020-01-16 NOTE — H&P ADULT - NSICDXPASTMEDICALHX_GEN_ALL_CORE_FT
PAST MEDICAL HISTORY:  Anemia     Aortic valve replaced     CHF (congestive heart failure)     CKD (chronic kidney disease) On hemodialysis    Diabetes mellitus     HLD (hyperlipidemia)     HTN (hypertension)     Hypertension

## 2020-01-16 NOTE — CONSULT NOTE ADULT - SUBJECTIVE AND OBJECTIVE BOX
full consult dictated    Plan:  Pt admitted with black tarry stools x 2 days.  Hgb 5. Pt had been on ASA 81mg. Pt being admitted. Transfuse 2u prbc's; iv PROTONIX; LABS IN am; CONSENT FOR egd; CLEAR LIQUIDS; npo AFTER mn

## 2020-01-16 NOTE — H&P ADULT - HISTORY OF PRESENT ILLNESS
· 69 year old female PMH of HTN, HLD, CKD on hemodialysis, anemia, CHF, and DM presents BIB EMS c/o vomiting brown liquid since Tuesday with latest episode today while eating. Patient missed his dialysis session on Tuesday but was able to get full session yesterday. Patient reports nausea, generalized weakness, and dizziness. Pt also reports having passed out while eating today at home. Pt denies any general pain, or trouble breathing. Dialysis recommended to send him to ED. Pt is allergic to Lisinopril, and his blood sugar level at home was 300.

## 2020-01-16 NOTE — ED ADULT NURSE REASSESSMENT NOTE - NS ED NURSE REASSESS COMMENT FT1
Patient was observed with dark red foul smelling blood from his rectum, DR. Barrera at bedside is aware, rectal exam done and sample for occult stool obtained. patient tolerated well, hygienic care given.

## 2020-01-16 NOTE — ED PROVIDER NOTE - OBJECTIVE STATEMENT
68 y/o F pmhx of HTN, HLD, CKD, anemia, CHF, and DM presents BIB EMS vomiting since Tuesday with latest episode today while eating after missing his dialysis session that day but was able to get full session yesterday. Pt reports nausea, generalized weakness, and dizziness. Pt also reports having passed out while eating today at home. Pt denies any general pain, or trouble breathing. Dialysis wanted to send him to ED. Pt is allergic to Lisinopril, and his blood sugar level at home was 300.

## 2020-01-16 NOTE — ED ADULT NURSE REASSESSMENT NOTE - NS ED NURSE REASSESS COMMENT FT1
Patient hand off to CORBIN Batista in dialysis, patient transported safely with the first unit of pack cells infusing. No adverse reactions noted or reported by patient.

## 2020-01-16 NOTE — ED PROVIDER NOTE - CLINICAL SUMMARY MEDICAL DECISION MAKING FREE TEXT BOX
nausea, vomiting and renal failure. Rule out DKA, rule out intra-abdominal infection, rule out sepsis, rule out potasium derangement, page renal, anticipate admission. nausea, vomiting and renal failure. Rule out DKA, rule out intra-abdominal infection, rule out sepsis, rule out potasium derangement, page renal, anticipate admission. I read ekg as nsr rate 78, rbbb and lafb, LVH with associated anterior st depression, qtc 528, left axis, no st elevation or depression. nausea, vomiting and renal failure. Rule out DKA, rule out intra-abdominal infection, rule out sepsis, rule out potasium derangement, page renal, anticipate admission. I read ekg as nsr rate 78, rbbb and lafb, LVH with associated anterior st depression, qtc 528, left axis, no st elevation or depression.  stool is dark and tarry despite negative hemoccult. case dw dr branch and dr godinez. admit for blood transfusion.

## 2020-01-16 NOTE — CONSULT NOTE ADULT - SUBJECTIVE AND OBJECTIVE BOX
[facility] Consult Note Nephrology - CONSULTATION NOTE     70 y/o F pmhx of HTN, HLD, CKD, anemia, CHF, and DM presents BIB EMS vomiting since Tuesday with latest episode today while eating after missing his dialysis session that day but was able to get full session yesterday. Pt reports nausea, generalized weakness, and dizziness. Pt also reports having passed out while eating today at home. Pt denies any general pain, or trouble breathing. Dialysis wanted to send him to ED. Pt is allergic to Lisinopril, and his blood sugar level at home was 300      Renal consult for esrd on HD- routine hd sessions are on TTS- pt had an event on tuesday and last HD was yesterday-. PT has been having vomiting and black stools--> IN ed hgb of 5 and k of 5.7  denies any n/v/d/c/v/sob  awaiting prbcs    PAST MEDICAL & SURGICAL HISTORY:  HLD (hyperlipidemia)  HTN (hypertension)  Aortic valve replaced  CKD (chronic kidney disease)  Anemia  CHF (congestive heart failure)  Diabetes mellitus  Hypertension  S/P CABG (coronary artery bypass graft)  S/P appendectomy    lisinopril (Anaphylaxis)    Home Medications Reviewed  Hospital Medications:   MEDICATIONS  (STANDING):    SOCIAL HISTORY:  Denies ETOh,Smoking,   FAMILY HISTORY:  FH: type 2 diabetes: brother    REVIEW OF SYSTEMS:  CONSTITUTIONAL: No weakness, fevers or chills  EYES/ENT: No visual changes;  No vertigo or throat pain   NECK: No pain or stiffness  RESPIRATORY: No cough, wheezing, hemoptysis; No shortness of breath  CARDIOVASCULAR: No chest pain or palpitations.  GASTROINTESTINAL: +melena  GENITOURINARY: No dysuria, frequency, foamy urine, urinary urgency, incontinence or hematuria  NEUROLOGICAL: No numbness or weakness  SKIN: No itching, burning, rashes, or lesions   VASCULAR: No bilateral lower extremity edema.   All other review of systems is negative unless indicated above.    VITALS:  T(F): 98.2 (01-16-20 @ 14:10), Max: 98.2 (01-16-20 @ 14:10)  HR: 79 (01-16-20 @ 14:54)  BP: 132/55 (01-16-20 @ 14:54)  RR: 21 (01-16-20 @ 14:54)  SpO2: 97% (01-16-20 @ 14:54)  Wt(kg): --      PHYSICAL EXAM:  Constitutional: NAD  HEENT: anicteric sclera, oropharynx clear, MMM  Neck: No JVD  Respiratory: CTAB, no wheezes, rales or rhonchi  Cardiovascular: S1, S2, RRR  Gastrointestinal: BS+, soft, NT/ND  Extremities: No cyanosis or clubbing. No peripheral edema  Neurological: A/O x 3, no focal deficits  Psychiatric: Normal mood, normal affect  : No CVA tenderness. No bee.   Skin: No rashes  Vascular Access:right forearm avg     LABS:  01-16    140  |  92<L>  |  145<H>  ----------------------------<  201<H>  5.7<H>   |  33<H>  |  9.34<H>    Ca    7.9<L>      16 Jan 2020 14:51  Mg     2.5     01-16    TPro  5.8<L>  /  Alb  2.6<L>  /  TBili  0.3  /  DBili      /  AST  18  /  ALT  15  /  AlkPhos  100  01-16    Creatinine Trend: 9.34 <--                        5.4    10.39 )-----------( 183      ( 16 Jan 2020 14:51 )             17.0     Urine Studies:      RADIOLOGY & ADDITIONAL STUDIES:

## 2020-01-16 NOTE — ED PROVIDER NOTE - CARE PLAN
Principal Discharge DX:	Iron deficiency anemia due to chronic blood loss  Secondary Diagnosis:	Non-intractable vomiting with nausea

## 2020-01-16 NOTE — H&P ADULT - NSHPSOCIALHISTORY_GEN_ALL_CORE
Patient lives with wife. Patient lives with wife.  Patient denies use of tobacco, alcohol or recreational drugs.

## 2020-01-16 NOTE — CONSULT NOTE ADULT - ASSESSMENT
68 y/o F pmhx of HTN, HLD, CKD, anemia, CHF, and DM presents BIB EMS vomiting  with melena found to have hgb of 5 and k of 5.7

## 2020-01-16 NOTE — H&P ADULT - NSHPPHYSICALEXAM_GEN_ALL_CORE
Vital Signs Last 24 Hrs  T(C): 36.7 (16 Jan 2020 17:30), Max: 36.8 (16 Jan 2020 14:10)  T(F): 98 (16 Jan 2020 17:30), Max: 98.2 (16 Jan 2020 14:10)  HR: 86 (16 Jan 2020 17:30) (79 - 86)  BP: 129/60 (16 Jan 2020 17:30) (129/60 - 139/63)  BP(mean): --  RR: 12 (16 Jan 2020 17:30) (12 - 21)  SpO2: 97% (16 Jan 2020 17:30) (97% - 98%)        GENERAL: NAD, well-developed  HEAD:  Atraumatic, Normocephalic  EYES: EOMI, PERRLA, conjunctiva and sclera clear  ENMT: No tonsillar erythema, exudates, or enlargement; Moist mucous membranes, No lesions  NECK: Supple, No JVD, Normal thyroid  NERVOUS SYSTEM:  Alert & Oriented X name and place, Motor Strength 5/5 B/L upper and lower extremities; DTRs 2+ intact and symmetric  CHEST/LUNG: Clear to percussion bilaterally; No rales, rhonchi, wheezing, or rubs  HEART: Regular rate and rhythm; + murmurs, no rubs, or gallops  ABDOMEN: Soft, Nontender, Nondistended; Bowel sounds present  EXTREMITIES:  Left arm AV graft/fistula, 2+ Peripheral Pulses, No clubbing, cyanosis, or edema  LYMPH: No lymphadenopathy noted  SKIN: No rashes or lesions

## 2020-01-16 NOTE — H&P ADULT - NSHPLABSRESULTS_GEN_ALL_CORE
5.4    10.39 )-----------( 183      ( 16 Jan 2020 14:51 )             17.0     01-16    140  |  92<L>  |  145<H>  ----------------------------<  201<H>  5.7<H>   |  33<H>  |  9.34<H>    Ca    7.9<L>      16 Jan 2020 14:51  Mg     2.5     01-16    TPro  5.8<L>  /  Alb  2.6<L>  /  TBili  0.3  /  DBili  x   /  AST  18  /  ALT  15  /  AlkPhos  100  01-16    PT/INR - ( 16 Jan 2020 14:51 )   PT: 12.1 sec;   INR: 1.08 ratio         PTT - ( 16 Jan 2020 14:51 )  PTT:27.2 sec    CAPILLARY BLOOD GLUCOSE    CARDIAC MARKERS ( 16 Jan 2020 14:51 )  .069 ng/mL / x     / x     / x     / 5.5 ng/mL    < from: CT Abdomen and Pelvis No Cont (01.16.20 @ 15:41) >    FINDINGS:    LOWER CHEST: Cardiomegaly with cardiac valvular coronary artery calcination. Decrease cardiac chamber blood pool attenuation consistent with an anemic state. Dense chronic appearing loculated left pleural effusion with calcification consistent with fibrothorax. Bilateral gynecomastia.    LIVER: Within normal limits.  BILE DUCTS: Normal caliber.  GALLBLADDER: Within normal limits.  SPLEEN: Within normal limits.  PANCREAS: Within normal limits.  ADRENALS: Within normal limits.  KIDNEYS/URETERS: No hydronephrosis or urolithiasis. Right renal cyst.    BLADDER: Not adequately distended.  REPRODUCTIVE ORGANS: Prostate normal for age    BOWEL: No bowel obstruction. Appendix normal  PERITONEUM: No ascites.  VESSELS: Markedly atherosclerotic, nonaneurysmal  RETROPERITONEUM/LYMPH NODES: No lymphadenopathy.    ABDOMINAL WALL: Fat-containing umbilical hernia. Subcentimeter subcutaneous soft tissue nodule adjacent to the umbilicus  BONES: Degenerative changes. Nonspecific L3 sclerotic focus.    IMPRESSION:     No acute inflammatory or obstructive pathology.  Findings as discussed    < end of copied text >

## 2020-01-17 ENCOUNTER — RESULT REVIEW (OUTPATIENT)
Age: 70
End: 2020-01-17

## 2020-01-17 DIAGNOSIS — K26.9 DUODENAL ULCER, UNSPECIFIED AS ACUTE OR CHRONIC, WITHOUT HEMORRHAGE OR PERFORATION: ICD-10-CM

## 2020-01-17 LAB
ANION GAP SERPL CALC-SCNC: 8 MMOL/L — SIGNIFICANT CHANGE UP (ref 5–17)
BUN SERPL-MCNC: 59 MG/DL — HIGH (ref 7–23)
CALCIUM SERPL-MCNC: 8 MG/DL — LOW (ref 8.5–10.1)
CHLORIDE SERPL-SCNC: 102 MMOL/L — SIGNIFICANT CHANGE UP (ref 96–108)
CO2 SERPL-SCNC: 31 MMOL/L — SIGNIFICANT CHANGE UP (ref 22–31)
CREAT SERPL-MCNC: 5.25 MG/DL — HIGH (ref 0.5–1.3)
GLUCOSE BLDC GLUCOMTR-MCNC: 135 MG/DL — HIGH (ref 70–99)
GLUCOSE BLDC GLUCOMTR-MCNC: 154 MG/DL — HIGH (ref 70–99)
GLUCOSE BLDC GLUCOMTR-MCNC: 160 MG/DL — HIGH (ref 70–99)
GLUCOSE BLDC GLUCOMTR-MCNC: 211 MG/DL — HIGH (ref 70–99)
GLUCOSE SERPL-MCNC: 160 MG/DL — HIGH (ref 70–99)
HBA1C BLD-MCNC: 5.9 % — HIGH (ref 4–5.6)
HCT VFR BLD CALC: 23.4 % — LOW (ref 39–50)
HGB BLD-MCNC: 7.7 G/DL — LOW (ref 13–17)
MCHC RBC-ENTMCNC: 29.8 PG — SIGNIFICANT CHANGE UP (ref 27–34)
MCHC RBC-ENTMCNC: 32.9 GM/DL — SIGNIFICANT CHANGE UP (ref 32–36)
MCV RBC AUTO: 90.7 FL — SIGNIFICANT CHANGE UP (ref 80–100)
NRBC # BLD: 0 /100 WBCS — SIGNIFICANT CHANGE UP (ref 0–0)
PLATELET # BLD AUTO: 155 K/UL — SIGNIFICANT CHANGE UP (ref 150–400)
POTASSIUM SERPL-MCNC: 4.8 MMOL/L — SIGNIFICANT CHANGE UP (ref 3.5–5.3)
POTASSIUM SERPL-SCNC: 4.8 MMOL/L — SIGNIFICANT CHANGE UP (ref 3.5–5.3)
RBC # BLD: 2.58 M/UL — LOW (ref 4.2–5.8)
RBC # FLD: 17.6 % — HIGH (ref 10.3–14.5)
SODIUM SERPL-SCNC: 141 MMOL/L — SIGNIFICANT CHANGE UP (ref 135–145)
WBC # BLD: 11.21 K/UL — HIGH (ref 3.8–10.5)
WBC # FLD AUTO: 11.21 K/UL — HIGH (ref 3.8–10.5)

## 2020-01-17 PROCEDURE — 88312 SPECIAL STAINS GROUP 1: CPT | Mod: 26

## 2020-01-17 PROCEDURE — 88305 TISSUE EXAM BY PATHOLOGIST: CPT | Mod: 26

## 2020-01-17 PROCEDURE — 99233 SBSQ HOSP IP/OBS HIGH 50: CPT

## 2020-01-17 RX ORDER — SUCRALFATE 1 G
1 TABLET ORAL
Refills: 0 | Status: DISCONTINUED | OUTPATIENT
Start: 2020-01-17 | End: 2020-01-20

## 2020-01-17 RX ORDER — SODIUM CHLORIDE 9 MG/ML
1000 INJECTION INTRAMUSCULAR; INTRAVENOUS; SUBCUTANEOUS
Refills: 0 | Status: DISCONTINUED | OUTPATIENT
Start: 2020-01-17 | End: 2020-01-17

## 2020-01-17 RX ORDER — INSULIN LISPRO 100/ML
VIAL (ML) SUBCUTANEOUS
Refills: 0 | Status: DISCONTINUED | OUTPATIENT
Start: 2020-01-17 | End: 2020-01-20

## 2020-01-17 RX ADMIN — Medication 300 MILLIGRAM(S): at 05:33

## 2020-01-17 RX ADMIN — SEVELAMER CARBONATE 800 MILLIGRAM(S): 2400 POWDER, FOR SUSPENSION ORAL at 05:33

## 2020-01-17 RX ADMIN — Medication 300 MILLIGRAM(S): at 21:55

## 2020-01-17 RX ADMIN — SEVELAMER CARBONATE 800 MILLIGRAM(S): 2400 POWDER, FOR SUSPENSION ORAL at 18:06

## 2020-01-17 RX ADMIN — Medication 1 GRAM(S): at 18:00

## 2020-01-17 RX ADMIN — Medication 50 MILLIGRAM(S): at 18:00

## 2020-01-17 RX ADMIN — Medication 80 MILLIGRAM(S): at 18:00

## 2020-01-17 RX ADMIN — Medication 1 GRAM(S): at 23:38

## 2020-01-17 RX ADMIN — Medication 2: at 18:05

## 2020-01-17 RX ADMIN — Medication 50 MILLIGRAM(S): at 05:37

## 2020-01-17 RX ADMIN — SEVELAMER CARBONATE 800 MILLIGRAM(S): 2400 POWDER, FOR SUSPENSION ORAL at 21:54

## 2020-01-17 RX ADMIN — PANTOPRAZOLE SODIUM 40 MILLIGRAM(S): 20 TABLET, DELAYED RELEASE ORAL at 05:33

## 2020-01-17 RX ADMIN — Medication 80 MILLIGRAM(S): at 05:33

## 2020-01-17 RX ADMIN — Medication 1 GRAM(S): at 12:18

## 2020-01-17 RX ADMIN — ATORVASTATIN CALCIUM 40 MILLIGRAM(S): 80 TABLET, FILM COATED ORAL at 21:55

## 2020-01-17 RX ADMIN — PANTOPRAZOLE SODIUM 40 MILLIGRAM(S): 20 TABLET, DELAYED RELEASE ORAL at 18:00

## 2020-01-17 NOTE — PROGRESS NOTE ADULT - SUBJECTIVE AND OBJECTIVE BOX
see gastroscopy reort    Imp: Acute Duodenal Ulcer (bulb)    Plan: Full fluids; follow CBC; IV protonix; carafate

## 2020-01-17 NOTE — PROGRESS NOTE ADULT - SUBJECTIVE AND OBJECTIVE BOX
Patient is a 69y old  Male who presents with a chief complaint of Vomiting/weakness (17 Jan 2020 09:10), no chest pain, no SOB.       OVERNIGHT EVENTS: none    MEDICATIONS  (STANDING):  atorvastatin 40 milliGRAM(s) Oral at bedtime  dextrose 5%. 1000 milliLiter(s) (50 mL/Hr) IV Continuous <Continuous>  dextrose 50% Injectable 12.5 Gram(s) IV Push once  dextrose 50% Injectable 25 Gram(s) IV Push once  dextrose 50% Injectable 25 Gram(s) IV Push once  epoetin theodore Injectable 10500 Unit(s) IV Push <User Schedule>  furosemide    Tablet 80 milliGRAM(s) Oral two times a day  hydrALAZINE 50 milliGRAM(s) Oral two times a day  labetalol 300 milliGRAM(s) Oral three times a day  pantoprazole  Injectable 40 milliGRAM(s) IV Push every 12 hours  sevelamer carbonate 800 milliGRAM(s) Oral every 8 hours  sucralfate 1 Gram(s) Oral four times a day    MEDICATIONS  (PRN):  dextrose 40% Gel 15 Gram(s) Oral once PRN Blood Glucose LESS THAN 70 milliGRAM(s)/deciliter  glucagon  Injectable 1 milliGRAM(s) IntraMuscular once PRN Glucose LESS THAN 70 milligrams/deciliter  ondansetron Injectable 4 milliGRAM(s) IV Push every 6 hours PRN Nausea and/or Vomiting        REVIEW OF SYSTEMS:  CONSTITUTIONAL: No fever,    EYES: No eye pain,    ENMT:  No sinus or throat pain  NECK: No pain or stiffness  RESPIRATORY: No cough,  No shortness of breath  CARDIOVASCULAR: No chest pain,    GASTROINTESTINAL: No abdominal pain.   GENITOURINARY: No dysuria,    NEUROLOGICAL: No headaches.  SKIN: No itching,       Vital Signs Last 24 Hrs  T(C): 36.3 (17 Jan 2020 09:12), Max: 36.8 (16 Jan 2020 14:10)  T(F): 97.3 (17 Jan 2020 09:12), Max: 98.2 (16 Jan 2020 14:10)  HR: 78 (17 Jan 2020 09:42) (77 - 102)  BP: 114/68 (17 Jan 2020 09:42) (100/58 - 165/60)  BP(mean): --  RR: 13 (17 Jan 2020 09:42) (12 - 21)  SpO2: 96% (17 Jan 2020 09:42) (96% - 100%)    PHYSICAL EXAM:  GENERAL: NAD, well-developed  HEAD:  Atraumatic, Normocephalic  EYES: EOMI, PERRLA, conjunctiva and sclera clear  ENMT: No tonsillar erythema, exudates, or enlargement; Moist mucous membranes   NECK: Supple, No JVD   NERVOUS SYSTEM:  Alert & Oriented , moves all ext.  CHEST/LUNG:  Good air entry bilaterally;    HEART: Regular rate and rhythm;    ABDOMEN: Soft, Nontender, Nondistended; Bowel sounds present  EXTREMITIES:  2+ Peripheral Pulses, No clubbing, cyanosis, or edema, Left arm AV graft/fistula,  LYMPH: No lymphadenopathy noted  SKIN: No petechiae.    LABS:                        7.7    11.21 )-----------( 155      ( 17 Jan 2020 07:56 )             23.4     01-17    141  |  102  |  59<H>  ----------------------------<  160<H>  4.8   |  31  |  5.25<H>    Ca    8.0<L>      17 Jan 2020 07:56  Mg     2.5     01-16    TPro  5.8<L>  /  Alb  2.6<L>  /  TBili  0.3  /  DBili  x   /  AST  18  /  ALT  15  /  AlkPhos  100  01-16    PT/INR - ( 16 Jan 2020 14:51 )   PT: 12.1 sec;   INR: 1.08 ratio         PTT - ( 16 Jan 2020 14:51 )  PTT:27.2 sec   cardiac markers Troponin .069  CK --      CAPILLARY BLOOD GLUCOSE      POCT Blood Glucose.: 154 mg/dL (17 Jan 2020 07:27)  POCT Blood Glucose.: 122 mg/dL (16 Jan 2020 23:23)    Cultures    RADIOLOGY & ADDITIONAL TESTS:    Imaging Personally Reviewed:  [ ] YES  [ ] NO    Consultant(s) Notes Reviewed:  [ x ] YES  [ ] NO    Care Discussed with Consultants/Other Providers [ ] YES  [ ] NO

## 2020-01-18 LAB
ANION GAP SERPL CALC-SCNC: 11 MMOL/L — SIGNIFICANT CHANGE UP (ref 5–17)
BUN SERPL-MCNC: 77 MG/DL — HIGH (ref 7–23)
CALCIUM SERPL-MCNC: 7.3 MG/DL — LOW (ref 8.5–10.1)
CHLORIDE SERPL-SCNC: 99 MMOL/L — SIGNIFICANT CHANGE UP (ref 96–108)
CO2 SERPL-SCNC: 27 MMOL/L — SIGNIFICANT CHANGE UP (ref 22–31)
CREAT SERPL-MCNC: 8.26 MG/DL — HIGH (ref 0.5–1.3)
GLUCOSE BLDC GLUCOMTR-MCNC: 104 MG/DL — HIGH (ref 70–99)
GLUCOSE BLDC GLUCOMTR-MCNC: 148 MG/DL — HIGH (ref 70–99)
GLUCOSE BLDC GLUCOMTR-MCNC: 163 MG/DL — HIGH (ref 70–99)
GLUCOSE BLDC GLUCOMTR-MCNC: 187 MG/DL — HIGH (ref 70–99)
GLUCOSE SERPL-MCNC: 178 MG/DL — HIGH (ref 70–99)
HCT VFR BLD CALC: 21.7 % — LOW (ref 39–50)
HGB BLD-MCNC: 7 G/DL — CRITICAL LOW (ref 13–17)
MCHC RBC-ENTMCNC: 29.9 PG — SIGNIFICANT CHANGE UP (ref 27–34)
MCHC RBC-ENTMCNC: 32.3 GM/DL — SIGNIFICANT CHANGE UP (ref 32–36)
MCV RBC AUTO: 92.7 FL — SIGNIFICANT CHANGE UP (ref 80–100)
NRBC # BLD: 0 /100 WBCS — SIGNIFICANT CHANGE UP (ref 0–0)
PLATELET # BLD AUTO: 166 K/UL — SIGNIFICANT CHANGE UP (ref 150–400)
POTASSIUM SERPL-MCNC: 4.4 MMOL/L — SIGNIFICANT CHANGE UP (ref 3.5–5.3)
POTASSIUM SERPL-SCNC: 4.4 MMOL/L — SIGNIFICANT CHANGE UP (ref 3.5–5.3)
RBC # BLD: 2.34 M/UL — LOW (ref 4.2–5.8)
RBC # FLD: 17.7 % — HIGH (ref 10.3–14.5)
SODIUM SERPL-SCNC: 137 MMOL/L — SIGNIFICANT CHANGE UP (ref 135–145)
WBC # BLD: 9.68 K/UL — SIGNIFICANT CHANGE UP (ref 3.8–10.5)
WBC # FLD AUTO: 9.68 K/UL — SIGNIFICANT CHANGE UP (ref 3.8–10.5)

## 2020-01-18 PROCEDURE — 99233 SBSQ HOSP IP/OBS HIGH 50: CPT

## 2020-01-18 RX ADMIN — Medication 50 MILLIGRAM(S): at 05:20

## 2020-01-18 RX ADMIN — Medication 300 MILLIGRAM(S): at 21:37

## 2020-01-18 RX ADMIN — SEVELAMER CARBONATE 800 MILLIGRAM(S): 2400 POWDER, FOR SUSPENSION ORAL at 21:37

## 2020-01-18 RX ADMIN — Medication 80 MILLIGRAM(S): at 17:36

## 2020-01-18 RX ADMIN — PANTOPRAZOLE SODIUM 40 MILLIGRAM(S): 20 TABLET, DELAYED RELEASE ORAL at 17:35

## 2020-01-18 RX ADMIN — Medication 1: at 17:37

## 2020-01-18 RX ADMIN — Medication 1: at 08:31

## 2020-01-18 RX ADMIN — ATORVASTATIN CALCIUM 40 MILLIGRAM(S): 80 TABLET, FILM COATED ORAL at 21:37

## 2020-01-18 RX ADMIN — Medication 1 GRAM(S): at 05:20

## 2020-01-18 RX ADMIN — SEVELAMER CARBONATE 800 MILLIGRAM(S): 2400 POWDER, FOR SUSPENSION ORAL at 05:20

## 2020-01-18 RX ADMIN — PANTOPRAZOLE SODIUM 40 MILLIGRAM(S): 20 TABLET, DELAYED RELEASE ORAL at 05:20

## 2020-01-18 RX ADMIN — Medication 1 GRAM(S): at 23:46

## 2020-01-18 RX ADMIN — ERYTHROPOIETIN 20000 UNIT(S): 10000 INJECTION, SOLUTION INTRAVENOUS; SUBCUTANEOUS at 13:39

## 2020-01-18 RX ADMIN — Medication 50 MILLIGRAM(S): at 17:36

## 2020-01-18 RX ADMIN — Medication 1 GRAM(S): at 17:36

## 2020-01-18 NOTE — PROGRESS NOTE ADULT - PROBLEM SELECTOR PLAN 2
- status post EGD.  -  Acute Duodenal Ulcer (bulb)  - continue PPI, carafate  - GI is following 157.48

## 2020-01-18 NOTE — PROGRESS NOTE ADULT - PROBLEM SELECTOR PLAN 1
- status post transfusion of 2 units PRBC's  - H/H is better   - GI eval noted.  - follow up CBC today

## 2020-01-18 NOTE — PROGRESS NOTE ADULT - SUBJECTIVE AND OBJECTIVE BOX
Pt stable - no active bleeding  +acute DU noted at endoscopy      MEDICATIONS  (STANDING):  atorvastatin 40 milliGRAM(s) Oral at bedtime  dextrose 5%. 1000 milliLiter(s) (50 mL/Hr) IV Continuous <Continuous>  dextrose 50% Injectable 12.5 Gram(s) IV Push once  dextrose 50% Injectable 25 Gram(s) IV Push once  dextrose 50% Injectable 25 Gram(s) IV Push once  epoetin theodore Injectable 50611 Unit(s) IV Push <User Schedule>  furosemide    Tablet 80 milliGRAM(s) Oral two times a day  hydrALAZINE 50 milliGRAM(s) Oral two times a day  insulin lispro (HumaLOG) corrective regimen sliding scale   SubCutaneous three times a day before meals  labetalol 300 milliGRAM(s) Oral three times a day  pantoprazole  Injectable 40 milliGRAM(s) IV Push every 12 hours  sevelamer carbonate 800 milliGRAM(s) Oral every 8 hours  sucralfate 1 Gram(s) Oral four times a day    MEDICATIONS  (PRN):  dextrose 40% Gel 15 Gram(s) Oral once PRN Blood Glucose LESS THAN 70 milliGRAM(s)/deciliter  glucagon  Injectable 1 milliGRAM(s) IntraMuscular once PRN Glucose LESS THAN 70 milligrams/deciliter  ondansetron Injectable 4 milliGRAM(s) IV Push every 6 hours PRN Nausea and/or Vomiting      Allergies    lisinopril (Anaphylaxis)    Intolerances        Vital Signs Last 24 Hrs  T(C): 36.8 (18 Jan 2020 13:20), Max: 37.1 (18 Jan 2020 09:50)  T(F): 98.2 (18 Jan 2020 13:20), Max: 98.7 (18 Jan 2020 09:50)  HR: 76 (18 Jan 2020 13:20) (68 - 79)  BP: 138/62 (18 Jan 2020 13:20) (106/52 - 141/67)  BP(mean): --  RR: 18 (18 Jan 2020 13:20) (17 - 18)  SpO2: 100% (18 Jan 2020 13:20) (93% - 100%)    PHYSICAL EXAM:  General: NAD.  CVS: S1, S2  Chest: air entry bilaterally present  Abd: BS present, soft, non-tender      LABS:                        7.0    9.68  )-----------( 166      ( 18 Jan 2020 13:55 )             21.7     01-18    137  |  99  |  77<H>  ----------------------------<  178<H>  4.4   |  27  |  8.26<H>    Ca    7.3<L>      18 Jan 2020 13:55      transfuse 2u prbc's  follow CBC Pt stable - no active bleeding  +acute DU noted at endoscopy      MEDICATIONS  (STANDING):  atorvastatin 40 milliGRAM(s) Oral at bedtime  dextrose 5%. 1000 milliLiter(s) (50 mL/Hr) IV Continuous <Continuous>  dextrose 50% Injectable 12.5 Gram(s) IV Push once  dextrose 50% Injectable 25 Gram(s) IV Push once  dextrose 50% Injectable 25 Gram(s) IV Push once  epoetin theodore Injectable 41660 Unit(s) IV Push <User Schedule>  furosemide    Tablet 80 milliGRAM(s) Oral two times a day  hydrALAZINE 50 milliGRAM(s) Oral two times a day  insulin lispro (HumaLOG) corrective regimen sliding scale   SubCutaneous three times a day before meals  labetalol 300 milliGRAM(s) Oral three times a day  pantoprazole  Injectable 40 milliGRAM(s) IV Push every 12 hours  sevelamer carbonate 800 milliGRAM(s) Oral every 8 hours  sucralfate 1 Gram(s) Oral four times a day    MEDICATIONS  (PRN):  dextrose 40% Gel 15 Gram(s) Oral once PRN Blood Glucose LESS THAN 70 milliGRAM(s)/deciliter  glucagon  Injectable 1 milliGRAM(s) IntraMuscular once PRN Glucose LESS THAN 70 milligrams/deciliter  ondansetron Injectable 4 milliGRAM(s) IV Push every 6 hours PRN Nausea and/or Vomiting      Allergies    lisinopril (Anaphylaxis)    Intolerances        Vital Signs Last 24 Hrs  T(C): 36.8 (18 Jan 2020 13:20), Max: 37.1 (18 Jan 2020 09:50)  T(F): 98.2 (18 Jan 2020 13:20), Max: 98.7 (18 Jan 2020 09:50)  HR: 76 (18 Jan 2020 13:20) (68 - 79)  BP: 138/62 (18 Jan 2020 13:20) (106/52 - 141/67)  BP(mean): --  RR: 18 (18 Jan 2020 13:20) (17 - 18)  SpO2: 100% (18 Jan 2020 13:20) (93% - 100%)    PHYSICAL EXAM:  General: NAD.  CVS: S1, S2  Chest: air entry bilaterally present  Abd: BS present, soft, non-tender      LABS:                        7.0    9.68  )-----------( 166      ( 18 Jan 2020 13:55 )             21.7     01-18    137  |  99  |  77<H>  ----------------------------<  178<H>  4.4   |  27  |  8.26<H>    Ca    7.3<L>      18 Jan 2020 13:55      transfuse 2u prbc's -  will start with 1 now and possibly repeat in AM  follow CBC

## 2020-01-18 NOTE — PROGRESS NOTE ADULT - SUBJECTIVE AND OBJECTIVE BOX
Patient is a 69y old  Male who presents with a chief complaint of Vomiting/weakness (17 Jan 2020 11:27), has no pain       OVERNIGHT EVENTS:    MEDICATIONS  (STANDING):  atorvastatin 40 milliGRAM(s) Oral at bedtime  dextrose 5%. 1000 milliLiter(s) (50 mL/Hr) IV Continuous <Continuous>  dextrose 50% Injectable 12.5 Gram(s) IV Push once  dextrose 50% Injectable 25 Gram(s) IV Push once  dextrose 50% Injectable 25 Gram(s) IV Push once  epoetin theodore Injectable 42571 Unit(s) IV Push <User Schedule>  furosemide    Tablet 80 milliGRAM(s) Oral two times a day  hydrALAZINE 50 milliGRAM(s) Oral two times a day  insulin lispro (HumaLOG) corrective regimen sliding scale   SubCutaneous three times a day before meals  labetalol 300 milliGRAM(s) Oral three times a day  pantoprazole  Injectable 40 milliGRAM(s) IV Push every 12 hours  sevelamer carbonate 800 milliGRAM(s) Oral every 8 hours  sucralfate 1 Gram(s) Oral four times a day    MEDICATIONS  (PRN):  dextrose 40% Gel 15 Gram(s) Oral once PRN Blood Glucose LESS THAN 70 milliGRAM(s)/deciliter  glucagon  Injectable 1 milliGRAM(s) IntraMuscular once PRN Glucose LESS THAN 70 milligrams/deciliter  ondansetron Injectable 4 milliGRAM(s) IV Push every 6 hours PRN Nausea and/or Vomiting        REVIEW OF SYSTEMS:  CONSTITUTIONAL: No fever,    EYES: No eye pain,   ENMT:  No difficulty hearing, tinnitus, vertigo; No sinus or throat pain  NECK: No pain or stiffness  RESPIRATORY: No cough,  No shortness of breath  CARDIOVASCULAR: No chest pain,    GASTROINTESTINAL: No abdominal   pain.    GENITOURINARY: No dysuria,    NEUROLOGICAL: No headaches,    SKIN: No itching, burning, rashes, or lesions      Vital Signs Last 24 Hrs  T(C): 36.8 (18 Jan 2020 04:45), Max: 36.8 (17 Jan 2020 17:03)  T(F): 98.2 (18 Jan 2020 04:45), Max: 98.3 (17 Jan 2020 23:25)  HR: 78 (18 Jan 2020 04:45) (68 - 78)  BP: 117/69 (18 Jan 2020 04:45) (106/52 - 141/67)  BP(mean): 83 (17 Jan 2020 14:19) (80 - 83)  RR: 18 (18 Jan 2020 04:45) (17 - 18)  SpO2: 93% (18 Jan 2020 04:45) (93% - 100%)    PHYSICAL EXAM:  ENERAL: NAD, well-developed  HEAD:  Atraumatic, Normocephalic  EYES: EOMI, PERRLA, conjunctiva and sclera clear  ENMT: Moist mucous membranes   NECK: Supple, No JVD   NERVOUS SYSTEM:  Alert & Oriented , moves all ext.  CHEST/LUNG:  Good air entry bilaterally;    HEART: Regular rate and rhythm;    ABDOMEN: Soft, Nontender, Nondistended; Bowel sounds present  EXTREMITIES:  2+ Peripheral Pulses,   Left arm AV graft/fistula,  LYMPH: No lymphadenopathy noted  SKIN: No petechiae.    LABS:                        7.7    11.21 )-----------( 155      ( 17 Jan 2020 07:56 )             23.4     01-17    141  |  102  |  59<H>  ----------------------------<  160<H>  4.8   |  31  |  5.25<H>    Ca    8.0<L>      17 Jan 2020 07:56  Mg     2.5     01-16    TPro  5.8<L>  /  Alb  2.6<L>  /  TBili  0.3  /  DBili  x   /  AST  18  /  ALT  15  /  AlkPhos  100  01-16    PT/INR - ( 16 Jan 2020 14:51 )   PT: 12.1 sec;   INR: 1.08 ratio         PTT - ( 16 Jan 2020 14:51 )  PTT:27.2 sec   cardiac markers     CAPILLARY BLOOD GLUCOSE      POCT Blood Glucose.: 187 mg/dL (18 Jan 2020 08:14)  POCT Blood Glucose.: 135 mg/dL (17 Jan 2020 21:51)  POCT Blood Glucose.: 211 mg/dL (17 Jan 2020 18:04)  POCT Blood Glucose.: 160 mg/dL (17 Jan 2020 11:28)    Cultures    RADIOLOGY & ADDITIONAL TESTS:    Imaging Personally Reviewed:  [ ] YES  [ ] NO    Consultant(s) Notes Reviewed:  [x ] YES  [ ] NO    Care Discussed with Consultants/Other Providers [ ] YES  [ ] NO

## 2020-01-18 NOTE — PROGRESS NOTE ADULT - SUBJECTIVE AND OBJECTIVE BOX
Patient seen and examined  no complaints    lisinopril (Anaphylaxis)    Hospital Medications:   MEDICATIONS  (STANDING):  atorvastatin 40 milliGRAM(s) Oral at bedtime  dextrose 5%. 1000 milliLiter(s) (50 mL/Hr) IV Continuous <Continuous>  dextrose 50% Injectable 12.5 Gram(s) IV Push once  dextrose 50% Injectable 25 Gram(s) IV Push once  dextrose 50% Injectable 25 Gram(s) IV Push once  epoetin theodore Injectable 49050 Unit(s) IV Push <User Schedule>  furosemide    Tablet 80 milliGRAM(s) Oral two times a day  hydrALAZINE 50 milliGRAM(s) Oral two times a day  insulin lispro (HumaLOG) corrective regimen sliding scale   SubCutaneous three times a day before meals  labetalol 300 milliGRAM(s) Oral three times a day  pantoprazole  Injectable 40 milliGRAM(s) IV Push every 12 hours  sevelamer carbonate 800 milliGRAM(s) Oral every 8 hours  sucralfate 1 Gram(s) Oral four times a day      VITALS:  T(F): 98.2 (01-18-20 @ 13:20), Max: 98.7 (01-18-20 @ 09:50)  HR: 76 (01-18-20 @ 13:20)  BP: 138/62 (01-18-20 @ 13:20)  RR: 18 (01-18-20 @ 13:20)  SpO2: 100% (01-18-20 @ 13:20)  Wt(kg): --    01-17 @ 07:01  -  01-18 @ 07:00  --------------------------------------------------------  IN: 175 mL / OUT: 0 mL / NET: 175 mL    01-18 @ 07:01  -  01-18 @ 15:31  --------------------------------------------------------  IN: 0 mL / OUT: 1500 mL / NET: -1500 mL      PHYSICAL EXAM:  Constitutional: NAD  HEENT: anicteric sclera, oropharynx clear, MMM  Neck: No JVD  Respiratory: CTAB, no wheezes, rales or rhonchi  Cardiovascular: S1, S2, RRR  Gastrointestinal: BS+, soft, NT/ND  Extremities: No cyanosis or clubbing. No peripheral edema  Neurological: A/O x 3, no focal deficits  Psychiatric: Normal mood, normal affect  : No CVA tenderness. No bee.   Skin: No rashes  Vascular Access:right forearm avg     LABS:  01-18    137  |  99  |  77<H>  ----------------------------<  178<H>  4.4   |  27  |  8.26<H>    Ca    7.3<L>      18 Jan 2020 13:55      Creatinine Trend: 8.26 <--, 5.25 <--, 9.34 <--                        7.0    9.68  )-----------( 166      ( 18 Jan 2020 13:55 )             21.7     Urine Studies:      RADIOLOGY & ADDITIONAL STUDIES:

## 2020-01-19 LAB
GLUCOSE BLDC GLUCOMTR-MCNC: 115 MG/DL — HIGH (ref 70–99)
GLUCOSE BLDC GLUCOMTR-MCNC: 127 MG/DL — HIGH (ref 70–99)
GLUCOSE BLDC GLUCOMTR-MCNC: 179 MG/DL — HIGH (ref 70–99)
GLUCOSE BLDC GLUCOMTR-MCNC: 192 MG/DL — HIGH (ref 70–99)
HCT VFR BLD CALC: 27.2 % — LOW (ref 39–50)
HGB BLD-MCNC: 8.6 G/DL — LOW (ref 13–17)
MCHC RBC-ENTMCNC: 29.1 PG — SIGNIFICANT CHANGE UP (ref 27–34)
MCHC RBC-ENTMCNC: 31.6 GM/DL — LOW (ref 32–36)
MCV RBC AUTO: 91.9 FL — SIGNIFICANT CHANGE UP (ref 80–100)
NRBC # BLD: 0 /100 WBCS — SIGNIFICANT CHANGE UP (ref 0–0)
PLATELET # BLD AUTO: 182 K/UL — SIGNIFICANT CHANGE UP (ref 150–400)
RBC # BLD: 2.96 M/UL — LOW (ref 4.2–5.8)
RBC # FLD: 17 % — HIGH (ref 10.3–14.5)
WBC # BLD: 8.63 K/UL — SIGNIFICANT CHANGE UP (ref 3.8–10.5)
WBC # FLD AUTO: 8.63 K/UL — SIGNIFICANT CHANGE UP (ref 3.8–10.5)

## 2020-01-19 PROCEDURE — 99233 SBSQ HOSP IP/OBS HIGH 50: CPT

## 2020-01-19 RX ORDER — GUAIFENESIN/DEXTROMETHORPHAN 600MG-30MG
5 TABLET, EXTENDED RELEASE 12 HR ORAL EVERY 6 HOURS
Refills: 0 | Status: DISCONTINUED | OUTPATIENT
Start: 2020-01-19 | End: 2020-01-20

## 2020-01-19 RX ADMIN — Medication 1: at 11:43

## 2020-01-19 RX ADMIN — Medication 50 MILLIGRAM(S): at 05:30

## 2020-01-19 RX ADMIN — Medication 80 MILLIGRAM(S): at 17:38

## 2020-01-19 RX ADMIN — Medication 300 MILLIGRAM(S): at 14:18

## 2020-01-19 RX ADMIN — SEVELAMER CARBONATE 800 MILLIGRAM(S): 2400 POWDER, FOR SUSPENSION ORAL at 21:47

## 2020-01-19 RX ADMIN — ATORVASTATIN CALCIUM 40 MILLIGRAM(S): 80 TABLET, FILM COATED ORAL at 21:47

## 2020-01-19 RX ADMIN — Medication 1 GRAM(S): at 17:38

## 2020-01-19 RX ADMIN — Medication 300 MILLIGRAM(S): at 21:47

## 2020-01-19 RX ADMIN — Medication 5 MILLILITER(S): at 17:38

## 2020-01-19 RX ADMIN — PANTOPRAZOLE SODIUM 40 MILLIGRAM(S): 20 TABLET, DELAYED RELEASE ORAL at 05:31

## 2020-01-19 RX ADMIN — Medication 300 MILLIGRAM(S): at 05:30

## 2020-01-19 RX ADMIN — SEVELAMER CARBONATE 800 MILLIGRAM(S): 2400 POWDER, FOR SUSPENSION ORAL at 05:31

## 2020-01-19 RX ADMIN — Medication 80 MILLIGRAM(S): at 05:29

## 2020-01-19 RX ADMIN — PANTOPRAZOLE SODIUM 40 MILLIGRAM(S): 20 TABLET, DELAYED RELEASE ORAL at 17:38

## 2020-01-19 RX ADMIN — Medication 1 GRAM(S): at 05:30

## 2020-01-19 RX ADMIN — Medication 50 MILLIGRAM(S): at 17:38

## 2020-01-19 RX ADMIN — Medication 1 GRAM(S): at 23:14

## 2020-01-19 RX ADMIN — SEVELAMER CARBONATE 800 MILLIGRAM(S): 2400 POWDER, FOR SUSPENSION ORAL at 14:18

## 2020-01-19 RX ADMIN — Medication 1 GRAM(S): at 11:43

## 2020-01-19 NOTE — PROGRESS NOTE ADULT - PROBLEM SELECTOR PLAN 2
- status post EGD.  -  Acute Duodenal Ulcer (bulb)  - continue PPI, carafate  - GI is following  - CBC in AM  - Advance diet as tolerated as per GI

## 2020-01-19 NOTE — PROGRESS NOTE ADULT - SUBJECTIVE AND OBJECTIVE BOX
Patient is a 69y old  Male who presents with a chief complaint of Vomiting/weakness (19 Jan 2020 10:47), has no pain, he is doing better now.       OVERNIGHT EVENTS: none.    MEDICATIONS  (STANDING):  atorvastatin 40 milliGRAM(s) Oral at bedtime  dextrose 5%. 1000 milliLiter(s) (50 mL/Hr) IV Continuous <Continuous>  dextrose 50% Injectable 12.5 Gram(s) IV Push once  dextrose 50% Injectable 25 Gram(s) IV Push once  dextrose 50% Injectable 25 Gram(s) IV Push once  epoetin theodore Injectable 36122 Unit(s) IV Push <User Schedule>  furosemide    Tablet 80 milliGRAM(s) Oral two times a day  hydrALAZINE 50 milliGRAM(s) Oral two times a day  insulin lispro (HumaLOG) corrective regimen sliding scale   SubCutaneous three times a day before meals  labetalol 300 milliGRAM(s) Oral three times a day  pantoprazole  Injectable 40 milliGRAM(s) IV Push every 12 hours  sevelamer carbonate 800 milliGRAM(s) Oral every 8 hours  sucralfate 1 Gram(s) Oral four times a day    MEDICATIONS  (PRN):  dextrose 40% Gel 15 Gram(s) Oral once PRN Blood Glucose LESS THAN 70 milliGRAM(s)/deciliter  glucagon  Injectable 1 milliGRAM(s) IntraMuscular once PRN Glucose LESS THAN 70 milligrams/deciliter  ondansetron Injectable 4 milliGRAM(s) IV Push every 6 hours PRN Nausea and/or Vomiting        REVIEW OF SYSTEMS:  CONSTITUTIONAL: No fever,    EYES: No eye pain,   ENMT:  No vertigo; No sinus or throat pain  NECK: No pain or stiffness  RESPIRATORY: No cough,  No shortness of breath  CARDIOVASCULAR: No chest pain,    GASTROINTESTINAL: No abdominal  pain.    GENITOURINARY: No dysuria,    NEUROLOGICAL: No headaches,    SKIN: No itching, burning, rashes, or lesions      Vital Signs Last 24 Hrs  T(C): 36.9 (19 Jan 2020 04:30), Max: 37.1 (19 Jan 2020 00:10)  T(F): 98.5 (19 Jan 2020 04:30), Max: 98.7 (19 Jan 2020 00:10)  HR: 76 (19 Jan 2020 05:38) (70 - 78)  BP: 146/61 (19 Jan 2020 05:38) (126/54 - 147/66)  BP(mean): --  RR: 18 (19 Jan 2020 04:30) (16 - 18)  SpO2: 97% (19 Jan 2020 04:30) (95% - 100%)    PHYSICAL EXAM:  GENERAL: NAD, well-groomed, well-developed  HEAD:  Atraumatic, Normocephalic  EYES: EOMI, PERRLA, conjunctiva and sclera clear  ENMT: Moist mucous membranes   NECK: Supple, No JVD   NERVOUS SYSTEM:  Alert & Oriented , moves all ext.  CHEST/LUNG:  Good air entry bilaterally;    HEART: Regular rate and rhythm;    ABDOMEN: Soft, Nontender, Nondistended; Bowel sounds present  EXTREMITIES:  2+ Peripheral Pulses,   Left arm AV graft/fistula,  LYMPH: No lymphadenopathy noted  SKIN: No petechiae.    LABS:                        8.6    8.63  )-----------( 182      ( 19 Jan 2020 06:55 )             27.2     01-18    137  |  99  |  77<H>  ----------------------------<  178<H>  4.4   |  27  |  8.26<H>    Ca    7.3<L>      18 Jan 2020 13:55         cardiac markers     CAPILLARY BLOOD GLUCOSE      POCT Blood Glucose.: 192 mg/dL (19 Jan 2020 11:30)  POCT Blood Glucose.: 127 mg/dL (19 Jan 2020 07:55)  POCT Blood Glucose.: 148 mg/dL (18 Jan 2020 21:13)  POCT Blood Glucose.: 163 mg/dL (18 Jan 2020 17:02)  POCT Blood Glucose.: 104 mg/dL (18 Jan 2020 13:10)    Cultures    RADIOLOGY & ADDITIONAL TESTS:    Imaging Personally Reviewed:  [ ] YES  [ ] NO    Consultant(s) Notes Reviewed:  [ x ] YES  [ ] NO    Care Discussed with Consultants/Other Providers [ ] YES  [ ] NO

## 2020-01-19 NOTE — PROGRESS NOTE ADULT - SUBJECTIVE AND OBJECTIVE BOX
Pt doing well  no further bleeding  H/H stable      MEDICATIONS  (STANDING):  atorvastatin 40 milliGRAM(s) Oral at bedtime  dextrose 5%. 1000 milliLiter(s) (50 mL/Hr) IV Continuous <Continuous>  dextrose 50% Injectable 12.5 Gram(s) IV Push once  dextrose 50% Injectable 25 Gram(s) IV Push once  dextrose 50% Injectable 25 Gram(s) IV Push once  epoetin theodore Injectable 00950 Unit(s) IV Push <User Schedule>  furosemide    Tablet 80 milliGRAM(s) Oral two times a day  hydrALAZINE 50 milliGRAM(s) Oral two times a day  insulin lispro (HumaLOG) corrective regimen sliding scale   SubCutaneous three times a day before meals  labetalol 300 milliGRAM(s) Oral three times a day  pantoprazole  Injectable 40 milliGRAM(s) IV Push every 12 hours  sevelamer carbonate 800 milliGRAM(s) Oral every 8 hours  sucralfate 1 Gram(s) Oral four times a day    MEDICATIONS  (PRN):  dextrose 40% Gel 15 Gram(s) Oral once PRN Blood Glucose LESS THAN 70 milliGRAM(s)/deciliter  glucagon  Injectable 1 milliGRAM(s) IntraMuscular once PRN Glucose LESS THAN 70 milligrams/deciliter  ondansetron Injectable 4 milliGRAM(s) IV Push every 6 hours PRN Nausea and/or Vomiting      Allergies    lisinopril (Anaphylaxis)    Intolerances        Vital Signs Last 24 Hrs  T(C): 36.9 (19 Jan 2020 04:30), Max: 37.1 (19 Jan 2020 00:10)  T(F): 98.5 (19 Jan 2020 04:30), Max: 98.7 (19 Jan 2020 00:10)  HR: 76 (19 Jan 2020 05:38) (70 - 78)  BP: 146/61 (19 Jan 2020 05:38) (126/54 - 147/66)  BP(mean): --  RR: 18 (19 Jan 2020 04:30) (16 - 18)  SpO2: 97% (19 Jan 2020 04:30) (95% - 100%)    PHYSICAL EXAM:  General: NAD.  CVS: S1, S2  Chest: air entry bilaterally present  Abd: BS present, soft, non-tender      LABS:                        8.6    8.63  )-----------( 182      ( 19 Jan 2020 06:55 )             27.2     01-18    137  |  99  |  77<H>  ----------------------------<  178<H>  4.4   |  27  |  8.26<H>    Ca    7.3<L>      18 Jan 2020 13:55        cbc in AM  advance diet  continue protonix and carafate Pt doing well  no further bleeding  Pt transfused 1 additional unit PRBC's yesterday   H/H stable      MEDICATIONS  (STANDING):  atorvastatin 40 milliGRAM(s) Oral at bedtime  dextrose 5%. 1000 milliLiter(s) (50 mL/Hr) IV Continuous <Continuous>  dextrose 50% Injectable 12.5 Gram(s) IV Push once  dextrose 50% Injectable 25 Gram(s) IV Push once  dextrose 50% Injectable 25 Gram(s) IV Push once  epoetin theodore Injectable 45342 Unit(s) IV Push <User Schedule>  furosemide    Tablet 80 milliGRAM(s) Oral two times a day  hydrALAZINE 50 milliGRAM(s) Oral two times a day  insulin lispro (HumaLOG) corrective regimen sliding scale   SubCutaneous three times a day before meals  labetalol 300 milliGRAM(s) Oral three times a day  pantoprazole  Injectable 40 milliGRAM(s) IV Push every 12 hours  sevelamer carbonate 800 milliGRAM(s) Oral every 8 hours  sucralfate 1 Gram(s) Oral four times a day    MEDICATIONS  (PRN):  dextrose 40% Gel 15 Gram(s) Oral once PRN Blood Glucose LESS THAN 70 milliGRAM(s)/deciliter  glucagon  Injectable 1 milliGRAM(s) IntraMuscular once PRN Glucose LESS THAN 70 milligrams/deciliter  ondansetron Injectable 4 milliGRAM(s) IV Push every 6 hours PRN Nausea and/or Vomiting      Allergies    lisinopril (Anaphylaxis)    Intolerances        Vital Signs Last 24 Hrs  T(C): 36.9 (19 Jan 2020 04:30), Max: 37.1 (19 Jan 2020 00:10)  T(F): 98.5 (19 Jan 2020 04:30), Max: 98.7 (19 Jan 2020 00:10)  HR: 76 (19 Jan 2020 05:38) (70 - 78)  BP: 146/61 (19 Jan 2020 05:38) (126/54 - 147/66)  BP(mean): --  RR: 18 (19 Jan 2020 04:30) (16 - 18)  SpO2: 97% (19 Jan 2020 04:30) (95% - 100%)    PHYSICAL EXAM:  General: NAD.  CVS: S1, S2  Chest: air entry bilaterally present  Abd: BS present, soft, non-tender      LABS:                        8.6    8.63  )-----------( 182      ( 19 Jan 2020 06:55 )             27.2     01-18    137  |  99  |  77<H>  ----------------------------<  178<H>  4.4   |  27  |  8.26<H>    Ca    7.3<L>      18 Jan 2020 13:55        cbc in AM  advance diet  continue protonix and carafate

## 2020-01-19 NOTE — PROGRESS NOTE ADULT - PROBLEM SELECTOR PLAN 1
- status post transfusion of 2 units PRBC's on admission.  He was transfused another unit of PRBCS yesterday 1/18/20.  - hgb is 8.6 today.  - CBC in AM

## 2020-01-20 ENCOUNTER — TRANSCRIPTION ENCOUNTER (OUTPATIENT)
Age: 70
End: 2020-01-20

## 2020-01-20 VITALS
HEART RATE: 75 BPM | DIASTOLIC BLOOD PRESSURE: 71 MMHG | TEMPERATURE: 98 F | RESPIRATION RATE: 17 BRPM | OXYGEN SATURATION: 98 % | SYSTOLIC BLOOD PRESSURE: 129 MMHG

## 2020-01-20 LAB
GLUCOSE BLDC GLUCOMTR-MCNC: 139 MG/DL — HIGH (ref 70–99)
GLUCOSE BLDC GLUCOMTR-MCNC: 188 MG/DL — HIGH (ref 70–99)
GLUCOSE BLDC GLUCOMTR-MCNC: 215 MG/DL — HIGH (ref 70–99)
HCT VFR BLD CALC: 27.1 % — LOW (ref 39–50)
HGB BLD-MCNC: 8.6 G/DL — LOW (ref 13–17)
MCHC RBC-ENTMCNC: 29.4 PG — SIGNIFICANT CHANGE UP (ref 27–34)
MCHC RBC-ENTMCNC: 31.7 GM/DL — LOW (ref 32–36)
MCV RBC AUTO: 92.5 FL — SIGNIFICANT CHANGE UP (ref 80–100)
NRBC # BLD: 0 /100 WBCS — SIGNIFICANT CHANGE UP (ref 0–0)
PLATELET # BLD AUTO: 198 K/UL — SIGNIFICANT CHANGE UP (ref 150–400)
RBC # BLD: 2.93 M/UL — LOW (ref 4.2–5.8)
RBC # FLD: 17.7 % — HIGH (ref 10.3–14.5)
WBC # BLD: 9.22 K/UL — SIGNIFICANT CHANGE UP (ref 3.8–10.5)
WBC # FLD AUTO: 9.22 K/UL — SIGNIFICANT CHANGE UP (ref 3.8–10.5)

## 2020-01-20 PROCEDURE — 99239 HOSP IP/OBS DSCHRG MGMT >30: CPT

## 2020-01-20 RX ORDER — ERYTHROPOIETIN 10000 [IU]/ML
0 INJECTION, SOLUTION INTRAVENOUS; SUBCUTANEOUS
Qty: 0 | Refills: 0 | DISCHARGE
Start: 2020-01-20

## 2020-01-20 RX ORDER — PANTOPRAZOLE SODIUM 20 MG/1
40 TABLET, DELAYED RELEASE ORAL
Refills: 0 | Status: DISCONTINUED | OUTPATIENT
Start: 2020-01-20 | End: 2020-01-20

## 2020-01-20 RX ORDER — FERROUS SULFATE 325(65) MG
1 TABLET ORAL
Qty: 30 | Refills: 0
Start: 2020-01-20 | End: 2020-02-18

## 2020-01-20 RX ORDER — ASPIRIN/CALCIUM CARB/MAGNESIUM 324 MG
1 TABLET ORAL
Qty: 0 | Refills: 0 | DISCHARGE

## 2020-01-20 RX ORDER — PANTOPRAZOLE SODIUM 20 MG/1
1 TABLET, DELAYED RELEASE ORAL
Qty: 60 | Refills: 0
Start: 2020-01-20 | End: 2020-02-18

## 2020-01-20 RX ORDER — FERROUS SULFATE 325(65) MG
325 TABLET ORAL DAILY
Refills: 0 | Status: DISCONTINUED | OUTPATIENT
Start: 2020-01-20 | End: 2020-01-20

## 2020-01-20 RX ORDER — SUCRALFATE 1 G
1 TABLET ORAL
Qty: 120 | Refills: 0
Start: 2020-01-20 | End: 2020-02-18

## 2020-01-20 RX ORDER — FUROSEMIDE 40 MG
1 TABLET ORAL
Qty: 0 | Refills: 0 | DISCHARGE

## 2020-01-20 RX ADMIN — PANTOPRAZOLE SODIUM 40 MILLIGRAM(S): 20 TABLET, DELAYED RELEASE ORAL at 17:05

## 2020-01-20 RX ADMIN — Medication 50 MILLIGRAM(S): at 05:31

## 2020-01-20 RX ADMIN — Medication 1 GRAM(S): at 05:31

## 2020-01-20 RX ADMIN — SEVELAMER CARBONATE 800 MILLIGRAM(S): 2400 POWDER, FOR SUSPENSION ORAL at 13:07

## 2020-01-20 RX ADMIN — Medication 50 MILLIGRAM(S): at 17:04

## 2020-01-20 RX ADMIN — PANTOPRAZOLE SODIUM 40 MILLIGRAM(S): 20 TABLET, DELAYED RELEASE ORAL at 05:32

## 2020-01-20 RX ADMIN — Medication 1 GRAM(S): at 17:04

## 2020-01-20 RX ADMIN — Medication 5 MILLILITER(S): at 05:37

## 2020-01-20 RX ADMIN — Medication 2: at 16:48

## 2020-01-20 RX ADMIN — Medication 300 MILLIGRAM(S): at 13:07

## 2020-01-20 RX ADMIN — SEVELAMER CARBONATE 800 MILLIGRAM(S): 2400 POWDER, FOR SUSPENSION ORAL at 06:28

## 2020-01-20 RX ADMIN — Medication 1: at 11:31

## 2020-01-20 RX ADMIN — Medication 1 GRAM(S): at 11:31

## 2020-01-20 RX ADMIN — Medication 300 MILLIGRAM(S): at 05:32

## 2020-01-20 RX ADMIN — Medication 80 MILLIGRAM(S): at 05:31

## 2020-01-20 NOTE — PHYSICAL THERAPY INITIAL EVALUATION ADULT - PERTINENT HX OF CURRENT PROBLEM, REHAB EVAL
Per chart: · 69 year old female PMH of HTN, HLD, CKD on hemodialysis, anemia, CHF, and DM presents BIB EMS c/o vomiting brown liquid since Tuesday with latest episode today while eating.

## 2020-01-20 NOTE — PROGRESS NOTE ADULT - PROBLEM SELECTOR PLAN 3
- from ESRD.  - resolved status post Hemodialysis. - from ESRD. resolved status post Hemodialysis. Resume HD tomorrow at regular   HD center.

## 2020-01-20 NOTE — PROGRESS NOTE ADULT - PROBLEM SELECTOR PLAN 1
- status post transfusion of 2 units PRBC's on admission.  He was transfused another unit of PRBCS yesterday 1/18/20.  - hgb is 8.6 today.  - CBC in AM - status post transfusion of 2 units PRBC's on admission.  He was transfused another unit of PRBCS yesterday 1/18/20. hgb is 8.6 today. No change from yesterday.

## 2020-01-20 NOTE — PROGRESS NOTE ADULT - SUBJECTIVE AND OBJECTIVE BOX
INTERVAL HPI/OVERNIGHT EVENTS:  Pt seen and examined at bedside.     Allergies/Intolerance: lisinopril (Anaphylaxis)      MEDICATIONS  (STANDING):  atorvastatin 40 milliGRAM(s) Oral at bedtime  dextrose 5%. 1000 milliLiter(s) (50 mL/Hr) IV Continuous <Continuous>  dextrose 50% Injectable 12.5 Gram(s) IV Push once  dextrose 50% Injectable 25 Gram(s) IV Push once  dextrose 50% Injectable 25 Gram(s) IV Push once  epoetin theodore Injectable 53488 Unit(s) IV Push <User Schedule>  furosemide    Tablet 80 milliGRAM(s) Oral two times a day  hydrALAZINE 50 milliGRAM(s) Oral two times a day  insulin lispro (HumaLOG) corrective regimen sliding scale   SubCutaneous three times a day before meals  labetalol 300 milliGRAM(s) Oral three times a day  pantoprazole  Injectable 40 milliGRAM(s) IV Push every 12 hours  sevelamer carbonate 800 milliGRAM(s) Oral every 8 hours  sucralfate 1 Gram(s) Oral four times a day    MEDICATIONS  (PRN):  dextrose 40% Gel 15 Gram(s) Oral once PRN Blood Glucose LESS THAN 70 milliGRAM(s)/deciliter  glucagon  Injectable 1 milliGRAM(s) IntraMuscular once PRN Glucose LESS THAN 70 milligrams/deciliter  guaifenesin/dextromethorphan  Syrup 5 milliLiter(s) Oral every 6 hours PRN Cough  ondansetron Injectable 4 milliGRAM(s) IV Push every 6 hours PRN Nausea and/or Vomiting        ROS: all systems reviewed and wnl      PHYSICAL EXAMINATION:  Vital Signs Last 24 Hrs  T(C): 36.2 (20 Jan 2020 05:19), Max: 36.8 (19 Jan 2020 17:30)  T(F): 97.2 (20 Jan 2020 05:19), Max: 98.3 (19 Jan 2020 17:30)  HR: 71 (20 Jan 2020 05:19) (68 - 84)  BP: 142/68 (20 Jan 2020 05:19) (118/53 - 142/68)  BP(mean): --  RR: 18 (20 Jan 2020 05:19) (16 - 18)  SpO2: 97% (20 Jan 2020 05:19) (94% - 97%)  CAPILLARY BLOOD GLUCOSE      POCT Blood Glucose.: 139 mg/dL (20 Jan 2020 07:36)  POCT Blood Glucose.: 179 mg/dL (19 Jan 2020 21:12)  POCT Blood Glucose.: 115 mg/dL (19 Jan 2020 16:49)  POCT Blood Glucose.: 192 mg/dL (19 Jan 2020 11:30)        GENERAL:   NECK: supple, No JVD  CHEST/LUNG: clear to auscultation bilaterally; no rales, rhonchi, or wheezing b/l  HEART: normal S1, S2  ABDOMEN: BS+, soft, ND, NT   EXTREMITIES:  pulses palpable; no clubbing, cyanosis, or edema b/l LEs  SKIN: no rashes or lesions      LABS:                        8.6    9.22  )-----------( 198      ( 20 Jan 2020 06:54 )             27.1     01-18    137  |  99  |  77<H>  ----------------------------<  178<H>  4.4   |  27  |  8.26<H>    Ca    7.3<L>      18 Jan 2020 13:55 INTERVAL HPI/OVERNIGHT EVENTS:  Pt seen and examined at bedside.     Allergies/Intolerance: lisinopril (Anaphylaxis)      MEDICATIONS  (STANDING):  atorvastatin 40 milliGRAM(s) Oral at bedtime  dextrose 5%. 1000 milliLiter(s) (50 mL/Hr) IV Continuous <Continuous>  dextrose 50% Injectable 12.5 Gram(s) IV Push once  dextrose 50% Injectable 25 Gram(s) IV Push once  dextrose 50% Injectable 25 Gram(s) IV Push once  epoetin theodore Injectable 14830 Unit(s) IV Push <User Schedule>  furosemide    Tablet 80 milliGRAM(s) Oral two times a day  hydrALAZINE 50 milliGRAM(s) Oral two times a day  insulin lispro (HumaLOG) corrective regimen sliding scale   SubCutaneous three times a day before meals  labetalol 300 milliGRAM(s) Oral three times a day  pantoprazole  Injectable 40 milliGRAM(s) IV Push every 12 hours  sevelamer carbonate 800 milliGRAM(s) Oral every 8 hours  sucralfate 1 Gram(s) Oral four times a day    MEDICATIONS  (PRN):  dextrose 40% Gel 15 Gram(s) Oral once PRN Blood Glucose LESS THAN 70 milliGRAM(s)/deciliter  glucagon  Injectable 1 milliGRAM(s) IntraMuscular once PRN Glucose LESS THAN 70 milligrams/deciliter  guaifenesin/dextromethorphan  Syrup 5 milliLiter(s) Oral every 6 hours PRN Cough  ondansetron Injectable 4 milliGRAM(s) IV Push every 6 hours PRN Nausea and/or Vomiting        ROS: all systems reviewed and wnl      PHYSICAL EXAMINATION:  Vital Signs Last 24 Hrs  T(C): 36.2 (20 Jan 2020 05:19), Max: 36.8 (19 Jan 2020 17:30)  T(F): 97.2 (20 Jan 2020 05:19), Max: 98.3 (19 Jan 2020 17:30)  HR: 71 (20 Jan 2020 05:19) (68 - 84)  BP: 142/68 (20 Jan 2020 05:19) (118/53 - 142/68)  BP(mean): --  RR: 18 (20 Jan 2020 05:19) (16 - 18)  SpO2: 97% (20 Jan 2020 05:19) (94% - 97%)  CAPILLARY BLOOD GLUCOSE      POCT Blood Glucose.: 139 mg/dL (20 Jan 2020 07:36)  POCT Blood Glucose.: 179 mg/dL (19 Jan 2020 21:12)  POCT Blood Glucose.: 115 mg/dL (19 Jan 2020 16:49)  POCT Blood Glucose.: 192 mg/dL (19 Jan 2020 11:30)        GENERAL: comfortable, no new complaints.   NECK: supple, No JVD  CHEST/LUNG: clear to auscultation bilaterally; no rales, rhonchi, or wheezing b/l  HEART: normal S1, S2  ABDOMEN: BS+, soft, ND, NT   EXTREMITIES:  pulses palpable; no clubbing, cyanosis, or edema b/l LEs  SKIN: no rashes or lesions      LABS:                        8.6    9.22  )-----------( 198      ( 20 Jan 2020 06:54 )             27.1     01-18    137  |  99  |  77<H>  ----------------------------<  178<H>  4.4   |  27  |  8.26<H>    Ca    7.3<L>      18 Jan 2020 13:55

## 2020-01-20 NOTE — PROGRESS NOTE ADULT - SUBJECTIVE AND OBJECTIVE BOX
Pt stable - no bleeding  tolerating diet    MEDICATIONS  (STANDING):  atorvastatin 40 milliGRAM(s) Oral at bedtime  dextrose 5%. 1000 milliLiter(s) (50 mL/Hr) IV Continuous <Continuous>  dextrose 50% Injectable 12.5 Gram(s) IV Push once  dextrose 50% Injectable 25 Gram(s) IV Push once  dextrose 50% Injectable 25 Gram(s) IV Push once  epoetin theodore Injectable 66732 Unit(s) IV Push <User Schedule>  hydrALAZINE 50 milliGRAM(s) Oral two times a day  insulin lispro (HumaLOG) corrective regimen sliding scale   SubCutaneous three times a day before meals  labetalol 300 milliGRAM(s) Oral three times a day  pantoprazole    Tablet 40 milliGRAM(s) Oral two times a day  sevelamer carbonate 800 milliGRAM(s) Oral every 8 hours  sucralfate 1 Gram(s) Oral four times a day    MEDICATIONS  (PRN):  dextrose 40% Gel 15 Gram(s) Oral once PRN Blood Glucose LESS THAN 70 milliGRAM(s)/deciliter  glucagon  Injectable 1 milliGRAM(s) IntraMuscular once PRN Glucose LESS THAN 70 milligrams/deciliter  guaifenesin/dextromethorphan  Syrup 5 milliLiter(s) Oral every 6 hours PRN Cough      Allergies    lisinopril (Anaphylaxis)    Intolerances        Vital Signs Last 24 Hrs  T(C): 36.1 (20 Jan 2020 11:58), Max: 36.8 (19 Jan 2020 17:30)  T(F): 97 (20 Jan 2020 11:58), Max: 98.3 (19 Jan 2020 17:30)  HR: 62 (20 Jan 2020 11:58) (62 - 71)  BP: 139/69 (20 Jan 2020 11:58) (97/45 - 142/68)  BP(mean): --  RR: 18 (20 Jan 2020 11:58) (16 - 18)  SpO2: 97% (20 Jan 2020 11:58) (94% - 100%)    PHYSICAL EXAM:  General: NAD.  CVS: S1, S2  Chest: air entry bilaterally present  Abd: BS present, soft, non-tender      LABS:                        8.6    9.22  )-----------( 198      ( 20 Jan 2020 06:54 )             27.1         continue protonix  diet as tolerated  ok to d/c carafate upon discharge

## 2020-01-20 NOTE — DISCHARGE NOTE NURSING/CASE MANAGEMENT/SOCIAL WORK - PATIENT PORTAL LINK FT
You can access the FollowMyHealth Patient Portal offered by [facility] by registering at the following website: [email or web address]. By joining [email or web address]’s FollowMyHealth portal, you will also be able to view your health information using other applications (apps) compatible with our system.

## 2020-01-20 NOTE — PROGRESS NOTE ADULT - SUBJECTIVE AND OBJECTIVE BOX
Patient seen and examined  no complaints    lisinopril (Anaphylaxis)    Hospital Medications:   MEDICATIONS  (STANDING):  atorvastatin 40 milliGRAM(s) Oral at bedtime  dextrose 5%. 1000 milliLiter(s) (50 mL/Hr) IV Continuous <Continuous>  dextrose 50% Injectable 12.5 Gram(s) IV Push once  dextrose 50% Injectable 25 Gram(s) IV Push once  dextrose 50% Injectable 25 Gram(s) IV Push once  epoetin theodore Injectable 01289 Unit(s) IV Push <User Schedule>  ferrous    sulfate 325 milliGRAM(s) Oral daily  hydrALAZINE 50 milliGRAM(s) Oral two times a day  insulin lispro (HumaLOG) corrective regimen sliding scale   SubCutaneous three times a day before meals  labetalol 300 milliGRAM(s) Oral three times a day  pantoprazole    Tablet 40 milliGRAM(s) Oral two times a day  sevelamer carbonate 800 milliGRAM(s) Oral every 8 hours  sucralfate 1 Gram(s) Oral four times a day        VITALS:  T(F): 97 (01-20-20 @ 11:58), Max: 98.3 (01-19-20 @ 17:30)  HR: 62 (01-20-20 @ 11:58)  BP: 139/69 (01-20-20 @ 11:58)  RR: 18 (01-20-20 @ 11:58)  SpO2: 97% (01-20-20 @ 11:58)  Wt(kg): --      PHYSICAL EXAM:  Constitutional: NAD  HEENT: anicteric sclera, oropharynx clear, MMM  Neck: No JVD  Respiratory: CTAB, no wheezes, rales or rhonchi  Cardiovascular: S1, S2, RRR  Gastrointestinal: BS+, soft, NT/ND  Extremities: No cyanosis or clubbing. No peripheral edema  Neurological: A/O x 3, no focal deficits  Psychiatric: Normal mood, normal affect  : No CVA tenderness. No bee.   Skin: No rashes  Vascular Access:right forearm avg     LABS:        Creatinine Trend: 8.26 <--, 5.25 <--, 9.34 <--                        8.6    9.22  )-----------( 198      ( 20 Jan 2020 06:54 )             27.1     Urine Studies:      RADIOLOGY & ADDITIONAL STUDIES:

## 2020-01-20 NOTE — PROGRESS NOTE ADULT - PROBLEM SELECTOR PLAN 2
- status post EGD.  -  Acute Duodenal Ulcer (bulb)  - continue PPI, carafate  - GI is following  - CBC in AM  - Advance diet as tolerated as per GI - status post EGD. Acute Duodenal Ulcer (bulb)  - continue PPI, carafate.  GI is following. Cleared for discharge.

## 2020-01-20 NOTE — DISCHARGE NOTE PROVIDER - NSDCCPCAREPLAN_GEN_ALL_CORE_FT
PRINCIPAL DISCHARGE DIAGNOSIS  Diagnosis: Iron deficiency anemia due to chronic blood loss  Assessment and Plan of Treatment: Start Iron PO      SECONDARY DISCHARGE DIAGNOSES  Diagnosis: Upper GI bleed  Assessment and Plan of Treatment: Return to ED if GI bleed reoccurs    Diagnosis: Duodenal ulcer  Assessment and Plan of Treatment: Start carafate and protonix  Follow up with GI dr in 7-10 days    Diagnosis: Hyperlipidemia, unspecified hyperlipidemia type  Assessment and Plan of Treatment: Continue home medications    Diagnosis: Type 2 diabetes mellitus with chronic kidney disease on chronic dialysis, unspecified whether long term insulin use  Assessment and Plan of Treatment: Diet controlled  Continue consistent carbohydrate diet    Diagnosis: Essential hypertension  Assessment and Plan of Treatment: Continue home medications    Diagnosis: ESRD on dialysis  Assessment and Plan of Treatment: Continue outpatient dialysis

## 2020-01-20 NOTE — PHYSICAL THERAPY INITIAL EVALUATION ADULT - GAIT DEVIATIONS NOTED, PT EVAL
decreased ofelia/decreased step length/decreased swing-to-stance ratio/decreased weight-shifting ability

## 2020-01-20 NOTE — PHYSICAL THERAPY INITIAL EVALUATION ADULT - GAIT TRAINING, PT EVAL
Improve gait independence and endurance  to be able to ambulate independently 500 ft and climb steps independently in 2 wks.

## 2020-01-20 NOTE — PROGRESS NOTE ADULT - REASON FOR ADMISSION
Vomiting/weakness

## 2020-01-20 NOTE — DISCHARGE NOTE PROVIDER - NSDCMRMEDTOKEN_GEN_ALL_CORE_FT
atorvastatin 40 mg oral tablet: 1 tab(s) orally once a day (at bedtime)  docusate sodium 100 mg oral tablet: 1 tab(s) orally , As Needed  epoetin theodore:   FeroSul 325 mg (65 mg elemental iron) oral tablet: 1 tab(s) orally once a day   guaiFENesin 100 mg/5 mL oral liquid: 10 milliliter(s) orally every 6 hours, As needed, Cough  hydrALAZINE 50 mg oral tablet: orally 2 times a day  labetalol 300 mg oral tablet: 1 tab(s) orally 3 times a day  pantoprazole 40 mg oral delayed release tablet: 1 tab(s) orally 2 times a day  sevelamer hydrochloride 800 mg oral tablet: 2 tab(s) orally 3 times a day  sucralfate 1 g oral tablet: 1 tab(s) orally 4 times a day

## 2020-01-20 NOTE — DISCHARGE NOTE PROVIDER - HOSPITAL COURSE
HPI:    · 69 year old female PMH of HTN, HLD, CKD on hemodialysis, anemia, CHF, and DM presents BIB EMS c/o vomiting brown liquid since Tuesday with latest episode today while eating. Patient missed his dialysis session on Tuesday but was able to get full session yesterday. Patient reports nausea, generalized weakness, and dizziness. Pt also reports having passed out while eating today at home. Pt denies any general pain, or trouble breathing. Dialysis recommended to send him to ED. Pt is allergic to Lisinopril, and his blood sugar level at home was 300. (16 Jan 2020 17:21)        During hospital stay patient had GI bleed, EGD showed source to be duodenal ulcer. Patient given 3 units PRBCs during stay, and was started on Iron, Carafate and protonix.Problem/Plan - 1:    ·  Problem: Iron deficiency anemia due to chronic blood loss.  Plan: - status post transfusion of 2 units PRBC's on admission.  He was transfused another unit of PRBCS yesterday 1/18/20.    - hgb is 8.6 today.    - CBC in AM.         Problem/Plan - 2:    ·  Problem: Duodenal ulcer.  Plan: - status post EGD.    -  Acute Duodenal Ulcer (bulb)    - continue PPI, carafate    - GI is following    - CBC in AM    - Advance diet as tolerated as per GI.         Problem/Plan - 3:    ·  Problem: Hyperkalemia.  Plan: - from ESRD.    - resolved status post Hemodialysis.         Problem/Plan - 4:    ·  Problem: ESRD on dialysis.  Plan: - HD maintenance as per renal.         Problem/Plan - 5:    ·  Problem: Non-intractable vomiting with nausea.  Plan: Zofran PRN.         Problem/Plan - 6:    Problem: Essential hypertension. Plan: Continue Home Meds Labatolol and Hydralazine.        Problem/Plan - 7:    ·  Problem: Type 2 diabetes mellitus with chronic kidney disease on chronic dialysis, unspecified whether long term insulin use.  Plan: FS AC/HS with SS coverage    HbA1C pending.         Problem/Plan - 8:    ·  Problem: Hyperlipidemia, unspecified hyperlipidemia type.  Plan: Continue Home meds. HPI:    · 69 year old female PMH of HTN, HLD, CKD on hemodialysis, anemia, CHF, and DM presents BIB EMS c/o vomiting brown liquid since Tuesday with latest episode today while eating. Patient missed his dialysis session on Tuesday but was able to get full session yesterday. Patient reports nausea, generalized weakness, and dizziness. Pt also reports having passed out while eating today at home. Pt denies any general pain, or trouble breathing. Dialysis recommended to send him to ED. Pt is allergic to Lisinopril, and his blood sugar level at home was 300. (16 Jan 2020 17:21)        During hospital stay patient had GI bleed, EGD showed source to be duodenal ulcer. Patient given 3 units PRBCs during stay, and was started on Iron, Carafate and protonix.Problem/Plan - 1:    ·  Problem: Iron deficiency anemia due to chronic blood loss.  Plan: - status post transfusion of 2 units PRBC's on admission.  He was transfused another unit of PRBCS yesterday 1/18/20.    - hgb is 8.6 today.    - CBC in AM.         Problem/Plan - 2:    ·  Problem: Duodenal ulcer.  Plan: - status post EGD.    -  Acute Duodenal Ulcer (bulb)    - continue PPI, carafate    - GI is following    - CBC in AM    - Advance diet as tolerated as per GI.         Problem/Plan - 3:    ·  Problem: Hyperkalemia.  Plan: - from ESRD.    - resolved status post Hemodialysis.         Problem/Plan - 4:    ·  Problem: ESRD on dialysis.  Plan: - HD maintenance as per renal.         Problem/Plan - 5:    ·  Problem: Non-intractable vomiting with nausea.  Plan: Zofran PRN.         Problem/Plan - 6:    Problem: Essential hypertension. Plan: Continue Home Meds Labatolol and Hydralazine.        Problem/Plan - 7:    ·  Problem: Type 2 diabetes mellitus with chronic kidney disease on chronic dialysis, unspecified whether long term insulin use.  Plan: FS AC/HS with SS coverage    HbA1C pending.         Problem/Plan - 8:    ·  Problem: Hyperlipidemia, unspecified hyperlipidemia type.  Plan: Continue Home meds.         Addendum: Has DM(II) with hyperglycemia on arrival. On HD. No DKA. Does not take long term insulin, DM is diet controlled     now that he is on insulin.

## 2020-01-20 NOTE — DISCHARGE NOTE PROVIDER - CARE PROVIDER_API CALL
Emiliano Forbes)  Internal Medicine  [address] [facility], [address] [place] [address]  Phone: [phone]  Fax: [phone]  Follow Up Time:

## 2020-01-21 LAB — SURGICAL PATHOLOGY STUDY: SIGNIFICANT CHANGE UP

## 2020-01-28 DIAGNOSIS — Z95.1 PRESENCE OF AORTOCORONARY BYPASS GRAFT: ICD-10-CM

## 2020-01-28 DIAGNOSIS — E78.5 HYPERLIPIDEMIA, UNSPECIFIED: ICD-10-CM

## 2020-01-28 DIAGNOSIS — Z88.8 ALLERGY STATUS TO OTHER DRUGS, MEDICAMENTS AND BIOLOGICAL SUBSTANCES STATUS: ICD-10-CM

## 2020-01-28 DIAGNOSIS — I50.9 HEART FAILURE, UNSPECIFIED: ICD-10-CM

## 2020-01-28 DIAGNOSIS — Z95.2 PRESENCE OF PROSTHETIC HEART VALVE: ICD-10-CM

## 2020-01-28 DIAGNOSIS — E11.65 TYPE 2 DIABETES MELLITUS WITH HYPERGLYCEMIA: ICD-10-CM

## 2020-01-28 DIAGNOSIS — E87.5 HYPERKALEMIA: ICD-10-CM

## 2020-01-28 DIAGNOSIS — D50.0 IRON DEFICIENCY ANEMIA SECONDARY TO BLOOD LOSS (CHRONIC): ICD-10-CM

## 2020-01-28 DIAGNOSIS — Z79.82 LONG TERM (CURRENT) USE OF ASPIRIN: ICD-10-CM

## 2020-01-28 DIAGNOSIS — R11.2 NAUSEA WITH VOMITING, UNSPECIFIED: ICD-10-CM

## 2020-01-28 DIAGNOSIS — K26.0 ACUTE DUODENAL ULCER WITH HEMORRHAGE: ICD-10-CM

## 2020-01-28 DIAGNOSIS — D63.1 ANEMIA IN CHRONIC KIDNEY DISEASE: ICD-10-CM

## 2020-01-28 DIAGNOSIS — R55 SYNCOPE AND COLLAPSE: ICD-10-CM

## 2020-01-28 DIAGNOSIS — N18.6 END STAGE RENAL DISEASE: ICD-10-CM

## 2020-01-28 DIAGNOSIS — Z90.49 ACQUIRED ABSENCE OF OTHER SPECIFIED PARTS OF DIGESTIVE TRACT: ICD-10-CM

## 2020-01-28 DIAGNOSIS — Z99.2 DEPENDENCE ON RENAL DIALYSIS: ICD-10-CM

## 2020-01-28 DIAGNOSIS — E11.22 TYPE 2 DIABETES MELLITUS WITH DIABETIC CHRONIC KIDNEY DISEASE: ICD-10-CM

## 2020-01-28 DIAGNOSIS — I13.2 HYPERTENSIVE HEART AND CHRONIC KIDNEY DISEASE WITH HEART FAILURE AND WITH STAGE 5 CHRONIC KIDNEY DISEASE, OR END STAGE RENAL DISEASE: ICD-10-CM

## 2020-01-28 DIAGNOSIS — K29.70 GASTRITIS, UNSPECIFIED, WITHOUT BLEEDING: ICD-10-CM

## 2020-02-08 PROBLEM — N18.9 CHRONIC KIDNEY DISEASE, UNSPECIFIED: Chronic | Status: ACTIVE | Noted: 2018-06-13

## 2020-02-10 ENCOUNTER — APPOINTMENT (OUTPATIENT)
Dept: PULMONOLOGY | Facility: CLINIC | Age: 70
End: 2020-02-10
Payer: MEDICARE

## 2020-02-10 VITALS
RESPIRATION RATE: 16 BRPM | OXYGEN SATURATION: 90 % | HEART RATE: 87 BPM | SYSTOLIC BLOOD PRESSURE: 213 MMHG | TEMPERATURE: 98.5 F | DIASTOLIC BLOOD PRESSURE: 89 MMHG

## 2020-02-10 VITALS — DIASTOLIC BLOOD PRESSURE: 84 MMHG | SYSTOLIC BLOOD PRESSURE: 198 MMHG

## 2020-02-10 DIAGNOSIS — I10 ESSENTIAL (PRIMARY) HYPERTENSION: ICD-10-CM

## 2020-02-10 LAB — POCT - HEMOGLOBIN (HGB), QUANTITATIVE, TRANSCUTANEOUS: 11

## 2020-02-10 PROCEDURE — 94727 GAS DIL/WSHOT DETER LNG VOL: CPT

## 2020-02-10 PROCEDURE — 94060 EVALUATION OF WHEEZING: CPT

## 2020-02-10 PROCEDURE — 94729 DIFFUSING CAPACITY: CPT

## 2020-02-10 PROCEDURE — 71046 X-RAY EXAM CHEST 2 VIEWS: CPT

## 2020-02-10 PROCEDURE — 99214 OFFICE O/P EST MOD 30 MIN: CPT | Mod: 25

## 2020-02-10 PROCEDURE — 88738 HGB QUANT TRANSCUTANEOUS: CPT

## 2020-02-10 NOTE — ASSESSMENT
[FreeTextEntry1] : patient has nebulizer at home , will order DuoNeb and budesonide bid\par Acapella for pulmonary toilet\par F/U CT Chest\par Continue dialysis.

## 2020-02-10 NOTE — PROCEDURE
[FreeTextEntry1] : 02/10/2020\par Pulmonary function testing\par There is a severe ventilatory impairment in a restrictive pattern. There is no significant bronchodilator response. There is a severe reduction in lung volume. There is elevation in the RV/TLC ratio indicative of possible air trapping. There is a very severe diffusion impairment. Corrects to moderate with lung volume correction \par Mild dec. in function compared to 2/20/19\par \par 02/10/2020 \par PA and lateral chest radiograph reveals He is status post median sternotomy and coronary artery bypass graft. There is significant elevation of the right hemidiaphragm. There is mild blunting of the left costophrenic angle but no definitive pleural disease. There are retrocardiac densities on the left which are oblong in shape.\par \par Compared to prior chest radiograph from August 3, 2018 the only change is possible mild decrease in the retrocardiac density.\par

## 2020-02-10 NOTE — HISTORY OF PRESENT ILLNESS
[Former] : former [< 30 pack-years] : < 30 pack-years [TextBox_4] : Now gets dialysis. c/o dry cough past 3 months worse since out of hospital .ran out of albuterol inhaler, never used it when he had it. Mostly worse at night but can happen during day , like a choking ,cant get mucus up , no sob. b/p in dialysis good today. . endoscopy recently in hospital showed gastric ulcer ,now on Carafate. dry weight has been stable\par \par hx diabetes right now not on dialysis [TextBox_11] : 1 [TextBox_13] : 25 [TextBox_15] : 1990

## 2020-02-10 NOTE — DISCUSSION/SUMMARY
[FreeTextEntry1] : Severe restrictive physiology related to body habitus, prior injury, diaphragmatic dysfunction and multiple medical problems. \par \par Persistent cough most likely related to airway congestion and difficulty mobilizing secretions. Cannot exclude component of airways reactivity. \par \par Abnormal CT likely chronic injury. \par

## 2020-03-16 ENCOUNTER — APPOINTMENT (OUTPATIENT)
Dept: PULMONOLOGY | Facility: CLINIC | Age: 70
End: 2020-03-16
Payer: MEDICARE

## 2020-03-16 VITALS
HEART RATE: 85 BPM | RESPIRATION RATE: 16 BRPM | DIASTOLIC BLOOD PRESSURE: 88 MMHG | OXYGEN SATURATION: 92 % | SYSTOLIC BLOOD PRESSURE: 182 MMHG | TEMPERATURE: 98.6 F

## 2020-03-16 DIAGNOSIS — Z00.00 ENCOUNTER FOR GENERAL ADULT MEDICAL EXAMINATION W/OUT ABNORMAL FINDINGS: ICD-10-CM

## 2020-03-16 PROCEDURE — 99213 OFFICE O/P EST LOW 20 MIN: CPT

## 2020-03-17 NOTE — DISCUSSION/SUMMARY
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 Clinically stable. \par SZYMANSKI improved

## 2020-03-17 NOTE — PHYSICAL EXAM
[No Acute Distress] : no acute distress [Normal Appearance] : normal appearance [No Neck Mass] : no neck mass [Normal S1, S2] : normal s1, s2 [Benign] : benign [No Clubbing] : no clubbing [No Cyanosis] : no cyanosis [No Edema] : no edema [TextBox_68] : Decreased BS right base.

## 2020-03-17 NOTE — HISTORY OF PRESENT ILLNESS
[TextBox_4] : Cough resolved. \par Now off pantoprazole as well. \par \par Denies wheezing. Decrease in SZYMANSKI

## 2020-03-19 NOTE — ED ADULT NURSE NOTE - ED STAT RN HANDOFF TIME
57y Male is under our care for gas gangrene and acute osteomyelitis (acute)of right 5th toe / foot and cellulitis of right foot and leg. Patient is doing well and denies any overnight events. Wbc count continues to trend downward. Going to OR tomorrow instead for right foot debridement.     REVIEW OF SYSTEMS:  [  ] Not able to illicit  General: no fevers no malaise  Chest: no cough no sob  GI: no nvd  : no urinary sxs   Skin: no rashes  Musculoskeletal: no trauma no LBP +foot pain  Neuro: no ha's no dizziness     MEDS:  clindamycin IVPB 900 milliGRAM(s) IV Intermittent every 8 hours  piperacillin/tazobactam IVPB.. 3.375 Gram(s) IV Intermittent every 8 hours       ALLERGIES: Allergies    No Known Allergies    Intolerances      VITALS:  Vital Signs Last 24 Hrs  T(C): 36.2 (19 Mar 2020 05:25), Max: 37 (18 Mar 2020 20:35)  T(F): 97.1 (19 Mar 2020 05:25), Max: 98.6 (18 Mar 2020 20:35)  HR: 93 (19 Mar 2020 05:25) (93 - 100)  BP: 157/88 (19 Mar 2020 05:25) (123/75 - 157/88)  BP(mean): --  RR: 17 (19 Mar 2020 05:25) (17 - 17)  SpO2: 94% (19 Mar 2020 05:25) (94% - 97%)      PHYSICAL EXAM:  HEENT: n/a  Neck: supple no LN's   Respiratory: lungs clear no rales  Cardiovascular: S1 S2 reg no murmurs  Gastrointestinal: +BS with soft, large abdominal pannus; nontender  Extremities: decreasing RLE edema  Skin: lingering RLE warmth, but RLE erythema has slightly improved, right foot wrapped  Ortho: n/a  Neuro: awake and alert      LABS/DIAGNOSTIC TESTS:                        10.7   19.21 )-----------( 375      ( 19 Mar 2020 07:06 )             34.1   WBC Count: 19.21 K/uL (03-19 @ 07:06)  WBC Count: 23.60 K/uL (03-18 @ 07:12)  WBC Count: 25.59 K/uL (03-17 @ 07:46)  WBC Count: 28.45 K/uL (03-16 @ 17:55)  WBC Count: 29.66 K/uL (03-16 @ 14:55)    03-19    133<L>  |  99  |  13  ----------------------------<  261<H>  3.8   |  21<L>  |  1.06    Ca    8.6      19 Mar 2020 07:06    TPro  7.4  /  Alb  1.7<L>  /  TBili  0.6  /  DBili  0.2  /  AST  45<H>  /  ALT  39  /  AlkPhos  196<H>  03-18        CULTURES:   .Surgical Swab  03-17 @ 10:04   No growth  --  --      .Blood  03-16 @ 22:40   No growth to date.  --  --      .Blood  03-16 @ 22:39   No growth to date.  --  --        RADIOLOGY:  no new studies 19:10

## 2020-03-25 NOTE — DISCHARGE NOTE PROVIDER - REASON FOR ADMISSION
blocked HD graft Post-Care Instructions: I reviewed with the patient in detail post-care instructions. Patient should stay away from the sun and wear sun protection until treated areas are fully healed.

## 2020-05-07 NOTE — PROGRESS NOTE ADULT - PROBLEM SELECTOR PROBLEM 6
Subjective   Danny Newman is a 27 y.o. male.     Chief Complaint   Patient presents with   • new patient visit   • Annual Exam         History of Present Illness   Danny Newman 27 y.o. male who presents for an Annual Wellness Visit.  he has a history of   Patient Active Problem List   Diagnosis   • Healthcare maintenance   • Chronic bilateral low back pain without sciatica   • TMJ syndrome   • Poor sleep   • Chronic pain of both knees   • Intrinsic eczema   • Instability of right ankle joint   .  he has been feeling well.  Labs results discussed in detail with the patient.  Plan to update vaccines if needed today.  I  reviewed health maintenance with him as part of my preventative care plan.    Health Habits:  Dental Exam. up to date  Eye Exam. up to date  Exercise: 3 times/week.  Current exercise activities include: running/ jogging      The following portions of the patient's history were reviewed and updated as appropriate: allergies, current medications, past family history, past medical history, past social history, past surgical history and problem list.    Review of Systems   Constitutional: Negative.    HENT: Negative.    Eyes: Negative.    Respiratory: Negative.    Cardiovascular: Negative.    Gastrointestinal: Negative.    Endocrine: Negative.    Genitourinary: Negative.    Musculoskeletal: Positive for arthralgias and back pain.        Knees   Skin:        eczema   Allergic/Immunologic: Negative.    Neurological: Negative.    Hematological: Negative.    Psychiatric/Behavioral: Positive for sleep disturbance.       Objective   Physical Exam   Constitutional: He is oriented to person, place, and time. He appears well-developed and well-nourished. No distress.   HENT:   Head: Normocephalic and atraumatic.   Right Ear: External ear normal.   Left Ear: External ear normal.   Nose: Nose normal.   Mouth/Throat: Oropharynx is clear and moist.   Eyes: Pupils are equal, round, and reactive to light.  Conjunctivae are normal. Right eye exhibits no discharge. Left eye exhibits no discharge.   Neck: Normal range of motion. No thyromegaly present.   Cardiovascular: Normal rate and regular rhythm.   Pulmonary/Chest: Effort normal and breath sounds normal.   Abdominal: Soft. He exhibits no distension and no mass. There is no tenderness. There is no rebound and no guarding. No hernia.   Musculoskeletal: Normal range of motion. He exhibits no edema or tenderness.   Neurological: He is alert and oriented to person, place, and time. He displays normal reflexes. No cranial nerve deficit or sensory deficit. He exhibits normal muscle tone. Coordination normal.   Skin: Skin is warm and dry. He is not diaphoretic.   Psychiatric: He has a normal mood and affect. His behavior is normal. Judgment and thought content normal.   Nursing note and vitals reviewed.      Assessment/Plan   Danny was seen today for new patient visit and annual exam.    Diagnoses and all orders for this visit:    Healthcare maintenance  -     Lipid Panel    TMJ syndrome    Chronic pain of both knees    Chronic bilateral low back pain without sciatica    Poor sleep    Intrinsic eczema    Instability of right ankle joint    Follow-up results of lipid testing continue healthy lifestyle with exercise intermittent use of anti-inflammatories for chronic musculoskeletal pain as well as topical steroid treatment for eczema as needed otherwise follow-up as needed or annually            Essential hypertension
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
## 2020-08-21 NOTE — ED ADULT NURSE NOTE - CAS TRG GENERAL NORM CIRC DET
PULMONARY PROGRESS NOTE      BOUCHRA HERNANDEZ  MRN-[id number]    Patient is a 50y old  Male who presents with a chief complaint of Acute Hypoxemic Respiratory Failure, COVID19 PNA (21 Aug 2020 12:38)        INTERVAL HPI/OVERNIGHT EVENTS:  Pt seen and examined at the bedside. He reports feeling okay overall. Denies worsening of SOB.    MEDICATIONS  (STANDING):  ascorbic acid 1500 milliGRAM(s) Oral daily  aspirin enteric coated 325 milliGRAM(s) Oral daily  dextrose 5%. 1000 milliLiter(s) (50 mL/Hr) IV Continuous <Continuous>  dextrose 50% Injectable 12.5 Gram(s) IV Push once  dextrose 50% Injectable 25 Gram(s) IV Push once  dextrose 50% Injectable 25 Gram(s) IV Push once  enoxaparin Injectable 40 milliGRAM(s) SubCutaneous every 12 hours  insulin glargine Injectable (LANTUS) 25 Unit(s) SubCutaneous at bedtime  insulin lispro (HumaLOG) corrective regimen sliding scale   SubCutaneous three times a day before meals  insulin lispro (HumaLOG) corrective regimen sliding scale   SubCutaneous at bedtime  insulin lispro Injectable (HumaLOG) 7 Unit(s) SubCutaneous three times a day before meals  lisinopril 5 milliGRAM(s) Oral daily  methylPREDNISolone sodium succinate Injectable 60 milliGRAM(s) IV Push every 12 hours  remdesivir  IVPB 100 milliGRAM(s) IV Intermittent every 24 hours  thiamine 100 milliGRAM(s) Oral daily  zinc sulfate 220 milliGRAM(s) Oral daily    MEDICATIONS  (PRN):  acetaminophen   Tablet .. 650 milliGRAM(s) Oral every 4 hours PRN Temp greater or equal to 38.5C (101.3F)  acetaminophen  Suppository .. 650 milliGRAM(s) Rectal every 4 hours PRN Temp greater or equal to 38C (100.4F)  ALBUTerol    90 MICROgram(s) HFA Inhaler 2 Puff(s) Inhalation every 4 hours PRN Shortness of Breath and/or Wheezing  dextrose 40% Gel 15 Gram(s) Oral once PRN Blood Glucose LESS THAN 70 milliGRAM(s)/deciliter  glucagon  Injectable 1 milliGRAM(s) IntraMuscular once PRN Glucose LESS THAN 70 milligrams/deciliter  ondansetron Injectable 4 milliGRAM(s) IV Push every 6 hours PRN Nausea and/or Vomiting    Allergies    No Known Allergies    Intolerances    Paper tray only (Unknown)    PAST MEDICAL & SURGICAL HISTORY:  DM (diabetes mellitus)  Hypertriglyceridemia  Hypertension  No significant past surgical history        REVIEW OF SYSTEMS:  Negative except as noted in HPI      Vital Signs Last 24 Hrs  T(C): 36.6 (21 Aug 2020 00:03), Max: 36.6 (21 Aug 2020 00:03)  T(F): 97.8 (21 Aug 2020 00:03), Max: 97.8 (21 Aug 2020 00:03)  HR: 97 (21 Aug 2020 09:30) (71 - 101)  BP: 106/65 (21 Aug 2020 07:30) (96/57 - 116/73)  BP(mean): --  RR: 21 (21 Aug 2020 07:30) (20 - 21)  SpO2: 89% (21 Aug 2020 08:26) (89% - 95%)      PHYSICAL EXAMINATION:  GEN: In no apparent distress  HEENT: NC/AT  NECK: Supple, non-tender  CV: +S1, S2, RRR  RESPIRATORY: CTA B/L, good inspiratory effort, non-labored breathing  ABDOMEN: Soft, NT/ND, +BS  EXTREMITIES: No rashes, lesions, or pitting edema noted  NEURO: No focal deficits, grossly intact  PSYCH: Normal affect      LABS:                        15.7   13.58 )-----------( 532      ( 20 Aug 2020 08:11 )             46.2     08-21    x   |  x   |  x   ----------------------------<  x   x    |  x   |  0.54    Ca    8.6      20 Aug 2020 08:11  Phos  3.0     08-20  Mg     2.3     08-20    TPro  5.7<L>  /  Alb  2.7<L>  /  TBili  0.5  /  DBili  0.1  /  AST  12  /  ALT  21  /  AlkPhos  70  08-21          CARDIAC MARKERS ( 21 Aug 2020 07:05 )  x     / <0.01 ng/mL / x     / x     / x            Serum Pro-Brain Natriuretic Peptide: 40 pg/mL (08-21-20 @ 07:05)          MICROBIOLOGY:  COVID-19  Antibody - for prior infection screening (08.14.20 @ 01:01)    COVID-19 IgG Antibody Index: 0.30: Roche ECLIA Total AB (OMA)  NOTE: This result index represents a total antibody measurement,  which  includes IgG, IgA, and IgM  Negative <= 0.99 Index  Positive >= 1.00 Index Index    COVID-19 IgG Antibody Interpretation: Negative: This test has not been reviewed by the FDA by the standard review  process. It has been authorized for emergency use by the FDA. [employer] has validated this test to be accurate.  Negative results do not rule out SARS-CoV-2 infection, particularly in  those who have been in recent contact with the virus. Follow-up testing  with a molecular diagnostic test should be considered to rule out  infection in these individuals.  Results from antibody testing should not be used as thesole basis to  diagnose or exclude SARS-CoV-2 infection, or to inform infection status.  Positive results may rarely be due to past or present infection with  non-SARS-CoV-2 coronavirus strains, such as coronavirus HKU1, NL63, OC43,  or 229E. [employer], through extensive validation  testing, has confirmed that this risk is minimal with this test.        RADIOLOGY & ADDITIONAL STUDIES:  < from: CT Angio Chest w/ IV Cont (08.12.20 @ 22:44) >  IMPRESSION:  No evidence of pulmonary embolism.    Extensive, bilaterally asymmetric and peripherally predominant groundglass opacities concerning for atypical infection, especially a viral etiology such as COVID-19. Alternative etiologies include diffuse alveolar hemorrhage, eosinophilic lung disease, or vasculitis.    < end of copied text >      ECHO: Capillary refill less/equal to 2 seconds/Strong peripheral pulses

## 2020-10-01 NOTE — H&P PST ADULT - RS GEN PE MLT RESP DETAILS PC
Clothing
good air movement/airway patent/respirations non-labored/normal/no wheezes/clear to auscultation bilaterally/no chest wall tenderness/no intercostal retractions/no rales/no rhonchi/no subcutaneous emphysema

## 2021-01-19 ENCOUNTER — INPATIENT (INPATIENT)
Facility: HOSPITAL | Age: 71
LOS: 7 days | Discharge: HOME HEALTH | End: 2021-01-27
Attending: INTERNAL MEDICINE | Admitting: INTERNAL MEDICINE
Payer: MEDICARE

## 2021-01-19 VITALS
OXYGEN SATURATION: 93 % | HEART RATE: 63 BPM | RESPIRATION RATE: 19 BRPM | HEIGHT: 66 IN | SYSTOLIC BLOOD PRESSURE: 112 MMHG | WEIGHT: 154.98 LBS | DIASTOLIC BLOOD PRESSURE: 63 MMHG | TEMPERATURE: 100 F

## 2021-01-19 DIAGNOSIS — Z95.1 PRESENCE OF AORTOCORONARY BYPASS GRAFT: Chronic | ICD-10-CM

## 2021-01-19 LAB
ALBUMIN SERPL ELPH-MCNC: 3 G/DL — LOW (ref 3.3–5)
ALP SERPL-CCNC: 95 U/L — SIGNIFICANT CHANGE UP (ref 40–120)
ALT FLD-CCNC: 20 U/L — SIGNIFICANT CHANGE UP (ref 12–78)
ANION GAP SERPL CALC-SCNC: 10 MMOL/L — SIGNIFICANT CHANGE UP (ref 5–17)
AST SERPL-CCNC: 33 U/L — SIGNIFICANT CHANGE UP (ref 15–37)
BASOPHILS # BLD AUTO: 0.03 K/UL — SIGNIFICANT CHANGE UP (ref 0–0.2)
BASOPHILS NFR BLD AUTO: 0.4 % — SIGNIFICANT CHANGE UP (ref 0–2)
BILIRUB SERPL-MCNC: 0.5 MG/DL — SIGNIFICANT CHANGE UP (ref 0.2–1.2)
BUN SERPL-MCNC: 68 MG/DL — HIGH (ref 7–23)
CALCIUM SERPL-MCNC: 8.3 MG/DL — LOW (ref 8.5–10.1)
CHLORIDE SERPL-SCNC: 91 MMOL/L — LOW (ref 96–108)
CO2 SERPL-SCNC: 30 MMOL/L — SIGNIFICANT CHANGE UP (ref 22–31)
CREAT SERPL-MCNC: 12.8 MG/DL — HIGH (ref 0.5–1.3)
D DIMER BLD IA.RAPID-MCNC: 2090 NG/ML DDU — HIGH
EOSINOPHIL # BLD AUTO: 0.03 K/UL — SIGNIFICANT CHANGE UP (ref 0–0.5)
EOSINOPHIL NFR BLD AUTO: 0.4 % — SIGNIFICANT CHANGE UP (ref 0–6)
GLUCOSE SERPL-MCNC: 158 MG/DL — HIGH (ref 70–99)
HCT VFR BLD CALC: 34.3 % — LOW (ref 39–50)
HGB BLD-MCNC: 10.6 G/DL — LOW (ref 13–17)
IMM GRANULOCYTES NFR BLD AUTO: 0.3 % — SIGNIFICANT CHANGE UP (ref 0–1.5)
LYMPHOCYTES # BLD AUTO: 0.8 K/UL — LOW (ref 1–3.3)
LYMPHOCYTES # BLD AUTO: 10.8 % — LOW (ref 13–44)
MCHC RBC-ENTMCNC: 27.3 PG — SIGNIFICANT CHANGE UP (ref 27–34)
MCHC RBC-ENTMCNC: 30.9 GM/DL — LOW (ref 32–36)
MCV RBC AUTO: 88.4 FL — SIGNIFICANT CHANGE UP (ref 80–100)
MONOCYTES # BLD AUTO: 1.96 K/UL — HIGH (ref 0–0.9)
MONOCYTES NFR BLD AUTO: 26.5 % — HIGH (ref 2–14)
NEUTROPHILS # BLD AUTO: 4.57 K/UL — SIGNIFICANT CHANGE UP (ref 1.8–7.4)
NEUTROPHILS NFR BLD AUTO: 61.6 % — SIGNIFICANT CHANGE UP (ref 43–77)
NRBC # BLD: 0 /100 WBCS — SIGNIFICANT CHANGE UP (ref 0–0)
NT-PROBNP SERPL-SCNC: HIGH PG/ML (ref 0–125)
PLATELET # BLD AUTO: 204 K/UL — SIGNIFICANT CHANGE UP (ref 150–400)
POTASSIUM SERPL-MCNC: 5.5 MMOL/L — HIGH (ref 3.5–5.3)
POTASSIUM SERPL-SCNC: 5.5 MMOL/L — HIGH (ref 3.5–5.3)
PROT SERPL-MCNC: 7.3 GM/DL — SIGNIFICANT CHANGE UP (ref 6–8.3)
RAPID RVP RESULT: DETECTED
RBC # BLD: 3.88 M/UL — LOW (ref 4.2–5.8)
RBC # FLD: 17.2 % — HIGH (ref 10.3–14.5)
SARS-COV-2 RNA SPEC QL NAA+PROBE: DETECTED
SODIUM SERPL-SCNC: 131 MMOL/L — LOW (ref 135–145)
TROPONIN I SERPL-MCNC: 0.12 NG/ML — HIGH (ref 0.01–0.04)
WBC # BLD: 7.41 K/UL — SIGNIFICANT CHANGE UP (ref 3.8–10.5)
WBC # FLD AUTO: 7.41 K/UL — SIGNIFICANT CHANGE UP (ref 3.8–10.5)

## 2021-01-19 PROCEDURE — 93010 ELECTROCARDIOGRAM REPORT: CPT

## 2021-01-19 PROCEDURE — 71045 X-RAY EXAM CHEST 1 VIEW: CPT | Mod: 26

## 2021-01-19 PROCEDURE — 99285 EMERGENCY DEPT VISIT HI MDM: CPT

## 2021-01-19 PROCEDURE — 71275 CT ANGIOGRAPHY CHEST: CPT | Mod: 26,MC

## 2021-01-19 NOTE — ED ADULT NURSE NOTE - ED STAT RN HANDOFF DETAILS
Lawanda ALANIZ  Pt alseep, respirations even and unlabored, NSR on cardiac monitor, 98% oxygen saturation on 4L/min via nasal cannula.

## 2021-01-19 NOTE — ED ADULT NURSE NOTE - PMH
Anemia    Aortic valve replaced    CHF (congestive heart failure)    CKD (chronic kidney disease)  On hemodialysis  Diabetes mellitus    HLD (hyperlipidemia)    HTN (hypertension)    Hypertension

## 2021-01-19 NOTE — ED ADULT TRIAGE NOTE - CHIEF COMPLAINT QUOTE
patient c/o of cough , and low oxygen level as per wife Talisha , patient denied chest pain denied difficulty breathing , dialysis days ( T , T , S )

## 2021-01-19 NOTE — ED ADULT NURSE REASSESSMENT NOTE - NS ED NURSE REASSESS COMMENT FT1
Pt aaox3, respirations even and unlabored, skin warm and dry, NSR on cardaic monitor. Denies chest pain or shortness of breath. Pending CTA to rule out PE secondary to elevated d-dimer.

## 2021-01-19 NOTE — ED PROVIDER NOTE - OBJECTIVE STATEMENT
69yo M w/ PMH HTN, DM, ESRD on HD (TTS) pw hypoxia. Pt reports O2 was 90% on RA this AM, normally 97%. Pt endorses 4-5 days of wet productive cough. Pt did not go to dialysis today, last dialysis Saturday. Pt denies SOB, CP, F/C, abd pain, N/V/D. Pt wife Talisha     PMH as above, PSH AVF, allergy lisinopril, meds as listed. 71yo M w/ PMH HTN, DM, ESRD on HD (TTS) pw hypoxia. Pt reports O2 was 90% on RA this AM, normally 97%. Pt endorses 4-5 days of wet productive cough. Pt did not go to dialysis today, last dialysis Saturday. Pt denies SOB, CP, F/C, abd pain, N/V/D. Pt wife Talisha (ph # [phone])    PMH as above, PSH AVF, allergy lisinopril, meds as listed.

## 2021-01-19 NOTE — ED PROVIDER NOTE - OTHER FINDINGS
RBPATRICIA, MELODY Purse String (Simple) Text: Given the location of the defect and the characteristics of the surrounding skin a purse string closure was deemed most appropriate.  Undermining was performed circumfirentially around the surgical defect.  A purse string suture was then placed and tightened.

## 2021-01-19 NOTE — ED PROVIDER NOTE - CLINICAL SUMMARY MEDICAL DECISION MAKING FREE TEXT BOX
71yo M w/ PMH HTN, DM, asthma pw hypoxia to 90% RA since this AM, a/w 4-5 days of wet cough. AFVSS. Pt well appearing, in NAD, exam as noted in PE. Plan: Obtain CBC, CMP, ECG, trop, D-dimer, CXR. Re-eval. 71yo M w/ PMH HTN, DM, asthma pw hypoxia to 90% RA since this AM, a/w 4-5 days of wet cough. AFVSS. Pt well appearing, in NAD, exam as noted in PE. Plan: Obtain CBC, CMP, ECG, trop, D-dimer, CXR. Re-eval. CBC, CMP c/w pt hx ESRD on HD. Trop + 0.1, likely d/t decr renal failure. CXR w/ L sided PNA. D-dimer >2000. CTA ordered. COVID pending. Pt care signed out at change of shift.

## 2021-01-19 NOTE — ED PROVIDER NOTE - CARE PLAN
Principal Discharge DX:	Pneumonia of left lower lobe due to infectious organism   Principal Discharge DX:	Pneumonia of left lower lobe due to infectious organism  Secondary Diagnosis:	COVID-19

## 2021-01-19 NOTE — ED PROVIDER NOTE - PROGRESS NOTE DETAILS
Sitting up in bed, eating meal, in NAD. D-dimer elevated, pending COVID / CTA. Nephro Dr Carlos Denis (sp?)

## 2021-01-19 NOTE — ED ADULT NURSE NOTE - OBJECTIVE STATEMENT
Received pt lying on stretcher alert and oriented x4c/o of cough, patient denied chest pain denied difficulty breathing , hx ESRD on dialysis ( T , T , S ) last dialysis on Saturday withy full treatment 3 hours, left upper arm AV fistula +Bruit

## 2021-01-19 NOTE — ED PROVIDER NOTE - PHYSICAL EXAMINATION
GEN: Awake, alert, interactive, NAD.  HEAD AND NECK: NC/AT. Airway patent. Neck supple.   EYES:  Clear b/l. EOMI. PERRL.   ENT: Moist mucus membranes.   CARDIAC: Regular rate, regular rhythm. No evident pedal edema. No unilateral LE  edema / no calf TTP.   RESP/CHEST: Normal respiratory effort with no use of accessory muscles or retractions. Clear throughout on auscultation. + audible chest congestion.   ABD: Soft, non-distended, non-tender. No rebound, no guarding.   BACK: No midline spinal TTP. No CVAT.   EXTREMITIES: Moving all extremities with no apparent deformities.   SKIN: Warm, dry, intact normal color. No rash.   NEURO: AOx3, CN II-XII grossly intact, no focal deficits.   PSYCH: Appropriate mood and affect.

## 2021-01-20 DIAGNOSIS — N18.6 END STAGE RENAL DISEASE: ICD-10-CM

## 2021-01-20 DIAGNOSIS — Z29.9 ENCOUNTER FOR PROPHYLACTIC MEASURES, UNSPECIFIED: ICD-10-CM

## 2021-01-20 DIAGNOSIS — E78.5 HYPERLIPIDEMIA, UNSPECIFIED: ICD-10-CM

## 2021-01-20 DIAGNOSIS — E11.69 TYPE 2 DIABETES MELLITUS WITH OTHER SPECIFIED COMPLICATION: ICD-10-CM

## 2021-01-20 DIAGNOSIS — I10 ESSENTIAL (PRIMARY) HYPERTENSION: ICD-10-CM

## 2021-01-20 DIAGNOSIS — U07.1 COVID-19: ICD-10-CM

## 2021-01-20 LAB
ALBUMIN SERPL ELPH-MCNC: 2.7 G/DL — LOW (ref 3.3–5)
ALBUMIN SERPL ELPH-MCNC: 2.9 G/DL — LOW (ref 3.3–5)
ALP SERPL-CCNC: 95 U/L — SIGNIFICANT CHANGE UP (ref 40–120)
ALP SERPL-CCNC: 96 U/L — SIGNIFICANT CHANGE UP (ref 40–120)
ALT FLD-CCNC: 19 U/L — SIGNIFICANT CHANGE UP (ref 12–78)
ALT FLD-CCNC: 20 U/L — SIGNIFICANT CHANGE UP (ref 12–78)
ANION GAP SERPL CALC-SCNC: 14 MMOL/L — SIGNIFICANT CHANGE UP (ref 5–17)
ANION GAP SERPL CALC-SCNC: 9 MMOL/L — SIGNIFICANT CHANGE UP (ref 5–17)
AST SERPL-CCNC: 36 U/L — SIGNIFICANT CHANGE UP (ref 15–37)
AST SERPL-CCNC: 42 U/L — HIGH (ref 15–37)
BASOPHILS # BLD AUTO: 0.02 K/UL — SIGNIFICANT CHANGE UP (ref 0–0.2)
BASOPHILS NFR BLD AUTO: 0.3 % — SIGNIFICANT CHANGE UP (ref 0–2)
BILIRUB SERPL-MCNC: 0.4 MG/DL — SIGNIFICANT CHANGE UP (ref 0.2–1.2)
BILIRUB SERPL-MCNC: 0.5 MG/DL — SIGNIFICANT CHANGE UP (ref 0.2–1.2)
BUN SERPL-MCNC: 40 MG/DL — HIGH (ref 7–23)
BUN SERPL-MCNC: 77 MG/DL — HIGH (ref 7–23)
CALCIUM SERPL-MCNC: 7.7 MG/DL — LOW (ref 8.5–10.1)
CALCIUM SERPL-MCNC: 7.9 MG/DL — LOW (ref 8.5–10.1)
CHLORIDE SERPL-SCNC: 92 MMOL/L — LOW (ref 96–108)
CHLORIDE SERPL-SCNC: 97 MMOL/L — SIGNIFICANT CHANGE UP (ref 96–108)
CO2 SERPL-SCNC: 26 MMOL/L — SIGNIFICANT CHANGE UP (ref 22–31)
CO2 SERPL-SCNC: 31 MMOL/L — SIGNIFICANT CHANGE UP (ref 22–31)
CREAT SERPL-MCNC: 14.2 MG/DL — HIGH (ref 0.5–1.3)
CREAT SERPL-MCNC: 8.96 MG/DL — HIGH (ref 0.5–1.3)
EOSINOPHIL # BLD AUTO: 0.04 K/UL — SIGNIFICANT CHANGE UP (ref 0–0.5)
EOSINOPHIL NFR BLD AUTO: 0.6 % — SIGNIFICANT CHANGE UP (ref 0–6)
GLUCOSE BLDC GLUCOMTR-MCNC: 131 MG/DL — HIGH (ref 70–99)
GLUCOSE BLDC GLUCOMTR-MCNC: 149 MG/DL — HIGH (ref 70–99)
GLUCOSE BLDC GLUCOMTR-MCNC: 169 MG/DL — HIGH (ref 70–99)
GLUCOSE BLDC GLUCOMTR-MCNC: 189 MG/DL — HIGH (ref 70–99)
GLUCOSE SERPL-MCNC: 130 MG/DL — HIGH (ref 70–99)
GLUCOSE SERPL-MCNC: 82 MG/DL — SIGNIFICANT CHANGE UP (ref 70–99)
HCT VFR BLD CALC: 32.2 % — LOW (ref 39–50)
HGB BLD-MCNC: 10.1 G/DL — LOW (ref 13–17)
IMM GRANULOCYTES NFR BLD AUTO: 0.3 % — SIGNIFICANT CHANGE UP (ref 0–1.5)
LYMPHOCYTES # BLD AUTO: 0.64 K/UL — LOW (ref 1–3.3)
LYMPHOCYTES # BLD AUTO: 10.2 % — LOW (ref 13–44)
MAGNESIUM SERPL-MCNC: 2.9 MG/DL — HIGH (ref 1.6–2.6)
MCHC RBC-ENTMCNC: 27.7 PG — SIGNIFICANT CHANGE UP (ref 27–34)
MCHC RBC-ENTMCNC: 31.4 GM/DL — LOW (ref 32–36)
MCV RBC AUTO: 88.2 FL — SIGNIFICANT CHANGE UP (ref 80–100)
MONOCYTES # BLD AUTO: 1.35 K/UL — HIGH (ref 0–0.9)
MONOCYTES NFR BLD AUTO: 21.4 % — HIGH (ref 2–14)
NEUTROPHILS # BLD AUTO: 4.23 K/UL — SIGNIFICANT CHANGE UP (ref 1.8–7.4)
NEUTROPHILS NFR BLD AUTO: 67.2 % — SIGNIFICANT CHANGE UP (ref 43–77)
NRBC # BLD: 0 /100 WBCS — SIGNIFICANT CHANGE UP (ref 0–0)
PHOSPHATE SERPL-MCNC: 9.8 MG/DL — HIGH (ref 2.5–4.5)
PLATELET # BLD AUTO: 196 K/UL — SIGNIFICANT CHANGE UP (ref 150–400)
POTASSIUM SERPL-MCNC: 3.9 MMOL/L — SIGNIFICANT CHANGE UP (ref 3.5–5.3)
POTASSIUM SERPL-MCNC: 6 MMOL/L — HIGH (ref 3.5–5.3)
POTASSIUM SERPL-SCNC: 3.9 MMOL/L — SIGNIFICANT CHANGE UP (ref 3.5–5.3)
POTASSIUM SERPL-SCNC: 6 MMOL/L — HIGH (ref 3.5–5.3)
PROT SERPL-MCNC: 7.1 GM/DL — SIGNIFICANT CHANGE UP (ref 6–8.3)
PROT SERPL-MCNC: 7.1 GM/DL — SIGNIFICANT CHANGE UP (ref 6–8.3)
RBC # BLD: 3.65 M/UL — LOW (ref 4.2–5.8)
RBC # FLD: 17.2 % — HIGH (ref 10.3–14.5)
SARS-COV-2 IGG SERPL QL IA: NEGATIVE — SIGNIFICANT CHANGE UP
SARS-COV-2 IGM SERPL IA-ACNC: <0.1 INDEX — SIGNIFICANT CHANGE UP
SODIUM SERPL-SCNC: 132 MMOL/L — LOW (ref 135–145)
SODIUM SERPL-SCNC: 137 MMOL/L — SIGNIFICANT CHANGE UP (ref 135–145)
TROPONIN I SERPL-MCNC: 0.19 NG/ML — HIGH (ref 0.01–0.04)
WBC # BLD: 6.3 K/UL — SIGNIFICANT CHANGE UP (ref 3.8–10.5)
WBC # FLD AUTO: 6.3 K/UL — SIGNIFICANT CHANGE UP (ref 3.8–10.5)

## 2021-01-20 PROCEDURE — 99223 1ST HOSP IP/OBS HIGH 75: CPT

## 2021-01-20 PROCEDURE — 12345: CPT | Mod: NC

## 2021-01-20 RX ORDER — DEXTROSE 50 % IN WATER 50 %
50 SYRINGE (ML) INTRAVENOUS ONCE
Refills: 0 | Status: COMPLETED | OUTPATIENT
Start: 2021-01-20 | End: 2021-01-20

## 2021-01-20 RX ORDER — GLUCAGON INJECTION, SOLUTION 0.5 MG/.1ML
1 INJECTION, SOLUTION SUBCUTANEOUS ONCE
Refills: 0 | Status: DISCONTINUED | OUTPATIENT
Start: 2021-01-20 | End: 2021-01-27

## 2021-01-20 RX ORDER — DEXTROSE 50 % IN WATER 50 %
25 SYRINGE (ML) INTRAVENOUS ONCE
Refills: 0 | Status: DISCONTINUED | OUTPATIENT
Start: 2021-01-20 | End: 2021-01-27

## 2021-01-20 RX ORDER — SODIUM CHLORIDE 9 MG/ML
1000 INJECTION, SOLUTION INTRAVENOUS
Refills: 0 | Status: DISCONTINUED | OUTPATIENT
Start: 2021-01-20 | End: 2021-01-27

## 2021-01-20 RX ORDER — AMLODIPINE BESYLATE 2.5 MG/1
1 TABLET ORAL
Qty: 0 | Refills: 0 | DISCHARGE

## 2021-01-20 RX ORDER — ISOSORBIDE DINITRATE 5 MG/1
1 TABLET ORAL
Qty: 0 | Refills: 0 | DISCHARGE

## 2021-01-20 RX ORDER — DEXTROSE 50 % IN WATER 50 %
12.5 SYRINGE (ML) INTRAVENOUS ONCE
Refills: 0 | Status: DISCONTINUED | OUTPATIENT
Start: 2021-01-20 | End: 2021-01-27

## 2021-01-20 RX ORDER — ACETAMINOPHEN 500 MG
650 TABLET ORAL EVERY 6 HOURS
Refills: 0 | Status: DISCONTINUED | OUTPATIENT
Start: 2021-01-20 | End: 2021-01-27

## 2021-01-20 RX ORDER — INSULIN LISPRO 100/ML
VIAL (ML) SUBCUTANEOUS AT BEDTIME
Refills: 0 | Status: DISCONTINUED | OUTPATIENT
Start: 2021-01-20 | End: 2021-01-27

## 2021-01-20 RX ORDER — ATORVASTATIN CALCIUM 80 MG/1
40 TABLET, FILM COATED ORAL AT BEDTIME
Refills: 0 | Status: DISCONTINUED | OUTPATIENT
Start: 2021-01-20 | End: 2021-01-27

## 2021-01-20 RX ORDER — HYDRALAZINE HCL 50 MG
1 TABLET ORAL
Qty: 0 | Refills: 0 | DISCHARGE

## 2021-01-20 RX ORDER — DEXAMETHASONE 0.5 MG/5ML
6 ELIXIR ORAL DAILY
Refills: 0 | Status: DISCONTINUED | OUTPATIENT
Start: 2021-01-20 | End: 2021-01-27

## 2021-01-20 RX ORDER — DEXTROSE 50 % IN WATER 50 %
15 SYRINGE (ML) INTRAVENOUS ONCE
Refills: 0 | Status: DISCONTINUED | OUTPATIENT
Start: 2021-01-20 | End: 2021-01-27

## 2021-01-20 RX ORDER — OMEGA-3 ACID ETHYL ESTERS 1 G
1 CAPSULE ORAL
Qty: 0 | Refills: 0 | DISCHARGE

## 2021-01-20 RX ORDER — LABETALOL HCL 100 MG
300 TABLET ORAL THREE TIMES A DAY
Refills: 0 | Status: DISCONTINUED | OUTPATIENT
Start: 2021-01-20 | End: 2021-01-27

## 2021-01-20 RX ORDER — CALCIUM ACETATE 667 MG
1 TABLET ORAL
Qty: 0 | Refills: 0 | DISCHARGE

## 2021-01-20 RX ORDER — SODIUM POLYSTYRENE SULFONATE 4.1 MEQ/G
15 POWDER, FOR SUSPENSION ORAL ONCE
Refills: 0 | Status: COMPLETED | OUTPATIENT
Start: 2021-01-20 | End: 2021-01-20

## 2021-01-20 RX ORDER — ALBUTEROL 90 UG/1
2 AEROSOL, METERED ORAL EVERY 4 HOURS
Refills: 0 | Status: DISCONTINUED | OUTPATIENT
Start: 2021-01-20 | End: 2021-01-27

## 2021-01-20 RX ORDER — CEFUROXIME AXETIL 250 MG
1 TABLET ORAL
Qty: 0 | Refills: 0 | DISCHARGE

## 2021-01-20 RX ORDER — ONDANSETRON 8 MG/1
4 TABLET, FILM COATED ORAL EVERY 6 HOURS
Refills: 0 | Status: DISCONTINUED | OUTPATIENT
Start: 2021-01-20 | End: 2021-01-27

## 2021-01-20 RX ORDER — INSULIN LISPRO 100/ML
VIAL (ML) SUBCUTANEOUS
Refills: 0 | Status: DISCONTINUED | OUTPATIENT
Start: 2021-01-20 | End: 2021-01-27

## 2021-01-20 RX ORDER — HEPARIN SODIUM 5000 [USP'U]/ML
5000 INJECTION INTRAVENOUS; SUBCUTANEOUS EVERY 8 HOURS
Refills: 0 | Status: DISCONTINUED | OUTPATIENT
Start: 2021-01-20 | End: 2021-01-27

## 2021-01-20 RX ORDER — INSULIN HUMAN 100 [IU]/ML
10 INJECTION, SOLUTION SUBCUTANEOUS ONCE
Refills: 0 | Status: COMPLETED | OUTPATIENT
Start: 2021-01-20 | End: 2021-01-20

## 2021-01-20 RX ORDER — PANTOPRAZOLE SODIUM 20 MG/1
40 TABLET, DELAYED RELEASE ORAL
Refills: 0 | Status: DISCONTINUED | OUTPATIENT
Start: 2021-01-20 | End: 2021-01-27

## 2021-01-20 RX ADMIN — Medication 300 MILLIGRAM(S): at 05:37

## 2021-01-20 RX ADMIN — Medication 6 MILLIGRAM(S): at 05:36

## 2021-01-20 RX ADMIN — SODIUM POLYSTYRENE SULFONATE 15 GRAM(S): 4.1 POWDER, FOR SUSPENSION ORAL at 09:06

## 2021-01-20 RX ADMIN — ATORVASTATIN CALCIUM 40 MILLIGRAM(S): 80 TABLET, FILM COATED ORAL at 22:06

## 2021-01-20 RX ADMIN — PANTOPRAZOLE SODIUM 40 MILLIGRAM(S): 20 TABLET, DELAYED RELEASE ORAL at 05:37

## 2021-01-20 RX ADMIN — Medication 1: at 12:38

## 2021-01-20 RX ADMIN — INSULIN HUMAN 10 UNIT(S): 100 INJECTION, SOLUTION SUBCUTANEOUS at 09:06

## 2021-01-20 RX ADMIN — Medication 50 MILLILITER(S): at 09:06

## 2021-01-20 RX ADMIN — HEPARIN SODIUM 5000 UNIT(S): 5000 INJECTION INTRAVENOUS; SUBCUTANEOUS at 22:06

## 2021-01-20 RX ADMIN — Medication 1: at 17:40

## 2021-01-20 RX ADMIN — Medication 100 MILLIGRAM(S): at 22:06

## 2021-01-20 RX ADMIN — Medication 300 MILLIGRAM(S): at 22:08

## 2021-01-20 RX ADMIN — HEPARIN SODIUM 5000 UNIT(S): 5000 INJECTION INTRAVENOUS; SUBCUTANEOUS at 17:33

## 2021-01-20 RX ADMIN — HEPARIN SODIUM 5000 UNIT(S): 5000 INJECTION INTRAVENOUS; SUBCUTANEOUS at 05:37

## 2021-01-20 NOTE — H&P ADULT - PROBLEM SELECTOR PLAN 7
[facility] Hospitalist - Discharge Summary    SUBJECTIVE:    Patient is feeling better. Flank pain improving. Denies headache or dizziness, no chest pain or SOB, no nausea,vomiting or diarrhea.      OBJECTIVE:  I/O's    Intake/Output Summary (Last 24 hours) at 7/9/2020 1652  Last data filed at 7/9/2020 1300  Gross per 24 hour   Intake 458.54 ml   Output 750 ml   Net -291.46 ml       Last Recorded Vitals  Blood pressure (!) 145/67, pulse 62, temperature 97.2 °F (36.2 °C), resp. rate 18, height 5' 8\" (1.727 m), weight 79 kg (174 lb 2.6 oz), SpO2 97 %.  Body mass index is 26.48 kg/m².    Physical Exam  Vitals signs and nursing note reviewed.   Constitutional:       General: She is not in acute distress.     Appearance: She is well-developed.   HENT:      Head: Normocephalic and atraumatic.   Eyes:      Conjunctiva/sclera: Conjunctivae normal.   Neck:      Musculoskeletal: Neck supple.   Cardiovascular:      Rate and Rhythm: Normal rate and regular rhythm.      Heart sounds: Normal heart sounds.   Pulmonary:      Effort: Pulmonary effort is normal. No respiratory distress.   Abdominal:      General: Bowel sounds are normal.      Palpations: Abdomen is soft.      Tenderness: There is no abdominal tenderness. There is no left CVA tenderness.      Comments: Patient needs assistance ambulating   Musculoskeletal: Normal range of motion.         General: Tenderness present.      Comments: Tenderness to left ribs  Tenderness to right shoulder   Skin:     General: Skin is warm and dry.      Comments: Abrasion left knee  Bruising left anterior shin   Neurological:      Mental Status: She is alert and oriented to person, place, and time.   Psychiatric:         Behavior: Behavior normal.         Thought Content: Thought content normal.         Labs   Recent Results (from the past 24 hour(s))   Metered blood glucose    Collection Time: 07/08/20 10:49 PM   Result Value Ref Range    Glucose Bedside  (H) 70 - 99 mg/dL   Basic  Metabolic Panel    Collection Time: 07/09/20  4:50 AM   Result Value Ref Range    Sodium 143 135 - 145 mmol/L    Potassium 4.0 3.4 - 5.1 mmol/L    Chloride 107 98 - 107 mmol/L    Carbon Dioxide 32 21 - 32 mmol/L    Anion Gap 8 (L) 10 - 20 mmol/L    Glucose 176 (H) 65 - 99 mg/dL    BUN 23 (H) 6 - 20 mg/dL    Creatinine 0.64 0.51 - 0.95 mg/dL    GFR Estimate,  >90     GFR Estimate, Non African American 85     BUN/Creatinine Ratio 36 (H) 7 - 25    CALCIUM 9.1 8.4 - 10.2 mg/dL   Metered blood glucose    Collection Time: 07/09/20  7:16 AM   Result Value Ref Range    Glucose Bedside  (H) 70 - 99 mg/dL   Metered blood glucose    Collection Time: 07/09/20 11:26 AM   Result Value Ref Range    Glucose Bedside  (H) 70 - 99 mg/dL   Metered blood glucose    Collection Time: 07/09/20  3:54 PM   Result Value Ref Range    Glucose Bedside  (H) 70 - 99 mg/dL       Imaging  US KIDNEY BILATERAL   Final Result      1.  No evidence of hydronephrosis.    2. Minimal perinephric fluid is noted bilaterally, nonspecific.     3. Left renal cysts are present.      Electronically Signed by: VINH MOROCHO D.O.    Signed on: 7/7/2020 10:52 PM          XR RIBS 3 VIEWS LEFT W CHEST 1 VIEW   Final Result   FINDINGS AND IMPRESSION: .   The lungs are well expanded. There are no parenchymal or interstitial opacities. There is no evidence of pneumothorax.  Trachea is midline.  The costophrenic angles are clear.     Mild prominence mediastinal structures appears unchanged since the prior study, likely vascular.  Cardiomediastinal silhouette is not enlarged. Visualized osseous structures appear unremarkable.      Electronically Signed by: KAROLYN MCBRIDE M.D.    Signed on: 7/4/2020 7:53 AM          XR SHOULDER 3 VIEWS RIGHT   Final Result   FINDINGS AND IMPRESSION: Right glenohumeral and acromioclavicular joints remain well aligned.  Mild deformity right humeral head is unchanged since the prior study, possibly remote  healed fracture.  Mild degenerative changes acromioclavicular joint.     Small bony excrescence series superior lateral aspect of the femoral head, likely degenerative.  No evidence of acute or healing fracture, dislocation, aggressive osseous lesion or periosteal reaction. Overlying soft tissues appear unremarkable.      Electronically Signed by: KAROLYN MCBRIDE M.D.    Signed on: 7/4/2020 7:51 AM          CT HEAD WO CONTRAST   Final Result   No CT evidence of acute intracranial process.   Stable mild cerebral atrophy.   Stable moderate chronic microvascular ischemic changes.      Electronically Signed by: KAROLYN MCBRIDE M.D.    Signed on: 7/4/2020 7:54 AM          CT CERVICAL SPINE WO CONTRAST   Final Result   No evidence of cervical spine fracture.      Electronically Signed by: KAROLYN MCBRIDE M.D.    Signed on: 7/4/2020 7:56 AM            HISTORY OF PRESENT ILLNESS:  Ms. Briceño is a 76 year old year old female with past medical history significant for DM, CHF, HTN, HLD, GERD, hypothyroidism, and depression who presented to the ED with complaints of fall. Upon arrival to the ED, she was afebrile with HR 66, RR 18, /82, and SPO2 94%. Labs were obtained and significant for , WBC 11.9.  CT head shows no acute intracranial process, CT C-spine shows no acute fractures.  X-ray right shoulder shows a mild deformity right humeral head unchanged since prior study, possibly remote healed fracture, no evidence of acute or healed glenoid fracture, dislocation, aggressive osseous lesion or periosteal reaction.  X-ray left ribs with chest showed no acute findings.  Patient received tramadol for pain in the ER. She was admitted as Inpatient for further workup and management.    DISCHARGE DIAGNOSES/HOSPITAL COURSE:    Mechanical fall prior to arrival with some preceding dizziness likely 2/2 Orthostatic hypotension, E.Coli UTI - ESBL with AC left Pyelonephritis- Patient has left flank pain and CVA tenderness  - CT  Head and CT C-spine were negative for acute findings   - USG Kidneys (7/7) showed minimal perinephric fluid bilaterally, left renal cysts present.  - Orthostatic VS were positive (7/4)  - Urine Cx grew E.Coli (final 7/6)  - Continue IV Merrem x 12 days via midline catheter upon discharge   - Pain management was ordered prn   - Fall precautions placed  - PT/OT worked with patient as able - Recommended non-daily therapy and 24 hr non-skilled assistance upon discharge  - ID (Dr. Coffey) followed - Cleared for rehab placement. Recommend weekly CBC, CMP      Left sided chest pain- referred from abdomen and muscle strain from fall  - XR left ribs and chest was negative for fracture  - Encouraged IS   - Pain management was ordered as needed  - PT/OT worked with patient as tolerable     Right shoulder pain  - XR right shoulder noted no acute fractures  - Pain management was ordered prn  - PT/OT worked with patient as able     Chronic diastolic heart failure  - Previous echocardiogram (10/14/19) noted EF 60-65%, grade 1 diastolic dysfunction; mild aortic valve stenosis; mild MR  - Diuresed with PO Lasix  - Monitored daily weights and I&O's     Hypertensive urgency with underlying primary hypertension  - BPs in 140/60s.   - Continued home dose lasix, metoprolol, and losartan, and prn hydralazine      Type 2 Diabetes Mellitus   - BS in 300s (7/9). HgbA1c pending  - Continued home Lantus 34U sq. Held metformin   - Monitored via Accu-Cheks, covered with SSI     Hyperlipidemia - Continued atorvastatin  Hypothyroidism - Continued levothyroxine  Anxiety/depression - Continued Cymbalta     DVT Ppx: Lovenox sq, SCDs and TEDs       DISCHARGE INSTRUCTIONS: Continue IV Merrem x 12 days. Weekly CBC, CMP    Admission Date: 7/4/2020   Discharge Date:  7/9/2020  Discharge to:  [facility]  Diet: General  Activity:  As tolerated  New medications:  Per discharge med rec  Home Health Care:  PT  Home Oxygen:  None    Follow-up  appointments:    Carlie Rodriguez MD  [address]  [address]  [place] [address]  [phone]    In 3 days          PCP:  Carlie Rodriguez MD     All labs and imaging were reviewed.  All patient questions were answered.       Summary of your Discharge Medications      Take these Medications      Details   acetaminophen 325 MG tablet  Commonly known as:  TYLENOL   Take 650 mg by mouth every 4 hours as needed.     ACIDOPHILUS/PECTIN PO   Take by mouth 3 times daily.     atorvastatin 40 MG tablet  Commonly known as:  LIPITOR   take 1 tablet by mouth every night at bedtime     celecoxib 100 MG capsule  Commonly known as:  CeleBREX   Take 1 capsule by mouth 2 times daily as needed for Pain.     Cranberry 500 MG Tab   Take 500 mg by mouth daily.     Cymbalta 20 MG capsule   Generic drug:  DULoxetine  Take 40 mg by mouth daily.     Fish Oil 1000 MG capsule   Take 1 g by mouth at bedtime.     furosemide 20 MG tablet  Commonly known as:  LASIX   Take 1 tablet by mouth daily.     insulin glargine 100 UNIT/ML vial solution  Commonly known as:  LANTUS   Inject 30 Units into the skin nightly.     insulin lispro 100 UNIT/ML injectable solution  Commonly known as:  HumaLOG   Inject 5 Units into the skin 1 time as needed for High Blood Sugar. If Blood Glucose over 300.     levothyroxine 25 MCG tablet   Take 1 tablet by mouth daily.     lidocaine 5 % patch  Commonly known as:  LIDODERM   Place 1 patch onto the skin every 24 hours. Apply 1 patch to affected shoulder and 1 patch to mid-back; Remove patch 12 hours after applying     losartan 50 MG tablet  Commonly known as:  COZAAR   Take 1 tablet by mouth daily.     metformin 1000 MG tablet  Commonly known as:  GLUCOPHAGE   Take 1 tablet by mouth daily.     metoPROLOL succinate 50 MG 24 hr tablet  Commonly known as:  Toprol XL   Take 1 tablet by mouth every morning.     Multivitamin Adult Tab   Take 1 tablet by mouth daily.     nitrofurantoin 50 MG capsule  Commonly known as:   MACRODANTIN   Take 50 mg by mouth daily. Long term for UTI prophylaxis     potassium chloride 20 MEQ ER tablet  Commonly known as:  K-TAB   Take 40 mEq by mouth daily.     traMADol 50 MG tablet  Commonly known as:  ULTRAM   Take 50 mg by mouth every 6 hours as needed.     vitamin B-12 1000 MCG tablet  Commonly known as:  CYANOCOBALAMIN   Take 1,000 mcg by mouth daily.     vitamin D 50 mcg (2,000 units) capsule   Take 50 mcg by mouth daily.  Comment:  50 mcg = 2,000 units          Meropenem 500 mg IV every 8 hours.    Charting performed by clint Nicholson for Dr. Annemarie Oro    All medical record entries made by the clint were at my direction. I have reviewed the chart and agree that the record accurately reflects my personal performance of the history, physical exam, hospital course, and assessment and plan.   - HSQ      Pt wife Talisha (ph # [phone])

## 2021-01-20 NOTE — H&P ADULT - HISTORY OF PRESENT ILLNESS
71 y/o male w/ PMHx of HTN, ESRD on HD T/TH/SAT, DM type 2, presents to the ED c/o SOB and cough. Pt reports worsening SOB and productive cough of clear sputum for about 1 week. Pt reports SPO2 was 90% on RA this morning. Pt reports he was not able to make it to HD due to trouble breathing. Pt reports wife has similar symptoms however has not been tested for COVID. Pt denies cp, fever or chills abd pain, N/V/D.     In ED SPO2 93% on RA, rest of vitals stable, pt afebrile. Labs sig for  Hazy peripheral groundglass and consolidative opacities within the left lung likely related to patient's known Covid-19 diagnosis      69 y/o male w/ PMHx of HTN, ESRD on HD T/TH/SAT, DM type 2, AVR, presents to the ED c/o SOB and cough. Pt reports worsening SOB and productive cough of clear sputum for about 1 week. Pt reports SPO2 was 90% on RA this morning. Pt reports he was not able to make it to HD due to trouble breathing. Pt reports wife has similar symptoms however has not been tested for COVID. Pt denies cp, fever or chills abd pain, N/V/D.     In ED SPO2 93% on RA, rest of vitals stable, pt afebrile. Labs sig for H/H 10.6/34.3, Na 131, K 5.5, BUN/Cr 68/12.8, Trop 0.121, BNP 39722, Dimer 2090. CTA showed Hazy peripheral groundglass and consolidative opacities within the left lung likely related to patient's known Covid-19 diagnosis. Dr Lilly consulted for HD

## 2021-01-20 NOTE — CONSULT NOTE ADULT - SUBJECTIVE AND OBJECTIVE BOX
Patient chart reviewed, full consult to follow.     Will arrange for HD today    Thank you for the courtesy of this consultation. [facility] NE[facility], [facility]  NEPHROLOGY AND HYPERTENSION  [address]  [address]  [place] [address]  [phone]    MD WHIT NICKERSON, MD MOE GARCIA MD YELENA ROSENBERG, MD BINNY KOSHY, MD CHRISTOPHER CAPUTO, MD JEIMY HERNANDEZ MD      Information from chart:  "Patient is a 70y old  Male who presents with a chief complaint of COVID PNA (20 Jan 2021 08:26)    HPI:       69 y/o male w/ PMHx of HTN, ESRD on HD T/TH/SAT, DM type 2, AVR, presents to the ED c/o SOB and cough. Pt reports worsening SOB and productive cough of clear sputum for about 1 week. Pt reports SPO2 was 90% on RA this morning. Pt reports he was not able to make it to HD due to trouble breathing. Pt reports wife has similar symptoms however has not been tested for COVID. Pt denies cp, fever or chills abd pain, N/V/D.     In ED SPO2 93% on RA, rest of vitals stable, pt afebrile. Labs sig for H/H 10.6/34.3, Na 131, K 5.5, BUN/Cr 68/12.8, Trop 0.121, BNP 94134, Dimer 2090. CTA showed Hazy peripheral groundglass and consolidative opacities within the left lung likely related to patient's known Covid-19 diagnosis. Dr Lilly consulted for HD (20 Jan 2021 00:28)   "    Patient last HD Monday, at [facility] center Dr. Dallin Hodgson        PAST MEDICAL & SURGICAL HISTORY:  HLD (hyperlipidemia)    HTN (hypertension)    Aortic valve replaced    CKD (chronic kidney disease)  On hemodialysis    Anemia    CHF (congestive heart failure)    Diabetes mellitus    Hypertension    S/P CABG (coronary artery bypass graft)    S/P appendectomy      FAMILY HISTORY:  FH: type 2 diabetes  brother      Allergies    lisinopril (Anaphylaxis)    Intolerances      Home Medications:  atorvastatin 40 mg oral tablet: 1 tab(s) orally once a day (at bedtime) (20 Jan 2021 02:01)  docusate sodium 100 mg oral tablet: 1 tab(s) orally , As Needed (20 Jan 2021 02:01)  epoetin theodore:  (20 Jan 2021 02:01)  hydrALAZINE 50 mg oral tablet: orally 2 times a day (20 Jan 2021 02:01)  labetalol 300 mg oral tablet: 1 tab(s) orally 3 times a day (20 Jan 2021 02:01)  PANTOPRAZOLE SOD DR 40 MG TAB: TAKE 1 TABLET BY MOUTH TWICE A DAY (20 Jan 2021 02:01)  sevelamer hydrochloride 800 mg oral tablet: 2 tab(s) orally 3 times a day (20 Jan 2021 02:01)  SIMVASTATIN 20 MG TABLET: TAKE 1 TABLET AT BEDTIME (20 Jan 2021 02:01)    MEDICATIONS  (STANDING):  atorvastatin 40 milliGRAM(s) Oral at bedtime  dexAMETHasone  Injectable 6 milliGRAM(s) IV Push daily  dextrose 40% Gel 15 Gram(s) Oral once  dextrose 5%. 1000 milliLiter(s) (50 mL/Hr) IV Continuous <Continuous>  dextrose 5%. 1000 milliLiter(s) (100 mL/Hr) IV Continuous <Continuous>  dextrose 50% Injectable 25 Gram(s) IV Push once  dextrose 50% Injectable 12.5 Gram(s) IV Push once  dextrose 50% Injectable 25 Gram(s) IV Push once  glucagon  Injectable 1 milliGRAM(s) IntraMuscular once  heparin   Injectable 5000 Unit(s) SubCutaneous every 8 hours  insulin lispro (ADMELOG) corrective regimen sliding scale   SubCutaneous three times a day before meals  insulin lispro (ADMELOG) corrective regimen sliding scale   SubCutaneous at bedtime  labetalol 300 milliGRAM(s) Oral three times a day  pantoprazole    Tablet 40 milliGRAM(s) Oral before breakfast    MEDICATIONS  (PRN):  acetaminophen   Tablet .. 650 milliGRAM(s) Oral every 6 hours PRN Temp greater or equal to 38C (100.4F), Mild Pain (1 - 3)  ALBUTerol    90 MICROgram(s) HFA Inhaler 2 Puff(s) Inhalation every 4 hours PRN Shortness of Breath and/or Wheezing  benzonatate 100 milliGRAM(s) Oral three times a day PRN Cough  ondansetron Injectable 4 milliGRAM(s) IV Push every 6 hours PRN Nausea and/or Vomiting    Vital Signs Last 24 Hrs  T(C): 37.1 (20 Jan 2021 05:30), Max: 37.9 (20 Jan 2021 03:00)  T(F): 98.7 (20 Jan 2021 05:30), Max: 100.2 (20 Jan 2021 03:00)  HR: 77 (20 Jan 2021 05:30) (63 - 96)  BP: 177/66 (20 Jan 2021 05:30) (112/63 - 210/99)  BP(mean): --  RR: 18 (20 Jan 2021 05:30) (17 - 26)  SpO2: 99% (20 Jan 2021 05:30) (93% - 99%)    Daily Height in cm: 167.64 (19 Jan 2021 13:59)    Daily     CAPILLARY BLOOD GLUCOSE      POCT Blood Glucose.: 131 mg/dL (20 Jan 2021 08:50)    PHYSICAL EXAM:      T(C): 37.1 (01-20-21 @ 05:30), Max: 37.9 (01-20-21 @ 03:00)  HR: 77 (01-20-21 @ 05:30) (63 - 96)  BP: 177/66 (01-20-21 @ 05:30) (112/63 - 210/99)  RR: 18 (01-20-21 @ 05:30) (17 - 26)  SpO2: 99% (01-20-21 @ 05:30) (93% - 99%)  Wt(kg): --  Lungs clear  Heart S1S2  Abd soft NT ND  Extremities:   tr edema              01-20    132<L>  |  92<L>  |  77<H>  ----------------------------<  82  6.0<H>   |  26  |  14.20<H>    Ca    7.7<L>      20 Jan 2021 07:04  Phos  9.8     01-20  Mg     2.9     01-20    TPro  7.1  /  Alb  2.9<L>  /  TBili  0.5  /  DBili  x   /  AST  36  /  ALT  19  /  AlkPhos  95  01-20                          10.1   6.30  )-----------( 196      ( 20 Jan 2021 07:04 )             32.2     Creatinine Trend: 14.20<--, 12.80<--          Assessment   ESRD; COVID PNA  Hyperkalemia     Plan  HD for today  UF as tolerated  No absolute renal contraindication to Remdesivir as clinically warranted.    Payam Lilly MD

## 2021-01-20 NOTE — H&P ADULT - NSHPLABSRESULTS_GEN_ALL_CORE
T(C): 36.7 (01-20-21 @ 00:18), Max: 37.7 (01-19-21 @ 13:59)  HR: 72 (01-20-21 @ 00:18) (63 - 74)  BP: 147/65 (01-20-21 @ 00:18) (112/63 - 149/71)  RR: 22 (01-20-21 @ 00:18) (19 - 26)  SpO2: 98% (01-20-21 @ 00:18) (93% - 98%)                        10.6   7.41  )-----------( 204      ( 19 Jan 2021 16:09 )             34.3     01-19    131<L>  |  91<L>  |  68<H>  ----------------------------<  158<H>  5.5<H>   |  30  |  12.80<H>    Ca    8.3<L>      19 Jan 2021 17:13    TPro  7.3  /  Alb  3.0<L>  /  TBili  0.5  /  DBili  x   /  AST  33  /  ALT  20  /  AlkPhos  95  01-19    LIVER FUNCTIONS - ( 19 Jan 2021 17:13 )  Alb: 3.0 g/dL / Pro: 7.3 gm/dL / ALK PHOS: 95 U/L / ALT: 20 U/L / AST: 33 U/L / GGT: x             < from: CT Angio Chest w/ IV Cont (01.19.21 @ 22:42) >      IMPRESSION:    1. No central or lobar PE. Motion artifact limits evaluation of segmental and subsegmental branches.    2. Hazy peripheral groundglass and consolidative opacities within the left lung likely related to patient's known Covid-19 diagnosis.    3. Stable appearance of chronic left pleural collection with associated pleural thickening and calcification.      < end of copied text >          acetaminophen   Tablet .. 650 milliGRAM(s) Oral every 6 hours PRN  ALBUTerol    90 MICROgram(s) HFA Inhaler 2 Puff(s) Inhalation every 4 hours PRN  atorvastatin 40 milliGRAM(s) Oral at bedtime  benzonatate 100 milliGRAM(s) Oral three times a day PRN  dexAMETHasone  Injectable 6 milliGRAM(s) IV Push daily  dextrose 40% Gel 15 Gram(s) Oral once  dextrose 5%. 1000 milliLiter(s) IV Continuous <Continuous>  dextrose 5%. 1000 milliLiter(s) IV Continuous <Continuous>  dextrose 50% Injectable 25 Gram(s) IV Push once  dextrose 50% Injectable 12.5 Gram(s) IV Push once  dextrose 50% Injectable 25 Gram(s) IV Push once  glucagon  Injectable 1 milliGRAM(s) IntraMuscular once  heparin   Injectable 5000 Unit(s) SubCutaneous every 8 hours  insulin lispro (ADMELOG) corrective regimen sliding scale   SubCutaneous three times a day before meals  insulin lispro (ADMELOG) corrective regimen sliding scale   SubCutaneous at bedtime  labetalol 300 milliGRAM(s) Oral three times a day  ondansetron Injectable 4 milliGRAM(s) IV Push every 6 hours PRN  pantoprazole    Tablet 40 milliGRAM(s) Oral before breakfast

## 2021-01-20 NOTE — H&P ADULT - PROBLEM SELECTOR PLAN 2
- Dr Lilly made aware by ED  - c/w HD - Pt missed HD and receive contrast Dr Lilly made aware by ED  - c/w HD

## 2021-01-20 NOTE — H&P ADULT - NSHPPHYSICALEXAM_GEN_ALL_CORE
PHYSICAL EXAM:    Vital Signs Last 24 Hrs  T(C): 36.7 (20 Jan 2021 00:18), Max: 37.7 (19 Jan 2021 13:59)  T(F): 98.1 (20 Jan 2021 00:18), Max: 99.8 (19 Jan 2021 13:59)  HR: 72 (20 Jan 2021 00:18) (63 - 74)  BP: 147/65 (20 Jan 2021 00:18) (112/63 - 149/71)  BP(mean): --  RR: 22 (20 Jan 2021 00:18) (19 - 26)  SpO2: 98% (20 Jan 2021 00:18) (93% - 98%)    GENERAL: Pt lying in bed comfortably in NAD  HEENT:  Atraumatic, EOMI, PERRL, conjunctiva and sclera clear, MMM  NECK: Supple, No JVD  CHEST/LUNG: Clear to auscultation bilaterally; No rales, rhonchi, wheezing or rubs. Unlabored respirations  HEART: Regular rate and rhythm; No murmurs, rubs, or gallops  ABDOMEN: Bowel sounds present; Soft, Nontender, Nondistended. No guarding or rigidity    EXTREMITIES:  2+ Peripheral Pulses, brisk capillary refill. No clubbing, cyanosis, or edema  NEUROLOGICAL:  Alert & Oriented X3, speech clear. Answers questions appropriately. Full and equal strength B/L upper and lower extremities. No deficits   MSK: FROM x 4 extremities   SKIN: No rashes or lesions PHYSICAL EXAM:    Vital Signs Last 24 Hrs  T(C): 36.7 (20 Jan 2021 00:18), Max: 37.7 (19 Jan 2021 13:59)  T(F): 98.1 (20 Jan 2021 00:18), Max: 99.8 (19 Jan 2021 13:59)  HR: 72 (20 Jan 2021 00:18) (63 - 74)  BP: 147/65 (20 Jan 2021 00:18) (112/63 - 149/71)  BP(mean): --  RR: 22 (20 Jan 2021 00:18) (19 - 26)  SpO2: 98% (20 Jan 2021 00:18) (93% - 98%)    GENERAL: Pt lying in bed in some respt distress  HEENT:  Atraumatic, EOMI, PERRL, conjunctiva and sclera clear, MMM  NECK: Supple,  CHEST/LUNG: faint rale b/l. labored respirations while speaking  HEART: Regular rate and rhythm  ABDOMEN: Bowel sounds present; Soft, Nontender, Nondistended.   EXTREMITIES:  2+ Peripheral Pulses, brisk capillary refill. No edema  NEUROLOGICAL:  Alert & Oriented X3. No focal deficits   MSK: FROM x 4 extremities   SKIN: No rashes or lesions

## 2021-01-20 NOTE — H&P ADULT - ASSESSMENT
71 y/o male w/ PMHx of HTN, ESRD on HD T/TH/SAT, DM type 2, presents to the ED c/o SOB and cough. Pt being admitted w/ COVID PNA  71 y/o male w/ PMHx of HTN, ESRD on HD T/TH/SAT, DM type 2, presents to the ED c/o SOB and cough. Pt being admitted w/ COVID PNA.    IMPROVE VTE Individual Risk Assessment    RISK                                                                Points    [  ] Previous VTE                                                  3    [  ] Thrombophilia                                               2    [  ] Lower limb paralysis                                      2        (unable to hold up >15 seconds)      [  ] Current Cancer                                              2         (within 6 months)    [  ] Immobilization > 24 hrs                                1    [  ] ICU/CCU stay > 24 hours                              1    [  ] Age > 60                                                      1    IMPROVE VTE Score _________    IMPROVE Score 0-1: Low Risk, No VTE prophylaxis required for most patients, encourage ambulation.   IMPROVE Score 2-3: At risk, pharmacologic VTE prophylaxis is indicated for most patients (in the absence of a contraindication)  IMPROVE Score > or = 4: High Risk, pharmacologic VTE prophylaxis is indicated for most patients (in the absence of a contraindication)

## 2021-01-20 NOTE — H&P ADULT - PROBLEM SELECTOR PLAN 1
- c/w supplemental oxygen  - Start Decadron  - Tessalon pearls  - Inhaler prn  - hold Remdesivir due to renal failure  - COVID isolation

## 2021-01-20 NOTE — CHART NOTE - NSCHARTNOTEFT_GEN_A_CORE
ESRD. Getting HD. follow BMP in am  HTN. uncontrolled. cont labetolol and monitor.   DM type 2.   Hyperkalemia. Follow up BMP in am.

## 2021-01-21 LAB
A1C WITH ESTIMATED AVERAGE GLUCOSE RESULT: 7.5 % — HIGH (ref 4–5.6)
ESTIMATED AVERAGE GLUCOSE: 169 MG/DL — HIGH (ref 68–114)
GLUCOSE BLDC GLUCOMTR-MCNC: 124 MG/DL — HIGH (ref 70–99)
GLUCOSE BLDC GLUCOMTR-MCNC: 140 MG/DL — HIGH (ref 70–99)
GLUCOSE BLDC GLUCOMTR-MCNC: 170 MG/DL — HIGH (ref 70–99)
GLUCOSE BLDC GLUCOMTR-MCNC: 254 MG/DL — HIGH (ref 70–99)
HAV IGM SER-ACNC: SIGNIFICANT CHANGE UP
HBV CORE IGM SER-ACNC: SIGNIFICANT CHANGE UP
HBV SURFACE AG SER-ACNC: SIGNIFICANT CHANGE UP
HCV AB S/CO SERPL IA: 0.87 S/CO — SIGNIFICANT CHANGE UP (ref 0–0.99)
HCV AB SERPL-IMP: SIGNIFICANT CHANGE UP

## 2021-01-21 PROCEDURE — 99232 SBSQ HOSP IP/OBS MODERATE 35: CPT

## 2021-01-21 RX ORDER — LANOLIN ALCOHOL/MO/W.PET/CERES
3 CREAM (GRAM) TOPICAL AT BEDTIME
Refills: 0 | Status: DISCONTINUED | OUTPATIENT
Start: 2021-01-21 | End: 2021-01-27

## 2021-01-21 RX ORDER — CHLORHEXIDINE GLUCONATE 213 G/1000ML
1 SOLUTION TOPICAL
Refills: 0 | Status: DISCONTINUED | OUTPATIENT
Start: 2021-01-21 | End: 2021-01-27

## 2021-01-21 RX ADMIN — Medication 1: at 17:13

## 2021-01-21 RX ADMIN — HEPARIN SODIUM 5000 UNIT(S): 5000 INJECTION INTRAVENOUS; SUBCUTANEOUS at 13:03

## 2021-01-21 RX ADMIN — Medication 300 MILLIGRAM(S): at 22:35

## 2021-01-21 RX ADMIN — Medication 300 MILLIGRAM(S): at 13:03

## 2021-01-21 RX ADMIN — ATORVASTATIN CALCIUM 40 MILLIGRAM(S): 80 TABLET, FILM COATED ORAL at 22:35

## 2021-01-21 RX ADMIN — Medication 3 MILLIGRAM(S): at 01:19

## 2021-01-21 RX ADMIN — Medication 3: at 11:04

## 2021-01-21 RX ADMIN — PANTOPRAZOLE SODIUM 40 MILLIGRAM(S): 20 TABLET, DELAYED RELEASE ORAL at 08:35

## 2021-01-21 RX ADMIN — Medication 3 MILLIGRAM(S): at 22:35

## 2021-01-21 RX ADMIN — HEPARIN SODIUM 5000 UNIT(S): 5000 INJECTION INTRAVENOUS; SUBCUTANEOUS at 22:35

## 2021-01-22 LAB
ANION GAP SERPL CALC-SCNC: 11 MMOL/L — SIGNIFICANT CHANGE UP (ref 5–17)
BUN SERPL-MCNC: 70 MG/DL — HIGH (ref 7–23)
CALCIUM SERPL-MCNC: 7.4 MG/DL — LOW (ref 8.5–10.1)
CHLORIDE SERPL-SCNC: 95 MMOL/L — LOW (ref 96–108)
CO2 SERPL-SCNC: 30 MMOL/L — SIGNIFICANT CHANGE UP (ref 22–31)
CREAT SERPL-MCNC: 12.9 MG/DL — HIGH (ref 0.5–1.3)
GLUCOSE BLDC GLUCOMTR-MCNC: 105 MG/DL — HIGH (ref 70–99)
GLUCOSE BLDC GLUCOMTR-MCNC: 159 MG/DL — HIGH (ref 70–99)
GLUCOSE BLDC GLUCOMTR-MCNC: 198 MG/DL — HIGH (ref 70–99)
GLUCOSE SERPL-MCNC: 190 MG/DL — HIGH (ref 70–99)
HCT VFR BLD CALC: 32.8 % — LOW (ref 39–50)
HGB BLD-MCNC: 10.1 G/DL — LOW (ref 13–17)
MCHC RBC-ENTMCNC: 27.4 PG — SIGNIFICANT CHANGE UP (ref 27–34)
MCHC RBC-ENTMCNC: 30.8 GM/DL — LOW (ref 32–36)
MCV RBC AUTO: 88.9 FL — SIGNIFICANT CHANGE UP (ref 80–100)
NRBC # BLD: 0 /100 WBCS — SIGNIFICANT CHANGE UP (ref 0–0)
PLATELET # BLD AUTO: 172 K/UL — SIGNIFICANT CHANGE UP (ref 150–400)
POTASSIUM SERPL-MCNC: 4.8 MMOL/L — SIGNIFICANT CHANGE UP (ref 3.5–5.3)
POTASSIUM SERPL-SCNC: 4.8 MMOL/L — SIGNIFICANT CHANGE UP (ref 3.5–5.3)
RBC # BLD: 3.69 M/UL — LOW (ref 4.2–5.8)
RBC # FLD: 17.1 % — HIGH (ref 10.3–14.5)
SODIUM SERPL-SCNC: 136 MMOL/L — SIGNIFICANT CHANGE UP (ref 135–145)
WBC # BLD: 8.83 K/UL — SIGNIFICANT CHANGE UP (ref 3.8–10.5)
WBC # FLD AUTO: 8.83 K/UL — SIGNIFICANT CHANGE UP (ref 3.8–10.5)

## 2021-01-22 PROCEDURE — 99233 SBSQ HOSP IP/OBS HIGH 50: CPT

## 2021-01-22 PROCEDURE — 71045 X-RAY EXAM CHEST 1 VIEW: CPT | Mod: 26

## 2021-01-22 RX ORDER — AMLODIPINE BESYLATE 2.5 MG/1
5 TABLET ORAL DAILY
Refills: 0 | Status: DISCONTINUED | OUTPATIENT
Start: 2021-01-22 | End: 2021-01-27

## 2021-01-22 RX ADMIN — Medication 6 MILLIGRAM(S): at 06:33

## 2021-01-22 RX ADMIN — PANTOPRAZOLE SODIUM 40 MILLIGRAM(S): 20 TABLET, DELAYED RELEASE ORAL at 08:11

## 2021-01-22 RX ADMIN — HEPARIN SODIUM 5000 UNIT(S): 5000 INJECTION INTRAVENOUS; SUBCUTANEOUS at 06:33

## 2021-01-22 RX ADMIN — Medication 3 MILLIGRAM(S): at 22:52

## 2021-01-22 RX ADMIN — Medication 100 MILLIGRAM(S): at 22:52

## 2021-01-22 RX ADMIN — ATORVASTATIN CALCIUM 40 MILLIGRAM(S): 80 TABLET, FILM COATED ORAL at 22:52

## 2021-01-22 RX ADMIN — CHLORHEXIDINE GLUCONATE 1 APPLICATION(S): 213 SOLUTION TOPICAL at 06:32

## 2021-01-22 RX ADMIN — Medication 1: at 11:04

## 2021-01-22 RX ADMIN — Medication 300 MILLIGRAM(S): at 13:08

## 2021-01-22 RX ADMIN — Medication 300 MILLIGRAM(S): at 06:33

## 2021-01-22 RX ADMIN — HEPARIN SODIUM 5000 UNIT(S): 5000 INJECTION INTRAVENOUS; SUBCUTANEOUS at 13:08

## 2021-01-22 RX ADMIN — Medication 300 MILLIGRAM(S): at 22:52

## 2021-01-22 RX ADMIN — AMLODIPINE BESYLATE 5 MILLIGRAM(S): 2.5 TABLET ORAL at 13:08

## 2021-01-22 RX ADMIN — Medication 100 MILLIGRAM(S): at 12:00

## 2021-01-23 LAB
GLUCOSE BLDC GLUCOMTR-MCNC: 149 MG/DL — HIGH (ref 70–99)
GLUCOSE BLDC GLUCOMTR-MCNC: 167 MG/DL — HIGH (ref 70–99)
GLUCOSE BLDC GLUCOMTR-MCNC: 179 MG/DL — HIGH (ref 70–99)
GLUCOSE BLDC GLUCOMTR-MCNC: 265 MG/DL — HIGH (ref 70–99)

## 2021-01-23 PROCEDURE — 99233 SBSQ HOSP IP/OBS HIGH 50: CPT

## 2021-01-23 RX ADMIN — Medication 3 MILLIGRAM(S): at 21:26

## 2021-01-23 RX ADMIN — Medication 6 MILLIGRAM(S): at 05:10

## 2021-01-23 RX ADMIN — AMLODIPINE BESYLATE 5 MILLIGRAM(S): 2.5 TABLET ORAL at 05:10

## 2021-01-23 RX ADMIN — ATORVASTATIN CALCIUM 40 MILLIGRAM(S): 80 TABLET, FILM COATED ORAL at 21:26

## 2021-01-23 RX ADMIN — PANTOPRAZOLE SODIUM 40 MILLIGRAM(S): 20 TABLET, DELAYED RELEASE ORAL at 08:08

## 2021-01-23 RX ADMIN — HEPARIN SODIUM 5000 UNIT(S): 5000 INJECTION INTRAVENOUS; SUBCUTANEOUS at 21:27

## 2021-01-23 RX ADMIN — Medication 3: at 11:41

## 2021-01-23 RX ADMIN — Medication 100 MILLIGRAM(S): at 13:09

## 2021-01-23 RX ADMIN — Medication 300 MILLIGRAM(S): at 21:26

## 2021-01-23 RX ADMIN — Medication 1: at 17:16

## 2021-01-23 RX ADMIN — CHLORHEXIDINE GLUCONATE 1 APPLICATION(S): 213 SOLUTION TOPICAL at 04:54

## 2021-01-23 RX ADMIN — Medication 300 MILLIGRAM(S): at 05:10

## 2021-01-23 RX ADMIN — HEPARIN SODIUM 5000 UNIT(S): 5000 INJECTION INTRAVENOUS; SUBCUTANEOUS at 12:41

## 2021-01-23 RX ADMIN — HEPARIN SODIUM 5000 UNIT(S): 5000 INJECTION INTRAVENOUS; SUBCUTANEOUS at 05:10

## 2021-01-23 RX ADMIN — Medication 300 MILLIGRAM(S): at 12:41

## 2021-01-23 RX ADMIN — Medication 100 MILLIGRAM(S): at 05:10

## 2021-01-24 LAB
GLUCOSE BLDC GLUCOMTR-MCNC: 128 MG/DL — HIGH (ref 70–99)
GLUCOSE BLDC GLUCOMTR-MCNC: 133 MG/DL — HIGH (ref 70–99)
GLUCOSE BLDC GLUCOMTR-MCNC: 149 MG/DL — HIGH (ref 70–99)
GLUCOSE BLDC GLUCOMTR-MCNC: 152 MG/DL — HIGH (ref 70–99)

## 2021-01-24 PROCEDURE — 99233 SBSQ HOSP IP/OBS HIGH 50: CPT

## 2021-01-24 RX ADMIN — CHLORHEXIDINE GLUCONATE 1 APPLICATION(S): 213 SOLUTION TOPICAL at 06:10

## 2021-01-24 RX ADMIN — HEPARIN SODIUM 5000 UNIT(S): 5000 INJECTION INTRAVENOUS; SUBCUTANEOUS at 15:00

## 2021-01-24 RX ADMIN — Medication 1: at 11:34

## 2021-01-24 RX ADMIN — HEPARIN SODIUM 5000 UNIT(S): 5000 INJECTION INTRAVENOUS; SUBCUTANEOUS at 21:54

## 2021-01-24 RX ADMIN — Medication 300 MILLIGRAM(S): at 21:52

## 2021-01-24 RX ADMIN — Medication 300 MILLIGRAM(S): at 11:35

## 2021-01-24 RX ADMIN — AMLODIPINE BESYLATE 5 MILLIGRAM(S): 2.5 TABLET ORAL at 06:00

## 2021-01-24 RX ADMIN — Medication 100 MILLIGRAM(S): at 08:01

## 2021-01-24 RX ADMIN — Medication 300 MILLIGRAM(S): at 06:00

## 2021-01-24 RX ADMIN — Medication 6 MILLIGRAM(S): at 06:00

## 2021-01-24 RX ADMIN — HEPARIN SODIUM 5000 UNIT(S): 5000 INJECTION INTRAVENOUS; SUBCUTANEOUS at 06:03

## 2021-01-24 RX ADMIN — PANTOPRAZOLE SODIUM 40 MILLIGRAM(S): 20 TABLET, DELAYED RELEASE ORAL at 07:49

## 2021-01-24 RX ADMIN — ATORVASTATIN CALCIUM 40 MILLIGRAM(S): 80 TABLET, FILM COATED ORAL at 21:52

## 2021-01-24 RX ADMIN — Medication 3 MILLIGRAM(S): at 21:55

## 2021-01-25 LAB
ANION GAP SERPL CALC-SCNC: 14 MMOL/L — SIGNIFICANT CHANGE UP (ref 5–17)
BUN SERPL-MCNC: 93 MG/DL — HIGH (ref 7–23)
CALCIUM SERPL-MCNC: 6.9 MG/DL — LOW (ref 8.5–10.1)
CHLORIDE SERPL-SCNC: 89 MMOL/L — LOW (ref 96–108)
CO2 SERPL-SCNC: 25 MMOL/L — SIGNIFICANT CHANGE UP (ref 22–31)
CREAT SERPL-MCNC: 13.7 MG/DL — HIGH (ref 0.5–1.3)
GLUCOSE BLDC GLUCOMTR-MCNC: 137 MG/DL — HIGH (ref 70–99)
GLUCOSE BLDC GLUCOMTR-MCNC: 165 MG/DL — HIGH (ref 70–99)
GLUCOSE BLDC GLUCOMTR-MCNC: 238 MG/DL — HIGH (ref 70–99)
GLUCOSE SERPL-MCNC: 166 MG/DL — HIGH (ref 70–99)
HCT VFR BLD CALC: 28.4 % — LOW (ref 39–50)
HGB BLD-MCNC: 9.2 G/DL — LOW (ref 13–17)
MAGNESIUM SERPL-MCNC: 2.5 MG/DL — SIGNIFICANT CHANGE UP (ref 1.6–2.6)
MCHC RBC-ENTMCNC: 27.4 PG — SIGNIFICANT CHANGE UP (ref 27–34)
MCHC RBC-ENTMCNC: 32.4 GM/DL — SIGNIFICANT CHANGE UP (ref 32–36)
MCV RBC AUTO: 84.5 FL — SIGNIFICANT CHANGE UP (ref 80–100)
NRBC # BLD: 0 /100 WBCS — SIGNIFICANT CHANGE UP (ref 0–0)
PHOSPHATE SERPL-MCNC: 7.6 MG/DL — HIGH (ref 2.5–4.5)
PLATELET # BLD AUTO: 206 K/UL — SIGNIFICANT CHANGE UP (ref 150–400)
POTASSIUM SERPL-MCNC: 5.4 MMOL/L — HIGH (ref 3.5–5.3)
POTASSIUM SERPL-SCNC: 5.4 MMOL/L — HIGH (ref 3.5–5.3)
RBC # BLD: 3.36 M/UL — LOW (ref 4.2–5.8)
RBC # FLD: 16.9 % — HIGH (ref 10.3–14.5)
SODIUM SERPL-SCNC: 128 MMOL/L — LOW (ref 135–145)
WBC # BLD: 5.58 K/UL — SIGNIFICANT CHANGE UP (ref 3.8–10.5)
WBC # FLD AUTO: 5.58 K/UL — SIGNIFICANT CHANGE UP (ref 3.8–10.5)

## 2021-01-25 PROCEDURE — 99232 SBSQ HOSP IP/OBS MODERATE 35: CPT

## 2021-01-25 RX ADMIN — HEPARIN SODIUM 5000 UNIT(S): 5000 INJECTION INTRAVENOUS; SUBCUTANEOUS at 15:00

## 2021-01-25 RX ADMIN — PANTOPRAZOLE SODIUM 40 MILLIGRAM(S): 20 TABLET, DELAYED RELEASE ORAL at 07:51

## 2021-01-25 RX ADMIN — HEPARIN SODIUM 5000 UNIT(S): 5000 INJECTION INTRAVENOUS; SUBCUTANEOUS at 05:57

## 2021-01-25 RX ADMIN — CHLORHEXIDINE GLUCONATE 1 APPLICATION(S): 213 SOLUTION TOPICAL at 05:56

## 2021-01-25 RX ADMIN — Medication 6 MILLIGRAM(S): at 05:57

## 2021-01-25 RX ADMIN — Medication 300 MILLIGRAM(S): at 16:03

## 2021-01-25 RX ADMIN — Medication 1: at 17:00

## 2021-01-25 RX ADMIN — Medication 100 MILLIGRAM(S): at 05:56

## 2021-01-25 RX ADMIN — AMLODIPINE BESYLATE 5 MILLIGRAM(S): 2.5 TABLET ORAL at 05:58

## 2021-01-25 RX ADMIN — Medication 2: at 12:31

## 2021-01-26 ENCOUNTER — TRANSCRIPTION ENCOUNTER (OUTPATIENT)
Age: 71
End: 2021-01-26

## 2021-01-26 LAB
ANION GAP SERPL CALC-SCNC: 8 MMOL/L — SIGNIFICANT CHANGE UP (ref 5–17)
BUN SERPL-MCNC: 44 MG/DL — HIGH (ref 7–23)
CALCIUM SERPL-MCNC: 7.8 MG/DL — LOW (ref 8.5–10.1)
CHLORIDE SERPL-SCNC: 97 MMOL/L — SIGNIFICANT CHANGE UP (ref 96–108)
CO2 SERPL-SCNC: 30 MMOL/L — SIGNIFICANT CHANGE UP (ref 22–31)
CREAT SERPL-MCNC: 7.74 MG/DL — HIGH (ref 0.5–1.3)
GLUCOSE BLDC GLUCOMTR-MCNC: 109 MG/DL — HIGH (ref 70–99)
GLUCOSE BLDC GLUCOMTR-MCNC: 112 MG/DL — HIGH (ref 70–99)
GLUCOSE BLDC GLUCOMTR-MCNC: 250 MG/DL — HIGH (ref 70–99)
GLUCOSE BLDC GLUCOMTR-MCNC: 281 MG/DL — HIGH (ref 70–99)
GLUCOSE BLDC GLUCOMTR-MCNC: 282 MG/DL — HIGH (ref 70–99)
GLUCOSE BLDC GLUCOMTR-MCNC: 317 MG/DL — HIGH (ref 70–99)
GLUCOSE SERPL-MCNC: 84 MG/DL — SIGNIFICANT CHANGE UP (ref 70–99)
HCT VFR BLD CALC: 32.5 % — LOW (ref 39–50)
HGB BLD-MCNC: 10 G/DL — LOW (ref 13–17)
MAGNESIUM SERPL-MCNC: 2.1 MG/DL — SIGNIFICANT CHANGE UP (ref 1.6–2.6)
MCHC RBC-ENTMCNC: 26.6 PG — LOW (ref 27–34)
MCHC RBC-ENTMCNC: 30.8 GM/DL — LOW (ref 32–36)
MCV RBC AUTO: 86.4 FL — SIGNIFICANT CHANGE UP (ref 80–100)
NRBC # BLD: 0 /100 WBCS — SIGNIFICANT CHANGE UP (ref 0–0)
PHOSPHATE SERPL-MCNC: 6 MG/DL — HIGH (ref 2.5–4.5)
PLATELET # BLD AUTO: 185 K/UL — SIGNIFICANT CHANGE UP (ref 150–400)
POTASSIUM SERPL-MCNC: 4.3 MMOL/L — SIGNIFICANT CHANGE UP (ref 3.5–5.3)
POTASSIUM SERPL-SCNC: 4.3 MMOL/L — SIGNIFICANT CHANGE UP (ref 3.5–5.3)
RBC # BLD: 3.76 M/UL — LOW (ref 4.2–5.8)
RBC # FLD: 16.9 % — HIGH (ref 10.3–14.5)
SODIUM SERPL-SCNC: 135 MMOL/L — SIGNIFICANT CHANGE UP (ref 135–145)
WBC # BLD: 6.19 K/UL — SIGNIFICANT CHANGE UP (ref 3.8–10.5)
WBC # FLD AUTO: 6.19 K/UL — SIGNIFICANT CHANGE UP (ref 3.8–10.5)

## 2021-01-26 PROCEDURE — 99232 SBSQ HOSP IP/OBS MODERATE 35: CPT

## 2021-01-26 RX ORDER — PANTOPRAZOLE SODIUM 20 MG/1
0 TABLET, DELAYED RELEASE ORAL
Qty: 0 | Refills: 0 | DISCHARGE

## 2021-01-26 RX ORDER — ASPIRIN/CALCIUM CARB/MAGNESIUM 324 MG
1 TABLET ORAL
Qty: 30 | Refills: 0
Start: 2021-01-26 | End: 2021-02-24

## 2021-01-26 RX ORDER — AMLODIPINE BESYLATE 2.5 MG/1
1 TABLET ORAL
Qty: 0 | Refills: 0 | DISCHARGE
Start: 2021-01-26

## 2021-01-26 RX ORDER — SIMVASTATIN 20 MG/1
0 TABLET, FILM COATED ORAL
Qty: 0 | Refills: 0 | DISCHARGE

## 2021-01-26 RX ADMIN — HEPARIN SODIUM 5000 UNIT(S): 5000 INJECTION INTRAVENOUS; SUBCUTANEOUS at 00:24

## 2021-01-26 RX ADMIN — HEPARIN SODIUM 5000 UNIT(S): 5000 INJECTION INTRAVENOUS; SUBCUTANEOUS at 22:19

## 2021-01-26 RX ADMIN — Medication 300 MILLIGRAM(S): at 00:23

## 2021-01-26 RX ADMIN — Medication 100 MILLIGRAM(S): at 00:32

## 2021-01-26 RX ADMIN — Medication 3: at 16:47

## 2021-01-26 RX ADMIN — Medication 300 MILLIGRAM(S): at 15:15

## 2021-01-26 RX ADMIN — Medication 300 MILLIGRAM(S): at 22:19

## 2021-01-26 RX ADMIN — HEPARIN SODIUM 5000 UNIT(S): 5000 INJECTION INTRAVENOUS; SUBCUTANEOUS at 15:14

## 2021-01-26 RX ADMIN — AMLODIPINE BESYLATE 5 MILLIGRAM(S): 2.5 TABLET ORAL at 05:35

## 2021-01-26 RX ADMIN — ATORVASTATIN CALCIUM 40 MILLIGRAM(S): 80 TABLET, FILM COATED ORAL at 22:19

## 2021-01-26 RX ADMIN — HEPARIN SODIUM 5000 UNIT(S): 5000 INJECTION INTRAVENOUS; SUBCUTANEOUS at 05:35

## 2021-01-26 RX ADMIN — Medication 4: at 12:00

## 2021-01-26 RX ADMIN — Medication 6 MILLIGRAM(S): at 05:36

## 2021-01-26 RX ADMIN — CHLORHEXIDINE GLUCONATE 1 APPLICATION(S): 213 SOLUTION TOPICAL at 05:35

## 2021-01-26 RX ADMIN — Medication 3 MILLIGRAM(S): at 22:19

## 2021-01-26 RX ADMIN — ATORVASTATIN CALCIUM 40 MILLIGRAM(S): 80 TABLET, FILM COATED ORAL at 00:23

## 2021-01-26 RX ADMIN — Medication 3 MILLIGRAM(S): at 00:23

## 2021-01-26 RX ADMIN — PANTOPRAZOLE SODIUM 40 MILLIGRAM(S): 20 TABLET, DELAYED RELEASE ORAL at 08:32

## 2021-01-26 RX ADMIN — Medication 300 MILLIGRAM(S): at 05:35

## 2021-01-26 NOTE — PHYSICAL THERAPY INITIAL EVALUATION ADULT - ADDITIONAL COMMENTS
(continuation...) CT Chest: No central or lobar PE; hazy peripheral ground glass and consolidative opacities in the (L) lung; Stable chronic left pleural collection with associated pleural thickening and calcification. Chest Auscultation today: clear apical breathsounds, diminished breath sounds bibasally with coarse lower bibasal crackles.    Per patient, lives c spouse in private home c 5 stair steps to enter without rails at front of home, 5 stair steps to enter at back entrance c left handrail. Once inside all amenities at same level. Previously independent in all mobility without devices. Denies use of supplemental O2 at home. Goes to dialysis and gets transported to outpatient HD.

## 2021-01-26 NOTE — PHYSICAL THERAPY INITIAL EVALUATION ADULT - MODALITIES TREATMENT COMMENTS
Stevan RPE 3/10 on room air, SaO2 86% at rest. Stevan RPE reported 5/10 post mobility on room air, SaO2 94% on re-application of 2Lpm nasal cannula. HR normal limits.

## 2021-01-26 NOTE — DISCHARGE NOTE PROVIDER - PROVIDER TOKENS
PROVIDER:[TOKEN:[[id number]]],FREE:[LAST:[Primary Care Physician],PHONE:[(   )    -],FAX:[(   )    -],FOLLOWUP:[2 weeks]]

## 2021-01-26 NOTE — DISCHARGE NOTE PROVIDER - HOSPITAL COURSE
Pt is a 69 y/o male w/ PMHx of HTN, ESRD on HD T/TH/SAT, DM type 2, AVR, presented to the ED c/o SOB and cough. Pt reports worsening SOB and productive cough of clear sputum for about 1 week. Pt reports SPO2 was 90% on RA on admission. Pt reports he was not able to make it to HD due to trouble breathing. Pt reports wife has similar symptoms however has not been tested for COVID. Pt denies cp, fever or chills abd pain, N/V/D.  In ED SPO2 93% on RA, rest of vitals stable, pt afebrile. Labs sig for H/H 10.6/34.3, Na 131, K 5.5, BUN/Cr 68/12.8, Trop 0.121, BNP 16111, Dimer 2090. CTA showed hazy peripheral ground-glass and consolidative opacities within the left lung.  Admitted w/ COVID PNA.      Pneumonia due to COVID-19 virus. acute hypoxic respiratory failure now.   - Initially needing NRB oxygen, stable on 2L nasal cannula.  - Received Decadron  - Tessalon pearls prn cough  - Inhaler prn  - Held Remdesivir due to renal failure  - COVID isolation.   - Discharge home with home PT, rolling walker and home O2.  - ASA 81mg x 30 days post discharge for DVT ppx.    ESRD (end stage renal disease) on dialysis.   - Nephrology, Dr. Lilly consulted  - Received HD per renal, stable    Essential hypertension.  uncontrolled.   - Stable, on norvasc with holding parameters.      Hyperlipidemia, unspecified hyperlipidemia type.    - Stable, con't w/ statin.    Type 2 diabetes mellitus with other specified complication, unspecified whether long term insulin use.    - Stable on SSI   -  A1c. at 7.5    Anemia due to chronic kidney disease, on chronic dialysis.   - H/H stable    Hyperkalemia.   - Resolved      Pt seen and examined by Dr. Harris, deem clinically stable to discharge home with home PT, rolling walker, and home O2. Pt is a 69 y/o male w/ PMHx of HTN, ESRD on HD T/TH/SAT, DM type 2, AVR, presented to the ED c/o SOB and cough. Pt reports worsening SOB and productive cough of clear sputum for about 1 week. Pt reports SPO2 was 90% on RA on admission. Pt reports he was not able to make it to HD due to trouble breathing. Pt reports wife has similar symptoms however has not been tested for COVID. Pt denies cp, fever or chills abd pain, N/V/D.  In ED SPO2 93% on RA, rest of vitals stable, pt afebrile. Labs sig for H/H 10.6/34.3, Na 131, K 5.5, BUN/Cr 68/12.8, Trop 0.121, BNP 76735, Dimer 2090. CTA showed hazy peripheral ground-glass and consolidative opacities within the left lung.  Admitted w/ COVID PNA.      Pneumonia due to COVID-19 virus. acute hypoxic respiratory failure now.   - Initially needing NRB oxygen, stable on 2L nasal cannula.  - Received Decadron  - Tessalon pearls prn cough  - Inhaler prn  - Held Remdesivir due to renal failure  - COVID isolation.   - Discharge home with home PT, rolling walker and home O2.  - ASA 81mg x 30 days post discharge for DVT ppx.    ESRD (end stage renal disease) on dialysis.   - Nephrology, Dr. Lilly consulted  - Received HD per renal, stable  - Continue with outpatient HD.    Essential hypertension.  uncontrolled.   - Stable, on norvasc with holding parameters.      Hyperlipidemia, unspecified hyperlipidemia type.    - Stable, con't w/ statin.    Type 2 diabetes mellitus with other specified complication, unspecified whether long term insulin use.    - Stable on SSI   -  A1c. at 7.5    Anemia due to chronic kidney disease, on chronic dialysis.   - H/H stable    Hyperkalemia.   - Resolved      Pt seen and examined by Dr. Harris, deem clinically stable to discharge home with home PT, rolling walker, and home O2.  Pt to continue with hemodialysis outpatient. Pt is a 71 y/o male w/ PMHx of HTN, ESRD on HD T/TH/SAT, DM type 2, AVR, presented to the ED c/o SOB and cough. Pt reports worsening SOB and productive cough of clear sputum for about 1 week. Pt reports SPO2 was 90% on RA on admission. Pt reports he was not able to make it to HD due to trouble breathing. Pt reports wife has similar symptoms however has not been tested for COVID. Pt denies cp, fever or chills abd pain, N/V/D.  In ED SPO2 93% on RA, rest of vitals stable, pt afebrile. Labs sig for H/H 10.6/34.3, Na 131, K 5.5, BUN/Cr 68/12.8, Trop 0.121, BNP 17764, Dimer 2090. CTA showed hazy peripheral ground-glass and consolidative opacities within the left lung.  Admitted w/ COVID PNA.      Pneumonia due to COVID-19 virus. acute hypoxic respiratory failure now.   - Initially needing NRB oxygen, stable on 2L nasal cannula.  - Received Decadron  - Tessalon pearls prn cough  - Inhaler prn  - Held Remdesivir due to renal failure  - COVID isolation.   - Discharge home with home PT, rolling walker and home O2.  - ASA 81mg x 30 days post discharge for DVT ppx.    ESRD (end stage renal disease) on dialysis.   - Nephrology, Dr. Lilly consulted  - Received HD per renal, stable  - Continue with outpatient HD.    Essential hypertension.  uncontrolled.   - Stable, on norvasc with holding parameters.      Hyperlipidemia, unspecified hyperlipidemia type.    - Stable, con't w/ statin.    Type 2 diabetes mellitus with other specified complication, unspecified whether long term insulin use.    - Stable on SSI   -  A1c. at 7.5    Anemia due to chronic kidney disease, on chronic dialysis.   - H/H stable    Hyperkalemia.   - Resolved      Pt seen and examined by Dr. Harris, deem clinically stable to discharge home with home PT, rolling walker, and home O2.  Pt to continue with hemodialysis outpatient.  TIME SPENT COMPLETING DISCHARGE AND COORDINATING CARE WITH NURSING,  AND CASE MANAGEMENT APPROX 40MINUTES

## 2021-01-26 NOTE — DISCHARGE NOTE PROVIDER - NSDCFUADDINST_GEN_ALL_CORE_FT
use oxygen nasal cannula at 2 liters with ambulation and at rest  use oxygen nasal cannula at 2 liters with ambulation and at rest    check oxygen on finer using pulse oximeter if less than 88% come back to hospital immediately

## 2021-01-26 NOTE — PHYSICAL THERAPY INITIAL EVALUATION ADULT - PERTINENT HX OF CURRENT PROBLEM, REHAB EVAL
Patient walked in to [facility] ED on 01/19/21 due to dyspnea, wet sounding cough and low SpO2. On ED 90% on room air, reportedly usually 97%. Labs show raised BUN/creatinine (on hemodialysis T/Th/S), raised but now trended down, raised BNP. (+) Covid as of 1/19. (Continued...)

## 2021-01-26 NOTE — PROGRESS NOTE ADULT - ASSESSMENT
69 y/o male w/ PMHx of HTN, ESRD on HD T/TH/SAT, DM type 2, presents to the ED c/o SOB and cough. Pt being admitted w/ COVID PNA.    Problem/Plan - 1:  ·  Problem: Pneumonia due to COVID-19 virus.  Plan: - c/w supplemental oxygen  - cont Decadron  - Tessalon pearls  - Inhaler prn  - hold Remdesivir due to renal failure  - COVID isolation.   -Might need home oxygen. discussed with nurse.     Problem/Plan - 2:  ·  Problem: ESRD (end stage renal disease) on dialysis. HD per renal.     Problem/Plan - 3:  ·  Problem: Essential hypertension.  controlled. Plan: - c/w home meds.     Problem/Plan - 4:  ·  Problem: Hyperlipidemia, unspecified hyperlipidemia type.  Plan: - c/w statin.     Problem/Plan - 5:  ·  Problem: Type 2 diabetes mellitus with other specified complication, unspecified whether long term insulin use.  Plan: - FS qAC and HS w/ SSI  - f/u A1c.     Problem/Plan - 6:  Problem: Anemia due to chronic kidney disease, on chronic dialysis. Plan: - H/H stable  - cont to monitor.    Hyperkalemia. improved.     Full code  DVT ppx.     Problem/Plan - 7:  ·  Problem: DVT prophylaxis.  Plan: - HSQ      Pt wife Talisha (ph # [phone]). 
71 y/o male w/ PMHx of HTN, ESRD on HD T/TH/SAT, DM type 2, presents to the ED c/o SOB and cough. Pt being admitted w/ COVID PNA.    Problem/Plan - 1:  ·  Problem: Pneumonia due to COVID-19 virus. acute hypoxic respiratory failure now. cont NRB oxygen.   - cont Decadron  - Tessalon pearls  - Inhaler prn  - hold Remdesivir due to renal failure  - COVID isolation.   1/23/2021 check room air saturation    Problem/Plan - 2:  ·  Problem: ESRD (end stage renal disease) on dialysis. HD per renal.     Problem/Plan - 3:  ·  Problem: Essential hypertension.  uncontrolled. add norvasc with holding parameters.     Problem/Plan - 4:  ·  Problem: Hyperlipidemia, unspecified hyperlipidemia type.  Plan: - c/w statin.     Problem/Plan - 5:  ·  Problem: Type 2 diabetes mellitus with other specified complication, unspecified whether long term insulin use.  Plan: - FS qAC and HS w/ SSI  - Pending A1c.     Problem/Plan - 6:  Problem: Anemia due to chronic kidney disease, on chronic dialysis. Plan: - H/H stable  - cont to monitor.    Hyperkalemia. Improved.     Full code  DVT ppx.       Called and updated wife Talisha (Ph # [phone]) on phone. 
71 y/o male w/ PMHx of HTN, ESRD on HD T/TH/SAT, DM type 2, presents to the ED c/o SOB and cough. Pt being admitted w/ COVID PNA.    Problem/Plan - 1:  ·  Problem: Pneumonia due to COVID-19 virus. acute hypoxic respiratory failure now. cont NRB oxygen.   - cont Decadron  - Tessalon pearls  - Inhaler prn  - hold Remdesivir due to renal failure  - COVID isolation.       Problem/Plan - 2:  ·  Problem: ESRD (end stage renal disease) on dialysis. HD per renal.     Problem/Plan - 3:  ·  Problem: Essential hypertension.  uncontrolled. add norvasc with holding parameters.     Problem/Plan - 4:  ·  Problem: Hyperlipidemia, unspecified hyperlipidemia type.  Plan: - c/w statin.     Problem/Plan - 5:  ·  Problem: Type 2 diabetes mellitus with other specified complication, unspecified whether long term insulin use.  Plan: - FS qAC and HS w/ SSI  - Pending A1c.     Problem/Plan - 6:  Problem: Anemia due to chronic kidney disease, on chronic dialysis. Plan: - H/H stable  - cont to monitor.    Hyperkalemia. Improved.     Full code  DVT ppx.       Called and updated wife Talisha (Ph # [phone]) on phone. 
71 y/o male w/ PMHx of HTN, ESRD on HD T/TH/SAT, DM type 2, presents to the ED c/o SOB and cough. Pt being admitted w/ COVID PNA.    Problem/Plan - 1:  ·  Problem: Pneumonia due to COVID-19 virus. acute hypoxic respiratory failure now. cont NRB oxygen.   - cont Decadron  - Tessalon pearls  - Inhaler prn  - hold Remdesivir due to renal failure  - COVID isolation.   1/23/2021 check room air saturation  room air at rest 87% placed on oxygen at 2 liters nasal cannula 94%    Problem/Plan - 2:  ·  Problem: ESRD (end stage renal disease) on dialysis. HD per renal.     Problem/Plan - 3:  ·  Problem: Essential hypertension.  uncontrolled. add norvasc with holding parameters.   1/24/2021 will uptitrate as needed    Problem/Plan - 4:  ·  Problem: Hyperlipidemia, unspecified hyperlipidemia type.  Plan: - c/w statin.     Problem/Plan - 5:  ·  Problem: Type 2 diabetes mellitus with other specified complication, unspecified whether long term insulin use.  Plan: - FS qAC and HS w/ SSI  -  A1c.  at 7.5    Problem/Plan - 6:  Problem: Anemia due to chronic kidney disease, on chronic dialysis. Plan: - H/H stable  - cont to monitor.    Hyperkalemia. Improved.     Full code  DVT ppx.       Called and updated wife Talisha (Ph # [phone]) on phone. 
71 y/o male w/ PMHx of HTN, ESRD on HD T/TH/SAT, DM type 2, presents to the ED c/o SOB and cough. Pt being admitted w/ COVID PNA.    Problem/Plan - 1:  ·  Problem: Pneumonia due to COVID-19 virus. acute hypoxic respiratory failure now. cont NRB oxygen.   - cont Decadron  - Tessalon pearls  - Inhaler prn  - hold Remdesivir due to renal failure  - COVID isolation.   1/23/2021 check room air saturation  room air at rest 87% placed on oxygen at 2 liters nasal cannula 94%  1/25/2021 igoing home will need oxygen if going to rehab will detremine await PT evalaution    Problem/Plan - 2:  ·  Problem: ESRD (end stage renal disease) on dialysis. HD per renal.     Problem/Plan - 3:  ·  Problem: Essential hypertension.  uncontrolled. add norvasc with holding parameters.   1/24/2021 will uptitrate as needed    Problem/Plan - 4:  ·  Problem: Hyperlipidemia, unspecified hyperlipidemia type.  Plan: - c/w statin.     Problem/Plan - 5:  ·  Problem: Type 2 diabetes mellitus with other specified complication, unspecified whether long term insulin use.  Plan: - FS qAC and HS w/ SSI  -  A1c.  at 7.5    Problem/Plan - 6:  Problem: Anemia due to chronic kidney disease, on chronic dialysis. Plan: - H/H stable  - cont to monitor.    Hyperkalemia. Improved.     Full code  DVT ppx.       Called and updated wife Talisha (Ph # [phone]) on phone. 
69 y/o male w/ PMHx of HTN, ESRD on HD T/TH/SAT, DM type 2, presents to the ED c/o SOB and cough. Pt being admitted w/ COVID PNA.    Problem/Plan - 1:  ·  Problem: Pneumonia due to COVID-19 virus. acute hypoxic respiratory failure now. cont NRB oxygen.   - cont Decadron  - Tessalon pearls  - Inhaler prn  - hold Remdesivir due to renal failure  - COVID isolation.   1/23/2021 check room air saturation  room air at rest 87% placed on oxygen at 2 liters nasal cannula 94%  1/25/2021 going home will need oxygen if going to rehab will determine await PT evalaution    Problem/Plan - 2:  ·  Problem: ESRD (end stage renal disease) on dialysis. HD per renal.     Problem/Plan - 3:  ·  Problem: Essential hypertension.  uncontrolled. add norvasc with holding parameters.   1/24/2021 will uptitrate as needed    Problem/Plan - 4:  ·  Problem: Hyperlipidemia, unspecified hyperlipidemia type.  Plan: - c/w statin.     Problem/Plan - 5:  ·  Problem: Type 2 diabetes mellitus with other specified complication, unspecified whether long term insulin use.  Plan: - FS qAC and HS w/ SSI  -  A1c.  at 7.5    Problem/Plan - 6:  Problem: Anemia due to chronic kidney disease, on chronic dialysis. Plan: - H/H stable  - cont to monitor.    Hyperkalemia. Improved.     Full code  DVT ppx.       Called and updated wife Talisha (Ph # [phone]) on phone.

## 2021-01-26 NOTE — DISCHARGE NOTE PROVIDER - NSDCMRMEDTOKEN_GEN_ALL_CORE_FT
amLODIPine 5 mg oral tablet: 1 tab(s) orally once a day  aspirin 81 mg oral delayed release tablet: 1 tab(s) orally once a day   atorvastatin 40 mg oral tablet: 1 tab(s) orally once a day (at bedtime)  docusate sodium 100 mg oral tablet: 1 tab(s) orally , As Needed  epoetin theodore:   FeroSul 325 mg (65 mg elemental iron) oral tablet: 1 tab(s) orally once a day   guaiFENesin 100 mg/5 mL oral liquid: 10 milliliter(s) orally every 6 hours, As needed, Cough  hydrALAZINE 50 mg oral tablet: orally 2 times a day  labetalol 300 mg oral tablet: 1 tab(s) orally 3 times a day  pantoprazole 40 mg oral delayed release tablet: 1 tab(s) orally 2 times a day  sevelamer hydrochloride 800 mg oral tablet: 2 tab(s) orally 3 times a day  sucralfate 1 g oral tablet: 1 tab(s) orally 4 times a day   amLODIPine 5 mg oral tablet: 1 tab(s) orally once a day  aspirin 81 mg oral delayed release tablet: 1 tab(s) orally once a day   atorvastatin 40 mg oral tablet: 1 tab(s) orally once a day (at bedtime)  docusate sodium 100 mg oral tablet: 1 tab(s) orally , As Needed  epoetin theodore:   FeroSul 325 mg (65 mg elemental iron) oral tablet: 1 tab(s) orally once a day   guaiFENesin 100 mg/5 mL oral liquid: 10 milliliter(s) orally every 6 hours, As needed, Cough  hydrALAZINE 50 mg oral tablet: orally 2 times a day  Januvia 25 mg oral tablet: 1 tab(s) orally once a day   labetalol 300 mg oral tablet: 1 tab(s) orally 3 times a day  pantoprazole 40 mg oral delayed release tablet: 1 tab(s) orally 2 times a day  sevelamer hydrochloride 800 mg oral tablet: 2 tab(s) orally 3 times a day  sucralfate 1 g oral tablet: 1 tab(s) orally 4 times a day

## 2021-01-26 NOTE — PHYSICAL THERAPY INITIAL EVALUATION ADULT - DISCHARGE DISPOSITION, PT EVAL
Home c home PT c rolling walker to improve balance, endurance and strength. Will require supplemental O2.

## 2021-01-26 NOTE — PHYSICAL THERAPY INITIAL EVALUATION ADULT - BALANCE TRAINING, PT EVAL
GOAL: Patient will demonstrate independent performance of ADLS c low falls risk with appropriate mobility/adaptive devices, without dyspnea, in 4 weeks.

## 2021-01-26 NOTE — DISCHARGE NOTE PROVIDER - CARE PROVIDER_API CALL
Payam Lilly  INTERNAL MEDICINE  [address] [facility] [place], [address] [place] [id number]  Phone: [phone]  Fax: [phone]  Follow Up Time:     Primary Care Physician,   Phone: (   )    -  Fax: (   )    -  Follow Up Time: 2 weeks

## 2021-01-27 ENCOUNTER — TRANSCRIPTION ENCOUNTER (OUTPATIENT)
Age: 71
End: 2021-01-27

## 2021-01-27 VITALS
DIASTOLIC BLOOD PRESSURE: 70 MMHG | RESPIRATION RATE: 17 BRPM | SYSTOLIC BLOOD PRESSURE: 127 MMHG | TEMPERATURE: 98 F | OXYGEN SATURATION: 96 % | HEART RATE: 66 BPM

## 2021-01-27 LAB
GLUCOSE BLDC GLUCOMTR-MCNC: 150 MG/DL — HIGH (ref 70–99)
GLUCOSE BLDC GLUCOMTR-MCNC: 193 MG/DL — HIGH (ref 70–99)
GLUCOSE BLDC GLUCOMTR-MCNC: 236 MG/DL — HIGH (ref 70–99)

## 2021-01-27 PROCEDURE — 99239 HOSP IP/OBS DSCHRG MGMT >30: CPT

## 2021-01-27 RX ORDER — SITAGLIPTIN 50 MG/1
1 TABLET, FILM COATED ORAL
Qty: 30 | Refills: 0
Start: 2021-01-27 | End: 2021-02-25

## 2021-01-27 RX ADMIN — Medication 6 MILLIGRAM(S): at 05:41

## 2021-01-27 RX ADMIN — Medication 1: at 07:47

## 2021-01-27 RX ADMIN — PANTOPRAZOLE SODIUM 40 MILLIGRAM(S): 20 TABLET, DELAYED RELEASE ORAL at 05:41

## 2021-01-27 RX ADMIN — Medication 300 MILLIGRAM(S): at 05:41

## 2021-01-27 RX ADMIN — HEPARIN SODIUM 5000 UNIT(S): 5000 INJECTION INTRAVENOUS; SUBCUTANEOUS at 05:41

## 2021-01-27 RX ADMIN — CHLORHEXIDINE GLUCONATE 1 APPLICATION(S): 213 SOLUTION TOPICAL at 05:41

## 2021-01-27 RX ADMIN — Medication 2: at 12:02

## 2021-01-27 RX ADMIN — AMLODIPINE BESYLATE 5 MILLIGRAM(S): 2.5 TABLET ORAL at 05:41

## 2021-01-27 NOTE — PROGRESS NOTE ADULT - REASON FOR ADMISSION
COVID PNA

## 2021-01-27 NOTE — PROGRESS NOTE ADULT - SUBJECTIVE AND OBJECTIVE BOX
CHIEF COMPLAINT: follow up of COVID  no fever  no diarrhea  no chest pain  + SOB  on NRB oxygen.       PHYSICAL EXAM:    GENERAL: Moderately built, on NRB oxygen.   CHEST/LUNG: Clear to ausculation bilaterally, no wheezing, no crackles   HEART: Regular rate and rhythm; No murmurs, rubs  ABDOMEN: Soft, Nontender, Nondistended; Bowel sounds present  EXTREMITIES:  Moving all four extremities spontaneously, No clubbing, cyanosis, or edema  NERVOUS SYSTEM:  Grossly non focal.  Psychiatry: AAO x 3, mood is appropriate       OBJECTIVE DATA:     Vital Signs Last 24 Hrs  T(C): 37.1 (22 Jan 2021 11:14), Max: 37.2 (22 Jan 2021 08:38)  T(F): 98.7 (22 Jan 2021 11:14), Max: 99 (22 Jan 2021 08:38)  HR: 84 (22 Jan 2021 11:14) (62 - 84)  BP: 187/90 (22 Jan 2021 11:14) (161/72 - 208/91)  BP(mean): --  RR: 20 (22 Jan 2021 11:14) (18 - 20)  SpO2: 100% (22 Jan 2021 11:14) (95% - 100%)           Daily     Daily   LABS:                        10.1   8.83  )-----------( 172      ( 22 Jan 2021 11:33 )             32.8             01-22    136  |  95<L>  |  70<H>  ----------------------------<  190<H>  4.8   |  30  |  12.90<H>    Ca    7.4<L>      22 Jan 2021 11:33    TPro  7.1  /  Alb  2.7<L>  /  TBili  0.4  /  DBili  x   /  AST  42<H>  /  ALT  20  /  AlkPhos  96  01-20                       CAPILLARY BLOOD GLUCOSE      POCT Blood Glucose.: 198 mg/dL (22 Jan 2021 10:15)      MEDICATIONS  (STANDING):  amLODIPine   Tablet 5 milliGRAM(s) Oral daily  atorvastatin 40 milliGRAM(s) Oral at bedtime  chlorhexidine 4% Liquid 1 Application(s) Topical <User Schedule>  dexAMETHasone  Injectable 6 milliGRAM(s) IV Push daily  dextrose 40% Gel 15 Gram(s) Oral once  dextrose 5%. 1000 milliLiter(s) (50 mL/Hr) IV Continuous <Continuous>  dextrose 5%. 1000 milliLiter(s) (100 mL/Hr) IV Continuous <Continuous>  dextrose 50% Injectable 25 Gram(s) IV Push once  dextrose 50% Injectable 12.5 Gram(s) IV Push once  dextrose 50% Injectable 25 Gram(s) IV Push once  glucagon  Injectable 1 milliGRAM(s) IntraMuscular once  heparin   Injectable 5000 Unit(s) SubCutaneous every 8 hours  insulin lispro (ADMELOG) corrective regimen sliding scale   SubCutaneous three times a day before meals  insulin lispro (ADMELOG) corrective regimen sliding scale   SubCutaneous at bedtime  labetalol 300 milliGRAM(s) Oral three times a day  melatonin 3 milliGRAM(s) Oral at bedtime  pantoprazole    Tablet 40 milliGRAM(s) Oral before breakfast    MEDICATIONS  (PRN):  acetaminophen   Tablet .. 650 milliGRAM(s) Oral every 6 hours PRN Temp greater or equal to 38C (100.4F), Mild Pain (1 - 3)  ALBUTerol    90 MICROgram(s) HFA Inhaler 2 Puff(s) Inhalation every 4 hours PRN Shortness of Breath and/or Wheezing  benzonatate 100 milliGRAM(s) Oral three times a day PRN Cough  ondansetron Injectable 4 milliGRAM(s) IV Push every 6 hours PRN Nausea and/or Vomiting      
I am inheriting this patient on today 1/23/2021    CHIEF COMPLAINT:  COVID PNA      MEDICATIONS  (STANDING):  amLODIPine   Tablet 5 milliGRAM(s) Oral daily  atorvastatin 40 milliGRAM(s) Oral at bedtime  chlorhexidine 4% Liquid 1 Application(s) Topical <User Schedule>  dexAMETHasone  Injectable 6 milliGRAM(s) IV Push daily  dextrose 40% Gel 15 Gram(s) Oral once  dextrose 5%. 1000 milliLiter(s) (50 mL/Hr) IV Continuous <Continuous>  dextrose 5%. 1000 milliLiter(s) (100 mL/Hr) IV Continuous <Continuous>  dextrose 50% Injectable 25 Gram(s) IV Push once  dextrose 50% Injectable 12.5 Gram(s) IV Push once  dextrose 50% Injectable 25 Gram(s) IV Push once  glucagon  Injectable 1 milliGRAM(s) IntraMuscular once  heparin   Injectable 5000 Unit(s) SubCutaneous every 8 hours  insulin lispro (ADMELOG) corrective regimen sliding scale   SubCutaneous three times a day before meals  insulin lispro (ADMELOG) corrective regimen sliding scale   SubCutaneous at bedtime  labetalol 300 milliGRAM(s) Oral three times a day  melatonin 3 milliGRAM(s) Oral at bedtime  pantoprazole    Tablet 40 milliGRAM(s) Oral before breakfast    MEDICATIONS  (PRN):  acetaminophen   Tablet .. 650 milliGRAM(s) Oral every 6 hours PRN Temp greater or equal to 38C (100.4F), Mild Pain (1 - 3)  ALBUTerol    90 MICROgram(s) HFA Inhaler 2 Puff(s) Inhalation every 4 hours PRN Shortness of Breath and/or Wheezing  benzonatate 100 milliGRAM(s) Oral three times a day PRN Cough  ondansetron Injectable 4 milliGRAM(s) IV Push every 6 hours PRN Nausea and/or Vomiting    Vital Signs Last 24 Hrs  T(C): 36.4 (24 Jan 2021 11:40), Max: 36.8 (23 Jan 2021 16:19)  T(F): 97.5 (24 Jan 2021 11:40), Max: 98.2 (23 Jan 2021 16:19)  HR: 66 (24 Jan 2021 11:40) (56 - 66)  BP: 163/77 (24 Jan 2021 11:40) (107/52 - 163/77)  BP(mean): --  RR: 18 (24 Jan 2021 11:40) (18 - 20)  SpO2: 97% (24 Jan 2021 11:40) (85% - 97%)      PHYSICAL EXAM:    GENERAL: Moderately built, on nasal cannula.   CHEST/LUNG: Clear to ausculation bilaterally, no wheezing, no crackles   HEART: Regular rate and rhythm; No murmurs, rubs  ABDOMEN: Soft, Nontender, Nondistended; Bowel sounds present  EXTREMITIES:  Moving all four extremities spontaneously, No clubbing, cyanosis, or edema  NERVOUS SYSTEM:  Grossly non focal.  Psychiatry: AAO x 3, mood is appropriate       LABS:   no new labs                            hgab1c 7.5    
I am inheriting this patient on today 1/23/2021    CHIEF COMPLAINT:  COVID PNA    MEDICATIONS  (STANDING):  amLODIPine   Tablet 5 milliGRAM(s) Oral daily  atorvastatin 40 milliGRAM(s) Oral at bedtime  chlorhexidine 4% Liquid 1 Application(s) Topical <User Schedule>  dexAMETHasone  Injectable 6 milliGRAM(s) IV Push daily  dextrose 40% Gel 15 Gram(s) Oral once  dextrose 5%. 1000 milliLiter(s) (50 mL/Hr) IV Continuous <Continuous>  dextrose 5%. 1000 milliLiter(s) (100 mL/Hr) IV Continuous <Continuous>  dextrose 50% Injectable 25 Gram(s) IV Push once  dextrose 50% Injectable 12.5 Gram(s) IV Push once  dextrose 50% Injectable 25 Gram(s) IV Push once  glucagon  Injectable 1 milliGRAM(s) IntraMuscular once  heparin   Injectable 5000 Unit(s) SubCutaneous every 8 hours  insulin lispro (ADMELOG) corrective regimen sliding scale   SubCutaneous three times a day before meals  insulin lispro (ADMELOG) corrective regimen sliding scale   SubCutaneous at bedtime  labetalol 300 milliGRAM(s) Oral three times a day  melatonin 3 milliGRAM(s) Oral at bedtime  pantoprazole    Tablet 40 milliGRAM(s) Oral before breakfast    MEDICATIONS  (PRN):  acetaminophen   Tablet .. 650 milliGRAM(s) Oral every 6 hours PRN Temp greater or equal to 38C (100.4F), Mild Pain (1 - 3)  ALBUTerol    90 MICROgram(s) HFA Inhaler 2 Puff(s) Inhalation every 4 hours PRN Shortness of Breath and/or Wheezing  benzonatate 100 milliGRAM(s) Oral three times a day PRN Cough  ondansetron Injectable 4 milliGRAM(s) IV Push every 6 hours PRN Nausea and/or Vomiting    Vital Signs Last 24 Hrs    T(F): 98.3  HR: 70  BP: 146/67  RR: 18  SpO2: 95%    PHYSICAL EXAM:    GENERAL: Moderately built, on nasal cannula.   CHEST/LUNG: Clear to ausculation bilaterally, no wheezing, no crackles   HEART: Regular rate and rhythm; No murmurs, rubs  ABDOMEN: Soft, Nontender, Nondistended; Bowel sounds present  EXTREMITIES:  Moving all four extremities spontaneously, No clubbing, cyanosis, or edema  NERVOUS SYSTEM:  Grossly non focal.  Psychiatry: AAO x 3, mood is appropriate       LABS:   no new labs                            hgab1c 7.5    
I am inheriting this patient on today 1/23/2021    CHIEF COMPLAINT:  COVID PNA    MEDICATIONS  (STANDING):  amLODIPine   Tablet 5 milliGRAM(s) Oral daily  atorvastatin 40 milliGRAM(s) Oral at bedtime  chlorhexidine 4% Liquid 1 Application(s) Topical <User Schedule>  dexAMETHasone  Injectable 6 milliGRAM(s) IV Push daily  dextrose 40% Gel 15 Gram(s) Oral once  dextrose 5%. 1000 milliLiter(s) (50 mL/Hr) IV Continuous <Continuous>  dextrose 5%. 1000 milliLiter(s) (100 mL/Hr) IV Continuous <Continuous>  dextrose 50% Injectable 25 Gram(s) IV Push once  dextrose 50% Injectable 12.5 Gram(s) IV Push once  dextrose 50% Injectable 25 Gram(s) IV Push once  glucagon  Injectable 1 milliGRAM(s) IntraMuscular once  heparin   Injectable 5000 Unit(s) SubCutaneous every 8 hours  insulin lispro (ADMELOG) corrective regimen sliding scale   SubCutaneous three times a day before meals  insulin lispro (ADMELOG) corrective regimen sliding scale   SubCutaneous at bedtime  labetalol 300 milliGRAM(s) Oral three times a day  melatonin 3 milliGRAM(s) Oral at bedtime  pantoprazole    Tablet 40 milliGRAM(s) Oral before breakfast    MEDICATIONS  (PRN):  acetaminophen   Tablet .. 650 milliGRAM(s) Oral every 6 hours PRN Temp greater or equal to 38C (100.4F), Mild Pain (1 - 3)  ALBUTerol    90 MICROgram(s) HFA Inhaler 2 Puff(s) Inhalation every 4 hours PRN Shortness of Breath and/or Wheezing  benzonatate 100 milliGRAM(s) Oral three times a day PRN Cough  ondansetron Injectable 4 milliGRAM(s) IV Push every 6 hours PRN Nausea and/or Vomiting    Vital Signs Last 24 Hrs  T(C): 36.9 (25 Jan 2021 11:41), Max: 37.9 (24 Jan 2021 23:47)  T(F): 98.5 (25 Jan 2021 11:41), Max: 100.3 (24 Jan 2021 23:47)  HR: 76 (25 Jan 2021 11:41) (58 - 76)  BP: 143/69 (25 Jan 2021 11:41) (114/57 - 145/67)  BP(mean): --  RR: 18 (25 Jan 2021 11:41) (16 - 18)  SpO2: 96% (25 Jan 2021 11:41) (94% - 96%)    PHYSICAL EXAM:    GENERAL: Moderately built, on nasal cannula.   CHEST/LUNG: Clear to ausculation bilaterally, no wheezing, no crackles   HEART: Regular rate and rhythm; No murmurs, rubs  ABDOMEN: Soft, Nontender, Nondistended; Bowel sounds present  EXTREMITIES:  Moving all four extremities spontaneously, No clubbing, cyanosis, or edema  NERVOUS SYSTEM:  Grossly non focal.  Psychiatry: AAO x 3, mood is appropriate       LABS:   no new labs                            hgab1c 7.5    
NEPHROLOGY PROGRESS NOTE    CHIEF COMPLAINT:  ESRD    HPI:  Hypoxic and dyspneic, no fever.    EXAM:  T(F): 98.8 (01-21-21 @ 11:25)  HR: 73 (01-21-21 @ 11:25)  BP: 153/65 (01-21-21 @ 11:25)  RR: 18 (01-21-21 @ 11:25)  SpO2: 96% (01-21-21 @ 11:25)    deferred    LABS                             10.1   6.30  )-----------( 196      ( 20 Jan 2021 07:04 )             32.2          01-20    137  |  97  |  40<H>  ----------------------------<  130<H>  3.9   |  31  |  8.96<H>    Ca    7.9<L>      20 Jan 2021 20:42  Phos  9.8     01-20  Mg     2.9     01-20    TPro  7.1  /  Alb  2.7<L>  /  TBili  0.4  /  DBili  x   /  AST  42<H>  /  ALT  20  /  AlkPhos  96  01-20           Assessment   ESRD; COVID PNA  Hyperkalemia resolved post HD    Plan  Next dialysis tomorrow  
[facility] NEPHROLOGY SERVICES, [facility]  NEPHROLOGY AND HYPERTENSION  [address]  [address]  [place] [address]  [phone]    MD WHIT NICKERSON MD ANDREY GONCHARUK, MD MADHU KORRAPATI, MD YELENA ROSENBERG, MD BINNY KOSHY, MD CHRISTOPHER CAPUTO, MD EDWARD BOVER, MD          Patient events noted    MEDICATIONS  (STANDING):  amLODIPine   Tablet 5 milliGRAM(s) Oral daily  atorvastatin 40 milliGRAM(s) Oral at bedtime  chlorhexidine 4% Liquid 1 Application(s) Topical <User Schedule>  dexAMETHasone  Injectable 6 milliGRAM(s) IV Push daily  dextrose 40% Gel 15 Gram(s) Oral once  dextrose 5%. 1000 milliLiter(s) (50 mL/Hr) IV Continuous <Continuous>  dextrose 5%. 1000 milliLiter(s) (100 mL/Hr) IV Continuous <Continuous>  dextrose 50% Injectable 25 Gram(s) IV Push once  dextrose 50% Injectable 12.5 Gram(s) IV Push once  dextrose 50% Injectable 25 Gram(s) IV Push once  glucagon  Injectable 1 milliGRAM(s) IntraMuscular once  heparin   Injectable 5000 Unit(s) SubCutaneous every 8 hours  insulin lispro (ADMELOG) corrective regimen sliding scale   SubCutaneous three times a day before meals  insulin lispro (ADMELOG) corrective regimen sliding scale   SubCutaneous at bedtime  labetalol 300 milliGRAM(s) Oral three times a day  melatonin 3 milliGRAM(s) Oral at bedtime  pantoprazole    Tablet 40 milliGRAM(s) Oral before breakfast    MEDICATIONS  (PRN):  acetaminophen   Tablet .. 650 milliGRAM(s) Oral every 6 hours PRN Temp greater or equal to 38C (100.4F), Mild Pain (1 - 3)  ALBUTerol    90 MICROgram(s) HFA Inhaler 2 Puff(s) Inhalation every 4 hours PRN Shortness of Breath and/or Wheezing  benzonatate 100 milliGRAM(s) Oral three times a day PRN Cough  ondansetron Injectable 4 milliGRAM(s) IV Push every 6 hours PRN Nausea and/or Vomiting      01-26-21 @ 07:01  -  01-27-21 @ 07:00  --------------------------------------------------------  IN: 260 mL / OUT: 0 mL / NET: 260 mL      PHYSICAL EXAM:      T(C): 36.4 (01-27-21 @ 14:00), Max: 36.8 (01-27-21 @ 00:18)  HR: 64 (01-27-21 @ 14:00) (61 - 88)  BP: 147/73 (01-27-21 @ 14:00) (129/63 - 162/73)  RR: 18 (01-27-21 @ 14:00) (17 - 18)  SpO2: 96% (01-27-21 @ 14:00) (94% - 96%)  Wt(kg): --  Lungs clear  Heart S1S2  Abd soft NT ND  Extremities:   tr edema                                    10.0   6.19  )-----------( 185      ( 26 Jan 2021 08:25 )             32.5     01-26    135  |  97  |  44<H>  ----------------------------<  84  4.3   |  30  |  7.74<H>    Ca    7.8<L>      26 Jan 2021 08:25  Phos  6.0     01-26  Mg     2.1     01-26          Creatinine Trend: 7.74<--, 13.70<--, 12.90<--, 8.96<--, 14.20<--, 12.80<--      Assessment   ESRD; HTN, DM;     Plan:  Maintenance HD  UF as tolerated   Discharge planning    Payam Lilly MD
[facility] NEPHROLOGY SERVICES, [facility]  NEPHROLOGY AND HYPERTENSION  [address]  [address]  [place] [address]  [phone]    MD WHIT NICKERSON MD ANDREY GONCHARUK, MD MADHU KORRAPATI, MD YELENA ROSENBERG, MD BINNY KOSHY, MD CHRISTOPHER CAPUTO, MD EDWARD BOVER, MD          Patient events noted    MEDICATIONS  (STANDING):  amLODIPine   Tablet 5 milliGRAM(s) Oral daily  atorvastatin 40 milliGRAM(s) Oral at bedtime  chlorhexidine 4% Liquid 1 Application(s) Topical <User Schedule>  dexAMETHasone  Injectable 6 milliGRAM(s) IV Push daily  dextrose 40% Gel 15 Gram(s) Oral once  dextrose 5%. 1000 milliLiter(s) (50 mL/Hr) IV Continuous <Continuous>  dextrose 5%. 1000 milliLiter(s) (100 mL/Hr) IV Continuous <Continuous>  dextrose 50% Injectable 25 Gram(s) IV Push once  dextrose 50% Injectable 12.5 Gram(s) IV Push once  dextrose 50% Injectable 25 Gram(s) IV Push once  glucagon  Injectable 1 milliGRAM(s) IntraMuscular once  heparin   Injectable 5000 Unit(s) SubCutaneous every 8 hours  insulin lispro (ADMELOG) corrective regimen sliding scale   SubCutaneous three times a day before meals  insulin lispro (ADMELOG) corrective regimen sliding scale   SubCutaneous at bedtime  labetalol 300 milliGRAM(s) Oral three times a day  melatonin 3 milliGRAM(s) Oral at bedtime  pantoprazole    Tablet 40 milliGRAM(s) Oral before breakfast    MEDICATIONS  (PRN):  acetaminophen   Tablet .. 650 milliGRAM(s) Oral every 6 hours PRN Temp greater or equal to 38C (100.4F), Mild Pain (1 - 3)  ALBUTerol    90 MICROgram(s) HFA Inhaler 2 Puff(s) Inhalation every 4 hours PRN Shortness of Breath and/or Wheezing  benzonatate 100 milliGRAM(s) Oral three times a day PRN Cough  ondansetron Injectable 4 milliGRAM(s) IV Push every 6 hours PRN Nausea and/or Vomiting      01-22-21 @ 07:01  -  01-22-21 @ 21:28  --------------------------------------------------------  IN: 0 mL / OUT: 2500 mL / NET: -2500 mL      PHYSICAL EXAM:      T(C): 36.4 (01-22-21 @ 18:35), Max: 37.2 (01-22-21 @ 08:38)  HR: 63 (01-22-21 @ 18:35) (59 - 84)  BP: 165/70 (01-22-21 @ 18:35) (150/76 - 208/91)  RR: 18 (01-22-21 @ 18:35) (18 - 20)  SpO2: 98% (01-22-21 @ 18:35) (95% - 100%)  Wt(kg): --  Lungs mild rhonchi  Heart S1S2  Abd soft NT ND  Extremities:   tr edema                                    10.1   8.83  )-----------( 172      ( 22 Jan 2021 11:33 )             32.8     01-22    136  |  95<L>  |  70<H>  ----------------------------<  190<H>  4.8   |  30  |  12.90<H>    Ca    7.4<L>      22 Jan 2021 11:33          Creatinine Trend: 12.90<--, 8.96<--, 14.20<--, 12.80<--      Assessment   ESRD; COVID PNA   Stable    Plan:  HD for today  UF as tolerated  Discharge planning    Payam Lilly MD
[facility] NEPHROLOGY SERVICES, [facility]  NEPHROLOGY AND HYPERTENSION  [address]  [address]  [place] [address]  [phone]    MD WHIT NICKERSON MD ANDREY GONCHARUK, MD MADHU KORRAPATI, MD YELENA ROSENBERG, MD BINNY KOSHY, MD CHRISTOPHER CAPUTO, MD EDWARD BOVER, MD          Patient events noted    MEDICATIONS  (STANDING):  amLODIPine   Tablet 5 milliGRAM(s) Oral daily  atorvastatin 40 milliGRAM(s) Oral at bedtime  chlorhexidine 4% Liquid 1 Application(s) Topical <User Schedule>  dexAMETHasone  Injectable 6 milliGRAM(s) IV Push daily  dextrose 40% Gel 15 Gram(s) Oral once  dextrose 5%. 1000 milliLiter(s) (50 mL/Hr) IV Continuous <Continuous>  dextrose 5%. 1000 milliLiter(s) (100 mL/Hr) IV Continuous <Continuous>  dextrose 50% Injectable 12.5 Gram(s) IV Push once  dextrose 50% Injectable 25 Gram(s) IV Push once  dextrose 50% Injectable 25 Gram(s) IV Push once  glucagon  Injectable 1 milliGRAM(s) IntraMuscular once  heparin   Injectable 5000 Unit(s) SubCutaneous every 8 hours  insulin lispro (ADMELOG) corrective regimen sliding scale   SubCutaneous three times a day before meals  insulin lispro (ADMELOG) corrective regimen sliding scale   SubCutaneous at bedtime  labetalol 300 milliGRAM(s) Oral three times a day  melatonin 3 milliGRAM(s) Oral at bedtime  pantoprazole    Tablet 40 milliGRAM(s) Oral before breakfast    MEDICATIONS  (PRN):  acetaminophen   Tablet .. 650 milliGRAM(s) Oral every 6 hours PRN Temp greater or equal to 38C (100.4F), Mild Pain (1 - 3)  ALBUTerol    90 MICROgram(s) HFA Inhaler 2 Puff(s) Inhalation every 4 hours PRN Shortness of Breath and/or Wheezing  benzonatate 100 milliGRAM(s) Oral three times a day PRN Cough  ondansetron Injectable 4 milliGRAM(s) IV Push every 6 hours PRN Nausea and/or Vomiting      01-24-21 @ 07:01  -  01-25-21 @ 07:00  --------------------------------------------------------  IN: 480 mL / OUT: 0 mL / NET: 480 mL    01-25-21 @ 07:01  -  01-25-21 @ 20:30  --------------------------------------------------------  IN: 0 mL / OUT: 150 mL / NET: -150 mL      PHYSICAL EXAM:      T(C): 36.4 (01-25-21 @ 19:52), Max: 37.9 (01-24-21 @ 23:47)  HR: 61 (01-25-21 @ 19:52) (61 - 76)  BP: 149/84 (01-25-21 @ 19:52) (114/57 - 149/84)  RR: 18 (01-25-21 @ 19:52) (18 - 18)  SpO2: 94% (01-25-21 @ 19:52) (94% - 96%)  Wt(kg): --  Lungs clear  Heart S1S2  Abd soft NT ND  Extremities:   tr edema                          Creatinine Trend: 12.90<--, 8.96<--, 14.20<--, 12.80<--      Assessment   ESRD; COVID 19 PNA    Plan:  HD for today  UF as tolerated      Payam Lilly MD
Subjective: no complaints. L AVF dressed after mild bleeding over night.       MEDICATIONS  (STANDING):  amLODIPine   Tablet 5 milliGRAM(s) Oral daily  atorvastatin 40 milliGRAM(s) Oral at bedtime  chlorhexidine 4% Liquid 1 Application(s) Topical <User Schedule>  dexAMETHasone  Injectable 6 milliGRAM(s) IV Push daily  dextrose 40% Gel 15 Gram(s) Oral once  dextrose 5%. 1000 milliLiter(s) (50 mL/Hr) IV Continuous <Continuous>  dextrose 5%. 1000 milliLiter(s) (100 mL/Hr) IV Continuous <Continuous>  dextrose 50% Injectable 25 Gram(s) IV Push once  dextrose 50% Injectable 12.5 Gram(s) IV Push once  dextrose 50% Injectable 25 Gram(s) IV Push once  glucagon  Injectable 1 milliGRAM(s) IntraMuscular once  heparin   Injectable 5000 Unit(s) SubCutaneous every 8 hours  insulin lispro (ADMELOG) corrective regimen sliding scale   SubCutaneous three times a day before meals  insulin lispro (ADMELOG) corrective regimen sliding scale   SubCutaneous at bedtime  labetalol 300 milliGRAM(s) Oral three times a day  melatonin 3 milliGRAM(s) Oral at bedtime  pantoprazole    Tablet 40 milliGRAM(s) Oral before breakfast    MEDICATIONS  (PRN):  acetaminophen   Tablet .. 650 milliGRAM(s) Oral every 6 hours PRN Temp greater or equal to 38C (100.4F), Mild Pain (1 - 3)  ALBUTerol    90 MICROgram(s) HFA Inhaler 2 Puff(s) Inhalation every 4 hours PRN Shortness of Breath and/or Wheezing  benzonatate 100 milliGRAM(s) Oral three times a day PRN Cough  ondansetron Injectable 4 milliGRAM(s) IV Push every 6 hours PRN Nausea and/or Vomiting          T(C): 36.7 (01-23-21 @ 05:36), Max: 37.1 (01-22-21 @ 11:14)  HR: 58 (01-23-21 @ 05:36) (58 - 84)  BP: 160/73 (01-23-21 @ 05:36) (150/76 - 187/90)  RR: 18 (01-23-21 @ 05:36) (18 - 20)  SpO2: 95% (01-23-21 @ 05:36) (95% - 100%)  Wt(kg): --        I&O's Detail    22 Jan 2021 07:01  -  23 Jan 2021 07:00  --------------------------------------------------------  IN:  Total IN: 0 mL    OUT:    Other (mL): 2500 mL  Total OUT: 2500 mL    Total NET: -2500 mL               PHYSICAL EXAM:    GENERAL: NAD  NECK: Supple, no inc in JVP  CHEST/LUNG: occ rhonchi  HEART: S1S2  ABDOMEN: Soft, Nontender, Nondistended; Bowel sounds present  EXTREMITIES:  no edema  L AVF dressing dry      LABS:  CBC Full  -  ( 22 Jan 2021 11:33 )  WBC Count : 8.83 K/uL  RBC Count : 3.69 M/uL  Hemoglobin : 10.1 g/dL  Hematocrit : 32.8 %  Platelet Count - Automated : 172 K/uL  Mean Cell Volume : 88.9 fl  Mean Cell Hemoglobin : 27.4 pg  Mean Cell Hemoglobin Concentration : 30.8 gm/dL  Auto Neutrophil # : x  Auto Lymphocyte # : x  Auto Monocyte # : x  Auto Eosinophil # : x  Auto Basophil # : x  Auto Neutrophil % : x  Auto Lymphocyte % : x  Auto Monocyte % : x  Auto Eosinophil % : x  Auto Basophil % : x    01-22    136  |  95<L>  |  70<H>  ----------------------------<  190<H>  4.8   |  30  |  12.90<H>    Ca    7.4<L>      22 Jan 2021 11:33          Assessment:  ESRD  COVID PNA, stable.     Plan:  Next HD Monday  Discharge planning            
Subjective: no complaints. O2 Sat 97% on NC O2      MEDICATIONS  (STANDING):  amLODIPine   Tablet 5 milliGRAM(s) Oral daily  atorvastatin 40 milliGRAM(s) Oral at bedtime  chlorhexidine 4% Liquid 1 Application(s) Topical <User Schedule>  dexAMETHasone  Injectable 6 milliGRAM(s) IV Push daily  dextrose 40% Gel 15 Gram(s) Oral once  dextrose 5%. 1000 milliLiter(s) (50 mL/Hr) IV Continuous <Continuous>  dextrose 5%. 1000 milliLiter(s) (100 mL/Hr) IV Continuous <Continuous>  dextrose 50% Injectable 25 Gram(s) IV Push once  dextrose 50% Injectable 12.5 Gram(s) IV Push once  dextrose 50% Injectable 25 Gram(s) IV Push once  glucagon  Injectable 1 milliGRAM(s) IntraMuscular once  heparin   Injectable 5000 Unit(s) SubCutaneous every 8 hours  insulin lispro (ADMELOG) corrective regimen sliding scale   SubCutaneous three times a day before meals  insulin lispro (ADMELOG) corrective regimen sliding scale   SubCutaneous at bedtime  labetalol 300 milliGRAM(s) Oral three times a day  melatonin 3 milliGRAM(s) Oral at bedtime  pantoprazole    Tablet 40 milliGRAM(s) Oral before breakfast    MEDICATIONS  (PRN):  acetaminophen   Tablet .. 650 milliGRAM(s) Oral every 6 hours PRN Temp greater or equal to 38C (100.4F), Mild Pain (1 - 3)  ALBUTerol    90 MICROgram(s) HFA Inhaler 2 Puff(s) Inhalation every 4 hours PRN Shortness of Breath and/or Wheezing  benzonatate 100 milliGRAM(s) Oral three times a day PRN Cough  ondansetron Injectable 4 milliGRAM(s) IV Push every 6 hours PRN Nausea and/or Vomiting          T(C): 36.6 (01-24-21 @ 06:34), Max: 36.8 (01-23-21 @ 16:19)  HR: 60 (01-24-21 @ 06:34) (56 - 60)  BP: 129/61 (01-24-21 @ 06:34) (107/52 - 144/69)  RR: 18 (01-24-21 @ 06:34) (18 - 20)  SpO2: 97% (01-24-21 @ 06:34) (85% - 97%)  Wt(kg): --        I&O's Detail           PHYSICAL EXAM:    GENERAL: NAD  NECK: Supple, no inc in JVP  CHEST/LUNG: occ rhonchi  HEART: S1S2  ABDOMEN: Soft, Nontender, Nondistended; Bowel sounds present  EXTREMITIES:  no edema  NEURO: no asterixis      LABS:  CBC Full  -  ( 22 Jan 2021 11:33 )  WBC Count : 8.83 K/uL  RBC Count : 3.69 M/uL  Hemoglobin : 10.1 g/dL  Hematocrit : 32.8 %  Platelet Count - Automated : 172 K/uL  Mean Cell Volume : 88.9 fl  Mean Cell Hemoglobin : 27.4 pg  Mean Cell Hemoglobin Concentration : 30.8 gm/dL  Auto Neutrophil # : x  Auto Lymphocyte # : x  Auto Monocyte # : x  Auto Eosinophil # : x  Auto Basophil # : x  Auto Neutrophil % : x  Auto Lymphocyte % : x  Auto Monocyte % : x  Auto Eosinophil % : x  Auto Basophil % : x    01-22    136  |  95<L>  |  70<H>  ----------------------------<  190<H>  4.8   |  30  |  12.90<H>    Ca    7.4<L>      22 Jan 2021 11:33          Assessment:  ESRD  COVID PNA, stable.     Plan:  Next HD Monday  Discharge planning              
CHIEF COMPLAINT: follow up of COVID  no fever  no diarrhea  no chest pain  + SOB  and Hypoxia in mid 80's on room air per nurse.       PHYSICAL EXAM:    GENERAL: Moderately built, on NC oxygen.   CHEST/LUNG: Clear to ausculation bilaterally, no wheezing, no crackles   HEART: Regular rate and rhythm; No murmurs, rubs  ABDOMEN: Soft, Nontender, Nondistended; Bowel sounds present  EXTREMITIES:  Moving all four extremities spontaneously, No clubbing, cyanosis, or edema  NERVOUS SYSTEM:  Grossly non focal.  Psychiatry: AAO x 3, mood is appropriate       OBJECTIVE DATA:   Vital Signs Last 24 Hrs  T(C): 37.1 (21 Jan 2021 11:25), Max: 37.2 (21 Jan 2021 04:42)  T(F): 98.8 (21 Jan 2021 11:25), Max: 98.9 (21 Jan 2021 04:42)  HR: 73 (21 Jan 2021 11:25) (72 - 103)  BP: 153/65 (21 Jan 2021 11:25) (113/62 - 162/76)  BP(mean): --  RR: 18 (21 Jan 2021 11:25) (18 - 23)  SpO2: 96% (21 Jan 2021 11:25) (96% - 100%)           Daily Height in cm: 167.64 (20 Jan 2021 22:15)    Daily   LABS:                        10.1   6.30  )-----------( 196      ( 20 Jan 2021 07:04 )             32.2             01-20    137  |  97  |  40<H>  ----------------------------<  130<H>  3.9   |  31  |  8.96<H>    Ca    7.9<L>      20 Jan 2021 20:42  Phos  9.8     01-20  Mg     2.9     01-20    TPro  7.1  /  Alb  2.7<L>  /  TBili  0.4  /  DBili  x   /  AST  42<H>  /  ALT  20  /  AlkPhos  96  01-20                    CARDIAC MARKERS ( 20 Jan 2021 07:04 )  .191 ng/mL / x     / x     / x     / x      CARDIAC MARKERS ( 19 Jan 2021 17:13 )  .121 ng/mL / x     / x     / x     / x          CAPILLARY BLOOD GLUCOSE      POCT Blood Glucose.: 254 mg/dL (21 Jan 2021 11:05)          MEDICATIONS  (STANDING):  atorvastatin 40 milliGRAM(s) Oral at bedtime  dexAMETHasone  Injectable 6 milliGRAM(s) IV Push daily  dextrose 40% Gel 15 Gram(s) Oral once  dextrose 5%. 1000 milliLiter(s) (50 mL/Hr) IV Continuous <Continuous>  dextrose 5%. 1000 milliLiter(s) (100 mL/Hr) IV Continuous <Continuous>  dextrose 50% Injectable 25 Gram(s) IV Push once  dextrose 50% Injectable 12.5 Gram(s) IV Push once  dextrose 50% Injectable 25 Gram(s) IV Push once  glucagon  Injectable 1 milliGRAM(s) IntraMuscular once  heparin   Injectable 5000 Unit(s) SubCutaneous every 8 hours  insulin lispro (ADMELOG) corrective regimen sliding scale   SubCutaneous three times a day before meals  insulin lispro (ADMELOG) corrective regimen sliding scale   SubCutaneous at bedtime  labetalol 300 milliGRAM(s) Oral three times a day  melatonin 3 milliGRAM(s) Oral at bedtime  pantoprazole    Tablet 40 milliGRAM(s) Oral before breakfast    MEDICATIONS  (PRN):  acetaminophen   Tablet .. 650 milliGRAM(s) Oral every 6 hours PRN Temp greater or equal to 38C (100.4F), Mild Pain (1 - 3)  ALBUTerol    90 MICROgram(s) HFA Inhaler 2 Puff(s) Inhalation every 4 hours PRN Shortness of Breath and/or Wheezing  benzonatate 100 milliGRAM(s) Oral three times a day PRN Cough  ondansetron Injectable 4 milliGRAM(s) IV Push every 6 hours PRN Nausea and/or Vomiting      
I am inheriting this patient on today 1/23/2021    CHIEF COMPLAINT:  COVID PNA    MEDICATIONS  (STANDING):  amLODIPine   Tablet 5 milliGRAM(s) Oral daily  atorvastatin 40 milliGRAM(s) Oral at bedtime  chlorhexidine 4% Liquid 1 Application(s) Topical <User Schedule>  dexAMETHasone  Injectable 6 milliGRAM(s) IV Push daily  dextrose 40% Gel 15 Gram(s) Oral once  dextrose 5%. 1000 milliLiter(s) (50 mL/Hr) IV Continuous <Continuous>  dextrose 5%. 1000 milliLiter(s) (100 mL/Hr) IV Continuous <Continuous>  dextrose 50% Injectable 25 Gram(s) IV Push once  dextrose 50% Injectable 12.5 Gram(s) IV Push once  dextrose 50% Injectable 25 Gram(s) IV Push once  glucagon  Injectable 1 milliGRAM(s) IntraMuscular once  heparin   Injectable 5000 Unit(s) SubCutaneous every 8 hours  insulin lispro (ADMELOG) corrective regimen sliding scale   SubCutaneous three times a day before meals  insulin lispro (ADMELOG) corrective regimen sliding scale   SubCutaneous at bedtime  labetalol 300 milliGRAM(s) Oral three times a day  melatonin 3 milliGRAM(s) Oral at bedtime  pantoprazole    Tablet 40 milliGRAM(s) Oral before breakfast    MEDICATIONS  (PRN):  acetaminophen   Tablet .. 650 milliGRAM(s) Oral every 6 hours PRN Temp greater or equal to 38C (100.4F), Mild Pain (1 - 3)  ALBUTerol    90 MICROgram(s) HFA Inhaler 2 Puff(s) Inhalation every 4 hours PRN Shortness of Breath and/or Wheezing  benzonatate 100 milliGRAM(s) Oral three times a day PRN Cough  ondansetron Injectable 4 milliGRAM(s) IV Push every 6 hours PRN Nausea and/or Vomiting    Vital Signs Last 24 Hrs  T(C): 36.7 (23 Jan 2021 12:27), Max: 37 (22 Jan 2021 15:32)  T(F): 98 (23 Jan 2021 12:27), Max: 98.6 (22 Jan 2021 15:32)  HR: 59 (23 Jan 2021 12:27) (58 - 63)  BP: 107/52 (23 Jan 2021 12:27) (107/52 - 165/70)  BP(mean): --  RR: 18 (23 Jan 2021 12:27) (18 - 20)  SpO2: 97% (23 Jan 2021 12:27) (95% - 98%)    PHYSICAL EXAM:    GENERAL: Moderately built, on nasal cannula.   CHEST/LUNG: Clear to ausculation bilaterally, no wheezing, no crackles   HEART: Regular rate and rhythm; No murmurs, rubs  ABDOMEN: Soft, Nontender, Nondistended; Bowel sounds present  EXTREMITIES:  Moving all four extremities spontaneously, No clubbing, cyanosis, or edema  NERVOUS SYSTEM:  Grossly non focal.  Psychiatry: AAO x 3, mood is appropriate       LABS:                                 10.1   8.83  )-----------( 172      ( 22 Jan 2021 11:33 )             32.8   01-22    136  |  95<L>  |  70<H>  ----------------------------<  190<H>  4.8   |  30  |  12.90<H>    Ca    7.4<L>      22 Jan 2021 11:33           CAPILLARY BLOOD GLUCOSE    CAPILLARY BLOOD GLUCOSE      POCT Blood Glucose.: 265 mg/dL (23 Jan 2021 11:23)  POCT Blood Glucose.: 149 mg/dL (23 Jan 2021 08:01)  POCT Blood Glucose.: 159 mg/dL (22 Jan 2021 21:45)

## 2021-01-27 NOTE — PROGRESS NOTE ADULT - PROVIDER SPECIALTY LIST ADULT
Nephrology
Nephrology
Hospitalist
Nephrology
Nephrology
[Follow Up] : follow up GYN visit
Hospitalist
Hospitalist
Nephrology
Nephrology
[FreeTextEntry1] : Patient with menopausal symptoms\par She is having lack of sleep and hot flashes \par She has very irregular cycles and elevated FSH consistent with POF 
Hospitalist

## 2021-01-27 NOTE — DISCHARGE NOTE NURSING/CASE MANAGEMENT/SOCIAL WORK - PATIENT PORTAL LINK FT
You can access the FollowMyHealth Patient Portal offered by [facility] by registering at the following website: [email or web address]. By joining [facility]’s FollowMyHealth portal, you will also be able to view your health information using other applications (apps) compatible with our system.

## 2021-02-02 DIAGNOSIS — D63.1 ANEMIA IN CHRONIC KIDNEY DISEASE: ICD-10-CM

## 2021-02-02 DIAGNOSIS — I50.9 HEART FAILURE, UNSPECIFIED: ICD-10-CM

## 2021-02-02 DIAGNOSIS — Z99.2 DEPENDENCE ON RENAL DIALYSIS: ICD-10-CM

## 2021-02-02 DIAGNOSIS — E87.5 HYPERKALEMIA: ICD-10-CM

## 2021-02-02 DIAGNOSIS — U07.1 COVID-19: ICD-10-CM

## 2021-02-02 DIAGNOSIS — E78.5 HYPERLIPIDEMIA, UNSPECIFIED: ICD-10-CM

## 2021-02-02 DIAGNOSIS — Z95.1 PRESENCE OF AORTOCORONARY BYPASS GRAFT: ICD-10-CM

## 2021-02-02 DIAGNOSIS — R06.02 SHORTNESS OF BREATH: ICD-10-CM

## 2021-02-02 DIAGNOSIS — I13.2 HYPERTENSIVE HEART AND CHRONIC KIDNEY DISEASE WITH HEART FAILURE AND WITH STAGE 5 CHRONIC KIDNEY DISEASE, OR END STAGE RENAL DISEASE: ICD-10-CM

## 2021-02-02 DIAGNOSIS — J96.01 ACUTE RESPIRATORY FAILURE WITH HYPOXIA: ICD-10-CM

## 2021-02-02 DIAGNOSIS — N18.6 END STAGE RENAL DISEASE: ICD-10-CM

## 2021-02-02 DIAGNOSIS — E11.22 TYPE 2 DIABETES MELLITUS WITH DIABETIC CHRONIC KIDNEY DISEASE: ICD-10-CM

## 2021-02-27 ENCOUNTER — INPATIENT (INPATIENT)
Facility: HOSPITAL | Age: 71
LOS: 6 days | Discharge: HOME HEALTH | End: 2021-03-06
Attending: HOSPITALIST | Admitting: HOSPITALIST
Payer: MEDICARE

## 2021-02-27 VITALS
HEIGHT: 66 IN | RESPIRATION RATE: 18 BRPM | OXYGEN SATURATION: 94 % | HEART RATE: 90 BPM | SYSTOLIC BLOOD PRESSURE: 163 MMHG | TEMPERATURE: 98 F | DIASTOLIC BLOOD PRESSURE: 78 MMHG

## 2021-02-27 DIAGNOSIS — Z95.1 PRESENCE OF AORTOCORONARY BYPASS GRAFT: Chronic | ICD-10-CM

## 2021-02-27 LAB
ALBUMIN SERPL ELPH-MCNC: 2.9 G/DL — LOW (ref 3.3–5)
ALP SERPL-CCNC: 97 U/L — SIGNIFICANT CHANGE UP (ref 40–120)
ALT FLD-CCNC: 16 U/L — SIGNIFICANT CHANGE UP (ref 12–78)
ANION GAP SERPL CALC-SCNC: 13 MMOL/L — SIGNIFICANT CHANGE UP (ref 5–17)
APTT BLD: 28.9 SEC — SIGNIFICANT CHANGE UP (ref 27.5–35.5)
AST SERPL-CCNC: 17 U/L — SIGNIFICANT CHANGE UP (ref 15–37)
BASOPHILS # BLD AUTO: 0.01 K/UL — SIGNIFICANT CHANGE UP (ref 0–0.2)
BASOPHILS NFR BLD AUTO: 0.1 % — SIGNIFICANT CHANGE UP (ref 0–2)
BILIRUB SERPL-MCNC: 0.4 MG/DL — SIGNIFICANT CHANGE UP (ref 0.2–1.2)
BLD GP AB SCN SERPL QL: SIGNIFICANT CHANGE UP
BUN SERPL-MCNC: 67 MG/DL — HIGH (ref 7–23)
CALCIUM SERPL-MCNC: 8.1 MG/DL — LOW (ref 8.5–10.1)
CHLORIDE SERPL-SCNC: 98 MMOL/L — SIGNIFICANT CHANGE UP (ref 96–108)
CO2 SERPL-SCNC: 27 MMOL/L — SIGNIFICANT CHANGE UP (ref 22–31)
CREAT SERPL-MCNC: 8.76 MG/DL — HIGH (ref 0.5–1.3)
EOSINOPHIL # BLD AUTO: 0.22 K/UL — SIGNIFICANT CHANGE UP (ref 0–0.5)
EOSINOPHIL NFR BLD AUTO: 1.7 % — SIGNIFICANT CHANGE UP (ref 0–6)
FLUAV AG NPH QL: SIGNIFICANT CHANGE UP
FLUBV AG NPH QL: SIGNIFICANT CHANGE UP
GLUCOSE SERPL-MCNC: 119 MG/DL — HIGH (ref 70–99)
HCT VFR BLD CALC: 14.3 % — CRITICAL LOW (ref 39–50)
HGB BLD-MCNC: 4.4 G/DL — CRITICAL LOW (ref 13–17)
IMM GRANULOCYTES NFR BLD AUTO: 0.7 % — SIGNIFICANT CHANGE UP (ref 0–1.5)
INR BLD: 1 RATIO — SIGNIFICANT CHANGE UP (ref 0.88–1.16)
LYMPHOCYTES # BLD AUTO: 0.83 K/UL — LOW (ref 1–3.3)
LYMPHOCYTES # BLD AUTO: 6.5 % — LOW (ref 13–44)
MCHC RBC-ENTMCNC: 28.9 PG — SIGNIFICANT CHANGE UP (ref 27–34)
MCHC RBC-ENTMCNC: 30.8 GM/DL — LOW (ref 32–36)
MCV RBC AUTO: 94.1 FL — SIGNIFICANT CHANGE UP (ref 80–100)
MONOCYTES # BLD AUTO: 1.7 K/UL — HIGH (ref 0–0.9)
MONOCYTES NFR BLD AUTO: 13.3 % — SIGNIFICANT CHANGE UP (ref 2–14)
NEUTROPHILS # BLD AUTO: 9.98 K/UL — HIGH (ref 1.8–7.4)
NEUTROPHILS NFR BLD AUTO: 77.7 % — HIGH (ref 43–77)
NRBC # BLD: 0 /100 WBCS — SIGNIFICANT CHANGE UP (ref 0–0)
OB PNL STL: POSITIVE
PLATELET # BLD AUTO: 298 K/UL — SIGNIFICANT CHANGE UP (ref 150–400)
POTASSIUM SERPL-MCNC: 4.7 MMOL/L — SIGNIFICANT CHANGE UP (ref 3.5–5.3)
POTASSIUM SERPL-SCNC: 4.7 MMOL/L — SIGNIFICANT CHANGE UP (ref 3.5–5.3)
PROT SERPL-MCNC: 6.6 GM/DL — SIGNIFICANT CHANGE UP (ref 6–8.3)
PROTHROM AB SERPL-ACNC: 11.6 SEC — SIGNIFICANT CHANGE UP (ref 10.6–13.6)
RBC # BLD: 1.52 M/UL — LOW (ref 4.2–5.8)
RBC # FLD: 21.9 % — HIGH (ref 10.3–14.5)
SARS-COV-2 RNA SPEC QL NAA+PROBE: SIGNIFICANT CHANGE UP
SODIUM SERPL-SCNC: 138 MMOL/L — SIGNIFICANT CHANGE UP (ref 135–145)
TROPONIN I SERPL-MCNC: 0.03 NG/ML — SIGNIFICANT CHANGE UP (ref 0.01–0.04)
WBC # BLD: 12.83 K/UL — HIGH (ref 3.8–10.5)
WBC # FLD AUTO: 12.83 K/UL — HIGH (ref 3.8–10.5)

## 2021-02-27 PROCEDURE — 71045 X-RAY EXAM CHEST 1 VIEW: CPT | Mod: 26

## 2021-02-27 PROCEDURE — 99285 EMERGENCY DEPT VISIT HI MDM: CPT

## 2021-02-27 PROCEDURE — 93010 ELECTROCARDIOGRAM REPORT: CPT

## 2021-02-27 PROCEDURE — 99223 1ST HOSP IP/OBS HIGH 75: CPT

## 2021-02-27 RX ORDER — SODIUM CHLORIDE 9 MG/ML
1000 INJECTION, SOLUTION INTRAVENOUS
Refills: 0 | Status: DISCONTINUED | OUTPATIENT
Start: 2021-02-27 | End: 2021-03-06

## 2021-02-27 RX ORDER — PANTOPRAZOLE SODIUM 20 MG/1
80 TABLET, DELAYED RELEASE ORAL ONCE
Refills: 0 | Status: COMPLETED | OUTPATIENT
Start: 2021-02-27 | End: 2021-02-27

## 2021-02-27 RX ORDER — PANTOPRAZOLE SODIUM 20 MG/1
8 TABLET, DELAYED RELEASE ORAL
Qty: 80 | Refills: 0 | Status: DISCONTINUED | OUTPATIENT
Start: 2021-02-27 | End: 2021-03-03

## 2021-02-27 RX ORDER — ACETAMINOPHEN 500 MG
650 TABLET ORAL EVERY 6 HOURS
Refills: 0 | Status: DISCONTINUED | OUTPATIENT
Start: 2021-02-27 | End: 2021-03-06

## 2021-02-27 RX ORDER — DEXTROSE 50 % IN WATER 50 %
15 SYRINGE (ML) INTRAVENOUS ONCE
Refills: 0 | Status: DISCONTINUED | OUTPATIENT
Start: 2021-02-27 | End: 2021-03-06

## 2021-02-27 RX ORDER — DEXTROSE 50 % IN WATER 50 %
12.5 SYRINGE (ML) INTRAVENOUS ONCE
Refills: 0 | Status: DISCONTINUED | OUTPATIENT
Start: 2021-02-27 | End: 2021-03-06

## 2021-02-27 RX ORDER — DEXTROSE 50 % IN WATER 50 %
25 SYRINGE (ML) INTRAVENOUS ONCE
Refills: 0 | Status: DISCONTINUED | OUTPATIENT
Start: 2021-02-27 | End: 2021-03-06

## 2021-02-27 RX ORDER — GLUCAGON INJECTION, SOLUTION 0.5 MG/.1ML
1 INJECTION, SOLUTION SUBCUTANEOUS ONCE
Refills: 0 | Status: DISCONTINUED | OUTPATIENT
Start: 2021-02-27 | End: 2021-03-06

## 2021-02-27 RX ORDER — ONDANSETRON 8 MG/1
4 TABLET, FILM COATED ORAL EVERY 6 HOURS
Refills: 0 | Status: DISCONTINUED | OUTPATIENT
Start: 2021-02-27 | End: 2021-03-06

## 2021-02-27 RX ORDER — INSULIN LISPRO 100/ML
VIAL (ML) SUBCUTANEOUS EVERY 6 HOURS
Refills: 0 | Status: DISCONTINUED | OUTPATIENT
Start: 2021-02-27 | End: 2021-02-28

## 2021-02-27 RX ADMIN — PANTOPRAZOLE SODIUM 80 MILLIGRAM(S): 20 TABLET, DELAYED RELEASE ORAL at 19:52

## 2021-02-27 NOTE — PROVIDER CONTACT NOTE (EICU) - RECOMMENDATIONS
Plan  1. hold ASA  2. Transfuse to Hb > 7, 3 units  3. Given TBili was normal, likely not hemolysis, Red cell aplasia is part of the Ddx, please consult hematology for smear review, possible bone marrow bx  4. Please send retic count, iron studies, Free T4, TSH, EBV, B19, not sure if COVID19 would cause aplastic anemia, please consult ID. Plan  1. hold ASA  2. Transfuse to Hb > 7, 3 units  3. Given TBili was normal, likely not hemolysis, Red cell aplasia is high on the differential diagnosis, please consult hematology for smear review, possible bone marrow bx  4. Please send retic count, iron studies, Free T4, TSH, EBV, B19  5. COVID-19 infection is known to cause aplastic anemia, please consult ID.

## 2021-02-27 NOTE — PROVIDER CONTACT NOTE (EICU) - ASSESSMENT
Problem list  1. Subacute blood loss (given he is hypertensive, should not be acute bleed)  2. On ASA  3. r/o red cell aplasia Problem list  1. Subacute blood loss (given he is hypertensive, should not be acute bleed)  2. On ASA  3. r/o Aplastic anemia (pure red cell) from COVID-19 or other viruses, e.g. B19, EBV

## 2021-02-27 NOTE — H&P ADULT - ASSESSMENT
71 y/o male w/ PMHx of ESRD on HD T/TH/SAT (missed HD today), DM type 2, HTN, HLD, AVR, recently admitted for COVID PNA discharged on oxygen presents to the ED with complaints of dizziness. Pt being admitted for Acute GI bleed.    IMPROVE VTE Individual Risk Assessment    RISK                                                                Points    [  ] Previous VTE                                                  3    [  ] Thrombophilia                                               2    [  ] Lower limb paralysis                                      2        (unable to hold up >15 seconds)      [  ] Current Cancer                                              2         (within 6 months)    [  ] Immobilization > 24 hrs                                1    [  ] ICU/CCU stay > 24 hours                              1    [  ] Age > 60                                                      1    IMPROVE VTE Score _________    IMPROVE Score 0-1: Low Risk, No VTE prophylaxis required for most patients, encourage ambulation.   IMPROVE Score 2-3: At risk, pharmacologic VTE prophylaxis is indicated for most patients (in the absence of a contraindication)  IMPROVE Score > or = 4: High Risk, pharmacologic VTE prophylaxis is indicated for most patients (in the absence of a contraindication)

## 2021-02-27 NOTE — CONSULT NOTE ADULT - SUBJECTIVE AND OBJECTIVE BOX
HPI: Patient is a 71yo M with PMHx of ESRD on HD, DM, HTN, HLD, AVR, previous COVID who presents to the ED with complaints of weakness for the last week. Patient states that he has been having progressive weakness for the last week and decided to come in today per family member. In the ED, patient noted to have hemoglobin of 4.4. Patient also states that he has missed HD for today due to not feeling well. ICU consulted for patient with low hemoglobin. Seen and examined at the bedside. Patient alert and oriented, offers no complaints other than weakness at this time. Vitals obtained at the bedside all WNL. Patient not hypoxic and protecting airway. Receiving his second unit of PRBC at the time.     Allergies    lisinopril (Anaphylaxis)    Intolerances        MEDICATIONS  (STANDING):    MEDICATIONS  (PRN):      Daily Height in cm: 167.64 (27 Feb 2021 18:54)    Daily     Drug Dosing Weight  Height (cm): 167.6 (27 Feb 2021 18:54)  Weight (kg): 67 (20 Jan 2021 22:15)  BMI (kg/m2): 23.9 (27 Feb 2021 18:54)  BSA (m2): 1.76 (27 Feb 2021 18:54)    PAST MEDICAL & SURGICAL HISTORY:  HLD (hyperlipidemia)    HTN (hypertension)    Aortic valve replaced    CKD (chronic kidney disease)  On hemodialysis    Anemia    CHF (congestive heart failure)    Diabetes mellitus    Hypertension    S/P CABG (coronary artery bypass graft)    S/P appendectomy        FAMILY HISTORY:  FH: type 2 diabetes  brother        SOCIAL HISTORY:    ADVANCE DIRECTIVES:    REVIEW OF SYSTEMS:    CONSTITUTIONAL: No fever, weight loss, or fatigue  EYES: No eye pain, visual disturbances, or discharge  ENMT:  No difficulty hearing, tinnitus, vertigo; No sinus or throat pain  NECK: No pain or stiffness  BREASTS: No pain, masses, or nipple discharge  RESPIRATORY: No cough, wheezing, chills or hemoptysis; No shortness of breath  CARDIOVASCULAR: No chest pain, palpitations, dizziness, or leg swelling  GASTROINTESTINAL: No abdominal or epigastric pain. No nausea, vomiting, or hematemesis; No diarrhea or constipation. No melena or hematochezia.  GENITOURINARY: No dysuria, frequency, hematuria, or incontinence  NEUROLOGICAL: No headaches, memory loss, loss of strength, numbness, or tremors  SKIN: No itching, burning, rashes, or lesions   LYMPH NODES: No enlarged glands  ENDOCRINE: No heat or cold intolerance; No hair loss  MUSCULOSKELETAL: No joint pain or swelling; No muscle, back, or extremity pain  PSYCHIATRIC: No depression, anxiety, mood swings, or difficulty sleeping  HEME/LYMPH: No easy bruising, or bleeding gums  ALLERGY AND IMMUNOLOGIC: No hives or eczema          ICU Vital Signs Last 24 Hrs  T(C): 37.2 (27 Feb 2021 21:07), Max: 37.4 (27 Feb 2021 20:50)  T(F): 99 (27 Feb 2021 21:07), Max: 99.3 (27 Feb 2021 20:50)  HR: 94 (27 Feb 2021 21:07) (90 - 94)  BP: 172/70 (27 Feb 2021 21:07) (163/78 - 189/71)  RR: 20 (27 Feb 2021 21:07) (18 - 22)  SpO2: 100% (27 Feb 2021 21:07) (94% - 100%)          I&O's Detail      PHYSICAL EXAM:    GENERAL: NAD, well-groomed, well-developed  HEAD:  Atraumatic, Normocephalic  EYES: EOMI, PERRLA, conjunctiva and sclera clear  ENMT: No tonsillar erythema, exudates, or enlargement; Moist mucous membranes, Good dentition, No lesions  NECK: Supple, No JVD, Normal thyroid  NERVOUS SYSTEM:  Alert & Oriented X3, Good concentration; Motor Strength 5/5 B/L upper and lower extremities; DTRs 2+ intact and symmetric  CHEST/LUNG: Clear to percussion bilaterally; No rales, rhonchi, wheezing, or rubs  HEART: Regular rate and rhythm; No murmurs, rubs, or gallops  ABDOMEN: Soft, Nontender, Nondistended; Bowel sounds present  EXTREMITIES:  2+ Peripheral Pulses, No clubbing, cyanosis, or edema  LYMPH: No lymphadenopathy noted  SKIN: No rashes or lesions    LABS:  CBC Full  -  ( 27 Feb 2021 19:41 )  WBC Count : 12.83 K/uL  RBC Count : 1.52 M/uL  Hemoglobin : 4.4 g/dL  Hematocrit : 14.3 %  Platelet Count - Automated : 298 K/uL  Mean Cell Volume : 94.1 fl  Mean Cell Hemoglobin : 28.9 pg  Mean Cell Hemoglobin Concentration : 30.8 gm/dL  Auto Neutrophil # : x  Auto Lymphocyte # : x  Auto Monocyte # : x  Auto Eosinophil # : x  Auto Basophil # : x  Auto Neutrophil % : x  Auto Lymphocyte % : x  Auto Monocyte % : x  Auto Eosinophil % : x  Auto Basophil % : x    02-27    138  |  98  |  67<H>  ----------------------------<  119<H>  4.7   |  27  |  8.76<H>    Ca    8.1<L>      27 Feb 2021 19:41    TPro  6.6  /  Alb  2.9<L>  /  TBili  0.4  /  DBili  x   /  AST  17  /  ALT  16  /  AlkPhos  97  02-27    CAPILLARY BLOOD GLUCOSE        PT/INR - ( 27 Feb 2021 19:41 )   PT: 11.6 sec;   INR: 1.00 ratio         PTT - ( 27 Feb 2021 19:41 )  PTT:28.9 sec    CARDIAC MARKERS ( 27 Feb 2021 19:41 )  .033 ng/mL / x     / x     / x     / x              Assessment    CRITICAL CARE TIME SPENT:

## 2021-02-27 NOTE — H&P ADULT - HISTORY OF PRESENT ILLNESS
69 y/o male w/ PMHx of ESRD on HD T/TH/SAT (missed HD today), DM type 2, HTN, HLD, AVR, who presents to the ED with complaints of dizziness. Patient reports he became dizzy while at rest this morning after having a BM; unsure the color of his stool. Pt reports he has been feeling weaker over the past week. Pt denies abdominal pain, n/v, cp, palpitations or SOB.       In the ED, patient noted to have hemoglobin of 4.4. Patient also states that he has missed HD for today due to not feeling well. ICU consulted for patient with low hemoglobin. Seen and examined at the bedside. Patient alert and oriented, offers no complaints other than weakness at this time. Vitals obtained at the bedside all WNL. Patient not hypoxic and protecting airway. Receiving his second unit of PRBC at the time.          71 y/o male w/ PMHx of ESRD on HD T/TH/SAT (missed HD today), DM type 2, HTN, HLD, AVR, recently admitted for COVID PNA discharged on oxygen presents to the ED with complaints of dizziness. Patient reports he became dizzy while at rest this morning after having a BM; unsure the color of his stool. Pt reports he missed HD because he was not feeling well. Pt reports he has been feeling weaker over the past week. Pt denies abdominal pain, n/v, cp, palpitations or SOB.         In ED initial /70, rest of vitals stable. Labs sig for WBC 12.83, H/H 4.4/14.3, guaiac +ve black stool. for rest of sig labs see below. ICU consulted for patient with low hemoglobin. No indication for ICU monitoring. Pt ordered for 2 unit PRBC   (1 on floor, the second w/ HD). Renal and GI consulted by ED.

## 2021-02-27 NOTE — H&P ADULT - PROBLEM SELECTOR PROBLEM 7
DVT prophylaxis Type 2 diabetes mellitus with other specified complication, unspecified whether long term insulin use

## 2021-02-27 NOTE — PROVIDER CONTACT NOTE (EICU) - BACKGROUND
70M with a history of ESRD on HD, DM, HTN, HLD, AVR, previous COVID+ in Jan, discharged on 1/26 with ASA p/w generalized weakness for one week  Hb dropped from 10 to 4.4 within a month  MCV 94, TBili 0.4, Plt 294 (higher), INR 1  SBP in 160s

## 2021-02-27 NOTE — CONSULT NOTE ADULT - ASSESSMENT
69yo M with PMHx of ESRD on HD, DM, HTN, HLD, AVR, previous COVID who presents to the ED with complaints of weakness for the last week. In the ED patient noted to be anemic with hemoglobin of 4.4. Per chart review patient with hemoglobin of 10.0 1/26/21. ICU consulted for anemia. Patient seen at the bedside. Noted to be mentating well and with all vitals within normal limits. Discussed case with E-ICU attending who agrees patient does not require ICU admission at this time.    Plan:  -- Would recommend transfusing at least 3uPRBC  -- Would send off hemolysis labs  -- Follow up GI consult and Renal consults  -- Admit and management per Medicine team

## 2021-02-27 NOTE — H&P ADULT - NSHPLABSRESULTS_GEN_ALL_CORE
T(C): 37.1 (02-28-21 @ 05:40), Max: 37.4 (02-27-21 @ 20:50)  HR: 102 (02-28-21 @ 05:40) (87 - 102)  BP: 171/76 (02-28-21 @ 05:40) (136/79 - 189/71)  RR: 18 (02-28-21 @ 05:40) (16 - 22)  SpO2: 98% (02-28-21 @ 05:40) (94% - 100%)                        4.4    12.83 )-----------( 298      ( 27 Feb 2021 19:41 )             14.3     02-27    138  |  98  |  67<H>  ----------------------------<  119<H>  4.7   |  27  |  8.76<H>    Ca    8.1<L>      27 Feb 2021 19:41    TPro  6.6  /  Alb  2.9<L>  /  TBili  0.4  /  DBili  x   /  AST  17  /  ALT  16  /  AlkPhos  97  02-27    LIVER FUNCTIONS - ( 27 Feb 2021 19:41 )  Alb: 2.9 g/dL / Pro: 6.6 gm/dL / ALK PHOS: 97 U/L / ALT: 16 U/L / AST: 17 U/L / GGT: x           PT/INR - ( 27 Feb 2021 19:41 )   PT: 11.6 sec;   INR: 1.00 ratio         PTT - ( 27 Feb 2021 19:41 )  PTT:28.9 sec        acetaminophen   Tablet .. 650 milliGRAM(s) Oral every 6 hours PRN  dextrose 40% Gel 15 Gram(s) Oral once  dextrose 5%. 1000 milliLiter(s) IV Continuous <Continuous>  dextrose 5%. 1000 milliLiter(s) IV Continuous <Continuous>  dextrose 50% Injectable 25 Gram(s) IV Push once  dextrose 50% Injectable 12.5 Gram(s) IV Push once  dextrose 50% Injectable 25 Gram(s) IV Push once  glucagon  Injectable 1 milliGRAM(s) IntraMuscular once  insulin lispro (ADMELOG) corrective regimen sliding scale   SubCutaneous every 6 hours  ondansetron Injectable 4 milliGRAM(s) IV Push every 6 hours PRN  pantoprazole Infusion 8 mG/Hr IV Continuous <Continuous>

## 2021-02-27 NOTE — ED PROVIDER NOTE - EMPLOYMENT
Nephrology ([facility]) Dialysis Progress Note      Patient:  Cheri Ely  YOB: 1943  Date of Service: 4/17/2020  MRN: [id number]   Acct: [id number]   Primary Care Physician: Manny Peters APRN  Advance Directive:   Code Status and Medical Interventions:   Ordered at: 04/14/20 2240     Level Of Support Discussed With:    Patient     Code Status:    CPR     Medical Interventions (Level of Support Prior to Arrest):    Full     Admit Date: 4/14/2020       Hospital Day: 3  Referring Provider: Azalia Shaver DO      Patient personally seen and examined.  Complete chart including Consults, Notes, Operative Reports, Labs, Cardiology, and Radiology studies reviewed as able.        Subjective:  Cheri Ely is a 77 y.o. female  whom we were consulted for end stage renal disease.  Routine hemodialysis Monday Wednesday Friday at [facility].  missed dialysis on Monday 4/13 due to not feeling well.  Extensive history of GI issues/chronic diarrhea.  Presented to [facility] facility with several days of severe diarrhea; was given IV fluids at [facility] then transferred to [facility]. Had CT angiogram of abdomen done on 4/15. Tolerated HD well on 4/15    Today, she was still having diarrhea. She was seen and examined on dialysis.    Allergies:  Patient has no known allergies.    Home Meds:  Medications Prior to Admission   Medication Sig Dispense Refill Last Dose   • allopurinol (ZYLOPRIM) 100 MG tablet Take 100 mg by mouth Daily.   4/14/2020 at Unknown time   • B Complex-C-Folic Acid (JOHN-SANGEETA) tablet Take 1 tablet by mouth Daily.   4/14/2020 at Unknown time   • carvedilol (COREG) 12.5 MG tablet Take 12.5 mg by mouth 2 (Two) Times a Day With Meals. TAKE ONE TABLET TWICE A DAY ON NONDIALYSIS DAYS, AND 1 TABLET DAILY ON DIALYSIS DAY (MON, WED, FRI)   4/14/2020 at Unknown time   • cyproheptadine (PERIACTIN) 4 MG tablet Take 4 mg by mouth 3 (Three) Times a Day As  Needed for Allergies.   4/14/2020 at Unknown time   • levothyroxine (SYNTHROID, LEVOTHROID) 125 MCG tablet Take 1 tablet by mouth Daily.   4/14/2020 at Unknown time   • pantoprazole (PROTONIX) 40 MG EC tablet Take 1 tablet by mouth Daily.   4/14/2020 at Unknown time   • pravastatin (PRAVACHOL) 40 MG tablet Take 40 mg by mouth Daily.   4/14/2020 at Unknown time   • sertraline (ZOLOFT) 50 MG tablet Take 50 mg by mouth Every Night.   4/14/2020 at Unknown time   • sucralfate (CARAFATE) 1 g tablet Take 1 g by mouth Daily.   4/14/2020 at Unknown time   • acetaminophen (TYLENOL) 325 MG tablet Take 2 tablets by mouth Every 6 (Six) Hours As Needed for Mild Pain .   Unknown at Unknown time   • Cholecalciferol (VITAMIN D) 2000 units capsule Take 1 capsule by mouth Daily. 30 capsule  Taking   • diphenoxylate-atropine (LOMOTIL) 2.5-0.025 MG/5ML liquid Take 5 mL by mouth 4 (Four) Times a Day As Needed for Diarrhea.   Taking   • lidocaine 3 % cream cream Apply  topically Daily As Needed (prior to dialysis access).   Taking   • Multiple Vitamins-Minerals (MULTIVITAMIN ADULT PO) Take 1 tablet by mouth Daily.   Taking   • ondansetron ODT (ZOFRAN-ODT) 4 MG disintegrating tablet Place 1 tablet on the tongue Every 8 (Eight) Hours As Needed for Nausea. 12 tablet 0    • oxyCODONE-acetaminophen (PERCOCET)  MG per tablet Take 1 tablet by mouth Every 6 (Six) Hours As Needed for Moderate Pain .   Unknown at Unknown time       Medicines:  Current Facility-Administered Medications   Medication Dose Route Frequency Provider Last Rate Last Dose   • acetaminophen (TYLENOL) tablet 650 mg  650 mg Oral Q4H PRN Shakira Bassett APRN        Or   • acetaminophen (TYLENOL) 160 MG/5ML solution 650 mg  650 mg Oral Q4H PRN Shakira Bassett APRN        Or   • acetaminophen (TYLENOL) suppository 650 mg  650 mg Rectal Q4H PRN Shakira Bassett APRN       • allopurinol (ZYLOPRIM) tablet 100 mg  100 mg Oral Daily Shakira Bassett APRN   100 mg at  04/17/20 0800   • diphenoxylate-atropine (LOMOTIL) 2.5-0.025 MG per tablet 1 tablet  1 tablet Oral Q2H PRN Shakira Bassett APRN   1 tablet at 04/16/20 0950   • epoetin lucy-epbx (RETACRIT) injection 4,000 Units  4,000 Units Subcutaneous Once per day on Mon Wed Fri Faisal Sevilla MD   4,000 Units at 04/15/20 2127   • levothyroxine (SYNTHROID, LEVOTHROID) tablet 150 mcg  150 mcg Oral Daily Shakira Bassett APRN   150 mcg at 04/17/20 0539   • lidocaine 3 % cream   Apply externally Daily PRN Shakira Bassett APRN       • magnesium oxide (MAGOX) tablet 400 mg  400 mg Oral Daily Dirk Tristan APRN   400 mg at 04/17/20 0801   • ondansetron (ZOFRAN) tablet 4 mg  4 mg Oral Q6H PRN Shakira Bassett APRN        Or   • ondansetron (ZOFRAN) injection 4 mg  4 mg Intravenous Q6H PRN Shakira Bassett, APRPEPITO       • oxyCODONE-acetaminophen (PERCOCET)  MG per tablet 1 tablet  1 tablet Oral Q6H PRN Shakira Bassett APRN   1 tablet at 04/16/20 0950   • pantoprazole (PROTONIX) EC tablet 40 mg  40 mg Oral Daily Shakira Bassett APRN   40 mg at 04/17/20 0802   • sodium chloride 0.9 % flush 10 mL  10 mL Intravenous Q12H Shakira Bassett APRN   10 mL at 04/17/20 0801   • sodium chloride 0.9 % flush 10 mL  10 mL Intravenous PRN Shakira Bassett, APRN       • sucralfate (CARAFATE) 1 GM/10ML suspension 1 g  1 g Oral 4x Daily AC & at Bedtime Shakira Bassett APRN   1 g at 04/17/20 1121       Past Medical History:  Past Medical History:   Diagnosis Date   • (HFpEF) heart failure with preserved ejection fraction (CMS/HCC) 3/9/2019   • A-fib (CMS/HCC)    • AVM (arteriovenous malformation) of small bowel, acquired 4/2/2019   • Carotid artery disease (CMS/HCC)    • Chronic kidney disease on chronic dialysis (CMS/HCC)    • Chronic mesenteric ischemia (CMS/HCC) 12/14/2017   • Closed displaced intertrochanteric fracture of right femur (CMS/HCC) 2/8/2019    S/p Cephalomedullary nailing 2/8   • COPD (chronic  obstructive pulmonary disease) (CMS/HCC)    • Coronary artery disease    • Diverticulitis    • Diverticulosis    • Gastric ulcer    • Gout    • History of transfusion    • Hyperlipidemia    • Hypertension    • Hypothyroidism    • Injury of back    • Mesenteric artery insufficiency (CMS/HCC)    • Multilevel degenerative disc disease    • Osteoporosis    • Pancreatitis    • Pelvis fracture (CMS/HCC)    • Pneumonia    • PVD (peripheral vascular disease) (CMS/HCC)    • RA (rheumatoid arthritis) (CMS/HCC)    • Sleep apnea        Past Surgical History:  Past Surgical History:   Procedure Laterality Date   • AORTAGRAM Right 1/8/2018    Procedure: MESENTERIC ANGIOGRAM, GROIN ACCESS, MYNX CLOSURE;  Surgeon: Tomasz San DO;  Location: [facility] OR;  Service:    • AORTIC VALVE SURGERY     • APPENDECTOMY     • BACK SURGERY     • CAPSULE ENDOSCOPY N/A 3/30/2019    Dr. Yu-Angiodysplasia; Limit anticoagulation as much as is reasonable; Gastric passage time: 0h21m; Small bowel passage time: 2h2m   • CARDIAC SURGERY     • CAROTID ENDARTERECTOMY Bilateral    • CATARACT EXTRACTION, BILATERAL     • CERVICAL SPINE SURGERY     • CHOLECYSTECTOMY     • COLON RESECTION     • COLONOSCOPY  10/01/2015    Diverticulosis in the sigmoid colon; The examined portion of the ileum was normal; The examination was otherwise normal on direct and retroflexion views; No specimens collected; No plans to repeat colonoscopy due to multiple comorbidities   • COLONOSCOPY N/A 3/28/2019    Dr. Merida-The examined portion of the ileum was normal; One 6mm tubular adenomatous polyp at the hepatic flexure; Diverticulosis in the sigmoid colon   • CORONARY ARTERY BYPASS GRAFT     • DIALYSIS FISTULA CREATION     • ENDOSCOPY N/A 12/27/2018    LA Grade B reflux esophagitis; Non-bleeding gastric ulcers with no stigmata of bleeding-biopsied; Erosive gastritis; Normal examined duodenum; Repeat 2 months   • ENDOSCOPY N/A 2/28/2019    LA Grade B  esophagitis-biopsied-clip (MR conditional) was placed; An adjustable gastric banding was found, characterized by healthy appearing mucosa; Normal stomach; Normal examined duodenum; Repeat 2 months   • ENDOSCOPY N/A 3/11/2019    An endoclip was found in the esophagus; Normal stomach; Normal examined duodenum; No specimens collected; Repeat endo 2-3 months   • ENDOSCOPY N/A 3/27/2019    Dr. Merida-Normal examined jejunum; Normal examined duodenum; An adjustable gastric banding was found; Normal esophagus; No specimens collected   • ENDOSCOPY  10/01/2015    Normal esophagus; Bleeding erosive gastropathy-biopsied; Thickening of the gastric mucosa in the cardia-biopsied; Normal examined duodenum   • EXPLORATORY LAPAROTOMY N/A 5/13/2019    Procedure: LAPAROTOMY EXPLORATORY, OVERSEW DUODENAL ULCER WITH FRED PATCH, KOCHER MANEUVER;  Surgeon: Laila Rodriguez MD;  Location: [facility] OR;  Service: General   • HIP TROCHANTERIC NAILING WITH INTRAMEDULLARY HIP SCREW Right 2/8/2019    Procedure: HIP TROCANTERIC NAILING LONG WITH INTRAMEDULLARY HIP SCREW;  Surgeon: Boo Camacho MD;  Location: [facility] OR;  Service: Orthopedics   • LAPAROSCOPIC GASTRIC BANDING     • LEEP     • LUMBAR FUSION     • REPLACEMENT TOTAL KNEE      Bilateral   • TOE AMPUTATION Left     2nd toe   • TOTAL KNEE ARTHROPLASTY Bilateral    • TUBAL ABDOMINAL LIGATION         Family History  Family History   Problem Relation Age of Onset   • Hypertension Mother    • Cancer Mother         stomach   • Coronary artery disease Father    • Heart attack Father    • Diabetes Brother    • Coronary artery disease Brother    • Colon cancer Neg Hx    • Colon polyps Neg Hx        Social History  Social History     Socioeconomic History   • Marital status:      Spouse name: Not on file   • Number of children: Not on file   • Years of education: Not on file   • Highest education level: Not on file   Tobacco Use   • Smoking status: Never Smoker   • Smokeless tobacco:  Never Used   Substance and Sexual Activity   • Alcohol use: No   • Drug use: No   • Sexual activity: Defer         Review of Systems:  History obtained from chart review and the patient  General ROS: Patient complains of fatigue and No fever or chills  Respiratory ROS: No cough, shortness of breath, wheezing  Cardiovascular ROS: No chest pain or palpitations  Gastrointestinal ROS: positive for - diarrhea  No abdominal pain or melena  Genito-Urinary ROS: No dysuria or hematuria  Neurological ROS; no headache or dizziness    Objective:  BP: 88/41  HR: 93  General: awake/alert    Neck: supple, no JVD  Chest:  clear to auscultation bilaterally without respiratory distress  CVS: regular rate and rhythm  Abdominal: soft, nontender, positive bowel sounds  Extremities: no cyanosis or edema  Skin: warm and dry without rash   Neuro: no focal motor deficits      Labs:  Results from last 7 days   Lab Units 04/15/20  0404   WBC 10*3/mm3 10.44   HEMOGLOBIN g/dL 8.5*   HEMATOCRIT % 25.6*   PLATELETS 10*3/mm3 216         Results from last 7 days   Lab Units 04/17/20  0523 04/16/20  0439 04/15/20  0404   SODIUM mmol/L 134* 133* 138   POTASSIUM mmol/L 3.4* 2.7* 3.0*   CHLORIDE mmol/L 97* 97* 101   CO2 mmol/L 20.0* 22.0 18.0*   BUN mg/dL 44* 36* 53*   CREATININE mg/dL 5.46* 5.17* 6.84*   CALCIUM mg/dL 8.0* 6.9* 7.4*   BILIRUBIN mg/dL  --   --  0.3   ALK PHOS U/L  --   --  176*   ALT (SGPT) U/L  --   --  14   AST (SGOT) U/L  --   --  14   GLUCOSE mg/dL 91 83 75       Radiology:   Imaging Results (Last 72 Hours)     Procedure Component Value Units Date/Time    CT Angiogram Abdomen Pelvis [[id number]] Collected:  04/15/20 1005     Updated:  04/15/20 1030    Narrative:       CT ANGIOGRAM ABDOMEN PELVIS- 4/15/2020 9:53 AM CDT     HISTORY: possible ischemic bowel disease     COMPARISON: CT scan dated 03/25/2020     DOSE LENGTH PRODUCT: 636 mGy cm. Automated exposure control was also  utilized to decrease patient radiation dose.      TECHNIQUE: Helical tomographic images of the abdomen and pelvis  utilizing angiographic protocol were obtained following the intravenous  infusion of contrast. Multiplanar and 3 D reformatted images were  provided for review.     FINDINGS:     Angiogram:     Abdominal aorta is normal in caliber. Moderate atheromatous  calcification throughout the aorta. High-grade atheromatous narrowing of  the proximal celiac artery (series 7-image 82) along an approximately 6  mm segment. There is high-grade atheromatous plaque in the proximal SMA,  which appears completely occluded just beyond its origin. There is  reconstitution of the SMA, distally. The RIZWANA is patent and is larger in  caliber than is typically seen. Bilateral high-grade atheromatous  narrowing along the proximal and mid portions of the renal arteries. The  native right renal artery is likely completely occluded with a  downstream mild reconstitution via collaterals.     Mild diffuse atheromatous plaque along the bilateral common, internal  and external iliac arteries.     Other findings:  Four-chamber cardiomegaly. Aortic valvular prosthesis. Mild septal  thickening in the lung bases with trace bilateral pleural effusions.     Contrast reflux into the hepatic veins. No obvious focal liver lesion.  Prior cholecystectomy. The biliary tree is nondilated. Diffuse atrophy  of the pancreas. Nonspecific small rounded cystic lesion at the  pancreatic tail, measuring 1 cm (series 5-image 48). This is stable.  Spleen is normal in size. Adrenal glands are unremarkable. Bilateral  severe renal atrophy. Stable simple appearing left renal cyst. No  hydronephrosis. No retroperitoneal adenopathy or mass. LAP-BAND in  place. Stomach is nondistended. Small bowel is nondilated. Mucosal  enhancement along the small bowel appears appropriate. There is no  inflammatory change surrounding the small bowel. Diverticulosis along  the descending and sigmoid colon. Mucosal enhancement  along the colon  appears appropriate. There is no surrounding inflammatory change.      There is no mesenteric adenopathy, mass or inflammatory change. No  intraperitoneal free fluid or free air. Prior lumbar spinal fusion. No  acute bony abnormality. Right hip TFN. Underlying osteopenia.       Impression:       1. High-grade atheromatous narrowing at the celiac artery origin. The  proximal SMA appears completely occluded, with reconstitution distally.  The RIZWANA is patent and is fairly large, likely due to its role as a  collateral. The bowel does not appear acutely ischemic or necrotic  although the underlying atheromatous disease may be a source for a more  chronic, intermittent or postprandial bowel ischemia.  2. Four-chamber cardiomegaly with minimal septal thickening/interstitial  edema in the lung bases. Additional trace pleural effusions.  3. Severe bilateral renal atrophy with high-grade renal artery  atheromatous narrowings. The right renal artery may be completely  occluded, proximally, with a distal reconstitution.  4. There is a small (approximately 1 cm) cystic lesion of the pancreatic  tail which has been stable. This is likely benign.  This report was finalized on 04/15/2020 10:27 by Dr Ronan Varner, .          Culture:  No results found for: BLOODCX, URINECX, WOUNDCX, MRSACX, RESPCX, STOOLCX      Assessment   1.  End stage renal disease on hemodialysis MWF  2.  Hypokalemia  3.  Metabolic acidosis  4.  Chronic diarrhea  5.  Hypothyroidism   6.  Anemia of CKD    Dialysis: Access- left UE AVF, , , UF: 2 liters     Plan:  1.  Dialysis  As above.   2.  Monitor labs       N/A

## 2021-02-27 NOTE — CONSULT NOTE ADULT - SUBJECTIVE AND OBJECTIVE BOX
Patient chart reviewed, full consult to follow.   ESRD, missed today; acute severe anemia;   For PRBC tx during HD as risk for pulm edema ;  ICU evaluation  Will arrange for HD tonight   Thank you for the courtesy of this consultation.   [facility] NEPHR[facility], [facility]  NEPHROLOGY AND HYPERTENSION  [address]  [address]  [place] [address]  [phone]    MD WHIT NICKERSON MD ANDREY GONCHARUK, MD MADHU KORRAPATI, MD YELENA ROSENBERG, MD BINNY KOSHY, MD CHRISTOPHER CAPUTO, MD JEIMY HERNANDEZ MD      Information from chart:  "Patient is a 70y old  Male who presents with a chief complaint of Anemia (27 Feb 2021 21:35)    HPI: Weakness fatigue; missed hd; Mild SOB;    "    PAST MEDICAL & SURGICAL HISTORY:  HLD (hyperlipidemia)    HTN (hypertension)    Aortic valve replaced    CKD (chronic kidney disease)  On hemodialysis    Anemia    CHF (congestive heart failure)    Diabetes mellitus    Hypertension    S/P CABG (coronary artery bypass graft)    S/P appendectomy      FAMILY HISTORY:  FH: type 2 diabetes  brother      Allergies    lisinopril (Anaphylaxis)    Intolerances      Home Medications:  amLODIPine 5 mg oral tablet: 1 tab(s) orally once a day (27 Feb 2021 19:24)  atorvastatin 40 mg oral tablet: 1 tab(s) orally once a day (at bedtime) (27 Feb 2021 19:24)  docusate sodium 100 mg oral tablet: 1 tab(s) orally , As Needed (27 Feb 2021 19:24)  epoetin theodore:  (27 Feb 2021 19:24)  hydrALAZINE 50 mg oral tablet: orally 2 times a day (27 Feb 2021 19:24)  labetalol 300 mg oral tablet: 1 tab(s) orally 3 times a day (27 Feb 2021 19:24)  sevelamer hydrochloride 800 mg oral tablet: 2 tab(s) orally 3 times a day (27 Feb 2021 19:24)    MEDICATIONS  (STANDING):  pantoprazole Infusion 8 mG/Hr (10 mL/Hr) IV Continuous <Continuous>    MEDICATIONS  (PRN):  acetaminophen   Tablet .. 650 milliGRAM(s) Oral every 6 hours PRN Mild Pain (1 - 3)  ondansetron Injectable 4 milliGRAM(s) IV Push every 6 hours PRN Nausea and/or Vomiting    Vital Signs Last 24 Hrs  T(C): 36.4 (27 Feb 2021 22:38), Max: 37.4 (27 Feb 2021 20:50)  T(F): 97.6 (27 Feb 2021 22:38), Max: 99.3 (27 Feb 2021 20:50)  HR: 87 (27 Feb 2021 22:38) (87 - 94)  BP: 169/80 (27 Feb 2021 22:38) (163/78 - 189/71)  BP(mean): --  RR: 18 (27 Feb 2021 22:38) (18 - 22)  SpO2: 95% (27 Feb 2021 22:38) (94% - 100%)    Daily Height in cm: 167.64 (27 Feb 2021 18:54)    Daily     CAPILLARY BLOOD GLUCOSE        PHYSICAL EXAM:      T(C): 36.4 (02-27-21 @ 22:38), Max: 37.4 (02-27-21 @ 20:50)  HR: 87 (02-27-21 @ 22:38) (87 - 94)  BP: 169/80 (02-27-21 @ 22:38) (163/78 - 189/71)  RR: 18 (02-27-21 @ 22:38) (18 - 22)  SpO2: 95% (02-27-21 @ 22:38) (94% - 100%)  Wt(kg): --  Lungs clear  Heart S1S2  Abd soft NT ND  Extremities:   tr edema              02-27    138  |  98  |  67<H>  ----------------------------<  119<H>  4.7   |  27  |  8.76<H>    Ca    8.1<L>      27 Feb 2021 19:41    TPro  6.6  /  Alb  2.9<L>  /  TBili  0.4  /  DBili  x   /  AST  17  /  ALT  16  /  AlkPhos  97  02-27                          4.4    12.83 )-----------( 298      ( 27 Feb 2021 19:41 )             14.3     Creatinine Trend: 8.76<--          Assessment   ESRD; acute anemia; suspected GI bleed    Plan  HD tonight, PRBC tx  Will follow course      Payam Lilly MD

## 2021-02-27 NOTE — ED ADULT TRIAGE NOTE - CHIEF COMPLAINT QUOTE
c/o dizziness and b/l lower extremity weakness with difficulty ambulating since tues after dialysis treatment l av shunt tues-thurs-sat didn't go to dialysis today due to dizziness/weakness perpts wife when she notified dialysis center of pts symptoms they states hgb=5 on thurs

## 2021-02-27 NOTE — PROVIDER CONTACT NOTE (EICU) - ACTION/TREATMENT ORDERED:
This is an interesting case, likely aplastic anemia from viral infection.  There is no indication for ICU observation, please admit to medicine floor    Case d/w ICU PA

## 2021-02-27 NOTE — H&P ADULT - NSHPPHYSICALEXAM_GEN_ALL_CORE
PHYSICAL EXAM:    Vital Signs Last 24 Hrs  T(C): 37.1 (28 Feb 2021 05:40), Max: 37.4 (27 Feb 2021 20:50)  T(F): 98.7 (28 Feb 2021 05:40), Max: 99.3 (27 Feb 2021 20:50)  HR: 102 (28 Feb 2021 05:40) (87 - 102)  BP: 171/76 (28 Feb 2021 05:40) (136/79 - 189/71)  BP(mean): --  RR: 18 (28 Feb 2021 05:40) (16 - 22)  SpO2: 98% (28 Feb 2021 05:40) (94% - 100%)    GENERAL: Pt lying in bed comfortably in NAD, eating ice-chips  HEENT:  Atraumatic, MMM  NECK: Supple  CHEST/LUNG: Clear to auscultation bilaterally; Unlabored respirations  HEART: Regular rate and rhythm;   ABDOMEN: Bowel sounds present; Soft, Nontender, Nondistended. No guarding or rigidity    EXTREMITIES:  2+ Peripheral Pulses, brisk capillary refill. No edema  NEUROLOGICAL:  Alert & Oriented X3, speech clear. No focal deficits   SKIN: No rashes or lesions

## 2021-02-27 NOTE — H&P ADULT - PROBLEM SELECTOR PLAN 3
- HD per renal  - Dr Lilly following - recent COVID hospitalization  - SPO2 dips to 89 off NC, c/w supplemental oxygen   - COVID neg today

## 2021-02-27 NOTE — ED ADULT NURSE NOTE - OBJECTIVE STATEMENT
c/o general weakness and dizziness for past year, same time as starting dialysis. Patient endorses that it is worse today. Denies fevers, chills. states symptoms present when ambulating. Fistula on L arm +/+.

## 2021-02-27 NOTE — ED PROVIDER NOTE - OBJECTIVE STATEMENT
70 year old male with PMH of HTN, HLD, DM II, ESRD on Dialysis x 1 year Tue/Thur/Sat, CHF presenting to ED due to worsened generalized weakness noted - was told he had anemia with hgb to 5. Pt denies any significant blood loss and denies any black stools he had noticed. States there is weakness and feels SOB on exertion. Did not have dialysis today.

## 2021-02-28 DIAGNOSIS — D62 ACUTE POSTHEMORRHAGIC ANEMIA: ICD-10-CM

## 2021-02-28 DIAGNOSIS — N18.6 END STAGE RENAL DISEASE: ICD-10-CM

## 2021-02-28 DIAGNOSIS — E78.5 HYPERLIPIDEMIA, UNSPECIFIED: ICD-10-CM

## 2021-02-28 DIAGNOSIS — I10 ESSENTIAL (PRIMARY) HYPERTENSION: ICD-10-CM

## 2021-02-28 DIAGNOSIS — U07.1 COVID-19: ICD-10-CM

## 2021-02-28 DIAGNOSIS — K92.2 GASTROINTESTINAL HEMORRHAGE, UNSPECIFIED: ICD-10-CM

## 2021-02-28 DIAGNOSIS — Z29.9 ENCOUNTER FOR PROPHYLACTIC MEASURES, UNSPECIFIED: ICD-10-CM

## 2021-02-28 DIAGNOSIS — E11.69 TYPE 2 DIABETES MELLITUS WITH OTHER SPECIFIED COMPLICATION: ICD-10-CM

## 2021-02-28 LAB
ALBUMIN SERPL ELPH-MCNC: 2.8 G/DL — LOW (ref 3.3–5)
ALP SERPL-CCNC: 89 U/L — SIGNIFICANT CHANGE UP (ref 40–120)
ALT FLD-CCNC: 17 U/L — SIGNIFICANT CHANGE UP (ref 12–78)
ANION GAP SERPL CALC-SCNC: 3 MMOL/L — LOW (ref 5–17)
AST SERPL-CCNC: 24 U/L — SIGNIFICANT CHANGE UP (ref 15–37)
BASOPHILS # BLD AUTO: 0.03 K/UL — SIGNIFICANT CHANGE UP (ref 0–0.2)
BASOPHILS NFR BLD AUTO: 0.3 % — SIGNIFICANT CHANGE UP (ref 0–2)
BILIRUB SERPL-MCNC: 0.5 MG/DL — SIGNIFICANT CHANGE UP (ref 0.2–1.2)
BUN SERPL-MCNC: 31 MG/DL — HIGH (ref 7–23)
CALCIUM SERPL-MCNC: 8 MG/DL — LOW (ref 8.5–10.1)
CHLORIDE SERPL-SCNC: 102 MMOL/L — SIGNIFICANT CHANGE UP (ref 96–108)
CO2 SERPL-SCNC: 34 MMOL/L — HIGH (ref 22–31)
CREAT SERPL-MCNC: 5.2 MG/DL — HIGH (ref 0.5–1.3)
EOSINOPHIL # BLD AUTO: 0.25 K/UL — SIGNIFICANT CHANGE UP (ref 0–0.5)
EOSINOPHIL NFR BLD AUTO: 2.2 % — SIGNIFICANT CHANGE UP (ref 0–6)
GLUCOSE BLDC GLUCOMTR-MCNC: 124 MG/DL — HIGH (ref 70–99)
GLUCOSE BLDC GLUCOMTR-MCNC: 159 MG/DL — HIGH (ref 70–99)
GLUCOSE SERPL-MCNC: 114 MG/DL — HIGH (ref 70–99)
HBV SURFACE AB SER-ACNC: 5.4 MIU/ML — LOW
HBV SURFACE AG SER-ACNC: SIGNIFICANT CHANGE UP
HCT VFR BLD CALC: 21.1 % — LOW (ref 39–50)
HCT VFR BLD CALC: 24.1 % — LOW (ref 39–50)
HCV AB S/CO SERPL IA: 0.86 S/CO — SIGNIFICANT CHANGE UP (ref 0–0.99)
HCV AB SERPL-IMP: SIGNIFICANT CHANGE UP
HGB BLD-MCNC: 6.7 G/DL — CRITICAL LOW (ref 13–17)
HGB BLD-MCNC: 7.9 G/DL — LOW (ref 13–17)
IMM GRANULOCYTES NFR BLD AUTO: 0.5 % — SIGNIFICANT CHANGE UP (ref 0–1.5)
LYMPHOCYTES # BLD AUTO: 0.67 K/UL — LOW (ref 1–3.3)
LYMPHOCYTES # BLD AUTO: 6 % — LOW (ref 13–44)
MAGNESIUM SERPL-MCNC: 2.2 MG/DL — SIGNIFICANT CHANGE UP (ref 1.6–2.6)
MCHC RBC-ENTMCNC: 28.6 PG — SIGNIFICANT CHANGE UP (ref 27–34)
MCHC RBC-ENTMCNC: 29 PG — SIGNIFICANT CHANGE UP (ref 27–34)
MCHC RBC-ENTMCNC: 31.8 GM/DL — LOW (ref 32–36)
MCHC RBC-ENTMCNC: 32.8 GM/DL — SIGNIFICANT CHANGE UP (ref 32–36)
MCV RBC AUTO: 88.6 FL — SIGNIFICANT CHANGE UP (ref 80–100)
MCV RBC AUTO: 90.2 FL — SIGNIFICANT CHANGE UP (ref 80–100)
MONOCYTES # BLD AUTO: 1.65 K/UL — HIGH (ref 0–0.9)
MONOCYTES NFR BLD AUTO: 14.7 % — HIGH (ref 2–14)
NEUTROPHILS # BLD AUTO: 8.56 K/UL — HIGH (ref 1.8–7.4)
NEUTROPHILS NFR BLD AUTO: 76.3 % — SIGNIFICANT CHANGE UP (ref 43–77)
NRBC # BLD: 0 /100 WBCS — SIGNIFICANT CHANGE UP (ref 0–0)
NRBC # BLD: 0 /100 WBCS — SIGNIFICANT CHANGE UP (ref 0–0)
PHOSPHATE SERPL-MCNC: 3.4 MG/DL — SIGNIFICANT CHANGE UP (ref 2.5–4.5)
PLATELET # BLD AUTO: 277 K/UL — SIGNIFICANT CHANGE UP (ref 150–400)
PLATELET # BLD AUTO: 295 K/UL — SIGNIFICANT CHANGE UP (ref 150–400)
POTASSIUM SERPL-MCNC: 3.6 MMOL/L — SIGNIFICANT CHANGE UP (ref 3.5–5.3)
POTASSIUM SERPL-SCNC: 3.6 MMOL/L — SIGNIFICANT CHANGE UP (ref 3.5–5.3)
PROT SERPL-MCNC: 6.4 GM/DL — SIGNIFICANT CHANGE UP (ref 6–8.3)
RBC # BLD: 2.34 M/UL — LOW (ref 4.2–5.8)
RBC # BLD: 2.72 M/UL — LOW (ref 4.2–5.8)
RBC # FLD: 17.8 % — HIGH (ref 10.3–14.5)
RBC # FLD: 18.5 % — HIGH (ref 10.3–14.5)
SARS-COV-2 IGG SERPL QL IA: POSITIVE
SARS-COV-2 IGM SERPL IA-ACNC: 6.42 INDEX — HIGH
SODIUM SERPL-SCNC: 139 MMOL/L — SIGNIFICANT CHANGE UP (ref 135–145)
WBC # BLD: 10.28 K/UL — SIGNIFICANT CHANGE UP (ref 3.8–10.5)
WBC # BLD: 11.22 K/UL — HIGH (ref 3.8–10.5)
WBC # FLD AUTO: 10.28 K/UL — SIGNIFICANT CHANGE UP (ref 3.8–10.5)
WBC # FLD AUTO: 11.22 K/UL — HIGH (ref 3.8–10.5)

## 2021-02-28 PROCEDURE — 99233 SBSQ HOSP IP/OBS HIGH 50: CPT

## 2021-02-28 RX ORDER — DEXTROSE 50 % IN WATER 50 %
15 SYRINGE (ML) INTRAVENOUS ONCE
Refills: 0 | Status: COMPLETED | OUTPATIENT
Start: 2021-02-28 | End: 2021-02-28

## 2021-02-28 RX ORDER — INSULIN LISPRO 100/ML
VIAL (ML) SUBCUTANEOUS
Refills: 0 | Status: DISCONTINUED | OUTPATIENT
Start: 2021-02-28 | End: 2021-03-06

## 2021-02-28 RX ORDER — INSULIN LISPRO 100/ML
VIAL (ML) SUBCUTANEOUS AT BEDTIME
Refills: 0 | Status: DISCONTINUED | OUTPATIENT
Start: 2021-02-28 | End: 2021-03-06

## 2021-02-28 RX ADMIN — Medication 650 MILLIGRAM(S): at 07:09

## 2021-02-28 RX ADMIN — PANTOPRAZOLE SODIUM 10 MG/HR: 20 TABLET, DELAYED RELEASE ORAL at 17:03

## 2021-02-28 RX ADMIN — Medication 15 GRAM(S): at 23:30

## 2021-02-28 RX ADMIN — PANTOPRAZOLE SODIUM 10 MG/HR: 20 TABLET, DELAYED RELEASE ORAL at 05:23

## 2021-02-28 RX ADMIN — Medication 15 GRAM(S): at 22:56

## 2021-02-28 RX ADMIN — Medication 2: at 18:09

## 2021-02-28 RX ADMIN — Medication 650 MILLIGRAM(S): at 12:16

## 2021-02-28 NOTE — CONSULT NOTE ADULT - SUBJECTIVE AND OBJECTIVE BOX
HPI:           71 y/o male w/ PMHx of ESRD on HD T/TH/SAT (missed HD today), DM type 2, HTN, HLD, AVR, recently admitted for COVID PNA discharged on oxygen presents to the ED with complaints of dizziness. Patient reports he became dizzy while at rest this morning after having a BM; unsure the color of his stool. Pt reports he missed HD because he was not feeling well. Pt reports he has been feeling weaker over the past week. Pt denies abdominal pain, n/v, cp, palpitations or SOB.         In ED initial /70, rest of vitals stable. Labs sig for WBC 12.83, H/H 4.4/14.3, guaiac +ve black stool. for rest of sig labs see below. ICU consulted for patient with low hemoglobin. No indication for ICU monitoring. Pt ordered for 2 unit PRBC   (1 on floor, the second w/ HD). Renal and GI consulted by ED. (27 Feb 2021 23:36)  --------- As Above -------------  Patient is a poor historian. States that he came in being dizzy. Patient found to be anemic and in CHF. The patient denies melena, hematochezia, hematemesis, nausea, vomiting, abdominal pain, constipation, diarrhea, or change in bowel movements Denies having a colonoscopy  Nurse states that patient had a dark, brown BM this morning. Denies NSAIDs.       PAST MEDICAL & SURGICAL HISTORY:  HLD (hyperlipidemia)    HTN (hypertension)    Aortic valve replaced    CKD (chronic kidney disease)  On hemodialysis    Anemia    CHF (congestive heart failure)    Diabetes mellitus    Hypertension    S/P CABG (coronary artery bypass graft)    S/P appendectomy        MEDICATIONS  (STANDING):  dextrose 40% Gel 15 Gram(s) Oral once  dextrose 5%. 1000 milliLiter(s) (50 mL/Hr) IV Continuous <Continuous>  dextrose 5%. 1000 milliLiter(s) (100 mL/Hr) IV Continuous <Continuous>  dextrose 50% Injectable 25 Gram(s) IV Push once  dextrose 50% Injectable 12.5 Gram(s) IV Push once  dextrose 50% Injectable 25 Gram(s) IV Push once  glucagon  Injectable 1 milliGRAM(s) IntraMuscular once  insulin lispro (ADMELOG) corrective regimen sliding scale   SubCutaneous every 6 hours  pantoprazole Infusion 8 mG/Hr (10 mL/Hr) IV Continuous <Continuous>    MEDICATIONS  (PRN):  acetaminophen   Tablet .. 650 milliGRAM(s) Oral every 6 hours PRN Mild Pain (1 - 3)  ondansetron Injectable 4 milliGRAM(s) IV Push every 6 hours PRN Nausea and/or Vomiting      Allergies    lisinopril (Anaphylaxis)    Intolerances        FAMILY HISTORY:  FH: type 2 diabetes  brother        REVIEW OF SYSTEMS:    CONSTITUTIONAL: No fever, weight loss, or fatigue  EYES: No eye pain, visual disturbances, or discharge  ENMT:  No difficulty hearing, tinnitus, vertigo; No sinus or throat pain  NECK: No pain or stiffness  BREASTS: No pain, masses, or nipple discharge  RESPIRATORY: No cough, wheezing, chills or hemoptysis; No shortness of breath  CARDIOVASCULAR: No chest pain, palpitations, dizziness, or leg swelling  GASTROINTESTINAL: No abdominal or epigastric pain. No nausea, vomiting, or hematemesis; No diarrhea or constipation. No melena or hematochezia.  GENITOURINARY: No dysuria, frequency, hematuria, or incontinence  NEUROLOGICAL: No headaches, memory loss, loss of strength, numbness, or tremors  SKIN: No itching, burning, rashes, or lesions   LYMPH NODES: No enlarged glands  ENDOCRINE: No heat or cold intolerance; No hair loss  MUSCULOSKELETAL: No joint pain or swelling; No muscle, back, or extremity pain  PSYCHIATRIC: No depression, anxiety, mood swings, or difficulty sleeping  HEME/LYMPH: No easy bruising, or bleeding gums  ALLERGY AND IMMUNOLOGIC: No hives or eczema          SOCIAL HISTORY:    FAMILY HISTORY:  FH: type 2 diabetes  brother        Vital Signs Last 24 Hrs  T(C): 36.4 (28 Feb 2021 12:05), Max: 37.4 (27 Feb 2021 20:50)  T(F): 97.6 (28 Feb 2021 12:05), Max: 99.3 (27 Feb 2021 20:50)  HR: 93 (28 Feb 2021 12:05) (86 - 102)  BP: 136/70 (28 Feb 2021 12:05) (136/70 - 189/71)  BP(mean): --  RR: 18 (28 Feb 2021 12:05) (16 - 22)  SpO2: 98% (28 Feb 2021 12:05) (94% - 100%)    PHYSICAL EXAM:    GENERAL: NAD, well-groomed, well-developed  HEAD:  Atraumatic, Normocephalic  EYES: EOMI, PERRLA, conjunctiva and sclera clear  ENMT: No tonsillar erythema, exudates, or enlargement; Moist mucous membranes, Good dentition, No lesions  NECK: Supple, No JVD, Normal thyroid  NERVOUS SYSTEM:  Alert & Oriented X3, Good concentration; Motor Strength 5/5 B/L upper and lower extremities; DTRs 2+ intact and symmetric  CHEST/LUNG: Clear to percussion bilaterally; No rales, rhonchi, wheezing, or rubs  HEART: Regular rate and rhythm; No murmurs, rubs, or gallops  ABDOMEN: Soft, Nontender, Nondistended; Bowel sounds present  EXTREMITIES:  2+ Peripheral Pulses, No clubbing, cyanosis, or edema  LYMPH: No lymphadenopathy noted   RECTAL: No masses, prostate normal size and smooth, Guaiaci negative   BREAST: No palpable masses, skin no lesions no retractions, no discharges. adnexal no palpable masses noted   GYN: uterus normal size, adnexal, no palpable masses, no CMT, no uterine discharge   SKIN: No rashes or lesions    LABS:                        6.7    11.22 )-----------( 277      ( 28 Feb 2021 07:07 )             21.1       CBC:  02-28 @ 07:07  WBC  11.22  HGB 6.7  HCT 21.1 Plate 277  MCV 90.2  02-27 @ 19:41  WBC  12.83  HGB 4.4  HCT 14.3 Plate 298  MCV 94.1           28 Feb 2021 07:07    139    |  102    |  31     ----------------------------<  114    3.6     |  34     |  5.20   27 Feb 2021 19:41    138    |  98     |  67     ----------------------------<  119    4.7     |  27     |  8.76     Ca    8.0        28 Feb 2021 07:07  Ca    8.1        27 Feb 2021 19:41  Phos  3.4       28 Feb 2021 07:07  Mg     2.2       28 Feb 2021 07:07    TPro  6.4    /  Alb  2.8    /  TBili  0.5    /  DBili  x      /  AST  24     /  ALT  17     /  AlkPhos  89     28 Feb 2021 07:07  TPro  6.6    /  Alb  2.9    /  TBili  0.4    /  DBili  x      /  AST  17     /  ALT  16     /  AlkPhos  97     27 Feb 2021 19:41    PT/INR - ( 27 Feb 2021 19:41 )   PT: 11.6 sec;   INR: 1.00 ratio         PTT - ( 27 Feb 2021 19:41 )  PTT:28.9 sec        RADIOLOGY & ADDITIONAL STUDIES: HPI:           71 y/o male w/ PMHx of ESRD on HD T/TH/SAT (missed HD today), DM type 2, HTN, HLD, AVR, recently admitted for COVID PNA discharged on oxygen presents to the ED with complaints of dizziness. Patient reports he became dizzy while at rest this morning after having a BM; unsure the color of his stool. Pt reports he missed HD because he was not feeling well. Pt reports he has been feeling weaker over the past week. Pt denies abdominal pain, n/v, cp, palpitations or SOB.         In ED initial /70, rest of vitals stable. Labs sig for WBC 12.83, H/H 4.4/14.3, guaiac +ve black stool. for rest of sig labs see below. ICU consulted for patient with low hemoglobin. No indication for ICU monitoring. Pt ordered for 2 unit PRBC   (1 on floor, the second w/ HD). Renal and GI consulted by ED. (27 Feb 2021 23:36)  --------- As Above -------------  Patient is a poor historian. States that he came in being dizzy. Patient found to be anemic and in CHF. The patient denies melena, hematochezia, hematemesis, nausea, vomiting, abdominal pain, constipation, diarrhea, or change in bowel movements Denies having a colonoscopy  Nurse states that patient had a dark, brown BM this morning. Denies NSAIDs.   Addendum =spoke with wife. GERD (+) Patient had difficulty walking after dialysis. Got better and worsened again after the next dialysis. Constipated (+) The wife denies melena, hematochezia, hematemesis, nausea, vomiting, abdominal pain, diarrhea, or change in bowel movements Chronic anemia (+) Patient on Famotidine. Never had a an EGD. Colonoscopy (+) along time ago. ASA 81 mg daily.. No other NSAIDs.      PAST MEDICAL & SURGICAL HISTORY:  HLD (hyperlipidemia)    HTN (hypertension)    Aortic valve replaced    CKD (chronic kidney disease)  On hemodialysis    Anemia    CHF (congestive heart failure)    Diabetes mellitus    Hypertension    S/P CABG (coronary artery bypass graft)    S/P appendectomy        MEDICATIONS  (STANDING):  dextrose 40% Gel 15 Gram(s) Oral once  dextrose 5%. 1000 milliLiter(s) (50 mL/Hr) IV Continuous <Continuous>  dextrose 5%. 1000 milliLiter(s) (100 mL/Hr) IV Continuous <Continuous>  dextrose 50% Injectable 25 Gram(s) IV Push once  dextrose 50% Injectable 12.5 Gram(s) IV Push once  dextrose 50% Injectable 25 Gram(s) IV Push once  glucagon  Injectable 1 milliGRAM(s) IntraMuscular once  insulin lispro (ADMELOG) corrective regimen sliding scale   SubCutaneous every 6 hours  pantoprazole Infusion 8 mG/Hr (10 mL/Hr) IV Continuous <Continuous>    MEDICATIONS  (PRN):  acetaminophen   Tablet .. 650 milliGRAM(s) Oral every 6 hours PRN Mild Pain (1 - 3)  ondansetron Injectable 4 milliGRAM(s) IV Push every 6 hours PRN Nausea and/or Vomiting      Allergies    lisinopril (Anaphylaxis)    Intolerances        FAMILY HISTORY:  FH: type 2 diabetes  brother        REVIEW OF SYSTEMS:    CONSTITUTIONAL: No fever, weight loss,  EYES: No eye pain, visual disturbances, or discharge  ENMT:  No difficulty hearing, tinnitus, vertigo; No sinus or throat pain  NECK: No pain or stiffness  BREASTS: No pain, masses, or nipple discharge  RESPIRATORY: No cough, wheezing, chills or hemoptysis;   CARDIOVASCULAR: No chest pain, palpitations, dizziness, or leg swelling  GASTROINTESTINAL: See above  GENITOURINARY: No dysuria, frequency, hematuria, or incontinence  NEUROLOGICAL: No headaches, memory loss, loss of strength, numbness, or tremors  SKIN: No itching, burning, rashes, or lesions   LYMPH NODES: No enlarged glands  ENDOCRINE: No heat or cold intolerance; No hair loss  MUSCULOSKELETAL: No joint pain or swelling; No muscle, back, or extremity pain  PSYCHIATRIC: No depression, anxiety, mood swings, or difficulty sleeping  HEME/LYMPH: No easy bruising, or bleeding gums  ALLERGY AND IMMUNOLOGIC: No hives or eczema          SOCIAL HISTORY:    FAMILY HISTORY:  FH: type 2 diabetes  brother        Vital Signs Last 24 Hrs  T(C): 36.4 (28 Feb 2021 12:05), Max: 37.4 (27 Feb 2021 20:50)  T(F): 97.6 (28 Feb 2021 12:05), Max: 99.3 (27 Feb 2021 20:50)  HR: 93 (28 Feb 2021 12:05) (86 - 102)  BP: 136/70 (28 Feb 2021 12:05) (136/70 - 189/71)  BP(mean): --  RR: 18 (28 Feb 2021 12:05) (16 - 22)  SpO2: 98% (28 Feb 2021 12:05) (94% - 100%)    PHYSICAL EXAM:    GENERAL: NAD, well-groomed, well-developed  HEAD:  Atraumatic, Normocephalic  EYES: EOMI, PERRLA, conjunctiva and sclera clear  NECK: Supple, No JVD, Normal thyroid  NERVOUS SYSTEM:  Alert & Oriented X3, Good concentration; Motor Strength 5/5 B/L upper and lower extremities;   CHEST/LUNG: Clear to percussion bilaterally; No rales, rhonchi, wheezing, or rubs  HEART: Regular rate and rhythm; No murmurs, rubs, or gallops  ABDOMEN: Soft, Nontender, Nondistended; Bowel sounds present  EXTREMITIES:  2+ Peripheral Pulses, No clubbing, cyanosis, or edema  LYMPH: No lymphadenopathy noted   RECTAL: Patient refused   SKIN: No rashes or lesions    LABS:                        6.7    11.22 )-----------( 277      ( 28 Feb 2021 07:07 )             21.1       CBC:  02-28 @ 07:07  WBC  11.22  HGB 6.7  HCT 21.1 Plate 277  MCV 90.2  02-27 @ 19:41  WBC  12.83  HGB 4.4  HCT 14.3 Plate 298  MCV 94.1           28 Feb 2021 07:07    139    |  102    |  31     ----------------------------<  114    3.6     |  34     |  5.20   27 Feb 2021 19:41    138    |  98     |  67     ----------------------------<  119    4.7     |  27     |  8.76     Ca    8.0        28 Feb 2021 07:07  Ca    8.1        27 Feb 2021 19:41  Phos  3.4       28 Feb 2021 07:07  Mg     2.2       28 Feb 2021 07:07    TPro  6.4    /  Alb  2.8    /  TBili  0.5    /  DBili  x      /  AST  24     /  ALT  17     /  AlkPhos  89     28 Feb 2021 07:07  TPro  6.6    /  Alb  2.9    /  TBili  0.4    /  DBili  x      /  AST  17     /  ALT  16     /  AlkPhos  97     27 Feb 2021 19:41    PT/INR - ( 27 Feb 2021 19:41 )   PT: 11.6 sec;   INR: 1.00 ratio         PTT - ( 27 Feb 2021 19:41 )  PTT:28.9 sec        RADIOLOGY & ADDITIONAL STUDIES:

## 2021-02-28 NOTE — CONSULT NOTE ADULT - ASSESSMENT
HPI:           69 y/o male w/ PMHx of ESRD on HD T/TH/SAT (missed HD today), DM type 2, HTN, HLD, AVR, recently admitted for COVID PNA discharged on oxygen presents to the ED with complaints of dizziness. Patient reports he became dizzy while at rest this morning after having a BM; unsure the color of his stool. Pt reports he missed HD because he was not feeling well. Pt reports he has been feeling weaker over the past week. Pt denies abdominal pain, n/v, cp, palpitations or SOB.         In ED initial /70, rest of vitals stable. Labs sig for WBC 12.83, H/H 4.4/14.3, guaiac +ve black stool. for rest of sig labs see below. ICU consulted for patient with low hemoglobin. No indication for ICU monitoring. Pt ordered for 2 unit PRBC   (1 on floor, the second w/ HD). Renal and GI consulted by ED. (27 Feb 2021 23:36)  --------- As Above -------------  Patient is a poor historian. States that he came in being dizzy. Patient found to be anemic and in CHF. The patient denies melena, hematochezia, hematemesis, nausea, vomiting, abdominal pain, constipation, diarrhea, or change in bowel movements Denies having a colonoscopy  Nurse states that patient had a dark, brown BM this morning. Denies NSAIDs.   Addendum =spoke with wife. GERD (+) Patient had difficulty walking after dialysis. Got better and worsened again after the next dialysis. Constipated (+) The wife denies melena, hematochezia, hematemesis, nausea, vomiting, abdominal pain, diarrhea, or change in bowel movements Chronic anemia (+) Patient on Famotidine. Never had a an EGD. Colonoscopy (+) along time ago. ASA 81 mg daily. No other NSAIDs.    Melena, heme (+) stool - passed a brown BM today 1) PPI 2) blood transfusions 3) f/u labs 4) clear liquids 5) EGD ( once medically / cardiac stable and wife agrees ) 6) Hold anticoagulants HPI:           69 y/o male w/ PMHx of ESRD on HD T/TH/SAT (missed HD today), DM type 2, HTN, HLD, AVR, recently admitted for COVID PNA discharged on oxygen presents to the ED with complaints of dizziness. Patient reports he became dizzy while at rest this morning after having a BM; unsure the color of his stool. Pt reports he missed HD because he was not feeling well. Pt reports he has been feeling weaker over the past week. Pt denies abdominal pain, n/v, cp, palpitations or SOB.         In ED initial /70, rest of vitals stable. Labs sig for WBC 12.83, H/H 4.4/14.3, guaiac +ve black stool. for rest of sig labs see below. ICU consulted for patient with low hemoglobin. No indication for ICU monitoring. Pt ordered for 2 unit PRBC   (1 on floor, the second w/ HD). Renal and GI consulted by ED. (27 Feb 2021 23:36)  --------- As Above -------------  Patient is a poor historian. States that he came in being dizzy. Patient found to be anemic and in CHF. The patient denies melena, hematochezia, hematemesis, nausea, vomiting, abdominal pain, constipation, diarrhea, or change in bowel movements Denies having a colonoscopy  Nurse states that patient had a dark, brown BM this morning. Denies NSAIDs.   Addendum =spoke with wife. GERD (+) Patient had difficulty walking after dialysis. Got better and worsened again after the next dialysis. Constipated (+) The wife denies melena, hematochezia, hematemesis, nausea, vomiting, abdominal pain, diarrhea, or change in bowel movements Chronic anemia (+) Patient on Famotidine. Never had a an EGD. Colonoscopy (+) along time ago. ASA 81 mg daily. No other NSAIDs.    Melena, heme (+) stool - passed a brown BM today 1) PPI 2) blood transfusions 3) f/u labs 4) clear liquids 5) EGD ( once medically / cardiac stable and wife agrees ) 6) Hold anticoagulants  Addendum : Patient had a gastric ulcer 01/2020. Patient had been on ASA at that time

## 2021-02-28 NOTE — PROGRESS NOTE ADULT - ASSESSMENT
71 y/o male w/ PMHx of ESRD on HD T/TH/SAT (missed HD today), DM type 2, HTN, HLD, AVR, recently admitted for COVID PNA discharged on oxygen presents to the ED with complaints of dizziness. Pt being admitted for Acute GI bleed.

## 2021-03-01 LAB
ANION GAP SERPL CALC-SCNC: 11 MMOL/L — SIGNIFICANT CHANGE UP (ref 5–17)
BUN SERPL-MCNC: 37 MG/DL — HIGH (ref 7–23)
CALCIUM SERPL-MCNC: 7.8 MG/DL — LOW (ref 8.5–10.1)
CHLORIDE SERPL-SCNC: 97 MMOL/L — SIGNIFICANT CHANGE UP (ref 96–108)
CO2 SERPL-SCNC: 27 MMOL/L — SIGNIFICANT CHANGE UP (ref 22–31)
CREAT SERPL-MCNC: 7.2 MG/DL — HIGH (ref 0.5–1.3)
GLUCOSE BLDC GLUCOMTR-MCNC: 104 MG/DL — HIGH (ref 70–99)
GLUCOSE BLDC GLUCOMTR-MCNC: 222 MG/DL — HIGH (ref 70–99)
GLUCOSE BLDC GLUCOMTR-MCNC: 260 MG/DL — HIGH (ref 70–99)
GLUCOSE BLDC GLUCOMTR-MCNC: 59 MG/DL — LOW (ref 70–99)
GLUCOSE BLDC GLUCOMTR-MCNC: 62 MG/DL — LOW (ref 70–99)
GLUCOSE BLDC GLUCOMTR-MCNC: 71 MG/DL — SIGNIFICANT CHANGE UP (ref 70–99)
GLUCOSE SERPL-MCNC: 95 MG/DL — SIGNIFICANT CHANGE UP (ref 70–99)
HCT VFR BLD CALC: 27.8 % — LOW (ref 39–50)
HGB BLD-MCNC: 8.9 G/DL — LOW (ref 13–17)
MCHC RBC-ENTMCNC: 28.7 PG — SIGNIFICANT CHANGE UP (ref 27–34)
MCHC RBC-ENTMCNC: 32 GM/DL — SIGNIFICANT CHANGE UP (ref 32–36)
MCV RBC AUTO: 89.7 FL — SIGNIFICANT CHANGE UP (ref 80–100)
NRBC # BLD: 0 /100 WBCS — SIGNIFICANT CHANGE UP (ref 0–0)
PLATELET # BLD AUTO: 293 K/UL — SIGNIFICANT CHANGE UP (ref 150–400)
POTASSIUM SERPL-MCNC: 4.1 MMOL/L — SIGNIFICANT CHANGE UP (ref 3.5–5.3)
POTASSIUM SERPL-SCNC: 4.1 MMOL/L — SIGNIFICANT CHANGE UP (ref 3.5–5.3)
RBC # BLD: 3.1 M/UL — LOW (ref 4.2–5.8)
RBC # FLD: 18.6 % — HIGH (ref 10.3–14.5)
SODIUM SERPL-SCNC: 135 MMOL/L — SIGNIFICANT CHANGE UP (ref 135–145)
WBC # BLD: 10.84 K/UL — HIGH (ref 3.8–10.5)
WBC # FLD AUTO: 10.84 K/UL — HIGH (ref 3.8–10.5)

## 2021-03-01 PROCEDURE — 99233 SBSQ HOSP IP/OBS HIGH 50: CPT

## 2021-03-01 RX ORDER — AMLODIPINE BESYLATE 2.5 MG/1
10 TABLET ORAL DAILY
Refills: 0 | Status: DISCONTINUED | OUTPATIENT
Start: 2021-03-01 | End: 2021-03-06

## 2021-03-01 RX ORDER — HYDRALAZINE HCL 50 MG
25 TABLET ORAL THREE TIMES A DAY
Refills: 0 | Status: DISCONTINUED | OUTPATIENT
Start: 2021-03-01 | End: 2021-03-06

## 2021-03-01 RX ADMIN — PANTOPRAZOLE SODIUM 10 MG/HR: 20 TABLET, DELAYED RELEASE ORAL at 13:09

## 2021-03-01 RX ADMIN — PANTOPRAZOLE SODIUM 10 MG/HR: 20 TABLET, DELAYED RELEASE ORAL at 04:51

## 2021-03-01 RX ADMIN — Medication 6: at 11:52

## 2021-03-01 RX ADMIN — Medication 25 MILLIGRAM(S): at 21:48

## 2021-03-01 RX ADMIN — PANTOPRAZOLE SODIUM 10 MG/HR: 20 TABLET, DELAYED RELEASE ORAL at 22:47

## 2021-03-01 NOTE — PROGRESS NOTE ADULT - ASSESSMENT
HPI:           69 y/o male w/ PMHx of ESRD on HD T/TH/SAT (missed HD today), DM type 2, HTN, HLD, AVR, recently admitted for COVID PNA discharged on oxygen presents to the ED with complaints of dizziness. Patient reports he became dizzy while at rest this morning after having a BM; unsure the color of his stool. Pt reports he missed HD because he was not feeling well. Pt reports he has been feeling weaker over the past week. Pt denies abdominal pain, n/v, cp, palpitations or SOB.         In ED initial /70, rest of vitals stable. Labs sig for WBC 12.83, H/H 4.4/14.3, guaiac +ve black stool. for rest of sig labs see below. ICU consulted for patient with low hemoglobin. No indication for ICU monitoring. Pt ordered for 2 unit PRBC   (1 on floor, the second w/ HD). Renal and GI consulted by ED. (27 Feb 2021 23:36)  --------- As Above -------------  Patient is a poor historian. States that he came in being dizzy. Patient found to be anemic and in CHF. The patient denies melena, hematochezia, hematemesis, nausea, vomiting, abdominal pain, constipation, diarrhea, or change in bowel movements Denies having a colonoscopy  Nurse states that patient had a dark, brown BM this morning. Denies NSAIDs.   Addendum =spoke with wife. GERD (+) Patient had difficulty walking after dialysis. Got better and worsened again after the next dialysis. Constipated (+) The wife denies melena, hematochezia, hematemesis, nausea, vomiting, abdominal pain, diarrhea, or change in bowel movements Chronic anemia (+) Patient on Famotidine. Never had a an EGD. Colonoscopy (+) along time ago. ASA 81 mg daily. No other NSAIDs.    Melena, heme (+) stool - No Bm x 24 hours. HGB 8.9 1) PPI 2) blood transfusions PRN 3) f/u labs 4) full liquids 5) EGD ( once medically / cardiac stable and wife agrees ) 6) Hold anticoagulants  Addendum : Patient had a gastric ulcer 01/2020. Patient had been on ASA at that time

## 2021-03-01 NOTE — PROGRESS NOTE ADULT - ASSESSMENT
69 y/o male w/ PMHx of ESRD on HD T/TH/SAT (missed HD today), DM type 2, HTN, HLD, AVR, recently admitted for COVID PNA discharged on oxygen presents to the ED with complaints of dizziness. Pt being admitted for Acute GI bleed.

## 2021-03-02 DIAGNOSIS — J15.6 PNEUMONIA DUE TO OTHER GRAM-NEGATIVE BACTERIA: ICD-10-CM

## 2021-03-02 DIAGNOSIS — Z95.2 PRESENCE OF PROSTHETIC HEART VALVE: ICD-10-CM

## 2021-03-02 LAB
ALBUMIN SERPL ELPH-MCNC: 2.8 G/DL — LOW (ref 3.3–5)
ALP SERPL-CCNC: 96 U/L — SIGNIFICANT CHANGE UP (ref 40–120)
ALT FLD-CCNC: 16 U/L — SIGNIFICANT CHANGE UP (ref 12–78)
ANION GAP SERPL CALC-SCNC: 9 MMOL/L — SIGNIFICANT CHANGE UP (ref 5–17)
AST SERPL-CCNC: 19 U/L — SIGNIFICANT CHANGE UP (ref 15–37)
BILIRUB SERPL-MCNC: 0.6 MG/DL — SIGNIFICANT CHANGE UP (ref 0.2–1.2)
BUN SERPL-MCNC: 14 MG/DL — SIGNIFICANT CHANGE UP (ref 7–23)
CALCIUM SERPL-MCNC: 7.9 MG/DL — LOW (ref 8.5–10.1)
CHLORIDE SERPL-SCNC: 95 MMOL/L — LOW (ref 96–108)
CO2 SERPL-SCNC: 30 MMOL/L — SIGNIFICANT CHANGE UP (ref 22–31)
CREAT SERPL-MCNC: 4.46 MG/DL — HIGH (ref 0.5–1.3)
GLUCOSE BLDC GLUCOMTR-MCNC: 147 MG/DL — HIGH (ref 70–99)
GLUCOSE BLDC GLUCOMTR-MCNC: 163 MG/DL — HIGH (ref 70–99)
GLUCOSE BLDC GLUCOMTR-MCNC: 163 MG/DL — HIGH (ref 70–99)
GLUCOSE BLDC GLUCOMTR-MCNC: 182 MG/DL — HIGH (ref 70–99)
GLUCOSE SERPL-MCNC: 98 MG/DL — SIGNIFICANT CHANGE UP (ref 70–99)
HCT VFR BLD CALC: 28.3 % — LOW (ref 39–50)
HGB BLD-MCNC: 9.3 G/DL — LOW (ref 13–17)
MAGNESIUM SERPL-MCNC: 2.4 MG/DL — SIGNIFICANT CHANGE UP (ref 1.6–2.6)
MCHC RBC-ENTMCNC: 29 PG — SIGNIFICANT CHANGE UP (ref 27–34)
MCHC RBC-ENTMCNC: 32.9 GM/DL — SIGNIFICANT CHANGE UP (ref 32–36)
MCV RBC AUTO: 88.2 FL — SIGNIFICANT CHANGE UP (ref 80–100)
NRBC # BLD: 0 /100 WBCS — SIGNIFICANT CHANGE UP (ref 0–0)
PHOSPHATE SERPL-MCNC: 4.5 MG/DL — SIGNIFICANT CHANGE UP (ref 2.5–4.5)
PLATELET # BLD AUTO: 349 K/UL — SIGNIFICANT CHANGE UP (ref 150–400)
POTASSIUM SERPL-MCNC: 3.7 MMOL/L — SIGNIFICANT CHANGE UP (ref 3.5–5.3)
POTASSIUM SERPL-SCNC: 3.7 MMOL/L — SIGNIFICANT CHANGE UP (ref 3.5–5.3)
PROT SERPL-MCNC: 6.8 GM/DL — SIGNIFICANT CHANGE UP (ref 6–8.3)
RBC # BLD: 3.21 M/UL — LOW (ref 4.2–5.8)
RBC # FLD: 18.4 % — HIGH (ref 10.3–14.5)
SODIUM SERPL-SCNC: 134 MMOL/L — LOW (ref 135–145)
WBC # BLD: 11.34 K/UL — HIGH (ref 3.8–10.5)
WBC # FLD AUTO: 11.34 K/UL — HIGH (ref 3.8–10.5)

## 2021-03-02 PROCEDURE — 93306 TTE W/DOPPLER COMPLETE: CPT | Mod: 26

## 2021-03-02 PROCEDURE — 99223 1ST HOSP IP/OBS HIGH 75: CPT

## 2021-03-02 RX ORDER — LABETALOL HCL 100 MG
300 TABLET ORAL THREE TIMES A DAY
Refills: 0 | Status: DISCONTINUED | OUTPATIENT
Start: 2021-03-02 | End: 2021-03-06

## 2021-03-02 RX ORDER — PIPERACILLIN AND TAZOBACTAM 4; .5 G/20ML; G/20ML
3.38 INJECTION, POWDER, LYOPHILIZED, FOR SOLUTION INTRAVENOUS ONCE
Refills: 0 | Status: COMPLETED | OUTPATIENT
Start: 2021-03-02 | End: 2021-03-02

## 2021-03-02 RX ORDER — PANTOPRAZOLE SODIUM 20 MG/1
40 TABLET, DELAYED RELEASE ORAL
Refills: 0 | Status: DISCONTINUED | OUTPATIENT
Start: 2021-03-02 | End: 2021-03-06

## 2021-03-02 RX ADMIN — PANTOPRAZOLE SODIUM 10 MG/HR: 20 TABLET, DELAYED RELEASE ORAL at 07:46

## 2021-03-02 RX ADMIN — Medication 25 MILLIGRAM(S): at 12:55

## 2021-03-02 RX ADMIN — Medication 300 MILLIGRAM(S): at 21:32

## 2021-03-02 RX ADMIN — Medication 25 MILLIGRAM(S): at 05:04

## 2021-03-02 RX ADMIN — Medication 2: at 11:29

## 2021-03-02 RX ADMIN — Medication 2: at 07:42

## 2021-03-02 RX ADMIN — PANTOPRAZOLE SODIUM 10 MG/HR: 20 TABLET, DELAYED RELEASE ORAL at 06:04

## 2021-03-02 RX ADMIN — PIPERACILLIN AND TAZOBACTAM 200 GRAM(S): 4; .5 INJECTION, POWDER, LYOPHILIZED, FOR SOLUTION INTRAVENOUS at 18:12

## 2021-03-02 RX ADMIN — Medication 300 MILLIGRAM(S): at 18:13

## 2021-03-02 RX ADMIN — Medication 25 MILLIGRAM(S): at 21:32

## 2021-03-02 RX ADMIN — AMLODIPINE BESYLATE 10 MILLIGRAM(S): 2.5 TABLET ORAL at 05:04

## 2021-03-02 NOTE — PROGRESS NOTE ADULT - ASSESSMENT
71 y/o male w/ PMHx of ESRD on HD T/TH/SAT (missed HD today), DM type 2, HTN, HLD, AVR, recently admitted for COVID PNA discharged on oxygen presents to the ED with complaints of dizziness.  Hemodynamically stable     echo ordered and cardiology called for clearance for endoscopy . Patient is willing to have endoscopy.    Will give zosyn x1 and check procalcitonin cxr read possible left sided infiltrate

## 2021-03-02 NOTE — PROGRESS NOTE ADULT - ASSESSMENT
HPI:           71 y/o male w/ PMHx of ESRD on HD T/TH/SAT (missed HD today), DM type 2, HTN, HLD, AVR, recently admitted for COVID PNA discharged on oxygen presents to the ED with complaints of dizziness. Patient reports he became dizzy while at rest this morning after having a BM; unsure the color of his stool. Pt reports he missed HD because he was not feeling well. Pt reports he has been feeling weaker over the past week. Pt denies abdominal pain, n/v, cp, palpitations or SOB.         In ED initial /70, rest of vitals stable. Labs sig for WBC 12.83, H/H 4.4/14.3, guaiac +ve black stool. for rest of sig labs see below. ICU consulted for patient with low hemoglobin. No indication for ICU monitoring. Pt ordered for 2 unit PRBC   (1 on floor, the second w/ HD). Renal and GI consulted by ED. (27 Feb 2021 23:36)  --------- As Above -------------  Patient is a poor historian. States that he came in being dizzy. Patient found to be anemic and in CHF. The patient denies melena, hematochezia, hematemesis, nausea, vomiting, abdominal pain, constipation, diarrhea, or change in bowel movements Denies having a colonoscopy  Nurse states that patient had a dark, brown BM this morning. Denies NSAIDs.   Addendum =spoke with wife. GERD (+) Patient had difficulty walking after dialysis. Got better and worsened again after the next dialysis. Constipated (+) The wife denies melena, hematochezia, hematemesis, nausea, vomiting, abdominal pain, diarrhea, or change in bowel movements Chronic anemia (+) Patient on Famotidine. Never had a an EGD. Colonoscopy (+) along time ago. ASA 81 mg daily. No other NSAIDs.    Melena, heme (+) stool -  HGB 8.9 1) PPI PO BID 2) blood transfusions PRN 3) f/u labs 4) full liquids 5) EGD ( once medically / cardiac stable and wife agrees ) 6) Hold anticoagulants  Addendum : Patient had a gastric ulcer 01/2020. Patient had been on ASA at that time HPI:           69 y/o male w/ PMHx of ESRD on HD T/TH/SAT (missed HD today), DM type 2, HTN, HLD, AVR, recently admitted for COVID PNA discharged on oxygen presents to the ED with complaints of dizziness. Patient reports he became dizzy while at rest this morning after having a BM; unsure the color of his stool. Pt reports he missed HD because he was not feeling well. Pt reports he has been feeling weaker over the past week. Pt denies abdominal pain, n/v, cp, palpitations or SOB.         In ED initial /70, rest of vitals stable. Labs sig for WBC 12.83, H/H 4.4/14.3, guaiac +ve black stool. for rest of sig labs see below. ICU consulted for patient with low hemoglobin. No indication for ICU monitoring. Pt ordered for 2 unit PRBC   (1 on floor, the second w/ HD). Renal and GI consulted by ED. (27 Feb 2021 23:36)  --------- As Above -------------  Patient is a poor historian. States that he came in being dizzy. Patient found to be anemic and in CHF. The patient denies melena, hematochezia, hematemesis, nausea, vomiting, abdominal pain, constipation, diarrhea, or change in bowel movements Denies having a colonoscopy  Nurse states that patient had a dark, brown BM this morning. Denies NSAIDs.   Addendum =spoke with wife. GERD (+) Patient had difficulty walking after dialysis. Got better and worsened again after the next dialysis. Constipated (+) The wife denies melena, hematochezia, hematemesis, nausea, vomiting, abdominal pain, diarrhea, or change in bowel movements Chronic anemia (+) Patient on Famotidine. Never had a an EGD. Colonoscopy (+) along time ago. ASA 81 mg daily. No other NSAIDs.    Melena, heme (+) stool -  HGB 8.9 1) PPI PO BID 2) blood transfusions PRN 3) f/u labs 4) soft diet 5) EGD ( once medically / cardiac stable  ) 6) Hold anticoagulants  === Discusse with wife whe agrees and gave consent  Addendum : Patient had a gastric ulcer 01/2020. Patient had been on ASA at that time

## 2021-03-02 NOTE — CONSULT NOTE ADULT - SUBJECTIVE AND OBJECTIVE BOX
69 y/o male w/ PMHx of ESRD on HD T//SAT (missed HD today), DM type 2, HTN, HLD, AVR, recently admitted for COVID PNA discharged on oxygen presents to the ED with complaints of dizziness. Patient reports he became dizzy while at rest this morning after having a BM; unsure the color of his stool. Pt reports he missed HD because he was not feeling well. Pt reports he has been feeling weaker over the past week. Pt denies abdominal pain, n/v, cp, palpitations or SOB.         In ED initial  70y old  Male who presents with a chief complaint of GI Bleed, Acute blood loss anemia (02 Mar 2021 17:43)      Review of Systems:    General:  No wt loss, fevers, chills, night sweats  Eyes:  Good vision, no reported pain  ENT:  No sore throat, pain, runny nose, dysphagia  CV:  No pain, palpitations, hypo/hypertension  Resp:  No dyspnea, cough, tachypnea, wheezing  GI:  No pain, nausea, vomiting, diarrhea, constipation  :  No pain, bleeding, incontinence, nocturia  Muscle:  No pain,          Social History/Family History  SOCHX:   tobacco,  -  alcohol    FMHX: FA/MO  - contributory       Discussed with:  PMD, Family    Physical Exam:    Vital Signs:  Vital Signs Last 24 Hrs  T(C): 36.7 (02 Mar 2021 17:50), Max: 36.9 (01 Mar 2021 23:25)  T(F): 98 (02 Mar 2021 17:50), Max: 98.5 (01 Mar 2021 23:25)  HR: 89 (02 Mar 2021 17:50) (66 - 89)  BP: 156/79 (02 Mar 2021 17:50) (155/73 - 179/91)  BP(mean): --  RR: 17 (02 Mar 2021 17:50) (17 - 18)  SpO2: 92% (02 Mar 2021 17:50) (92% - 100%)  Daily     Daily Weight in k.3 (02 Mar 2021 06:24)  I&O's Summary    01 Mar 2021 07:01  -  02 Mar 2021 07:00  --------------------------------------------------------  IN: 580 mL / OUT: 2775 mL / NET: -2195 mL    02 Mar 2021 07:01  -  02 Mar 2021 21:54  --------------------------------------------------------  IN: 220 mL / OUT: 0 mL / NET: 220 mL          Chest:  Full & symmetric excursion, no increased effort, breath sounds clear  Cardiovascular:  Regular rhythm, S1, S2, no murmur/rub/S3/S4, no carotid/femoral/abdominal bruit, radial/pedal pulses 2+, no edema  Abdomen:  Soft, non-tender, non-distended, normoactive bowel sounds, no HSM      Laboratory:                          9.3    11.34 )-----------( 349      ( 02 Mar 2021 06:44 )             28.3     03-02    134<L>  |  95<L>  |  14  ----------------------------<  98  3.7   |  30  |  4.46<H>    Ca    7.9<L>      02 Mar 2021 06:44  Phos  4.5     03-02  Mg     2.4     03-02    TPro  6.8  /  Alb  2.8<L>  /  TBili  0.6  /  DBili  x   /  AST  19  /  ALT  16  /  AlkPhos  96  03-02          CAPILLARY BLOOD GLUCOSE      POCT Blood Glucose.: 163 mg/dL (02 Mar 2021 21:16)  POCT Blood Glucose.: 147 mg/dL (02 Mar 2021 17:07)  POCT Blood Glucose.: 182 mg/dL (02 Mar 2021 10:43)  POCT Blood Glucose.: 163 mg/dL (02 Mar 2021 07:35)    LIVER FUNCTIONS - ( 02 Mar 2021 06:44 )  Alb: 2.8 g/dL / Pro: 6.8 gm/dL / ALK PHOS: 96 U/L / ALT: 16 U/L / AST: 19 U/L / GGT: x                 Assessment:    I am asked to evaluate this patient with an acute gastrointestinal bleed   the patient is noted to have a bioprosthetic Aortic valve this is not a metallic valve replacement but a bioprosthetic valve replacement as noted in a previous diagnostic echocardiogram  The patient may safely discontinue aspirin therapy  The patient's left ventricular function is noted to be normal  His cardiovascular risk assessment as deemed acceptable for any gastrointestinal procedures

## 2021-03-03 ENCOUNTER — TRANSCRIPTION ENCOUNTER (OUTPATIENT)
Age: 71
End: 2021-03-03

## 2021-03-03 LAB
ALBUMIN SERPL ELPH-MCNC: 2.5 G/DL — LOW (ref 3.3–5)
ALP SERPL-CCNC: 86 U/L — SIGNIFICANT CHANGE UP (ref 40–120)
ALT FLD-CCNC: 14 U/L — SIGNIFICANT CHANGE UP (ref 12–78)
ANION GAP SERPL CALC-SCNC: 10 MMOL/L — SIGNIFICANT CHANGE UP (ref 5–17)
AST SERPL-CCNC: 16 U/L — SIGNIFICANT CHANGE UP (ref 15–37)
BILIRUB SERPL-MCNC: 0.6 MG/DL — SIGNIFICANT CHANGE UP (ref 0.2–1.2)
BLD GP AB SCN SERPL QL: SIGNIFICANT CHANGE UP
BUN SERPL-MCNC: 20 MG/DL — SIGNIFICANT CHANGE UP (ref 7–23)
CALCIUM SERPL-MCNC: 7.7 MG/DL — LOW (ref 8.5–10.1)
CHLORIDE SERPL-SCNC: 91 MMOL/L — LOW (ref 96–108)
CO2 SERPL-SCNC: 29 MMOL/L — SIGNIFICANT CHANGE UP (ref 22–31)
CREAT SERPL-MCNC: 6.46 MG/DL — HIGH (ref 0.5–1.3)
GLUCOSE BLDC GLUCOMTR-MCNC: 123 MG/DL — HIGH (ref 70–99)
GLUCOSE BLDC GLUCOMTR-MCNC: 125 MG/DL — HIGH (ref 70–99)
GLUCOSE BLDC GLUCOMTR-MCNC: 133 MG/DL — HIGH (ref 70–99)
GLUCOSE BLDC GLUCOMTR-MCNC: 169 MG/DL — HIGH (ref 70–99)
GLUCOSE BLDC GLUCOMTR-MCNC: 239 MG/DL — HIGH (ref 70–99)
GLUCOSE SERPL-MCNC: 114 MG/DL — HIGH (ref 70–99)
HCT VFR BLD CALC: 23.5 % — LOW (ref 39–50)
HGB BLD-MCNC: 7.7 G/DL — LOW (ref 13–17)
MAGNESIUM SERPL-MCNC: 2 MG/DL — SIGNIFICANT CHANGE UP (ref 1.6–2.6)
MCHC RBC-ENTMCNC: 28.8 PG — SIGNIFICANT CHANGE UP (ref 27–34)
MCHC RBC-ENTMCNC: 32.8 GM/DL — SIGNIFICANT CHANGE UP (ref 32–36)
MCV RBC AUTO: 88 FL — SIGNIFICANT CHANGE UP (ref 80–100)
NRBC # BLD: 0 /100 WBCS — SIGNIFICANT CHANGE UP (ref 0–0)
PHOSPHATE SERPL-MCNC: 5.1 MG/DL — HIGH (ref 2.5–4.5)
PLATELET # BLD AUTO: 311 K/UL — SIGNIFICANT CHANGE UP (ref 150–400)
POTASSIUM SERPL-MCNC: 3.9 MMOL/L — SIGNIFICANT CHANGE UP (ref 3.5–5.3)
POTASSIUM SERPL-SCNC: 3.9 MMOL/L — SIGNIFICANT CHANGE UP (ref 3.5–5.3)
PROCALCITONIN SERPL-MCNC: 0.29 NG/ML — HIGH (ref 0.02–0.1)
PROT SERPL-MCNC: 5.8 GM/DL — LOW (ref 6–8.3)
RAPID RVP RESULT: SIGNIFICANT CHANGE UP
RBC # BLD: 2.67 M/UL — LOW (ref 4.2–5.8)
RBC # FLD: 17.5 % — HIGH (ref 10.3–14.5)
SARS-COV-2 RNA SPEC QL NAA+PROBE: SIGNIFICANT CHANGE UP
SODIUM SERPL-SCNC: 130 MMOL/L — LOW (ref 135–145)
WBC # BLD: 8.4 K/UL — SIGNIFICANT CHANGE UP (ref 3.8–10.5)
WBC # FLD AUTO: 8.4 K/UL — SIGNIFICANT CHANGE UP (ref 3.8–10.5)

## 2021-03-03 PROCEDURE — 99233 SBSQ HOSP IP/OBS HIGH 50: CPT

## 2021-03-03 RX ORDER — CHLORHEXIDINE GLUCONATE 213 G/1000ML
1 SOLUTION TOPICAL
Refills: 0 | Status: DISCONTINUED | OUTPATIENT
Start: 2021-03-03 | End: 2021-03-06

## 2021-03-03 RX ADMIN — PANTOPRAZOLE SODIUM 40 MILLIGRAM(S): 20 TABLET, DELAYED RELEASE ORAL at 05:17

## 2021-03-03 RX ADMIN — Medication 650 MILLIGRAM(S): at 15:35

## 2021-03-03 RX ADMIN — Medication 300 MILLIGRAM(S): at 21:44

## 2021-03-03 RX ADMIN — Medication 300 MILLIGRAM(S): at 05:17

## 2021-03-03 RX ADMIN — Medication 2: at 12:06

## 2021-03-03 RX ADMIN — Medication 0: at 18:51

## 2021-03-03 RX ADMIN — PANTOPRAZOLE SODIUM 40 MILLIGRAM(S): 20 TABLET, DELAYED RELEASE ORAL at 20:20

## 2021-03-03 RX ADMIN — AMLODIPINE BESYLATE 10 MILLIGRAM(S): 2.5 TABLET ORAL at 05:17

## 2021-03-03 RX ADMIN — Medication 25 MILLIGRAM(S): at 21:44

## 2021-03-03 RX ADMIN — Medication 650 MILLIGRAM(S): at 15:05

## 2021-03-03 RX ADMIN — Medication 25 MILLIGRAM(S): at 05:17

## 2021-03-03 NOTE — PROGRESS NOTE ADULT - ASSESSMENT
HPI:           69 y/o male w/ PMHx of ESRD on HD T/TH/SAT (missed HD today), DM type 2, HTN, HLD, AVR, recently admitted for COVID PNA discharged on oxygen presents to the ED with complaints of dizziness. Patient reports he became dizzy while at rest this morning after having a BM; unsure the color of his stool. Pt reports he missed HD because he was not feeling well. Pt reports he has been feeling weaker over the past week. Pt denies abdominal pain, n/v, cp, palpitations or SOB.         In ED initial /70, rest of vitals stable. Labs sig for WBC 12.83, H/H 4.4/14.3, guaiac +ve black stool. for rest of sig labs see below. ICU consulted for patient with low hemoglobin. No indication for ICU monitoring. Pt ordered for 2 unit PRBC   (1 on floor, the second w/ HD). Renal and GI consulted by ED. (27 Feb 2021 23:36)  --------- As Above -------------  Patient is a poor historian. States that he came in being dizzy. Patient found to be anemic and in CHF. The patient denies melena, hematochezia, hematemesis, nausea, vomiting, abdominal pain, constipation, diarrhea, or change in bowel movements Denies having a colonoscopy  Nurse states that patient had a dark, brown BM this morning. Denies NSAIDs.   Addendum =spoke with wife. GERD (+) Patient had difficulty walking after dialysis. Got better and worsened again after the next dialysis. Constipated (+) The wife denies melena, hematochezia, hematemesis, nausea, vomiting, abdominal pain, diarrhea, or change in bowel movements Chronic anemia (+) Patient on Famotidine. Never had a an EGD. Colonoscopy (+) along time ago. ASA 81 mg daily. No other NSAIDs.    Melena, heme (+) stool -  HGB 8.9---> 7.7 1) PPI PO BID 2) blood transfusion3) f/u labs 4) soft diet 5) EGD Thursday  ) 6) Hold anticoagulants  === Discusse with wife whe agrees and gave consent  Addendum : Patient had a gastric ulcer 01/2020. Patient had been on ASA at that time

## 2021-03-04 ENCOUNTER — RESULT REVIEW (OUTPATIENT)
Age: 71
End: 2021-03-04

## 2021-03-04 LAB
ANION GAP SERPL CALC-SCNC: 8 MMOL/L — SIGNIFICANT CHANGE UP (ref 5–17)
BUN SERPL-MCNC: 14 MG/DL — SIGNIFICANT CHANGE UP (ref 7–23)
CALCIUM SERPL-MCNC: 7.7 MG/DL — LOW (ref 8.5–10.1)
CHLORIDE SERPL-SCNC: 98 MMOL/L — SIGNIFICANT CHANGE UP (ref 96–108)
CO2 SERPL-SCNC: 28 MMOL/L — SIGNIFICANT CHANGE UP (ref 22–31)
CREAT SERPL-MCNC: 4.85 MG/DL — HIGH (ref 0.5–1.3)
GLUCOSE BLDC GLUCOMTR-MCNC: 107 MG/DL — HIGH (ref 70–99)
GLUCOSE BLDC GLUCOMTR-MCNC: 137 MG/DL — HIGH (ref 70–99)
GLUCOSE BLDC GLUCOMTR-MCNC: 158 MG/DL — HIGH (ref 70–99)
GLUCOSE BLDC GLUCOMTR-MCNC: 371 MG/DL — HIGH (ref 70–99)
GLUCOSE SERPL-MCNC: 130 MG/DL — HIGH (ref 70–99)
HCT VFR BLD CALC: 27.1 % — LOW (ref 39–50)
HGB BLD-MCNC: 8.8 G/DL — LOW (ref 13–17)
MCHC RBC-ENTMCNC: 28.9 PG — SIGNIFICANT CHANGE UP (ref 27–34)
MCHC RBC-ENTMCNC: 32.5 GM/DL — SIGNIFICANT CHANGE UP (ref 32–36)
MCV RBC AUTO: 89.1 FL — SIGNIFICANT CHANGE UP (ref 80–100)
NRBC # BLD: 0 /100 WBCS — SIGNIFICANT CHANGE UP (ref 0–0)
PLATELET # BLD AUTO: 295 K/UL — SIGNIFICANT CHANGE UP (ref 150–400)
POTASSIUM SERPL-MCNC: 4 MMOL/L — SIGNIFICANT CHANGE UP (ref 3.5–5.3)
POTASSIUM SERPL-SCNC: 4 MMOL/L — SIGNIFICANT CHANGE UP (ref 3.5–5.3)
RBC # BLD: 3.04 M/UL — LOW (ref 4.2–5.8)
RBC # FLD: 16.9 % — HIGH (ref 10.3–14.5)
SODIUM SERPL-SCNC: 134 MMOL/L — LOW (ref 135–145)
WBC # BLD: 9.94 K/UL — SIGNIFICANT CHANGE UP (ref 3.8–10.5)
WBC # FLD AUTO: 9.94 K/UL — SIGNIFICANT CHANGE UP (ref 3.8–10.5)

## 2021-03-04 PROCEDURE — 88305 TISSUE EXAM BY PATHOLOGIST: CPT | Mod: 26

## 2021-03-04 PROCEDURE — 88312 SPECIAL STAINS GROUP 1: CPT | Mod: 26

## 2021-03-04 PROCEDURE — 99233 SBSQ HOSP IP/OBS HIGH 50: CPT

## 2021-03-04 RX ORDER — SUCRALFATE 1 G
1 TABLET ORAL
Refills: 0 | Status: DISCONTINUED | OUTPATIENT
Start: 2021-03-04 | End: 2021-03-06

## 2021-03-04 RX ADMIN — Medication 1 GRAM(S): at 23:47

## 2021-03-04 RX ADMIN — Medication 1 GRAM(S): at 16:32

## 2021-03-04 RX ADMIN — Medication 300 MILLIGRAM(S): at 05:41

## 2021-03-04 RX ADMIN — CHLORHEXIDINE GLUCONATE 1 APPLICATION(S): 213 SOLUTION TOPICAL at 05:41

## 2021-03-04 RX ADMIN — Medication 10: at 11:30

## 2021-03-04 RX ADMIN — Medication 25 MILLIGRAM(S): at 14:02

## 2021-03-04 RX ADMIN — Medication 300 MILLIGRAM(S): at 14:02

## 2021-03-04 RX ADMIN — Medication 1 GRAM(S): at 13:09

## 2021-03-04 RX ADMIN — PANTOPRAZOLE SODIUM 40 MILLIGRAM(S): 20 TABLET, DELAYED RELEASE ORAL at 16:32

## 2021-03-04 RX ADMIN — PANTOPRAZOLE SODIUM 40 MILLIGRAM(S): 20 TABLET, DELAYED RELEASE ORAL at 05:41

## 2021-03-04 RX ADMIN — AMLODIPINE BESYLATE 10 MILLIGRAM(S): 2.5 TABLET ORAL at 05:41

## 2021-03-04 RX ADMIN — Medication 300 MILLIGRAM(S): at 21:28

## 2021-03-04 RX ADMIN — Medication 25 MILLIGRAM(S): at 21:28

## 2021-03-04 RX ADMIN — Medication 25 MILLIGRAM(S): at 05:41

## 2021-03-04 NOTE — PROGRESS NOTE ADULT - ASSESSMENT
71 y/o male w/ PMHx of ESRD on HD T/TH/SAT (missed HD today), DM type 2, HTN, HLD, AVR, recently admitted for COVID PNA discharged on oxygen presents to the ED with complaints of dizziness.  Hemodynamically stable     echo ordered and cardiology called for clearance for endoscopy . had endoscopy today probable dc in am

## 2021-03-04 NOTE — PRE-OP CHECKLIST - BLOOD AVAILABLE
no n/a Purse String (Simple) Text: Given the location of the defect and the characteristics of the surrounding skin a purse string closure was deemed most appropriate.  Undermining was performed circumfirentially around the surgical defect.  A purse string suture was then placed and tightened.

## 2021-03-05 ENCOUNTER — TRANSCRIPTION ENCOUNTER (OUTPATIENT)
Age: 71
End: 2021-03-05

## 2021-03-05 DIAGNOSIS — K26.9 DUODENAL ULCER, UNSPECIFIED AS ACUTE OR CHRONIC, WITHOUT HEMORRHAGE OR PERFORATION: ICD-10-CM

## 2021-03-05 LAB
GLUCOSE BLDC GLUCOMTR-MCNC: 130 MG/DL — HIGH (ref 70–99)
GLUCOSE BLDC GLUCOMTR-MCNC: 152 MG/DL — HIGH (ref 70–99)
GLUCOSE BLDC GLUCOMTR-MCNC: 245 MG/DL — HIGH (ref 70–99)
GLUCOSE BLDC GLUCOMTR-MCNC: 69 MG/DL — LOW (ref 70–99)
GLUCOSE BLDC GLUCOMTR-MCNC: 94 MG/DL — SIGNIFICANT CHANGE UP (ref 70–99)
SURGICAL PATHOLOGY STUDY: SIGNIFICANT CHANGE UP

## 2021-03-05 PROCEDURE — 99233 SBSQ HOSP IP/OBS HIGH 50: CPT

## 2021-03-05 RX ADMIN — AMLODIPINE BESYLATE 10 MILLIGRAM(S): 2.5 TABLET ORAL at 05:52

## 2021-03-05 RX ADMIN — Medication 300 MILLIGRAM(S): at 05:52

## 2021-03-05 RX ADMIN — Medication 1 GRAM(S): at 11:04

## 2021-03-05 RX ADMIN — PANTOPRAZOLE SODIUM 40 MILLIGRAM(S): 20 TABLET, DELAYED RELEASE ORAL at 17:08

## 2021-03-05 RX ADMIN — Medication 1 GRAM(S): at 05:52

## 2021-03-05 RX ADMIN — PANTOPRAZOLE SODIUM 40 MILLIGRAM(S): 20 TABLET, DELAYED RELEASE ORAL at 05:52

## 2021-03-05 RX ADMIN — Medication 4: at 11:05

## 2021-03-05 RX ADMIN — Medication 1 GRAM(S): at 22:24

## 2021-03-05 RX ADMIN — Medication 2: at 16:52

## 2021-03-05 RX ADMIN — Medication 1 GRAM(S): at 17:08

## 2021-03-05 RX ADMIN — CHLORHEXIDINE GLUCONATE 1 APPLICATION(S): 213 SOLUTION TOPICAL at 05:51

## 2021-03-05 RX ADMIN — Medication 300 MILLIGRAM(S): at 22:24

## 2021-03-05 RX ADMIN — Medication 25 MILLIGRAM(S): at 05:52

## 2021-03-05 RX ADMIN — Medication 25 MILLIGRAM(S): at 22:24

## 2021-03-05 NOTE — PROGRESS NOTE ADULT - PROBLEM SELECTOR PLAN 3
- Pt s/p 3 unit PRBC  - monitor H/H  - Keep Hb > 7  - Keep active T&S
- recent COVID hospitalization  - SPO2 dips to 89 off NC, c/w supplemental oxygen   - COVID neg-
- recent COVID hospitalization  - SPO2 dips to 89 off NC, c/w supplemental oxygen   - COVID neg
- recent COVID hospitalization  - SPO2 dips to 89 off NC, c/w supplemental oxygen   - COVID neg today
- recent COVID hospitalization  - SPO2 dips to 89 off NC, c/w supplemental oxygen   - COVID neg-

## 2021-03-05 NOTE — PROGRESS NOTE ADULT - PROBLEM SELECTOR PLAN 4
check procalcitonin   zosyn x 1
- HD per renal today   - Dr Lilly following
- HD per renal  - Dr Lilly following
check procalcitonin   zosyn x 1
- recent COVID hospitalization  - SPO2 dips to 89 off NC, c/w supplemental oxygen   - COVID neg-

## 2021-03-05 NOTE — PHYSICAL THERAPY INITIAL EVALUATION ADULT - MODALITIES TREATMENT COMMENTS
Pt able to ambulate 50 ft with no device (rolling walker offered however declined to use) noted to hold on to bed and educated that use of device is safer, still continued to decline use of assistive device.

## 2021-03-05 NOTE — PROGRESS NOTE ADULT - PROBLEM SELECTOR PROBLEM 5
ESRD on hemodialysis
Essential hypertension
ESRD on hemodialysis
Gram-negative pneumonia
Essential hypertension

## 2021-03-05 NOTE — PHYSICAL THERAPY INITIAL EVALUATION ADULT - PERTINENT HX OF CURRENT PROBLEM, REHAB EVAL
Pt is a 70-y/o, male, admitted to [facility] due to c/o dizziness at home after bowel movement. Pt stated to have missed HD due to not feeling well. Pt was admitted for GIB requiring blood transfusion. Of note, pt was recently admitted due to COVID-19. Pt now referred to PT for functional assessment.

## 2021-03-05 NOTE — PROGRESS NOTE ADULT - PROBLEM SELECTOR PLAN 2
- Pt s/p 2 unit PRBC  - monitor H/H  - Keep Hb > 7  - Keep active T&S
- Pt s/p 3 unit PRBC  - monitor H/H  - Keep Hb > 7  - Keep active T&S
-clears  - Pt s/p 3 unit PRBC,   - GI  egdex done   - c/w PPI drip  - monitor H/H

## 2021-03-05 NOTE — PROGRESS NOTE ADULT - PROBLEM SELECTOR PROBLEM 1
Gastrointestinal hemorrhage, unspecified gastrointestinal hemorrhage type
Duodenum ulcer
Gastrointestinal hemorrhage, unspecified gastrointestinal hemorrhage type

## 2021-03-05 NOTE — PROGRESS NOTE ADULT - PROBLEM SELECTOR PROBLEM 6
Hyperlipidemia, unspecified hyperlipidemia type
ESRD on hemodialysis
Essential hypertension
Essential hypertension
Hyperlipidemia, unspecified hyperlipidemia type

## 2021-03-05 NOTE — PROGRESS NOTE ADULT - ASSESSMENT
71 y/o male w/ PMHx of ESRD on HD T/TH/SAT (missed HD today), DM type 2, HTN, HLD, AVR, recently admitted for COVID PNA discharged on oxygen presents to the ED with complaints of dizziness.  Hemodynamically stable     s/p dialysis today will dc home in am

## 2021-03-05 NOTE — PROGRESS NOTE ADULT - PROBLEM SELECTOR PLAN 5
- HD per renal   - Dr Lilly following
- HD per renal   - Dr Lilly following
check procalcitonin   zosyn x 1
- c/w home meds
- c/w home meds

## 2021-03-05 NOTE — PHYSICAL THERAPY INITIAL EVALUATION ADULT - BALANCE DISTURBANCE, SYSTEM IMPAIRMENT CONTRIBUTE, REHAB EVAL
musculoskeletal Cyclophosphamide Pregnancy And Lactation Text: This medication is Pregnancy Category D and it isn't considered safe during pregnancy. This medication is excreted in breast milk.

## 2021-03-05 NOTE — PROGRESS NOTE ADULT - PROBLEM SELECTOR PROBLEM 2
Acute blood loss anemia
Acute blood loss anemia
Gastrointestinal hemorrhage, unspecified gastrointestinal hemorrhage type
Acute blood loss anemia
Acute blood loss anemia

## 2021-03-05 NOTE — PROGRESS NOTE ADULT - PROBLEM SELECTOR PROBLEM 7
Essential hypertension
Type 2 diabetes mellitus with other specified complication, unspecified whether long term insulin use
Type 2 diabetes mellitus with other specified complication, unspecified whether long term insulin use
Hyperlipidemia, unspecified hyperlipidemia type
Hyperlipidemia, unspecified hyperlipidemia type

## 2021-03-05 NOTE — PROGRESS NOTE ADULT - PROBLEM SELECTOR PLAN 10
echo order   cardiology consult fela

## 2021-03-05 NOTE — PROGRESS NOTE ADULT - ASSESSMENT
HPI:           69 y/o male w/ PMHx of ESRD on HD T/TH/SAT (missed HD today), DM type 2, HTN, HLD, AVR, recently admitted for COVID PNA discharged on oxygen presents to the ED with complaints of dizziness. Patient reports he became dizzy while at rest this morning after having a BM; unsure the color of his stool. Pt reports he missed HD because he was not feeling well. Pt reports he has been feeling weaker over the past week. Pt denies abdominal pain, n/v, cp, palpitations or SOB.         In ED initial /70, rest of vitals stable. Labs sig for WBC 12.83, H/H 4.4/14.3, guaiac +ve black stool. for rest of sig labs see below. ICU consulted for patient with low hemoglobin. No indication for ICU monitoring. Pt ordered for 2 unit PRBC   (1 on floor, the second w/ HD). Renal and GI consulted by ED. (27 Feb 2021 23:36)  --------- As Above -------------  Patient is a poor historian. States that he came in being dizzy. Patient found to be anemic and in CHF. The patient denies melena, hematochezia, hematemesis, nausea, vomiting, abdominal pain, constipation, diarrhea, or change in bowel movements Denies having a colonoscopy  Nurse states that patient had a dark, brown BM this morning. Denies NSAIDs.   Addendum =spoke with wife. GERD (+) Patient had difficulty walking after dialysis. Got better and worsened again after the next dialysis. Constipated (+) The wife denies melena, hematochezia, hematemesis, nausea, vomiting, abdominal pain, diarrhea, or change in bowel movements Chronic anemia (+) Patient on Famotidine. Never had a an EGD. Colonoscopy (+) along time ago. ASA 81 mg daily. No other NSAIDs.    Melena, heme (+) stool -  See EGD results, biopsies benign, HGB 8.9---> 7.7--> 8.8  1) PPI PO BID 2)  f/u labs 3) soft diet 4) Short term Carafate unless patient stays on Aspirin   ==== Patient stable for discharge from GI point of view

## 2021-03-05 NOTE — PHYSICAL THERAPY INITIAL EVALUATION ADULT - ADDITIONAL COMMENTS
Pt lives with spouse in private home with 5 stair steps to enter without rails at front of home, 5 stair steps to enter at back entrance with left handrail. Once inside all amenities at same level. Previously independent in all mobility without devices. As per chart, pt has rolling walker and supplemental O2 from prior hospitalization (however unable to recall having rolling walker at this time; attempted to call spouse to clarify, unavailable). Goes to dialysis and gets transported to outpatient HD.

## 2021-03-05 NOTE — PROGRESS NOTE ADULT - PROBLEM SELECTOR PROBLEM 8
Type 2 diabetes mellitus with other specified complication, unspecified whether long term insulin use
DVT prophylaxis
Type 2 diabetes mellitus with other specified complication, unspecified whether long term insulin use
DVT prophylaxis
Type 2 diabetes mellitus with other specified complication, unspecified whether long term insulin use

## 2021-03-05 NOTE — PROGRESS NOTE ADULT - PROBLEM SELECTOR PLAN 1
- NPO  - Pt s/p 2 unit PRBC, transfuse another this am  - GI consulted by ED; Dr Dwyer   - c/w PPI drip  - monitor H/H
-clears  - Pt s/p 3 unit PRBC,   - GI Dr Dwyer possible egd   - c/w PPI drip  - monitor H/H
source of bleeding will need Carafate and PPI

## 2021-03-05 NOTE — DISCHARGE NOTE NURSING/CASE MANAGEMENT/SOCIAL WORK - PATIENT PORTAL LINK FT
You can access the FollowMyHealth Patient Portal offered by [facility] by registering at the following website: [email or web address]. By joining [facility]’s FollowMyHealth portal, you will also be able to view your health information using other applications (apps) compatible with our system.

## 2021-03-06 ENCOUNTER — TRANSCRIPTION ENCOUNTER (OUTPATIENT)
Age: 71
End: 2021-03-06

## 2021-03-06 VITALS
OXYGEN SATURATION: 96 % | TEMPERATURE: 98 F | DIASTOLIC BLOOD PRESSURE: 68 MMHG | SYSTOLIC BLOOD PRESSURE: 145 MMHG | RESPIRATION RATE: 17 BRPM | HEART RATE: 80 BPM

## 2021-03-06 LAB
ANION GAP SERPL CALC-SCNC: 9 MMOL/L — SIGNIFICANT CHANGE UP (ref 5–17)
BUN SERPL-MCNC: 23 MG/DL — SIGNIFICANT CHANGE UP (ref 7–23)
CALCIUM SERPL-MCNC: 8.1 MG/DL — LOW (ref 8.5–10.1)
CHLORIDE SERPL-SCNC: 100 MMOL/L — SIGNIFICANT CHANGE UP (ref 96–108)
CO2 SERPL-SCNC: 28 MMOL/L — SIGNIFICANT CHANGE UP (ref 22–31)
CREAT SERPL-MCNC: 6.91 MG/DL — HIGH (ref 0.5–1.3)
GLUCOSE BLDC GLUCOMTR-MCNC: 127 MG/DL — HIGH (ref 70–99)
GLUCOSE BLDC GLUCOMTR-MCNC: 199 MG/DL — HIGH (ref 70–99)
GLUCOSE SERPL-MCNC: 178 MG/DL — HIGH (ref 70–99)
HCT VFR BLD CALC: 26.2 % — LOW (ref 39–50)
HGB BLD-MCNC: 8.5 G/DL — LOW (ref 13–17)
MCHC RBC-ENTMCNC: 29 PG — SIGNIFICANT CHANGE UP (ref 27–34)
MCHC RBC-ENTMCNC: 32.4 GM/DL — SIGNIFICANT CHANGE UP (ref 32–36)
MCV RBC AUTO: 89.4 FL — SIGNIFICANT CHANGE UP (ref 80–100)
NRBC # BLD: 0 /100 WBCS — SIGNIFICANT CHANGE UP (ref 0–0)
PLATELET # BLD AUTO: 317 K/UL — SIGNIFICANT CHANGE UP (ref 150–400)
POTASSIUM SERPL-MCNC: 3.8 MMOL/L — SIGNIFICANT CHANGE UP (ref 3.5–5.3)
POTASSIUM SERPL-SCNC: 3.8 MMOL/L — SIGNIFICANT CHANGE UP (ref 3.5–5.3)
RBC # BLD: 2.93 M/UL — LOW (ref 4.2–5.8)
RBC # FLD: 17 % — HIGH (ref 10.3–14.5)
SODIUM SERPL-SCNC: 137 MMOL/L — SIGNIFICANT CHANGE UP (ref 135–145)
WBC # BLD: 7.07 K/UL — SIGNIFICANT CHANGE UP (ref 3.8–10.5)
WBC # FLD AUTO: 7.07 K/UL — SIGNIFICANT CHANGE UP (ref 3.8–10.5)

## 2021-03-06 PROCEDURE — 99239 HOSP IP/OBS DSCHRG MGMT >30: CPT

## 2021-03-06 RX ORDER — PANTOPRAZOLE SODIUM 20 MG/1
1 TABLET, DELAYED RELEASE ORAL
Qty: 60 | Refills: 0
Start: 2021-03-06 | End: 2021-04-04

## 2021-03-06 RX ORDER — SUCRALFATE 1 G
1 TABLET ORAL
Qty: 120 | Refills: 0
Start: 2021-03-06 | End: 2021-04-04

## 2021-03-06 RX ADMIN — Medication 1 GRAM(S): at 06:36

## 2021-03-06 RX ADMIN — Medication 25 MILLIGRAM(S): at 15:50

## 2021-03-06 RX ADMIN — CHLORHEXIDINE GLUCONATE 1 APPLICATION(S): 213 SOLUTION TOPICAL at 06:37

## 2021-03-06 RX ADMIN — AMLODIPINE BESYLATE 10 MILLIGRAM(S): 2.5 TABLET ORAL at 06:37

## 2021-03-06 RX ADMIN — Medication 300 MILLIGRAM(S): at 06:36

## 2021-03-06 RX ADMIN — Medication 300 MILLIGRAM(S): at 15:50

## 2021-03-06 RX ADMIN — Medication 25 MILLIGRAM(S): at 06:36

## 2021-03-06 RX ADMIN — Medication 1 GRAM(S): at 11:46

## 2021-03-06 RX ADMIN — Medication 2: at 11:47

## 2021-03-06 RX ADMIN — PANTOPRAZOLE SODIUM 40 MILLIGRAM(S): 20 TABLET, DELAYED RELEASE ORAL at 06:36

## 2021-03-06 NOTE — PROGRESS NOTE ADULT - NSTELEHEALTH_GEN_ALL_CORE
Left message for patient to call back to advise patient of referral to orthopedics Dr. Williams Simpson phone # [phone].
No
No

## 2021-03-06 NOTE — DISCHARGE NOTE PROVIDER - NSDCFUSCHEDAPPT_GEN_ALL_CORE_FT
NATANAEL MASSEY ; 03/22/2021 ; [facility] [place] [facility] [address] [place] ; 03/22/2021 ; [facility] [place] [facility] [address] [place]

## 2021-03-06 NOTE — PROGRESS NOTE ADULT - REASON FOR ADMISSION
GI Bleed, Acute blood loss anemia

## 2021-03-06 NOTE — DISCHARGE NOTE PROVIDER - HOSPITAL COURSE
HPI:           69 y/o male w/ PMHx of ESRD on HD T/TH/SAT (missed HD today), DM type 2, HTN, HLD, AVR, recently admitted for COVID PNA discharged on oxygen presents to the ED with complaints of dizziness. Patient reports he became dizzy while at rest this morning after having a BM; unsure the color of his stool. Pt reports he missed HD because he was not feeling well. Pt reports he has been feeling weaker over the past week. Pt denies abdominal pain, n/v, cp, palpitations or SOB.         In ED initial /70, rest of vitals stable. Labs sig for WBC 12.83, H/H 4.4/14.3, guaiac +ve black stool. for rest of sig labs see below. ICU consulted for patient with low hemoglobin. No indication for ICU monitoring. Pt ordered for 2 unit PRBC   (1 on floor, the second w/ HD). Renal and GI consulted by ED. (27 Feb 2021 23:36)   Problem/Plan - 1:  ·  Problem: Duodenum ulcer.  Plan: source of bleeding will need Carafate and PPI.      Problem/Plan - 2:  ·  Problem: Gastrointestinal hemorrhage, unspecified gastrointestinal hemorrhage type.  Plan: -clears  - Pt s/p 3 unit PRBC,   - GI Dr egd done   - s/p PPI drip       Problem/Plan - 3:  ·  Problem: Acute blood loss anemia.  Plan: - Pt s/p 3 unit PRBC  - monitor H/H  - Keep Hb > 7  - Keep active T&S.      Problem/Plan - 4:  ·  Problem: COVID-19.  Plan: - recent COVID hospitalization  - SPO2 dips to 89 off NC, c/w supplemental oxygen   - COVID neg-.      Problem/Plan - 5:  ·  Problem: Gram-negative pneumonia.  Plan: check procalcitonin   zosyn x 1.   resolved      Problem/Plan - 6:  Problem: ESRD on hemodialysis. Plan: - HD per renal   - Dr Vijaya rice.     Problem/Plan - 7:  ·  Problem: Essential hypertension.  Plan: - c/w home meds.      Problem/Plan - 8:  ·  Problem: Type 2 diabetes mellitus with other specified complication, unspecified whether long term insulin use.  Plan: - FS q6 while NPO w/ SSI.      Problem/Plan - 9:  ·  Problem: DVT prophylaxis.  Plan: SCDs.      Problem/Plan - 10:  Problem: Aortic valve replaced. Plan; echo order   cardiology consult fela.    Attending Attestation:   45 minutes spent on total discharge more than 50% of the visit was spent counseling and/or coordinating care by the attending physician.

## 2021-03-06 NOTE — PROGRESS NOTE ADULT - PROVIDER SPECIALTY LIST ADULT
Cardiology
Gastroenterology
Nephrology
Nephrology
Cardiology
Hospitalist
Nephrology
Gastroenterology
Nephrology
Nephrology
Gastroenterology
Hospitalist

## 2021-03-06 NOTE — DISCHARGE NOTE PROVIDER - NSDCMRMEDTOKEN_GEN_ALL_CORE_FT
amLODIPine 5 mg oral tablet: 1 tab(s) orally once a day  aspirin 81 mg oral delayed release tablet: 1 tab(s) orally once a day   atorvastatin 40 mg oral tablet: 1 tab(s) orally once a day (at bedtime)  docusate sodium 100 mg oral tablet: 1 tab(s) orally , As Needed  epoetin theodore:   hydrALAZINE 50 mg oral tablet: orally 2 times a day  Januvia 25 mg oral tablet: 1 tab(s) orally once a day   labetalol 300 mg oral tablet: 1 tab(s) orally 3 times a day  pantoprazole 40 mg oral delayed release tablet: 1 tab(s) orally 2 times a day  sevelamer hydrochloride 800 mg oral tablet: 2 tab(s) orally 3 times a day  sucralfate 1 g oral tablet: 1 tab(s) orally 4 times a day

## 2021-03-06 NOTE — DISCHARGE NOTE PROVIDER - NSDCCPCAREPLAN_GEN_ALL_CORE_FT
PRINCIPAL DISCHARGE DIAGNOSIS  Diagnosis: Duodenum ulcer  Assessment and Plan of Treatment: please follow up with GI      SECONDARY DISCHARGE DIAGNOSES  Diagnosis: GI bleed  Assessment and Plan of Treatment: resolved please take protonx and carafate

## 2021-03-06 NOTE — PROGRESS NOTE ADULT - SUBJECTIVE AND OBJECTIVE BOX
Patient is a 70y old  Male who presents with a chief complaint of GI Bleed, Acute blood loss anemia (02 Mar 2021 21:50)      HPI:           69 y/o male w/ PMHx of ESRD on HD T/TH/SAT (missed HD today), DM type 2, HTN, HLD, AVR, recently admitted for COVID PNA discharged on oxygen presents to the ED with complaints of dizziness. Patient reports he became dizzy while at rest this morning after having a BM; unsure the color of his stool. Pt reports he missed HD because he was not feeling well. Pt reports he has been feeling weaker over the past week. Pt denies abdominal pain, n/v, cp, palpitations or SOB.         In ED initial /70, rest of vitals stable. Labs sig for WBC 12.83, H/H 4.4/14.3, guaiac +ve black stool. for rest of sig labs see below. ICU consulted for patient with low hemoglobin. No indication for ICU monitoring. Pt ordered for 2 unit PRBC   (1 on floor, the second w/ HD). Renal and GI consulted by ED. (27 Feb 2021 23:36)      INTERVAL HPI/OVERNIGHT EVENTS: Patient seen earlier today  The patient denies melena, hematochezia, hematemesis, nausea, vomiting, abdominal pain, constipation, diarrhea, or change in bowel movements Tolerating diet    MEDICATIONS  (STANDING):  amLODIPine   Tablet 10 milliGRAM(s) Oral daily  dextrose 40% Gel 15 Gram(s) Oral once  dextrose 5%. 1000 milliLiter(s) (100 mL/Hr) IV Continuous <Continuous>  dextrose 5%. 1000 milliLiter(s) (50 mL/Hr) IV Continuous <Continuous>  dextrose 50% Injectable 25 Gram(s) IV Push once  dextrose 50% Injectable 12.5 Gram(s) IV Push once  dextrose 50% Injectable 25 Gram(s) IV Push once  glucagon  Injectable 1 milliGRAM(s) IntraMuscular once  hydrALAZINE 25 milliGRAM(s) Oral three times a day  insulin lispro (ADMELOG) corrective regimen sliding scale   SubCutaneous three times a day before meals  insulin lispro (ADMELOG) corrective regimen sliding scale   SubCutaneous at bedtime  labetalol 300 milliGRAM(s) Oral three times a day  pantoprazole Infusion 8 mG/Hr (10 mL/Hr) IV Continuous <Continuous>    MEDICATIONS  (PRN):  acetaminophen   Tablet .. 650 milliGRAM(s) Oral every 6 hours PRN Mild Pain (1 - 3)  ondansetron Injectable 4 milliGRAM(s) IV Push every 6 hours PRN Nausea and/or Vomiting      FAMILY HISTORY:  FH: type 2 diabetes  brother        Allergies    lisinopril (Anaphylaxis)    Intolerances        PMH/PSH:  HLD (hyperlipidemia)    HTN (hypertension)    Aortic valve replaced    CKD (chronic kidney disease)    Anemia    CHF (congestive heart failure)    Diabetes mellitus    Hypertension    S/P CABG (coronary artery bypass graft)    S/P appendectomy          REVIEW OF SYSTEMS:  CONSTITUTIONAL: No fever, weight loss,   EYES: No eye pain, visual disturbances, or discharge  ENMT:  No difficulty hearing, tinnitus, vertigo; No sinus or throat pain  NECK: No pain or stiffness  BREASTS: No pain, masses, or nipple discharge  RESPIRATORY: No cough, wheezing, chills or hemoptysis; No shortness of breath  CARDIOVASCULAR: No chest pain, palpitations, dizziness, or leg swelling  GASTROINTESTINAL: See above.  GENITOURINARY: No dysuria, frequency, hematuria, or incontinence  NEUROLOGICAL: No headaches,  loss of strength, numbness, or tremors  SKIN: No itching, burning, rashes, or lesions   LYMPH NODES: No enlarged glands  ENDOCRINE: No heat or cold intolerance; No hair loss  MUSCULOSKELETAL: No joint pain or swelling; No muscle, back, or extremity pain  PSYCHIATRIC: No depression, anxiety, mood swings, or difficulty sleeping  HEME/LYMPH: No easy bruising, or bleeding gums  ALLERGY AND IMMUNOLOGIC: No hives or eczema    Vital Signs Last 24 Hrs  T(C): 36.7 (02 Mar 2021 17:50), Max: 36.9 (01 Mar 2021 23:25)  T(F): 98 (02 Mar 2021 17:50), Max: 98.5 (01 Mar 2021 23:25)  HR: 89 (02 Mar 2021 17:50) (66 - 89)  BP: 156/79 (02 Mar 2021 17:50) (155/73 - 179/91)  BP(mean): --  RR: 17 (02 Mar 2021 17:50) (17 - 18)  SpO2: 92% (02 Mar 2021 17:50) (92% - 100%)    PHYSICAL EXAM:  GENERAL: NAD, well-groomed, well-developed  HEAD:  Atraumatic, Normocephalic  EYES: EOMI, PERRLA, conjunctiva and sclera clear  NECK: Supple, No JVD, Normal thyroid  NERVOUS SYSTEM:  Alert & Oriented X1, Good concentration;   CHEST/LUNG: Clear to percussion bilaterally; No rales, rhonchi, wheezing, or rubs  HEART: Regular rate and rhythm; No murmurs, rubs, or gallops  ABDOMEN: Soft, Nontender, Nondistended; Bowel sounds present  EXTREMITIES:  2+ Peripheral Pulses, No clubbing, cyanosis, or edema  LYMPH: No lymphadenopathy noted  SKIN: No rashes or lesions    LAB                          9.3    11.34 )-----------( 349      ( 02 Mar 2021 06:44 )             28.3       CBC:  03-02 @ 06:44  WBC 11.34   Hgb 9.3   Hct 28.3   Plts 349  MCV 88.2  03-01 @ 06:46  WBC 10.84   Hgb 8.9   Hct 27.8   Plts 293  MCV 89.7  02-28 @ 23:09  WBC 10.28   Hgb 7.9   Hct 24.1   Plts 295  MCV 88.6  02-28 @ 07:07  WBC 11.22   Hgb 6.7   Hct 21.1   Plts 277  MCV 90.2  02-27 @ 19:41  WBC 12.83   Hgb 4.4   Hct 14.3   Plts 298  MCV 94.1      Chemistry:  03-02 @ 06:44  Na+ 134  K+ 3.7  Cl- 95  CO2 30  BUN 14  Cr 4.46     03-01 @ 06:46  Na+ 135  K+ 4.1  Cl- 97  CO2 27  BUN 37  Cr 7.20     02-28 @ 07:07  Na+ 139  K+ 3.6  Cl- 102  CO2 34  BUN 31  Cr 5.20     02-27 @ 19:41  Na+ 138  K+ 4.7  Cl- 98  CO2 27  BUN 67  Cr 8.76         Glucose, Serum: 98 mg/dL (03-02 @ 06:44)  Glucose, Serum: 95 mg/dL (03-01 @ 06:46)  Glucose, Serum: 114 mg/dL (02-28 @ 07:07)  Glucose, Serum: 119 mg/dL (02-27 @ 19:41)      02 Mar 2021 06:44    134    |  95     |  14     ----------------------------<  98     3.7     |  30     |  4.46   01 Mar 2021 06:46    135    |  97     |  37     ----------------------------<  95     4.1     |  27     |  7.20   28 Feb 2021 07:07    139    |  102    |  31     ----------------------------<  114    3.6     |  34     |  5.20   27 Feb 2021 19:41    138    |  98     |  67     ----------------------------<  119    4.7     |  27     |  8.76     Ca    7.9        02 Mar 2021 06:44  Ca    7.8        01 Mar 2021 06:46  Ca    8.0        28 Feb 2021 07:07  Ca    8.1        27 Feb 2021 19:41  Phos  4.5       02 Mar 2021 06:44  Phos  3.4       28 Feb 2021 07:07  Mg     2.4       02 Mar 2021 06:44  Mg     2.2       28 Feb 2021 07:07    TPro  6.8    /  Alb  2.8    /  TBili  0.6    /  DBili  x      /  AST  19     /  ALT  16     /  AlkPhos  96     02 Mar 2021 06:44  TPro  6.4    /  Alb  2.8    /  TBili  0.5    /  DBili  x      /  AST  24     /  ALT  17     /  AlkPhos  89     28 Feb 2021 07:07  TPro  6.6    /  Alb  2.9    /  TBili  0.4    /  DBili  x      /  AST  17     /  ALT  16     /  AlkPhos  97     27 Feb 2021 19:41              CAPILLARY BLOOD GLUCOSE      POCT Blood Glucose.: 163 mg/dL (02 Mar 2021 21:16)  POCT Blood Glucose.: 147 mg/dL (02 Mar 2021 17:07)  POCT Blood Glucose.: 182 mg/dL (02 Mar 2021 10:43)  POCT Blood Glucose.: 163 mg/dL (02 Mar 2021 07:35)          RADIOLOGY & ADDITIONAL TESTS:    Imaging Personally Reviewed:  [ ] YES  [ ] NO    Consultant(s) Notes Reviewed:  [ ] YES  [ ] NO    Care Discussed with Consultants/Other Providers [ ] YES  [ ] NO
Subjective: no complaints.       MEDICATIONS  (STANDING):  amLODIPine   Tablet 10 milliGRAM(s) Oral daily  chlorhexidine 4% Liquid 1 Application(s) Topical <User Schedule>  dextrose 40% Gel 15 Gram(s) Oral once  dextrose 5%. 1000 milliLiter(s) (100 mL/Hr) IV Continuous <Continuous>  dextrose 5%. 1000 milliLiter(s) (50 mL/Hr) IV Continuous <Continuous>  dextrose 50% Injectable 25 Gram(s) IV Push once  dextrose 50% Injectable 12.5 Gram(s) IV Push once  dextrose 50% Injectable 25 Gram(s) IV Push once  glucagon  Injectable 1 milliGRAM(s) IntraMuscular once  hydrALAZINE 25 milliGRAM(s) Oral three times a day  insulin lispro (ADMELOG) corrective regimen sliding scale   SubCutaneous three times a day before meals  insulin lispro (ADMELOG) corrective regimen sliding scale   SubCutaneous at bedtime  labetalol 300 milliGRAM(s) Oral three times a day  pantoprazole    Tablet 40 milliGRAM(s) Oral two times a day    MEDICATIONS  (PRN):  acetaminophen   Tablet .. 650 milliGRAM(s) Oral every 6 hours PRN Mild Pain (1 - 3)  ondansetron Injectable 4 milliGRAM(s) IV Push every 6 hours PRN Nausea and/or Vomiting          T(C): 36.4 (03-04-21 @ 08:22), Max: 36.8 (03-03-21 @ 22:30)  HR: 70 (03-04-21 @ 08:22) (61 - 87)  BP: 154/66 (03-04-21 @ 08:22) (106/55 - 167/77)  RR: 17 (03-04-21 @ 08:22) (17 - 20)  SpO2: 96% (03-04-21 @ 08:22) (93% - 100%)  Wt(kg): --        I&O's Detail    03 Mar 2021 07:01  -  04 Mar 2021 07:00  --------------------------------------------------------  IN:    Oral Fluid: 420 mL  Total IN: 420 mL    OUT:    Other (mL): 2000 mL  Total OUT: 2000 mL    Total NET: -1580 mL               PHYSICAL EXAM:    GENERAL: NAD  NECK: Supple, no inc in JVP  CHEST/LUNG: Clear  HEART: S1S2, M  ABDOMEN: Soft, Nontender, Nondistended; Bowel sounds present  EXTREMITIES:  no edema  NEURO: no asterixis  L AVG t/b      LABS:  CBC Full  -  ( 03 Mar 2021 07:26 )  WBC Count : 8.40 K/uL  RBC Count : 2.67 M/uL  Hemoglobin : 7.7 g/dL  Hematocrit : 23.5 %  Platelet Count - Automated : 311 K/uL  Mean Cell Volume : 88.0 fl  Mean Cell Hemoglobin : 28.8 pg  Mean Cell Hemoglobin Concentration : 32.8 gm/dL  Auto Neutrophil # : x  Auto Lymphocyte # : x  Auto Monocyte # : x  Auto Eosinophil # : x  Auto Basophil # : x  Auto Neutrophil % : x  Auto Lymphocyte % : x  Auto Monocyte % : x  Auto Eosinophil % : x  Auto Basophil % : x    03-03    130<L>  |  91<L>  |  20  ----------------------------<  114<H>  3.9   |  29  |  6.46<H>    Ca    7.7<L>      03 Mar 2021 07:26  Phos  5.1     03-03  Mg     2.0     03-03    TPro  5.8<L>  /  Alb  2.5<L>  /  TBili  0.6  /  DBili  x   /  AST  16  /  ALT  14  /  AlkPhos  86  03-03            Assessment:  ESRD  Melena. EGD today  Recent COVID 19    Plan:  Next HD ordered for tomorrow. Heparin free  Transfuse a unit of blood.   Follow EGD results.     Thank you!              
[facility] NEPHROLOGY SERVICES, [facility]  NEPHROLOGY AND HYPERTENSION  [address]  [address]  [place] [address]  [phone]    MD WHIT NICKERSON MD ANDREY GONCHARUK, MD MADHU KORRAPATI, MD YELENA ROSENBERG, MD BINNY KOSHY, MD CHRISTOPHER CAPUTO, MD JEIMY HERNANDEZ MD          Patient events noted  no distress    MEDICATIONS  (STANDING):  amLODIPine   Tablet 10 milliGRAM(s) Oral daily  dextrose 40% Gel 15 Gram(s) Oral once  dextrose 5%. 1000 milliLiter(s) (100 mL/Hr) IV Continuous <Continuous>  dextrose 5%. 1000 milliLiter(s) (50 mL/Hr) IV Continuous <Continuous>  dextrose 50% Injectable 25 Gram(s) IV Push once  dextrose 50% Injectable 12.5 Gram(s) IV Push once  dextrose 50% Injectable 25 Gram(s) IV Push once  glucagon  Injectable 1 milliGRAM(s) IntraMuscular once  hydrALAZINE 25 milliGRAM(s) Oral three times a day  insulin lispro (ADMELOG) corrective regimen sliding scale   SubCutaneous three times a day before meals  insulin lispro (ADMELOG) corrective regimen sliding scale   SubCutaneous at bedtime  labetalol 300 milliGRAM(s) Oral three times a day  pantoprazole Infusion 8 mG/Hr (10 mL/Hr) IV Continuous <Continuous>  piperacillin/tazobactam IVPB. 3.375 Gram(s) IV Intermittent once    MEDICATIONS  (PRN):  acetaminophen   Tablet .. 650 milliGRAM(s) Oral every 6 hours PRN Mild Pain (1 - 3)  ondansetron Injectable 4 milliGRAM(s) IV Push every 6 hours PRN Nausea and/or Vomiting      03-01-21 @ 07:01  -  03-02-21 @ 07:00  --------------------------------------------------------  IN: 580 mL / OUT: 2775 mL / NET: -2195 mL      PHYSICAL EXAM:      T(C): 36.6 (03-02-21 @ 11:31), Max: 37 (03-01-21 @ 18:57)  HR: 66 (03-02-21 @ 11:31) (66 - 89)  BP: 155/73 (03-02-21 @ 11:31) (155/73 - 179/91)  RR: 18 (03-02-21 @ 11:31) (16 - 18)  SpO2: 96% (03-02-21 @ 11:31) (96% - 100%)  Wt(kg): --  Lungs clear  Heart S1S2  Abd soft NT ND  Extremities:   tr edema                                    9.3    11.34 )-----------( 349      ( 02 Mar 2021 06:44 )             28.3     03-02    134<L>  |  95<L>  |  14  ----------------------------<  98  3.7   |  30  |  4.46<H>    Ca    7.9<L>      02 Mar 2021 06:44  Phos  4.5     03-02  Mg     2.4     03-02    TPro  6.8  /  Alb  2.8<L>  /  TBili  0.6  /  DBili  x   /  AST  19  /  ALT  16  /  AlkPhos  96  03-02      LIVER FUNCTIONS - ( 02 Mar 2021 06:44 )  Alb: 2.8 g/dL / Pro: 6.8 gm/dL / ALK PHOS: 96 U/L / ALT: 16 U/L / AST: 19 U/L / GGT: x           Creatinine Trend: 4.46<--, 7.20<--, 5.20<--, 8.76<--      Assessment   ESRD; GI bleed       Plan:  HD for tomorrow  Possible EGD Amarjit Lilly MD
[facility] NEPHROLOGY SERVICES, [facility]  NEPHROLOGY AND HYPERTENSION  [address]  [address]  [place] [address]  [phone]    MD WHIT NICKERSON MD ANDREY GONCHARUK, MD MADHU KORRAPATI, MD YELENA ROSENBERG, MD MARK GALLOWAY, MD JEIMY HERNANDEZ MD          Patient events noted    MEDICATIONS  (STANDING):  amLODIPine   Tablet 10 milliGRAM(s) Oral daily  dextrose 40% Gel 15 Gram(s) Oral once  dextrose 5%. 1000 milliLiter(s) (100 mL/Hr) IV Continuous <Continuous>  dextrose 5%. 1000 milliLiter(s) (50 mL/Hr) IV Continuous <Continuous>  dextrose 50% Injectable 25 Gram(s) IV Push once  dextrose 50% Injectable 12.5 Gram(s) IV Push once  dextrose 50% Injectable 25 Gram(s) IV Push once  glucagon  Injectable 1 milliGRAM(s) IntraMuscular once  hydrALAZINE 25 milliGRAM(s) Oral three times a day  insulin lispro (ADMELOG) corrective regimen sliding scale   SubCutaneous three times a day before meals  insulin lispro (ADMELOG) corrective regimen sliding scale   SubCutaneous at bedtime  pantoprazole Infusion 8 mG/Hr (10 mL/Hr) IV Continuous <Continuous>    MEDICATIONS  (PRN):  acetaminophen   Tablet .. 650 milliGRAM(s) Oral every 6 hours PRN Mild Pain (1 - 3)  ondansetron Injectable 4 milliGRAM(s) IV Push every 6 hours PRN Nausea and/or Vomiting      02-28-21 @ 07:01  -  03-01-21 @ 07:00  --------------------------------------------------------  IN: 360 mL / OUT: 150 mL / NET: 210 mL    03-01-21 @ 07:01  -  03-01-21 @ 21:08  --------------------------------------------------------  IN: 220 mL / OUT: 2000 mL / NET: -1780 mL      PHYSICAL EXAM:      T(C): 37 (03-01-21 @ 18:57), Max: 37 (03-01-21 @ 18:57)  HR: 87 (03-01-21 @ 18:57) (71 - 89)  BP: 173/82 (03-01-21 @ 18:57) (83/- - 190/82)  RR: 16 (03-01-21 @ 18:57) (16 - 18)  SpO2: 98% (03-01-21 @ 18:57) (96% - 100%)  Wt(kg): --  Lungs clear  Heart S1S2  Abd soft NT ND  Extremities:   tr edema                                    8.9    10.84 )-----------( 293      ( 01 Mar 2021 06:46 )             27.8     03-01    135  |  97  |  37<H>  ----------------------------<  95  4.1   |  27  |  7.20<H>    Ca    7.8<L>      01 Mar 2021 06:46  Phos  3.4     02-28  Mg     2.2     02-28    TPro  6.4  /  Alb  2.8<L>  /  TBili  0.5  /  DBili  x   /  AST  24  /  ALT  17  /  AlkPhos  89  02-28      LIVER FUNCTIONS - ( 28 Feb 2021 07:07 )  Alb: 2.8 g/dL / Pro: 6.4 gm/dL / ALK PHOS: 89 U/L / ALT: 17 U/L / AST: 24 U/L / GGT: x           Creatinine Trend: 7.20<--, 5.20<--, 8.76<--      Assessment   ESRD, GI bleed;     Plan:  HD for today  UF as tolerated  GI follow up.      Payam Lilly MD
[facility] NEPHROLOGY SERVICES, [facility]  NEPHROLOGY AND HYPERTENSION  [address]  [address]  [place] [address]  [phone]    MD WHIT NICKERSON MD ANDREY GONCHARUK, MD MADHU KORRAPATI, MD YELENA ROSENBERG, MD BINNY KOSHY, MD CHRISTOPHER CAPUTO, MD JEIMY HERNANDEZ MD          Patient events noted    MEDICATIONS  (STANDING):  amLODIPine   Tablet 10 milliGRAM(s) Oral daily  chlorhexidine 4% Liquid 1 Application(s) Topical <User Schedule>  dextrose 40% Gel 15 Gram(s) Oral once  dextrose 5%. 1000 milliLiter(s) (100 mL/Hr) IV Continuous <Continuous>  dextrose 5%. 1000 milliLiter(s) (50 mL/Hr) IV Continuous <Continuous>  dextrose 50% Injectable 25 Gram(s) IV Push once  dextrose 50% Injectable 12.5 Gram(s) IV Push once  dextrose 50% Injectable 25 Gram(s) IV Push once  glucagon  Injectable 1 milliGRAM(s) IntraMuscular once  hydrALAZINE 25 milliGRAM(s) Oral three times a day  insulin lispro (ADMELOG) corrective regimen sliding scale   SubCutaneous three times a day before meals  insulin lispro (ADMELOG) corrective regimen sliding scale   SubCutaneous at bedtime  labetalol 300 milliGRAM(s) Oral three times a day  pantoprazole    Tablet 40 milliGRAM(s) Oral two times a day  sucralfate 1 Gram(s) Oral four times a day    MEDICATIONS  (PRN):  acetaminophen   Tablet .. 650 milliGRAM(s) Oral every 6 hours PRN Mild Pain (1 - 3)  ondansetron Injectable 4 milliGRAM(s) IV Push every 6 hours PRN Nausea and/or Vomiting      03-04-21 @ 07:01  -  03-05-21 @ 07:00  --------------------------------------------------------  IN: 620 mL / OUT: 200 mL / NET: 420 mL    03-05-21 @ 07:01  -  03-05-21 @ 22:19  --------------------------------------------------------  IN: 0 mL / OUT: 1000 mL / NET: -1000 mL      PHYSICAL EXAM:      T(C): 36.7 (03-05-21 @ 16:56), Max: 37.1 (03-04-21 @ 23:26)  HR: 71 (03-05-21 @ 16:56) (67 - 88)  BP: 147/57 (03-05-21 @ 16:56) (110/71 - 158/64)  RR: 18 (03-05-21 @ 16:56) (17 - 20)  SpO2: 94% (03-05-21 @ 16:56) (94% - 97%)  Wt(kg): --  Lungs clear  Heart S1S2  Abd soft NT ND  Extremities:   tr edema                                    8.8    9.94  )-----------( 295      ( 04 Mar 2021 08:49 )             27.1     03-04    134<L>  |  98  |  14  ----------------------------<  130<H>  4.0   |  28  |  4.85<H>    Ca    7.7<L>      04 Mar 2021 08:49          Creatinine Trend: 4.85<--, 6.46<--, 4.46<--, 7.20<--, 5.20<--, 8.76<--      Assessment   ESRD; maintenance   2 hrs today;     Plan:  HD 2 hrs today, 3 hrs tomorrow;   Discharge planning     Payam Lilly MD
69 y/o male w/ PMHx of ESRD on HD T//SAT (missed HD today), DM type 2, HTN, HLD, AVR, recently admitted for COVID PNA discharged on oxygen presents to the ED with complaints of dizziness. Patient reports he became dizzy while at rest this morning after having a BM; unsure the color of his stool. Pt reports he missed HD because he was not feeling well. Pt reports he has been feeling weaker over the past week. Pt denies abdominal pain, n/v, cp, palpitations or SOB.         In ED initial  70y old  Male who presents with a chief complaint of GI Bleed, Acute blood loss anemia (02 Mar 2021 17:43)      Review of Systems:    General:  No wt loss, fevers, chills, night sweats  Eyes:  Good vision, no reported pain  ENT:  No sore throat, pain, runny nose, dysphagia  CV:  No pain, palpitations, hypo/hypertension  Resp:  No dyspnea, cough, tachypnea, wheezing  GI:  No pain, nausea, vomiting, diarrhea, constipation  :  No pain, bleeding, incontinence, nocturia  Muscle:  No pain,          Social History/Family History  SOCHX:   tobacco,  -  alcohol    FMHX: FA/MO  - contributory       Discussed with:  PMD, Family    Physical Exam:    Vital Signs:  Vital Signs Last 24 Hrs  T(C): 36.7 (02 Mar 2021 17:50), Max: 36.9 (01 Mar 2021 23:25)  T(F): 98 (02 Mar 2021 17:50), Max: 98.5 (01 Mar 2021 23:25)  HR: 89 (02 Mar 2021 17:50) (66 - 89)  BP: 156/79 (02 Mar 2021 17:50) (155/73 - 179/91)  BP(mean): --  RR: 17 (02 Mar 2021 17:50) (17 - 18)  SpO2: 92% (02 Mar 2021 17:50) (92% - 100%)  Daily     Daily Weight in k.3 (02 Mar 2021 06:24)  I&O's Summary    01 Mar 2021 07:01  -  02 Mar 2021 07:00  --------------------------------------------------------  IN: 580 mL / OUT: 2775 mL / NET: -2195 mL    02 Mar 2021 07:01  -  02 Mar 2021 21:54  --------------------------------------------------------  IN: 220 mL / OUT: 0 mL / NET: 220 mL          Chest:  Full & symmetric excursion, no increased effort, breath sounds clear  Cardiovascular:  Regular rhythm, S1, S2, no murmur/rub/S3/S4, no carotid/femoral/abdominal bruit, radial/pedal pulses 2+, no edema  Abdomen:  Soft, non-tender, non-distended, normoactive bowel sounds, no HSM      Laboratory:                          9.3    11.34 )-----------( 349      ( 02 Mar 2021 06:44 )             28.3     03-02    134<L>  |  95<L>  |  14  ----------------------------<  98  3.7   |  30  |  4.46<H>    Ca    7.9<L>      02 Mar 2021 06:44  Phos  4.5     03-02  Mg     2.4     03-02    TPro  6.8  /  Alb  2.8<L>  /  TBili  0.6  /  DBili  x   /  AST  19  /  ALT  16  /  AlkPhos  96  03-02          CAPILLARY BLOOD GLUCOSE      POCT Blood Glucose.: 163 mg/dL (02 Mar 2021 21:16)  POCT Blood Glucose.: 147 mg/dL (02 Mar 2021 17:07)  POCT Blood Glucose.: 182 mg/dL (02 Mar 2021 10:43)  POCT Blood Glucose.: 163 mg/dL (02 Mar 2021 07:35)    LIVER FUNCTIONS - ( 02 Mar 2021 06:44 )  Alb: 2.8 g/dL / Pro: 6.8 gm/dL / ALK PHOS: 96 U/L / ALT: 16 U/L / AST: 19 U/L / GGT: x                 Assessment:    I am asked to evaluate this patient with an acute gastrointestinal bleed   the patient is noted to have a bioprosthetic Aortic valve this is not a metallic valve replacement but a bioprosthetic valve replacement as noted in a previous diagnostic echocardiogram  The patient may safely discontinue aspirin therapy  The patient's left ventricular function is noted to be normal  His cardiovascular risk assessment as deemed acceptable for any gastrointestinal procedures            
71 y/o male w/ PMHx of ESRD on HD T//SAT (missed HD today), DM type 2, HTN, HLD, AVR, recently admitted for COVID PNA discharged on oxygen presents to the ED with complaints of dizziness. Patient reports he became dizzy while at rest this morning after having a BM; unsure the color of his stool. Pt reports he missed HD because he was not feeling well. Pt reports he has been feeling weaker over the past week. Pt denies abdominal pain, n/v, cp, palpitations or SOB.         In ED initial  70y old  Male who presents with a chief complaint of GI Bleed, Acute blood loss anemia (02 Mar 2021 17:43)      Review of Systems:    General:  No wt loss, fevers, chills, night sweats  Eyes:  Good vision, no reported pain  ENT:  No sore throat, pain, runny nose, dysphagia  CV:  No pain, palpitations, hypo/hypertension  Resp:  No dyspnea, cough, tachypnea, wheezing  GI:  No pain, nausea, vomiting, diarrhea, constipation  :  No pain, bleeding, incontinence, nocturia  Muscle:  No pain,          Social History/Family History  SOCHX:   tobacco,  -  alcohol    FMHX: FA/MO  - contributory       Discussed with:  PMD, Family    Physical Exam:    Vital Signs:  Vital Signs Last 24 Hrs  T(C): 36.7 (02 Mar 2021 17:50), Max: 36.9 (01 Mar 2021 23:25)  T(F): 98 (02 Mar 2021 17:50), Max: 98.5 (01 Mar 2021 23:25)  HR: 89 (02 Mar 2021 17:50) (66 - 89)  BP: 156/79 (02 Mar 2021 17:50) (155/73 - 179/91)  BP(mean): --  RR: 17 (02 Mar 2021 17:50) (17 - 18)  SpO2: 92% (02 Mar 2021 17:50) (92% - 100%)  Daily     Daily Weight in k.3 (02 Mar 2021 06:24)  I&O's Summary    01 Mar 2021 07:01  -  02 Mar 2021 07:00  --------------------------------------------------------  IN: 580 mL / OUT: 2775 mL / NET: -2195 mL    02 Mar 2021 07:01  -  02 Mar 2021 21:54  --------------------------------------------------------  IN: 220 mL / OUT: 0 mL / NET: 220 mL          Chest:  Full & symmetric excursion, no increased effort, breath sounds clear  Cardiovascular:  Regular rhythm, S1, S2, no murmur/rub/S3/S4, no carotid/femoral/abdominal bruit, radial/pedal pulses 2+, no edema  Abdomen:  Soft, non-tender, non-distended, normoactive bowel sounds, no HSM      Laboratory:                          9.3    11.34 )-----------( 349      ( 02 Mar 2021 06:44 )             28.3     03-02    134<L>  |  95<L>  |  14  ----------------------------<  98  3.7   |  30  |  4.46<H>    Ca    7.9<L>      02 Mar 2021 06:44  Phos  4.5     03-02  Mg     2.4     03-02    TPro  6.8  /  Alb  2.8<L>  /  TBili  0.6  /  DBili  x   /  AST  19  /  ALT  16  /  AlkPhos  96  03-02          CAPILLARY BLOOD GLUCOSE      POCT Blood Glucose.: 163 mg/dL (02 Mar 2021 21:16)  POCT Blood Glucose.: 147 mg/dL (02 Mar 2021 17:07)  POCT Blood Glucose.: 182 mg/dL (02 Mar 2021 10:43)  POCT Blood Glucose.: 163 mg/dL (02 Mar 2021 07:35)    LIVER FUNCTIONS - ( 02 Mar 2021 06:44 )  Alb: 2.8 g/dL / Pro: 6.8 gm/dL / ALK PHOS: 96 U/L / ALT: 16 U/L / AST: 19 U/L / GGT: x                 Assessment:    I am asked to evaluate this patient with an acute gastrointestinal bleed   the patient is noted to have a bioprosthetic Aortic valve this is not a metallic valve replacement but a bioprosthetic valve replacement as noted in a previous diagnostic echocardiogram  The patient may safely discontinue aspirin therapy, restart as safe with GI  The patient's left ventricular function is noted to be normal  His cardiovascular risk assessment as deemed acceptable for any gastrointestinal procedures  Post GI procedure as noted           
[facility] NEPHROLOGY SERVICES, [facility]  NEPHROLOGY AND HYPERTENSION  [address]  [address]  [place] [address]  [phone]    MD WHIT NICKERSON, MD MOE GARCIA, MD KIRSTIE BAL, MD MARK GALLOWAY, MD JEIMY HERNANDEZ MD          Patient events noted no distress    MEDICATIONS  (STANDING):  dextrose 40% Gel 15 Gram(s) Oral once  dextrose 5%. 1000 milliLiter(s) (50 mL/Hr) IV Continuous <Continuous>  dextrose 5%. 1000 milliLiter(s) (100 mL/Hr) IV Continuous <Continuous>  dextrose 50% Injectable 25 Gram(s) IV Push once  dextrose 50% Injectable 12.5 Gram(s) IV Push once  dextrose 50% Injectable 25 Gram(s) IV Push once  glucagon  Injectable 1 milliGRAM(s) IntraMuscular once  insulin lispro (ADMELOG) corrective regimen sliding scale   SubCutaneous three times a day before meals  insulin lispro (ADMELOG) corrective regimen sliding scale   SubCutaneous at bedtime  pantoprazole Infusion 8 mG/Hr (10 mL/Hr) IV Continuous <Continuous>    MEDICATIONS  (PRN):  acetaminophen   Tablet .. 650 milliGRAM(s) Oral every 6 hours PRN Mild Pain (1 - 3)  ondansetron Injectable 4 milliGRAM(s) IV Push every 6 hours PRN Nausea and/or Vomiting      02-27-21 @ 07:01  -  02-28-21 @ 07:00  --------------------------------------------------------  IN: 1400 mL / OUT: 3400 mL / NET: -2000 mL    02-28-21 @ 07:01  -  02-28-21 @ 21:58  --------------------------------------------------------  IN: 240 mL / OUT: 150 mL / NET: 90 mL      PHYSICAL EXAM:      T(C): 36.5 (02-28-21 @ 15:27), Max: 37.2 (02-28-21 @ 03:23)  HR: 81 (02-28-21 @ 15:27) (81 - 102)  BP: 130/71 (02-28-21 @ 15:27) (130/71 - 171/85)  RR: 18 (02-28-21 @ 15:27) (16 - 20)  SpO2: 98% (02-28-21 @ 15:27) (95% - 100%)  Wt(kg): --  Lungs clear  Heart S1S2  Abd soft NT ND  Extremities:   tr edema                                    6.7    11.22 )-----------( 277      ( 28 Feb 2021 07:07 )             21.1     02-28    139  |  102  |  31<H>  ----------------------------<  114<H>  3.6   |  34<H>  |  5.20<H>    Ca    8.0<L>      28 Feb 2021 07:07  Phos  3.4     02-28  Mg     2.2     02-28    TPro  6.4  /  Alb  2.8<L>  /  TBili  0.5  /  DBili  x   /  AST  24  /  ALT  17  /  AlkPhos  89  02-28      LIVER FUNCTIONS - ( 28 Feb 2021 07:07 )  Alb: 2.8 g/dL / Pro: 6.4 gm/dL / ALK PHOS: 89 U/L / ALT: 17 U/L / AST: 24 U/L / GGT: x           Creatinine Trend: 5.20<--, 8.76<--      Assessment   ESRD; GI bleed;   Post HD yesterday    Plan:    Will arrange for HD tomorrow  PRBC with treatment  GI evaluation noted.  Payam Lilly MD
[facility] NEPHROLOGY SERVICES, [facility]  NEPHROLOGY AND HYPERTENSION  [address]  [address]  [place] [address]  [phone]    MD WHIT NICKERSON MD ANDREY GONCHARUK, MD MADHU KORRAPATI, MD YELENA ROSENBERG, MD BINNY KOSHY, MD CHRISTOPHER CAPUTO, MD JEIMY HERNANDEZ MD          Patient events noted feels well    MEDICATIONS  (STANDING):  amLODIPine   Tablet 10 milliGRAM(s) Oral daily  chlorhexidine 4% Liquid 1 Application(s) Topical <User Schedule>  dextrose 40% Gel 15 Gram(s) Oral once  dextrose 5%. 1000 milliLiter(s) (100 mL/Hr) IV Continuous <Continuous>  dextrose 5%. 1000 milliLiter(s) (50 mL/Hr) IV Continuous <Continuous>  dextrose 50% Injectable 25 Gram(s) IV Push once  dextrose 50% Injectable 12.5 Gram(s) IV Push once  dextrose 50% Injectable 25 Gram(s) IV Push once  glucagon  Injectable 1 milliGRAM(s) IntraMuscular once  hydrALAZINE 25 milliGRAM(s) Oral three times a day  insulin lispro (ADMELOG) corrective regimen sliding scale   SubCutaneous three times a day before meals  insulin lispro (ADMELOG) corrective regimen sliding scale   SubCutaneous at bedtime  labetalol 300 milliGRAM(s) Oral three times a day  pantoprazole    Tablet 40 milliGRAM(s) Oral two times a day    MEDICATIONS  (PRN):  acetaminophen   Tablet .. 650 milliGRAM(s) Oral every 6 hours PRN Mild Pain (1 - 3)  ondansetron Injectable 4 milliGRAM(s) IV Push every 6 hours PRN Nausea and/or Vomiting      03-02-21 @ 07:01  -  03-03-21 @ 07:00  --------------------------------------------------------  IN: 220 mL / OUT: 100 mL / NET: 120 mL    03-03-21 @ 07:01  -  03-03-21 @ 22:43  --------------------------------------------------------  IN: 420 mL / OUT: 2000 mL / NET: -1580 mL      PHYSICAL EXAM:      T(C): 36.4 (03-03-21 @ 17:30), Max: 36.7 (03-02-21 @ 23:24)  HR: 75 (03-03-21 @ 17:30) (61 - 75)  BP: 143/63 (03-03-21 @ 17:30) (106/55 - 154/66)  RR: 18 (03-03-21 @ 17:30) (17 - 18)  SpO2: 96% (03-03-21 @ 17:30) (95% - 100%)  Wt(kg): --  Lungs clear  Heart S1S2  Abd soft NT ND  Extremities:   tr edema                                    7.7    8.40  )-----------( 311      ( 03 Mar 2021 07:26 )             23.5     03-03    130<L>  |  91<L>  |  20  ----------------------------<  114<H>  3.9   |  29  |  6.46<H>    Ca    7.7<L>      03 Mar 2021 07:26  Phos  5.1     03-03  Mg     2.0     03-03    TPro  5.8<L>  /  Alb  2.5<L>  /  TBili  0.6  /  DBili  x   /  AST  16  /  ALT  14  /  AlkPhos  86  03-03      LIVER FUNCTIONS - ( 03 Mar 2021 07:26 )  Alb: 2.5 g/dL / Pro: 5.8 gm/dL / ALK PHOS: 86 U/L / ALT: 14 U/L / AST: 16 U/L / GGT: x           Creatinine Trend: 6.46<--, 4.46<--, 7.20<--, 5.20<--, 8.76<--      Assessment   ESRD; GI bleed    Plan:  Maintenance HD  UF as tolerated  GI follow up     Payam Lilly MD
HPI:           71 y/o male w/ PMHx of ESRD on HD T/TH/SAT (missed HD today), DM type 2, HTN, HLD, AVR, recently admitted for COVID PNA discharged on oxygen presents to the ED with complaints of dizziness. Patient reports he became dizzy while at rest this morning after having a BM; unsure the color of his stool. Pt reports he missed HD because he was not feeling well. Pt reports he has been feeling weaker over the past week. Pt denies abdominal pain, n/v, cp, palpitations or SOB.         In ED initial /70, rest of vitals stable. Labs sig for WBC 12.83, H/H 4.4/14.3, guaiac +ve black stool. for rest of sig labs see below. ICU consulted for patient with low hemoglobin. No indication for ICU monitoring. Pt ordered for 2 unit PRBC   (1 on floor, the second w/ HD). Renal and GI consulted by ED. (27 Feb 2021 23:36)    Patient is a 70y old  Male who presents with a chief complaint of GI Bleed, Acute blood loss anemia (03 Mar 2021 16:23)      INTERVAL HPI/OVERNIGHT EVENTS: no acute events     MEDICATIONS  (STANDING):  amLODIPine   Tablet 10 milliGRAM(s) Oral daily  chlorhexidine 4% Liquid 1 Application(s) Topical <User Schedule>  dextrose 40% Gel 15 Gram(s) Oral once  dextrose 5%. 1000 milliLiter(s) (100 mL/Hr) IV Continuous <Continuous>  dextrose 5%. 1000 milliLiter(s) (50 mL/Hr) IV Continuous <Continuous>  dextrose 50% Injectable 25 Gram(s) IV Push once  dextrose 50% Injectable 12.5 Gram(s) IV Push once  dextrose 50% Injectable 25 Gram(s) IV Push once  glucagon  Injectable 1 milliGRAM(s) IntraMuscular once  hydrALAZINE 25 milliGRAM(s) Oral three times a day  insulin lispro (ADMELOG) corrective regimen sliding scale   SubCutaneous three times a day before meals  insulin lispro (ADMELOG) corrective regimen sliding scale   SubCutaneous at bedtime  labetalol 300 milliGRAM(s) Oral three times a day  pantoprazole    Tablet 40 milliGRAM(s) Oral two times a day    MEDICATIONS  (PRN):  acetaminophen   Tablet .. 650 milliGRAM(s) Oral every 6 hours PRN Mild Pain (1 - 3)  ondansetron Injectable 4 milliGRAM(s) IV Push every 6 hours PRN Nausea and/or Vomiting      Allergies    lisinopril (Anaphylaxis)    Intolerances        REVIEW OF SYSTEMS:  CONSTITUTIONAL: No fever, weight loss, or fatigue  EYES: No eye pain, visual disturbances, or discharge  ENMT:  No difficulty hearing, tinnitus, vertigo; No sinus or throat pain  NECK: No pain or stiffness  BREASTS: No pain, masses, or nipple discharge  RESPIRATORY: No cough, wheezing, chills or hemoptysis; No shortness of breath  CARDIOVASCULAR: No chest pain, palpitations, dizziness, or leg swelling  GASTROINTESTINAL: No abdominal or epigastric pain. No nausea, vomiting, or hematemesis; No diarrhea or constipation. No melena or hematochezia.  GENITOURINARY: No dysuria, frequency, hematuria, or incontinence  NEUROLOGICAL: No headaches, memory loss, loss of strength, numbness, or tremors  SKIN: No itching, burning, rashes, or lesions   LYMPH NODES: No enlarged glands  ENDOCRINE: No heat or cold intolerance; No hair loss  MUSCULOSKELETAL: No joint pain or swelling; No muscle, back, or extremity pain  PSYCHIATRIC: No depression, anxiety, mood swings, or difficulty sleeping  HEME/LYMPH: No easy bruising, or bleeding gums  ALLERGY AND IMMUNOLOGIC: No hives or eczema    Vital Signs Last 24 Hrs  T(C): 36.4 (03 Mar 2021 17:30), Max: 36.7 (02 Mar 2021 23:24)  T(F): 97.5 (03 Mar 2021 17:30), Max: 98.1 (03 Mar 2021 13:50)  HR: 75 (03 Mar 2021 17:30) (61 - 75)  BP: 143/63 (03 Mar 2021 17:30) (106/55 - 154/66)  RR: 18 (03 Mar 2021 17:30) (17 - 18)  SpO2: 96% (03 Mar 2021 17:30) (95% - 100%)    PHYSICAL EXAM:  GENERAL: NAD, well-groomed, well-developed  HEAD:  Atraumatic, Normocephalic  EYES: EOMI, PERRLA, conjunctiva and sclera clear  ENMT: No tonsillar erythema, exudates, or enlargement; Moist mucous membranes, Good dentition, No lesions  NECK: Supple, No JVD, Normal thyroid  NERVOUS SYSTEM:  Alert & Oriented X3, Good concentration; Motor Strength 5/5 B/L upper and lower extremities; DTRs 2+ intact and symmetric  CHEST/LUNG: Clear  bilaterally; No rales, rhonchi, wheezing, or rubs  HEART: Regular rate and rhythm; No murmurs, rubs, or gallops  ABDOMEN: Soft, Nontender, Nondistended; Bowel sounds present  EXTREMITIES:  2+ Peripheral Pulses, No clubbing, cyanosis, or edema  LYMPH: dialysis access   SKIN: No rashes or lesions    LABS:                        7.7    8.40  )-----------( 311      ( 03 Mar 2021 07:26 )             23.5     03-03    130<L>  |  91<L>  |  20  ----------------------------<  114<H>  3.9   |  29  |  6.46<H>    Ca    7.7<L>      03 Mar 2021 07:26  Phos  5.1     03-03  Mg     2.0     03-03    TPro  5.8<L>  /  Alb  2.5<L>  /  TBili  0.6  /  DBili  x   /  AST  16  /  ALT  14  /  AlkPhos  86  03-03        CAPILLARY BLOOD GLUCOSE      POCT Blood Glucose.: 123 mg/dL (03 Mar 2021 18:49)  POCT Blood Glucose.: 169 mg/dL (03 Mar 2021 11:52)  POCT Blood Glucose.: 133 mg/dL (03 Mar 2021 08:43)  POCT Blood Glucose.: 125 mg/dL (03 Mar 2021 08:07)  POCT Blood Glucose.: 163 mg/dL (02 Mar 2021 21:16)      RADIOLOGY & ADDITIONAL TESTS:    Imaging Personally Reviewed:  [ ] YES  [ ] NO    Consultant(s) Notes Reviewed:  [ ] YES  [ ] NO    Care Discussed with Consultants/Other Providers [ ] YES  [ ] NO
Patient is a 70y old  Male who presents with a chief complaint of GI Bleed, Acute blood loss anemia (01 Mar 2021 21:07)      HPI:           71 y/o male w/ PMHx of ESRD on HD T/TH/SAT (missed HD today), DM type 2, HTN, HLD, AVR, recently admitted for COVID PNA discharged on oxygen presents to the ED with complaints of dizziness. Patient reports he became dizzy while at rest this morning after having a BM; unsure the color of his stool. Pt reports he missed HD because he was not feeling well. Pt reports he has been feeling weaker over the past week. Pt denies abdominal pain, n/v, cp, palpitations or SOB.         In ED initial /70, rest of vitals stable. Labs sig for WBC 12.83, H/H 4.4/14.3, guaiac +ve black stool. for rest of sig labs see below. ICU consulted for patient with low hemoglobin. No indication for ICU monitoring. Pt ordered for 2 unit PRBC   (1 on floor, the second w/ HD). Renal and GI consulted by ED. (27 Feb 2021 23:36)      INTERVAL HPI/OVERNIGHT EVENTS:  Patient seen earlier today  The nurse denies melena, hematochezia, hematemesis, nausea, vomiting, abdominal pain, constipation, diarrhea, or change in bowel movements over past 24 hours    MEDICATIONS  (STANDING):  amLODIPine   Tablet 10 milliGRAM(s) Oral daily  dextrose 40% Gel 15 Gram(s) Oral once  dextrose 5%. 1000 milliLiter(s) (100 mL/Hr) IV Continuous <Continuous>  dextrose 5%. 1000 milliLiter(s) (50 mL/Hr) IV Continuous <Continuous>  dextrose 50% Injectable 25 Gram(s) IV Push once  dextrose 50% Injectable 12.5 Gram(s) IV Push once  dextrose 50% Injectable 25 Gram(s) IV Push once  glucagon  Injectable 1 milliGRAM(s) IntraMuscular once  hydrALAZINE 25 milliGRAM(s) Oral three times a day  insulin lispro (ADMELOG) corrective regimen sliding scale   SubCutaneous three times a day before meals  insulin lispro (ADMELOG) corrective regimen sliding scale   SubCutaneous at bedtime  pantoprazole Infusion 8 mG/Hr (10 mL/Hr) IV Continuous <Continuous>    MEDICATIONS  (PRN):  acetaminophen   Tablet .. 650 milliGRAM(s) Oral every 6 hours PRN Mild Pain (1 - 3)  ondansetron Injectable 4 milliGRAM(s) IV Push every 6 hours PRN Nausea and/or Vomiting      FAMILY HISTORY:  FH: type 2 diabetes  brother        Allergies    lisinopril (Anaphylaxis)    Intolerances        PMH/PSH:  HLD (hyperlipidemia)    HTN (hypertension)    Aortic valve replaced    CKD (chronic kidney disease)    Anemia    CHF (congestive heart failure)    Diabetes mellitus    Hypertension    S/P CABG (coronary artery bypass graft)    S/P appendectomy          REVIEW OF SYSTEMS:  Poor communicator  CONSTITUTIONAL: No fever, weight loss,   EYES: No eye pain, visual disturbances, or discharge  ENMT:  No difficulty hearing, tinnitus, vertigo; No sinus or throat pain  NECK: No pain or stiffness  BREASTS: No pain, masses, or nipple discharge  RESPIRATORY: No cough, wheezing, chills or hemoptysis; No shortness of breath  CARDIOVASCULAR: No chest pain, palpitations, dizziness, or leg swelling  GASTROINTESTINAL :See above  GENITOURINARY: No dysuria, frequency, hematuria, or incontinence  NEUROLOGICAL: No headaches, numbness, or tremors  SKIN: No itching, burning, rashes, or lesions   LYMPH NODES: No enlarged glands  ENDOCRINE: No heat or cold intolerance; No hair loss  MUSCULOSKELETAL: No joint pain or swelling; No muscle, back, or extremity pain  PSYCHIATRIC: No depression, anxiety, mood swings, or difficulty sleeping  HEME/LYMPH: No easy bruising, or bleeding gums  ALLERGY AND IMMUNOLOGIC: No hives or eczema    Vital Signs Last 24 Hrs  T(C): 36.9 (01 Mar 2021 23:25), Max: 37 (01 Mar 2021 18:57)  T(F): 98.5 (01 Mar 2021 23:25), Max: 98.6 (01 Mar 2021 18:57)  HR: 87 (01 Mar 2021 18:57) (71 - 89)  BP: 160/89 (01 Mar 2021 23:25) (83/- - 190/82)  BP(mean): --  RR: 17 (01 Mar 2021 23:25) (16 - 18)  SpO2: 98% (01 Mar 2021 23:25) (96% - 100%)    PHYSICAL EXAM:  GENERAL: NAD, well-groomed, well-developed  HEAD:  Atraumatic, Normocephalic  EYES: EOMI, PERRLA, conjunctiva and sclera clear  NECK: Supple, No JVD, Normal thyroid  NERVOUS SYSTEM: MS unchanged  CHEST/LUNG: Clear to percussion bilaterally; No rales, rhonchi, wheezing, or rubs  HEART: Regular rate and rhythm; No murmurs, rubs, or gallops  ABDOMEN: Soft, Nontender, Nondistended; Bowel sounds present  EXTREMITIES:  2+ Peripheral Pulses, No clubbing, cyanosis, or edema  LYMPH: No lymphadenopathy noted  SKIN: No rashes or lesions    LAB                          8.9    10.84 )-----------( 293      ( 01 Mar 2021 06:46 )             27.8       CBC:  03-01 @ 06:46  WBC 10.84   Hgb 8.9   Hct 27.8   Plts 293  MCV 89.7  02-28 @ 23:09  WBC 10.28   Hgb 7.9   Hct 24.1   Plts 295  MCV 88.6  02-28 @ 07:07  WBC 11.22   Hgb 6.7   Hct 21.1   Plts 277  MCV 90.2  02-27 @ 19:41  WBC 12.83   Hgb 4.4   Hct 14.3   Plts 298  MCV 94.1      Chemistry:  03-01 @ 06:46  Na+ 135  K+ 4.1  Cl- 97  CO2 27  BUN 37  Cr 7.20     02-28 @ 07:07  Na+ 139  K+ 3.6  Cl- 102  CO2 34  BUN 31  Cr 5.20     02-27 @ 19:41  Na+ 138  K+ 4.7  Cl- 98  CO2 27  BUN 67  Cr 8.76         Glucose, Serum: 95 mg/dL (03-01 @ 06:46)  Glucose, Serum: 114 mg/dL (02-28 @ 07:07)  Glucose, Serum: 119 mg/dL (02-27 @ 19:41)      01 Mar 2021 06:46    135    |  97     |  37     ----------------------------<  95     4.1     |  27     |  7.20   28 Feb 2021 07:07    139    |  102    |  31     ----------------------------<  114    3.6     |  34     |  5.20   27 Feb 2021 19:41    138    |  98     |  67     ----------------------------<  119    4.7     |  27     |  8.76     Ca    7.8        01 Mar 2021 06:46  Ca    8.0        28 Feb 2021 07:07  Ca    8.1        27 Feb 2021 19:41  Phos  3.4       28 Feb 2021 07:07  Mg     2.2       28 Feb 2021 07:07    TPro  6.4    /  Alb  2.8    /  TBili  0.5    /  DBili  x      /  AST  24     /  ALT  17     /  AlkPhos  89     28 Feb 2021 07:07  TPro  6.6    /  Alb  2.9    /  TBili  0.4    /  DBili  x      /  AST  17     /  ALT  16     /  AlkPhos  97     27 Feb 2021 19:41              CAPILLARY BLOOD GLUCOSE      POCT Blood Glucose.: 104 mg/dL (01 Mar 2021 21:33)  POCT Blood Glucose.: 222 mg/dL (01 Mar 2021 18:26)  POCT Blood Glucose.: 71 mg/dL (01 Mar 2021 16:57)  POCT Blood Glucose.: 62 mg/dL (01 Mar 2021 16:49)  POCT Blood Glucose.: 59 mg/dL (01 Mar 2021 16:46)  POCT Blood Glucose.: 260 mg/dL (01 Mar 2021 11:51)  POCT Blood Glucose.: 122 mg/dL (01 Mar 2021 07:58)          RADIOLOGY & ADDITIONAL TESTS:    Imaging Personally Reviewed:  [ ] YES  [ ] NO    Consultant(s) Notes Reviewed:  [ ] YES  [ ] NO    Care Discussed with Consultants/Other Providers [ ] YES  [ ] NO
Procedure:           Upper GI endoscopy with biopsy 03-04-21 @ 09:20    Indications:                 Upper GI bleed    Monitored Anesthesia Care Provided by :     ____________________________________________________________________________________________________  Procedure:           Pre-Anesthesia Assessment:                       - Prior to the procedure, a History and Physical was performed, and patient                        medications and allergies were reviewed. The patient is not competent. The risks                        and benefits of the procedure and the sedation options and risks were                        discussed with the patient's wife. All questions were answered and informed consent                        was obtained. Patient identification and proposed procedure were verified by                        the physician, the nurse and the anesthesiologist in the procedure room.                        Mental Status Examination:     alert and oriented. Airway Examination: normal                        oropharyngeal airway and neck mobility. Respiratory Examination: clear to                        auscultation. CV Examination: normal. Prophylactic Antibiotics: The patient                        does not require prophylactic antibiotics.                        Grade Assessment:  After                        reviewing the risks and benefits, the patient was deemed in satisfactory                        condition to undergo the procedure. The anesthesia plan was to use monitored                        anesthesia care (MAC). Immediately prior to administration of medications,                        the patient was re-assessed for adequacy to receive sedatives. The heart        		     rate, respiratory rate, oxygen saturations, blood pressure, adequacy of                        pulmonary ventilation, and response to care were monitored throughout the                        procedure. The physical status of the patient was re-assessed after the                        procedure.                       After obtaining informed consent, the endoscope was passed under direct                        vision. Throughout the procedure, the patient's blood pressure, pulse, and                        oxygen saturations were monitored continuously. The Endoscope was introduced                        through the mouth, and advanced to the second part of duodenum. Retroflexion was done in the stomach. The upper GI                        endoscopy was accomplished with ease. The patient tolerated the procedure                        well.    ESOPHAGUS:   WNL    STOMACH:    Mild antral gastritis s/p biopsy    DUODENUM:  2 x 6 mm ulcer bulb with surrounding edema s/p biopsy      Assessment : As Above    PLAN :  PPI / Carafate / May start anticoagulation / Restart diet  
Patient is a 70y old  Male who presents with a chief complaint of GI Bleed, Acute blood loss anemia (04 Mar 2021 20:58)      HPI:           71 y/o male w/ PMHx of ESRD on HD T/TH/SAT (missed HD today), DM type 2, HTN, HLD, AVR, recently admitted for COVID PNA discharged on oxygen presents to the ED with complaints of dizziness. Patient reports he became dizzy while at rest this morning after having a BM; unsure the color of his stool. Pt reports he missed HD because he was not feeling well. Pt reports he has been feeling weaker over the past week. Pt denies abdominal pain, n/v, cp, palpitations or SOB.         In ED initial /70, rest of vitals stable. Labs sig for WBC 12.83, H/H 4.4/14.3, guaiac +ve black stool. for rest of sig labs see below. ICU consulted for patient with low hemoglobin. No indication for ICU monitoring. Pt ordered for 2 unit PRBC   (1 on floor, the second w/ HD). Renal and GI consulted by ED. (27 Feb 2021 23:36)      INTERVAL HPI/OVERNIGHT EVENTS:  The patient / nurse denies melena, hematochezia, hematemesis, nausea, vomiting, abdominal pain, constipation, diarrhea, or change in bowel movements Tolerating diet    MEDICATIONS  (STANDING):  amLODIPine   Tablet 10 milliGRAM(s) Oral daily  chlorhexidine 4% Liquid 1 Application(s) Topical <User Schedule>  dextrose 40% Gel 15 Gram(s) Oral once  dextrose 5%. 1000 milliLiter(s) (100 mL/Hr) IV Continuous <Continuous>  dextrose 5%. 1000 milliLiter(s) (50 mL/Hr) IV Continuous <Continuous>  dextrose 50% Injectable 25 Gram(s) IV Push once  dextrose 50% Injectable 12.5 Gram(s) IV Push once  dextrose 50% Injectable 25 Gram(s) IV Push once  glucagon  Injectable 1 milliGRAM(s) IntraMuscular once  hydrALAZINE 25 milliGRAM(s) Oral three times a day  insulin lispro (ADMELOG) corrective regimen sliding scale   SubCutaneous three times a day before meals  insulin lispro (ADMELOG) corrective regimen sliding scale   SubCutaneous at bedtime  labetalol 300 milliGRAM(s) Oral three times a day  pantoprazole    Tablet 40 milliGRAM(s) Oral two times a day  sucralfate 1 Gram(s) Oral four times a day    MEDICATIONS  (PRN):  acetaminophen   Tablet .. 650 milliGRAM(s) Oral every 6 hours PRN Mild Pain (1 - 3)  ondansetron Injectable 4 milliGRAM(s) IV Push every 6 hours PRN Nausea and/or Vomiting      FAMILY HISTORY:  FH: type 2 diabetes  brother        Allergies    lisinopril (Anaphylaxis)    Intolerances        PMH/PSH:  HLD (hyperlipidemia)    HTN (hypertension)    Aortic valve replaced    CKD (chronic kidney disease)    Anemia    CHF (congestive heart failure)    Diabetes mellitus    Hypertension    S/P CABG (coronary artery bypass graft)    S/P appendectomy          REVIEW OF SYSTEMS:  CONSTITUTIONAL: No fever, weight loss,   EYES: No eye pain, visual disturbances, or discharge  ENMT:  No difficulty hearing, tinnitus, vertigo; No sinus or throat pain  NECK: No pain or stiffness  BREASTS: No pain, masses, or nipple discharge  RESPIRATORY: No cough, wheezing, chills or hemoptysis; No shortness of breath  CARDIOVASCULAR: No chest pain, palpitations, dizziness, or leg swelling  GASTROINTESTINAL:  see above  GENITOURINARY: No dysuria, frequency, hematuria, or incontinence  NEUROLOGICAL: No headaches,  loss of strength, numbness, or tremors  SKIN: No itching, burning, rashes, or lesions   LYMPH NODES: No enlarged glands  ENDOCRINE: No heat or cold intolerance; No hair loss  MUSCULOSKELETAL: No joint pain or swelling; No muscle, back, or extremity pain  PSYCHIATRIC: No depression, anxiety, mood swings, or difficulty sleeping  HEME/LYMPH: No easy bruising, or bleeding gums  ALLERGY AND IMMUNOLOGIC: No hives or eczema    Vital Signs Last 24 Hrs  T(C): 36.6 (05 Mar 2021 14:07), Max: 37.1 (04 Mar 2021 23:26)  T(F): 97.9 (05 Mar 2021 14:07), Max: 98.8 (04 Mar 2021 23:26)  HR: 82 (05 Mar 2021 14:07) (67 - 88)  BP: 110/71 (05 Mar 2021 14:07) (110/71 - 152/72)  BP(mean): 111 (04 Mar 2021 21:21) (111 - 111)  RR: 17 (05 Mar 2021 14:07) (17 - 20)  SpO2: 96% (05 Mar 2021 14:07) (94% - 97%)    PHYSICAL EXAM:  GENERAL: NAD, well-groomed, well-developed  HEAD:  Atraumatic, Normocephalic  EYES: EOMI, PERRLA, conjunctiva and sclera clear  NECK: Supple, No JVD, Normal thyroid  NERVOUS SYSTEM:  Alert & at baseline , Good concentration;   CHEST/LUNG: Clear to percussion bilaterally; No rales, rhonchi, wheezing, or rubs  HEART: Regular rate and rhythm; No murmurs, rubs, or gallops  ABDOMEN: Soft, Nontender, Nondistended; Bowel sounds present  EXTREMITIES:  2+ Peripheral Pulses, No clubbing, cyanosis, or edema  LYMPH: No lymphadenopathy noted  SKIN: No rashes or lesions    LAB                          8.8    9.94  )-----------( 295      ( 04 Mar 2021 08:49 )             27.1       CBC:  03-04 @ 08:49  WBC 9.94   Hgb 8.8   Hct 27.1   Plts 295  MCV 89.1  03-03 @ 07:26  WBC 8.40   Hgb 7.7   Hct 23.5   Plts 311  MCV 88.0  03-02 @ 06:44  WBC 11.34   Hgb 9.3   Hct 28.3   Plts 349  MCV 88.2  03-01 @ 06:46  WBC 10.84   Hgb 8.9   Hct 27.8   Plts 293  MCV 89.7  02-28 @ 23:09  WBC 10.28   Hgb 7.9   Hct 24.1   Plts 295  MCV 88.6  02-28 @ 07:07  WBC 11.22   Hgb 6.7   Hct 21.1   Plts 277  MCV 90.2  02-27 @ 19:41  WBC 12.83   Hgb 4.4   Hct 14.3   Plts 298  MCV 94.1      Chemistry:  03-04 @ 08:49  Na+ 134  K+ 4.0  Cl- 98  CO2 28  BUN 14  Cr 4.85     03-03 @ 07:26  Na+ 130  K+ 3.9  Cl- 91  CO2 29  BUN 20  Cr 6.46     03-02 @ 06:44  Na+ 134  K+ 3.7  Cl- 95  CO2 30  BUN 14  Cr 4.46     03-01 @ 06:46  Na+ 135  K+ 4.1  Cl- 97  CO2 27  BUN 37  Cr 7.20     02-28 @ 07:07  Na+ 139  K+ 3.6  Cl- 102  CO2 34  BUN 31  Cr 5.20     02-27 @ 19:41  Na+ 138  K+ 4.7  Cl- 98  CO2 27  BUN 67  Cr 8.76         Glucose, Serum: 130 mg/dL (03-04 @ 08:49)  Glucose, Serum: 114 mg/dL (03-03 @ 07:26)  Glucose, Serum: 98 mg/dL (03-02 @ 06:44)  Glucose, Serum: 95 mg/dL (03-01 @ 06:46)  Glucose, Serum: 114 mg/dL (02-28 @ 07:07)  Glucose, Serum: 119 mg/dL (02-27 @ 19:41)      04 Mar 2021 08:49    134    |  98     |  14     ----------------------------<  130    4.0     |  28     |  4.85   03 Mar 2021 07:26    130    |  91     |  20     ----------------------------<  114    3.9     |  29     |  6.46   02 Mar 2021 06:44    134    |  95     |  14     ----------------------------<  98     3.7     |  30     |  4.46   01 Mar 2021 06:46    135    |  97     |  37     ----------------------------<  95     4.1     |  27     |  7.20   28 Feb 2021 07:07    139    |  102    |  31     ----------------------------<  114    3.6     |  34     |  5.20   27 Feb 2021 19:41    138    |  98     |  67     ----------------------------<  119    4.7     |  27     |  8.76     Ca    7.7        04 Mar 2021 08:49  Ca    7.7        03 Mar 2021 07:26  Ca    7.9        02 Mar 2021 06:44  Ca    7.8        01 Mar 2021 06:46  Ca    8.0        28 Feb 2021 07:07  Ca    8.1        27 Feb 2021 19:41  Phos  5.1       03 Mar 2021 07:26  Phos  4.5       02 Mar 2021 06:44  Phos  3.4       28 Feb 2021 07:07  Mg     2.0       03 Mar 2021 07:26  Mg     2.4       02 Mar 2021 06:44  Mg     2.2       28 Feb 2021 07:07    TPro  5.8    /  Alb  2.5    /  TBili  0.6    /  DBili  x      /  AST  16     /  ALT  14     /  AlkPhos  86     03 Mar 2021 07:26  TPro  6.8    /  Alb  2.8    /  TBili  0.6    /  DBili  x      /  AST  19     /  ALT  16     /  AlkPhos  96     02 Mar 2021 06:44  TPro  6.4    /  Alb  2.8    /  TBili  0.5    /  DBili  x      /  AST  24     /  ALT  17     /  AlkPhos  89     28 Feb 2021 07:07  TPro  6.6    /  Alb  2.9    /  TBili  0.4    /  DBili  x      /  AST  17     /  ALT  16     /  AlkPhos  97     27 Feb 2021 19:41              CAPILLARY BLOOD GLUCOSE      POCT Blood Glucose.: 245 mg/dL (05 Mar 2021 10:58)  POCT Blood Glucose.: 130 mg/dL (05 Mar 2021 07:38)  POCT Blood Glucose.: 137 mg/dL (04 Mar 2021 21:15)  POCT Blood Glucose.: 107 mg/dL (04 Mar 2021 16:43)          RADIOLOGY & ADDITIONAL TESTS:    Imaging Personally Reviewed:  [ ] YES  [ ] NO    Consultant(s) Notes Reviewed:  [ ] YES  [ ] NO    Care Discussed with Consultants/Other Providers [ ] YES  [ ] NO
Subjective: no complaints.       MEDICATIONS  (STANDING):  amLODIPine   Tablet 10 milliGRAM(s) Oral daily  chlorhexidine 4% Liquid 1 Application(s) Topical <User Schedule>  dextrose 40% Gel 15 Gram(s) Oral once  dextrose 5%. 1000 milliLiter(s) (100 mL/Hr) IV Continuous <Continuous>  dextrose 5%. 1000 milliLiter(s) (50 mL/Hr) IV Continuous <Continuous>  dextrose 50% Injectable 25 Gram(s) IV Push once  dextrose 50% Injectable 12.5 Gram(s) IV Push once  dextrose 50% Injectable 25 Gram(s) IV Push once  glucagon  Injectable 1 milliGRAM(s) IntraMuscular once  hydrALAZINE 25 milliGRAM(s) Oral three times a day  insulin lispro (ADMELOG) corrective regimen sliding scale   SubCutaneous three times a day before meals  insulin lispro (ADMELOG) corrective regimen sliding scale   SubCutaneous at bedtime  labetalol 300 milliGRAM(s) Oral three times a day  pantoprazole    Tablet 40 milliGRAM(s) Oral two times a day  sucralfate 1 Gram(s) Oral four times a day    MEDICATIONS  (PRN):  acetaminophen   Tablet .. 650 milliGRAM(s) Oral every 6 hours PRN Mild Pain (1 - 3)  ondansetron Injectable 4 milliGRAM(s) IV Push every 6 hours PRN Nausea and/or Vomiting          T(C): 36.5 (03-06-21 @ 05:20), Max: 36.7 (03-05-21 @ 16:56)  HR: 70 (03-06-21 @ 05:20) (67 - 82)  BP: 161/73 (03-06-21 @ 05:20) (110/71 - 161/73)  RR: 18 (03-06-21 @ 05:20) (17 - 20)  SpO2: 97% (03-06-21 @ 05:20) (93% - 97%)  Wt(kg): --        I&O's Detail    05 Mar 2021 07:01  -  06 Mar 2021 07:00  --------------------------------------------------------  IN:  Total IN: 0 mL    OUT:    Other (mL): 1000 mL    Voided (mL): 200 mL  Total OUT: 1200 mL    Total NET: -1200 mL               PHYSICAL EXAM:    GENERAL: NAD  NECK: Supple, no inc in JVP  CHEST/LUNG: Clear  HEART: S1S2  ABDOMEN: Soft, Nontender, Nondistended; Bowel sounds present  EXTREMITIES:  no edema  NEURO: no asterixis      LABS:  CBC Full  -  ( 04 Mar 2021 08:49 )  WBC Count : 9.94 K/uL  RBC Count : 3.04 M/uL  Hemoglobin : 8.8 g/dL  Hematocrit : 27.1 %  Platelet Count - Automated : 295 K/uL  Mean Cell Volume : 89.1 fl  Mean Cell Hemoglobin : 28.9 pg  Mean Cell Hemoglobin Concentration : 32.5 gm/dL  Auto Neutrophil # : x  Auto Lymphocyte # : x  Auto Monocyte # : x  Auto Eosinophil # : x  Auto Basophil # : x  Auto Neutrophil % : x  Auto Lymphocyte % : x  Auto Monocyte % : x  Auto Eosinophil % : x  Auto Basophil % : x    03-04    134<L>  |  98  |  14  ----------------------------<  130<H>  4.0   |  28  |  4.85<H>    Ca    7.7<L>      04 Mar 2021 08:49          Assessment:  ESRD  Melena. EGD results noted.  Recent COVID 19    Plan:  Next HD ordered for today, heparin free  Stable for dc after dialysis from renal POV    Thank you!              
Patient is a 70y old  Male who presents with a chief complaint of GI Bleed, Acute blood loss anemia (02 Mar 2021 22:22)      HPI:           69 y/o male w/ PMHx of ESRD on HD T/TH/SAT (missed HD today), DM type 2, HTN, HLD, AVR, recently admitted for COVID PNA discharged on oxygen presents to the ED with complaints of dizziness. Patient reports he became dizzy while at rest this morning after having a BM; unsure the color of his stool. Pt reports he missed HD because he was not feeling well. Pt reports he has been feeling weaker over the past week. Pt denies abdominal pain, n/v, cp, palpitations or SOB.         In ED initial /70, rest of vitals stable. Labs sig for WBC 12.83, H/H 4.4/14.3, guaiac +ve black stool. for rest of sig labs see below. ICU consulted for patient with low hemoglobin. No indication for ICU monitoring. Pt ordered for 2 unit PRBC   (1 on floor, the second w/ HD). Renal and GI consulted by ED. (27 Feb 2021 23:36)      INTERVAL HPI/OVERNIGHT EVENTS: Patient seen earlier today  The patient / nurse denies melena, hematochezia, hematemesis, nausea, vomiting, abdominal pain, constipation, diarrhea, or change in bowel movements     MEDICATIONS  (STANDING):  amLODIPine   Tablet 10 milliGRAM(s) Oral daily  chlorhexidine 4% Liquid 1 Application(s) Topical <User Schedule>  dextrose 40% Gel 15 Gram(s) Oral once  dextrose 5%. 1000 milliLiter(s) (100 mL/Hr) IV Continuous <Continuous>  dextrose 5%. 1000 milliLiter(s) (50 mL/Hr) IV Continuous <Continuous>  dextrose 50% Injectable 25 Gram(s) IV Push once  dextrose 50% Injectable 12.5 Gram(s) IV Push once  dextrose 50% Injectable 25 Gram(s) IV Push once  glucagon  Injectable 1 milliGRAM(s) IntraMuscular once  hydrALAZINE 25 milliGRAM(s) Oral three times a day  insulin lispro (ADMELOG) corrective regimen sliding scale   SubCutaneous three times a day before meals  insulin lispro (ADMELOG) corrective regimen sliding scale   SubCutaneous at bedtime  labetalol 300 milliGRAM(s) Oral three times a day  pantoprazole    Tablet 40 milliGRAM(s) Oral two times a day    MEDICATIONS  (PRN):  acetaminophen   Tablet .. 650 milliGRAM(s) Oral every 6 hours PRN Mild Pain (1 - 3)  ondansetron Injectable 4 milliGRAM(s) IV Push every 6 hours PRN Nausea and/or Vomiting      FAMILY HISTORY:  FH: type 2 diabetes  brother        Allergies    lisinopril (Anaphylaxis)    Intolerances        PMH/PSH:  HLD (hyperlipidemia)    HTN (hypertension)    Aortic valve replaced    CKD (chronic kidney disease)    Anemia    CHF (congestive heart failure)    Diabetes mellitus    Hypertension    S/P CABG (coronary artery bypass graft)    S/P appendectomy          REVIEW OF SYSTEMS:  CONSTITUTIONAL: No fever, weight loss,  EYES: No eye pain, visual disturbances, or discharge  ENMT:  No difficulty hearing, tinnitus, vertigo; No sinus or throat pain  NECK: No pain or stiffness  BREASTS: No pain, masses, or nipple discharge  RESPIRATORY: No cough, wheezing, chills or hemoptysis; No shortness of breath  CARDIOVASCULAR: No chest pain, palpitations, dizziness, or leg swelling  GASTROINTESTINAL: No abdominal or epigastric pain. No nausea, vomiting, or hematemesis; No diarrhea or constipation. No melena or hematochezia.  GENITOURINARY: No dysuria, frequency, hematuria, or incontinence  NEUROLOGICAL: No headaches, numbness, or tremors  SKIN: No itching, burning, rashes, or lesions   LYMPH NODES: No enlarged glands  ENDOCRINE: No heat or cold intolerance; No hair loss  MUSCULOSKELETAL: No joint pain or swelling; No muscle, back, or extremity pain  PSYCHIATRIC: No depression, anxiety, mood swings, or difficulty sleeping  HEME/LYMPH: No easy bruising, or bleeding gums  ALLERGY AND IMMUNOLOGIC: No hives or eczema    Vital Signs Last 24 Hrs  T(C): 36.7 (03 Mar 2021 13:50), Max: 36.7 (02 Mar 2021 17:50)  T(F): 98.1 (03 Mar 2021 13:50), Max: 98.1 (03 Mar 2021 13:50)  HR: 66 (03 Mar 2021 13:50) (61 - 89)  BP: 106/55 (03 Mar 2021 13:50) (106/55 - 156/79)  BP(mean): --  RR: 18 (03 Mar 2021 13:50) (17 - 18)  SpO2: 95% (03 Mar 2021 13:50) (92% - 100%)    PHYSICAL EXAM:  GENERAL: NAD, well-groomed, well-developed  HEAD:  Atraumatic, Normocephalic  EYES: EOMI, PERRLA, conjunctiva and sclera clear  NECK: Supple, No JVD, Normal thyroid  NERVOUS SYSTEM:  Alert & Oriented X1, Good concentration; Motor Strength 5/5 B/L upper and lower extremities;  CHEST/LUNG: Clear to percussion bilaterally; No rales, rhonchi, wheezing, or rubs  HEART: Regular rate and rhythm; No murmurs, rubs, or gallops  ABDOMEN: Soft, Nontender, Nondistended; Bowel sounds present  EXTREMITIES:  2+ Peripheral Pulses, No clubbing, cyanosis, or edema  LYMPH: No lymphadenopathy noted  SKIN: No rashes or lesions    LAB                          7.7    8.40  )-----------( 311      ( 03 Mar 2021 07:26 )             23.5       CBC:  03-03 @ 07:26  WBC 8.40   Hgb 7.7   Hct 23.5   Plts 311  MCV 88.0  03-02 @ 06:44  WBC 11.34   Hgb 9.3   Hct 28.3   Plts 349  MCV 88.2  03-01 @ 06:46  WBC 10.84   Hgb 8.9   Hct 27.8   Plts 293  MCV 89.7  02-28 @ 23:09  WBC 10.28   Hgb 7.9   Hct 24.1   Plts 295  MCV 88.6  02-28 @ 07:07  WBC 11.22   Hgb 6.7   Hct 21.1   Plts 277  MCV 90.2  02-27 @ 19:41  WBC 12.83   Hgb 4.4   Hct 14.3   Plts 298  MCV 94.1      Chemistry:  03-03 @ 07:26  Na+ 130  K+ 3.9  Cl- 91  CO2 29  BUN 20  Cr 6.46     03-02 @ 06:44  Na+ 134  K+ 3.7  Cl- 95  CO2 30  BUN 14  Cr 4.46     03-01 @ 06:46  Na+ 135  K+ 4.1  Cl- 97  CO2 27  BUN 37  Cr 7.20     02-28 @ 07:07  Na+ 139  K+ 3.6  Cl- 102  CO2 34  BUN 31  Cr 5.20     02-27 @ 19:41  Na+ 138  K+ 4.7  Cl- 98  CO2 27  BUN 67  Cr 8.76         Glucose, Serum: 114 mg/dL (03-03 @ 07:26)  Glucose, Serum: 98 mg/dL (03-02 @ 06:44)  Glucose, Serum: 95 mg/dL (03-01 @ 06:46)  Glucose, Serum: 114 mg/dL (02-28 @ 07:07)  Glucose, Serum: 119 mg/dL (02-27 @ 19:41)      03 Mar 2021 07:26    130    |  91     |  20     ----------------------------<  114    3.9     |  29     |  6.46   02 Mar 2021 06:44    134    |  95     |  14     ----------------------------<  98     3.7     |  30     |  4.46   01 Mar 2021 06:46    135    |  97     |  37     ----------------------------<  95     4.1     |  27     |  7.20   28 Feb 2021 07:07    139    |  102    |  31     ----------------------------<  114    3.6     |  34     |  5.20   27 Feb 2021 19:41    138    |  98     |  67     ----------------------------<  119    4.7     |  27     |  8.76     Ca    7.7        03 Mar 2021 07:26  Ca    7.9        02 Mar 2021 06:44  Ca    7.8        01 Mar 2021 06:46  Ca    8.0        28 Feb 2021 07:07  Ca    8.1        27 Feb 2021 19:41  Phos  5.1       03 Mar 2021 07:26  Phos  4.5       02 Mar 2021 06:44  Phos  3.4       28 Feb 2021 07:07  Mg     2.0       03 Mar 2021 07:26  Mg     2.4       02 Mar 2021 06:44  Mg     2.2       28 Feb 2021 07:07    TPro  5.8    /  Alb  2.5    /  TBili  0.6    /  DBili  x      /  AST  16     /  ALT  14     /  AlkPhos  86     03 Mar 2021 07:26  TPro  6.8    /  Alb  2.8    /  TBili  0.6    /  DBili  x      /  AST  19     /  ALT  16     /  AlkPhos  96     02 Mar 2021 06:44  TPro  6.4    /  Alb  2.8    /  TBili  0.5    /  DBili  x      /  AST  24     /  ALT  17     /  AlkPhos  89     28 Feb 2021 07:07  TPro  6.6    /  Alb  2.9    /  TBili  0.4    /  DBili  x      /  AST  17     /  ALT  16     /  AlkPhos  97     27 Feb 2021 19:41              CAPILLARY BLOOD GLUCOSE      POCT Blood Glucose.: 169 mg/dL (03 Mar 2021 11:52)  POCT Blood Glucose.: 133 mg/dL (03 Mar 2021 08:43)  POCT Blood Glucose.: 125 mg/dL (03 Mar 2021 08:07)  POCT Blood Glucose.: 163 mg/dL (02 Mar 2021 21:16)  POCT Blood Glucose.: 147 mg/dL (02 Mar 2021 17:07)          RADIOLOGY & ADDITIONAL TESTS:    Imaging Personally Reviewed:  [ ] YES  [ ] NO    Consultant(s) Notes Reviewed:  [ ] YES  [ ] NO    Care Discussed with Consultants/Other Providers [ ] YES  [ ] NO
HPI:           71 y/o male w/ PMHx of ESRD on HD T/TH/SAT (missed HD today), DM type 2, HTN, HLD, AVR, recently admitted for COVID PNA discharged on oxygen presents to the ED with complaints of dizziness. Patient reports he became dizzy while at rest this morning after having a BM; unsure the color of his stool. Pt reports he missed HD because he was not feeling well. Pt reports he has been feeling weaker over the past week. Pt denies abdominal pain, n/v, cp, palpitations or SOB.         In ED initial /70, rest of vitals stable. Labs sig for WBC 12.83, H/H 4.4/14.3, guaiac +ve black stool. for rest of sig labs see below. ICU consulted for patient with low hemoglobin. No indication for ICU monitoring. Pt ordered for 2 unit PRBC   (1 on floor, the second w/ HD). Renal and GI consulted by ED. (27 Feb 2021 23:36)    Patient is a 70y old  Male who presents with a chief complaint of GI Bleed, Acute blood loss anemia (03 Mar 2021 16:23)      INTERVAL HPI/OVERNIGHT EVENTS: no acute events     MEDICATIONS  (STANDING):  amLODIPine   Tablet 10 milliGRAM(s) Oral daily  chlorhexidine 4% Liquid 1 Application(s) Topical <User Schedule>  dextrose 40% Gel 15 Gram(s) Oral once  dextrose 5%. 1000 milliLiter(s) (100 mL/Hr) IV Continuous <Continuous>  dextrose 5%. 1000 milliLiter(s) (50 mL/Hr) IV Continuous <Continuous>  dextrose 50% Injectable 25 Gram(s) IV Push once  dextrose 50% Injectable 12.5 Gram(s) IV Push once  dextrose 50% Injectable 25 Gram(s) IV Push once  glucagon  Injectable 1 milliGRAM(s) IntraMuscular once  hydrALAZINE 25 milliGRAM(s) Oral three times a day  insulin lispro (ADMELOG) corrective regimen sliding scale   SubCutaneous three times a day before meals  insulin lispro (ADMELOG) corrective regimen sliding scale   SubCutaneous at bedtime  labetalol 300 milliGRAM(s) Oral three times a day  pantoprazole    Tablet 40 milliGRAM(s) Oral two times a day    MEDICATIONS  (PRN):  acetaminophen   Tablet .. 650 milliGRAM(s) Oral every 6 hours PRN Mild Pain (1 - 3)  ondansetron Injectable 4 milliGRAM(s) IV Push every 6 hours PRN Nausea and/or Vomiting      Allergies    lisinopril (Anaphylaxis)    Intolerances        REVIEW OF SYSTEMS:  CONSTITUTIONAL: No fever, weight loss, or fatigue  EYES: No eye pain, visual disturbances, or discharge  ENMT:  No difficulty hearing, tinnitus, vertigo; No sinus or throat pain  NECK: No pain or stiffness  BREASTS: No pain, masses, or nipple discharge  RESPIRATORY: No cough, wheezing, chills or hemoptysis; No shortness of breath  CARDIOVASCULAR: No chest pain, palpitations, dizziness, or leg swelling  GASTROINTESTINAL: No abdominal or epigastric pain. No nausea, vomiting, or hematemesis; No diarrhea or constipation. No melena or hematochezia.  GENITOURINARY: No dysuria, frequency, hematuria, or incontinence  NEUROLOGICAL: No headaches, memory loss, loss of strength, numbness, or tremors  SKIN: No itching, burning, rashes, or lesions   LYMPH NODES: No enlarged glands  ENDOCRINE: No heat or cold intolerance; No hair loss  MUSCULOSKELETAL: No joint pain or swelling; No muscle, back, or extremity pain  PSYCHIATRIC: No depression, anxiety, mood swings, or difficulty sleeping  HEME/LYMPH: No easy bruising, or bleeding gums  ALLERGY AND IMMUNOLOGIC: No hives or eczema    Vital Signs Last 24 Hrs  T(C): 36.4 (03 Mar 2021 17:30), Max: 36.7 (02 Mar 2021 23:24)  T(F): 97.5 (03 Mar 2021 17:30), Max: 98.1 (03 Mar 2021 13:50)  HR: 75 (03 Mar 2021 17:30) (61 - 75)  BP: 143/63 (03 Mar 2021 17:30) (106/55 - 154/66)  RR: 18 (03 Mar 2021 17:30) (17 - 18)  SpO2: 96% (03 Mar 2021 17:30) (95% - 100%)    PHYSICAL EXAM:  GENERAL: NAD, well-groomed, well-developed  HEAD:  Atraumatic, Normocephalic  EYES: EOMI, PERRLA, conjunctiva and sclera clear  ENMT: No tonsillar erythema, exudates, or enlargement; Moist mucous membranes, Good dentition, No lesions  NECK: Supple, No JVD, Normal thyroid  NERVOUS SYSTEM:  Alert & Oriented X3, Good concentration; Motor Strength 5/5 B/L upper and lower extremities; DTRs 2+ intact and symmetric  CHEST/LUNG: Clear  bilaterally; No rales, rhonchi, wheezing, or rubs  HEART: Regular rate and rhythm; No murmurs, rubs, or gallops  ABDOMEN: Soft, Nontender, Nondistended; Bowel sounds present  EXTREMITIES:  2+ Peripheral Pulses, No clubbing, cyanosis, or edema  LYMPH: dialysis access   SKIN: No rashes or lesions    LABS:                        7.7    8.40  )-----------( 311      ( 03 Mar 2021 07:26 )             23.5     03-03    130<L>  |  91<L>  |  20  ----------------------------<  114<H>  3.9   |  29  |  6.46<H>    Ca    7.7<L>      03 Mar 2021 07:26  Phos  5.1     03-03  Mg     2.0     03-03    TPro  5.8<L>  /  Alb  2.5<L>  /  TBili  0.6  /  DBili  x   /  AST  16  /  ALT  14  /  AlkPhos  86  03-03        CAPILLARY BLOOD GLUCOSE      POCT Blood Glucose.: 123 mg/dL (03 Mar 2021 18:49)  POCT Blood Glucose.: 169 mg/dL (03 Mar 2021 11:52)  POCT Blood Glucose.: 133 mg/dL (03 Mar 2021 08:43)  POCT Blood Glucose.: 125 mg/dL (03 Mar 2021 08:07)  POCT Blood Glucose.: 163 mg/dL (02 Mar 2021 21:16)      RADIOLOGY & ADDITIONAL TESTS:    Imaging Personally Reviewed:  [ ] YES  [ ] NO    Consultant(s) Notes Reviewed:  [ ] YES  [ ] NO    Care Discussed with Consultants/Other Providers [ ] YES  [ ] NO
HPI:           69 y/o male w/ PMHx of ESRD on HD T/TH/SAT (missed HD today), DM type 2, HTN, HLD, AVR, recently admitted for COVID PNA discharged on oxygen presents to the ED with complaints of dizziness. Patient reports he became dizzy while at rest this morning after having a BM; unsure the color of his stool. Pt reports he missed HD because he was not feeling well. Pt reports he has been feeling weaker over the past week. Pt denies abdominal pain, n/v, cp, palpitations or SOB.         In ED initial /70, rest of vitals stable. Labs sig for WBC 12.83, H/H 4.4/14.3, guaiac +ve black stool. for rest of sig labs see below. ICU consulted for patient with low hemoglobin. No indication for ICU monitoring. Pt ordered for 2 unit PRBC   (1 on floor, the second w/ HD). Renal and GI consulted by ED. (27 Feb 2021 23:36)    Patient is a 70y old  Male who presents with a chief complaint of GI Bleed, Acute blood loss anemia (01 Mar 2021 23:50)      INTERVAL HPI/OVERNIGHT EVENTS: No acute events overnight feels good but hungry no further reports of bleeding had successfull dialysis yesterday,     MEDICATIONS  (STANDING):  amLODIPine   Tablet 10 milliGRAM(s) Oral daily  dextrose 40% Gel 15 Gram(s) Oral once  dextrose 5%. 1000 milliLiter(s) (100 mL/Hr) IV Continuous <Continuous>  dextrose 5%. 1000 milliLiter(s) (50 mL/Hr) IV Continuous <Continuous>  dextrose 50% Injectable 25 Gram(s) IV Push once  dextrose 50% Injectable 12.5 Gram(s) IV Push once  dextrose 50% Injectable 25 Gram(s) IV Push once  glucagon  Injectable 1 milliGRAM(s) IntraMuscular once  hydrALAZINE 25 milliGRAM(s) Oral three times a day  insulin lispro (ADMELOG) corrective regimen sliding scale   SubCutaneous three times a day before meals  insulin lispro (ADMELOG) corrective regimen sliding scale   SubCutaneous at bedtime  pantoprazole Infusion 8 mG/Hr (10 mL/Hr) IV Continuous <Continuous>    MEDICATIONS  (PRN):  acetaminophen   Tablet .. 650 milliGRAM(s) Oral every 6 hours PRN Mild Pain (1 - 3)  ondansetron Injectable 4 milliGRAM(s) IV Push every 6 hours PRN Nausea and/or Vomiting      Allergies    lisinopril (Anaphylaxis)    Intolerances        REVIEW OF SYSTEMS:  CONSTITUTIONAL: No fever, weight loss, or fatigue  EYES: No eye pain, visual disturbances, or discharge  ENMT:  No difficulty hearing, tinnitus, vertigo; No sinus or throat pain  NECK: No pain or stiffness  BREASTS: No pain, masses, or nipple discharge  RESPIRATORY: No cough, wheezing, chills or hemoptysis; No shortness of breath  CARDIOVASCULAR: No chest pain, palpitations, dizziness, or leg swelling  GASTROINTESTINAL: No abdominal or epigastric pain. No nausea, vomiting, or hematemesis; No diarrhea or constipation. No melena or hematochezia.  GENITOURINARY: No dysuria, frequency, hematuria, or incontinence  NEUROLOGICAL: No headaches, memory loss, loss of strength, numbness, or tremors  SKIN: No itching, burning, rashes, or lesions   LYMPH NODES: No enlarged glands  ENDOCRINE: No heat or cold intolerance; No hair loss  MUSCULOSKELETAL: No joint pain or swelling; No muscle, back, or extremity pain  PSYCHIATRIC: No depression, anxiety, mood swings, or difficulty sleeping  HEME/LYMPH: No easy bruising, or bleeding gums  ALLERGY AND IMMUNOLOGIC: No hives or eczema    Vital Signs Last 24 Hrs  T(C): 36.6 (02 Mar 2021 11:31), Max: 37 (01 Mar 2021 18:57)  T(F): 97.9 (02 Mar 2021 11:31), Max: 98.6 (01 Mar 2021 18:57)  HR: 66 (02 Mar 2021 11:31) (66 - 89)  BP: 155/73 (02 Mar 2021 11:31) (83/- - 179/91)  RR: 18 (02 Mar 2021 11:31) (16 - 18)  SpO2: 96% (02 Mar 2021 11:31) (96% - 100%)    PHYSICAL EXAM:  GENERAL: NAD, well-groomed, well-developed  HEAD:  Atraumatic, Normocephalic  EYES: EOMI, PERRLA, conjunctiva and sclera clear  ENMT: No tonsillar erythema, exudates, or enlargement; Moist mucous membranes, Good dentition, No lesions  NECK: Supple, No JVD, Normal thyroid  NERVOUS SYSTEM:  Alert & Oriented X3, Good concentration; Motor Strength 5/5 B/L upper and lower extremities; DTRs 2+ intact and symmetric  CHEST/LUNG: Clear to percussion bilaterally; No rales, rhonchi, wheezing, or rubs  HEART: Regular rate and rhythm; No murmurs, rubs, or gallops  ABDOMEN: Soft, Nontender, Nondistended; Bowel sounds present  EXTREMITIES:  2+ Peripheral Pulses, No clubbing, cyanosis, or edema  LYMPH: No lymphadenopathy noted  SKIN: No rashes or lesions    LABS:                        9.3    11.34 )-----------( 349      ( 02 Mar 2021 06:44 )             28.3     03-02    134<L>  |  95<L>  |  14  ----------------------------<  98  3.7   |  30  |  4.46<H>    Ca    7.9<L>      02 Mar 2021 06:44  Phos  4.5     03-02  Mg     2.4     03-02    TPro  6.8  /  Alb  2.8<L>  /  TBili  0.6  /  DBili  x   /  AST  19  /  ALT  16  /  AlkPhos  96  03-02        CAPILLARY BLOOD GLUCOSE      POCT Blood Glucose.: 182 mg/dL (02 Mar 2021 10:43)  POCT Blood Glucose.: 163 mg/dL (02 Mar 2021 07:35)  POCT Blood Glucose.: 104 mg/dL (01 Mar 2021 21:33)  POCT Blood Glucose.: 222 mg/dL (01 Mar 2021 18:26)  POCT Blood Glucose.: 71 mg/dL (01 Mar 2021 16:57)  POCT Blood Glucose.: 62 mg/dL (01 Mar 2021 16:49)  POCT Blood Glucose.: 59 mg/dL (01 Mar 2021 16:46)      RADIOLOGY & ADDITIONAL TESTS:  < from: Xray Chest 1 View- PORTABLE-Urgent (Xray Chest 1 View- PORTABLE-Urgent .) (02.27.21 @ 20:18) >  EXAM:  XR CHEST PORTABLE URGENT 1V                            PROCEDURE DATE:  02/27/2021          INTERPRETATION:  INDICATION:r/o infection  Technique: Portable  COMPARISON: 1/22/2021    FINDINGS:  Status post thoracic surgery. Prosthetic heart valve.    Heart: The heart size is prominent    Mediastinum: Mediastinal contours are normal. There are no enlarged mediastinal or hilar nodes.    Lungs: Left basilar infiltrate/consolidation.    Pulmonary vascularity: Pulmonary vascularity is normal.    Osseous structures: The osseous structures are intact.    Soft tissues:There are no soft tissue abnormalities    Pleura: Small left pleural effusion    IMPRESSION:    Left basilar infiltrate/consolidation. Small left pleural effusion.    < end of copied text >    Imaging Personally Reviewed:  [X ] YES  [ ] NO    Consultant(s) Notes Reviewed:  [ X] YES  [ ] NO    Care Discussed with Consultants/Other Providers [ X] YES  [ ] NO
Patient is a 70y old  Male who presents with a chief complaint of GI Bleed, Acute blood loss anemia (27 Feb 2021 23:36)      INTERVAL HPI/OVERNIGHT EVENTS: no acute events     MEDICATIONS  (STANDING):  amLODIPine   Tablet 10 milliGRAM(s) Oral daily  dextrose 40% Gel 15 Gram(s) Oral once  dextrose 5%. 1000 milliLiter(s) (100 mL/Hr) IV Continuous <Continuous>  dextrose 5%. 1000 milliLiter(s) (50 mL/Hr) IV Continuous <Continuous>  dextrose 50% Injectable 25 Gram(s) IV Push once  dextrose 50% Injectable 12.5 Gram(s) IV Push once  dextrose 50% Injectable 25 Gram(s) IV Push once  glucagon  Injectable 1 milliGRAM(s) IntraMuscular once  hydrALAZINE 25 milliGRAM(s) Oral three times a day  insulin lispro (ADMELOG) corrective regimen sliding scale   SubCutaneous three times a day before meals  insulin lispro (ADMELOG) corrective regimen sliding scale   SubCutaneous at bedtime  pantoprazole Infusion 8 mG/Hr (10 mL/Hr) IV Continuous <Continuous>    MEDICATIONS  (PRN):  acetaminophen   Tablet .. 650 milliGRAM(s) Oral every 6 hours PRN Mild Pain (1 - 3)  ondansetron Injectable 4 milliGRAM(s) IV Push every 6 hours PRN Nausea and/or Vomiting      Allergies    lisinopril (Anaphylaxis)    Intolerances         Vital Signs Last 24 Hrs  T(C): 36.8 (01 Mar 2021 11:10), Max: 36.8 (28 Feb 2021 23:54)  T(F): 98.3 (01 Mar 2021 11:10), Max: 98.3 (01 Mar 2021 11:10)  HR: 85 (01 Mar 2021 11:10) (71 - 85)  BP: 190/82 (01 Mar 2021 11:10) (123/49 - 190/82)      RR: 18 (01 Mar 2021 11:10) (18 - 18)  SpO2: 100% (01 Mar 2021 11:10) (97% - 100%)    PHYSICAL EXAM:  GENERAL: NAD, well-groomed, well-developed  HEAD:  Atraumatic, Normocephalic  EYES: EOMI, PERRLA, conjunctiva and sclera clear  ENMT: No tonsillar erythema, exudates, or enlargement; Moist mucous membranes, Good dentition, No lesions  NECK: Supple, No JVD, Normal thyroid  NERVOUS SYSTEM:  Alert & Oriented X3, Good concentration; Motor Strength 5/5 B/L upper and lower extremities; DTRs 2+ intact and symmetric  CHEST/LUNG: Clear to percussion bilaterally; No rales, rhonchi, wheezing, or rubs  HEART: Regular rate and rhythm; No murmurs, rubs, or gallops  ABDOMEN: Soft, Nontender, Nondistended; Bowel sounds present  EXTREMITIES:  dialysis access   LYMPH: No lymphadenopathy noted  SKIN: No rashes or lesions                          8.9    10.84 )-----------( 293      ( 01 Mar 2021 06:46 )             27.8     03-01    135  |  97  |  37<H>  ----------------------------<  95  4.1   |  27  |  7.20<H>    Ca    7.8<L>      01 Mar 2021 06:46  Phos  3.4     02-28  Mg     2.2     02-28    TPro  6.4  /  Alb  2.8<L>  /  TBili  0.5  /  DBili  x   /  AST  24  /  ALT  17  /  AlkPhos  89  02-28    PT/INR - ( 27 Feb 2021 19:41 )   PT: 11.6 sec;   INR: 1.00 ratio         PTT - ( 27 Feb 2021 19:41 )  PTT:28.9 sec          RADIOLOGY & ADDITIONAL TESTS:    Imaging Personally Reviewed:  [ X] YES  [ ] NO    Consultant(s) Notes Reviewed:  [ X] YES  [ ] NO    Care Discussed with Consultants/Other Providers [X ] YES  [ ] NO
HPI:           71 y/o male w/ PMHx of ESRD on HD T/TH/SAT (missed HD today), DM type 2, HTN, HLD, AVR, recently admitted for COVID PNA discharged on oxygen presents to the ED with complaints of dizziness. Patient reports he became dizzy while at rest this morning after having a BM; unsure the color of his stool. Pt reports he missed HD because he was not feeling well. Pt reports he has been feeling weaker over the past week. Pt denies abdominal pain, n/v, cp, palpitations or SOB.         In ED initial /70, rest of vitals stable. Labs sig for WBC 12.83, H/H 4.4/14.3, guaiac +ve black stool. for rest of sig labs see below. ICU consulted for patient with low hemoglobin. No indication for ICU monitoring. Pt ordered for 2 unit PRBC   (1 on floor, the second w/ HD). Renal and GI consulted by ED. (27 Feb 2021 23:36)    Patient is a 70y old  Male who presents with a chief complaint of GI Bleed, Acute blood loss anemia (05 Mar 2021 15:06)      INTERVAL HPI/OVERNIGHT EVENTS: no acute events ready tp go home had egd yesterday     MEDICATIONS  (STANDING):  amLODIPine   Tablet 10 milliGRAM(s) Oral daily  chlorhexidine 4% Liquid 1 Application(s) Topical <User Schedule>  dextrose 40% Gel 15 Gram(s) Oral once  dextrose 5%. 1000 milliLiter(s) (100 mL/Hr) IV Continuous <Continuous>  dextrose 5%. 1000 milliLiter(s) (50 mL/Hr) IV Continuous <Continuous>  dextrose 50% Injectable 25 Gram(s) IV Push once  dextrose 50% Injectable 12.5 Gram(s) IV Push once  dextrose 50% Injectable 25 Gram(s) IV Push once  glucagon  Injectable 1 milliGRAM(s) IntraMuscular once  hydrALAZINE 25 milliGRAM(s) Oral three times a day  insulin lispro (ADMELOG) corrective regimen sliding scale   SubCutaneous three times a day before meals  insulin lispro (ADMELOG) corrective regimen sliding scale   SubCutaneous at bedtime  labetalol 300 milliGRAM(s) Oral three times a day  pantoprazole    Tablet 40 milliGRAM(s) Oral two times a day  sucralfate 1 Gram(s) Oral four times a day    MEDICATIONS  (PRN):  acetaminophen   Tablet .. 650 milliGRAM(s) Oral every 6 hours PRN Mild Pain (1 - 3)  ondansetron Injectable 4 milliGRAM(s) IV Push every 6 hours PRN Nausea and/or Vomiting      Allergies    lisinopril (Anaphylaxis)    Intolerances        REVIEW OF SYSTEMS:  CONSTITUTIONAL: No fever, weight loss, or fatigue  EYES: No eye pain, visual disturbances, or discharge  ENMT:  No difficulty hearing, tinnitus, vertigo; No sinus or throat pain  NECK: No pain or stiffness  BREASTS: No pain, masses, or nipple discharge  RESPIRATORY: No cough, wheezing, chills or hemoptysis; No shortness of breath  CARDIOVASCULAR: No chest pain, palpitations, dizziness, or leg swelling  GASTROINTESTINAL: No abdominal or epigastric pain. No nausea, vomiting, or hematemesis; No diarrhea or constipation. No melena or hematochezia.  GENITOURINARY: No dysuria, frequency, hematuria, or incontinence  NEUROLOGICAL: No headaches, memory loss, loss of strength, numbness, or tremors  SKIN: No itching, burning, rashes, or lesions   LYMPH NODES: No enlarged glands  ENDOCRINE: No heat or cold intolerance; No hair loss  MUSCULOSKELETAL: No joint pain or swelling; No muscle, back, or extremity pain  PSYCHIATRIC: No depression, anxiety, mood swings, or difficulty sleeping  HEME/LYMPH: No easy bruising, or bleeding gums  ALLERGY AND IMMUNOLOGIC: No hives or eczema    Vital Signs Last 24 Hrs  T(C): 36.7 (05 Mar 2021 16:56), Max: 37.1 (04 Mar 2021 23:26)  T(F): 98 (05 Mar 2021 16:56), Max: 98.8 (04 Mar 2021 23:26)  HR: 71 (05 Mar 2021 16:56) (67 - 88)  BP: 147/57 (05 Mar 2021 16:56) (110/71 - 158/64)  BP(mean): 111 (04 Mar 2021 21:21) (111 - 111)  RR: 18 (05 Mar 2021 16:56) (17 - 20)  SpO2: 94% (05 Mar 2021 16:56) (94% - 97%)    PHYSICAL EXAM:  GENERAL: NAD, well-groomed, well-developed  HEAD:  Atraumatic, Normocephalic  EYES: EOMI, PERRLA, conjunctiva and sclera clear  ENMT: No tonsillar erythema, exudates, or enlargement; Moist mucous membranes, Good dentition, No lesions  NECK: Supple, No JVD, Normal thyroid  NERVOUS SYSTEM:  Alert & Oriented X3, Good concentration; Motor Strength 5/5 B/L upper and lower extremities; DTRs 2+ intact and symmetric  CHEST/LUNG: Clear to percussion bilaterally; No rales, rhonchi, wheezing, or rubs  HEART: Regular rate and rhythm; No murmurs, rubs, or gallops  ABDOMEN: Soft, Nontender, Nondistended; Bowel sounds present  EXTREMITIES:  2+ Peripheral Pulses, No clubbing, cyanosis, or edema  LYMPH: No lymphadenopathy noted  SKIN: No rashes or lesions    LABS:                        8.8    9.94  )-----------( 295      ( 04 Mar 2021 08:49 )             27.1     03-04    134<L>  |  98  |  14  ----------------------------<  130<H>  4.0   |  28  |  4.85<H>    Ca    7.7<L>      04 Mar 2021 08:49          CAPILLARY BLOOD GLUCOSE      POCT Blood Glucose.: 152 mg/dL (05 Mar 2021 16:35)  POCT Blood Glucose.: 245 mg/dL (05 Mar 2021 10:58)  POCT Blood Glucose.: 130 mg/dL (05 Mar 2021 07:38)  POCT Blood Glucose.: 137 mg/dL (04 Mar 2021 21:15)      RADIOLOGY & ADDITIONAL TESTS:    Imaging Personally Reviewed:  [ X] YES  [ ] NO    Consultant(s) Notes Reviewed:  [ X] YES  [ ] NO    Care Discussed with Consultants/Other Providers [ X] YES  [ ] NO
Patient is a 70y old  Male who presents with a chief complaint of GI Bleed, Acute blood loss anemia (27 Feb 2021 23:36)      INTERVAL HPI/OVERNIGHT EVENTS:   no events   MEDICATIONS  (STANDING):  dextrose 40% Gel 15 Gram(s) Oral once  dextrose 5%. 1000 milliLiter(s) (50 mL/Hr) IV Continuous <Continuous>  dextrose 5%. 1000 milliLiter(s) (100 mL/Hr) IV Continuous <Continuous>  dextrose 50% Injectable 25 Gram(s) IV Push once  dextrose 50% Injectable 12.5 Gram(s) IV Push once  dextrose 50% Injectable 25 Gram(s) IV Push once  glucagon  Injectable 1 milliGRAM(s) IntraMuscular once  insulin lispro (ADMELOG) corrective regimen sliding scale   SubCutaneous every 6 hours  pantoprazole Infusion 8 mG/Hr (10 mL/Hr) IV Continuous <Continuous>    MEDICATIONS  (PRN):  acetaminophen   Tablet .. 650 milliGRAM(s) Oral every 6 hours PRN Mild Pain (1 - 3)  ondansetron Injectable 4 milliGRAM(s) IV Push every 6 hours PRN Nausea and/or Vomiting      Allergies    lisinopril (Anaphylaxis)    Intolerances           Vital Signs Last 24 Hrs  T(C): 37.1 (28 Feb 2021 05:40), Max: 37.4 (27 Feb 2021 20:50)  T(F): 98.7 (28 Feb 2021 05:40), Max: 99.3 (27 Feb 2021 20:50)  HR: 102 (28 Feb 2021 05:40) (87 - 102)  BP: 171/76 (28 Feb 2021 05:40) (136/79 - 189/71)  BP(mean): --  RR: 18 (28 Feb 2021 05:40) (16 - 22)  SpO2: 98% (28 Feb 2021 05:40) (94% - 100%)    PHYSICAL EXAM:  GENERAL: NAD, well-groomed, well-developed  HEAD:  Atraumatic, Normocephalic  EYES: EOMI, PERRLA, conjunctiva and sclera clear  ENMT: No tonsillar erythema, exudates, or enlargement; Moist mucous membranes, Good dentition, No lesions  NECK: Supple, No JVD, Normal thyroid  NERVOUS SYSTEM:  Alert & Oriented X3, Good concentration; Motor Strength 5/5 B/L upper and lower extremities; DTRs 2+ intact and symmetric  CHEST/LUNG: Clear to percussion bilaterally; No rales, rhonchi, wheezing, or rubs  HEART: Regular rate and rhythm; No murmurs, rubs, or gallops  ABDOMEN: Soft, Nontender, Nondistended; Bowel sounds present  EXTREMITIES:  2+ Peripheral Pulses, No clubbing, cyanosis, or edema  LYMPH: No lymphadenopathy noted  SKIN: No rashes or lesions    LABS:                        6.7    11.22 )-----------( 277      ( 28 Feb 2021 07:07 )             21.1     02-28    139  |  102  |  31<H>  ----------------------------<  114<H>  3.6   |  34<H>  |  5.20<H>    Ca    8.0<L>      28 Feb 2021 07:07  Phos  3.4     02-28  Mg     2.2     02-28    TPro  6.4  /  Alb  2.8<L>  /  TBili  0.5  /  DBili  x   /  AST  24  /  ALT  17  /  AlkPhos  89  02-28    PT/INR - ( 27 Feb 2021 19:41 )   PT: 11.6 sec;   INR: 1.00 ratio         PTT - ( 27 Feb 2021 19:41 )  PTT:28.9 sec    CAPILLARY BLOOD GLUCOSE      POCT Blood Glucose.: 159 mg/dL (28 Feb 2021 11:33)  POCT Blood Glucose.: 124 mg/dL (28 Feb 2021 05:17)      RADIOLOGY & ADDITIONAL TESTS:    Imaging Personally Reviewed:  [ X] YES  [ ] NO    Consultant(s) Notes Reviewed:  [ X] YES  [ ] NO    Care Discussed with Consultants/Other Providers [X ] YES  [ ] NO

## 2021-03-12 DIAGNOSIS — Z79.82 LONG TERM (CURRENT) USE OF ASPIRIN: ICD-10-CM

## 2021-03-12 DIAGNOSIS — N18.6 END STAGE RENAL DISEASE: ICD-10-CM

## 2021-03-12 DIAGNOSIS — I13.2 HYPERTENSIVE HEART AND CHRONIC KIDNEY DISEASE WITH HEART FAILURE AND WITH STAGE 5 CHRONIC KIDNEY DISEASE, OR END STAGE RENAL DISEASE: ICD-10-CM

## 2021-03-12 DIAGNOSIS — Z86.16 PERSONAL HISTORY OF COVID-19: ICD-10-CM

## 2021-03-12 DIAGNOSIS — K92.2 GASTROINTESTINAL HEMORRHAGE, UNSPECIFIED: ICD-10-CM

## 2021-03-12 DIAGNOSIS — Z99.2 DEPENDENCE ON RENAL DIALYSIS: ICD-10-CM

## 2021-03-12 DIAGNOSIS — K26.4 CHRONIC OR UNSPECIFIED DUODENAL ULCER WITH HEMORRHAGE: ICD-10-CM

## 2021-03-12 DIAGNOSIS — Z95.2 PRESENCE OF PROSTHETIC HEART VALVE: ICD-10-CM

## 2021-03-12 DIAGNOSIS — K29.71 GASTRITIS, UNSPECIFIED, WITH BLEEDING: ICD-10-CM

## 2021-03-12 DIAGNOSIS — E11.22 TYPE 2 DIABETES MELLITUS WITH DIABETIC CHRONIC KIDNEY DISEASE: ICD-10-CM

## 2021-03-12 DIAGNOSIS — D62 ACUTE POSTHEMORRHAGIC ANEMIA: ICD-10-CM

## 2021-03-12 DIAGNOSIS — J15.6 PNEUMONIA DUE TO OTHER GRAM-NEGATIVE BACTERIA: ICD-10-CM

## 2021-03-12 DIAGNOSIS — E78.5 HYPERLIPIDEMIA, UNSPECIFIED: ICD-10-CM

## 2021-03-22 ENCOUNTER — APPOINTMENT (OUTPATIENT)
Dept: PULMONOLOGY | Facility: CLINIC | Age: 71
End: 2021-03-22

## 2021-03-26 ENCOUNTER — APPOINTMENT (OUTPATIENT)
Dept: VASCULAR SURGERY | Facility: CLINIC | Age: 71
End: 2021-03-26

## 2021-04-06 NOTE — H&P ADULT - PROBLEM/PLAN-4
DISPLAY PLAN FREE TEXT Path Notes (To The Dermatopathologist): Please check margins. ***PER PROVIDER - ONLY DR SANA TO READ***

## 2021-04-27 ENCOUNTER — APPOINTMENT (OUTPATIENT)
Dept: PULMONOLOGY | Facility: CLINIC | Age: 71
End: 2021-04-27

## 2021-06-04 ENCOUNTER — APPOINTMENT (OUTPATIENT)
Dept: VASCULAR SURGERY | Facility: CLINIC | Age: 71
End: 2021-06-04

## 2021-06-11 ENCOUNTER — NON-APPOINTMENT (OUTPATIENT)
Age: 71
End: 2021-06-11

## 2021-06-11 ENCOUNTER — EMERGENCY (EMERGENCY)
Facility: HOSPITAL | Age: 71
LOS: 1 days | Discharge: HOME | End: 2021-06-11
Attending: EMERGENCY MEDICINE | Admitting: EMERGENCY MEDICINE
Payer: MEDICARE

## 2021-06-11 VITALS
HEIGHT: 66 IN | SYSTOLIC BLOOD PRESSURE: 153 MMHG | RESPIRATION RATE: 17 BRPM | OXYGEN SATURATION: 99 % | TEMPERATURE: 98 F | HEART RATE: 77 BPM | DIASTOLIC BLOOD PRESSURE: 80 MMHG

## 2021-06-11 DIAGNOSIS — Z95.1 PRESENCE OF AORTOCORONARY BYPASS GRAFT: Chronic | ICD-10-CM

## 2021-06-11 DIAGNOSIS — N18.6 END STAGE RENAL DISEASE: ICD-10-CM

## 2021-06-11 DIAGNOSIS — I70.219 ATHEROSCLEROSIS OF NATIVE ARTERIES OF EXTREMITIES WITH INTERMITTENT CLAUDICATION, UNSPECIFIED EXTREMITY: ICD-10-CM

## 2021-06-11 DIAGNOSIS — M79.672 PAIN IN LEFT FOOT: ICD-10-CM

## 2021-06-11 DIAGNOSIS — I73.9 PERIPHERAL VASCULAR DISEASE, UNSPECIFIED: ICD-10-CM

## 2021-06-11 PROCEDURE — 99283 EMERGENCY DEPT VISIT LOW MDM: CPT

## 2021-06-11 PROCEDURE — 73564 X-RAY EXAM KNEE 4 OR MORE: CPT | Mod: 26,LT

## 2021-06-11 PROCEDURE — 73610 X-RAY EXAM OF ANKLE: CPT | Mod: 26,LT

## 2021-06-11 NOTE — ED PROVIDER NOTE - NSFOLLOWUPINSTRUCTIONS_ED_ALL_ED_FT
-You were seen in the Emergency Department (ED) for foot pain. Lab and imaging results, if performed, were discussed with you along with your discharge diagnosis.    FOLLOW-UP:  -Please follow up with your PMD and vascular surgeon Dr. Dotson within the next 7 days.   -If you do not have a PMD, please call [phone] to find one convenient for you or call our clinic at [phone].    MEDICATIONS:  -Continue all other prescribed medicine, IF ANY, as per your primary care doctor's (PMD) recommendations.    PAIN CONTROL:  -Please take over the counter Tylenol (also known as acetaminophen) 650mg every 6 hours or Ibuprofen (also known as motrin, advil) 600mg every 8 hour for your pain, IF ANY, unless you are not supposed to for any reason.  -Rest, stay hydrated with plenty of fluids (drink at least 2 Liters or 64 Ounces of water each day UNLESS you are supposed to restrict fluids or ANY reason.    RETURN PRECAUTIONS:  -Please return to the Emergency Department if you experience ANY new or concerning symptoms, such as, but not limited to: worsening pain, large amount of bleeding, passing out, fever >100.F, shaking chills, inability to see or new double vision, chest pain, difficulty breathing, diffuse abdominal pain, unable to eat or drink, continuous vomiting or diarrhea, unable to move or feel part of your body

## 2021-06-11 NOTE — CONSULT NOTE ADULT - EXTREMITIES COMMENTS
lue avg w good bruit and thrill  mod le art insuff w mod trophic skin changes   left foot w dec cap refill and perfusion from ankle distally  no wounds

## 2021-06-11 NOTE — ED ADULT NURSE NOTE - NS ED NOTE ABUSE RESPONSE YN
Yes You can access the [facility] Patient Portal, offered by [facility], by registering with the following website: [email or web address]

## 2021-06-11 NOTE — ED PROVIDER NOTE - PATIENT PORTAL LINK FT
You can access the FollowMyHealth Patient Portal offered by [facility] by registering at the following website: [email or web address]. By joining [employer]’s FollowMyHealth portal, you will also be able to view your health information using other applications (apps) compatible with our system.

## 2021-06-11 NOTE — ED ADULT TRIAGE NOTE - CHIEF COMPLAINT QUOTE
pt arrives w/ c/o left foot pain. pt states he is a diabetic and has some discoloration to his left foot. pt states was sent in by his PMD. pt states he fell 1 week ago and also has left knee pain.

## 2021-06-11 NOTE — ED PROVIDER NOTE - PHYSICAL EXAMINATION
Gen: Well appearing in NAD  Head: NC/AT  Neck: trachea midline  Resp:  No distress  Ext: no deformities - B feet symmetric, TTP over L medial heel, and midfoot.  mild erythema.  Both feet are same temperature.  Motor intact as is sensation  Neuro:  A&O appears non focal  Skin:  Warm and dry as visualized  Psych:  Normal affect and mood

## 2021-06-11 NOTE — CONSULT NOTE ADULT - PROBLEM SELECTOR RECOMMENDATION 9
MAGGY Dotson MD performed a history and physical exam of the patient and discussed  the findings and plan with the house officer. I reviewed the resident note and agree with the findings and plan   I Ar Dotson MD have personally seen and examined the patient at bedside today at 930 am

## 2021-06-11 NOTE — ED PROVIDER NOTE - PROVIDER TOKENS
PROVIDER:[TOKEN:[[id number]],FOLLOWUP:[4-6 Days]],PROVIDER:[TOKEN:[[id number]],FOLLOWUP:[4-6 Days]]

## 2021-06-11 NOTE — ED PROVIDER NOTE - OBJECTIVE STATEMENT
72 yo with a history of DM, CKD on HD, HTN, HLD presenting with L foot pain.  Has been present for the last four days.  Worse with certain movements and touching.  Has not had this kind of pain before  Sharp when he feels it.  Went to his PMD who stated he could not feel a pulse so sent him to the ED.

## 2021-06-11 NOTE — CHART NOTE - NSCHARTNOTEFT_GEN_A_CORE
CHIEF COMPLAINT:    SUBJECTIVE:     REVIEW OF SYSTEMS:    CONSTITUTIONAL: (  )  weakness,  (  ) fevers or chills  EYES/ENT: (  )visual changes;     NECK: (  ) pain or stiffness  RESPIRATORY:   (  )cough, wheezing, hemoptysis;  (  ) shortness of breath  CARDIOVASCULAR:  (  )chest pain or palpitations  GASTROINTESTINAL:   (  )abdominal or epigastric pain.  (  ) nausea, vomiting, or hematemesis;   (   ) diarrhea or constipation.   GENITOURINARY:   (    ) dysuria, frequency or hematuria  NEUROLOGICAL:  (   ) numbness or weakness   All other review of systems is negative unless indicated above    Vital Signs Last 24 Hrs  T(C): 36.8 (11 Jun 2021 06:36), Max: 36.8 (11 Jun 2021 06:36)  T(F): 98.3 (11 Jun 2021 06:36), Max: 98.3 (11 Jun 2021 06:36)  HR: 77 (11 Jun 2021 06:36) (77 - 77)  BP: 153/80 (11 Jun 2021 06:36) (153/80 - 153/80)  BP(mean): --  RR: 17 (11 Jun 2021 06:36) (17 - 17)  SpO2: 99% (11 Jun 2021 06:36) (99% - 99%)    I&O's Summary      CAPILLARY BLOOD GLUCOSE      POCT Blood Glucose.: 130 mg/dL (11 Jun 2021 06:41)      PHYSICAL EXAM:    Constitutional:  (   ) NAD,   (   )awake and alert  HEENT: PERR, EOMI,    Neck: Soft and supple, No LAD, No JVD  Respiratory:  (    Breath sounds are clear bilaterally,    (   ) wheezing, rales or rhonchi  Cardiovascular:     (   )S1 and S2, regular rate and rhythm, no Murmurs, gallops or rubs  Gastrointestinal:  (   )Bowel Sounds present, soft,   (  )nontender, nondistended,    Extremities:    (  ) peripheral edema  Vascular: 2+ peripheral pulses  Neurological:    (    )A/O x 3,   (  ) focal deficits  Musculoskeletal:    (   )  normal strength b/l upper  (     ) normal  lower extremities  Skin: No rashes    MEDICATIONS:  MEDICATIONS  (STANDING):      LABS: All Labs Reviewed:                Blood Culture:   Urine Culture      RADIOLOGY/EKG:    ASSESSMENT AND PLAN:    DVT PPX:    ADVANCED DIRECTIVE:    DISPOSITION: CHIEF COMPLAINT:   left foot pain.    ·  : The patient is a 71y Male complaining of  · HPI Objective Statement: 72 yo with a history of DM, CKD on HD, HTN, HLD presenting with L foot pain.  Has been present for the last four days.  Worse with certain movements and touching.  Has not had this kind of pain before  Sharp when he feels it.   came to my office due to above also was found to have discoloration of left foot with no pulse able to palpate described with his wife detail the ER but due to dialysis yesterday he could not, so they decided to come this morning I discussed with vascular 2 to see him in the ER appreciated he recommend patient to stay overnight for angiogram and further determination at patient and wife refused to be followed by him as outpatient to his PMD who stated he could not feel a pulse so sent him to the ED.      SUBJECTIVE:     REVIEW OF SYSTEMS: left foot pain    CONSTITUTIONAL: (  )  weakness,  (  ) fevers or chills  EYES/ENT: (  )visual changes;     NECK: (  ) pain or stiffness  RESPIRATORY:   (  )cough, wheezing, hemoptysis;  (  ) shortness of breath  CARDIOVASCULAR:  (  )chest pain or palpitations  GASTROINTESTINAL:   (  )abdominal or epigastric pain.  (  ) nausea, vomiting, or hematemesis;   (   ) diarrhea or constipation.   GENITOURINARY:   (    ) dysuria, frequency or hematuria  NEUROLOGICAL:  (   ) numbness or weakness   All other review of systems is negative unless indicated above    Vital Signs Last 24 Hrs  T(C): 36.8 (11 Jun 2021 06:36), Max: 36.8 (11 Jun 2021 06:36)  T(F): 98.3 (11 Jun 2021 06:36), Max: 98.3 (11 Jun 2021 06:36)  HR: 77 (11 Jun 2021 06:36) (77 - 77)  BP: 153/80 (11 Jun 2021 06:36) (153/80 - 153/80)  BP(mean): --  RR: 17 (11 Jun 2021 06:36) (17 - 17)  SpO2: 99% (11 Jun 2021 06:36) (99% - 99%)    I&O's Summary      CAPILLARY BLOOD GLUCOSE      POCT Blood Glucose.: 130 mg/dL (11 Jun 2021 06:41)      PHYSICAL EXAM:    Constitutional:  (  x ) NAD,   (  x )awake and alert  HEENT: PERR, EOMI,    Neck: Soft and supple, No LAD, No JVD  Respiratory:  (    xBreath sounds are clear bilaterally,    (   ) wheezing, rales or rhonchi  Cardiovascular:     ( x  )S1 and S2, regular rate and rhythm, no Murmurs, gallops or rubs  Gastrointestinal:  ( x  )Bowel Sounds present, soft,   (  )nontender, nondistended,    Extremities:    (  ) peripheral edema  Vascular: 2+ peripheral pulses  Neurological:    (  x  )A/O x 3,   (  ) focal deficits  Musculoskeletal:    ( x  )  normal strength b/l upper  (     ) normal  lower extremities  Skin:  slight discoloration of left knee secondary to fall also discoloration/ecchymosis of the left foot    MEDICATIONS:  MEDICATIONS  (STANDING):      LABS: All Labs Reviewed:                Blood Culture:   Urine Culture      RADIOLOGY/EKG:    ASSESSMENT AND PLAN:  · r  72 yo with CKD on HD, DM HTN presenting with reported pulselessness in the L foot.   most send you to unable to palpate his pulse in left lower extremity with ecchymosis in the ER  Pulses are able to be dopplered.  plain films to look for pathologic fracture  his x-ray was done in the ER did not reveal any fracture but it show calcification of his artery will be follow by vascular as outpatient    discussed with his wife and patient in detail about vascular recommendation at the decline at present time  -Please return to the Emergency Department if you experience ANY new or concerning symptoms, such as, but not limited to: worsening pain, large amount of bleeding, passing out, fever >100.F, shaking chills, inability to see or new double vision, chest pain, difficulty breathing, diffuse abdominal pain, unable to eat or drink, continuous vomiting or diarrhea, unable to move or feel part of your body    DVT PPX:    ADVANCED DIRECTIVE:    DISPOSITION:

## 2021-06-11 NOTE — CONSULT NOTE ADULT - ASSESSMENT
70 yo with a past medical history of DM, CKD on HD, HTN, HLD and past surgical history of L AVG by Dr. Dotson, appendectomy and CABG presents to [facility] ED with L foot pain.     - No acute vascular surgery intervention at this time  - Patient states he wants to go home, can follow up with Dr. Dotson outpatient for SHAVON/PVR and venous duplex  - Please follow up with Dr. Dotson or one of his partners in 1-2 weeks  - Discussed with attending    [id number] 70 yo with a past medical history of DM, CKD on HD, HTN, HLD and past surgical history of L AVG by Dr. Dotson, appendectomy and CABG presents to [facility] ED with L foot pain.     - No acute vascular surgery intervention at this time  - Patient with rate limiting claudication would advise admission for SHAVON/PVR and angiogram, however, patient desires to go home  - Can follow up with Dr. Dotson outpatient for SHAVON/PVR and venous duplex  - If develops pain at rest or worsening rate limiting claudication please return to the ED due to patient risk for limb loss  - Please follow up with Dr. Dotson or one of his partners in 1-2 weeks  - Discussed with attending    [id number] 72 yo with a past medical history of DM, CKD on HD, HTN, HLD and past surgical history of L AVG by Dr. Dotson, appendectomy and CABG presents to [facility] ED with L foot pain from art insuff s.o progression     Plan  pt's le is not acutely ischemic or threatened  - No acute vascular surgery intervention at this time  - Patient with rate limiting claudication would advise admission for SHAVON/PVR and based on this and his functional status may need a angiogram, however, patient desires to go home  - Can follow up with Dr. Dotson outpatient for SHAVON/PVR and venous duplex  - If develops pain at rest or worsening rate limiting claudication please return to the ED due torisk for limb loss  - Please follow up with Dr. Dotson or one of his partners in 1-2 weeks  - Discussed with attending        [id number]

## 2021-06-11 NOTE — ED PROVIDER NOTE - CARE PROVIDER_API CALL
Ar Dotson)  Vascular Surgery  [address], [address]  [place] [address]  Phone: [phone]  Fax: [phone]  Follow Up Time: 4-6 Days    Josue Swartz)  [facility]  [address] [place] [address]  Phone: [phone]  Fax: [phone]  Follow Up Time: 4-6 Days

## 2021-06-11 NOTE — CONSULT NOTE ADULT - SUBJECTIVE AND OBJECTIVE BOX
72 yo with a past medical history of DM, CKD on HD, HTN, HLD and past surgical history of L AVG by Dr. Dotson, appendectomy and CABG presents to [facility] ED with L foot pain.  Has been present for the last four days. Worse with certain movements and touching. States that does not have pain at rest but when walks a few steps the pain develops on the medial portion of foot and ankle. Patient states that this pain is new. Patient fell four days ago, however fell on his knee. Patient denies nausea, vomiting, chest pain and SOB.        PAST MEDICAL & SURGICAL HISTORY:  Hypertension    Diabetes mellitus    CHF (congestive heart failure)    Anemia    CKD (chronic kidney disease)  On hemodialysis    Aortic valve replaced    HTN (hypertension)    HLD (hyperlipidemia)    S/P appendectomy    S/P CABG (coronary artery bypass graft)        MEDICATIONS  (STANDING):    MEDICATIONS  (PRN):      Allergies    lisinopril (Anaphylaxis)    Intolerances        SOCIAL HISTORY:    FAMILY HISTORY:  FH: type 2 diabetes  brother            Physical Exam:  PHYSICAL EXAM:  Constitutional: well developed, well nourished, NAD  Eyes: anicteric  ENMT: normal facies, symmetric  Respiratory: Breathing comfortably    Gastrointestinal: abdomen soft, nontender, nondistended.   Extremities: AT and PT signals b/l, left foot with color change, darker than right foot. Both warm to palpation, FROM of b/l feet, sensation intact  Neurological: intact, non-focal  Psychiatric: oriented x 3; appropriate      Vital Signs Last 24 Hrs  T(C): 36.8 (11 Jun 2021 06:36), Max: 36.8 (11 Jun 2021 06:36)  T(F): 98.3 (11 Jun 2021 06:36), Max: 98.3 (11 Jun 2021 06:36)  HR: 77 (11 Jun 2021 06:36) (77 - 77)  BP: 153/80 (11 Jun 2021 06:36) (153/80 - 153/80)  BP(mean): --  RR: 17 (11 Jun 2021 06:36) (17 - 17)  SpO2: 99% (11 Jun 2021 06:36) (99% - 99%)    I&O's Summary          LABS:              CAPILLARY BLOOD GLUCOSE      POCT Blood Glucose.: 130 mg/dL (11 Jun 2021 06:41)        Cultures:      RADIOLOGY & ADDITIONAL STUDIES:      Plan:           70 yo with a past medical history of DM, CKD on HD, HTN, HLD and past surgical history of L AVG by Dr. Dotson, appendectomy and CABG presents to [facility] ED with L foot pain.  Has been present for the last four days. Worse with certain movements and touching. States that does not have pain at rest but when walks a few steps the pain develops on the medial portion of foot and ankle. Patient states that this pain is new. Patient fell four days ago, however fell on his knee. Patient denies nausea, vomiting, chest pain and SOB.        PAST MEDICAL & SURGICAL HISTORY:  Hypertension    Diabetes mellitus    CHF (congestive heart failure)    Anemia    CKD (chronic kidney disease)  On hemodialysis    Aortic valve replaced    HTN (hypertension)    HLD (hyperlipidemia)    S/P appendectomy    S/P CABG (coronary artery bypass graft)        MEDICATIONS  (STANDING):    MEDICATIONS  (PRN):      Allergies    lisinopril (Anaphylaxis)    Intolerances        SOCIAL HISTORY:    FAMILY HISTORY:  FH: type 2 diabetes  brother            Physical Exam:  PHYSICAL EXAM:  Constitutional: well developed, well nourished, NAD  Eyes: anicteric  ENMT: normal facies, symmetric  Respiratory: Breathing comfortably    Gastrointestinal: abdomen soft, nontender, nondistended.   Extremities: Biphasic PT signal b/l and monophasic DP b/l, left foot with color change, darker than right foot. Capillary refill decreased in left foot. Both warm to palpation, FROM of b/l feet, sensation intact  Neurological: intact, non-focal  Psychiatric: oriented x 3; appropriate      Vital Signs Last 24 Hrs  T(C): 36.8 (11 Jun 2021 06:36), Max: 36.8 (11 Jun 2021 06:36)  T(F): 98.3 (11 Jun 2021 06:36), Max: 98.3 (11 Jun 2021 06:36)  HR: 77 (11 Jun 2021 06:36) (77 - 77)  BP: 153/80 (11 Jun 2021 06:36) (153/80 - 153/80)  BP(mean): --  RR: 17 (11 Jun 2021 06:36) (17 - 17)  SpO2: 99% (11 Jun 2021 06:36) (99% - 99%)    I&O's Summary          LABS:              CAPILLARY BLOOD GLUCOSE      POCT Blood Glucose.: 130 mg/dL (11 Jun 2021 06:41)        Cultures:      RADIOLOGY & ADDITIONAL STUDIES:      Plan:           72 yo with a past medical history of DM, CKD on HD, HTN, HLD and past surgical history of L AVG by Dr. Dotson, appendectomy and CABG presents to [facility] ED with L foot pain.  Has been present for the last four days. Worse with certain movements and touching. States that does not have pain at rest but when walks a few steps the pain develops on the medial portion of foot and ankle. Patient states that this pain is new. Patient fell four days ago, however fell on his knee. Patient denies nausea, vomiting, chest pain and SOB.  VASCULAR SURG ATT ADDENDUM  pt states left foot pain  and discomfort  developed prior to fall      PAST MEDICAL & SURGICAL HISTORY:  Hypertension    Diabetes mellitus    CHF (congestive heart failure)    Anemia    CKD (chronic kidney disease)  On hemodialysis    Aortic valve replaced    HTN (hypertension)    HLD (hyperlipidemia)    S/P appendectomy    S/P CABG (coronary artery bypass graft)        MEDICATIONS  (STANDING):    MEDICATIONS  (PRN):      Allergies    lisinopril (Anaphylaxis)    Intolerances        SOCIAL HISTORY:    FAMILY HISTORY:  FH: type 2 diabetes  brother        Physical Exam:  PHYSICAL EXAM:  Constitutional: well developed, well nourished, NAD  Eyes: anicteric  ENMT: normal facies, symmetric  Respiratory: Breathing comfortably    Gastrointestinal: abdomen soft, nontender, nondistended.   Extremities: Biphasic PT signal b/l and monophasic DP b/l, left foot with color change, darker than right foot. Capillary refill decreased in left foot. Both warm to palpation, FROM of b/l feet, sensation intact  Neurological: intact, non-focal  Psychiatric: oriented x 3; appropriate      Vital Signs Last 24 Hrs  T(C): 36.8 (11 Jun 2021 06:36), Max: 36.8 (11 Jun 2021 06:36)  T(F): 98.3 (11 Jun 2021 06:36), Max: 98.3 (11 Jun 2021 06:36)  HR: 77 (11 Jun 2021 06:36) (77 - 77)  BP: 153/80 (11 Jun 2021 06:36) (153/80 - 153/80)  BP(mean): --  RR: 17 (11 Jun 2021 06:36) (17 - 17)  SpO2: 99% (11 Jun 2021 06:36) (99% - 99%)    I&O's Summary          LABS:              CAPILLARY BLOOD GLUCOSE      POCT Blood Glucose.: 130 mg/dL (11 Jun 2021 06:41)        Cultures:      RADIOLOGY & ADDITIONAL STUDIES:      Plan:

## 2021-06-11 NOTE — ED PROVIDER NOTE - CARE PROVIDERS DIRECT ADDRESSES
[email or web address]

## 2021-06-11 NOTE — ED PROVIDER NOTE - CLINICAL SUMMARY MEDICAL DECISION MAKING FREE TEXT BOX
70 yo with CKD on HD, DM HTN presenting with reported pulselessness in the L foot.  Here the foot is warm and not overly tender.  Pulses are able to be dopplered.  Will touch base with his PMD and get plain films to look for pathologic fracture

## 2021-06-22 ENCOUNTER — INPATIENT (INPATIENT)
Facility: HOSPITAL | Age: 71
LOS: 2 days | Discharge: HOME | End: 2021-06-25
Attending: INTERNAL MEDICINE | Admitting: INTERNAL MEDICINE
Payer: MEDICARE

## 2021-06-22 VITALS
OXYGEN SATURATION: 99 % | HEIGHT: 66 IN | RESPIRATION RATE: 30 BRPM | SYSTOLIC BLOOD PRESSURE: 254 MMHG | HEART RATE: 89 BPM | WEIGHT: 149.91 LBS | DIASTOLIC BLOOD PRESSURE: 119 MMHG | TEMPERATURE: 97 F

## 2021-06-22 DIAGNOSIS — Z95.1 PRESENCE OF AORTOCORONARY BYPASS GRAFT: Chronic | ICD-10-CM

## 2021-06-22 LAB
ALBUMIN SERPL ELPH-MCNC: 3.8 G/DL — SIGNIFICANT CHANGE UP (ref 3.3–5)
ALP SERPL-CCNC: 213 U/L — HIGH (ref 40–120)
ALT FLD-CCNC: 26 U/L — SIGNIFICANT CHANGE UP (ref 12–78)
ANION GAP SERPL CALC-SCNC: 7 MMOL/L — SIGNIFICANT CHANGE UP (ref 5–17)
ANION GAP SERPL CALC-SCNC: 7 MMOL/L — SIGNIFICANT CHANGE UP (ref 5–17)
ANISOCYTOSIS BLD QL: SLIGHT — SIGNIFICANT CHANGE UP
AST SERPL-CCNC: 33 U/L — SIGNIFICANT CHANGE UP (ref 15–37)
BASE EXCESS BLDV CALC-SCNC: 3.1 MMOL/L — HIGH (ref -2–2)
BASOPHILS # BLD AUTO: 0 K/UL — SIGNIFICANT CHANGE UP (ref 0–0.2)
BASOPHILS NFR BLD AUTO: 0 % — SIGNIFICANT CHANGE UP (ref 0–2)
BILIRUB SERPL-MCNC: 0.7 MG/DL — SIGNIFICANT CHANGE UP (ref 0.2–1.2)
BLOOD GAS COMMENTS, VENOUS: SIGNIFICANT CHANGE UP
BUN SERPL-MCNC: 28 MG/DL — HIGH (ref 7–23)
BUN SERPL-MCNC: 63 MG/DL — HIGH (ref 7–23)
CALCIUM SERPL-MCNC: 8.4 MG/DL — LOW (ref 8.5–10.1)
CALCIUM SERPL-MCNC: 8.6 MG/DL — SIGNIFICANT CHANGE UP (ref 8.5–10.1)
CHLORIDE SERPL-SCNC: 96 MMOL/L — SIGNIFICANT CHANGE UP (ref 96–108)
CHLORIDE SERPL-SCNC: 99 MMOL/L — SIGNIFICANT CHANGE UP (ref 96–108)
CO2 SERPL-SCNC: 30 MMOL/L — SIGNIFICANT CHANGE UP (ref 22–31)
CO2 SERPL-SCNC: 32 MMOL/L — HIGH (ref 22–31)
CREAT SERPL-MCNC: 10.6 MG/DL — HIGH (ref 0.5–1.3)
CREAT SERPL-MCNC: 5.58 MG/DL — HIGH (ref 0.5–1.3)
EOSINOPHIL # BLD AUTO: 0.59 K/UL — HIGH (ref 0–0.5)
EOSINOPHIL NFR BLD AUTO: 7 % — HIGH (ref 0–6)
FLUAV AG NPH QL: SIGNIFICANT CHANGE UP
FLUBV AG NPH QL: SIGNIFICANT CHANGE UP
GAS PNL BLDV: SIGNIFICANT CHANGE UP
GLUCOSE BLDC GLUCOMTR-MCNC: 109 MG/DL — HIGH (ref 70–99)
GLUCOSE BLDC GLUCOMTR-MCNC: 118 MG/DL — HIGH (ref 70–99)
GLUCOSE BLDC GLUCOMTR-MCNC: 132 MG/DL — HIGH (ref 70–99)
GLUCOSE BLDC GLUCOMTR-MCNC: 136 MG/DL — HIGH (ref 70–99)
GLUCOSE BLDC GLUCOMTR-MCNC: 267 MG/DL — HIGH (ref 70–99)
GLUCOSE BLDC GLUCOMTR-MCNC: 55 MG/DL — LOW (ref 70–99)
GLUCOSE SERPL-MCNC: 162 MG/DL — HIGH (ref 70–99)
GLUCOSE SERPL-MCNC: 168 MG/DL — HIGH (ref 70–99)
HCO3 BLDV-SCNC: 30 MMOL/L — HIGH (ref 21–29)
HCT VFR BLD CALC: 41.8 % — SIGNIFICANT CHANGE UP (ref 39–50)
HGB BLD-MCNC: 12.4 G/DL — LOW (ref 13–17)
HOROWITZ INDEX BLDV+IHG-RTO: 40 — SIGNIFICANT CHANGE UP
HYPOCHROMIA BLD QL: SLIGHT — SIGNIFICANT CHANGE UP
LYMPHOCYTES # BLD AUTO: 1.09 K/UL — SIGNIFICANT CHANGE UP (ref 1–3.3)
LYMPHOCYTES # BLD AUTO: 13 % — SIGNIFICANT CHANGE UP (ref 13–44)
MACROCYTES BLD QL: SLIGHT — SIGNIFICANT CHANGE UP
MANUAL SMEAR VERIFICATION: SIGNIFICANT CHANGE UP
MCHC RBC-ENTMCNC: 25.9 PG — LOW (ref 27–34)
MCHC RBC-ENTMCNC: 29.7 GM/DL — LOW (ref 32–36)
MCV RBC AUTO: 87.4 FL — SIGNIFICANT CHANGE UP (ref 80–100)
MONOCYTES # BLD AUTO: 0.59 K/UL — SIGNIFICANT CHANGE UP (ref 0–0.9)
MONOCYTES NFR BLD AUTO: 7 % — SIGNIFICANT CHANGE UP (ref 2–14)
NEUTROPHILS # BLD AUTO: 6.11 K/UL — SIGNIFICANT CHANGE UP (ref 1.8–7.4)
NEUTROPHILS NFR BLD AUTO: 73 % — SIGNIFICANT CHANGE UP (ref 43–77)
NRBC # BLD: 0 /100 — SIGNIFICANT CHANGE UP (ref 0–0)
NRBC # BLD: SIGNIFICANT CHANGE UP /100 WBCS (ref 0–0)
NT-PROBNP SERPL-SCNC: HIGH PG/ML (ref 0–125)
PCO2 BLDV: 58 MMHG — HIGH (ref 35–50)
PH BLDV: 7.33 — LOW (ref 7.35–7.45)
PLAT MORPH BLD: NORMAL — SIGNIFICANT CHANGE UP
PLATELET # BLD AUTO: 217 K/UL — SIGNIFICANT CHANGE UP (ref 150–400)
PO2 BLDV: <46 MMHG — HIGH (ref 25–45)
POLYCHROMASIA BLD QL SMEAR: SLIGHT — SIGNIFICANT CHANGE UP
POTASSIUM SERPL-MCNC: 5.1 MMOL/L — SIGNIFICANT CHANGE UP (ref 3.5–5.3)
POTASSIUM SERPL-MCNC: 7.3 MMOL/L — CRITICAL HIGH (ref 3.5–5.3)
POTASSIUM SERPL-SCNC: 5.1 MMOL/L — SIGNIFICANT CHANGE UP (ref 3.5–5.3)
POTASSIUM SERPL-SCNC: 7.3 MMOL/L — CRITICAL HIGH (ref 3.5–5.3)
PROT SERPL-MCNC: 9 GM/DL — HIGH (ref 6–8.3)
RBC # BLD: 4.78 M/UL — SIGNIFICANT CHANGE UP (ref 4.2–5.8)
RBC # FLD: 21.2 % — HIGH (ref 10.3–14.5)
RBC BLD AUTO: ABNORMAL
SAO2 % BLDV: 73 % — SIGNIFICANT CHANGE UP (ref 67–88)
SARS-COV-2 RNA SPEC QL NAA+PROBE: SIGNIFICANT CHANGE UP
SCHISTOCYTES BLD QL AUTO: SLIGHT — SIGNIFICANT CHANGE UP
SODIUM SERPL-SCNC: 135 MMOL/L — SIGNIFICANT CHANGE UP (ref 135–145)
SODIUM SERPL-SCNC: 136 MMOL/L — SIGNIFICANT CHANGE UP (ref 135–145)
TROPONIN I SERPL-MCNC: 0.07 NG/ML — HIGH (ref 0.01–0.04)
WBC # BLD: 8.37 K/UL — SIGNIFICANT CHANGE UP (ref 3.8–10.5)
WBC # FLD AUTO: 8.37 K/UL — SIGNIFICANT CHANGE UP (ref 3.8–10.5)

## 2021-06-22 PROCEDURE — 93010 ELECTROCARDIOGRAM REPORT: CPT | Mod: 76

## 2021-06-22 PROCEDURE — 71045 X-RAY EXAM CHEST 1 VIEW: CPT | Mod: 26

## 2021-06-22 PROCEDURE — 99282 EMERGENCY DEPT VISIT SF MDM: CPT

## 2021-06-22 PROCEDURE — 99291 CRITICAL CARE FIRST HOUR: CPT

## 2021-06-22 RX ORDER — DEXTROSE 10 % IN WATER 10 %
1000 INTRAVENOUS SOLUTION INTRAVENOUS
Refills: 0 | Status: DISCONTINUED | OUTPATIENT
Start: 2021-06-22 | End: 2021-06-25

## 2021-06-22 RX ORDER — SODIUM CHLORIDE 9 MG/ML
1000 INJECTION, SOLUTION INTRAVENOUS
Refills: 0 | Status: DISCONTINUED | OUTPATIENT
Start: 2021-06-22 | End: 2021-06-25

## 2021-06-22 RX ORDER — HYDRALAZINE HCL 50 MG
50 TABLET ORAL THREE TIMES A DAY
Refills: 0 | Status: DISCONTINUED | OUTPATIENT
Start: 2021-06-22 | End: 2021-06-25

## 2021-06-22 RX ORDER — SEVELAMER CARBONATE 2400 MG/1
800 POWDER, FOR SUSPENSION ORAL EVERY 12 HOURS
Refills: 0 | Status: DISCONTINUED | OUTPATIENT
Start: 2021-06-22 | End: 2021-06-25

## 2021-06-22 RX ORDER — HYDRALAZINE HCL 50 MG
10 TABLET ORAL ONCE
Refills: 0 | Status: COMPLETED | OUTPATIENT
Start: 2021-06-22 | End: 2021-06-22

## 2021-06-22 RX ORDER — DEXTROSE 50 % IN WATER 50 %
15 SYRINGE (ML) INTRAVENOUS ONCE
Refills: 0 | Status: DISCONTINUED | OUTPATIENT
Start: 2021-06-22 | End: 2021-06-25

## 2021-06-22 RX ORDER — INSULIN LISPRO 100/ML
VIAL (ML) SUBCUTANEOUS
Refills: 0 | Status: DISCONTINUED | OUTPATIENT
Start: 2021-06-22 | End: 2021-06-25

## 2021-06-22 RX ORDER — CALCIUM GLUCONATE 100 MG/ML
1 VIAL (ML) INTRAVENOUS ONCE
Refills: 0 | Status: COMPLETED | OUTPATIENT
Start: 2021-06-22 | End: 2021-06-22

## 2021-06-22 RX ORDER — DEXTROSE 50 % IN WATER 50 %
50 SYRINGE (ML) INTRAVENOUS ONCE
Refills: 0 | Status: COMPLETED | OUTPATIENT
Start: 2021-06-22 | End: 2021-06-22

## 2021-06-22 RX ORDER — LABETALOL HCL 100 MG
300 TABLET ORAL THREE TIMES A DAY
Refills: 0 | Status: DISCONTINUED | OUTPATIENT
Start: 2021-06-22 | End: 2021-06-25

## 2021-06-22 RX ORDER — DEXTROSE 50 % IN WATER 50 %
25 SYRINGE (ML) INTRAVENOUS ONCE
Refills: 0 | Status: DISCONTINUED | OUTPATIENT
Start: 2021-06-22 | End: 2021-06-25

## 2021-06-22 RX ORDER — ATORVASTATIN CALCIUM 80 MG/1
40 TABLET, FILM COATED ORAL AT BEDTIME
Refills: 0 | Status: DISCONTINUED | OUTPATIENT
Start: 2021-06-22 | End: 2021-06-25

## 2021-06-22 RX ORDER — AMLODIPINE BESYLATE 2.5 MG/1
5 TABLET ORAL DAILY
Refills: 0 | Status: DISCONTINUED | OUTPATIENT
Start: 2021-06-22 | End: 2021-06-25

## 2021-06-22 RX ORDER — INSULIN HUMAN 100 [IU]/ML
10 INJECTION, SOLUTION SUBCUTANEOUS ONCE
Refills: 0 | Status: COMPLETED | OUTPATIENT
Start: 2021-06-22 | End: 2021-06-22

## 2021-06-22 RX ORDER — HEPARIN SODIUM 5000 [USP'U]/ML
5000 INJECTION INTRAVENOUS; SUBCUTANEOUS EVERY 12 HOURS
Refills: 0 | Status: DISCONTINUED | OUTPATIENT
Start: 2021-06-22 | End: 2021-06-25

## 2021-06-22 RX ORDER — ASPIRIN/CALCIUM CARB/MAGNESIUM 324 MG
162 TABLET ORAL ONCE
Refills: 0 | Status: COMPLETED | OUTPATIENT
Start: 2021-06-22 | End: 2021-06-22

## 2021-06-22 RX ORDER — ASPIRIN/CALCIUM CARB/MAGNESIUM 324 MG
81 TABLET ORAL DAILY
Refills: 0 | Status: DISCONTINUED | OUTPATIENT
Start: 2021-06-23 | End: 2021-06-25

## 2021-06-22 RX ORDER — LABETALOL HCL 100 MG
10 TABLET ORAL ONCE
Refills: 0 | Status: COMPLETED | OUTPATIENT
Start: 2021-06-22 | End: 2021-06-22

## 2021-06-22 RX ORDER — GLUCAGON INJECTION, SOLUTION 0.5 MG/.1ML
1 INJECTION, SOLUTION SUBCUTANEOUS ONCE
Refills: 0 | Status: DISCONTINUED | OUTPATIENT
Start: 2021-06-22 | End: 2021-06-25

## 2021-06-22 RX ORDER — DEXTROSE 50 % IN WATER 50 %
50 SYRINGE (ML) INTRAVENOUS
Refills: 0 | Status: DISCONTINUED | OUTPATIENT
Start: 2021-06-22 | End: 2021-06-25

## 2021-06-22 RX ORDER — DEXTROSE 50 % IN WATER 50 %
12.5 SYRINGE (ML) INTRAVENOUS ONCE
Refills: 0 | Status: DISCONTINUED | OUTPATIENT
Start: 2021-06-22 | End: 2021-06-25

## 2021-06-22 RX ORDER — NITROGLYCERIN 6.5 MG
0.4 CAPSULE, EXTENDED RELEASE ORAL
Refills: 0 | Status: DISCONTINUED | OUTPATIENT
Start: 2021-06-22 | End: 2021-06-22

## 2021-06-22 RX ADMIN — Medication 50 MILLILITER(S): at 14:42

## 2021-06-22 RX ADMIN — Medication 50 MILLILITER(S): at 12:53

## 2021-06-22 RX ADMIN — INSULIN HUMAN 10 UNIT(S): 100 INJECTION, SOLUTION SUBCUTANEOUS at 12:53

## 2021-06-22 RX ADMIN — Medication 50 MILLIGRAM(S): at 22:29

## 2021-06-22 RX ADMIN — Medication 10 MILLIGRAM(S): at 18:28

## 2021-06-22 RX ADMIN — ATORVASTATIN CALCIUM 40 MILLIGRAM(S): 80 TABLET, FILM COATED ORAL at 22:29

## 2021-06-22 RX ADMIN — Medication 0.4 MILLIGRAM(S): at 10:35

## 2021-06-22 RX ADMIN — Medication 162 MILLIGRAM(S): at 12:52

## 2021-06-22 RX ADMIN — SEVELAMER CARBONATE 800 MILLIGRAM(S): 2400 POWDER, FOR SUSPENSION ORAL at 18:31

## 2021-06-22 RX ADMIN — Medication 300 MILLIGRAM(S): at 22:29

## 2021-06-22 RX ADMIN — Medication 100 GRAM(S): at 12:53

## 2021-06-22 RX ADMIN — HEPARIN SODIUM 5000 UNIT(S): 5000 INJECTION INTRAVENOUS; SUBCUTANEOUS at 18:29

## 2021-06-22 NOTE — H&P ADULT - NSHPPHYSICALEXAM_GEN_ALL_CORE
PHYSICAL EXAMINATION:  Vital Signs Last 24 Hrs  T(C): 36.3 (22 Jun 2021 10:24), Max: 36.3 (22 Jun 2021 10:24)  T(F): 97.3 (22 Jun 2021 10:24), Max: 97.3 (22 Jun 2021 10:24)  HR: 78 (22 Jun 2021 11:06) (78 - 89)  BP: 162/78 (22 Jun 2021 11:06) (162/78 - 254/119)  BP(mean): --  RR: 20 (22 Jun 2021 11:06) (20 - 30)  SpO2: 100% (22 Jun 2021 11:06) (99% - 100%)  CAPILLARY BLOOD GLUCOSE            GENERAL: NAD, well-groomed,  HEAD:  atraumatic, normocephalic  EYES: sclera anicteric  ENMT: mucous membranes moist  NECK: supple, No JVD  CHEST/LUNG: clear to auscultation bilaterally;    no      rales   ,   no rhonchi,   HEART: normal S1, S2  ABDOMEN: BS+, soft, ND, NT   EXTREMITIES:    no    edema    b/l LEs  NEURO: awake, ,     moves all extremities  SKIN: no     rash

## 2021-06-22 NOTE — ED ADULT NURSE NOTE - OBJECTIVE STATEMENT
Pt received in bed alert and oriented with the c/o difficulty breathing since last night, which seems to have worsened this morning. Pt also states that he is due for dialysis today and isn't feeling well enough to go. As per MD's orders IV mello placed blood specimen obtained and sent to the lab. Pt placed on bipap and tolerating well. Nursing care ongoing and safety maintained.

## 2021-06-22 NOTE — ED ADULT TRIAGE NOTE - TEMPERATURE IN FAHRENHEIT (DEGREES F)
"Chief Complaint   Patient presents with     RECHECK     follow up sinus cleaning      Blood pressure 122/74, pulse 75, temperature 97.8  F (36.6  C), height 1.57 m (5' 1.81\"), weight 48.5 kg (107 lb), not currently breastfeeding.    Fabiano Raymundo LPN    "
97.3

## 2021-06-22 NOTE — CONSULT NOTE ADULT - SUBJECTIVE AND OBJECTIVE BOX
[facility] NEPHROLOGY SERVICES, [facility]  NEPHROLOGY AND HYPERTENSION  [address]  [address]  [place] [address]  [phone]    MD WHIT NICKERSON MD ANDREY GONCHARUK, MD MADHU KORRAPATI, MD YELENA ROSENBERG, MD BINNY KOSHY, MD CHRISTOPHER CAPUTO, MD EDWARD BOVER, MD      Information from chart:  "Patient is a 71y old  Male who presents with a chief complaint of sob (22 Jun 2021 12:33)    HPI:   72 yo    with a history of DM, CKD on HD , , HTN, HLD , h/o  AVR, bioprosthetic, pt does  not  know  details      with sob since last night.    no chest pain. no fever or cough. scheduled for dialysis today   last  HD  was  on friday      denies  cp. vomiting/  abd pain    pt seen in er,  no cp  comfortable  on bi pap (22 Jun 2021 12:33)   "    Patient dialyses at  [facility]  Due for today  On bipap comfortable         PAST MEDICAL & SURGICAL HISTORY:  Hypertension    Diabetes mellitus    CHF (congestive heart failure)    Anemia    CKD (chronic kidney disease)  On hemodialysis    Aortic valve replaced    HTN (hypertension)    HLD (hyperlipidemia)    S/P appendectomy    S/P CABG (coronary artery bypass graft)      FAMILY HISTORY:  FH: type 2 diabetes  brother      Allergies    lisinopril (Anaphylaxis)    Intolerances      Home Medications:  amLODIPine 5 mg oral tablet: 1 tab(s) orally once a day (22 Jun 2021 13:14)  atorvastatin 40 mg oral tablet: 1 tab(s) orally once a day (at bedtime) (22 Jun 2021 13:14)  docusate sodium 100 mg oral tablet: 1 tab(s) orally , As Needed (22 Jun 2021 13:14)  epoetin theodore:  (22 Jun 2021 13:14)  hydrALAZINE 50 mg oral tablet: orally 2 times a day (22 Jun 2021 13:14)  labetalol 300 mg oral tablet: 1 tab(s) orally 3 times a day (22 Jun 2021 13:14)  sevelamer hydrochloride 800 mg oral tablet: 2 tab(s) orally 3 times a day (22 Jun 2021 13:14)    MEDICATIONS  (STANDING):  amLODIPine   Tablet 5 milliGRAM(s) Oral daily  aspirin enteric coated 81 milliGRAM(s) Oral daily  atorvastatin 40 milliGRAM(s) Oral at bedtime  dextrose 40% Gel 15 Gram(s) Oral once  dextrose 5%. 1000 milliLiter(s) (50 mL/Hr) IV Continuous <Continuous>  dextrose 5%. 1000 milliLiter(s) (100 mL/Hr) IV Continuous <Continuous>  dextrose 50% Injectable 25 Gram(s) IV Push once  dextrose 50% Injectable 12.5 Gram(s) IV Push once  dextrose 50% Injectable 25 Gram(s) IV Push once  glucagon  Injectable 1 milliGRAM(s) IntraMuscular once  heparin   Injectable 5000 Unit(s) SubCutaneous every 12 hours  hydrALAZINE 50 milliGRAM(s) Oral three times a day  insulin lispro (ADMELOG) corrective regimen sliding scale   SubCutaneous three times a day before meals  labetalol 300 milliGRAM(s) Oral three times a day  sevelamer carbonate 800 milliGRAM(s) Oral every 12 hours    MEDICATIONS  (PRN):    Vital Signs Last 24 Hrs  T(C): 36.3 (22 Jun 2021 10:24), Max: 36.3 (22 Jun 2021 10:24)  T(F): 97.3 (22 Jun 2021 10:24), Max: 97.3 (22 Jun 2021 10:24)  HR: 78 (22 Jun 2021 11:06) (78 - 89)  BP: 162/78 (22 Jun 2021 11:06) (162/78 - 254/119)  BP(mean): --  RR: 20 (22 Jun 2021 11:06) (20 - 30)  SpO2: 100% (22 Jun 2021 11:06) (99% - 100%)    Daily Height in cm: 167.64 (22 Jun 2021 10:24)    Daily     CAPILLARY BLOOD GLUCOSE      POCT Blood Glucose.: 118 mg/dL (22 Jun 2021 12:50)    PHYSICAL EXAM:      T(C): 36.3 (06-22-21 @ 10:24), Max: 36.3 (06-22-21 @ 10:24)  HR: 78 (06-22-21 @ 11:06) (78 - 89)  BP: 162/78 (06-22-21 @ 11:06) (162/78 - 254/119)  RR: 20 (06-22-21 @ 11:06) (20 - 30)  SpO2: 100% (06-22-21 @ 11:06) (99% - 100%)  Wt(kg): --  Lungs clear anteriorly decreased BS bases  Heart S1S2  Abd soft NT ND  Extremities:   1  edema  LUE + AVF + bruit and thrill              06-22    136  |  99  |  63<H>  ----------------------------<  168<H>  7.3<HH>   |  30  |  10.60<H>    Ca    8.6      22 Jun 2021 10:58    TPro  9.0<H>  /  Alb  3.8  /  TBili  0.7  /  DBili  x   /  AST  33  /  ALT  26  /  AlkPhos  213<H>  06-22                          12.4   8.37  )-----------( 217      ( 22 Jun 2021 10:58 )             41.8     Creatinine Trend: 10.60<--          Assessment   ESRD; HTN emergency, fluid overload, hyperkalemia     Plan  Maintenance HD today and tomorrow  Discharge planning post treatment  BP medication adjustments   Will follow course     Payam Lilly MD

## 2021-06-22 NOTE — ED ADULT TRIAGE NOTE - CHIEF COMPLAINT QUOTE
pt c/o difficulty breathing since last night worsened this morning, due for dialysis today. pt denies chest pain at triage

## 2021-06-22 NOTE — ED PROVIDER NOTE - CLINICAL SUMMARY MEDICAL DECISION MAKING FREE TEXT BOX
patinet uncomfortable appearing, sevrely dyspneic. poor air sounds - patient without history of obstructive lung disease, mild co2 elevation. SL nitro X2 and bipap administered with significant improvement in symptoms. unlikely PE or aortic dissection as no chest pain and poor air sounds more indicative of parenchymal process. hyperkalemic without concerning ekg change, insulin and prophylactic calcium given. discussed with nephrology, will require dialysis today. will admit for dialysis

## 2021-06-22 NOTE — ED PROVIDER NOTE - OBJECTIVE STATEMENT
70 yo with a history of DM, CKD on HD TTS, HTN, HLD with sob since last night. no chest pain. no fever or cough. scheduled for dialysis today. sob at rest 72 yo with a history of DM, chf, CKD on HD TTS, HTN, HLD with sob since last night. no chest pain. no fever or cough. scheduled for dialysis today. sob at rest

## 2021-06-22 NOTE — PATIENT PROFILE ADULT - IS THERE A SUSPICION OF ABUSE/NEGLIGENCE?
no Gen: Alert, NAD  Head/eyes: NC/AT, PERRL, EOMI, normal lids/conjunctiva, no scleral icterus  ENT: airway patent  Neck: supple, no tenderness/meningismus/JVD, Trachea midline  Pulm/lung: Bilateral clear BS, normal resp effort, no wheeze/stridor/retractions  CV/heart: RRR, no M/R/G, +2 dist pulses (radial, pedal DP/PT, popliteal)  GI/Abd: soft, NT/ND, +BS, no guarding/rebound tenderness  Musculoskeletal: no edema/erythema/cyanosis, FROM in all extremities, no C/T/L spine ttp, +ttp left 1st MTP and ttp right knee, no erythema, no warmth  Skin: no rash, no vesicles, no petechaie, no ecchymosis, no swelling  Neuro: AAOx3, CN 2-12 intact, normal sensation, 5/5 motor strength in all extremities, normal gait, no dysmetria

## 2021-06-22 NOTE — H&P ADULT - ASSESSMENT
70 y/o male        h/o   ESRD on HD ,   DM type 2, HTN, HLD, AVR,   s/p   COVID   pna   in  3/21,       .  h/o  DU bleed  needing prbc         admitted with  worsening  sob      last HD  was  last friday.  denies  fevers, cp. abd  aopin       *    SOB           from fluid  overload. acute on  chronic chf           needs  urgent HD, being arranged  by renal  dr godinez    *    HTN               on norvasc, labetolol, hydralazine    *    CKD               on HD   *    Anemia           from ckd   *     DM               follow fs/ on januvia at home        on dvt ppx/ s/q  heparin       < from: TTE Echo Complete w/o Contrast w/ Doppler (03.02.21 @ 17:17) >  Summary:   1. Left ventricular ejection fraction, by visual estimation, is 60 to 65%.   2. Normal left ventricular size and wall thicknesses, with normal systolic and diastolic function.   3. Spectral Doppler shows impaired relaxation pattern of left ventricular myocardial filling (Grade I diastolic dysfunction).   4. There is mild concentric left ventricular hypertrophy.   5. Normal right ventricular size and function.   6. Mildly enlarged right atrium.   7. Moderately enlarged left atrium.   8. Mild mitral annular calcification.   9. Mild mitral valve regurgitation.  10. Mild tricuspid regurgitation.  11. Bioprosthesis in the aortic position.  12. Mild pulmonic valve regurgitation.  13. Dilatation of the aortic root.  14. Tacharrhythmia noted.  < end of copied text >              70 y/o male        h/o   ESRD on HD ,   DM type 2, HTN, HLD, AVR,   s/p   COVID   pna   in  3/21,       .  h/o  DU bleed  needing prbc         admitted with  worsening  sob      last HD  was  last friday.  denies  fevers, cp. abd  aopin       *    SOB           from fluid  overload. acute on  chronic chf           needs  urgent HD, being arranged  by renal  dr godinez            in  er, on  bipap,  comfortable  now         may de escalate  to  n/ canula,  when stable    *    HTN               on norvasc, labetolol, hydralazine    *    CKD               on HD   *    Anemia           from ckd   *     DM               follow fs/ on januvia at home   8  +  troponin,  from ckd/  not  from mi        on dvt ppx/ s/q  heparin       < from: TTE Echo Complete w/o Contrast w/ Doppler (03.02.21 @ 17:17) >  Summary:   1. Left ventricular ejection fraction, by visual estimation, is 60 to 65%.   2. Normal left ventricular size and wall thicknesses, with normal systolic and diastolic function.   3. Spectral Doppler shows impaired relaxation pattern of left ventricular myocardial filling (Grade I diastolic dysfunction).   4. There is mild concentric left ventricular hypertrophy.   5. Normal right ventricular size and function.   6. Mildly enlarged right atrium.   7. Moderately enlarged left atrium.   8. Mild mitral annular calcification.   9. Mild mitral valve regurgitation.  10. Mild tricuspid regurgitation.  11. Bioprosthesis in the aortic position.  12. Mild pulmonic valve regurgitation.  13. Dilatation of the aortic root.  14. Tacharrhythmia noted.  < end of copied text >

## 2021-06-22 NOTE — PATIENT PROFILE ADULT - NSPROGENSOURCEINFO_GEN_A_NUR
"Anesthesia Release from PACU Note    Patient: Sophia Hope    Procedure(s) Performed: Procedure(s) (LRB):  COLONOSCOPY (N/A)    Anesthesia type: MAC    Post pain: Adequate analgesia    Post assessment: no apparent anesthetic complications, tolerated procedure well and no evidence of recall    Last Vitals:   Visit Vitals  BP (!) 149/89 (BP Location: Left arm, Patient Position: Lying)   Pulse 72   Temp 36.8 °C (98.2 °F) (Oral)   Resp 20   Ht 5' 6" (1.676 m)   Wt 91.1 kg (200 lb 13.4 oz)   SpO2 100%   Breastfeeding? No   BMI 32.42 kg/m²       Post vital signs: stable    Level of consciousness: awake    Nausea/Vomiting: no nausea/no vomiting    Complications: none    Airway Patency: patent    Respiratory: unassisted, spontaneous ventilation, room air    Cardiovascular: stable and blood pressure at baseline    Hydration: euvolemic  "
patient

## 2021-06-22 NOTE — H&P ADULT - HISTORY OF PRESENT ILLNESS
72 yo    with a history of DM, CKD on HD , , HTN, HLD       with sob since last night.    no chest pain. no fever or cough. scheduled for dialysis today   last  HD  was  on friday      denies  cp. vomiting/  abd pain  72 yo    with a history of DM, CKD on HD , , HTN, HLD , h/o  AVR, bioprosthetic, pt does  not  know  details      with sob since last night.    no chest pain. no fever or cough. scheduled for dialysis today   last  HD  was  on friday      denies  cp. vomiting/  abd pain    pt seen in er,  no cp  comfortable  on bi pap

## 2021-06-22 NOTE — H&P ADULT - NSHPLABSRESULTS_GEN_ALL_CORE
LABS:                        12.4   8.37  )-----------( 217      ( 22 Jun 2021 10:58 )             41.8     06-22    136  |  99  |  63<H>  ----------------------------<  168<H>  7.3<HH>   |  30  |  10.60<H>    Ca    8.6      22 Jun 2021 10:58    TPro  9.0<H>  /  Alb  3.8  /  TBili  0.7  /  DBili  x   /  AST  33  /  ALT  26  /  AlkPhos  213<H>  06-22      CARDIAC MARKERS ( 22 Jun 2021 10:58 )  .073 ng/mL / x     / x     / x     / x                06-22 @ 11:11  3.1  <46

## 2021-06-23 LAB
A1C WITH ESTIMATED AVERAGE GLUCOSE RESULT: 7 % — HIGH (ref 4–5.6)
COVID-19 SPIKE DOMAIN AB INTERP: POSITIVE
COVID-19 SPIKE DOMAIN ANTIBODY RESULT: >250 U/ML — HIGH
ESTIMATED AVERAGE GLUCOSE: 154 MG/DL — HIGH (ref 68–114)
GLUCOSE BLDC GLUCOMTR-MCNC: 111 MG/DL — HIGH (ref 70–99)
GLUCOSE BLDC GLUCOMTR-MCNC: 129 MG/DL — HIGH (ref 70–99)
GLUCOSE BLDC GLUCOMTR-MCNC: 132 MG/DL — HIGH (ref 70–99)
GLUCOSE BLDC GLUCOMTR-MCNC: 200 MG/DL — HIGH (ref 70–99)
SARS-COV-2 IGG+IGM SERPL QL IA: >250 U/ML — HIGH
SARS-COV-2 IGG+IGM SERPL QL IA: POSITIVE

## 2021-06-23 PROCEDURE — 99233 SBSQ HOSP IP/OBS HIGH 50: CPT

## 2021-06-23 RX ADMIN — SEVELAMER CARBONATE 800 MILLIGRAM(S): 2400 POWDER, FOR SUSPENSION ORAL at 05:55

## 2021-06-23 RX ADMIN — Medication 300 MILLIGRAM(S): at 21:56

## 2021-06-23 RX ADMIN — Medication 200 MILLIGRAM(S): at 21:56

## 2021-06-23 RX ADMIN — HEPARIN SODIUM 5000 UNIT(S): 5000 INJECTION INTRAVENOUS; SUBCUTANEOUS at 05:55

## 2021-06-23 RX ADMIN — Medication 300 MILLIGRAM(S): at 05:55

## 2021-06-23 RX ADMIN — Medication 1: at 11:00

## 2021-06-23 RX ADMIN — Medication 50 MILLIGRAM(S): at 21:56

## 2021-06-23 RX ADMIN — Medication 81 MILLIGRAM(S): at 15:28

## 2021-06-23 RX ADMIN — SEVELAMER CARBONATE 800 MILLIGRAM(S): 2400 POWDER, FOR SUSPENSION ORAL at 17:19

## 2021-06-23 RX ADMIN — Medication 50 MILLIGRAM(S): at 05:55

## 2021-06-23 RX ADMIN — ATORVASTATIN CALCIUM 40 MILLIGRAM(S): 80 TABLET, FILM COATED ORAL at 21:55

## 2021-06-23 RX ADMIN — HEPARIN SODIUM 5000 UNIT(S): 5000 INJECTION INTRAVENOUS; SUBCUTANEOUS at 17:20

## 2021-06-23 RX ADMIN — AMLODIPINE BESYLATE 5 MILLIGRAM(S): 2.5 TABLET ORAL at 05:57

## 2021-06-24 LAB
ALBUMIN SERPL ELPH-MCNC: 3 G/DL — LOW (ref 3.3–5)
ALP SERPL-CCNC: 143 U/L — HIGH (ref 40–120)
ALT FLD-CCNC: 23 U/L — SIGNIFICANT CHANGE UP (ref 12–78)
ANION GAP SERPL CALC-SCNC: 7 MMOL/L — SIGNIFICANT CHANGE UP (ref 5–17)
AST SERPL-CCNC: 25 U/L — SIGNIFICANT CHANGE UP (ref 15–37)
BILIRUB SERPL-MCNC: 0.5 MG/DL — SIGNIFICANT CHANGE UP (ref 0.2–1.2)
BUN SERPL-MCNC: 33 MG/DL — HIGH (ref 7–23)
CALCIUM SERPL-MCNC: 8 MG/DL — LOW (ref 8.5–10.1)
CHLORIDE SERPL-SCNC: 99 MMOL/L — SIGNIFICANT CHANGE UP (ref 96–108)
CO2 SERPL-SCNC: 29 MMOL/L — SIGNIFICANT CHANGE UP (ref 22–31)
CREAT SERPL-MCNC: 6.24 MG/DL — HIGH (ref 0.5–1.3)
GLUCOSE BLDC GLUCOMTR-MCNC: 105 MG/DL — HIGH (ref 70–99)
GLUCOSE BLDC GLUCOMTR-MCNC: 165 MG/DL — HIGH (ref 70–99)
GLUCOSE BLDC GLUCOMTR-MCNC: 84 MG/DL — SIGNIFICANT CHANGE UP (ref 70–99)
GLUCOSE SERPL-MCNC: 133 MG/DL — HIGH (ref 70–99)
HCT VFR BLD CALC: 35.5 % — LOW (ref 39–50)
HGB BLD-MCNC: 10.9 G/DL — LOW (ref 13–17)
MCHC RBC-ENTMCNC: 26.8 PG — LOW (ref 27–34)
MCHC RBC-ENTMCNC: 30.7 GM/DL — LOW (ref 32–36)
MCV RBC AUTO: 87.4 FL — SIGNIFICANT CHANGE UP (ref 80–100)
NRBC # BLD: 0 /100 WBCS — SIGNIFICANT CHANGE UP (ref 0–0)
PLATELET # BLD AUTO: 169 K/UL — SIGNIFICANT CHANGE UP (ref 150–400)
POTASSIUM SERPL-MCNC: 5.7 MMOL/L — HIGH (ref 3.5–5.3)
POTASSIUM SERPL-SCNC: 5.7 MMOL/L — HIGH (ref 3.5–5.3)
PROT SERPL-MCNC: 7.5 GM/DL — SIGNIFICANT CHANGE UP (ref 6–8.3)
RBC # BLD: 4.06 M/UL — LOW (ref 4.2–5.8)
RBC # FLD: 20.6 % — HIGH (ref 10.3–14.5)
SODIUM SERPL-SCNC: 135 MMOL/L — SIGNIFICANT CHANGE UP (ref 135–145)
WBC # BLD: 7.2 K/UL — SIGNIFICANT CHANGE UP (ref 3.8–10.5)
WBC # FLD AUTO: 7.2 K/UL — SIGNIFICANT CHANGE UP (ref 3.8–10.5)

## 2021-06-24 PROCEDURE — 99232 SBSQ HOSP IP/OBS MODERATE 35: CPT

## 2021-06-24 RX ORDER — CHLORHEXIDINE GLUCONATE 213 G/1000ML
1 SOLUTION TOPICAL
Refills: 0 | Status: DISCONTINUED | OUTPATIENT
Start: 2021-06-24 | End: 2021-06-25

## 2021-06-24 RX ADMIN — Medication 300 MILLIGRAM(S): at 11:19

## 2021-06-24 RX ADMIN — HEPARIN SODIUM 5000 UNIT(S): 5000 INJECTION INTRAVENOUS; SUBCUTANEOUS at 06:01

## 2021-06-24 RX ADMIN — ATORVASTATIN CALCIUM 40 MILLIGRAM(S): 80 TABLET, FILM COATED ORAL at 21:30

## 2021-06-24 RX ADMIN — Medication 1: at 07:42

## 2021-06-24 RX ADMIN — Medication 300 MILLIGRAM(S): at 21:31

## 2021-06-24 RX ADMIN — AMLODIPINE BESYLATE 5 MILLIGRAM(S): 2.5 TABLET ORAL at 06:01

## 2021-06-24 RX ADMIN — Medication 81 MILLIGRAM(S): at 11:19

## 2021-06-24 RX ADMIN — Medication 300 MILLIGRAM(S): at 06:02

## 2021-06-24 RX ADMIN — SEVELAMER CARBONATE 800 MILLIGRAM(S): 2400 POWDER, FOR SUSPENSION ORAL at 06:02

## 2021-06-24 RX ADMIN — HEPARIN SODIUM 5000 UNIT(S): 5000 INJECTION INTRAVENOUS; SUBCUTANEOUS at 17:27

## 2021-06-24 RX ADMIN — Medication 50 MILLIGRAM(S): at 06:01

## 2021-06-24 RX ADMIN — Medication 50 MILLIGRAM(S): at 11:19

## 2021-06-24 RX ADMIN — SEVELAMER CARBONATE 800 MILLIGRAM(S): 2400 POWDER, FOR SUSPENSION ORAL at 17:27

## 2021-06-24 RX ADMIN — Medication 50 MILLIGRAM(S): at 21:31

## 2021-06-24 NOTE — PROGRESS NOTE ADULT - ASSESSMENT
70 y/o male with h/o   ESRD on HD ,   DM type 2, HTN, HLD, AVR,   s/p   COVID   pna   in  3/21, h/o  DU bleed  needing prbc admitted with  worsening SOB.    Acute on chronic CHF:  - S/P urgent HD yesterday and again today  - Hypoxia improved  - Mild trop on admisison secondary to ESRD, unlikely cardiac event, EKG with no acute finding, echo from last admission reviewed  - Fluid and salt restriction daily wt    Hyperkalemia:  - Improved after HD  - Renal diet    ESRD on HD:  - S/P HD yesterday and today    HTN:  - BP acceptable  - Continue norvasc, labetolol, hydralazine    Anemia on CKD:  - Hb stable    DM:  - Follow up Hb A1c   - Continue current insulin regimen     DVT ppx  
 70 y/o male with h/o   ESRD on HD ,   DM type 2, HTN, HLD, AVR,   s/p   COVID   pna   in  3/21, h/o  DU bleed  needing prbc admitted with  worsening SOB.    Acute on chronic CHF:  - S/P urgent HD yesterday and again today  - Hypoxia improved, check So2 in RA  - Mild trop on admisison secondary to ESRD, unlikely cardiac event, EKG with no acute finding, echo from last admission reviewed  - Fluid and salt restriction daily wt    Hyperkalemia:  - Improved after HD  - Renal diet    ESRD on HD:  - S/P HD X 2   - HD again as scheduled  - Renal following     HTN:  - BP acceptable  - Continue norvasc, labetolol, hydralazine    Anemia on CKD:  - Hb stable    DM:  - Hb A1c 7  - Continue current insulin regimen     DVT ppx

## 2021-06-25 ENCOUNTER — TRANSCRIPTION ENCOUNTER (OUTPATIENT)
Age: 71
End: 2021-06-25

## 2021-06-25 VITALS
TEMPERATURE: 98 F | DIASTOLIC BLOOD PRESSURE: 62 MMHG | SYSTOLIC BLOOD PRESSURE: 114 MMHG | OXYGEN SATURATION: 96 % | RESPIRATION RATE: 18 BRPM | HEART RATE: 67 BPM

## 2021-06-25 LAB
ALBUMIN SERPL ELPH-MCNC: 2.9 G/DL — LOW (ref 3.3–5)
ALP SERPL-CCNC: 135 U/L — HIGH (ref 40–120)
ALT FLD-CCNC: 23 U/L — SIGNIFICANT CHANGE UP (ref 12–78)
ANION GAP SERPL CALC-SCNC: 6 MMOL/L — SIGNIFICANT CHANGE UP (ref 5–17)
AST SERPL-CCNC: 21 U/L — SIGNIFICANT CHANGE UP (ref 15–37)
BILIRUB SERPL-MCNC: 0.4 MG/DL — SIGNIFICANT CHANGE UP (ref 0.2–1.2)
BUN SERPL-MCNC: 33 MG/DL — HIGH (ref 7–23)
CALCIUM SERPL-MCNC: 7.8 MG/DL — LOW (ref 8.5–10.1)
CHLORIDE SERPL-SCNC: 101 MMOL/L — SIGNIFICANT CHANGE UP (ref 96–108)
CO2 SERPL-SCNC: 30 MMOL/L — SIGNIFICANT CHANGE UP (ref 22–31)
CREAT SERPL-MCNC: 5.73 MG/DL — HIGH (ref 0.5–1.3)
GLUCOSE BLDC GLUCOMTR-MCNC: 118 MG/DL — HIGH (ref 70–99)
GLUCOSE BLDC GLUCOMTR-MCNC: 137 MG/DL — HIGH (ref 70–99)
GLUCOSE BLDC GLUCOMTR-MCNC: 234 MG/DL — HIGH (ref 70–99)
GLUCOSE SERPL-MCNC: 121 MG/DL — HIGH (ref 70–99)
HCT VFR BLD CALC: 33.9 % — LOW (ref 39–50)
HGB BLD-MCNC: 10.4 G/DL — LOW (ref 13–17)
MCHC RBC-ENTMCNC: 26.9 PG — LOW (ref 27–34)
MCHC RBC-ENTMCNC: 30.7 GM/DL — LOW (ref 32–36)
MCV RBC AUTO: 87.8 FL — SIGNIFICANT CHANGE UP (ref 80–100)
NRBC # BLD: 0 /100 WBCS — SIGNIFICANT CHANGE UP (ref 0–0)
PLATELET # BLD AUTO: 166 K/UL — SIGNIFICANT CHANGE UP (ref 150–400)
POTASSIUM SERPL-MCNC: 5.1 MMOL/L — SIGNIFICANT CHANGE UP (ref 3.5–5.3)
POTASSIUM SERPL-SCNC: 5.1 MMOL/L — SIGNIFICANT CHANGE UP (ref 3.5–5.3)
PROT SERPL-MCNC: 6.8 GM/DL — SIGNIFICANT CHANGE UP (ref 6–8.3)
RBC # BLD: 3.86 M/UL — LOW (ref 4.2–5.8)
RBC # FLD: 20.5 % — HIGH (ref 10.3–14.5)
SODIUM SERPL-SCNC: 137 MMOL/L — SIGNIFICANT CHANGE UP (ref 135–145)
WBC # BLD: 6.04 K/UL — SIGNIFICANT CHANGE UP (ref 3.8–10.5)
WBC # FLD AUTO: 6.04 K/UL — SIGNIFICANT CHANGE UP (ref 3.8–10.5)

## 2021-06-25 PROCEDURE — 99239 HOSP IP/OBS DSCHRG MGMT >30: CPT

## 2021-06-25 RX ORDER — HYDRALAZINE HCL 50 MG
1 TABLET ORAL
Qty: 90 | Refills: 0
Start: 2021-06-25 | End: 2021-07-24

## 2021-06-25 RX ORDER — HYDRALAZINE HCL 50 MG
0 TABLET ORAL
Qty: 0 | Refills: 0 | DISCHARGE

## 2021-06-25 RX ADMIN — AMLODIPINE BESYLATE 5 MILLIGRAM(S): 2.5 TABLET ORAL at 05:45

## 2021-06-25 RX ADMIN — Medication 50 MILLIGRAM(S): at 05:46

## 2021-06-25 RX ADMIN — Medication 2: at 11:57

## 2021-06-25 RX ADMIN — Medication 300 MILLIGRAM(S): at 05:47

## 2021-06-25 RX ADMIN — HEPARIN SODIUM 5000 UNIT(S): 5000 INJECTION INTRAVENOUS; SUBCUTANEOUS at 05:46

## 2021-06-25 RX ADMIN — SEVELAMER CARBONATE 800 MILLIGRAM(S): 2400 POWDER, FOR SUSPENSION ORAL at 05:47

## 2021-06-25 RX ADMIN — Medication 81 MILLIGRAM(S): at 11:57

## 2021-06-25 RX ADMIN — Medication 50 MILLIGRAM(S): at 13:33

## 2021-06-25 RX ADMIN — Medication 300 MILLIGRAM(S): at 13:33

## 2021-06-25 RX ADMIN — CHLORHEXIDINE GLUCONATE 1 APPLICATION(S): 213 SOLUTION TOPICAL at 05:46

## 2021-06-25 NOTE — DISCHARGE NOTE PROVIDER - CARE PROVIDER_API CALL
Information verified with patient by Jessica Galvan.    Caro Rehman is a 47 year old female presenting with back pain    Denies Latex allergy or sensitivity.     There were no vitals taken for this visit.    No changes to Patient's medical history or social history since their last visit.    ALLERGIES:   Allergen Reactions   • Bupropion      welts   • Ciprofloxacin      anaphylaxis   • Erythromycin      confusion   • Gabapentin      confusion   • Ibuprofen      Increased heart rate   • Penicillins      welts   • Plaquenil [Hydroxychloroquine Sulfate] ARTHRALGIA   • Progesterone      confusion   • Sulfa Antibiotics      anaphylaxis       Patient notes that she currently is not a smoker.     primary nephrologist,   Phone: (   )    -  Fax: (   )    -  Follow Up Time:

## 2021-06-25 NOTE — PROGRESS NOTE ADULT - SUBJECTIVE AND OBJECTIVE BOX
[facility] NEPHROLOGY SERVICES, [facility]  NEPHROLOGY AND HYPERTENSION  [address]  [address]  [place] [address]  [phone]    MD WHIT NICKERSON MD ANDREY GONCHARUK, MD MADHU KORRAPATI, MD YELENA ROSENBERG, MD BINNY KOSHY, MD CHRISTOPHER CAPUTO, MD JEIMY HERNANDEZ MD          Patient events noted  seen pre hd    MEDICATIONS  (STANDING):  amLODIPine   Tablet 5 milliGRAM(s) Oral daily  aspirin enteric coated 81 milliGRAM(s) Oral daily  atorvastatin 40 milliGRAM(s) Oral at bedtime  chlorhexidine 2% Cloths 1 Application(s) Topical <User Schedule>  dextrose 10%. 1000 milliLiter(s) (30 mL/Hr) IV Continuous <Continuous>  dextrose 40% Gel 15 Gram(s) Oral once  dextrose 5%. 1000 milliLiter(s) (50 mL/Hr) IV Continuous <Continuous>  dextrose 5%. 1000 milliLiter(s) (100 mL/Hr) IV Continuous <Continuous>  dextrose 50% Injectable 50 milliLiter(s) IV Push every 15 minutes  dextrose 50% Injectable 25 Gram(s) IV Push once  dextrose 50% Injectable 12.5 Gram(s) IV Push once  dextrose 50% Injectable 25 Gram(s) IV Push once  glucagon  Injectable 1 milliGRAM(s) IntraMuscular once  heparin   Injectable 5000 Unit(s) SubCutaneous every 12 hours  hydrALAZINE 50 milliGRAM(s) Oral three times a day  insulin lispro (ADMELOG) corrective regimen sliding scale   SubCutaneous three times a day before meals  labetalol 300 milliGRAM(s) Oral three times a day  sevelamer carbonate 800 milliGRAM(s) Oral every 12 hours    MEDICATIONS  (PRN):  guaiFENesin Oral Liquid (Sugar-Free) 200 milliGRAM(s) Oral every 6 hours PRN Cough      06-23-21 @ 07:01  -  06-24-21 @ 07:00  --------------------------------------------------------  IN: 350 mL / OUT: 0 mL / NET: 350 mL      PHYSICAL EXAM:      T(C): 36.7 (06-24-21 @ 17:49), Max: 36.9 (06-23-21 @ 23:57)  HR: 79 (06-24-21 @ 17:49) (68 - 79)  BP: 114/64 (06-24-21 @ 17:49) (100/54 - 162/71)  RR: 18 (06-24-21 @ 17:49) (17 - 19)  SpO2: 98% (06-24-21 @ 17:49) (96% - 100%)  Wt(kg): --  Lungs clear  Heart S1S2  Abd soft NT ND  Extremities:   tr edema                                    10.9   7.20  )-----------( 169      ( 24 Jun 2021 09:35 )             35.5     06-24    135  |  99  |  33<H>  ----------------------------<  133<H>  5.7<H>   |  29  |  6.24<H>    Ca    8.0<L>      24 Jun 2021 09:35    TPro  7.5  /  Alb  3.0<L>  /  TBili  0.5  /  DBili  x   /  AST  25  /  ALT  23  /  AlkPhos  143<H>  06-24      LIVER FUNCTIONS - ( 24 Jun 2021 09:35 )  Alb: 3.0 g/dL / Pro: 7.5 gm/dL / ALK PHOS: 143 U/L / ALT: 23 U/L / AST: 25 U/L / GGT: x           Creatinine Trend: 6.24<--, 5.58<--, 10.60<--      Assessment   ESRD; fluid overload, hyperkalemia     Plan:  HD for today  UF as tolerated  Discharge planning     Payam Lilly MD
[facility] NEPHROLOGY SERVICES, [facility]  NEPHROLOGY AND HYPERTENSION  [address]  [address]  [place] [address]  [phone]    MD WHIT NICKERSON MD ANDREY GONCHARUK, MD MADHU KORRAPATI, MD YELENA ROSENBERG, MD BINNY KOSHY, MD CHRISTOPHER CAPUTO, MD JEIMY HERNANDEZ MD          Patient events noted  No distress    MEDICATIONS  (STANDING):  amLODIPine   Tablet 5 milliGRAM(s) Oral daily  aspirin enteric coated 81 milliGRAM(s) Oral daily  atorvastatin 40 milliGRAM(s) Oral at bedtime  chlorhexidine 2% Cloths 1 Application(s) Topical <User Schedule>  dextrose 10%. 1000 milliLiter(s) (30 mL/Hr) IV Continuous <Continuous>  dextrose 40% Gel 15 Gram(s) Oral once  dextrose 5%. 1000 milliLiter(s) (50 mL/Hr) IV Continuous <Continuous>  dextrose 5%. 1000 milliLiter(s) (100 mL/Hr) IV Continuous <Continuous>  dextrose 50% Injectable 50 milliLiter(s) IV Push every 15 minutes  dextrose 50% Injectable 25 Gram(s) IV Push once  dextrose 50% Injectable 12.5 Gram(s) IV Push once  dextrose 50% Injectable 25 Gram(s) IV Push once  glucagon  Injectable 1 milliGRAM(s) IntraMuscular once  heparin   Injectable 5000 Unit(s) SubCutaneous every 12 hours  hydrALAZINE 50 milliGRAM(s) Oral three times a day  insulin lispro (ADMELOG) corrective regimen sliding scale   SubCutaneous three times a day before meals  labetalol 300 milliGRAM(s) Oral three times a day  sevelamer carbonate 800 milliGRAM(s) Oral every 12 hours    MEDICATIONS  (PRN):  guaiFENesin Oral Liquid (Sugar-Free) 200 milliGRAM(s) Oral every 6 hours PRN Cough      06-24-21 @ 07:01  -  06-25-21 @ 07:00  --------------------------------------------------------  IN: 0 mL / OUT: 400 mL / NET: -400 mL      PHYSICAL EXAM:      T(C): 36.2 (06-25-21 @ 11:44), Max: 36.7 (06-24-21 @ 17:49)  HR: 74 (06-25-21 @ 13:32) (70 - 79)  BP: 131/71 (06-25-21 @ 13:32) (114/64 - 148/79)  RR: 17 (06-25-21 @ 13:32) (16 - 18)  SpO2: 94% (06-25-21 @ 13:32) (90% - 99%)  Wt(kg): --  Lungs clear  Heart S1S2  Abd soft NT ND  Extremities:   tr edema                                    10.4   6.04  )-----------( 166      ( 25 Jun 2021 06:46 )             33.9     06-25    137  |  101  |  33<H>  ----------------------------<  121<H>  5.1   |  30  |  5.73<H>    Ca    7.8<L>      25 Jun 2021 06:46    TPro  6.8  /  Alb  2.9<L>  /  TBili  0.4  /  DBili  x   /  AST  21  /  ALT  23  /  AlkPhos  135<H>  06-25      LIVER FUNCTIONS - ( 25 Jun 2021 06:46 )  Alb: 2.9 g/dL / Pro: 6.8 gm/dL / ALK PHOS: 135 U/L / ALT: 23 U/L / AST: 21 U/L / GGT: x           Creatinine Trend: 5.73<--, 6.24<--, 5.58<--, 10.60<--      Assessment   ESRD, HTN crisis; hyperkalemia     Plan:    HD for tomorrow    Payam Lilly MD
Patient is a 71y old  Male who presents with a chief complaint of sob (23 Jun 2021 22:21)    INTERVAL HPI/OVERNIGHT EVENTS:  Pt was seen and examined, no acute events.    MEDICATIONS  (STANDING):  amLODIPine   Tablet 5 milliGRAM(s) Oral daily  aspirin enteric coated 81 milliGRAM(s) Oral daily  atorvastatin 40 milliGRAM(s) Oral at bedtime  chlorhexidine 2% Cloths 1 Application(s) Topical <User Schedule>  dextrose 10%. 1000 milliLiter(s) (30 mL/Hr) IV Continuous <Continuous>  dextrose 40% Gel 15 Gram(s) Oral once  dextrose 5%. 1000 milliLiter(s) (50 mL/Hr) IV Continuous <Continuous>  dextrose 5%. 1000 milliLiter(s) (100 mL/Hr) IV Continuous <Continuous>  dextrose 50% Injectable 50 milliLiter(s) IV Push every 15 minutes  dextrose 50% Injectable 25 Gram(s) IV Push once  dextrose 50% Injectable 12.5 Gram(s) IV Push once  dextrose 50% Injectable 25 Gram(s) IV Push once  glucagon  Injectable 1 milliGRAM(s) IntraMuscular once  heparin   Injectable 5000 Unit(s) SubCutaneous every 12 hours  hydrALAZINE 50 milliGRAM(s) Oral three times a day  insulin lispro (ADMELOG) corrective regimen sliding scale   SubCutaneous three times a day before meals  labetalol 300 milliGRAM(s) Oral three times a day  sevelamer carbonate 800 milliGRAM(s) Oral every 12 hours    MEDICATIONS  (PRN):  guaiFENesin Oral Liquid (Sugar-Free) 200 milliGRAM(s) Oral every 6 hours PRN Cough      Allergies  lisinopril (Anaphylaxis)        Vital Signs Last 24 Hrs  T(C): 36.7 (24 Jun 2021 12:10), Max: 37.1 (23 Jun 2021 14:20)  T(F): 98.1 (24 Jun 2021 12:10), Max: 98.7 (23 Jun 2021 14:20)  HR: 69 (24 Jun 2021 12:10) (69 - 82)  BP: 135/68 (24 Jun 2021 12:10) (107/58 - 162/71)  BP(mean): --  RR: 17 (24 Jun 2021 12:10) (17 - 20)  SpO2: 100% (24 Jun 2021 12:10) (96% - 100%)      PHYSICAL EXAM:  GENERAL: NAD  HEAD: Atraumatic    EYES: PERRLA  NERVOUS SYSTEM:  Awake, alert  CHEST/LUNG: Diminished   HEART: RRR  ABDOMEN: Soft, non tender  EXTREMITIES:  +edema      LABS:                        10.9   7.20  )-----------( 169      ( 24 Jun 2021 09:35 )             35.5     06-24    135  |  99  |  33<H>  ----------------------------<  133<H>  5.7<H>   |  29  |  6.24<H>    Ca    8.0<L>      24 Jun 2021 09:35    TPro  7.5  /  Alb  3.0<L>  /  TBili  0.5  /  DBili  x   /  AST  25  /  ALT  23  /  AlkPhos  143<H>  06-24        CAPILLARY BLOOD GLUCOSE      POCT Blood Glucose.: 84 mg/dL (24 Jun 2021 11:43)  POCT Blood Glucose.: 165 mg/dL (24 Jun 2021 07:35)  POCT Blood Glucose.: 111 mg/dL (23 Jun 2021 23:50)  POCT Blood Glucose.: 132 mg/dL (23 Jun 2021 16:43)      RADIOLOGY & ADDITIONAL TESTS:    Imaging Personally Reviewed:  [ ] YES  [ ] NO    Consultant(s) Notes Reviewed:  [ ] YES  [ ] NO    Care Discussed with Consultants/Other Providers [ ] YES  [ ] NO
Patient is a 71y old  Male who presents with a chief complaint of sob (22 Jun 2021 13:21)      INTERVAL HPI/OVERNIGHT EVENTS:    MEDICATIONS  (STANDING):  amLODIPine   Tablet 5 milliGRAM(s) Oral daily  aspirin enteric coated 81 milliGRAM(s) Oral daily  atorvastatin 40 milliGRAM(s) Oral at bedtime  dextrose 10%. 1000 milliLiter(s) (30 mL/Hr) IV Continuous <Continuous>  dextrose 40% Gel 15 Gram(s) Oral once  dextrose 5%. 1000 milliLiter(s) (50 mL/Hr) IV Continuous <Continuous>  dextrose 5%. 1000 milliLiter(s) (100 mL/Hr) IV Continuous <Continuous>  dextrose 50% Injectable 50 milliLiter(s) IV Push every 15 minutes  dextrose 50% Injectable 25 Gram(s) IV Push once  dextrose 50% Injectable 12.5 Gram(s) IV Push once  dextrose 50% Injectable 25 Gram(s) IV Push once  glucagon  Injectable 1 milliGRAM(s) IntraMuscular once  heparin   Injectable 5000 Unit(s) SubCutaneous every 12 hours  hydrALAZINE 50 milliGRAM(s) Oral three times a day  insulin lispro (ADMELOG) corrective regimen sliding scale   SubCutaneous three times a day before meals  labetalol 300 milliGRAM(s) Oral three times a day  sevelamer carbonate 800 milliGRAM(s) Oral every 12 hours    MEDICATIONS  (PRN):      Allergies    lisinopril (Anaphylaxis)    Intolerances          Vital Signs Last 24 Hrs  T(C): 36.2 (23 Jun 2021 16:21), Max: 37.1 (23 Jun 2021 14:20)  T(F): 97.2 (23 Jun 2021 16:21), Max: 98.7 (23 Jun 2021 14:20)  HR: 78 (23 Jun 2021 16:21) (71 - 86)  BP: 112/56 (23 Jun 2021 16:21) (107/58 - 189/82)  BP(mean): --  RR: 19 (23 Jun 2021 16:21) (17 - 20)  SpO2: 96% (23 Jun 2021 16:21) (93% - 100%)    PHYSICAL EXAM:  GENERAL: NAD  HEAD:  Atraumatic  EYES: PERRLA  NERVOUS SYSTEM:  Awake, alert  CHEST/LUNG: Diminished, +crackles  HEART: RRR  ABDOMEN: Soft, non tender, + BS  EXTREMITIES:  1+ edema, LUE + AVF + bruit and thrill    LABS:                        12.4   8.37  )-----------( 217      ( 22 Jun 2021 10:58 )             41.8     06-22    135  |  96  |  28<H>  ----------------------------<  162<H>  5.1   |  32<H>  |  5.58<H>    Ca    8.4<L>      22 Jun 2021 20:50    TPro  9.0<H>  /  Alb  3.8  /  TBili  0.7  /  DBili  x   /  AST  33  /  ALT  26  /  AlkPhos  213<H>  06-22        CAPILLARY BLOOD GLUCOSE      POCT Blood Glucose.: 132 mg/dL (23 Jun 2021 16:43)  POCT Blood Glucose.: 200 mg/dL (23 Jun 2021 10:53)  POCT Blood Glucose.: 129 mg/dL (23 Jun 2021 07:32)  POCT Blood Glucose.: 109 mg/dL (22 Jun 2021 22:49)  POCT Blood Glucose.: 136 mg/dL (22 Jun 2021 17:21)      RADIOLOGY & ADDITIONAL TESTS:    Imaging Personally Reviewed:  [ ] YES  [ ] NO    Consultant(s) Notes Reviewed:  [ ] YES  [ ] NO    Care Discussed with Consultants/Other Providers [ ] YES  [ ] NO
[facility] NEPHROLOGY SERVICES, [facility]  NEPHROLOGY AND HYPERTENSION  [address]  [address]  [place] [address]  [phone]    MD WHIT NICKERSON MD ANDREY GONCHARUK, MD MADHU KORRAPATI, MD YELENA ROSENBERG, MD BINNY KOSHY, MD CHRISTOPHER CAPUTO, MD JEIMY HERNANDEZ MD          Patient events noted  no distress feels well       MEDICATIONS  (STANDING):  amLODIPine   Tablet 5 milliGRAM(s) Oral daily  aspirin enteric coated 81 milliGRAM(s) Oral daily  atorvastatin 40 milliGRAM(s) Oral at bedtime  dextrose 10%. 1000 milliLiter(s) (30 mL/Hr) IV Continuous <Continuous>  dextrose 40% Gel 15 Gram(s) Oral once  dextrose 5%. 1000 milliLiter(s) (50 mL/Hr) IV Continuous <Continuous>  dextrose 5%. 1000 milliLiter(s) (100 mL/Hr) IV Continuous <Continuous>  dextrose 50% Injectable 50 milliLiter(s) IV Push every 15 minutes  dextrose 50% Injectable 25 Gram(s) IV Push once  dextrose 50% Injectable 12.5 Gram(s) IV Push once  dextrose 50% Injectable 25 Gram(s) IV Push once  glucagon  Injectable 1 milliGRAM(s) IntraMuscular once  heparin   Injectable 5000 Unit(s) SubCutaneous every 12 hours  hydrALAZINE 50 milliGRAM(s) Oral three times a day  insulin lispro (ADMELOG) corrective regimen sliding scale   SubCutaneous three times a day before meals  labetalol 300 milliGRAM(s) Oral three times a day  sevelamer carbonate 800 milliGRAM(s) Oral every 12 hours    MEDICATIONS  (PRN):  guaiFENesin Oral Liquid (Sugar-Free) 200 milliGRAM(s) Oral every 6 hours PRN Cough      06-22-21 @ 07:01  -  06-23-21 @ 07:00  --------------------------------------------------------  IN: 118 mL / OUT: 2700 mL / NET: -2582 mL    06-23-21 @ 07:01  -  06-23-21 @ 22:21  --------------------------------------------------------  IN: 350 mL / OUT: 0 mL / NET: 350 mL      PHYSICAL EXAM:      T(C): 36.2 (06-23-21 @ 16:21), Max: 37.1 (06-23-21 @ 14:20)  HR: 77 (06-23-21 @ 21:45) (72 - 86)  BP: 158/69 (06-23-21 @ 21:45) (107/58 - 171/78)  RR: 19 (06-23-21 @ 21:45) (18 - 20)  SpO2: 98% (06-23-21 @ 21:45) (96% - 100%)  Wt(kg): --  Lungs clear  Heart S1S2  Abd soft NT ND  Extremities:   tr edema                                    12.4   8.37  )-----------( 217      ( 22 Jun 2021 10:58 )             41.8     06-22    135  |  96  |  28<H>  ----------------------------<  162<H>  5.1   |  32<H>  |  5.58<H>    Ca    8.4<L>      22 Jun 2021 20:50    TPro  9.0<H>  /  Alb  3.8  /  TBili  0.7  /  DBili  x   /  AST  33  /  ALT  26  /  AlkPhos  213<H>  06-22      LIVER FUNCTIONS - ( 22 Jun 2021 10:58 )  Alb: 3.8 g/dL / Pro: 9.0 gm/dL / ALK PHOS: 213 U/L / ALT: 26 U/L / AST: 33 U/L / GGT: x           Creatinine Trend: 5.58<--, 10.60<--      Assessment   HTN crisis, fluid overload   HD for today, extra rx    Plan:  HD for today, maintenance tomorrow  Will follow,     Payam Lilly MD

## 2021-06-25 NOTE — DISCHARGE NOTE NURSING/CASE MANAGEMENT/SOCIAL WORK - PATIENT PORTAL LINK FT
You can access the FollowMyHealth Patient Portal offered by [facility] by registering at the following website: [email or web address]. By joining [employer]’s FollowMyHealth portal, you will also be able to view your health information using other applications (apps) compatible with our system.

## 2021-06-25 NOTE — DISCHARGE NOTE PROVIDER - NSDCCPCAREPLAN_GEN_ALL_CORE_FT
PRINCIPAL DISCHARGE DIAGNOSIS  Diagnosis: Acute on chronic diastolic congestive heart failure  Assessment and Plan of Treatment: Acute on chronic CHF:  - S/P urgent HD  - Hypoxia improved, So2 ok in RA  - Mild trop on admission secondary to ESRD, unlikely cardiac event, EKG with no acute finding, echo from last admission reviewed  - Fluid and salt restriction daily wt  Hyperkalemia:  - Improved after HD  - Renal diet  ESRD on HD:  - S/P HD X 3 inpt  - Continue HD as scheduled  - Renal following   HTN:  - BP acceptable  - Continue Norvasc, Labetalol, hydralazine  Anemia on CKD:  - Hb stable  DM:  - Hb A1c 7  - Resume home med on dc      SECONDARY DISCHARGE DIAGNOSES  Diagnosis: Hyperkalemia  Assessment and Plan of Treatment:

## 2021-06-25 NOTE — DISCHARGE NOTE NURSING/CASE MANAGEMENT/SOCIAL WORK - CAREGIVER ADDRESS
PROCEDURE NOTE: Lucentis 0.5mg PFS OD. Diagnosis: Neovascular AMD with Active CNV. Anesthesia: Lidocaine 4%. Prep: Betadine Flush. Prior to injection, risks/benefits/alternatives discussed including but not limited to infection, loss of vision or eye, hemorrhage, cataract, glaucoma, retinal tears or detachment. The patient wished to proceed with treatment. Topical anesthesia was induced with Alcaine. Additional anesthesia was achieved using drop(s) or injection checked above. A drop of Povidone-iodine 5% ophthalmic solution was instilled over the injection site and in the inferior fornix. Betadine prep was performed. A single use prefilled syringe of intravitreal Lucentis 0.5mg/0.05ml was used and excess was disposed of as waste. The needle was passed 3.0 mm posterior to the limbus in pseudophakic patients, and 3.5 mm posterior to the limbus in phakic patients. Injection Time 3:15 PM. Patient tolerated the procedure well. There were no complications. The eye was irrigated with sterile irrigating solution. Post procedure instructions given. The patient was instructed to use Artificial Tears q.i.d. p.r.n for comfort. The patient was instructed to return for re-evaluation in approximately 4-12 weeks depending on his/her condition and was told to call immediately if vision decreases and/or if his/her eye becomes red, painful, and/or light sensitive. The patient was instructed to go to the emergency room or call 911 if unable to reach the doctor within an hour or two of trying or calling. Rosetta Morgan [phone] [facility] [id number]

## 2021-06-25 NOTE — DISCHARGE NOTE PROVIDER - NSDCFUSCHEDAPPT_GEN_ALL_CORE_FT
NATANAEL MASSEY ; 06/28/2021 ; [facility] [address]  NATANAEL MASSEY ; 07/27/2021 ; [facility] [address] NATANAEL Davila ; 07/27/2021 ; [facility] [address]

## 2021-06-25 NOTE — DISCHARGE NOTE PROVIDER - HOSPITAL COURSE
70 y/o male with h/o   ESRD on HD ,   DM type 2, HTN, HLD, AVR,   s/p   COVID   pna   in  3/21, h/o  DU bleed  needing prbc admitted with  worsening SOB.    Acute on chronic CHF:  - S/P urgent HD yesterday and again today  - Hypoxia improved, So2 ok in RA  - Mild trop on admission secondary to ESRD, unlikely cardiac event, EKG with no acute finding, echo from last admission reviewed  - Fluid and salt restriction daily wt    Hyperkalemia:  - Improved after HD  - Renal diet    ESRD on HD:  - S/P HD X 3 inpt  - Continue HD as scheduled  - Renal following     HTN:  - BP acceptable  - Continue Norvasc, Labetalol, hydralazine    Anemia on CKD:  - Hb stable    DM:  - Hb A1c 7  - Continue current insulin regimen , resume home med on dc     DVT ppx

## 2021-06-25 NOTE — DISCHARGE NOTE PROVIDER - NSDCMRMEDTOKEN_GEN_ALL_CORE_FT
amLODIPine 5 mg oral tablet: 1 tab(s) orally once a day  aspirin 81 mg oral delayed release tablet: 1 tab(s) orally once a day   atorvastatin 40 mg oral tablet: 1 tab(s) orally once a day (at bedtime)  docusate sodium 100 mg oral tablet: 1 tab(s) orally , As Needed  epoetin theodore:   hydrALAZINE 50 mg oral tablet: 1 tab(s) orally 3 times a day  Januvia 25 mg oral tablet: 1 tab(s) orally once a day   labetalol 300 mg oral tablet: 1 tab(s) orally 3 times a day  pantoprazole 40 mg oral delayed release tablet: 1 tab(s) orally 2 times a day  sevelamer hydrochloride 800 mg oral tablet: 2 tab(s) orally 3 times a day  sucralfate 1 g oral tablet: 1 tab(s) orally 4 times a day

## 2021-07-02 DIAGNOSIS — I13.2 HYPERTENSIVE HEART AND CHRONIC KIDNEY DISEASE WITH HEART FAILURE AND WITH STAGE 5 CHRONIC KIDNEY DISEASE, OR END STAGE RENAL DISEASE: ICD-10-CM

## 2021-07-02 DIAGNOSIS — Z86.16 PERSONAL HISTORY OF COVID-19: ICD-10-CM

## 2021-07-02 DIAGNOSIS — Z99.2 DEPENDENCE ON RENAL DIALYSIS: ICD-10-CM

## 2021-07-02 DIAGNOSIS — E11.9 TYPE 2 DIABETES MELLITUS WITHOUT COMPLICATIONS: ICD-10-CM

## 2021-07-02 DIAGNOSIS — E87.5 HYPERKALEMIA: ICD-10-CM

## 2021-07-02 DIAGNOSIS — Z79.82 LONG TERM (CURRENT) USE OF ASPIRIN: ICD-10-CM

## 2021-07-02 DIAGNOSIS — D63.1 ANEMIA IN CHRONIC KIDNEY DISEASE: ICD-10-CM

## 2021-07-02 DIAGNOSIS — E78.5 HYPERLIPIDEMIA, UNSPECIFIED: ICD-10-CM

## 2021-07-02 DIAGNOSIS — I50.33 ACUTE ON CHRONIC DIASTOLIC (CONGESTIVE) HEART FAILURE: ICD-10-CM

## 2021-07-02 DIAGNOSIS — J96.91 RESPIRATORY FAILURE, UNSPECIFIED WITH HYPOXIA: ICD-10-CM

## 2021-07-02 DIAGNOSIS — N18.6 END STAGE RENAL DISEASE: ICD-10-CM

## 2021-07-02 DIAGNOSIS — R06.02 SHORTNESS OF BREATH: ICD-10-CM

## 2021-07-02 DIAGNOSIS — Z95.1 PRESENCE OF AORTOCORONARY BYPASS GRAFT: ICD-10-CM

## 2021-07-02 DIAGNOSIS — I16.1 HYPERTENSIVE EMERGENCY: ICD-10-CM

## 2021-07-02 DIAGNOSIS — E11.22 TYPE 2 DIABETES MELLITUS WITH DIABETIC CHRONIC KIDNEY DISEASE: ICD-10-CM

## 2021-07-13 ENCOUNTER — APPOINTMENT (OUTPATIENT)
Dept: PULMONOLOGY | Facility: CLINIC | Age: 71
End: 2021-07-13
Payer: MEDICARE

## 2021-07-13 VITALS
OXYGEN SATURATION: 93 % | HEART RATE: 88 BPM | SYSTOLIC BLOOD PRESSURE: 179 MMHG | DIASTOLIC BLOOD PRESSURE: 79 MMHG | TEMPERATURE: 97.8 F

## 2021-07-13 DIAGNOSIS — J98.6 DISORDERS OF DIAPHRAGM: ICD-10-CM

## 2021-07-13 DIAGNOSIS — R05 COUGH: ICD-10-CM

## 2021-07-13 PROCEDURE — 99214 OFFICE O/P EST MOD 30 MIN: CPT | Mod: 25

## 2021-07-13 PROCEDURE — 99072 ADDL SUPL MATRL&STAF TM PHE: CPT

## 2021-07-13 PROCEDURE — 71046 X-RAY EXAM CHEST 2 VIEWS: CPT

## 2021-07-13 NOTE — HISTORY OF PRESENT ILLNESS
[TextBox_4] : Off nebulizer medications for over 1 year\par \par went to [place] for 1 week and on his way back about 10 days ago developed productive cough with yellow mucus\par  no fever or chills and no c/o sob or wheeze\par  O2 sat in office 93 percent\par Has had a few hospitalizations over the past year.\par getting dialysis regularly\par \par family says patient is not sleeping at night and has daytime sleepiness\par sitting up to sleep for over 1 year.  Some fatigue particularly after dialysis.\par \par has 02 portable and home concentrator prior to cough not using it\par \par got O2 from hospital in January

## 2021-07-13 NOTE — PHYSICAL EXAM
[No Acute Distress] : no acute distress [Normal Appearance] : normal appearance [No Neck Mass] : no neck mass [Normal S1, S2] : normal s1, s2 [Benign] : benign [No Clubbing] : no clubbing [No Cyanosis] : no cyanosis [No Edema] : no edema [TextBox_68] : .crackles  bases

## 2021-07-13 NOTE — ASSESSMENT
[FreeTextEntry1] : Course of p.o. Zithromax.\par Restart bronchodilator therapy via nebulizer.\par Arrange ABG if not done in hospital.\par Consider polysomnography if symptoms persist when clinically improved.

## 2021-07-13 NOTE — DISCUSSION/SUMMARY
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 Clinically stable. \par excessive daytime sleepiness\par Chronic renal failure on dialysis.\par Elevated right hemidiaphragm with restrictive component of disease.

## 2021-07-15 ENCOUNTER — APPOINTMENT (OUTPATIENT)
Dept: PULMONOLOGY | Facility: CLINIC | Age: 71
End: 2021-07-15
Payer: MEDICARE

## 2021-07-15 PROCEDURE — 82803 BLOOD GASES ANY COMBINATION: CPT

## 2021-07-15 PROCEDURE — 36600 WITHDRAWAL OF ARTERIAL BLOOD: CPT | Mod: 59

## 2021-07-15 PROCEDURE — 99072 ADDL SUPL MATRL&STAF TM PHE: CPT

## 2021-07-16 NOTE — ASU PATIENT PROFILE, ADULT - ABILITY TO HEAR (WITH HEARING AID OR HEARING APPLIANCE IF NORMALLY USED):
Render In Strict Bullet Format?: No
Detail Level: Zone
Plan: If patient needs a stronger topical steroid to treat she will call.
Continue Regimen: Over the counter hydrocortisone
Modify Regimen: Soolantra cream hold off until rash has cleared \\nRhofade gel hold off until rash has cleared
Adequate: hears normal conversation without difficulty

## 2021-07-27 ENCOUNTER — APPOINTMENT (OUTPATIENT)
Dept: VASCULAR SURGERY | Facility: CLINIC | Age: 71
End: 2021-07-27

## 2021-10-11 ENCOUNTER — APPOINTMENT (OUTPATIENT)
Dept: PULMONOLOGY | Facility: CLINIC | Age: 71
End: 2021-10-11
Payer: MEDICARE

## 2021-10-11 VITALS
DIASTOLIC BLOOD PRESSURE: 80 MMHG | SYSTOLIC BLOOD PRESSURE: 180 MMHG | TEMPERATURE: 97.8 F | HEART RATE: 80 BPM | OXYGEN SATURATION: 96 %

## 2021-10-11 DIAGNOSIS — R91.8 OTHER NONSPECIFIC ABNORMAL FINDING OF LUNG FIELD: ICD-10-CM

## 2021-10-11 PROCEDURE — 99214 OFFICE O/P EST MOD 30 MIN: CPT

## 2021-10-12 NOTE — PHYSICAL EXAM
[No Acute Distress] : no acute distress [Normal Appearance] : normal appearance [No Neck Mass] : no neck mass [Normal S1, S2] : normal s1, s2 [Benign] : benign [No Clubbing] : no clubbing [No Cyanosis] : no cyanosis [No Edema] : no edema [Kyphosis] : kyphosis [TextBox_68] : Few crackles  bases, mild decreased breath sounds right base.

## 2021-10-12 NOTE — PROCEDURE
[FreeTextEntry1] : \par Report date: 1/19/2021 \par  \par  View Order\par \par \par (Report matches study selected on Patient History pane)\par \par \par EXAM: CT ANGIO CHEST (W)AW IC\par \par \par PROCEDURE DATE: 01/19/2021\par \par \par \par INTERPRETATION: CLINICAL INFORMATION: Hypoxia, elevated d-dimer. Positive Covid-19.\par \par COMPARISON: 2/7/2019\par \par PROCEDURE:\par CT Angiography of the Chest.\par 90 ml of Omnipaque 350 was injected intravenously. 10 ml were discarded.\par Sagittal and coronal reformats were performed as well as 3D (MIP) reconstructions.\par \par FINDINGS:\par \par LUNGS AND AIRWAYS: Patent central airways. Patchy peripheral groundglass and consolidative opacities within the left lung. Subsegmental bilateral lower lobe atelectasis.\par PLEURA: Stable appearance of chronic left complex pleural collection with pleural thickening and foci of calcification.\par MEDIASTINUM AND JAH: No lymphadenopathy. Small hiatal hernia.\par VESSELS: Motion artifact limits evaluation of segmental and subsegmental branches. No central or lobar PE. Atherosclerotic changes of the aorta and coronary arteries. Status post CABG.\par HEART: Cardiomegaly. No pericardial effusion. Aortic valve replacement.\par CHEST WALL AND LOWER NECK: Mild gynecomastia.\par VISUALIZED UPPER ABDOMEN: Elevated right hemidiaphragm. Atrophic kidneys. Right renal cyst. Punctate nonobstructing right renal calculi.. Bilateral adrenal gland thickening.\par BONES: Median sternotomy. Degenerative changes of the spine.\par \par IMPRESSION:\par \par 1. No central or lobar PE. Motion artifact limits evaluation of segmental and subsegmental branches.\par \par 2. Hazy peripheral groundglass and consolidative opacities within the left lung likely related to patient's known Covid-19 diagnosis.\par \par 3. Stable appearance of chronic left pleural collection with associated pleural thickening and calcification.\par \par \par ALESSIA HENAO MD; Attending Radiologist\par This document has been electronically signed. Jan 19 2021 11:22PM

## 2021-10-12 NOTE — ASSESSMENT
[FreeTextEntry1] : \par Bronchodilator prn therapy via nebulizer.\par Sleep hygiene\par Polysomnography\par Continue dialysis.\par Further recommendations after review of above.

## 2021-10-12 NOTE — DISCUSSION/SUMMARY
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 Clinically stable. \par excessive daytime sleepiness\par Chronic renal failure on dialysis.\par Elevated right hemidiaphragm with restrictive component of disease.\par insomnia and interrupted sleep.  Poor sleep hygiene\par Rule out sleep disorder including central obstructive sleep apnea.\par Radiographic abnormalities likely not of clinical concern.  No indication for follow-up CAT scan at this time.

## 2021-10-12 NOTE — HISTORY OF PRESENT ILLNESS
[Daytime Somnolence] : daytime somnolence [Difficulty Maintaining Sleep] : difficulty maintaining sleep [Frequent Nocturnal Awakening] : frequent nocturnal awakening [Unusual Movements] : unusual movements [TextBox_4] : feeling better no cough no sob\par  no longer using O2 or inhalers\par \par having interrupted sleep, sits up with noises and moves legs a lot  no snoring \par naps during the day\par tried melatonin without help\par getting dialysis regularly\par  [Awakes Unrefreshed] : does not awaken unrefreshed [Awakes with Dry Mouth] : does not awaken with dry mouth [Recent  Weight Gain] : no recent weight gain [Snoring] : no snoring [Witnessed Apneas] : no witnessed apneas

## 2021-10-14 ENCOUNTER — APPOINTMENT (OUTPATIENT)
Dept: VASCULAR SURGERY | Facility: CLINIC | Age: 71
End: 2021-10-14
Payer: MEDICARE

## 2021-10-14 VITALS
HEART RATE: 75 BPM | TEMPERATURE: 98.6 F | HEIGHT: 66 IN | DIASTOLIC BLOOD PRESSURE: 66 MMHG | SYSTOLIC BLOOD PRESSURE: 174 MMHG | WEIGHT: 150 LBS | BODY MASS INDEX: 24.11 KG/M2

## 2021-10-14 DIAGNOSIS — I77.1 STRICTURE OF ARTERY: ICD-10-CM

## 2021-10-14 DIAGNOSIS — Z87.891 PERSONAL HISTORY OF NICOTINE DEPENDENCE: ICD-10-CM

## 2021-10-14 PROCEDURE — 99213 OFFICE O/P EST LOW 20 MIN: CPT | Mod: 25

## 2021-10-14 PROCEDURE — 93923 UPR/LXTR ART STDY 3+ LVLS: CPT

## 2021-10-14 RX ORDER — FUROSEMIDE 80 MG/1
80 TABLET ORAL
Qty: 90 | Refills: 0 | Status: ACTIVE | COMMUNITY
Start: 2021-07-14

## 2021-10-14 NOTE — ASSESSMENT
[FreeTextEntry1] : Problem #1 Peripheral vascular disease\par - No palpable pedal pulses and abnormal PVRs with TBI 0.00\par - Will schedule for LLE angiogram with possible revascularization\par - continue ASA and statin\par

## 2021-10-14 NOTE — PHYSICAL EXAM
[Respiratory Effort] : normal respiratory effort [Normal Rate and Rhythm] : normal rate and rhythm [2+] : left 2+ [1+] : right 1+ [0] : left 0 [Ankle Swelling (On Exam)] : not present [Abdomen Tenderness] : ~T ~M No abdominal tenderness [Skin Ulcer] : ulcer [Alert] : alert [Oriented to Person] : oriented to person [Oriented to Place] : oriented to place [Oriented to Time] : oriented to time [Calm] : calm [de-identified] : appears stated age [de-identified] : normocephalic, atraumatic [de-identified] : supple [de-identified] : left 2nd and 3rd toe gangrenous changes

## 2021-10-14 NOTE — DATA REVIEWED
[FreeTextEntry1] : L TBI 0.0 R .39\par vessels non compressible \par waveforms suggest left leg multilevel disease

## 2021-10-14 NOTE — HISTORY OF PRESENT ILLNESS
[FreeTextEntry1] : 71 year old male with history of peripheral vascular disease and end stage renal disease (HD Tu, Th, and Sat) who presents with left foot ischemia with 2nd and 3rd digit wounds. Denies claudication or rest pain. Denies fevers or chills. States his wound has been present for the last few weeks and has progressed quickly.

## 2021-10-17 ENCOUNTER — EMERGENCY (EMERGENCY)
Facility: HOSPITAL | Age: 71
LOS: 0 days | Discharge: HOME | End: 2021-10-17
Attending: STUDENT IN AN ORGANIZED HEALTH CARE EDUCATION/TRAINING PROGRAM
Payer: MEDICARE

## 2021-10-17 VITALS
TEMPERATURE: 98 F | HEART RATE: 77 BPM | OXYGEN SATURATION: 95 % | DIASTOLIC BLOOD PRESSURE: 88 MMHG | SYSTOLIC BLOOD PRESSURE: 168 MMHG | RESPIRATION RATE: 18 BRPM

## 2021-10-17 VITALS
HEART RATE: 80 BPM | RESPIRATION RATE: 19 BRPM | SYSTOLIC BLOOD PRESSURE: 228 MMHG | OXYGEN SATURATION: 95 % | WEIGHT: 149.91 LBS | TEMPERATURE: 98 F | HEIGHT: 66 IN | DIASTOLIC BLOOD PRESSURE: 106 MMHG

## 2021-10-17 DIAGNOSIS — Z20.822 CONTACT WITH AND (SUSPECTED) EXPOSURE TO COVID-19: ICD-10-CM

## 2021-10-17 DIAGNOSIS — I44.4 LEFT ANTERIOR FASCICULAR BLOCK: ICD-10-CM

## 2021-10-17 DIAGNOSIS — Z79.4 LONG TERM (CURRENT) USE OF INSULIN: ICD-10-CM

## 2021-10-17 DIAGNOSIS — L97.529 NON-PRESSURE CHRONIC ULCER OF OTHER PART OF LEFT FOOT WITH UNSPECIFIED SEVERITY: ICD-10-CM

## 2021-10-17 DIAGNOSIS — Z86.2 PERSONAL HISTORY OF DISEASES OF THE BLOOD AND BLOOD-FORMING ORGANS AND CERTAIN DISORDERS INVOLVING THE IMMUNE MECHANISM: ICD-10-CM

## 2021-10-17 DIAGNOSIS — Z90.49 ACQUIRED ABSENCE OF OTHER SPECIFIED PARTS OF DIGESTIVE TRACT: ICD-10-CM

## 2021-10-17 DIAGNOSIS — Z95.1 PRESENCE OF AORTOCORONARY BYPASS GRAFT: ICD-10-CM

## 2021-10-17 DIAGNOSIS — E11.22 TYPE 2 DIABETES MELLITUS WITH DIABETIC CHRONIC KIDNEY DISEASE: ICD-10-CM

## 2021-10-17 DIAGNOSIS — Z86.79 PERSONAL HISTORY OF OTHER DISEASES OF THE CIRCULATORY SYSTEM: ICD-10-CM

## 2021-10-17 DIAGNOSIS — I25.10 ATHEROSCLEROTIC HEART DISEASE OF NATIVE CORONARY ARTERY WITHOUT ANGINA PECTORIS: ICD-10-CM

## 2021-10-17 DIAGNOSIS — E78.5 HYPERLIPIDEMIA, UNSPECIFIED: ICD-10-CM

## 2021-10-17 DIAGNOSIS — Z99.2 DEPENDENCE ON RENAL DIALYSIS: ICD-10-CM

## 2021-10-17 DIAGNOSIS — Z79.82 LONG TERM (CURRENT) USE OF ASPIRIN: ICD-10-CM

## 2021-10-17 DIAGNOSIS — M79.672 PAIN IN LEFT FOOT: ICD-10-CM

## 2021-10-17 DIAGNOSIS — Z95.2 PRESENCE OF PROSTHETIC HEART VALVE: ICD-10-CM

## 2021-10-17 DIAGNOSIS — E11.621 TYPE 2 DIABETES MELLITUS WITH FOOT ULCER: ICD-10-CM

## 2021-10-17 DIAGNOSIS — Z88.8 ALLERGY STATUS TO OTHER DRUGS, MEDICAMENTS AND BIOLOGICAL SUBSTANCES STATUS: ICD-10-CM

## 2021-10-17 DIAGNOSIS — I10 ESSENTIAL (PRIMARY) HYPERTENSION: ICD-10-CM

## 2021-10-17 DIAGNOSIS — Z95.1 PRESENCE OF AORTOCORONARY BYPASS GRAFT: Chronic | ICD-10-CM

## 2021-10-17 DIAGNOSIS — I45.10 UNSPECIFIED RIGHT BUNDLE-BRANCH BLOCK: ICD-10-CM

## 2021-10-17 DIAGNOSIS — Z79.84 LONG TERM (CURRENT) USE OF ORAL HYPOGLYCEMIC DRUGS: ICD-10-CM

## 2021-10-17 LAB
ALBUMIN SERPL ELPH-MCNC: 2.9 G/DL — LOW (ref 3.3–5)
ALP SERPL-CCNC: 142 U/L — HIGH (ref 40–120)
ALT FLD-CCNC: 20 U/L — SIGNIFICANT CHANGE UP (ref 12–78)
ANION GAP SERPL CALC-SCNC: 9 MMOL/L — SIGNIFICANT CHANGE UP (ref 5–17)
APTT BLD: 37.6 SEC — HIGH (ref 27.5–35.5)
AST SERPL-CCNC: 38 U/L — HIGH (ref 15–37)
BASOPHILS # BLD AUTO: 0.03 K/UL — SIGNIFICANT CHANGE UP (ref 0–0.2)
BASOPHILS NFR BLD AUTO: 0.4 % — SIGNIFICANT CHANGE UP (ref 0–2)
BILIRUB SERPL-MCNC: 0.4 MG/DL — SIGNIFICANT CHANGE UP (ref 0.2–1.2)
BLD GP AB SCN SERPL QL: SIGNIFICANT CHANGE UP
BUN SERPL-MCNC: 30 MG/DL — HIGH (ref 7–23)
CALCIUM SERPL-MCNC: 9 MG/DL — SIGNIFICANT CHANGE UP (ref 8.5–10.1)
CHLORIDE SERPL-SCNC: 96 MMOL/L — SIGNIFICANT CHANGE UP (ref 96–108)
CO2 SERPL-SCNC: 29 MMOL/L — SIGNIFICANT CHANGE UP (ref 22–31)
CREAT SERPL-MCNC: 7.69 MG/DL — HIGH (ref 0.5–1.3)
CRP SERPL-MCNC: 52 MG/L — HIGH
EOSINOPHIL # BLD AUTO: 0.28 K/UL — SIGNIFICANT CHANGE UP (ref 0–0.5)
EOSINOPHIL NFR BLD AUTO: 3.4 % — SIGNIFICANT CHANGE UP (ref 0–6)
ERYTHROCYTE [SEDIMENTATION RATE] IN BLOOD: 75 MM/HR — HIGH (ref 0–20)
FLUAV AG NPH QL: SIGNIFICANT CHANGE UP
FLUBV AG NPH QL: SIGNIFICANT CHANGE UP
GLUCOSE BLDC GLUCOMTR-MCNC: 159 MG/DL — HIGH (ref 70–99)
GLUCOSE SERPL-MCNC: 120 MG/DL — HIGH (ref 70–99)
HCT VFR BLD CALC: 32.5 % — LOW (ref 39–50)
HGB BLD-MCNC: 10.2 G/DL — LOW (ref 13–17)
IMM GRANULOCYTES NFR BLD AUTO: 0.4 % — SIGNIFICANT CHANGE UP (ref 0–1.5)
INR BLD: 1.05 RATIO — SIGNIFICANT CHANGE UP (ref 0.88–1.16)
LACTATE SERPL-SCNC: 0.7 MMOL/L — SIGNIFICANT CHANGE UP (ref 0.7–2)
LYMPHOCYTES # BLD AUTO: 0.59 K/UL — LOW (ref 1–3.3)
LYMPHOCYTES # BLD AUTO: 7.1 % — LOW (ref 13–44)
MCHC RBC-ENTMCNC: 27.7 PG — SIGNIFICANT CHANGE UP (ref 27–34)
MCHC RBC-ENTMCNC: 31.4 GM/DL — LOW (ref 32–36)
MCV RBC AUTO: 88.3 FL — SIGNIFICANT CHANGE UP (ref 80–100)
MONOCYTES # BLD AUTO: 1.05 K/UL — HIGH (ref 0–0.9)
MONOCYTES NFR BLD AUTO: 12.6 % — SIGNIFICANT CHANGE UP (ref 2–14)
NEUTROPHILS # BLD AUTO: 6.36 K/UL — SIGNIFICANT CHANGE UP (ref 1.8–7.4)
NEUTROPHILS NFR BLD AUTO: 76.1 % — SIGNIFICANT CHANGE UP (ref 43–77)
NRBC # BLD: 0 /100 WBCS — SIGNIFICANT CHANGE UP (ref 0–0)
PLATELET # BLD AUTO: 363 K/UL — SIGNIFICANT CHANGE UP (ref 150–400)
POTASSIUM SERPL-MCNC: 4.8 MMOL/L — SIGNIFICANT CHANGE UP (ref 3.5–5.3)
POTASSIUM SERPL-SCNC: 4.8 MMOL/L — SIGNIFICANT CHANGE UP (ref 3.5–5.3)
PROT SERPL-MCNC: 7.7 GM/DL — SIGNIFICANT CHANGE UP (ref 6–8.3)
PROTHROM AB SERPL-ACNC: 12.1 SEC — SIGNIFICANT CHANGE UP (ref 10.6–13.6)
RBC # BLD: 3.68 M/UL — LOW (ref 4.2–5.8)
RBC # FLD: 18.2 % — HIGH (ref 10.3–14.5)
SARS-COV-2 RNA SPEC QL NAA+PROBE: SIGNIFICANT CHANGE UP
SODIUM SERPL-SCNC: 134 MMOL/L — LOW (ref 135–145)
WBC # BLD: 8.34 K/UL — SIGNIFICANT CHANGE UP (ref 3.8–10.5)
WBC # FLD AUTO: 8.34 K/UL — SIGNIFICANT CHANGE UP (ref 3.8–10.5)

## 2021-10-17 PROCEDURE — 93010 ELECTROCARDIOGRAM REPORT: CPT

## 2021-10-17 PROCEDURE — 71045 X-RAY EXAM CHEST 1 VIEW: CPT | Mod: 26

## 2021-10-17 PROCEDURE — 73620 X-RAY EXAM OF FOOT: CPT | Mod: 26,LT

## 2021-10-17 PROCEDURE — 99285 EMERGENCY DEPT VISIT HI MDM: CPT

## 2021-10-17 RX ORDER — MORPHINE SULFATE 50 MG/1
2 CAPSULE, EXTENDED RELEASE ORAL ONCE
Refills: 0 | Status: DISCONTINUED | OUTPATIENT
Start: 2021-10-17 | End: 2021-10-17

## 2021-10-17 RX ORDER — LABETALOL HCL 100 MG
1 TABLET ORAL
Qty: 0 | Refills: 0 | DISCHARGE

## 2021-10-17 RX ORDER — DOCUSATE SODIUM 100 MG
1 CAPSULE ORAL
Qty: 0 | Refills: 0 | DISCHARGE

## 2021-10-17 RX ORDER — ATORVASTATIN CALCIUM 80 MG/1
1 TABLET, FILM COATED ORAL
Qty: 0 | Refills: 0 | DISCHARGE

## 2021-10-17 RX ORDER — SEVELAMER CARBONATE 2400 MG/1
2 POWDER, FOR SUSPENSION ORAL
Qty: 0 | Refills: 0 | DISCHARGE

## 2021-10-17 RX ADMIN — MORPHINE SULFATE 2 MILLIGRAM(S): 50 CAPSULE, EXTENDED RELEASE ORAL at 08:35

## 2021-10-17 RX ADMIN — MORPHINE SULFATE 2 MILLIGRAM(S): 50 CAPSULE, EXTENDED RELEASE ORAL at 09:34

## 2021-10-17 NOTE — ED PROVIDER NOTE - PATIENT PORTAL LINK FT
You can access the FollowMyHealth Patient Portal offered by [facility] by registering at the following website: [email or web address]. By joining [employer]’s FollowMyHealth portal, you will also be able to view your health information using other applications (apps) compatible with our system.

## 2021-10-17 NOTE — CONSULT NOTE ADULT - ASSESSMENT
71 M with left foot 4rd digit ischemic changes  - pt seen and evaluated  - afebile, no leukocytosis  - left foot exam: left foot 3rd toe ischemic changes, macerations noted interdigitally in spaced between 2 and 3, and 3 and 4. no drainage, no malodor, no erythema, edema, or edema noted.  - left foot xray: no gas or OM  - patient follows Dr. Gomez/ Dr. Dotson for vascular workup, recommended Angio, planned for tuesday.  - patient follows Dr. Ding in [place] for podiatry, will like to continue f/u with him  - Pt presented to ED for left foot pain, not interested in surgical intervention at this point  - Pt instructed to continue wound care with betadine and to keep interspaces dry  - Pt already on Keflex course, no further abx recommended  - Pt to f/u outpatient with podiatrist Dr. Ding or with Dr. Heredia at [phone] within 1 week of discharge  - Discussed with attending

## 2021-10-17 NOTE — ED PROVIDER NOTE - PHYSICAL EXAMINATION
GEN: Awake, alert, interactive, NAD.  HEAD AND NECK: NC/AT. Airway patent. Neck supple.   EYES:  Clear b/l. EOMI. PERRL.   ENT: Moist mucus membranes.   CARDIAC: Regular rate, regular rhythm. No evident pedal edema.    RESP/CHEST: Normal respiratory effort with no use of accessory muscles or retractions. Clear throughout on auscultation.  ABD: Soft, non-distended, non-tender. No rebound, no guarding.   BACK: No midline spinal TTP. No CVAT.   EXTREMITIES: Moving all extremities with no apparent deformities. L foot: L 3rd toe w/ dusky coloration, + ulceration between 3rd / 4th toes. + mild erythema / TTP L dorsal foot.   SKIN: Warm, dry, intact normal color. No rash.   NEURO: AOx3, CN II-XII grossly intact, no focal deficits.   PSYCH: Appropriate mood and affect.

## 2021-10-17 NOTE — ED ADULT TRIAGE NOTE - CHIEF COMPLAINT QUOTE
Left foot pain with ulcers noted by wife X 2 weeks  saw podiatrist and given amoxicillin  HX DM Left foot pain with ulcers noted by wife X 2 weeks  saw podiatrist and given amoxicillin  HX DM, ESRD dialysis Left AV Fistula, HTN

## 2021-10-17 NOTE — ED PROVIDER NOTE - CLINICAL SUMMARY MEDICAL DECISION MAKING FREE TEXT BOX
70yo M w/ PMH HTN, DM, ESRD on HD, CAD, PVD pw 2wks L 3rd/ 4th toe ulceration. AFVSS. Well appearing, in NAD. Exam as noted in PE. Plan: CBC, CMP, coag panel, T&S, ESR / CRP, BCs, CXR, ECG, XR L foot, COVID swab. Pain control, IV abx. Consult Pod. Re-eval.

## 2021-10-17 NOTE — CONSULT NOTE ADULT - SUBJECTIVE AND OBJECTIVE BOX
Patient is a 71y old  Male who presents with a chief complaint of left foot pain    HPI:  70yo M w/ PMH HTN, DM, PVD, ESRD on HD (TTS, L AVF), CAD s/p CABG pw 2 weeks of L toe ulceration. Pt f/u'd w/ outpatient Podiatry (Dr Ding), prescribed Amox, taking w/o change in symptoms. Pt reports severe pain w/ ambulation. Pt ambulates w/o assistive device. Pt taking Tylenol w/o relief. ROS otherwise negative: Denies F/C, CP, SOB, cough, abd pain, back pain, N/V/D.     PAST MEDICAL & SURGICAL HISTORY:  Hypertension    Diabetes mellitus    CHF (congestive heart failure)    Anemia    CKD (chronic kidney disease)  On hemodialysis    Aortic valve replaced    HTN (hypertension)    HLD (hyperlipidemia)    S/P appendectomy    S/P CABG (coronary artery bypass graft)        MEDICATIONS  (STANDING):    MEDICATIONS  (PRN):      Allergies    lisinopril (Anaphylaxis)    Intolerances        VITALS:    Vital Signs Last 24 Hrs  T(C): 36.7 (17 Oct 2021 13:56), Max: 36.8 (17 Oct 2021 07:47)  T(F): 98.1 (17 Oct 2021 13:56), Max: 98.3 (17 Oct 2021 07:47)  HR: 81 (17 Oct 2021 13:56) (80 - 81)  BP: 173/83 (17 Oct 2021 13:56) (173/83 - 228/106)  BP(mean): --  RR: 16 (17 Oct 2021 13:56) (16 - 19)  SpO2: 96% (17 Oct 2021 13:56) (95% - 96%)    LABS:                          10.2   8.34  )-----------( 363      ( 17 Oct 2021 08:37 )             32.5       10-17    134<L>  |  96  |  30<H>  ----------------------------<  120<H>  4.8   |  29  |  7.69<H>    Ca    9.0      17 Oct 2021 08:38    TPro  7.7  /  Alb  2.9<L>  /  TBili  0.4  /  DBili  x   /  AST  38<H>  /  ALT  20  /  AlkPhos  142<H>  10-17      CAPILLARY BLOOD GLUCOSE      POCT Blood Glucose.: 159 mg/dL (17 Oct 2021 09:33)      PT/INR - ( 17 Oct 2021 08:37 )   PT: 12.1 sec;   INR: 1.05 ratio         PTT - ( 17 Oct 2021 08:37 )  PTT:37.6 sec    LOWER EXTREMITY PHYSICAL EXAM:    LLE EXAM  Vascular: DP/PT 0/4,  CFT <3 seconds Temperature gradient warm to cool,   Neuro: Epicritic sensation diminished to the level of digits B/L.  Musculoskeletal/Ortho: unremarkable  Skin: left foot 3rd toe ischemic changes, macerations noted interdigitally in spaced between 2 and 3, and 3 and 4. no drainage, no malodor, no erythema, edema, or edema noted.         RADIOLOGY & ADDITIONAL STUDIES:  left foot xray: no gas or OM noted (resident read)

## 2021-10-17 NOTE — ED PROVIDER NOTE - OBJECTIVE STATEMENT
70yo M w/ PMH HTN, DM, PVD, ESRD on HD (TTS, L AVF), CAD s/p CABG pw 2 weeks of L toe ulceration. Pt f/u'd w/ outpatient Podiatry (Dr Ding), prescribed Amox, taking w/o change in symptoms. Pt reports severe pain w/ ambulation. Pt ambulates w/o assistive device. Pt taking Tylenol w/o relief. ROS otherwise negative: Denies F/C, CP, SOB, cough, abd pain, back pain, N/V/D.     PMH as above, PSH as above, Allergy lisinopril, Meds hydralazine, labetalol.

## 2021-10-17 NOTE — ED PROVIDER NOTE - PROGRESS NOTE DETAILS
Pain controlled w/ morphine. Podiatry consulted. Podiatry eval'd pt in ED. Pain likely 2/2 ischemia, PVD. L 3rd toe gangrenous, however XR / labs w/o significant abnormalities. Pt / wife resistant to amputation. Stable for d/c home. Given script percocet. Pt has Podiatry and Vascular specialists for outpatient f/u. Return signs and symptoms d/w pt and wife. They understand and agree w/ this plan.

## 2021-10-17 NOTE — ED PROVIDER NOTE - PROVIDER TOKENS
PROVIDER:[TOKEN:[[id number]],FOLLOWUP:[1-3 Days]],PROVIDER:[TOKEN:[[id number]],FOLLOWUP:[7-10 Days]]

## 2021-10-17 NOTE — ED PROVIDER NOTE - CARE PROVIDER_API CALL
Brady Ding  PODIATRIC MEDICINE AND SURGERY  [address] [place] [address]  Phone: [phone]  Fax: [phone]  Follow Up Time: 1-3 Days    Moshe Gomez)  Surgery  Vascular  [address] [place] [address]  Phone: [phone]  Fax: [phone]  Follow Up Time: 7-10 Days

## 2021-10-20 NOTE — H&P PST ADULT - FALL HARM RISK CONCLUSION
----- Message from Delmy Mercado RN sent at 10/20/2021  7:33 AM CDT -----  Please call Rudi and get him an appt.  A telephone appt would work. (30 minutes is fine)  ----- Message -----  From: Kymberly Boswell PA-C  Sent: 10/17/2021   2:42 PM CDT  To: LILLI Quan is interested in switching to a [employer]andem insulin pump. Currently has a Medtronic [id number] insulin pump.  He asked if you could call him anytime Monday thru Friday from 1-1:45 pm or after 3:30 pm.  Thanks.  Guillermina       Fall Risk

## 2021-10-21 DIAGNOSIS — Z01.818 ENCOUNTER FOR OTHER PREPROCEDURAL EXAMINATION: ICD-10-CM

## 2021-10-22 ENCOUNTER — APPOINTMENT (OUTPATIENT)
Dept: DISASTER EMERGENCY | Facility: CLINIC | Age: 71
End: 2021-10-22

## 2021-11-01 ENCOUNTER — APPOINTMENT (OUTPATIENT)
Dept: PULMONOLOGY | Facility: CLINIC | Age: 71
End: 2021-11-01

## 2022-01-24 ENCOUNTER — APPOINTMENT (OUTPATIENT)
Dept: PULMONOLOGY | Facility: CLINIC | Age: 72
End: 2022-01-24

## 2022-02-02 NOTE — ED PROVIDER NOTE - WR INTERPRETATION DATE TIME  1
Hand-Off     Procedure hand off report given to [facility]. Pt's vital signs are stable. Pt denies any pain or discomfort at this time. Right groin procedural access sites are dry and intact with no signs and symptoms of bleeding and hematoma. Dressing in place. Dr. Ramandeep Valentine spoke with patient/family post procedure. 11-Jun-2021 09:53

## 2022-02-04 NOTE — PATIENT PROFILE ADULT - HOW PATIENT ADDRESSED, PROFILE
Pt calls office stating she has an infected tooth and her dental office and oral surgeon are both closed.  Wondering if she could get an antibiotic over the phone of if she needs OV.     Please advise.   Oumar    [phone]   First name

## 2022-03-11 NOTE — PROVIDER CONTACT NOTE (OTHER) - SITUATION
Patient is identified as having Combined Systolic and Diastolic heart failure that is Acute on chronic. CHF is currently uncontrolled due to Pulmonary edema/pleural effusion on CXR. Latest ECHO performed and demonstrates- Results for orders placed during the hospital encounter of 01/15/21    Echo Color Flow Doppler? Yes    Interpretation Summary  · The left ventricle is severely enlarged with eccentric hypertrophy and severely decreased systolic function. The estimated ejection fraction is 25%  · Grade II left ventricular diastolic dysfunction.  · Normal right ventricular size with moderately reduced right ventricular systolic function.  · Severe left atrial enlargement.  · There is pulmonary hypertension.  · There is mild aortic valve stenosis.  · Aortic valve area is 1.37 cm2; peak velocity is 1.33 m/s; mean gradient is 4 mmHg.  · Mild to moderate tricuspid regurgitation.  · Intermediate central venous pressure (8 mmHg).  · The estimated PA systolic pressure is 50 mmHg.  · Trivial pericardial effusion.  . Continue Nitrate/Vasodilator and monitor clinical status closely. Monitor on telemetry. Patient is on CHF pathway.  Monitor strict Is&Os and daily weights.  Place on fluid restriction of 1.5 L. Continue to stress to patient importance of self efficacy and  on diet for CHF. Last BNP reviewed- and noted below   Recent Labs   Lab 03/10/22  2018   BNP >4,900*   .     pt c/o severe L leg pain

## 2022-08-23 NOTE — ED ADULT TRIAGE NOTE - CCCP TRG CHIEF CMPLNT
Care Management Follow Up    Length of Stay (days): 4    Expected Discharge Date: 08/23/2022     Concerns to be Addressed: all concerns addressed in this encounter     Patient plan of care discussed at interdisciplinary rounds: Yes    Anticipated Discharge Disposition: Home, Inpatient Chemical Dependency     Anticipated Discharge Services: Mental Health Resources, Chemical Dependency Resources  Anticipated Discharge DME: Other (see comment) (CPAP)    Patient/family educated on Medicare website which has current facility and service quality ratings: yes  Education Provided on the Discharge Plan:    Patient/Family in Agreement with the Plan: yes    Referrals Placed by CM/SW: Durable Medical Equipment (DME)  Private pay costs discussed: Not applicable    Additional Information:  Note patient is discharging today and per Care Manager RN will be staying with his father in [place] and going into a clinic daily for INR for at least a week.  A referral was sent to [facility] yesterday by the [facility] as patient expressed interest in the [facility] program.  Writer has left [facility] admissions a message asking if they received the referral.    Spoke with patient who had been in contact with [facility] prior to admission.  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  He reports once he has his insurance approved, he plans to attend their partial program which he describes as morning therapy, afternoons are free for participant to work and then be back by evening where participant lodges.    Patient has the contact information and will f/u with the [facility] program once he has secured insurance thru [employer].        NASRA Frances       difficulty breathing

## 2022-09-27 ENCOUNTER — APPOINTMENT (OUTPATIENT)
Dept: PULMONOLOGY | Facility: CLINIC | Age: 72
End: 2022-09-27

## 2022-09-27 VITALS
HEART RATE: 71 BPM | TEMPERATURE: 97.8 F | SYSTOLIC BLOOD PRESSURE: 133 MMHG | OXYGEN SATURATION: 95 % | DIASTOLIC BLOOD PRESSURE: 68 MMHG

## 2022-09-27 DIAGNOSIS — R05.3 CHRONIC COUGH: ICD-10-CM

## 2022-09-27 PROCEDURE — 99214 OFFICE O/P EST MOD 30 MIN: CPT

## 2022-09-27 RX ORDER — IPRATROPIUM BROMIDE AND ALBUTEROL SULFATE 2.5; .5 MG/3ML; MG/3ML
0.5-2.5 (3) SOLUTION RESPIRATORY (INHALATION)
Qty: 2 | Refills: 5 | Status: ACTIVE | COMMUNITY
Start: 2020-02-10 | End: 1900-01-01

## 2022-09-27 RX ORDER — BUDESONIDE 0.5 MG/2ML
0.5 INHALANT ORAL
Qty: 60 | Refills: 5 | Status: ACTIVE | COMMUNITY
Start: 2020-02-10 | End: 1900-01-01

## 2022-09-29 NOTE — HISTORY OF PRESENT ILLNESS
[Daytime Somnolence] : daytime somnolence [Difficulty Maintaining Sleep] : difficulty maintaining sleep [Frequent Nocturnal Awakening] : frequent nocturnal awakening [Unusual Movements] : unusual movements [TextBox_4] : Patient last seen in office October 2021.\par  no longer using O2 \par Started using nebs few weeks ago but then something triggers cough and starts again.  Generally does not mobilize mucus.\par On regular dialysis 3 times/week.  Feels dry weight is good.\par S/p hospitalization for amputation for vascular disease. [facility].\par Denies GERD.  Denies cough with eating.  Denies significant upper airway congestion.\par Wt OK\par States had radiograph in hospital. [Awakes Unrefreshed] : does not awaken unrefreshed [Awakes with Dry Mouth] : does not awaken with dry mouth [Recent  Weight Gain] : no recent weight gain [Snoring] : no snoring [Witnessed Apneas] : no witnessed apneas

## 2022-09-29 NOTE — PHYSICAL EXAM
[No Acute Distress] : no acute distress [Normal Appearance] : normal appearance [No Neck Mass] : no neck mass [Normal S1, S2] : normal s1, s2 [Kyphosis] : kyphosis [Benign] : benign [No Cyanosis] : no cyanosis [Normal to Percussion] : normal to percussion [No Clubbing] : no clubbing [TextBox_68] : Few basilar crackles.  Decreased breath sounds right base. [TextBox_105] : Is post bilateral amputations.

## 2022-09-29 NOTE — ASSESSMENT
[FreeTextEntry1] : Increase nebulizer treatments to DuoNeb 4 times daily and budesonide twice daily.\par Continue dialysis.\par Obtain CT of chest.  High-resolution.\par Follow-up post CT to assess response to therapy and make further recommendations.\par \par 5-minute spent in evaluation management and review of studies.

## 2022-09-29 NOTE — DISCUSSION/SUMMARY
[FreeTextEntry1] : Persistent cough.  May be airways in origin.  No definitive history of GERD or aspiration.  Cannot exclude component of mild volume overload.  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 Clinically stable. \par excessive daytime sleepiness\par Chronic renal failure on dialysis.\par Elevated right hemidiaphragm with restrictive component of disease.\par .

## 2022-10-11 ENCOUNTER — APPOINTMENT (OUTPATIENT)
Dept: PULMONOLOGY | Facility: CLINIC | Age: 72
End: 2022-10-11

## 2022-10-15 ENCOUNTER — OUTPATIENT (OUTPATIENT)
Dept: OUTPATIENT SERVICES | Facility: HOSPITAL | Age: 72
LOS: 1 days | End: 2022-10-15
Payer: MEDICARE

## 2022-10-15 ENCOUNTER — APPOINTMENT (OUTPATIENT)
Dept: CT IMAGING | Facility: IMAGING CENTER | Age: 72
End: 2022-10-15

## 2022-10-15 DIAGNOSIS — Z00.8 ENCOUNTER FOR OTHER GENERAL EXAMINATION: ICD-10-CM

## 2022-10-15 DIAGNOSIS — R05.3 CHRONIC COUGH: ICD-10-CM

## 2022-10-15 DIAGNOSIS — Z95.1 PRESENCE OF AORTOCORONARY BYPASS GRAFT: Chronic | ICD-10-CM

## 2022-10-15 PROCEDURE — 71250 CT THORAX DX C-: CPT

## 2022-10-15 PROCEDURE — 71250 CT THORAX DX C-: CPT | Mod: 26

## 2022-12-30 NOTE — ED PROVIDER NOTE - IV ALTEPLASE EXCL ABS HIDDEN
[facility] Consult [facility]      Patient: Jessica Schroeder MRN: [id number]  SSN: [id number]    YOB: 1964  Age: 59 y.o. Sex: female    Date of Consultation: 12/30/2022  Referring Physician: Ezella Meigs  Reason for Consultation: Chest pain    Chief complain:  chest pain    HPI:  25-year-old female came to emergency with complaining of midsternal chest pain. She feels like midsternal heaviness, nonradiating, no associated symptoms. She is complaining of feeling fatigue and tired. As per patient she had myocardial infarction in June 2021. Since then post PCI she has not feeling well. She is complaining of worsening of midsternal chest pressure. Chest pressure is more with activity. Now she feels continues chest pressure. Due to worsening of symptoms she came to the emergency room. Denies any perspiration or shortness of breath. Denies any dizziness, palpitation, presyncope or syncope. Denies any current smoking or alcohol abuse. Cardiology consult called for evaluation of chest pain.       Past Medical History:   Diagnosis Date    Asthma     CAD (coronary artery disease)     Chest pain, unspecified 9/9/2012    Coronary atherosclerosis of native coronary artery     Depression     Epistaxis 12/11/2012    Fatigue 12/11/2012    GERD (gastroesophageal reflux disease)     Ill-defined condition     allergies    Ill-defined condition     anal mole and cyst    Left arm pain 8/7/2014    Nausea & vomiting     Other and unspecified hyperlipidemia 6/1/2011    Other and unspecified hyperlipidemia     Ovarian cyst      Past Surgical History:   Procedure Laterality Date    HX APPENDECTOMY      HX BREAST BIOPSY      right cyst    HX CHOLECYSTECTOMY      HX CORONARY STENT PLACEMENT  05/05/11    stent mid right coronary artery stenosis    HX GYN      dysplasia    HX HEART CATHETERIZATION  05/11    stent placement mid coronary artery    HX HYSTERECTOMY      HX KNEE ARTHROSCOPY      left    HX OOPHORECTOMY      HX TONSILLECTOMY       Current Facility-Administered Medications   Medication Dose Route Frequency    heparin 25,000 units in D5W 250 ml infusion  12-25 Units/kg/hr IntraVENous TITRATE    aspirin chewable tablet 81 mg  81 mg Oral DAILY    famotidine (PEPCID) tablet 20 mg  20 mg Oral DAILY    montelukast (SINGULAIR) tablet 10 mg  10 mg Oral DAILY    rosuvastatin (CRESTOR) tablet 40 mg  40 mg Oral QHS    ticagrelor (BRILINTA) tablet 90 mg  90 mg Oral Q12H    sodium chloride (NS) flush 5-40 mL  5-40 mL IntraVENous Q8H    sodium chloride (NS) flush 5-40 mL  5-40 mL IntraVENous PRN    acetaminophen (TYLENOL) tablet 650 mg  650 mg Oral Q6H PRN    Or    acetaminophen (TYLENOL) suppository 650 mg  650 mg Rectal Q6H PRN    polyethylene glycol (MIRALAX) packet 17 g  17 g Oral DAILY PRN    ondansetron (ZOFRAN ODT) tablet 4 mg  4 mg Oral Q8H PRN    Or    ondansetron (ZOFRAN) injection 4 mg  4 mg IntraVENous Q6H PRN     Current Outpatient Medications   Medication Sig    rosuvastatin (Crestor) 40 mg tablet Take 1 Tablet by mouth nightly. ticagrelor (BRILINTA) 90 mg tablet Take 1 Tablet by mouth every twelve (12) hours every twelve (12) hours. hydrocortisone (HYTONE) 2.5 % topical cream Apply  to affected area three (3) times daily. use thin layer    famotidine (PEPCID) 20 mg tablet Take 20 mg by mouth daily. cyanocobalamin 1,000 mcg tablet Take 1,000 mcg by mouth daily. nitroglycerin (NITROSTAT) 0.4 mg SL tablet 1 tablet by SubLINGual route every five (5) minutes as needed for Chest Pain (total of 3 tabs for one episode of pain). montelukast (SINGULAIR) 10 mg tablet Take 10 mg by mouth daily. LORazepam (ATIVAN) 0.5 mg tablet Take 0.25 mg by mouth nightly. Pt states she is weaning herself off;  Pt takes 1/2 and 1/4 tablet  Indications: anxious    potassium chloride SR (KLOR-CON 10) 10 mEq tablet Take 10 mEq by mouth daily. aspirin 81 mg tablet Take 81 mg by mouth daily.        Allergies and Intolerances: Allergies   Allergen Reactions    Zocor [Simvastatin] Hives    Biaxin [Clarithromycin] Other (comments)     Whelps      Ciprofloxacin Rash    Pcn [Penicillins] Rash    Plavix [Clopidogrel] Rash    Wellbutrin [Bupropion Hcl] Other (comments)     Whelps           Family History:   Family History   Problem Relation Age of Onset    Coronary Art Dis Mother 46    Heart Attack Mother 46       Social History:   She  reports that she quit smoking about 11 years ago. Her smoking use included cigarettes. She has a 24.00 pack-year smoking history. She has never used smokeless tobacco.  She  reports no history of alcohol use. Review of Systems:     Gen: No fever, chills, malaise, weight loss/gain. Heent: No headache, rhinorrhea, epistaxis, ear pain, hearing loss, sinus pain, neck pain/stiffness, sore throat. Heart: Positive chest pain, No palpitations, shortness of breath, pnd, or orthopnea. Resp: No cough, hemoptysis, wheezing and dyspnea  GI: No nausea, vomiting, diarrhea, constipation, melena or hematochezia. : No urinary obstruction, dysuria or hematuria. Derm: No rash, new skin lesion or pruritis. Musc/skeletal: no bone or joint complains. Vasc: No edema, cyanosis or claudication. Endo: No heat/cold intolerance, no polyuria,polydipsia or polyphagia. Neuro: No unilateral weakness, numbness, tingling. No seizures. Heme: No easy bruising or bleeding. Physical:   Patient Vitals for the past 6 hrs:   Pulse BP SpO2   12/30/22 1055 (!) 48 (!) 105/45 98 %   12/30/22 0955 (!) 58 121/63 98 %   12/30/22 0835 -- 115/61 --         Exam:   General Appearance: Comfortable, not using accessory muscles of respiration. HEENT: DANIELLA. HEAD: Atraumatic  NECK: No JVD, no thyroidomeglay. CAROTIDS: No bruit  LUNGS: Clear bilaterally. HEART: S1+S2 audible, no murmur, no pericardial rub. ABD: Non-tender, BS Audible    EXT: No edema, and no cysnosis. VASCULAR EXAM: Pulses are intact.     PSYCHIATRIC EXAM: Mood is appropriate. MUSCULOSKELETAL: Grossly no joint deformity. NEUROLOGICAL: AAO times 3, Motor and sensory sytem intact     Review of Data:   LABS:   Lab Results   Component Value Date/Time    WBC 6.2 12/30/2022 04:45 AM    HGB 12.2 12/30/2022 04:45 AM    HCT 37.2 12/30/2022 04:45 AM    PLATELET 715 19/06/7361 04:45 AM     Lab Results   Component Value Date/Time    Sodium 139 12/30/2022 01:30 AM    Potassium 3.9 12/30/2022 01:30 AM    Chloride 106 12/30/2022 01:30 AM    CO2 29 12/30/2022 01:30 AM    Glucose 121 (H) 12/30/2022 01:30 AM    BUN 16 12/30/2022 01:30 AM    Creatinine 0.72 12/30/2022 01:30 AM     Lab Results   Component Value Date/Time    Cholesterol, total 117 12/30/2022 04:45 AM    HDL Cholesterol 69 (H) 12/30/2022 04:45 AM    LDL, calculated 42.8 12/30/2022 04:45 AM    Triglyceride 26 12/30/2022 04:45 AM     No components found for: GPT  No results found for: HBA1C, SGV2UXAJ, CPL3QDTE      Cardiology Procedures:   Results for orders placed or performed during the hospital encounter of 12/30/22   EKG, 12 LEAD, INITIAL   Result Value Ref Range    Ventricular Rate 62 BPM    Atrial Rate 62 BPM    P-R Interval 168 ms    QRS Duration 80 ms    Q-T Interval 414 ms    QTC Calculation (Bezet) 420 ms    Calculated P Axis 78 degrees    Calculated R Axis 74 degrees    Calculated T Axis 73 degrees    Diagnosis       Normal sinus rhythm   Poor R-wave progression   No ST-T changes     Results for orders placed or performed in visit on 09/22/11   [facility] POC EKG ROUTINE W/ 12 LEADS, INTER & REP    Impression    See scanned report             Impression / Plan:    Patient Active Problem List   Diagnosis Code    GERD (gastroesophageal reflux disease) K21.9    Depression F32. A    Coronary atherosclerosis of native coronary artery I25.10    Hyperlipidemia E78.5    Epistaxis R04.0    Fatigue R53.83    Left arm pain M79.602    Chest pain R07.9         Asthma J45.909         Tobacco use Z72.0    History of coronary artery stent placement Z95.5    Unstable angina (HCC) I20.0    CAD (coronary artery disease) I25.10    Malaise R53.81    Acute chest pain R07.9    H/O acute myocardial infarction I25.2     Unstable angina     Cardiac catheterization was done in June 2021 reported    Non-STEMI in due to 90% stenosis of the mid left circumflex artery treated with single drug-eluting stent 2.25X30 mm resolute ganga drug-eluting stent with excellent results. Chronic mid LAD 40% disease without any interval change when compared with 2019 angiogram.       Chronic mid RCA 40% stenosis without any interval change when compared with 2019 angiogram.      LVEDP is 10 mmHg. Echocardiogram revealed no wall motion abnormality and normal LVEF. Plan:     Continue aspirin, Brilinta, IV heparin and statin. In view of CAD with unstable angina and ongoing chest discomfort advised about emergent left heart catheterization and coronary angiogram plus or minus PCI. All risks, benefits and alternatives explained to patient and she verbally understood. Discussed with patient about risk of cardiac catheterization including not limited are hematoma, retroperitoneal bleed, pseudoaneurysm, AV fistula, dissection, thrombosis and embolism, radial artery occlusion, myocardial infarction, CVA, TIA, dissection and perforation of great vessels, cardiac perforation, tamponade, atheroembolism, allergy reaction, infection, acute renal failure, arrhythmias, radiation injury, congestive heart failure, respiratory failure, multiorgan failure, death. Patient agreed for procedure.     Further recommendation to follow after cardiac catheterization        Signed By: Marcos Redman MD     December 30, 2022 show

## 2023-01-01 NOTE — ED PROVIDER NOTE - MUSCULOSKELETAL, MLM
Spine appears normal, range of motion is not limited, no muscle or joint tenderness. AV fistula in place on elft arm with palpable thrill - Follow-up with your pediatrician within 48 hours of discharge.   Routine Home Care Instructions:  - Please call us for help if you feel sad, blue or overwhelmed for more than a few days after discharge    - Umbilical cord care:        - Please keep your baby's cord clean and dry (do not apply alcohol)        - Please keep your baby's diaper below the umbilical cord until it has fallen off (~10-14 days)        - Please do not submerge your baby in a bath until the cord has fallen off (sponge bath instead)    - Continue feeding your child at least every 3 hours. Wake baby to feed if needed.     Please contact your pediatrician and return to the hospital if you notice any of the following:   - Fever  (T > 100.4)  - Reduced amount of wet diapers (< 5-6 per day) or no wet diaper in 12 hours  - Increased fussiness, irritability, or crying inconsolably  - Lethargy (excessively sleepy, difficult to arouse)  - Breathing difficulties (noisy breathing, breathing fast, using belly and neck muscles to breath)  - Changes in the baby’s color (yellow, blue, pale, gray)  - Seizure or loss of consciousness RLE vascular birthmark present  Dermatology consulted  MRI w/wo contrast pending  US spine, kidney & bladder and RLE pending RLE vascular birthmark present  Dermatology consulted  MRI w/wo contrast pending  US spine, kidney & bladder and RLE negative  Follow up with derm in 1-2 weeks.

## 2023-01-03 NOTE — ED ADULT NURSE NOTE - BREATH SOUNDS, MLM
Clear Solaraze Pregnancy And Lactation Text: This medication is Pregnancy Category B and is considered safe. There is some data to suggest avoiding during the third trimester. It is unknown if this medication is excreted in breast milk.

## 2023-02-17 NOTE — PROGRESS NOTE ADULT - PROBLEM SELECTOR PLAN 1
[de-identified] : 53 year old female presents for evaluation of injuries to her left foot and left ankle sustained 2/12/23 when she fell off a step ladder twisting her foot. She was evaluated in the ED at [facility] where xrays revealed a fracture of the fifth metatarsal and possible ankle fracture. She was splinted. She complains of pain on the lateral aspect of her foot and swelling of her toes. She is having difficulty managing with crutches and is using a wheelchair. 
-appears patient currently declining renal replacement therapy  -possible AVF
Patient with advanced/progressive CKD likely in the setting of long-standing DM2 and HTN. Upon review of [facility] HIE/[facility] showed elevated Scr of 8.25 on 6/17/18. Patient found to have elevated Scr of 8.74 on this admission (10/25/18). Patient declines renal replacement therapy at this time. Patient with LE pitting edema. Recommend continuing Lasix 80mg IV BID for symptoms. Request vascular consult for vein mapping for possible AVF creation. Will discuss patient's goals on a daily basis. Monitor BMP and urine output
Patient with advanced/progressive CKD likely in the setting of long-standing DM2 and HTN. Upon review of [facility] HIE/[facility] showed elevated Scr of 8.25 on 6/17/18. Patient found to have elevated Scr of 8.74 on this admission (10/25/18). Patient declines renal replacement therapy at this time. Patient with LE pitting edema. Recommend continuing Lasix IV BID for symptoms. Request vascular consult for vein mapping for possible AVF creation. Will discuss patient's goals on a daily basis. Monitor BMP and urine output
Patient with advanced/progressive CKD likely in the setting of long-standing DM2 and HTN. Upon review of [facility] HIE/[facility] showed elevated Scr of 8.25 on 6/17/18. Patient found to have elevated Scr of 8.74 on this admission (10/25/18). Patient declines renal replacement therapy at this time. Patient with LE pitting edema. Recommend continuing Lasix 80mg IV BID for symptoms. Request vascular consult for vein mapping for possible AVF creation. Will discuss patient's goals on a daily basis. Monitor BMP and urine output
-continue to reinforce importance of renal replacement  -possible AVF

## 2023-02-23 NOTE — H&P ADULT - PROBLEM SELECTOR PROBLEM 6
Yes Type 2 diabetes mellitus with other specified complication, unspecified whether long term insulin use Hyperlipidemia, unspecified hyperlipidemia type

## 2023-03-05 NOTE — ED ADULT NURSE NOTE - AGENT'S NAME
Patient states that she was bit by a dog that she is fostering prior to arrival. Patient has small superficial puncture to the top of her nose, no bleeding present. Laceration to right side of lip where lip is split, bleeding controlled. Areas cleansed with normal saline and gauze applied, patient given ice pack. States that dog is up to date on shots and her last tetanus was 6 years ago.    Talisha Castaneda

## 2023-04-25 NOTE — PATIENT PROFILE ADULT - MEDICATIONS/VISITS
[facility] ED  Emergency Department Encounter  Emergency Medicine Resident     Pt Pattie Shaw  MRN: [id number]  [place] 1956  Date of evaluation: 4/25/23  PCP:  Ronny Torre MD  Note Started: 8:38 AM EDT      CHIEF COMPLAINT       Chief Complaint   Patient presents with    Suture / Staple Removal       HISTORY OF PRESENT ILLNESS  (Location/Symptom, Timing/Onset, Context/Setting, Quality, Duration, Modifying Factors, Severity.)      Lotus Razo is a 74 y.o. male who presents with laceration to his forehead on 4/17 he had seven 5-0 sutures placed at this time. He hit his head on a light lamp and had a CT head and CT cervical spine are negative. Patient not on anticoagulation. Patient without headache fevers or chills. PAST MEDICAL / SURGICAL / SOCIAL / FAMILY HISTORY      has a past medical history of Alcohol withdrawal seizure ([address] Blood loss anemia, CAD (coronary artery disease), Cardiomyopathy ([address] Chest pain, Chronic kidney disease, Chronic systolic (congestive) heart failure, CKD (chronic kidney disease), COPD (chronic obstructive pulmonary disease) ([address] Cough, Erectile dysfunction, History of transfusion of packed red blood cells, Hyperlipidemia, Hypertension, Nicotine dependence, Primary adenocarcinoma of prostate ([address] Urinary incontinence, Wears dentures, and Wears glasses. has a past surgical history that includes Cardiac catheterization (2006); Endoscopy, colon, diagnostic; Upper gastrointestinal endoscopy (2008); Prostatectomy (2009); Cardiac catheterization (10/2015); Colonoscopy (2008); Inguinal hernia repair (Right); pr colon ca scrn not hi rsk ind (N/A, 9/19/2017); pr esophagogastroduodenoscopy transoral diagnostic (N/A, 9/19/2017); Upper gastrointestinal endoscopy (09/19/2017); Colonoscopy (09/19/2017); Colonoscopy (1/23/2018); Upper gastrointestinal endoscopy (1/23/2018); and Cardiac catheterization (10/03/2018).     Social History     Socioeconomic no

## 2023-05-12 ENCOUNTER — APPOINTMENT (OUTPATIENT)
Dept: GASTROENTEROLOGY | Facility: CLINIC | Age: 73
End: 2023-05-12
Payer: MEDICARE

## 2023-05-12 VITALS
OXYGEN SATURATION: 96 % | TEMPERATURE: 98.7 F | BODY MASS INDEX: 25.82 KG/M2 | HEART RATE: 91 BPM | DIASTOLIC BLOOD PRESSURE: 91 MMHG | WEIGHT: 160 LBS | SYSTOLIC BLOOD PRESSURE: 200 MMHG

## 2023-05-12 DIAGNOSIS — K21.9 GASTRO-ESOPHAGEAL REFLUX DISEASE W/OUT ESOPHAGITIS: ICD-10-CM

## 2023-05-12 DIAGNOSIS — I50.22 CHRONIC SYSTOLIC (CONGESTIVE) HEART FAILURE: ICD-10-CM

## 2023-05-12 DIAGNOSIS — Z87.19 PERSONAL HISTORY OF OTHER DISEASES OF THE DIGESTIVE SYSTEM: ICD-10-CM

## 2023-05-12 DIAGNOSIS — Z99.2 END STAGE RENAL DISEASE: ICD-10-CM

## 2023-05-12 DIAGNOSIS — N18.6 END STAGE RENAL DISEASE: ICD-10-CM

## 2023-05-12 DIAGNOSIS — R05.9 COUGH, UNSPECIFIED: ICD-10-CM

## 2023-05-12 DIAGNOSIS — Z86.39 PERSONAL HISTORY OF OTHER ENDOCRINE, NUTRITIONAL AND METABOLIC DISEASE: ICD-10-CM

## 2023-05-12 DIAGNOSIS — R11.10 VOMITING, UNSPECIFIED: ICD-10-CM

## 2023-05-12 PROCEDURE — 99205 OFFICE O/P NEW HI 60 MIN: CPT

## 2023-05-12 RX ORDER — AMOXICILLIN AND CLAVULANATE POTASSIUM 875; 125 MG/1; MG/1
875-125 TABLET, COATED ORAL
Qty: 28 | Refills: 0 | Status: DISCONTINUED | COMMUNITY
Start: 2021-10-05 | End: 2023-05-12

## 2023-05-12 RX ORDER — AZITHROMYCIN 250 MG/1
250 TABLET, FILM COATED ORAL
Qty: 7 | Refills: 0 | Status: DISCONTINUED | COMMUNITY
Start: 2021-07-13 | End: 2023-05-12

## 2023-05-12 RX ORDER — FAMOTIDINE 20 MG/1
20 TABLET, FILM COATED ORAL TWICE DAILY
Qty: 60 | Refills: 6 | Status: ACTIVE | COMMUNITY
Start: 2023-05-12 | End: 1900-01-01

## 2023-05-12 NOTE — ASSESSMENT
[FreeTextEntry1] : Postprandial cough and dysphagia.  Rule out esophageal dysmotility.  Patient is referred for a modified barium swallow, speech and swallow study.  This was discussed with the patient and his wife.  He understands and all questions were answered.  He will follow-up after his barium studies.\par Acid reflux. Dietary and lifestyle modification discussed with the patient.  Start Famotidine 20 mg twice daily.\par

## 2023-05-12 NOTE — PHYSICAL EXAM
[Alert] : alert [Normal Voice/Communication] : normal voice/communication [Healthy Appearing] : healthy appearing [No Acute Distress] : no acute distress [Sclera] : the sclera and conjunctiva were normal [Hearing Threshold Finger Rub Not [place]] : hearing was normal [Normal Lips/Gums] : the lips and gums were normal [Oropharynx] : the oropharynx was normal [Normal Appearance] : the appearance of the neck was normal [No Neck Mass] : no neck mass was observed [No Respiratory Distress] : no respiratory distress [No Acc Muscle Use] : no accessory muscle use [Respiration, Rhythm And Depth] : normal respiratory rhythm and effort [Auscultation Breath Sounds / Voice Sounds] : lungs were clear to auscultation bilaterally [Heart Rate And Rhythm] : heart rate was normal and rhythm regular [Normal S1, S2] : normal S1 and S2 [Murmurs] : no murmurs [Bowel Sounds] : normal bowel sounds [Abdomen Tenderness] : non-tender [No Masses] : no abdominal mass palpated [Abdomen Soft] : soft [] : no hepatosplenomegaly [Cervical Lymph Nodes Enlarged Posterior Bilaterally] : no posterior cervical lymphadenopathy [Supraclavicular Lymph Nodes Enlarged Bilaterally] : no supraclavicular lymphadenopathy [No CVA Tenderness] : no CVA  tenderness [Cranial Nerves Intact] : cranial nerves 2-12 were intact [Normal] : oriented to person, place, and time [Oriented To Time, Place, And Person] : oriented to person, place, and time [Normal Mood] : the mood was normal [de-identified] : s/p bilateral leg amputation [de-identified] : Patient is in a wheelchair

## 2023-05-12 NOTE — HISTORY OF PRESENT ILLNESS
[FreeTextEntry1] : 73-year-old man with end-stage renal disease on HD secondary to diabetes mellitus.  He is status post bilateral leg amputaion..  He has a complex medical history including coronary artery disease,  status post aortic valve replacement in 2013 according to the patient, cardiomyopathy with chronic congestive heart failure.  Now complains of episodes of postprandial coughing.  Frequently after he eats he gets mucus in the back of his throat followed by food getting stuck, coughing and sometimes vomiting.  He denies melena or hematemesis.  Despite this he is able to eat and has not lost any weight.  Sometimes he has episodes of mucus and coughing in the back of his throat even when he is not eating but it does appear worse when he eats.  He was told to have an ulcer approximately 4 years ago while hospitalized at [facility] and has been on famotidine.  He denies rectal bleeding, melena or hematemesis.  Colonoscopy approximately 7 years ago according to the patient was unremarkable.

## 2023-07-06 ENCOUNTER — RESULT REVIEW (OUTPATIENT)
Age: 73
End: 2023-07-06

## 2023-07-06 ENCOUNTER — OUTPATIENT (OUTPATIENT)
Dept: OUTPATIENT SERVICES | Facility: HOSPITAL | Age: 73
LOS: 1 days | Discharge: HOME | End: 2023-07-06

## 2023-07-06 ENCOUNTER — APPOINTMENT (OUTPATIENT)
Dept: RADIOLOGY | Facility: HOSPITAL | Age: 73
End: 2023-07-06
Payer: MEDICARE

## 2023-07-06 ENCOUNTER — NON-APPOINTMENT (OUTPATIENT)
Age: 73
End: 2023-07-06

## 2023-07-06 ENCOUNTER — OUTPATIENT (OUTPATIENT)
Dept: OUTPATIENT SERVICES | Facility: HOSPITAL | Age: 73
LOS: 1 days | End: 2023-07-06

## 2023-07-06 DIAGNOSIS — R13.10 DYSPHAGIA, UNSPECIFIED: ICD-10-CM

## 2023-07-06 DIAGNOSIS — Z95.1 PRESENCE OF AORTOCORONARY BYPASS GRAFT: Chronic | ICD-10-CM

## 2023-07-06 PROCEDURE — 74240 X-RAY XM UPR GI TRC 1CNTRST: CPT | Mod: 26

## 2023-07-06 PROCEDURE — 74230 X-RAY XM SWLNG FUNCJ C+: CPT | Mod: 26

## 2023-07-06 NOTE — HISTORY OF PRESENT ILLNESS
[FreeTextEntry1] : Patient arrived to Radiology for an OutPatient Modified Barium Swallow Study. Patient was accompanied by his wife (Talisha).  Patient is a 72 y/o male with PMH as per EMR:\par \par Active Problems\par Abnormal radiologic finding of lung field (793.19) (R91.8)\par Arterial insufficiency (447.1) (I77.1)\par Cardiomyopathy (425.4) (I42.9)\par Chronic cough (786.2) (R05.3)\par Chronic systolic heart failure (428.22) (I50.22)\par Constipation (564.00) (K59.00)\par Cough (786.2) (R05.9)\par Dysphagia (787.20) (R13.10)\par Elevated diaphragm (519.4) (J98.6)\par ESRD on dialysis (585.6,V45.11) (N18.6,Z99.2)\par Excessive daytime sleepiness (780.54) (G47.19)\par Hypertension, unspecified type (401.9) (I10)\par Intermittent claudication (443.9) (I73.9)\par Lung nodule (793.11) (R91.1)\par Nocturnal foot cramps (729.82) (R25.2)\par Nocturnal leg cramps (327.52) (G47.62)\par Pleural effusion (511.9) (J90)\par Preop testing (V72.84) (Z01.818)\par Problem with dialysis access, initial encounter (996.73) (T82.898A)\par Problem with dialysis access, subsequent encounter (V58.89,996.73) (T82.898D)\par Productive cough (786.2) (R05.8)\par Refused influenza vaccine (V64.06) (Z28.21)\par Renal insufficiency (593.9) (N28.9)\par Shortness of breath (786.05) (R06.02)\par Vomiting (787.03) (R11.10)\par \par Past Medical History\par History of diabetes mellitus (V12.29) (Z86.39)\par History of duodenal ulcer (V12.79) (Z87.19)\par \par \par GI Note 5/12/2023 - Postprandial cough and dysphagia. Rule out esophageal dysmotility. Patient is referred for a modified barium swallow, speech and swallow study. This was discussed with the patient and his wife. He understands and all questions were answered. He will follow-up after his barium studies.\par Acid reflux. Dietary and lifestyle modification discussed with the patient. Start Famotidine 20 mg twice daily.\par \par \par \par Today, Patient/Wife reports "when I swallow, i gag and cough" x couple of months.  Patient/Wife reports no current/repeated pneumonia. Patient eats Regular with Thin liquids.   This Modified Barium Swallow Study is to objectively assess the Physiology of the Oral and Pharyngeal Stage swallowing mechanism for treatment plan for least restrictive diet. \par \par \par Referring MD: Dr. Lance Parikh\par Fax: [phone]\par \par \par \par \par \par

## 2023-07-06 NOTE — PLAN
[FreeTextEntry2] : \par 1.) Regular with Thin Liquids. Thin Liquids via CHIN TUCK/DOWN posture\par 2.) Feeding/Swallowing Guidelines: Upright position, small bites, chew solids well, Thin Liquids via cup sip utilizing CHIN TUCK/DOWN posture.\par 3.) Aspiration Precautions\par 4.) Reflux Precautions\par 5.) Maintain Good Oral Hygiene Care\par 6.) Follow up with Physician\par 7.) Swallow Therapy to maximize swallow mechanism\par 8.) Consider Neurological Consultation given unclear etiology of dysphagia state at MD's discretion.\par \par SLP provided Patient/Wife education regarding preliminary results. Both verbalized understanding and will follow up with Physician. \par

## 2023-07-06 NOTE — ASSESSMENT
[FreeTextEntry1] : Patient presents with Functional Oral and Mild to Moderate Pharyngeal Stage Dysphagia. The Oral Stage is characterized by adequate oral containment, adequate chewing for solid, adequate bolus manipulation, adequate tongue motion with adequate anterior to posterior transfer of the bolus for puree/solids with adequate oral clearance.   The Pharyngeal Stage is characterized by mild delayed initiation of the pharyngeal swallow (Bolus head is at the vallecular for Thin Liquids), reduced laryngeal elevation with incomplete laryngeal vestibular closure, mildly reduced tongue base retraction and adequate pharyngeal constriction. There is trace pharyngeal clearance deficits located in the vallecular post swallow for puree/solids. There was Deep Laryngeal Penetration during the swallow for Thin Liquids and Mildly Thick Liquids to the level of the vocal folds. Patient was not sensate given no reflexive cough response. Patient is verbally cued to cough to clear contrast from the airway.  There was No Aspiration observed before, during or after the swallow for puree, solids and Moderately Thick Liquids. Compensatory strategy of Chin Down posture benefit to eliminate Laryngeal Penetration for Thin Liquids. \par \par RESULTS: \par 1.) Deep Laryngeal Penetration during the swallow for Thin Liquids and Mildly Thick Liquids to the level of the vocal folds.  \par 2.) There was No Aspiration observed before, during or after the swallow for puree, solids and Moderately Thick Liquids. \par 3.) Compensatory strategy of Chin Down posture benefit to eliminate Laryngeal Penetration for Thin Liquids. \par \par Of Note: Patient was also on schedule for an Upper GI Series (See Radiologist report). \par

## 2023-07-18 DIAGNOSIS — R13.13 DYSPHAGIA, PHARYNGEAL PHASE: ICD-10-CM

## 2023-07-19 NOTE — PROVIDER CONTACT NOTE (OTHER) - BACKGROUND
Patient was here today to have fecal transplant.  After the colonoscopy was done and fecal transplant was performed patient was rolled over on her back and previously placed G-tube was removed after emptying the inner balloon.   male, htn, needs/refuses dialysis

## 2023-11-03 NOTE — ED ADULT NURSE NOTE - NSSUSCREENINGQ3_ED_ALL_ED
Called patient and informed them of Dr. Cedeno's note, which stated to continue taking BP and to let us know if it was >150/90  
No

## 2023-11-07 NOTE — ED PROVIDER NOTE - CROS ED CARDIOVAS ALL NEG
Patient: FRANCES LORENZO    MR# [id number]    Time of Service: 11/6/2023  8:25 PM       Chief Complaint   Patient presents with   • Flank Pain         History of Present Illness    Frances Lorenzo is a 39 year old female with pmhx of DM presenting today for evaluation of left-sided flank pain that is been ongoing for the past week.  Patient states that she recently completed 3 different antibiotics for a UTI and staph infection approximately 2 weeks ago.  Has had pain to the left flank that is persisted ever since. Pt denies any chest pain, dyspnea, palpitations, fevers, n/v/d, dysuria, headache, cough, congestion, sore throat, neck pain, back pain, weakness, numbness, tingling, dizziness, syncope, or other complaint.         History given by (including independent historians): The patient      Review of Systems    All systems reviewed and are negative unless documented specifically in the HPI.       Past Medical/Surgical History    Past Medical History:   Diagnosis Date   • Diabetes mellitus (CMD)        No past surgical history on file.         Medication list on file    [unfilled]              Allergies    ALLERGIES:   Allergen Reactions   • Tramadol VOMITING            Family/Social History    No family history on file.             Social determinants of health impacting care    Social Determinants of Health     Intimate Partner Violence: Not on file   Social Connections: Not on file   Alcohol Use: Not on file   Tobacco Use: Not on file   Financial Resource Strain: Not on file   Stress: Not on file   Physical Activity: Not on file   Food Insecurity: Not on file   Transportation Needs: Not on file   Inadequate Housing: Not on file (11/6/2023)   Depression: Not on file             Physical Exam    Vitals:    11/06/23 1635 11/06/23 1901 11/06/23 2200 11/06/23 2300   BP:  134/86 123/73 102/61   Pulse:  83 80 (!) 7   Resp:  18 16 16   Temp:       SpO2:  97% 97% 97%   Weight: 115.7 kg (255 lb 1.2 oz)           Available triage/nursing notes and vital signs reviewed by me.      Constitutional:  Appears well-developed and well-nourished. Appears of stated age. NAD  Head: Normocephalic, Atraumatic  Nose: Nose normal  Mouth/Throat: Oropharynx is clear and moist.  Eyes: Conjunctivae are normal. PEERL, EOMI. No scleral icterus.  Ears: External ears without gross abnormalities.  Neck: Normal range of motion. No tracheal deviation present.  Cardiovascular: Normal rate, regular rhythm. intact distal pulses  Respiratory/Chest: Effort normal and breath sounds normal. No respiratory distress. No wheezes. Lungs CTA bilaterally  GI: Soft. Exhibits no mass.  Mild left CVA tenderness. Has no rebound and no guarding.  Musculoskeletal: Exhibits no tenderness. Normal ROM  Neurological: Is awake and alert, moves all extremities spontaneously, strength and sensation grossly intact, a/ox4  Skin: Skin is warm and dry. No edema  Psych: Appropriate mood and affect for condition    Diagnostics:       I have reviewed and interpreted the documented rhythm strip on the EKG as: Normal sinus rhythm           Laboratory summary         Labs Reviewed   COMPREHENSIVE METABOLIC PANEL - Abnormal; Notable for the following components:       Result Value    Sodium 134 (*)     Glucose 209 (*)     BUN/Cr 36 (*)     Globulin 4.2 (*)     All other components within normal limits   URINALYSIS & REFLEX MICROSCOPY WITH CULTURE IF INDICATED - Abnormal; Notable for the following components:    GLUCOSE, URINALYSIS 150 (*)     SPECIFIC GRAVITY, URINALYSIS >1.030 (*)     OCCULT BLOOD, URINALYSIS Large (*)     PROTEIN, URINALYSIS 30 (*)     ERYTHROCYTES, URINALYSIS >100 (*)     All other components within normal limits   LACTIC ACID VENOUS WITH REFLEX - Abnormal; Notable for the following components:    Lactate, Venous 2.3 (*)     All other components within normal limits   CBC WITH AUTOMATED DIFFERENTIAL (PERFORMABLE ONLY) - Abnormal; Notable for the following  components:    RBC 5.37 (*)     HGB 11.0 (*)     MCV 69.3 (*)     MCH 20.5 (*)     MCHC 29.6 (*)     RDW-CV 18.4 (*)     All other components within normal limits    Narrative:     This is an appended report.  These results have been appended to a previously verified report.   LIPASE - Normal   HCG, URINE - POINT OF CARE - Normal   CBC WITH DIFFERENTIAL    Narrative:     The following orders were created for panel order CBC with Automated Differential.  Procedure                               Abnormality         Status                     ---------                               -----------         ------                     CBC with Automated Dif...[[id number]]  Abnormal            Final result                 Please view results for these tests on the individual orders.   RAINBOW DRAW    Narrative:     The following orders were created for panel order [place] Draw Light Blue Top Collected? 1 Label; Red Top Collected? No Labels; Light Green Top Collected? 1 Label; Lavender Top Collected? 1 Label; Gold Top Collected? 1 Label; Pink Top Collected? No Labels; Grey Top Collected? No Labels.  Procedure                               Abnormality         Status                     ---------                               -----------         ------                     Light Blue Top[[id number]]                                 In process                 Light Green Top[[id number]]                                In process                 Lavender Top[[id number]]                                   In process                 Gold Top[[id number]]                                       In process                   Please view results for these tests on the individual orders.   LIGHT BLUE TOP   LIGHT GREEN TOP   LAVENDER TOP   GOLD TOP   LACTIC ACID, VENOUS       Laboratory results above were independently viewed and interpreted by me.              Radiography/Imaging       CT ABDOMEN PELVIS W CONTRAST   Final Result   No acute  findings.      Electronically Signed by: NYASIA PITTMAN M.D.    Signed on: 11/6/2023 11:02 PM    Workstation ID: [id number]          Imaging result above were independently viewed and interpreted by me.         Medical Decision Making including ED Course and Interventions    Multiple differential diagnoses were considered. The patient / caregivers were apprised of diagnostic / treatment options including alternate modes of care, in addition to the risks and benefits, for this medical condition. Based on this discussion the patient / caregiver agrees with this chosen diagnostic and treatment plan.    Assessment:    Frances Carmen is a 39 year old female who presents with pain    Patient otherwise well-appearing, she is stable vital signs and a benign exam.  Her abdominal exam is also benign.  Plan for labs, urinalysis, will order CT abdomen pelvis given that this is been ongoing for so long and may be associated with recent infection.  Will reassess.         Multiple differential diagnoses were considered for this patient.         Comorbidities/chronic illnesses impacting care: See HPI            ADMINISTERED MEDS IN ED:    Medications   morphine injection 4 mg (4 mg Intravenous Given 11/6/23 2148)   sodium chloride (NORMAL SALINE) 0.9 % bolus 1,000 mL (1,000 mLs Intravenous New Bag 11/6/23 2243)   iohexol (OMNIPAQUE 350 INJECT) contrast solution 125 mL (125 mLs Intravenous Given 11/6/23 2231)   morphine injection 2 mg (2 mg Intravenous Given 11/6/23 2308)                 Reassessment                      External records reviewed and documented as above.      Care affected by social determinants of health as documented above.       I personally considered admission/escalation of hospital level care vs discharge vs other disposition from the ED for this patient as above.         Disposition         The patient was discharged to home from the Emergency Department in good condition.        New Prescriptions     No medications on file          Patient instructed and strongly encouraged to return immediately to the Emergency Department for new, persistent, recurrent or worsening symptoms.  Instructions to notify the Primary Care Provider and arrange additional follow-up were advised.  Written aftercare and follow-up instructions were discussed with and verbally acknowledged by the patient.      FINAL CLINICAL IMPRESSION    1. Flank pain           Oswald Wills MD  11/06/23 9460     negative...

## 2024-02-27 ENCOUNTER — APPOINTMENT (OUTPATIENT)
Dept: GASTROENTEROLOGY | Facility: CLINIC | Age: 74
End: 2024-02-27
Payer: MEDICARE

## 2024-02-27 VITALS
DIASTOLIC BLOOD PRESSURE: 74 MMHG | TEMPERATURE: 97.6 F | BODY MASS INDEX: 24.91 KG/M2 | WEIGHT: 155 LBS | OXYGEN SATURATION: 92 % | SYSTOLIC BLOOD PRESSURE: 176 MMHG | HEIGHT: 66 IN | HEART RATE: 81 BPM

## 2024-02-27 DIAGNOSIS — K59.00 CONSTIPATION, UNSPECIFIED: ICD-10-CM

## 2024-02-27 DIAGNOSIS — R13.10 DYSPHAGIA, UNSPECIFIED: ICD-10-CM

## 2024-02-27 PROCEDURE — 99214 OFFICE O/P EST MOD 30 MIN: CPT

## 2024-02-27 RX ORDER — POLYETHYLENE GLYCOL 3350 17 G/17G
17 POWDER, FOR SOLUTION ORAL
Qty: 1 | Refills: 3 | Status: ACTIVE | COMMUNITY
Start: 2024-02-27 | End: 1900-01-01

## 2024-02-27 NOTE — ASSESSMENT
[FreeTextEntry1] : Neurogenic dysphagia. Dietary and lifestyle modification discussed with the patient.  Continue Famotidine 20 mg twice daily. Chronic constipation.  Colon cleanse advised to the patient.  Start MiraLAX daily.

## 2024-02-27 NOTE — PHYSICAL EXAM
[Alert] : alert [Normal Voice/Communication] : normal voice/communication [Healthy Appearing] : healthy appearing [No Acute Distress] : no acute distress [Sclera] : the sclera and conjunctiva were normal [Hearing Threshold Finger Rub Not [place]] : hearing was normal [Normal Lips/Gums] : the lips and gums were normal [Oropharynx] : the oropharynx was normal [Normal Appearance] : the appearance of the neck was normal [No Neck Mass] : no neck mass was observed [No Respiratory Distress] : no respiratory distress [No Acc Muscle Use] : no accessory muscle use [Respiration, Rhythm And Depth] : normal respiratory rhythm and effort [Auscultation Breath Sounds / Voice Sounds] : lungs were clear to auscultation bilaterally [Normal S1, S2] : normal S1 and S2 [Heart Rate And Rhythm] : heart rate was normal and rhythm regular [Murmurs] : no murmurs [Bowel Sounds] : normal bowel sounds [Abdomen Tenderness] : non-tender [Abdomen Soft] : soft [No Masses] : no abdominal mass palpated [] : no hepatosplenomegaly [Cervical Lymph Nodes Enlarged Posterior Bilaterally] : no posterior cervical lymphadenopathy [Supraclavicular Lymph Nodes Enlarged Bilaterally] : no supraclavicular lymphadenopathy [No CVA Tenderness] : no CVA  tenderness [Cranial Nerves Intact] : cranial nerves 2-12 were intact [Oriented To Time, Place, And Person] : oriented to person, place, and time [Normal] : oriented to person, place, and time [Normal Mood] : the mood was normal [de-identified] : s/p bilateral leg amputation [de-identified] : Patient is in a wheelchair

## 2024-02-27 NOTE — HISTORY OF PRESENT ILLNESS
[FreeTextEntry1] : 73-year-old man originally seen for dysphagia.  Since his last visit he is feeling significantly improved on the famotidine.  He is having no problems with swallowing and having no episodes of postprandial coughing.  He does have some occasional phlegm production but is otherwise much better.  He underwent a modified barium swallow that showed no evidence of any obstruction.  There was some deep laryngeal penetration during swallowing however there was no aspiration noted.  Compensatory strategy was given to the patient.  He also complains of chronic constipation.

## 2024-03-18 ENCOUNTER — APPOINTMENT (OUTPATIENT)
Dept: PULMONOLOGY | Facility: CLINIC | Age: 74
End: 2024-03-18
Payer: MEDICARE

## 2024-03-18 VITALS — HEART RATE: 50 BPM | SYSTOLIC BLOOD PRESSURE: 87 MMHG | OXYGEN SATURATION: 98 % | DIASTOLIC BLOOD PRESSURE: 56 MMHG

## 2024-03-18 DIAGNOSIS — G47.19 OTHER HYPERSOMNIA: ICD-10-CM

## 2024-03-18 DIAGNOSIS — R06.83 SNORING: ICD-10-CM

## 2024-03-18 DIAGNOSIS — J90 PLEURAL EFFUSION, NOT ELSEWHERE CLASSIFIED: ICD-10-CM

## 2024-03-18 DIAGNOSIS — R06.02 SHORTNESS OF BREATH: ICD-10-CM

## 2024-03-18 DIAGNOSIS — I42.9 CARDIOMYOPATHY, UNSPECIFIED: ICD-10-CM

## 2024-03-18 PROCEDURE — 99214 OFFICE O/P EST MOD 30 MIN: CPT

## 2024-03-18 PROCEDURE — 71045 X-RAY EXAM CHEST 1 VIEW: CPT

## 2024-03-18 NOTE — PROCEDURE
[FreeTextEntry1] : 03/18/2024 Pulmonary function testing There is a severe ventilatory impairment in a restrictive pattern.  Very mild decrease in flow rates compared to February 2020.

## 2024-03-18 NOTE — HISTORY OF PRESENT ILLNESS
[Difficulty Maintaining Sleep] : difficulty maintaining sleep [Daytime Somnolence] : daytime somnolence [Frequent Nocturnal Awakening] : frequent nocturnal awakening [Unusual Movements] : unusual movements [TextBox_4] : Here for f/u  daughter here with c/o of intermittent episodes pf sob that passes, ? anxiety, happens more [place] at night has o2 at home but only using prn no further cough has not taken Lasix also for a while. no longer doing nebulizers On regular dialysis 3 times/week.  Feels dry weight is good. S/p hospitalization for amputation for vascular disease. [facility]. Denies GERD.  Denies cough with eating.  Denies significant upper airway congestion. Wt OK SOB comes and goes. Resolves without treatment. Can be day or night. Not associated with desaturation.  [Awakes Unrefreshed] : does not awaken unrefreshed [Awakes with Dry Mouth] : does not awaken with dry mouth [Snoring] : no snoring [Recent  Weight Gain] : no recent weight gain [Witnessed Apneas] : no witnessed apneas

## 2024-03-18 NOTE — ASSESSMENT
[FreeTextEntry1] : Restart nebs BID PRN albuterol Continue dialysis. ABG discussed and ordered. Continue oxygen therapy. 2-night HHS ordered   35-minute spent in evaluation management and review of studies.

## 2024-03-18 NOTE — PHYSICAL EXAM
[Normal Appearance] : normal appearance [No Acute Distress] : no acute distress [No Neck Mass] : no neck mass [Normal S1, S2] : normal s1, s2 [Normal to Percussion] : normal to percussion [Kyphosis] : kyphosis [Benign] : benign [No Clubbing] : no clubbing [No Cyanosis] : no cyanosis [TextBox_68] : Few basilar crackles.  Decreased breath sounds right base. [TextBox_105] : Is post bilateral amputations.

## 2024-03-18 NOTE — DISCUSSION/SUMMARY
[FreeTextEntry1] : SOB intermittent.  Does have significant decrease in lung function but also may be component of anxiety.  Cannot exclude component related to volume overload. 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 excessive daytime sleepiness Chronic renal failure on dialysis. 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 .

## 2024-03-21 NOTE — BRIEF OPERATIVE NOTE - ESTIMATED BLOOD LOSS
Patient:  Bin Sutton  YOB: 1969  MRN: [id number]       HPI:       Bin Sutton is a 54 y.o. male who returns today for cardiac follow-up.  Patient here for routine follow-up.  Denies any chest pain or shortness of breath.  Has history remote stents done back several years ago.  Had a heart cath in August 2022 which showed no major obstruction and patent stents.  EF was 55%.  He had no other interval change.  We discussed proceeding with noninvasive studies at the 2-year interval sometime after the summer which he agrees to.      Objective:     Vitals:    03/21/24 1253   BP: 124/80   Pulse: 69       Wt Readings from Last 4 Encounters:   03/21/24 60.5 kg (133 lb 6.4 oz)   09/21/23 61 kg (134 lb 9 oz)   03/23/23 64.6 kg (142 lb 6 oz)   12/09/22 60.3 kg (133 lb)       Allergies:     Allergies   Allergen Reactions    Bee Venom Protein (Honey Bee) Swelling        Medications:     Current Outpatient Medications   Medication Instructions    aspirin 81 mg EC tablet 1 tablet, oral, Daily    atorvastatin (LIPITOR) 80 mg, oral, Nightly    isosorbide mononitrate ER (Imdur) 30 mg 24 hr tablet 1 tablet, oral, Daily    lisinopril 5 mg tablet 1 tablet, oral, Daily    metoprolol tartrate (LOPRESSOR) 25 mg, oral, 2 times daily    nitroglycerin (NITROSTAT) 0.4 mg, sublingual, Every 5 min PRN       Physical Examination:     Constitutional:       Appearance: Healthy appearance. Not in distress.   Neck:      Vascular: No JVR. JVD normal.   Pulmonary:      Effort: Pulmonary effort is normal.      Breath sounds: Normal breath sounds. No wheezing. No rhonchi. No rales.   Chest:      Chest wall: Not tender to palpatation.   Cardiovascular:      PMI at left midclavicular line. Normal rate. Regular rhythm. Normal S1. Normal S2.       Murmurs: There is a grade 1/6 systolic murmur at the URSB, ULSB and apex.      No gallop.  No click. No rub.   Pulses:     Intact distal pulses.   Edema:     Peripheral edema absent.   Abdominal:  "     General: Bowel sounds are normal.      Palpations: Abdomen is soft.      Tenderness: There is no abdominal tenderness.   Musculoskeletal: Normal range of motion.         General: No tenderness. Skin:     General: Skin is warm and dry.   Neurological:      General: No focal deficit present.      Mental Status: Alert and oriented to person, place and time.          Lab:     CBC:   Lab Results   Component Value Date    WBC 7.3 03/03/2023    RBC 4.61 03/03/2023    HGB 14.6 03/03/2023    HCT 42.8 03/03/2023     03/03/2023        CMP:    Lab Results   Component Value Date     03/03/2023    K 3.6 03/03/2023     (H) 03/03/2023    CO2 22 03/03/2023    BUN 10 03/03/2023    CREATININE 0.64 03/03/2023    GLUCOSE 111 (H) 03/03/2023    CALCIUM 9.0 03/03/2023       Magnesium:    Lab Results   Component Value Date    MG 1.86 03/03/2023       Lipid Profile:    Lab Results   Component Value Date    TRIG 50 08/10/2021    HDL 37.0 (A) 08/10/2021       TSH:    No results found for: \"TSH\"    BNP:   Lab Results   Component Value Date    BNP 36 03/03/2023        PT/INR:    Lab Results   Component Value Date    PROTIME 11.7 03/03/2023    INR 1.0 03/03/2023       HgBA1c:    Lab Results   Component Value Date    HGBA1C 5.4 05/30/2021       BMP:  Lab Results   Component Value Date     03/03/2023     08/10/2021     08/09/2021    K 3.6 03/03/2023    K 4.2 08/10/2021    K 3.3 (L) 08/09/2021     (H) 03/03/2023     (H) 08/10/2021     08/09/2021    CO2 22 03/03/2023    CO2 25 08/10/2021    CO2 26 08/09/2021    BUN 10 03/03/2023    BUN 11 08/10/2021    BUN 8 08/09/2021    CREATININE 0.64 03/03/2023    CREATININE 0.64 08/10/2021    CREATININE 0.48 (L) 08/09/2021       CBC:  Lab Results   Component Value Date    WBC 7.3 03/03/2023    WBC 8.0 08/10/2021    WBC 8.2 08/09/2021    RBC 4.61 03/03/2023    RBC 4.34 (L) 08/10/2021    RBC 4.04 (L) 08/09/2021    HGB 14.6 03/03/2023    HGB 14.2 " 08/10/2021    HGB 13.3 (L) 08/09/2021    HCT 42.8 03/03/2023    HCT 42.6 08/10/2021    HCT 38.8 (L) 08/09/2021    MCV 93 03/03/2023    MCV 98 08/10/2021    MCV 96 08/09/2021    MCHC 34.1 03/03/2023    MCHC 33.3 08/10/2021    MCHC 34.3 08/09/2021    RDW 13.8 03/03/2023    RDW 12.7 08/10/2021    RDW 12.4 08/09/2021     03/03/2023     08/10/2021     08/09/2021       Cardiac Enzymes:    Lab Results   Component Value Date    TROPHS 5 03/03/2023    TROPHS 5 03/03/2023    TROPHS 5 03/03/2023       Hepatic Function Panel:    Lab Results   Component Value Date    ALKPHOS 77 03/03/2023    ALT 20 03/03/2023    AST 18 03/03/2023    PROT 6.9 03/03/2023    BILITOT 1.3 (H) 03/03/2023       Diagnostic Studies:         XR chest 1 view    Result Date: 3/3/2023  Interpreted By:  FINKELSTEIN, EVAN, MD MRN: [id number] Patient Name: KATERIN HALLMAN  STUDY: CHEST 1 VIEW;  3/3/2023 6:09 am  INDICATION: Chest Pain .  COMPARISON: Chest radiograph 08/09/2021  ACCESSION NUMBER(S): [id number]  ORDERING CLINICIAN: LIV MCCORMICK  FINDINGS:   CARDIOMEDIASTINAL SILHOUETTE: Cardiomediastinal silhouette is normal in size and configuration.  LUNGS: No pulmonary consolidation, pleural effusion or pneumothorax.  ABDOMEN: No remarkable upper abdominal findings.  BONES: No acute osseous abnormality.      No radiographic evidence of acute cardiopulmonary pathology.      Cardiac Cath Transition of Care Summary:  Post Procedure Diagnosis: Mild CAD.  Blood Loss:               Estimated blood loss during the procedure was 0 mls.  Specimens Removed:        Number of specimen(s) removed: none.     ____________________________________________________________________________________  CONCLUSIONS:   1. The entire Left Main: 0% stenosis.   2. Left Anterior Descending Artery: is calcified and contains patent previously placed stents.   3. Proximal and mid LAD Lesion: The percent stenosis is 10-30%.   4. Mid CX Lesion: The percent stenosis is 10-30%.    5. Mid RCA Lesion: The percent stenosis is 30%.   6. The Left Ventricular Ejection Fraction is 55%.     ____________________________________________________________________________________  CPT Codes:  Left Heart Cath (visualization of coronaries) and [id number]; Moderate Sedation Services initial 15 minutes patient >5 years-[id number]     ICD 10 Codes:  I25.110-Atherosclerotic heart disease of native coronary artery with unstable angina pectoris     [id number] Will Conner MD  Performing Physician  Electronically signed by [id number] Will Conner MD on 8/10/2021 at 8:25:25  AM        cc Report to: Riky Mohamud MD     Radiology:     No orders to display       Problem List:     Patient Active Problem List   Diagnosis    CAD (coronary artery disease)    Current every day smoker    Hyperlipidemia    Hypertension    NSTEMI, initial episode of care (CMS/[facility])    Status post insertion of drug-eluting stent into left anterior descending (LAD) artery for coronary artery disease    BMI 21.0-21.9, adult       Asessment:       54-year-old gentleman here for routine cardiovascular follow-up.    Meds, vitals, examination as noted.    Chart reviewed in detail discussed the patient at length.    Impression:  ASHD class II  Rule out non-STEMI  Patent stent to the LAD based on August 2022 procedure  Smoking abuse  Dyslipidemia  Hypertension  Plan:     Recommendation:  Continue current meds  Smoking cessation  Schedule nuclear stress test and echo in September and see me afterwards  Call if any other issues or problems otherwise develop   30

## 2024-04-23 ENCOUNTER — APPOINTMENT (OUTPATIENT)
Dept: PULMONOLOGY | Facility: CLINIC | Age: 74
End: 2024-04-23

## 2024-04-25 ENCOUNTER — APPOINTMENT (OUTPATIENT)
Dept: PULMONOLOGY | Facility: CLINIC | Age: 74
End: 2024-04-25
Payer: MEDICARE

## 2024-04-25 PROCEDURE — 82803 BLOOD GASES ANY COMBINATION: CPT

## 2024-04-25 PROCEDURE — 36600 WITHDRAWAL OF ARTERIAL BLOOD: CPT | Mod: 59

## 2024-06-25 ENCOUNTER — APPOINTMENT (OUTPATIENT)
Dept: PULMONOLOGY | Facility: CLINIC | Age: 74
End: 2024-06-25
Payer: MEDICARE

## 2024-06-25 PROCEDURE — 95800 SLP STDY UNATTENDED: CPT

## 2024-06-27 PROCEDURE — 95800 SLP STDY UNATTENDED: CPT

## 2024-07-16 ENCOUNTER — APPOINTMENT (OUTPATIENT)
Dept: PULMONOLOGY | Facility: CLINIC | Age: 74
End: 2024-07-16

## 2024-08-06 ENCOUNTER — APPOINTMENT (OUTPATIENT)
Dept: PULMONOLOGY | Facility: CLINIC | Age: 74
End: 2024-08-06

## 2024-08-06 PROBLEM — G47.33 OSA (OBSTRUCTIVE SLEEP APNEA): Status: ACTIVE | Noted: 2024-08-06

## 2024-08-06 PROBLEM — J44.9 COPD (CHRONIC OBSTRUCTIVE PULMONARY DISEASE): Status: ACTIVE | Noted: 2024-08-06

## 2024-08-06 PROCEDURE — 99213 OFFICE O/P EST LOW 20 MIN: CPT

## 2024-08-06 NOTE — HISTORY OF PRESENT ILLNESS
[Daytime Somnolence] : daytime somnolence [Difficulty Maintaining Sleep] : difficulty maintaining sleep [Frequent Nocturnal Awakening] : frequent nocturnal awakening [Unusual Movements] : unusual movements [TextBox_4] : Here for f/u had Abg reviewed  had 2-night HSS pt c/o poor sleep   was using nebs bid but stopped has a cough prn but less c/o sob  seeing Dr Parikh for duodenal ulcer has o2 at home but only using prn   On regular dialysis 3 times/week.  Feels dry weight is good. has amputation for vascular disease. [facility]. Denies GERD.  Denies cough with eating.  Denies significant upper airway congestion. Wt OK SOB comes and goes. Resolves without treatment. Can be day or night. Not associated with desaturation.  [Awakes Unrefreshed] : does not awaken unrefreshed [Awakes with Dry Mouth] : does not awaken with dry mouth [Recent  Weight Gain] : no recent weight gain [Snoring] : no snoring [Witnessed Apneas] : no witnessed apneas

## 2024-08-06 NOTE — ASSESSMENT
[FreeTextEntry1] : Restart nebs BID, ordered PRN albuterol Continue dialysis. start auto cpap 5-15 cm with robins fx small nasal pillows r/t Oct/Nov for compliance and PFT Continue oxygen therapy.    35-minute spent in evaluation management and review of studies.

## 2024-08-06 NOTE — PHYSICAL EXAM
[No Acute Distress] : no acute distress [Normal Appearance] : normal appearance [No Neck Mass] : no neck mass [Normal S1, S2] : normal s1, s2 [Normal to Percussion] : normal to percussion [Kyphosis] : kyphosis [Benign] : benign [No Clubbing] : no clubbing [No Cyanosis] : no cyanosis [TextBox_68] : Few basilar crackles.  Decreased breath sounds right base. [TextBox_105] : Is post bilateral amputations.

## 2024-08-06 NOTE — DISCUSSION/SUMMARY
[FreeTextEntry1] : SOB intermittent.  Does have significant decrease in lung function but also may be component of anxiety.  Cannot exclude component related to volume overload. 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 Moderate MARYJO AHI 26 RDI 44 Chronic renal failure on dialysis. 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 .

## 2024-08-15 ENCOUNTER — APPOINTMENT (OUTPATIENT)
Dept: GASTROENTEROLOGY | Facility: CLINIC | Age: 74
End: 2024-08-15
Payer: MEDICARE

## 2024-08-15 VITALS
HEIGHT: 66 IN | BODY MASS INDEX: 24.91 KG/M2 | HEART RATE: 67 BPM | OXYGEN SATURATION: 95 % | SYSTOLIC BLOOD PRESSURE: 112 MMHG | DIASTOLIC BLOOD PRESSURE: 62 MMHG | WEIGHT: 155 LBS | TEMPERATURE: 97.6 F

## 2024-08-15 DIAGNOSIS — R13.10 DYSPHAGIA, UNSPECIFIED: ICD-10-CM

## 2024-08-15 DIAGNOSIS — Z99.2 END STAGE RENAL DISEASE: ICD-10-CM

## 2024-08-15 DIAGNOSIS — N18.6 END STAGE RENAL DISEASE: ICD-10-CM

## 2024-08-15 PROCEDURE — 99214 OFFICE O/P EST MOD 30 MIN: CPT

## 2024-08-15 NOTE — HISTORY OF PRESENT ILLNESS
[FreeTextEntry1] : 74-year-old man with ESRD on dialysis.  Over the weekend in mid July patient had several episodes of nausea and vomiting followed by greenish blackish emesis.  He has not been on his famotidine.  An upper GI series in 2023 was negative.  The nausea and vomiting resolved and he is in no further episodes.  He denies melena or hematemesis.

## 2024-08-15 NOTE — ASSESSMENT
[FreeTextEntry1] : Acute gastritis possible upper GI bleed. Dietary and lifestyle modification discussed with the patient.  Patient and his wife advised to make sure he continues on the famotidine 40 mg daily FIT test ordered.  Possible EGD if symptoms recur.  This was discussed with the patient and his wife.  He understands and all questions were answered

## 2024-10-15 ENCOUNTER — APPOINTMENT (OUTPATIENT)
Dept: PULMONOLOGY | Facility: CLINIC | Age: 74
End: 2024-10-15
Payer: MEDICARE

## 2024-10-15 VITALS
HEIGHT: 66 IN | HEART RATE: 59 BPM | OXYGEN SATURATION: 84 % | WEIGHT: 155 LBS | SYSTOLIC BLOOD PRESSURE: 116 MMHG | DIASTOLIC BLOOD PRESSURE: 62 MMHG | BODY MASS INDEX: 24.91 KG/M2

## 2024-10-15 VITALS — TEMPERATURE: 98.6 F

## 2024-10-15 DIAGNOSIS — R09.02 HYPOXEMIA: ICD-10-CM

## 2024-10-15 DIAGNOSIS — G47.33 OBSTRUCTIVE SLEEP APNEA (ADULT) (PEDIATRIC): ICD-10-CM

## 2024-10-15 DIAGNOSIS — J98.4 OTHER DISORDERS OF LUNG: ICD-10-CM

## 2024-10-15 DIAGNOSIS — J44.9 CHRONIC OBSTRUCTIVE PULMONARY DISEASE, UNSPECIFIED: ICD-10-CM

## 2024-10-15 DIAGNOSIS — Z99.2 END STAGE RENAL DISEASE: ICD-10-CM

## 2024-10-15 DIAGNOSIS — N18.6 END STAGE RENAL DISEASE: ICD-10-CM

## 2024-10-15 DIAGNOSIS — R06.02 SHORTNESS OF BREATH: ICD-10-CM

## 2024-10-15 PROCEDURE — 99214 OFFICE O/P EST MOD 30 MIN: CPT

## 2024-10-17 PROBLEM — R09.02 HYPOXEMIA: Status: ACTIVE | Noted: 2024-10-17

## 2024-10-17 PROBLEM — J98.4 RESTRICTIVE LUNG DISEASE: Status: ACTIVE | Noted: 2024-10-17

## 2024-11-05 ENCOUNTER — APPOINTMENT (OUTPATIENT)
Dept: PULMONOLOGY | Facility: CLINIC | Age: 74
End: 2024-11-05
Payer: MEDICARE

## 2024-11-05 VITALS
HEART RATE: 80 BPM | RESPIRATION RATE: 16 BRPM | DIASTOLIC BLOOD PRESSURE: 65 MMHG | OXYGEN SATURATION: 83 % | SYSTOLIC BLOOD PRESSURE: 114 MMHG

## 2024-11-05 DIAGNOSIS — G47.33 OBSTRUCTIVE SLEEP APNEA (ADULT) (PEDIATRIC): ICD-10-CM

## 2024-11-05 DIAGNOSIS — R06.02 SHORTNESS OF BREATH: ICD-10-CM

## 2024-11-05 DIAGNOSIS — Z99.2 END STAGE RENAL DISEASE: ICD-10-CM

## 2024-11-05 DIAGNOSIS — N18.6 END STAGE RENAL DISEASE: ICD-10-CM

## 2024-11-05 DIAGNOSIS — J44.9 CHRONIC OBSTRUCTIVE PULMONARY DISEASE, UNSPECIFIED: ICD-10-CM

## 2024-11-05 DIAGNOSIS — J98.6 DISORDERS OF DIAPHRAGM: ICD-10-CM

## 2024-11-05 DIAGNOSIS — J98.4 OTHER DISORDERS OF LUNG: ICD-10-CM

## 2024-11-05 DIAGNOSIS — J94.9 PLEURAL CONDITION, UNSPECIFIED: ICD-10-CM

## 2024-11-05 DIAGNOSIS — R91.8 OTHER NONSPECIFIC ABNORMAL FINDING OF LUNG FIELD: ICD-10-CM

## 2024-11-05 PROCEDURE — 71045 X-RAY EXAM CHEST 1 VIEW: CPT

## 2024-11-05 PROCEDURE — 99214 OFFICE O/P EST MOD 30 MIN: CPT

## 2024-11-08 NOTE — PATIENT PROFILE ADULT - NSPROPTRIGHTBILLOFRIGHTS_GEN_A_NUR
"[facility]  Pre Procedure History & Physical    Chief Complaint:   Time for my colonoscopy and egd     Subjective     HPI:   72 y.o. male presenting to endoscopy unit today for surveillance colonoscopy.a and egd     Past Medical History:   Past Medical History:   Diagnosis Date    Anxiety     Arthritis     BPH (benign prostatic hyperplasia)     Cataract 5 years ago ???    Childhood asthma     Chronic kidney disease     PT TOLD THAT HE HAS \"LOW KIDNEY FUNCTION\"  - PCP WATCHING LABS    Coronary artery disease     HX CABG X 1 - FOLLOWED BY PCP    Depression     Diabetes mellitus recently    Prediabetes    Diverticulosis     Erectile dysfunction Last year    since prostate surgery    Esophageal stricture     Esophagitis, eosinophilic 01/20/2022    Added automatically from request for surgery [id number]    GERD (gastroesophageal reflux disease)     Gout     History of sleep apnea     HX MULTIPLE SURGERIES    History of transfusion     no reaction    HL (hearing loss) 10 yesrs ago    hearing aids doesn't wear    Hyperlipidemia     Hypertension     Low back pain     Memory loss     Neurogenic claudication     Obesity long time    losing weight    Renal insufficiency steady    Right retinal detachment 02/05/2020    SI (sacroiliac) joint inflammation     Sleep apnea     NO CPAP- HAD UVULECTOMY AND RHINOPLASTY       Family History:  Family History   Problem Relation Age of Onset    Arthritis Mother     Cancer Mother     Heart disease Mother     Hypertension Mother     Kidney disease Mother         prediabetes    Hypertension Father     Aneurysm Father     Heart disease Father     Malig Hyperthermia Neg Hx        Social History:   reports that he quit smoking about 59 years ago. His smoking use included cigarettes. He has never been exposed to tobacco smoke. He has never used smokeless tobacco. He reports that he does not currently use alcohol. He reports that he does not use drugs.    Medications: "   Medications Prior to Admission   Medication Sig Dispense Refill Last Dose/Taking    acetaminophen (TYLENOL) 500 MG tablet Take 2 tablets by mouth Every 6 (Six) Hours As Needed for Mild Pain.   Past Week    atorvastatin (LIPITOR) 40 MG tablet TAKE 1 TABLET BY MOUTH EVERY NIGHT 90 tablet 1 11/7/2024    coenzyme Q10 100 MG capsule Take 1 capsule by mouth Daily. HELD FOR OR   11/4/2024    docusate sodium (COLACE) 250 MG capsule Take 1 capsule by mouth 2 (Two) Times a Day. 60 capsule 2 Past Week    KRILL OIL PO Take 1 capsule by mouth Daily. HELD FOR OR   10/30/2024    metFORMIN ER (GLUCOPHAGE-XR) 750 MG 24 hr tablet TAKE 1 TABLET BY MOUTH DAILY WITH BREAKFAST 30 tablet 2 11/7/2024    terazosin (HYTRIN) 10 MG capsule TAKE 1 CAPSULE BY MOUTH EVERY MORNING 90 capsule 1 11/8/2024    triamterene-hydrochlorothiazide (DYAZIDE) 37.5-25 MG per capsule TAKE 1 CAPSULE BY MOUTH EVERY MORNING 90 capsule 1 11/8/2024    HYDROcodone-acetaminophen (NORCO) 5-325 MG per tablet Take 1 tablet by mouth Every 6 (Six) Hours As Needed.   More than a month       Allergies:  Patient has no known allergies.    Objective     Blood pressure 115/75, pulse 86, resp. rate 18, weight 102 kg (224 lb 1.6 oz), SpO2 95%.  Physical Exam:   General: patient awake, alert and cooperative    Assessment & Plan     Diagnosis:  Screening  dysphagia    Anticipated Surgical Procedure:  Colonoscopy and egd     The risks, benefits, and alternatives of this procedure have been discussed with the patient or the responsible party- the patient understands and agrees to proceed.                                                                  patient

## 2024-11-16 ENCOUNTER — OUTPATIENT (OUTPATIENT)
Dept: OUTPATIENT SERVICES | Facility: HOSPITAL | Age: 74
LOS: 1 days | End: 2024-11-16
Payer: MEDICARE

## 2024-11-16 ENCOUNTER — APPOINTMENT (OUTPATIENT)
Dept: CT IMAGING | Facility: IMAGING CENTER | Age: 74
End: 2024-11-16

## 2024-11-16 DIAGNOSIS — J44.9 CHRONIC OBSTRUCTIVE PULMONARY DISEASE, UNSPECIFIED: ICD-10-CM

## 2024-11-16 DIAGNOSIS — Z95.1 PRESENCE OF AORTOCORONARY BYPASS GRAFT: Chronic | ICD-10-CM

## 2024-11-16 PROCEDURE — 71250 CT THORAX DX C-: CPT

## 2024-11-16 PROCEDURE — 71250 CT THORAX DX C-: CPT | Mod: 26

## 2024-11-16 NOTE — PATIENT PROFILE ADULT - DOES PATIENT HAVE ADVANCE DIRECTIVE
Prep Survey      Flowsheet Row Responses   [facility] discharged [facility]? [facility]   Is LACE score < 7 ? No   Eligibility Readm Mgmt   Discharge diagnosis Abnormal stress test   Does the patient have one of the following disease processes/diagnoses(primary or secondary)? Other   Prep survey completed? Yes            Gretchen STRANGE - Registered Nurse          
Yes

## 2024-12-17 ENCOUNTER — APPOINTMENT (OUTPATIENT)
Dept: PULMONOLOGY | Facility: CLINIC | Age: 74
End: 2024-12-17
Payer: MEDICARE

## 2024-12-17 VITALS — DIASTOLIC BLOOD PRESSURE: 69 MMHG | SYSTOLIC BLOOD PRESSURE: 110 MMHG | OXYGEN SATURATION: 77 % | HEART RATE: 97 BPM

## 2024-12-17 DIAGNOSIS — J44.9 CHRONIC OBSTRUCTIVE PULMONARY DISEASE, UNSPECIFIED: ICD-10-CM

## 2024-12-17 DIAGNOSIS — R91.8 OTHER NONSPECIFIC ABNORMAL FINDING OF LUNG FIELD: ICD-10-CM

## 2024-12-17 DIAGNOSIS — G47.33 OBSTRUCTIVE SLEEP APNEA (ADULT) (PEDIATRIC): ICD-10-CM

## 2024-12-17 DIAGNOSIS — J98.6 DISORDERS OF DIAPHRAGM: ICD-10-CM

## 2024-12-17 PROCEDURE — 99214 OFFICE O/P EST MOD 30 MIN: CPT

## 2025-01-01 ENCOUNTER — EMERGENCY (EMERGENCY)
Facility: HOSPITAL | Age: 75
LOS: 0 days | End: 2025-01-06
Attending: STUDENT IN AN ORGANIZED HEALTH CARE EDUCATION/TRAINING PROGRAM
Payer: MEDICARE

## 2025-01-01 VITALS — WEIGHT: 100.09 LBS

## 2025-01-01 DIAGNOSIS — Z95.1 PRESENCE OF AORTOCORONARY BYPASS GRAFT: Chronic | ICD-10-CM

## 2025-01-01 PROCEDURE — 92950 HEART/LUNG RESUSCITATION CPR: CPT

## 2025-01-01 PROCEDURE — 99285 EMERGENCY DEPT VISIT HI MDM: CPT | Mod: 25

## 2025-01-06 NOTE — ED ADULT NURSE NOTE - OBJECTIVE STATEMENT
Patient brought in by EMS, as per ems, pt became hypotensive then unresponsive half way through dialysis. DT 60 min pta, epi x 6, bicarb x 1, lidocaine X 1, Ca X1, shock x 5. ET- 7.5. lip line- 23.

## 2025-01-06 NOTE — ED ADULT NURSE NOTE - NSSUHOSCREENINGYN_ED_ALL_ED
No - the patient is unable to be screened due to medical condition
Number Of Freeze-Thaw Cycles: 1 freeze-thaw cycle
Detail Level: Zone
Consent: The patient's consent was obtained including but not limited to risks of crusting, scabbing, blistering, scarring, darker or lighter pigmentary change, recurrence, incomplete removal and infection.
Post-Care Instructions: I reviewed with the patient in detail post-care instructions. Patient is to wear sunprotection, and avoid picking at any of the treated lesions. Pt may apply Vaseline to crusted or scabbing areas.
Duration Of Freeze Thaw-Cycle (Seconds): 6
Show Aperture Variable?: Yes
Render Note In Bullet Format When Appropriate: No

## 2025-01-06 NOTE — ED ADULT TRIAGE NOTE - CHIEF COMPLAINT QUOTE
as per ems, pt became hypotensive then unresponsive half way through dialysis. DT 60 min pta, epi x 6, bicarb x 1, lidocaine X 1, Ca X1, shock x 5. ET- 7.5. lip line- 23.

## 2025-01-06 NOTE — PHARMACOTHERAPY INTERVENTION NOTE - COMMENTS
[facility] called for patient. Pharmacy at bedside with ED team to assess patient and to manage ACLS medications.     Patient coming from dialysis, downtime prior to arrival ~ 60 min, and patient received epinephrines x6, 88meq sodium bicarbonate, calcium chloride 1g, lidocaine 100mg, and shocked 5 times for Vfib/Vtach rhythm by EMS prior to arrival in ED.    Upon arrival to ED, patient recieved additional epinephrines x4, sodium bicarbonate 50meq x2, calcium chloride 1g x2, and amiodarone 300mg for Vfib/Vtach rhythm. TOD 1847.

## 2025-01-07 NOTE — ED PROVIDER NOTE - CLINICAL SUMMARY MEDICAL DECISION MAKING FREE TEXT BOX
74-year-old male pmhx of end-stage renal disease on dialysis Tuesday Thursday Saturday, hypertension, hyperlipidemia comes to ED in cardiac arrest. Patient was receiving dialysis when they suddenly lost consciousness.  Patient was noted to not have a pulse and CPR was started.  EMS was called, received 6 rounds of epi, 1 round of bicarb, 1 round of lidocaine, 1 round of calcium, and 5 shocks due to V-fib seen while in transport.  Patient by arrival time in the emergency department had already been down for 1 hour.      General: Unresponsive  HEENT: NCAT   Cardiac: No pulse  Chest: On ventilator, crackles throughout.   Abdomen: distended  Neuro: Unconscious    Impression: 74-year-old male pmhx of end-stage renal disease on dialysis Tuesday Thursday Saturday, hypertension, hyperlipidemia comes to ED in cardiac arrest.     While in the emergency department patient received 7 rounds of CPR including 7 shocks due to visible V-fib, 4 rounds of epi, 2 rounds of bicarb, 2 rounds of calcium, 1 round of amiodarone with no return to ROSC.  Time of death was called at 1847.

## 2025-03-18 ENCOUNTER — APPOINTMENT (OUTPATIENT)
Dept: PULMONOLOGY | Facility: CLINIC | Age: 75
End: 2025-03-18

## 2025-04-23 NOTE — ED ADULT NURSE NOTE - CAS TRG GENERAL NORM CIRC DET
Home care nurse Teressa called to report an FYI that Dr Zavala's recent instructions/orders stated that the patient's wound vac suction should be at 175. However, the patient felt that it was too painful and turned it back down to 150.   
Strong peripheral pulses
